# Patient Record
Sex: FEMALE | Race: BLACK OR AFRICAN AMERICAN | NOT HISPANIC OR LATINO | Employment: UNEMPLOYED | URBAN - METROPOLITAN AREA
[De-identification: names, ages, dates, MRNs, and addresses within clinical notes are randomized per-mention and may not be internally consistent; named-entity substitution may affect disease eponyms.]

---

## 2017-03-22 ENCOUNTER — HOSPITAL ENCOUNTER (OUTPATIENT)
Dept: PREADMISSION TESTING | Facility: HOSPITAL | Age: 57
Discharge: HOME | End: 2017-03-22
Attending: ORTHOPAEDIC SURGERY
Payer: MEDICARE

## 2017-03-22 VITALS
HEART RATE: 81 BPM | OXYGEN SATURATION: 98 % | RESPIRATION RATE: 18 BRPM | DIASTOLIC BLOOD PRESSURE: 81 MMHG | WEIGHT: 219 LBS | TEMPERATURE: 99 F | HEIGHT: 68 IN | SYSTOLIC BLOOD PRESSURE: 165 MMHG | BODY MASS INDEX: 33.19 KG/M2

## 2017-03-22 DIAGNOSIS — Z01.818 PRE-OP TESTING: Primary | ICD-10-CM

## 2017-03-22 LAB
ALBUMIN SERPL BCP-MCNC: 4.1 G/DL
ALP SERPL-CCNC: 107 U/L
ALT SERPL W/O P-5'-P-CCNC: 12 U/L
ANION GAP SERPL CALC-SCNC: 7 MMOL/L
AST SERPL-CCNC: 15 U/L
BASOPHILS # BLD AUTO: 0.04 K/UL
BASOPHILS NFR BLD: 0.3 %
BILIRUB SERPL-MCNC: 0.3 MG/DL
BUN SERPL-MCNC: 12 MG/DL
CALCIUM SERPL-MCNC: 10.2 MG/DL
CHLORIDE SERPL-SCNC: 103 MMOL/L
CO2 SERPL-SCNC: 28 MMOL/L
CREAT SERPL-MCNC: 0.9 MG/DL
DIFFERENTIAL METHOD: NORMAL
EOSINOPHIL # BLD AUTO: 0.1 K/UL
EOSINOPHIL NFR BLD: 0.8 %
ERYTHROCYTE [DISTWIDTH] IN BLOOD BY AUTOMATED COUNT: 12.7 %
EST. GFR  (AFRICAN AMERICAN): >60 ML/MIN/1.73 M^2
EST. GFR  (NON AFRICAN AMERICAN): >60 ML/MIN/1.73 M^2
GLUCOSE SERPL-MCNC: 95 MG/DL
HCT VFR BLD AUTO: 37.8 %
HGB BLD-MCNC: 13 G/DL
LYMPHOCYTES # BLD AUTO: 4.5 K/UL
LYMPHOCYTES NFR BLD: 38.9 %
MCH RBC QN AUTO: 30.9 PG
MCHC RBC AUTO-ENTMCNC: 34.4 %
MCV RBC AUTO: 90 FL
MONOCYTES # BLD AUTO: 0.8 K/UL
MONOCYTES NFR BLD: 7 %
NEUTROPHILS # BLD AUTO: 6.1 K/UL
NEUTROPHILS NFR BLD: 53 %
PLATELET # BLD AUTO: 334 K/UL
PMV BLD AUTO: 10.5 FL
POTASSIUM SERPL-SCNC: 3.9 MMOL/L
PROT SERPL-MCNC: 8.5 G/DL
RBC # BLD AUTO: 4.21 M/UL
SODIUM SERPL-SCNC: 138 MMOL/L
WBC # BLD AUTO: 11.48 K/UL

## 2017-03-22 PROCEDURE — 85025 COMPLETE CBC W/AUTO DIFF WBC: CPT

## 2017-03-22 PROCEDURE — 93005 ELECTROCARDIOGRAM TRACING: CPT

## 2017-03-22 PROCEDURE — 80053 COMPREHEN METABOLIC PANEL: CPT

## 2017-03-22 PROCEDURE — 36415 COLL VENOUS BLD VENIPUNCTURE: CPT

## 2017-03-22 RX ORDER — LEVOTHYROXINE SODIUM 25 UG/1
25 TABLET ORAL DAILY
COMMUNITY
End: 2017-03-22 | Stop reason: ALTCHOICE

## 2017-03-22 RX ORDER — INSULIN GLARGINE 100 [IU]/ML
30 INJECTION, SOLUTION SUBCUTANEOUS NIGHTLY
COMMUNITY
End: 2017-06-19 | Stop reason: SDUPTHER

## 2017-03-22 NOTE — PLAN OF CARE
Pre operative instructions, medication directives and pain scales/post op pain management discussed with patient. All questions were answered. Patient re-assured regarding surgical procedure and day of surgery routine as needed. Patient verbalized understanding of pre-op instructions.

## 2017-03-22 NOTE — DISCHARGE INSTRUCTIONS
"  Your surgery is scheduled for _____3/27/2017______________.    Call [phone] between 2 p.m. and 5 p.m. on   ___3/24/2017____________ to find out your arrival time for the day of your surgery.      Please report to SAME DAY SURGERY UNIT on the 2nd FLOOR at _______ a.m.  Use front door entrance. The doors open at 0530 am.      If you need WHEELCHAIR assistance please call  [phone] from your cell phone or "0"  from the  hospital courtesy phone located to the right after you enter the hospital lobby.      INSTRUCTIONS IMPORTANT!!!  ¨ Do not eat or drink after 12 midnight-including water. OK to brush teeth, no   gum, candy or mints!    ¨ Take only these medicines with a small swallow of water-morning of surgery.         Propanolol, nexium        PLEASE call [phone] when you get home so that we can verify your medications, dosages and frequency taken. This information is needed to complete your chart for surgery.      For this procedure you will shower at home the night before and also the morning of surgery with HIBICLENS soap provided by the pre op nurse. Do not use this soap on your face or genitals. You will shower a third time here at the hospital on the morning of surgery. Rinse completely after each shower.  Please place clean linens on your bed the night before surgery. Please wear fresh clean clothing after each shower.  No shaving of procedural area at least 4-5 days before surgery due to                        increased risk of skin irritation and/or possible infection.      _x___  Do not wear makeup, including mascara. WEARING EYE MAKEUP MAY                   LEAD TO SERIOUS EYE INJURY during surgery.  _x___  No powder, lotions or creams to your body.  _x___  You may wear only deodorant on the day of surgery.  _x___  Please remove all jewelry, including piercings and leave at home.  _x_  No money or valuables needed. Please leave at home.  You may bring your           cell phone.  _x___  Please bring any " documents given by your doctor.    _x___  Wear loose fitting clothing. Allow for dressings, bandages.  _x___  Stop Aspirin, Ibuprofen, Motrin and Aleve at least 3-5 days before                       surgery, unless otherwise instructed by your doctor, or the nurse.              You MAY use Tylenol/acetaminophen until day of surgery.  _x___  If you take diabetic medication, do not take am of surgery unless instructed by   Doctor.  x____  Call MD for temperature above 101 degrees.        _x_ Stop taking any Fish Oil supplement or any Vitamins that contain Vitamin                  E at least 5 days prior to surgery.          I have read or had read and explained to me, and understand the above information.  Additional comments or instructions:Please call   [phone] if you have any questions regarding the instructions above.

## 2017-03-22 NOTE — IP AVS SNAPSHOT
[place] [address] [address] [place] [address]  Phone: [phone]           Patient Discharge Instructions    Our goal is to set you up for success. This packet includes information on your condition, medications, and your home care. It will help you to care for yourself so you don't get sicker.     Please ask your nurse if you have any questions.        There are many details to remember when preparing for your surgery. Here is what you will need to do, please ask your nurse if there are more specific instructions and if you have any questions:    1. 24 hours before procedure Do not smoke or drink alcoholic beverages 24 hours prior to your procedure    2. Eating before procedure Do not eat or drink anything 8 hours before your procedure - this includes gum, mints, and candy.     3. Day of procedure Please remove all jewelry for the procedure. If you wear contact lenses, dentures, hearing aids or glasses, bring a container to put them in during your surgery and give to a family member for safekeeping.  If your doctor has scheduled you for an overnight stay, bring a small overnight bag with any personal items that you need.    4. After procedure Make arrangements in advance for transportation home by a responsible adult. It is not safe to drive a vehicle during the 24 hours following surgery.     PLEASE NOTE: You may be contacted the day before your surgery to confirm your surgery date and arrival time. The Surgery schedule has many variables which may affect the time of your surgery case. Family members should be available if your surgery time changes.                ** Verify the list of medication(s) below is accurate and up to date. Carry this with you in case of emergency. If your medications have changed, please notify your healthcare provider.             Medication List      TAKE these medications        Additional Info                      ANTIVERT 25 mg tablet   Refills:  0    Generic drug:  meclizine    Instructions:  Take by mouth. 1 Tablet Oral Every 6 hours.     AS NEEEDED FOR DIZZINESS     Begin Date    AM    Noon    PM    Bedtime       benazepril 10 MG tablet   Commonly known as:  LOTENSIN   Refills:  0    Instructions:  Take by mouth. 1 Tablet Oral Every day     Begin Date    AM    Noon    PM    Bedtime       bethanechol 25 MG Tab   Commonly known as:  URECHOLINE   Refills:  0    Instructions:  Take by mouth. 2 Tablet Oral Every 6 hours     Begin Date    AM    Noon    PM    Bedtime       CRESTOR 10 MG tablet   Refills:  0   Generic drug:  rosuvastatin      Begin Date    AM    Noon    PM    Bedtime       FLOMAX 0.4 mg Cp24   Refills:  0   Generic drug:  tamsulosin    Instructions:  Take by mouth. 1 Capsule, Ext Release 24 hr Oral Twice a day     Begin Date    AM    Noon    PM    Bedtime       * LANTUS 100 unit/mL injection   Refills:  0   Dose:  30 Units   Generic drug:  insulin glargine    Instructions:  Inject 30 Units into the skin every evening.     Begin Date    AM    Noon    PM    Bedtime       * insulin glargine 100 unit/mL injection   Commonly known as:  LANTUS   Quantity:  3 vial   Refills:  3   Dose:  25 Units    Instructions:  Inject 25 Units into the skin 2 (two) times daily. Give 25 units in am and at bedtime.     Begin Date    AM    Noon    PM    Bedtime       methimazole 10 MG Tab   Commonly known as:  TAPAZOLE   Refills:  0    Instructions:  Take by mouth. 2 Tablet Oral Twice a day     Begin Date    AM    Noon    PM    Bedtime       NEXIUM 40 MG capsule   Refills:  0   Generic drug:  esomeprazole      Begin Date    AM    Noon    PM    Bedtime       NOVOLOG 100 unit/mL injection   Refills:  0   Indications:  type 2 diabetes mellitus   Generic drug:  insulin aspart    Instructions:  Three times a day with meals according to sliding scale.     Begin Date    AM    Noon    PM    Bedtime       oxybutynin 5 MG Tab   Commonly known as:  DITROPAN   Refills:  0   Dose:  5 mg  "   Instructions:  Take 5 mg by mouth 2 (two) times daily.     Begin Date    AM    Noon    PM    Bedtime       propranolol 60 MG tablet   Commonly known as:  INDERAL   Refills:  0    Instructions:  Take by mouth 2 (two) times daily. 1 Tablet Oral Four times a day     Begin Date    AM    Noon    PM    Bedtime       SYNTHROID 25 MCG tablet   Refills:  0   Dose:  25 mcg   Generic drug:  levothyroxine    Instructions:  Take 25 mcg by mouth once daily.     Begin Date    AM    Noon    PM    Bedtime       * Notice:  This list has 2 medication(s) that are the same as other medications prescribed for you. Read the directions carefully, and ask your doctor or other care provider to review them with you.               Please bring to all follow up appointments:    1. A copy of your discharge instructions.  2. All medicines you are currently taking in their original bottles.  3. Identification and insurance card.    Please arrive 15 minutes ahead of scheduled appointment time.    Please call 24 hours in advance if you must reschedule your appointment and/or time.        Your Future Surgeries/Procedures     Mar 27, 2017   Surgery with Chapito Rodriguez MD   [facility] ([facility])    [address] [place], [place] [address] [id number] [phone]                  Discharge Instructions         Your surgery is scheduled for _____3/27/2017______________.    Call [phone] between 2 p.m. and 5 p.m. on   ___3/24/2017____________ to find out your arrival time for the day of your surgery.      Please report to SAME DAY SURGERY UNIT on the 2nd FLOOR at _______ a.m.  Use front door entrance. The doors open at 0530 am.      If you need WHEELCHAIR assistance please call  [phone] from your cell phone or "0"  from the  hospital courtesy phone located to the right after you enter the hospital lobby.      INSTRUCTIONS IMPORTANT!!!  ¨ Do not eat or drink after 12 midnight-including water. OK to brush teeth, no   gum, candy " or mints!    ¨ Take only these medicines with a small swallow of water-morning of surgery.         Propanolol, nexium        PLEASE call [phone] when you get home so that we can verify your medications, dosages and frequency taken. This information is needed to complete your chart for surgery.      For this procedure you will shower at home the night before and also the morning of surgery with HIBICLENS soap provided by the pre op nurse. Do not use this soap on your face or genitals. You will shower a third time here at the hospital on the morning of surgery. Rinse completely after each shower.  Please place clean linens on your bed the night before surgery. Please wear fresh clean clothing after each shower.  No shaving of procedural area at least 4-5 days before surgery due to                        increased risk of skin irritation and/or possible infection.      _x___  Do not wear makeup, including mascara. WEARING EYE MAKEUP MAY                   LEAD TO SERIOUS EYE INJURY during surgery.  _x___  No powder, lotions or creams to your body.  _x___  You may wear only deodorant on the day of surgery.  _x___  Please remove all jewelry, including piercings and leave at home.  _x_  No money or valuables needed. Please leave at home.  You may bring your           cell phone.  _x___  Please bring any documents given by your doctor.    _x___  Wear loose fitting clothing. Allow for dressings, bandages.  _x___  Stop Aspirin, Ibuprofen, Motrin and Aleve at least 3-5 days before                       surgery, unless otherwise instructed by your doctor, or the nurse.              You MAY use Tylenol/acetaminophen until day of surgery.  _x___  If you take diabetic medication, do not take am of surgery unless instructed by   Doctor.  x____  Call MD for temperature above 101 degrees.        _x_ Stop taking any Fish Oil supplement or any Vitamins that contain Vitamin                  E at least 5 days prior to surgery.         "  I have read or had read and explained to me, and understand the above information.  Additional comments or instructions:Please call   [phone] if you have any questions regarding the instructions above.                 Admission Information     Date & Time Provider Department CSN    3/22/2017 12:00 PM Chapito Rodriguez MD [facility] [id number]      Care Providers     Provider Role Specialty Primary office phone    Chapito Rodriguez MD Attending Provider Orthopedic Surgery [phone]      Your Vitals Were     BP Pulse Temp Resp Height Weight    165/81 (BP Location: Left arm, Patient Position: Sitting, BP Method: Automatic) 81 98.7 °F (37.1 °C) (Oral) 18 5' 7.5" (1.715 m) 99.3 kg (219 lb)    SpO2 BMI             98% 33.79 kg/m2         Recent Lab Values        4/16/2012 6/29/2012 9/9/2013 1/12/2016                 12:29 PM 11:20 AM 11:00 PM 11:18 AM        A1C 10.4 (H) 10.7 (H) 10.7 (H) 9.6 (H)        Comment for A1C at 11:18 AM on 1/12/2016:  Units:  % of total Hgb  According to ADA guidelines, hemoglobin A1c <7.0%  represents optimal control in non-pregnant diabetic  patients. Different metrics may apply to specific  patient populations. Standards of Medical Care in  Diabetes-2013. Diabetes Care. 2013;36:s11-s66  For the purpose of screening for the presence of  diabetes  <5.7%       Consistent with the absence of diabetes  5.7-6.4%    Consistent with increased risk for diabetes  (prediabetes)  >or=6.5%    Consistent with diabetes  This assay result is consistent with diabetes  mellitus.  Currently, no consensus exists for use of hemoglobin  A1c for diagnosis of diabetes for children.  Test Performed at:  [facility] [place]  [address]     JEFF RODGERS MD        Allergies as of 3/22/2017        Reactions    Penicillin G Shortness Of Breath    Penicillins Shortness Of Breath, Itching, Swelling      [facility] Care [facility] - " 24/7 Assistance  Unless otherwise directed by your provider, please contact [facility], our nurse care line that is available for 24/7 assistance.     Registered nurses in the [facility] provide clinical advisement, health education, appointment booking, and other advisory services.  Call for this free service at [phone].        Advance Directives     An advance directive is a document which, in the event you are no longer able to make decisions for yourself, tells your healthcare team what kind of treatment you do or do not want to receive, or who you would like to make those decisions for you.  If you do not currently have an advance directive, [facility] encourages you to create one.  For more information call:  [phone] ([phone]), [phone] ([phone]),  or log on to [email or web address].        Smoking Cessation     If you would like to quit smoking:   You may be eligible for free services if you are a [place] resident and started smoking cigarettes before September 1, 1988.  Call the Smoking Cessation Trust (SCT) toll free at (483) 499-9762 or (201) 020-4852.   Call 0-825-QUIT-NOW if you do not meet the above criteria.            Language Assistance Services     ATTENTION: Language assistance services are available, free of charge. Please call [phone].      ATENCIÓN: Si habla español, tiene a cordova disposición servicios gratuitos de asistencia lingüística. Llame al [phone].     CHÚ Ý: N?u b?n nói Ti?ng Vi?t, có các d?ch v? h? tr? ngôn ng? mi?n phí dành cho b?n. G?i s? [phone].        Diabetes Discharge Instructions                                   [facility] Sign-Up     Activating your [facility] account is as easy as 1-2-3!     1) Visit [email or web address], select Sign Up Now, enter this activation code and your date of birth, then select Next.  [email or web address]  Expires: 5/6/2017 12:41 PM      2) Create a username and password to use when you visit  [facility] in the future and select a security question in case you lose your password and select Next.    3) Enter your e-mail address and click Sign Up!    Additional Information  If you have questions, please e-mail [email or web address] or call [phone] to talk to our [facility] staff. Remember, [facility] is NOT to be used for urgent needs. For medical emergencies, dial 911.          [facility] complies with applicable Federal civil rights laws and does not discriminate on the basis of race, color, national origin, age, disability, or sex.

## 2017-03-27 ENCOUNTER — SURGERY (OUTPATIENT)
Age: 57
End: 2017-03-27

## 2017-03-27 ENCOUNTER — HOSPITAL ENCOUNTER (INPATIENT)
Facility: HOSPITAL | Age: 57
LOS: 1 days | Discharge: HOME | DRG: 554 | End: 2017-03-27
Attending: ORTHOPAEDIC SURGERY | Admitting: ORTHOPAEDIC SURGERY
Payer: MEDICARE

## 2017-03-27 VITALS
OXYGEN SATURATION: 99 % | WEIGHT: 219 LBS | DIASTOLIC BLOOD PRESSURE: 71 MMHG | HEIGHT: 68 IN | TEMPERATURE: 99 F | SYSTOLIC BLOOD PRESSURE: 133 MMHG | BODY MASS INDEX: 33.19 KG/M2 | HEART RATE: 77 BPM | RESPIRATION RATE: 18 BRPM

## 2017-03-27 DIAGNOSIS — M17.11 PRIMARY OSTEOARTHRITIS OF RIGHT KNEE: Primary | ICD-10-CM

## 2017-03-27 LAB
POCT GLUCOSE: 379 MG/DL (ref 70–110)
POCT GLUCOSE: 382 MG/DL (ref 70–110)

## 2017-03-27 PROCEDURE — 11000001 HC ACUTE MED/SURG PRIVATE ROOM

## 2017-03-27 PROCEDURE — 63600175 PHARM REV CODE 636 W HCPCS: Performed by: ANESTHESIOLOGY

## 2017-03-27 RX ORDER — LIDOCAINE HYDROCHLORIDE 10 MG/ML
1 INJECTION, SOLUTION EPIDURAL; INFILTRATION; INTRACAUDAL; PERINEURAL ONCE
Status: DISCONTINUED | OUTPATIENT
Start: 2017-03-27 | End: 2017-03-29 | Stop reason: HOSPADM

## 2017-03-27 RX ORDER — SODIUM CHLORIDE 9 MG/ML
INJECTION, SOLUTION INTRAVENOUS CONTINUOUS
Status: DISCONTINUED | OUTPATIENT
Start: 2017-03-27 | End: 2017-03-29 | Stop reason: HOSPADM

## 2017-03-27 RX ORDER — SODIUM CHLORIDE, SODIUM LACTATE, POTASSIUM CHLORIDE, CALCIUM CHLORIDE 600; 310; 30; 20 MG/100ML; MG/100ML; MG/100ML; MG/100ML
INJECTION, SOLUTION INTRAVENOUS CONTINUOUS
Status: DISCONTINUED | OUTPATIENT
Start: 2017-03-27 | End: 2017-03-29 | Stop reason: HOSPADM

## 2017-03-27 RX ORDER — INSULIN GLARGINE 100 [IU]/ML
50 INJECTION, SOLUTION SUBCUTANEOUS DAILY
COMMUNITY
End: 2017-06-19 | Stop reason: SDUPTHER

## 2017-03-27 RX ORDER — LIDOCAINE HYDROCHLORIDE 10 MG/ML
1 INJECTION, SOLUTION EPIDURAL; INFILTRATION; INTRACAUDAL; PERINEURAL ONCE AS NEEDED
Status: ACTIVE | OUTPATIENT
Start: 2017-03-27 | End: 2017-03-27

## 2017-03-27 RX ORDER — CLINDAMYCIN PHOSPHATE 900 MG/50ML
900 INJECTION, SOLUTION INTRAVENOUS
Status: DISCONTINUED | OUTPATIENT
Start: 2017-03-27 | End: 2017-03-29 | Stop reason: HOSPADM

## 2017-03-27 RX ADMIN — INSULIN HUMAN 10 UNITS: 100 INJECTION, SOLUTION PARENTERAL at 10:03

## 2017-03-27 NOTE — OR NURSING
Reported repeat blood sugar of 379 mg/dl  to Dr Rodriguez after getting new order. Instructed to tell patient , surgery cancelled for today and she is to follow-up with her primary care physician for diabetes management /improvement . Patient voices understanding but would like to wait to talk to Dr Rodriguez. He will come see her.

## 2017-03-30 NOTE — DISCHARGE SUMMARY
Patient was scheduled for a TKA but her blood sugar was elevated. We cancelled the case in order to nelida her blood sugar under better control.

## 2017-04-06 ENCOUNTER — HOSPITAL ENCOUNTER (OUTPATIENT)
Dept: PREADMISSION TESTING | Facility: HOSPITAL | Age: 57
Discharge: HOME | End: 2017-04-06
Attending: ORTHOPAEDIC SURGERY
Payer: MEDICARE

## 2017-04-06 VITALS
RESPIRATION RATE: 18 BRPM | HEIGHT: 68 IN | SYSTOLIC BLOOD PRESSURE: 162 MMHG | WEIGHT: 218 LBS | TEMPERATURE: 99 F | DIASTOLIC BLOOD PRESSURE: 83 MMHG | OXYGEN SATURATION: 98 % | HEART RATE: 91 BPM | BODY MASS INDEX: 33.04 KG/M2

## 2017-04-06 DIAGNOSIS — I15.9 SECONDARY HYPERTENSION: ICD-10-CM

## 2017-04-06 DIAGNOSIS — Z01.818 PRE-OP TESTING: Primary | ICD-10-CM

## 2017-04-06 LAB
ANION GAP SERPL CALC-SCNC: 9 MMOL/L
BUN SERPL-MCNC: 10 MG/DL
CALCIUM SERPL-MCNC: 9.6 MG/DL
CHLORIDE SERPL-SCNC: 105 MMOL/L
CO2 SERPL-SCNC: 24 MMOL/L
CREAT SERPL-MCNC: 1.1 MG/DL
EST. GFR  (AFRICAN AMERICAN): >60 ML/MIN/1.73 M^2
EST. GFR  (NON AFRICAN AMERICAN): 55 ML/MIN/1.73 M^2
GLUCOSE SERPL-MCNC: 236 MG/DL
POTASSIUM SERPL-SCNC: 4.2 MMOL/L
SODIUM SERPL-SCNC: 138 MMOL/L

## 2017-04-06 PROCEDURE — 83036 HEMOGLOBIN GLYCOSYLATED A1C: CPT

## 2017-04-06 PROCEDURE — 36415 COLL VENOUS BLD VENIPUNCTURE: CPT

## 2017-04-06 PROCEDURE — 80048 BASIC METABOLIC PNL TOTAL CA: CPT

## 2017-04-06 NOTE — DISCHARGE INSTRUCTIONS
"  Your surgery is scheduled for ___4/10/2017_________________.    Call [phone] between 2 p.m. and 5 p.m. on   ___4/7/2017____________ to find out your arrival time for the day of your surgery.      Please report to SAME DAY SURGERY UNIT on the 2nd FLOOR at _______ a.m.  Use front door entrance. The doors open at 0530 am.      If you need WHEELCHAIR assistance please call  [phone] from your cell phone or "0"  from the  hospital courtesy phone located to the right after you enter the hospital lobby.      INSTRUCTIONS IMPORTANT!!!  ¨ Do not eat or drink after 12 midnight-including water. OK to brush teeth, no   gum, candy or mints!    ¨ Take only these medicines with a small swallow of water-morning of surgery.   propanolol           _x___  Prep instructions:    SHOWER   OTHER  ______________________  _x___  Please shower using Hibiclens soap the night before AND  the morning of                  your surgery/procedure. Do not use Hibiclens on your face or genitals            Rinse hibiclens off completely.  _x___  No shaving of procedural area at least 4-5 days before surgery due to                        increased risk of skin irritation and/or possible infection.  _x___  Do not wear makeup, including mascara. WEARING EYE MAKEUP MAY                   LEAD TO SERIOUS EYE INJURY during surgery.  _x___  No powder, lotions or creams to your body.  _x___  You may wear only deodorant on the day of surgery.  _x___  Please remove all jewelry, including piercings and leave at home.  _x___  No money or valuables needed. Please leave at home.  You may bring your           cell phone.  x____  Please bring any documents given by your doctor.    _x___  Stop Aspirin, Ibuprofen, Motrin and Aleve at least 3-5 days before                       surgery, unless otherwise instructed by your doctor, or the nurse.              You MAY use Tylenol/acetaminophen until day of surgery.  _x___  If you take diabetic medication, do not take am " of surgery unless instructed by   Doctor.  _x___  Call MD for temperature above 101 degrees.        __x__ Stop taking any Fish Oil supplement or any Vitamins that contain Vitamin                  E at least 5 days prior to surgery.          I have read or had read and explained to me, and understand the above information.  Additional comments or instructions:Please call   [phone] if you have any questions regarding the instructions above.

## 2017-04-06 NOTE — IP AVS SNAPSHOT
[place] [address] [address] [address]  Phone: [phone]           Patient Discharge Instructions  Our goal is to set you up for success. This packet includes information on your condition, medications, and your home care. It will help you care for yourself to prevent having to return to the hospital.     Please ask your nurse if you have any questions.      There are many details to remember when preparing for your surgery. Here is what you will need to do, please ask your nurse if there are more specific instructions and if you have any questions:    1. Before procedure Do not smoke or drink alcoholic beverages 24 hours prior to your procedure. Do not eat or drink anything 8 hours before your procedure - this includes gum, mints, and candy.     2. Day of procedure Please remove all jewelry for the procedure. If you wear contact lenses, dentures, hearing aids or glasses, bring a container to put them in during your surgery and give to a family member.  If your doctor has scheduled you for an overnight stay, bring a small overnight bag with any personal items that you need.      3. After procedure  Make arrangements in advance for transportation home by a responsible adult. It is not safe to drive a vehicle during the 24 hours following surgery.     PLEASE NOTE: You may be contacted the day before your surgery to confirm your surgery date and arrival time. The Surgery schedule has many variables which may affect the time of your surgery case. Family members should be available if your surgery time changes.               ** Verify the list of medication(s) below is accurate and up to date. Carry this with you in case of emergency. If your medications have changed, please notify your healthcare provider.             Medication List      TAKE these medications        Additional Info                      ANTIVERT 25 mg tablet   Refills:  0   Generic drug:  meclizine    Instructions:   Take by mouth. 1 Tablet Oral Every 6 hours.     AS NEEEDED FOR DIZZINESS     Begin Date    AM    Noon    PM    Bedtime       benazepril 10 MG tablet   Commonly known as:  LOTENSIN   Refills:  0    Instructions:  Take by mouth. 1 Tablet Oral Every day     Begin Date    AM    Noon    PM    Bedtime       bethanechol 25 MG Tab   Commonly known as:  URECHOLINE   Refills:  0    Instructions:  Take by mouth. 2 Tablet Oral Every 6 hours     Begin Date    AM    Noon    PM    Bedtime       CRESTOR 10 MG tablet   Refills:  0   Generic drug:  rosuvastatin      Begin Date    AM    Noon    PM    Bedtime       FLOMAX 0.4 mg Cp24   Refills:  0   Generic drug:  tamsulosin    Instructions:  Take by mouth. 1 Capsule, Ext Release 24 hr Oral Twice a day     Begin Date    AM    Noon    PM    Bedtime       * LANTUS 100 unit/mL injection   Refills:  0   Dose:  30 Units   Generic drug:  insulin glargine    Instructions:  Inject 30 Units into the skin every evening.     Begin Date    AM    Noon    PM    Bedtime       * insulin glargine 100 unit/mL injection   Commonly known as:  LANTUS   Refills:  0   Dose:  50 Units    Instructions:  Inject 50 Units into the skin once daily.     Begin Date    AM    Noon    PM    Bedtime       * insulin glargine 100 unit/mL injection   Commonly known as:  LANTUS   Quantity:  3 vial   Refills:  3   Dose:  25 Units    Instructions:  Inject 25 Units into the skin 2 (two) times daily. Give 25 units in am and at bedtime.     Begin Date    AM    Noon    PM    Bedtime       METFORMIN ORAL   Refills:  0   Dose:  500 mg   Indications:  with breafast and dinner    Instructions:  Take 500 mg by mouth 2 (two) times daily.     Begin Date    AM    Noon    PM    Bedtime       methimazole 10 MG Tab   Commonly known as:  TAPAZOLE   Refills:  0   Dose:  10 mg    Instructions:  Take 10 mg by mouth once daily. 2 Tablet Oral Twice a day     Begin Date    AM    Noon    PM    Bedtime       NEXIUM 40 MG capsule   Refills:  0    Generic drug:  esomeprazole      Begin Date    AM    Noon    PM    Bedtime       NOVOLOG 100 unit/mL injection   Refills:  0   Indications:  type 2 diabetes mellitus   Generic drug:  insulin aspart    Instructions:  Three times a day with meals according to sliding scale.     Begin Date    AM    Noon    PM    Bedtime       oxybutynin 5 MG Tab   Commonly known as:  DITROPAN   Refills:  0   Dose:  5 mg    Instructions:  Take 5 mg by mouth 2 (two) times daily.     Begin Date    AM    Noon    PM    Bedtime       propranolol 60 MG tablet   Commonly known as:  INDERAL   Refills:  0    Instructions:  Take by mouth 2 (two) times daily. 1 Tablet Oral Four times a day     Begin Date    AM    Noon    PM    Bedtime       * Notice:  This list has 3 medication(s) that are the same as other medications prescribed for you. Read the directions carefully, and ask your doctor or other care provider to review them with you.               Please bring to all follow up appointments:    1. A copy of your discharge instructions.  2. All medicines you are currently taking in their original bottles.  3. Identification and insurance card.    Please arrive 15 minutes ahead of scheduled appointment time.    Please call 24 hours in advance if you must reschedule your appointment and/or time.        Your Future Surgeries/Procedures     Apr 10, 2017   Surgery with Chapito Rodriguez MD   [facility] ([facility])    [facility] [place] [address] [phone]                  Discharge Instructions         Your surgery is scheduled for ___4/10/2017_________________.    Call [phone] between 2 p.m. and 5 p.m. on   ___4/7/2017____________ to find out your arrival time for the day of your surgery.      Please report to SAME DAY SURGERY UNIT on the 2nd FLOOR at _______ a.m.  Use front door entrance. The doors open at 0530 am.      If you need WHEELCHAIR assistance please call  [phone] from your cell  "phone or "0"  from the  hospital courtesy phone located to the right after you enter the hospital lobby.      INSTRUCTIONS IMPORTANT!!!  ¨ Do not eat or drink after 12 midnight-including water. OK to brush teeth, no   gum, candy or mints!    ¨ Take only these medicines with a small swallow of water-morning of surgery.   propanolol           _x___  Prep instructions:    SHOWER   OTHER  ______________________  _x___  Please shower using Hibiclens soap the night before AND  the morning of                  your surgery/procedure. Do not use Hibiclens on your face or genitals            Rinse hibiclens off completely.  _x___  No shaving of procedural area at least 4-5 days before surgery due to                        increased risk of skin irritation and/or possible infection.  _x___  Do not wear makeup, including mascara. WEARING EYE MAKEUP MAY                   LEAD TO SERIOUS EYE INJURY during surgery.  _x___  No powder, lotions or creams to your body.  _x___  You may wear only deodorant on the day of surgery.  _x___  Please remove all jewelry, including piercings and leave at home.  _x___  No money or valuables needed. Please leave at home.  You may bring your           cell phone.  x____  Please bring any documents given by your doctor.    _x___  Stop Aspirin, Ibuprofen, Motrin and Aleve at least 3-5 days before                       surgery, unless otherwise instructed by your doctor, or the nurse.              You MAY use Tylenol/acetaminophen until day of surgery.  _x___  If you take diabetic medication, do not take am of surgery unless instructed by   Doctor.  _x___  Call MD for temperature above 101 degrees.        __x__ Stop taking any Fish Oil supplement or any Vitamins that contain Vitamin                  E at least 5 days prior to surgery.          I have read or had read and explained to me, and understand the above information.  Additional comments or instructions:Please call   [phone] if you have any " "questions regarding the instructions above.                 Admission Information     Date & Time Provider Department CSN    4/6/2017 12:00 PM Chapito Rodriguez MD [facility] [id number]      Care Providers     Provider Role Specialty Primary office phone    Chapito Rodriguez MD Attending Provider Orthopedic Surgery [phone]      Your Vitals Were     BP Pulse Temp Resp Height Weight    162/83 (BP Location: Left arm, Patient Position: Sitting, BP Method: Automatic) 91 99.4 °F (37.4 °C) (Oral) 18 5' 7.5" (1.715 m) 98.9 kg (218 lb)    SpO2 BMI             98% 33.64 kg/m2         Recent Lab Values        4/16/2012 6/29/2012 9/9/2013 1/12/2016                 12:29 PM 11:20 AM 11:00 PM 11:18 AM        A1C 10.4 (H) 10.7 (H) 10.7 (H) 9.6 (H)        Comment for A1C at 11:18 AM on 1/12/2016:  Units:  % of total Hgb  According to ADA guidelines, hemoglobin A1c <7.0%  represents optimal control in non-pregnant diabetic  patients. Different metrics may apply to specific  patient populations. Standards of Medical Care in  Diabetes-2013. Diabetes Care. 2013;36:s11-s66  For the purpose of screening for the presence of  diabetes  <5.7%       Consistent with the absence of diabetes  5.7-6.4%    Consistent with increased risk for diabetes  (prediabetes)  >or=6.5%    Consistent with diabetes  This assay result is consistent with diabetes  mellitus.  Currently, no consensus exists for use of hemoglobin  A1c for diagnosis of diabetes for children.  Test Performed at:  [facility] [place]  [address]     JEFF RODGERS MD        Allergies as of 4/6/2017        Reactions    Penicillin G Shortness Of Breath    Penicillins Shortness Of Breath, Itching, Swelling      [facility]     [facility] Nurse Care Line - 24/7 Assistance  Unless otherwise directed by your provider, please contact [facility], our nurse care line that is available for 24/7 assistance. "     Registered nurses in the [facility] provide clinical advisement, health education, appointment booking, and other advisory services.  Call for this free service at [phone].        Advance Directives     An advance directive is a document which, in the event you are no longer able to make decisions for yourself, tells your healthcare team what kind of treatment you do or do not want to receive, or who you would like to make those decisions for you.  If you do not currently have an advance directive, [facility] encourages you to create one.  For more information call:  [phone] ([phone]), [phone] ([phone]),  or log on to [email or web address].        Smoking Cessation     If you would like to quit smoking:   You may be eligible for free services if you are a [place] resident and started smoking cigarettes before September 1, 1988.  Call the Smoking Cessation Trust ([facility]) toll free at [phone] or [phone].   Call 4-633-QUIT-NOW if you do not meet the above criteria.   Contact us via email: [email or web address]   View our website for more information: [email or web address]        Language Assistance Services     ATTENTION: Language assistance services are available, free of charge. Please call [phone].      ATENCIÓN: Si habla español, tiene a cordova disposición servicios gratuitos de asistencia lingüística. Llame al [phone].     CHÚ Ý: N?u b?n nói Ti?ng Vi?t, có các d?ch v? h? tr? ngôn ng? mi?n phí dành cho b?n. G?i s? [phone].        Diabetes Discharge Instructions                                   [facility] Sign-Up     Activating your [facility] account is as easy as 1-2-3!     1) Visit [email or web address], select Sign Up Now, enter this activation code and your date of birth, then select Next.  [email or web address]  Expires: 5/6/2017 12:41 PM      2) Create a username and password to use when you visit [facility] in the future and select a  security question in case you lose your password and select Next.    3) Enter your e-mail address and click Sign Up!    Additional Information  If you have questions, please e-mail [email or web address] or call [phone] to talk to our [facility] staff. Remember, [facility] is NOT to be used for urgent needs. For medical emergencies, dial 911.          [facility] complies with applicable Federal civil rights laws and does not discriminate on the basis of race, color, national origin, age, disability, or sex.

## 2017-04-07 LAB
ESTIMATED AVG GLUCOSE: 246 MG/DL
HBA1C MFR BLD HPLC: 10.2 %

## 2017-06-19 ENCOUNTER — HOSPITAL ENCOUNTER (OUTPATIENT)
Dept: PREADMISSION TESTING | Facility: HOSPITAL | Age: 57
Discharge: HOME | End: 2017-06-19
Attending: ORTHOPAEDIC SURGERY
Payer: MEDICARE

## 2017-06-19 VITALS
RESPIRATION RATE: 17 BRPM | HEART RATE: 95 BPM | HEIGHT: 68 IN | TEMPERATURE: 100 F | SYSTOLIC BLOOD PRESSURE: 128 MMHG | BODY MASS INDEX: 34.89 KG/M2 | WEIGHT: 230.19 LBS | DIASTOLIC BLOOD PRESSURE: 86 MMHG | OXYGEN SATURATION: 95 %

## 2017-06-19 DIAGNOSIS — N39.0 URINARY TRACT INFECTION, BACTERIAL: ICD-10-CM

## 2017-06-19 DIAGNOSIS — E11.65 UNCONTROLLED TYPE 2 DIABETES MELLITUS WITH HYPERGLYCEMIA, UNSPECIFIED LONG TERM INSULIN USE STATUS: ICD-10-CM

## 2017-06-19 DIAGNOSIS — Z01.818 PRE-OP TESTING: Primary | ICD-10-CM

## 2017-06-19 DIAGNOSIS — Z87.440 HISTORY OF UTI: ICD-10-CM

## 2017-06-19 DIAGNOSIS — A49.9 URINARY TRACT INFECTION, BACTERIAL: ICD-10-CM

## 2017-06-19 LAB
ANION GAP SERPL CALC-SCNC: 9 MMOL/L
BACTERIA #/AREA URNS HPF: NORMAL /HPF
BASOPHILS # BLD AUTO: 0.03 K/UL
BASOPHILS NFR BLD: 0.3 %
BILIRUB UR QL STRIP: NEGATIVE
BUN SERPL-MCNC: 12 MG/DL
CALCIUM SERPL-MCNC: 10 MG/DL
CHLORIDE SERPL-SCNC: 107 MMOL/L
CLARITY UR: CLEAR
CO2 SERPL-SCNC: 22 MMOL/L
COLOR UR: YELLOW
CREAT SERPL-MCNC: 0.9 MG/DL
DIFFERENTIAL METHOD: NORMAL
EOSINOPHIL # BLD AUTO: 0.1 K/UL
EOSINOPHIL NFR BLD: 0.7 %
ERYTHROCYTE [DISTWIDTH] IN BLOOD BY AUTOMATED COUNT: 12.8 %
EST. GFR  (AFRICAN AMERICAN): >60 ML/MIN/1.73 M^2
EST. GFR  (NON AFRICAN AMERICAN): >60 ML/MIN/1.73 M^2
GLUCOSE SERPL-MCNC: 102 MG/DL
GLUCOSE UR QL STRIP: NEGATIVE
HCT VFR BLD AUTO: 38.7 %
HGB BLD-MCNC: 12.8 G/DL
HGB UR QL STRIP: ABNORMAL
KETONES UR QL STRIP: NEGATIVE
LEUKOCYTE ESTERASE UR QL STRIP: ABNORMAL
LYMPHOCYTES # BLD AUTO: 3.8 K/UL
LYMPHOCYTES NFR BLD: 35.1 %
MCH RBC QN AUTO: 30.4 PG
MCHC RBC AUTO-ENTMCNC: 33.1 %
MCV RBC AUTO: 92 FL
MICROSCOPIC COMMENT: NORMAL
MONOCYTES # BLD AUTO: 0.8 K/UL
MONOCYTES NFR BLD: 7 %
NEUTROPHILS # BLD AUTO: 6.2 K/UL
NEUTROPHILS NFR BLD: 56.8 %
NITRITE UR QL STRIP: NEGATIVE
PH UR STRIP: 5 [PH] (ref 5–8)
PLATELET # BLD AUTO: 305 K/UL
PMV BLD AUTO: 10.6 FL
POTASSIUM SERPL-SCNC: 4.3 MMOL/L
PROT UR QL STRIP: NEGATIVE
RBC # BLD AUTO: 4.21 M/UL
RBC #/AREA URNS HPF: 2 /HPF (ref 0–4)
SODIUM SERPL-SCNC: 138 MMOL/L
SP GR UR STRIP: 1.01 (ref 1–1.03)
SQUAMOUS #/AREA URNS HPF: 3 /HPF
URN SPEC COLLECT METH UR: ABNORMAL
UROBILINOGEN UR STRIP-ACNC: NEGATIVE EU/DL
WBC # BLD AUTO: 10.87 K/UL
WBC #/AREA URNS HPF: 2 /HPF (ref 0–5)

## 2017-06-19 PROCEDURE — 36415 COLL VENOUS BLD VENIPUNCTURE: CPT

## 2017-06-19 PROCEDURE — 81000 URINALYSIS NONAUTO W/SCOPE: CPT

## 2017-06-19 PROCEDURE — 80048 BASIC METABOLIC PNL TOTAL CA: CPT

## 2017-06-19 PROCEDURE — 87086 URINE CULTURE/COLONY COUNT: CPT

## 2017-06-19 PROCEDURE — 85025 COMPLETE CBC W/AUTO DIFF WBC: CPT

## 2017-06-19 PROCEDURE — 83036 HEMOGLOBIN GLYCOSYLATED A1C: CPT

## 2017-06-19 NOTE — DISCHARGE INSTRUCTIONS
"For this procedure you will shower at home the night before and also the morning of surgery with HIBICLENS soap provided by the pre op nurse. Do not use this soap on your face or genitals. You will shower a third time here at the hospital on the morning of surgery. Rinse completely after each shower.  Please place clean linens on your bed the night before surgery. Please wear fresh clean clothing after each shower.  No shaving of procedural area at least 4-5 days before surgery due to   increased risk of skin irritation and/or possible infection.         Your surgery is scheduled for Monday June 26, 2017___.    Call [phone] between 2 p.m. and 5 p.m. on   _Friday __ to find out your arrival time for the day of your surgery.      Please report to SAME DAY SURGERY UNIT on the 2nd FLOOR at _______ a.m.  Use front door entrance. The doors open at 0530 am.      If you need WHEELCHAIR assistance please call  [phone] from your cell phone or "0"  from the  hospital courtesy phone located to the right after you enter the hospital lobby.      INSTRUCTIONS IMPORTANT!!!  ¨ Do not eat or drink after 12 midnight-including water. OK to brush teeth, no   gum, candy or mints!    ¨ Take only these medicines with a small swallow of water-morning of surgery.  Take Benazepril and Inderal am of surgery with swallow of water.      _x___  Prep instructions:    SHOWER    _x___  Please shower using Hibiclens soap the night before AND  the morning of your surgery/procedure. Do not use Hibiclens on your face or genitals               If your surgery is around your belly button (Navel) be sure to wash inside your  belly button also. Rinse hibiclens off completely.  _x___  No shaving of procedural area at least 4-5 days before surgery due to   increased risk of skin irritation and/or possible infection.  _x___  Do not wear makeup, including mascara. WEARING EYE MAKEUP MAY  LEAD TO SERIOUS EYE INJURY during surgery.  _x___  No powder, lotions or " creams to your body.  _x___  You may wear only deodorant on the day of surgery.  _x___  Please remove all jewelry, including piercings and leave at home.  _x__  No money or valuables needed. Please leave at home.  You may bring your cell phone.  ____  Please bring any documents given by your doctor.    _x___  Wear loose fitting clothing. Allow for dressings, bandages.  x____  Stop Aspirin, Ibuprofen, Motrin and Aleve at least 3-5 days before   surgery, unless otherwise instructed by your doctor, or the nurse.              You MAY use Tylenol/acetaminophen until day of surgery.  _x___  If you take diabetic medication, do not take am of surgery unless instructed by   Doctor.  _x___  Call MD for temperature above 101 degrees.        __x__ Stop taking any Fish Oil supplement or any Vitamins that contain Vitamin    E at least 5 days prior to surgery.          I have read or had read and explained to me, and understand the above information.  Additional comments or instructions:Please call   [phone] if you have any questions regarding the instructions above.

## 2017-06-19 NOTE — PRE-PROCEDURE INSTRUCTIONS
Pre operative instructions, medication directives and pain scales/post op pain management discussed with patient.   Instructed to wash with Hibiclens prior to surgery as directed.   All questions were answered. Patient re-assured regarding surgical procedure and day of surgery routine as needed. Patient verbalized understanding of pre-op instructions.

## 2017-06-20 LAB
ESTIMATED AVG GLUCOSE: 203 MG/DL
HBA1C MFR BLD HPLC: 8.7 %

## 2017-06-21 LAB — BACTERIA UR CULT: NORMAL

## 2017-06-23 NOTE — H&P
CMikeC. Right knee pain    HPI: Navin Cobian56 y.o. complaining of right knee pain. She has had right knee pain for years. She has severe arthritis that she has been dealing with through injections bth cortisone and visco, the last of which was over 6 months ago. She has performed weight loss through diet. She has tried NSIAD's and other OTC medicines. At this point nothing is working. She is ready to proceed with a surgery    ROS:   Pertinent positives: right knee pain   Negatives: F/C, N/V, CP, SOB,    All other 14 point ROS negative    PMH:   Past Medical History:   Diagnosis Date    Allergy     Arrhythmia     Arthritis     Diabetes mellitus     Glaucoma     Hormone disorder     hysterectomy    Hypertension     Hyperthyroidism     Insomnia     Kidney stones     Thyroid disease     Urine, incontinence, stress female        PSH:   Past Surgical History:   Procedure Laterality Date    APPENDECTOMY      BACK SURGERY      disc removal     CHOLECYSTECTOMY      HYSTERECT[facility]Y  [facility]    KNEE ARTHROSCOPY W/ DEBRIDEMENT      KNEE SURGERY      LITHOTRIPSY      VT REMOVAL OF OVARY/TUBE(S)  9/9/13    benign    removal of round ligament mass      WRIST FRACTURE SURGERY      bilateral wrist        Social Hx:   Social History     Occupational History    Not on file.     Social History Main Topics    Smoking status: Former Smoker     Packs/day: 0.25     Years: 30.00    Smokeless tobacco: Never Used    Alcohol use No    Drug use: No    Sexual activity: Not Currently     Partners: Male     Birth control/ protection: Surgical       Medications:    No current facility-administered medications on file prior to encounter.      Current Outpatient Prescriptions on File Prior to Encounter   Medication Sig Dispense Refill    benazepril (LOTENSIN) 10 MG tablet Take by mouth. 1 Tablet Oral Every day      bethanechol (URECHOLINE) 25 MG Tab Take by mouth. 2 Tablet Oral Every 6  "hours      CRESTOR 10 mg tablet       insulin aspart (NOVOLOG) 100 unit/mL injection Three times a day with meals according to sliding scale.      insulin glargine (LANTUS) 100 unit/mL injection Inject 25 Units into the skin 2 (two) times daily. Give 25 units in am and at bedtime. (Patient taking differently: Inject 25 Units into the skin 2 (two) times daily. Give 60 units in am and 50 UNITS  at bedtime.) 3 vial 3    meclizine (ANTIVERT) 25 mg tablet Take by mouth. 1 Tablet Oral Every 6 hours.     AS NEEEDED FOR DIZZINESS      METFORMIN HCL (METFORMIN ORAL) Take 500 mg by mouth 2 (two) times daily.      methimazole (TAPAZOLE) 10 MG Tab Take 10 mg by mouth once daily. 2 Tablet Oral Twice a day       NEXIUM 40 mg capsule       oxybutynin (DITROPAN) 5 MG Tab Take 5 mg by mouth 2 (two) times daily.      propranolol (INDERAL) 60 MG tablet Take by mouth 2 (two) times daily. 1 Tablet Oral Four times a day      tamsulosin (FLOMAX) 0.4 mg Cp24 Take by mouth. 1 Capsule, Ext Release 24 hr Oral Twice a day         Allergies:   Review of patient's allergies indicates:   Allergen Reactions    Penicillin g Shortness Of Breath    Penicillins Shortness Of Breath, Itching and Swelling         PE:         There were no vitals filed for this visit.    Estimated body mass index is 35.52 kg/m² as calculated from the following:    Height as of this encounter: 5' 7.5" (1.715 m).    Weight as of this encounter: 104.4 kg (230 lb 2.6 oz).     General WDWN, NAD    Heart RRR  Lungs CTAB  Abd S/NT/ND     Extremity: Right knee with mild effusion  Crepitation with ROM  ROM 5-80 with pain  Stable to varus and valgus stress.  NV intact distally  Palpable distal pulses.     Labs:    Lab Results   Component Value Date    WBC 10.87 06/19/2017    HGB 12.8 06/19/2017    HCT 38.7 06/19/2017    MCV 92 06/19/2017     06/19/2017       BMP  Lab Results   Component Value Date     06/19/2017    K 4.3 06/19/2017     06/19/2017    " CO2 22 (L) 06/19/2017    BUN 12 06/19/2017    CREATININE 0.9 06/19/2017    CALCIUM 10.0 06/19/2017    ANIONGAP 9 06/19/2017    ESTGFRAFRICA >60 06/19/2017    EGFRNONAA >60 06/19/2017       Lab Results   Component Value Date    INR 1.0 06/18/2012       No results found for: SEDRATE    No results found for: CRP    Radiography:  Film    Interpretation  Severe tricompartmental right knee arthritis.    A/P  56 y.o.female with right knee DJD    Plan:    Proceed with Right TKA after clearance.     Vancomycin will be used due to the increased presence of MRSA in the community.    Risks and complications were discussed including but not limited to the risks of anesthetic complications, infection, wound healing complications, bleeding, aseptic loosening, injury to nerve, vessels, anr or soft tissues. Need to repeat or perform future operations, instability, limb length inequality, neurologic dysfunction including numbness, DVT, pulmonary embolism, myocardial infarction, stroke and death among others were discussed, and the patient elects to proceed.    The patient has no history of blood clot.   The patient is not on blood thinners.        Chapito Rodriguez MD

## 2017-06-25 ENCOUNTER — ANESTHESIA EVENT (OUTPATIENT)
Dept: SURGERY | Facility: HOSPITAL | Age: 57
DRG: 470 | End: 2017-06-25
Payer: MEDICARE

## 2017-06-26 ENCOUNTER — HOSPITAL ENCOUNTER (INPATIENT)
Facility: HOSPITAL | Age: 57
LOS: 2 days | Discharge: HOME HEALTH | DRG: 470 | End: 2017-06-28
Attending: ORTHOPAEDIC SURGERY | Admitting: ORTHOPAEDIC SURGERY
Payer: MEDICARE

## 2017-06-26 ENCOUNTER — ANESTHESIA (OUTPATIENT)
Dept: SURGERY | Facility: HOSPITAL | Age: 57
DRG: 470 | End: 2017-06-26
Payer: MEDICARE

## 2017-06-26 DIAGNOSIS — M17.11 PRIMARY OSTEOARTHRITIS OF RIGHT KNEE: Primary | ICD-10-CM

## 2017-06-26 LAB
ANION GAP SERPL CALC-SCNC: 8 MMOL/L
BUN SERPL-MCNC: 12 MG/DL
CALCIUM SERPL-MCNC: 9.6 MG/DL
CHLORIDE SERPL-SCNC: 106 MMOL/L
CO2 SERPL-SCNC: 24 MMOL/L
CREAT SERPL-MCNC: 1 MG/DL
EST. GFR  (AFRICAN AMERICAN): >60 ML/MIN/1.73 M^2
EST. GFR  (NON AFRICAN AMERICAN): >60 ML/MIN/1.73 M^2
GLUCOSE SERPL-MCNC: 121 MG/DL
HCT VFR BLD AUTO: 38.1 %
HGB BLD-MCNC: 12.6 G/DL
POCT GLUCOSE: 113 MG/DL (ref 70–110)
POCT GLUCOSE: 169 MG/DL (ref 70–110)
POCT GLUCOSE: 239 MG/DL (ref 70–110)
POTASSIUM SERPL-SCNC: 4 MMOL/L
SODIUM SERPL-SCNC: 138 MMOL/L

## 2017-06-26 PROCEDURE — 25000003 PHARM REV CODE 250: Performed by: ANESTHESIOLOGY

## 2017-06-26 PROCEDURE — C1713 ANCHOR/SCREW BN/BN,TIS/BN: HCPCS | Performed by: ORTHOPAEDIC SURGERY

## 2017-06-26 PROCEDURE — 63600175 PHARM REV CODE 636 W HCPCS: Performed by: ANESTHESIOLOGY

## 2017-06-26 PROCEDURE — 85018 HEMOGLOBIN: CPT

## 2017-06-26 PROCEDURE — 36000710: Performed by: ORTHOPAEDIC SURGERY

## 2017-06-26 PROCEDURE — 63600175 PHARM REV CODE 636 W HCPCS: Performed by: ORTHOPAEDIC SURGERY

## 2017-06-26 PROCEDURE — 71000039 HC RECOVERY, EACH ADD'L HOUR: Performed by: ORTHOPAEDIC SURGERY

## 2017-06-26 PROCEDURE — 0SRC0J9 REPLACEMENT OF RIGHT KNEE JOINT WITH SYNTHETIC SUBSTITUTE, CEMENTED, OPEN APPROACH: ICD-10-PCS | Performed by: ORTHOPAEDIC SURGERY

## 2017-06-26 PROCEDURE — 80048 BASIC METABOLIC PNL TOTAL CA: CPT

## 2017-06-26 PROCEDURE — 85014 HEMATOCRIT: CPT

## 2017-06-26 PROCEDURE — 63600175 PHARM REV CODE 636 W HCPCS: Performed by: NURSE ANESTHETIST, CERTIFIED REGISTERED

## 2017-06-26 PROCEDURE — 37000009 HC ANESTHESIA EA ADD 15 MINS: Performed by: ORTHOPAEDIC SURGERY

## 2017-06-26 PROCEDURE — 25000003 PHARM REV CODE 250

## 2017-06-26 PROCEDURE — 36415 COLL VENOUS BLD VENIPUNCTURE: CPT

## 2017-06-26 PROCEDURE — 25000003 PHARM REV CODE 250: Performed by: ORTHOPAEDIC SURGERY

## 2017-06-26 PROCEDURE — 36000711: Performed by: ORTHOPAEDIC SURGERY

## 2017-06-26 PROCEDURE — D9220A PRA ANESTHESIA: Mod: CRNA,,, | Performed by: NURSE ANESTHETIST, CERTIFIED REGISTERED

## 2017-06-26 PROCEDURE — 37000008 HC ANESTHESIA 1ST 15 MINUTES: Performed by: ORTHOPAEDIC SURGERY

## 2017-06-26 PROCEDURE — 27201423 OPTIME MED/SURG SUP & DEVICES STERILE SUPPLY: Performed by: ORTHOPAEDIC SURGERY

## 2017-06-26 PROCEDURE — D9220A PRA ANESTHESIA: Mod: ANES,,, | Performed by: ANESTHESIOLOGY

## 2017-06-26 PROCEDURE — 12000002 HC ACUTE/MED SURGE SEMI-PRIVATE ROOM

## 2017-06-26 PROCEDURE — 71000033 HC RECOVERY, INTIAL HOUR: Performed by: ORTHOPAEDIC SURGERY

## 2017-06-26 PROCEDURE — 25000003 PHARM REV CODE 250: Performed by: NURSE ANESTHETIST, CERTIFIED REGISTERED

## 2017-06-26 PROCEDURE — 63600175 PHARM REV CODE 636 W HCPCS

## 2017-06-26 PROCEDURE — C1776 JOINT DEVICE (IMPLANTABLE): HCPCS | Performed by: ORTHOPAEDIC SURGERY

## 2017-06-26 DEVICE — TIBIAL POST STAB SZ 3 11X3 POL: Type: IMPLANTABLE DEVICE | Site: KNEE | Status: FUNCTIONAL

## 2017-06-26 DEVICE — PATELLA TRI 29X8 X3 POLYETHYLE: Type: IMPLANTABLE DEVICE | Site: KNEE | Status: FUNCTIONAL

## 2017-06-26 DEVICE — CEMENT BONE W/GENT SIMPLEX HV: Type: IMPLANTABLE DEVICE | Site: KNEE | Status: FUNCTIONAL

## 2017-06-26 DEVICE — BASEPLATE TIB CEM PRIM SZ 3: Type: IMPLANTABLE DEVICE | Site: KNEE | Status: FUNCTIONAL

## 2017-06-26 DEVICE — COMP FEM POST STAB CEM SZ 4 RT: Type: IMPLANTABLE DEVICE | Site: KNEE | Status: FUNCTIONAL

## 2017-06-26 RX ORDER — MORPHINE SULFATE 10 MG/ML
4 INJECTION INTRAMUSCULAR; INTRAVENOUS; SUBCUTANEOUS EVERY 4 HOURS PRN
Status: DISCONTINUED | OUTPATIENT
Start: 2017-06-26 | End: 2017-06-28 | Stop reason: HOSPADM

## 2017-06-26 RX ORDER — ONDANSETRON 2 MG/ML
INJECTION INTRAMUSCULAR; INTRAVENOUS
Status: DISCONTINUED | OUTPATIENT
Start: 2017-06-26 | End: 2017-06-26

## 2017-06-26 RX ORDER — OXYCODONE HCL 10 MG/1
10 TABLET, FILM COATED, EXTENDED RELEASE ORAL EVERY 12 HOURS
Status: COMPLETED | OUTPATIENT
Start: 2017-06-26 | End: 2017-06-28

## 2017-06-26 RX ORDER — CLINDAMYCIN PHOSPHATE 900 MG/50ML
INJECTION, SOLUTION INTRAVENOUS
Status: DISCONTINUED | OUTPATIENT
Start: 2017-06-26 | End: 2017-06-26

## 2017-06-26 RX ORDER — OXYBUTYNIN CHLORIDE 5 MG/1
5 TABLET ORAL 2 TIMES DAILY
Status: DISCONTINUED | OUTPATIENT
Start: 2017-06-26 | End: 2017-06-28 | Stop reason: HOSPADM

## 2017-06-26 RX ORDER — SODIUM CHLORIDE, SODIUM LACTATE, POTASSIUM CHLORIDE, CALCIUM CHLORIDE 600; 310; 30; 20 MG/100ML; MG/100ML; MG/100ML; MG/100ML
INJECTION, SOLUTION INTRAVENOUS CONTINUOUS PRN
Status: DISCONTINUED | OUTPATIENT
Start: 2017-06-26 | End: 2017-06-26

## 2017-06-26 RX ORDER — IBUPROFEN 200 MG
24 TABLET ORAL
Status: DISCONTINUED | OUTPATIENT
Start: 2017-06-26 | End: 2017-06-28 | Stop reason: HOSPADM

## 2017-06-26 RX ORDER — FENTANYL CITRATE 50 UG/ML
25 INJECTION, SOLUTION INTRAMUSCULAR; INTRAVENOUS EVERY 5 MIN PRN
Status: COMPLETED | OUTPATIENT
Start: 2017-06-26 | End: 2017-06-26

## 2017-06-26 RX ORDER — HYDROMORPHONE HYDROCHLORIDE 2 MG/ML
0.2 INJECTION, SOLUTION INTRAMUSCULAR; INTRAVENOUS; SUBCUTANEOUS EVERY 5 MIN PRN
Status: DISCONTINUED | OUTPATIENT
Start: 2017-06-26 | End: 2017-06-26 | Stop reason: HOSPADM

## 2017-06-26 RX ORDER — ROSUVASTATIN CALCIUM 10 MG/1
10 TABLET, COATED ORAL DAILY
Status: DISCONTINUED | OUTPATIENT
Start: 2017-06-27 | End: 2017-06-28 | Stop reason: HOSPADM

## 2017-06-26 RX ORDER — INSULIN ASPART 100 [IU]/ML
1-10 INJECTION, SOLUTION INTRAVENOUS; SUBCUTANEOUS
Status: DISCONTINUED | OUTPATIENT
Start: 2017-06-26 | End: 2017-06-28 | Stop reason: HOSPADM

## 2017-06-26 RX ORDER — HYDRALAZINE HYDROCHLORIDE 20 MG/ML
10 INJECTION INTRAMUSCULAR; INTRAVENOUS ONCE
Status: COMPLETED | OUTPATIENT
Start: 2017-06-26 | End: 2017-06-26

## 2017-06-26 RX ORDER — METOCLOPRAMIDE HYDROCHLORIDE 5 MG/ML
INJECTION INTRAMUSCULAR; INTRAVENOUS
Status: DISCONTINUED | OUTPATIENT
Start: 2017-06-26 | End: 2017-06-26

## 2017-06-26 RX ORDER — ASPIRIN 325 MG
325 TABLET ORAL DAILY
Status: DISCONTINUED | OUTPATIENT
Start: 2017-06-27 | End: 2017-06-28 | Stop reason: HOSPADM

## 2017-06-26 RX ORDER — HYDROMORPHONE HYDROCHLORIDE 2 MG/ML
INJECTION, SOLUTION INTRAMUSCULAR; INTRAVENOUS; SUBCUTANEOUS
Status: COMPLETED
Start: 2017-06-26 | End: 2017-06-26

## 2017-06-26 RX ORDER — METOPROLOL TARTRATE 1 MG/ML
INJECTION, SOLUTION INTRAVENOUS
Status: DISPENSED
Start: 2017-06-26 | End: 2017-06-27

## 2017-06-26 RX ORDER — PHENYLEPHRINE HYDROCHLORIDE 10 MG/ML
INJECTION INTRAVENOUS
Status: DISCONTINUED | OUTPATIENT
Start: 2017-06-26 | End: 2017-06-26

## 2017-06-26 RX ORDER — GLUCAGON 1 MG
1 KIT INJECTION
Status: DISCONTINUED | OUTPATIENT
Start: 2017-06-26 | End: 2017-06-28 | Stop reason: HOSPADM

## 2017-06-26 RX ORDER — MEPERIDINE HYDROCHLORIDE 50 MG/ML
12.5 INJECTION INTRAMUSCULAR; INTRAVENOUS; SUBCUTANEOUS ONCE AS NEEDED
Status: DISCONTINUED | OUTPATIENT
Start: 2017-06-26 | End: 2017-06-26 | Stop reason: HOSPADM

## 2017-06-26 RX ORDER — OXYCODONE HYDROCHLORIDE 5 MG/1
10 TABLET ORAL
Status: DISCONTINUED | OUTPATIENT
Start: 2017-06-26 | End: 2017-06-28 | Stop reason: HOSPADM

## 2017-06-26 RX ORDER — SODIUM CHLORIDE 0.9 % (FLUSH) 0.9 %
3 SYRINGE (ML) INJECTION
Status: DISCONTINUED | OUTPATIENT
Start: 2017-06-26 | End: 2017-06-28 | Stop reason: HOSPADM

## 2017-06-26 RX ORDER — FENTANYL CITRATE 50 UG/ML
INJECTION, SOLUTION INTRAMUSCULAR; INTRAVENOUS
Status: DISCONTINUED
Start: 2017-06-26 | End: 2017-06-26 | Stop reason: WASHOUT

## 2017-06-26 RX ORDER — HYDRALAZINE HYDROCHLORIDE 20 MG/ML
INJECTION INTRAMUSCULAR; INTRAVENOUS
Status: COMPLETED
Start: 2017-06-26 | End: 2017-06-26

## 2017-06-26 RX ORDER — LIDOCAINE HCL/PF 100 MG/5ML
SYRINGE (ML) INTRAVENOUS
Status: DISCONTINUED | OUTPATIENT
Start: 2017-06-26 | End: 2017-06-26

## 2017-06-26 RX ORDER — SODIUM CHLORIDE 0.9 % (FLUSH) 0.9 %
3 SYRINGE (ML) INJECTION EVERY 8 HOURS
Status: DISCONTINUED | OUTPATIENT
Start: 2017-06-26 | End: 2017-06-26 | Stop reason: HOSPADM

## 2017-06-26 RX ORDER — MIDAZOLAM HYDROCHLORIDE 1 MG/ML
INJECTION, SOLUTION INTRAMUSCULAR; INTRAVENOUS
Status: DISCONTINUED | OUTPATIENT
Start: 2017-06-26 | End: 2017-06-26

## 2017-06-26 RX ORDER — PANTOPRAZOLE SODIUM 40 MG/1
40 TABLET, DELAYED RELEASE ORAL DAILY
Status: DISCONTINUED | OUTPATIENT
Start: 2017-06-27 | End: 2017-06-28 | Stop reason: HOSPADM

## 2017-06-26 RX ORDER — IBUPROFEN 200 MG
16 TABLET ORAL
Status: DISCONTINUED | OUTPATIENT
Start: 2017-06-26 | End: 2017-06-28 | Stop reason: HOSPADM

## 2017-06-26 RX ORDER — OXYCODONE AND ACETAMINOPHEN 5; 325 MG/1; MG/1
1 TABLET ORAL
Status: DISCONTINUED | OUTPATIENT
Start: 2017-06-26 | End: 2017-06-26 | Stop reason: HOSPADM

## 2017-06-26 RX ORDER — METOPROLOL TARTRATE 1 MG/ML
5 INJECTION, SOLUTION INTRAVENOUS ONCE
Status: COMPLETED | OUTPATIENT
Start: 2017-06-26 | End: 2017-06-26

## 2017-06-26 RX ORDER — TRANEXAMIC ACID 100 MG/ML
1000 INJECTION, SOLUTION INTRAVENOUS ONCE
Status: COMPLETED | OUTPATIENT
Start: 2017-06-26 | End: 2017-06-26

## 2017-06-26 RX ORDER — TAMSULOSIN HYDROCHLORIDE 0.4 MG/1
0.4 CAPSULE ORAL DAILY
Status: DISCONTINUED | OUTPATIENT
Start: 2017-06-27 | End: 2017-06-28 | Stop reason: HOSPADM

## 2017-06-26 RX ORDER — ACETAMINOPHEN 10 MG/ML
1000 INJECTION, SOLUTION INTRAVENOUS EVERY 6 HOURS
Status: COMPLETED | OUTPATIENT
Start: 2017-06-26 | End: 2017-06-27

## 2017-06-26 RX ORDER — OXYCODONE AND ACETAMINOPHEN 10; 325 MG/1; MG/1
1 TABLET ORAL EVERY 4 HOURS PRN
Qty: 90 TABLET | Refills: 0 | Status: SHIPPED | OUTPATIENT
Start: 2017-06-26 | End: 2017-07-06

## 2017-06-26 RX ORDER — GLYCOPYRROLATE 0.2 MG/ML
INJECTION INTRAMUSCULAR; INTRAVENOUS
Status: DISCONTINUED | OUTPATIENT
Start: 2017-06-26 | End: 2017-06-26

## 2017-06-26 RX ORDER — SODIUM CHLORIDE 9 MG/ML
INJECTION, SOLUTION INTRAVENOUS CONTINUOUS
Status: DISCONTINUED | OUTPATIENT
Start: 2017-06-26 | End: 2017-06-28 | Stop reason: HOSPADM

## 2017-06-26 RX ORDER — BENAZEPRIL HYDROCHLORIDE 10 MG/1
10 TABLET ORAL DAILY
Status: DISCONTINUED | OUTPATIENT
Start: 2017-06-27 | End: 2017-06-28 | Stop reason: HOSPADM

## 2017-06-26 RX ORDER — FENTANYL CITRATE 50 UG/ML
INJECTION, SOLUTION INTRAMUSCULAR; INTRAVENOUS
Status: DISCONTINUED | OUTPATIENT
Start: 2017-06-26 | End: 2017-06-26

## 2017-06-26 RX ORDER — DOCUSATE SODIUM 100 MG/1
100 CAPSULE, LIQUID FILLED ORAL 2 TIMES DAILY
Status: DISCONTINUED | OUTPATIENT
Start: 2017-06-26 | End: 2017-06-28 | Stop reason: HOSPADM

## 2017-06-26 RX ORDER — DOCUSATE SODIUM 100 MG/1
100 CAPSULE, LIQUID FILLED ORAL 2 TIMES DAILY PRN
Qty: 60 CAPSULE | Refills: 0 | Status: ON HOLD | OUTPATIENT
Start: 2017-06-26 | End: 2017-08-09

## 2017-06-26 RX ORDER — ONDANSETRON 2 MG/ML
4 INJECTION INTRAMUSCULAR; INTRAVENOUS EVERY 8 HOURS PRN
Status: DISCONTINUED | OUTPATIENT
Start: 2017-06-26 | End: 2017-06-26 | Stop reason: HOSPADM

## 2017-06-26 RX ORDER — HYDROMORPHONE HYDROCHLORIDE 2 MG/ML
0.2 INJECTION, SOLUTION INTRAMUSCULAR; INTRAVENOUS; SUBCUTANEOUS EVERY 5 MIN PRN
Status: COMPLETED | OUTPATIENT
Start: 2017-06-26 | End: 2017-06-26

## 2017-06-26 RX ORDER — METFORMIN HYDROCHLORIDE 500 MG/1
500 TABLET ORAL 2 TIMES DAILY
Status: DISCONTINUED | OUTPATIENT
Start: 2017-06-26 | End: 2017-06-28 | Stop reason: HOSPADM

## 2017-06-26 RX ORDER — PROPOFOL 10 MG/ML
VIAL (ML) INTRAVENOUS
Status: DISCONTINUED | OUTPATIENT
Start: 2017-06-26 | End: 2017-06-26

## 2017-06-26 RX ORDER — ASPIRIN 325 MG
325 TABLET ORAL DAILY
Qty: 30 TABLET | Refills: 0 | Status: SHIPPED | OUTPATIENT
Start: 2017-06-26 | End: 2017-07-26

## 2017-06-26 RX ORDER — LIDOCAINE HYDROCHLORIDE 10 MG/ML
1 INJECTION, SOLUTION EPIDURAL; INFILTRATION; INTRACAUDAL; PERINEURAL ONCE
Status: DISCONTINUED | OUTPATIENT
Start: 2017-06-26 | End: 2017-06-26 | Stop reason: HOSPADM

## 2017-06-26 RX ORDER — LABETALOL HYDROCHLORIDE 5 MG/ML
INJECTION, SOLUTION INTRAVENOUS
Status: COMPLETED
Start: 2017-06-26 | End: 2017-06-26

## 2017-06-26 RX ORDER — LABETALOL HYDROCHLORIDE 5 MG/ML
10 INJECTION, SOLUTION INTRAVENOUS ONCE
Status: COMPLETED | OUTPATIENT
Start: 2017-06-26 | End: 2017-06-26

## 2017-06-26 RX ORDER — SODIUM CHLORIDE, SODIUM LACTATE, POTASSIUM CHLORIDE, CALCIUM CHLORIDE 600; 310; 30; 20 MG/100ML; MG/100ML; MG/100ML; MG/100ML
INJECTION, SOLUTION INTRAVENOUS CONTINUOUS
Status: DISCONTINUED | OUTPATIENT
Start: 2017-06-26 | End: 2017-06-26

## 2017-06-26 RX ORDER — BETHANECHOL CHLORIDE 25 MG/1
25 TABLET ORAL 3 TIMES DAILY
Status: DISCONTINUED | OUTPATIENT
Start: 2017-06-26 | End: 2017-06-28 | Stop reason: HOSPADM

## 2017-06-26 RX ORDER — ACETAMINOPHEN 10 MG/ML
INJECTION, SOLUTION INTRAVENOUS
Status: COMPLETED
Start: 2017-06-26 | End: 2017-06-26

## 2017-06-26 RX ORDER — METOCLOPRAMIDE HYDROCHLORIDE 5 MG/ML
10 INJECTION INTRAMUSCULAR; INTRAVENOUS EVERY 10 MIN PRN
Status: DISCONTINUED | OUTPATIENT
Start: 2017-06-26 | End: 2017-06-26 | Stop reason: HOSPADM

## 2017-06-26 RX ADMIN — PROPOFOL 20 MG: 10 INJECTION, EMULSION INTRAVENOUS at 01:06

## 2017-06-26 RX ADMIN — OXYCODONE HYDROCHLORIDE 10 MG: 10 TABLET, FILM COATED, EXTENDED RELEASE ORAL at 08:06

## 2017-06-26 RX ADMIN — PHENYLEPHRINE HYDROCHLORIDE 100 MCG: 10 INJECTION INTRAVENOUS at 12:06

## 2017-06-26 RX ADMIN — PROPOFOL 30 MG: 10 INJECTION, EMULSION INTRAVENOUS at 01:06

## 2017-06-26 RX ADMIN — ACETAMINOPHEN 1000 MG: 10 INJECTION, SOLUTION INTRAVENOUS at 05:06

## 2017-06-26 RX ADMIN — HYDROMORPHONE HYDROCHLORIDE 0.2 MG: 2 INJECTION INTRAMUSCULAR; INTRAVENOUS; SUBCUTANEOUS at 05:06

## 2017-06-26 RX ADMIN — CLINDAMYCIN PHOSPHATE 900 MG: 18 INJECTION, SOLUTION INTRAVENOUS at 12:06

## 2017-06-26 RX ADMIN — PHENYLEPHRINE HYDROCHLORIDE 200 MCG: 10 INJECTION INTRAVENOUS at 12:06

## 2017-06-26 RX ADMIN — METFORMIN HYDROCHLORIDE 500 MG: 500 TABLET, FILM COATED ORAL at 08:06

## 2017-06-26 RX ADMIN — METOCLOPRAMIDE 10 MG: 5 INJECTION, SOLUTION INTRAMUSCULAR; INTRAVENOUS at 12:06

## 2017-06-26 RX ADMIN — FENTANYL CITRATE 100 MCG: 50 INJECTION INTRAMUSCULAR; INTRAVENOUS at 12:06

## 2017-06-26 RX ADMIN — FENTANYL CITRATE 25 MCG: 50 INJECTION, SOLUTION INTRAMUSCULAR; INTRAVENOUS at 06:06

## 2017-06-26 RX ADMIN — PROPOFOL 30 MG: 10 INJECTION, EMULSION INTRAVENOUS at 12:06

## 2017-06-26 RX ADMIN — BUPIVACAINE HYDROCHLORIDE 3 ML: 5 INJECTION, SOLUTION PERINEURAL at 12:06

## 2017-06-26 RX ADMIN — PROPRANOLOL HYDROCHLORIDE 60 MG: 40 TABLET ORAL at 10:06

## 2017-06-26 RX ADMIN — SODIUM CHLORIDE, SODIUM LACTATE, POTASSIUM CHLORIDE, AND CALCIUM CHLORIDE: .6; .31; .03; .02 INJECTION, SOLUTION INTRAVENOUS at 09:06

## 2017-06-26 RX ADMIN — PHENYLEPHRINE HYDROCHLORIDE 200 MCG: 10 INJECTION INTRAVENOUS at 01:06

## 2017-06-26 RX ADMIN — HYDROMORPHONE HYDROCHLORIDE 0.2 MG: 2 INJECTION INTRAMUSCULAR; INTRAVENOUS; SUBCUTANEOUS at 06:06

## 2017-06-26 RX ADMIN — INSULIN ASPART 1 UNITS: 100 INJECTION, SOLUTION INTRAVENOUS; SUBCUTANEOUS at 10:06

## 2017-06-26 RX ADMIN — MORPHINE SULFATE 4 MG: 10 INJECTION INTRAVENOUS at 08:06

## 2017-06-26 RX ADMIN — LIDOCAINE HYDROCHLORIDE 100 MG: 20 INJECTION, SOLUTION INTRAVENOUS at 12:06

## 2017-06-26 RX ADMIN — PREGABALIN 75 MG: 50 CAPSULE ORAL at 08:06

## 2017-06-26 RX ADMIN — MIDAZOLAM HYDROCHLORIDE 2 MG: 1 INJECTION, SOLUTION INTRAMUSCULAR; INTRAVENOUS at 12:06

## 2017-06-26 RX ADMIN — SODIUM CHLORIDE: 0.9 INJECTION, SOLUTION INTRAVENOUS at 08:06

## 2017-06-26 RX ADMIN — FENTANYL CITRATE 50 MCG: 50 INJECTION INTRAMUSCULAR; INTRAVENOUS at 12:06

## 2017-06-26 RX ADMIN — PROPOFOL 20 MG: 10 INJECTION, EMULSION INTRAVENOUS at 12:06

## 2017-06-26 RX ADMIN — GLYCOPYRROLATE 0.2 MG: 0.2 INJECTION, SOLUTION INTRAMUSCULAR; INTRAVENOUS at 12:06

## 2017-06-26 RX ADMIN — SODIUM CHLORIDE, SODIUM LACTATE, POTASSIUM CHLORIDE, AND CALCIUM CHLORIDE: .6; .31; .03; .02 INJECTION, SOLUTION INTRAVENOUS at 01:06

## 2017-06-26 RX ADMIN — FENTANYL CITRATE 50 MCG: 50 INJECTION INTRAMUSCULAR; INTRAVENOUS at 01:06

## 2017-06-26 RX ADMIN — DOCUSATE SODIUM 100 MG: 100 CAPSULE, LIQUID FILLED ORAL at 08:06

## 2017-06-26 RX ADMIN — LABETALOL HYDROCHLORIDE 10 MG: 5 INJECTION, SOLUTION INTRAVENOUS at 04:06

## 2017-06-26 RX ADMIN — VANCOMYCIN HYDROCHLORIDE 1000 MG: 1 INJECTION, POWDER, LYOPHILIZED, FOR SOLUTION INTRAVENOUS at 08:06

## 2017-06-26 RX ADMIN — ONDANSETRON 4 MG: 2 INJECTION, SOLUTION INTRAMUSCULAR; INTRAVENOUS at 12:06

## 2017-06-26 RX ADMIN — ACETAMINOPHEN 1000 MG: 10 INJECTION, SOLUTION INTRAVENOUS at 11:06

## 2017-06-26 RX ADMIN — HYDRALAZINE HYDROCHLORIDE 10 MG: 20 INJECTION INTRAMUSCULAR; INTRAVENOUS at 05:06

## 2017-06-26 RX ADMIN — OXYBUTYNIN CHLORIDE 5 MG: 5 TABLET ORAL at 10:06

## 2017-06-26 RX ADMIN — Medication 1000 MG: at 12:06

## 2017-06-26 RX ADMIN — BETHANECHOL CHLORIDE 25 MG: 25 TABLET ORAL at 10:06

## 2017-06-26 RX ADMIN — TRANEXAMIC ACID 1 G: 100 INJECTION, SOLUTION INTRAVENOUS at 12:06

## 2017-06-26 RX ADMIN — FENTANYL CITRATE 50 MCG: 50 INJECTION INTRAMUSCULAR; INTRAVENOUS at 02:06

## 2017-06-26 RX ADMIN — PROPOFOL 40 MG: 10 INJECTION, EMULSION INTRAVENOUS at 12:06

## 2017-06-26 RX ADMIN — TRANEXAMIC ACID 1 G: 100 INJECTION, SOLUTION INTRAVENOUS at 01:06

## 2017-06-26 RX ADMIN — METOPROLOL TARTRATE 5 MG: 5 INJECTION INTRAVENOUS at 03:06

## 2017-06-26 NOTE — TRANSFER OF CARE
"Anesthesia Transfer of Care Note    Patient: Navin Beck    Procedure(s) Performed: Procedure(s) (LRB):  ARTHROPLASTY-KNEE (Right)    Patient location: PACU    Anesthesia Type: spinal and MAC    Transport from OR: Transported from OR on room air with adequate spontaneous ventilation    Post pain: adequate analgesia    Post assessment: no apparent anesthetic complications and tolerated procedure well    Post vital signs: stable    Level of consciousness: awake, alert and oriented    Nausea/Vomiting: no nausea/vomiting    Complications: none    Transfer of care protocol was followed      Last vitals:   Visit Vitals  BP (!) 117/58 (BP Location: Left arm, Patient Position: Lying, BP Method: Automatic)   Pulse 80   Temp 36.5 °C (97.7 °F) (Oral)   Resp 17   Ht 5' 7.5" (1.715 m)   Wt 104.4 kg (230 lb 2.6 oz)   SpO2 100%   Breastfeeding? No   BMI 35.52 kg/m²     "

## 2017-06-26 NOTE — PLAN OF CARE
Problem: Anesthesia/Analgesia, Neuraxial (Adult)  Goal: Signs and Symptoms of Listed Potential Problems Will be Absent, Minimized or Managed (Anesthesia/Analgesia, Neuraxial)  Signs and symptoms of listed potential problems will be absent, minimized or managed by discharge/transition of care (reference Anesthesia/Analgesia, Neuraxial (Adult) CPG).   Outcome: Outcome(s) achieved Date Met: 06/26/17 06/26/17 1651   Anesthesia/Analgesia, Neuraxial   Problems Assessed (Neuraxial Anesthesia/Analgesia) all   Problems Present (Neuraxial Anesthesia/Analgesia) none

## 2017-06-26 NOTE — OP NOTE
DATE OF PROCEDURE: 06/26/2017    SURGEON: Chapito Rodriguez M.D.    ASSISTANT: Breezy White    Anesthesia spinal    PREOPERATIVE DIAGNOSIS: Right knee osteoarthritis, tricompartmental.    POSTOPERATIVE DIAGNOSIS: Same    PROCEDURE PERFORMED: Right total knee arthroplasty.    COMPLICATIONS: None.    IMPLANTS: Lucille Triathlon system, PS cemented femur size 4., tibia size 3, polyethylene size 11, patella component  size 29 x 8 mm, abx cement.    BLOOD LOSS: 50 mL.    FLUIDS: 2 liters.    URINE OUTPUT: 150 mL    HISTORY OF PRESENT ILLNESS: The patient is a 56 y.o. year-old female with   considerable Right knee osteoarthritis with pain and deformity. The patient has  undergone nonsurgical intervention such as physical therapy, weight loss attempts, and injections and failed. At this time the patient wishes to proceed with   surgical procedure.    PROCEDURE IN DETAIL: The appropriate consents were signed after discussion of   possible risks and complications, which include but not limited to wound healing  complications, skin breakdown and infection, as well as prosthesis component failure, as well as injury to nerves,   vessels, soft tissue in the area as well as DVT, pulmonary embolism, myocardial   infarction, stroke and death, as well as the need to repeat or perform future procedures. The patient understood possible risks and   complications and wished to proceed with procedure. After consents were signed,  the patient was brought in to the Operating Room, kept in the supine position   on the operative table. All bony prominences were well paddedThe Right lower limb was prepped and draped in the normal sterile standard fashion. The proximal thigh tourniquet had been placed   nonsterilely. Prior to this IV antibiotics had been infused. After prepping   and draping, a timeout was called including the scrub tech, the anesthesia staff,   circulating, nurse, as well as surgical staff. A midline incision was made    directly over the knee. A medial parapatellar arthrotomy was performed. The big   drill was placed into the femur. An intramedullary femoral alignment system was   placed. The distal femur was cut with 8 mm. The sizer was then put on the   distal femur showed to be size 4. This was pinned in 3 degrees of external   rotation. The sizer block was placed at 3 degrees of external rotation. Cuts were   made, anterior and posterior.  Attention was then turned to the tibia. The extramedullary alignment guide was placed on the tibia with 3 degrees of posterior slope built into the guide and 0 degrees varus valgus. Approximately 9 mm was chosen to cut from the tibia measuring from the non defective side. The guide was pinned into place, drop zenon was placed to check and a third pin was placed for stability. The tibia was then cut. The lamina  was used. Full complete meniscectomies were made medial and laterally. Posterior osteophytes were removed. The spacer blocks were placed and size 11, was appropriate in flexion and extension, as well as stable in varus and valgus. The femoral cutting block was then replaced and pinned in place on the femur. The anterior and posterior chamfer cuts were then made. The Box was cut. The tibial sizingtray was placed, showed to be a size 3. This was drilled and punched. The femoral trial was placed. The size 11   Trial insert was then placed and showed to be excellent in flexion-extension, varus and valgus. The knee was brought through range of motion. The leg was then brought on to a bump. The patella  was measured and showed to be 24 and cut. We took approximately 9 mm of patellar bone away and replaced 8 mm. This was again put into place, brought through range of motion, showed to be stable in all planes. The trial components were removed. Cement was mixed. The tibial component was cemented into place first, then the femoral  component and then the real polyethylene was inserted  into place. The knee   was reduced, showed to have excellent stability in all planes. The patellar   component was then cemented and while cement hardened, we pulsatile lavaged.   Once cement hardened, the knee was brought through range of motion again and   showed to be stable in all planes. The tourniquet was let down and all venous bleeders were coagulated. There was no evidence of arterial bleeding. The medial parapatellar arthrotomy was   closed with #1 Vicryl suture interrupted figure-of-eight. The 0's were used to   close the deep tissue and 2-0 Vicryl sutures inverted were used for the deep dermal layer. Skin was approximated with staples. Sterile   dressing applied to the wound. The patient was transferred to Recovery Room   without complications. All the counts were correct.      POSTOPERATIVE PLAN: The patient will be weight bearing as tolerated and receive PT immediately.    POSTOPERATIVE NOTE:The patient was examined in the recovery room found to be vascularly intact distally on the operative limb with weakness and sensatory loss bilateral LE due to spinal. The patient was awake and breathing without labor.

## 2017-06-26 NOTE — PLAN OF CARE
Problem: Patient Care Overview  Goal: Plan of Care Review  Outcome: Ongoing (interventions implemented as appropriate)   06/26/17 1652   Coping/Psychosocial   Plan Of Care Reviewed With patient;family     Patient continues to be hypertensive ('s)  despite treatment. Spinal is now resolved and patient is experiencing pain. PRN pain meds were initiated. AAOX4. No n/v noted. See chart for full assessment. Hand off report to KEVAN Lara RN.     Problem: Surgery Nonspecified (Adult)  Goal: Signs and Symptoms of Listed Potential Problems Will be Absent, Minimized or Managed (Surgery Nonspecified)  Signs and symptoms of listed potential problems will be absent, minimized or managed by discharge/transition of care (reference Surgery Nonspecified (Adult) CPG).  Outcome: Ongoing (interventions implemented as appropriate)   06/26/17 1652   Surgery Nonspecified   Problems Assessed (Surgery) bleeding/anemia;pain;postoperative nausea and vomiting;respiratory compromise;situational response;postoperative urinary retention   Problems Present (Surgery) pain

## 2017-06-26 NOTE — BRIEF OP NOTE
[facility]  Brief Operative Note    SUMMARY     Surgery Date: 6/26/2017     Surgeon(s) and Role:     * Chapito Rodriguez MD - Primary    Assisting Surgeon: None    Pre-op Diagnosis:  Primary osteoarthritis of right knee [M17.11]    Post-op Diagnosis:  Post-Op Diagnosis Codes:     * Primary osteoarthritis of right knee [M17.11]    Procedure(s) (LRB):  ARTHROPLASTY-KNEE (Right)    Anesthesia: Choice    Description of Procedure: R   TKA    Description of the findings of the procedure: Right knee DJD    Estimated Blood Loss: * No values recorded between 6/26/2017 12:50 PM and 6/26/2017  2:02 PM *       50 cc  Specimens:   Specimen (12h ago through future)    None

## 2017-06-27 LAB
ANION GAP SERPL CALC-SCNC: 8 MMOL/L
BUN SERPL-MCNC: 14 MG/DL
CALCIUM SERPL-MCNC: 9.4 MG/DL
CHLORIDE SERPL-SCNC: 102 MMOL/L
CO2 SERPL-SCNC: 24 MMOL/L
CREAT SERPL-MCNC: 1.1 MG/DL
EST. GFR  (AFRICAN AMERICAN): >60 ML/MIN/1.73 M^2
EST. GFR  (NON AFRICAN AMERICAN): 55 ML/MIN/1.73 M^2
GLUCOSE SERPL-MCNC: 293 MG/DL
HCT VFR BLD AUTO: 34 %
HGB BLD-MCNC: 11.2 G/DL
POCT GLUCOSE: 178 MG/DL (ref 70–110)
POCT GLUCOSE: 229 MG/DL (ref 70–110)
POCT GLUCOSE: 322 MG/DL (ref 70–110)
POCT GLUCOSE: 348 MG/DL (ref 70–110)
POTASSIUM SERPL-SCNC: 5.2 MMOL/L
SODIUM SERPL-SCNC: 134 MMOL/L

## 2017-06-27 PROCEDURE — 97116 GAIT TRAINING THERAPY: CPT

## 2017-06-27 PROCEDURE — 12000002 HC ACUTE/MED SURGE SEMI-PRIVATE ROOM

## 2017-06-27 PROCEDURE — G8988 SELF CARE GOAL STATUS: HCPCS | Mod: CI

## 2017-06-27 PROCEDURE — G8987 SELF CARE CURRENT STATUS: HCPCS | Mod: CJ

## 2017-06-27 PROCEDURE — 97110 THERAPEUTIC EXERCISES: CPT

## 2017-06-27 PROCEDURE — 94761 N-INVAS EAR/PLS OXIMETRY MLT: CPT

## 2017-06-27 PROCEDURE — 80048 BASIC METABOLIC PNL TOTAL CA: CPT

## 2017-06-27 PROCEDURE — 36415 COLL VENOUS BLD VENIPUNCTURE: CPT

## 2017-06-27 PROCEDURE — 97165 OT EVAL LOW COMPLEX 30 MIN: CPT

## 2017-06-27 PROCEDURE — 85018 HEMOGLOBIN: CPT

## 2017-06-27 PROCEDURE — 63600175 PHARM REV CODE 636 W HCPCS: Performed by: ORTHOPAEDIC SURGERY

## 2017-06-27 PROCEDURE — 97161 PT EVAL LOW COMPLEX 20 MIN: CPT

## 2017-06-27 PROCEDURE — 97535 SELF CARE MNGMENT TRAINING: CPT

## 2017-06-27 PROCEDURE — 25000003 PHARM REV CODE 250: Performed by: ORTHOPAEDIC SURGERY

## 2017-06-27 PROCEDURE — 85014 HEMATOCRIT: CPT

## 2017-06-27 PROCEDURE — G8978 MOBILITY CURRENT STATUS: HCPCS | Mod: CK

## 2017-06-27 PROCEDURE — G8979 MOBILITY GOAL STATUS: HCPCS | Mod: CI

## 2017-06-27 PROCEDURE — 97530 THERAPEUTIC ACTIVITIES: CPT

## 2017-06-27 RX ORDER — BUPIVACAINE HYDROCHLORIDE 5 MG/ML
INJECTION, SOLUTION PERINEURAL
Status: DISCONTINUED | OUTPATIENT
Start: 2017-06-26 | End: 2017-06-27

## 2017-06-27 RX ADMIN — OXYCODONE HYDROCHLORIDE 10 MG: 10 TABLET, FILM COATED, EXTENDED RELEASE ORAL at 09:06

## 2017-06-27 RX ADMIN — BETHANECHOL CHLORIDE 25 MG: 25 TABLET ORAL at 02:06

## 2017-06-27 RX ADMIN — ROSUVASTATIN CALCIUM 10 MG: 10 TABLET ORAL at 09:06

## 2017-06-27 RX ADMIN — BETHANECHOL CHLORIDE 25 MG: 25 TABLET ORAL at 09:06

## 2017-06-27 RX ADMIN — MORPHINE SULFATE 4 MG: 10 INJECTION INTRAVENOUS at 05:06

## 2017-06-27 RX ADMIN — OXYCODONE HYDROCHLORIDE 10 MG: 5 TABLET ORAL at 04:06

## 2017-06-27 RX ADMIN — INSULIN DETEMIR 60 UNITS: 100 INJECTION, SOLUTION SUBCUTANEOUS at 09:06

## 2017-06-27 RX ADMIN — PREGABALIN 75 MG: 50 CAPSULE ORAL at 09:06

## 2017-06-27 RX ADMIN — OXYBUTYNIN CHLORIDE 5 MG: 5 TABLET ORAL at 09:06

## 2017-06-27 RX ADMIN — MORPHINE SULFATE 4 MG: 10 INJECTION INTRAVENOUS at 07:06

## 2017-06-27 RX ADMIN — PROPRANOLOL HYDROCHLORIDE 60 MG: 40 TABLET ORAL at 09:06

## 2017-06-27 RX ADMIN — MORPHINE SULFATE 4 MG: 10 INJECTION INTRAVENOUS at 10:06

## 2017-06-27 RX ADMIN — ASPIRIN 325 MG ORAL TABLET 325 MG: 325 PILL ORAL at 09:06

## 2017-06-27 RX ADMIN — OXYCODONE HYDROCHLORIDE 10 MG: 5 TABLET ORAL at 03:06

## 2017-06-27 RX ADMIN — ACETAMINOPHEN 1000 MG: 10 INJECTION, SOLUTION INTRAVENOUS at 12:06

## 2017-06-27 RX ADMIN — SODIUM CHLORIDE: 0.9 INJECTION, SOLUTION INTRAVENOUS at 04:06

## 2017-06-27 RX ADMIN — INSULIN HUMAN 15 UNITS: 100 INJECTION, SOLUTION PARENTERAL at 04:06

## 2017-06-27 RX ADMIN — SODIUM CHLORIDE: 0.9 INJECTION, SOLUTION INTRAVENOUS at 05:06

## 2017-06-27 RX ADMIN — DOCUSATE SODIUM 100 MG: 100 CAPSULE, LIQUID FILLED ORAL at 09:06

## 2017-06-27 RX ADMIN — INSULIN ASPART 8 UNITS: 100 INJECTION, SOLUTION INTRAVENOUS; SUBCUTANEOUS at 09:06

## 2017-06-27 RX ADMIN — BETHANECHOL CHLORIDE 25 MG: 25 TABLET ORAL at 05:06

## 2017-06-27 RX ADMIN — MORPHINE SULFATE 4 MG: 10 INJECTION INTRAVENOUS at 02:06

## 2017-06-27 RX ADMIN — INSULIN DETEMIR 50 UNITS: 100 INJECTION, SOLUTION SUBCUTANEOUS at 09:06

## 2017-06-27 RX ADMIN — BENAZEPRIL HYDROCHLORIDE 10 MG: 10 TABLET, FILM COATED ORAL at 09:06

## 2017-06-27 RX ADMIN — INSULIN ASPART 1 UNITS: 100 INJECTION, SOLUTION INTRAVENOUS; SUBCUTANEOUS at 09:06

## 2017-06-27 RX ADMIN — ACETAMINOPHEN 1000 MG: 10 INJECTION, SOLUTION INTRAVENOUS at 05:06

## 2017-06-27 RX ADMIN — TAMSULOSIN HYDROCHLORIDE 0.4 MG: 0.4 CAPSULE ORAL at 09:06

## 2017-06-27 RX ADMIN — PROPRANOLOL HYDROCHLORIDE 60 MG: 40 TABLET ORAL at 12:06

## 2017-06-27 RX ADMIN — INSULIN HUMAN 15 UNITS: 100 INJECTION, SOLUTION PARENTERAL at 12:06

## 2017-06-27 RX ADMIN — METFORMIN HYDROCHLORIDE 500 MG: 500 TABLET, FILM COATED ORAL at 09:06

## 2017-06-27 RX ADMIN — PANTOPRAZOLE SODIUM 40 MG: 40 TABLET, DELAYED RELEASE ORAL at 09:06

## 2017-06-27 RX ADMIN — MORPHINE SULFATE 4 MG: 10 INJECTION INTRAVENOUS at 01:06

## 2017-06-27 RX ADMIN — INSULIN ASPART 8 UNITS: 100 INJECTION, SOLUTION INTRAVENOUS; SUBCUTANEOUS at 12:06

## 2017-06-27 NOTE — PLAN OF CARE
[facility] [employer] [address] [place] [address] AT [facility] [address]  [address]  [facility] [address]  Phone: [phone] Fax: [phone]      Physical Address: [address] [place] [address]     06/27/17 1103   Discharge Assessment   Assessment Type Discharge Planning Assessment   Confirmed/corrected address and phone number on facesheet? Yes   Assessment information obtained from? Patient   Prior to hospitilization cognitive status: Alert/Oriented   Prior to hospitalization functional status: Independent   Current cognitive status: Alert/Oriented   Current Functional Status: Independent   Arrived From home or self-care   Lives With child(jovanny), adult;child(jovanny), dependent   Able to Return to Prior Arrangements yes   Is patient able to care for self after discharge? Yes   How many people do you have in your home that can help with your care after discharge? 2   Who are your caregiver(s) and their phone number(s)? Dean Moct[place]uma and Kt   Patient's perception of discharge disposition home or selfcare   Readmission Within The Last 30 Days no previous admission in last 30 days   Equipment Currently Used at Home walker, rolling;cane, straight   Do you have any problems affording any of your prescribed medications? No   Is the patient taking medications as prescribed? yes   Do you have any financial concerns preventing you from receiving the healthcare you need? No   Does the patient have transportation to healthcare appointments? Yes   Transportation Available family or friend will provide   On Dialysis? No   Discharge Plan A Home with family;Home Health   Discharge Plan B Home with family  (PT/OT recs)   Patient/Family In Agreement With Plan yes

## 2017-06-27 NOTE — PT/OT/SLP PROGRESS
Physical Therapy  PM Treatment    Navin Beck   MRN: [id number]   Admitting Diagnosis: Primary osteoarthritis of right knee    PT Received On: 06/27/17  PT Start Time: 1403     PT Stop Time: 1445    PT Total Time (min): 42 min       Billable Minutes:  Gait Training 15 min, Therapeutic Activity 15 min and Therapeutic Exercise 12 min    Treatment Type: Treatment  PT/PTA: PT     PTA Visit Number: 0       General Precautions: Standard, fall, diabetic  Orthopedic Precautions: RLE weight bearing as tolerated   Braces: N/A         Subjective:  Communicated with nurse Virginia prior to session.    Pt stated that she will try.    Pain/Comfort  Pain Rating 1: 6/10  Location - Side 1: Right  Location 1: knee  Pain Addressed 1: Pre-medicate for activity  Pain Rating Post-Intervention 1: 10/10    Objective:   Patient found with: peripheral IV, Polar ice    Functional Mobility:  Bed Mobility:   Scooting/Bridging: Stand by Assistance  Sit to Supine: Contact Guard Assistance, With leg lift    Transfers:  Sit <> Stand Assistance: Contact Guard Assistance  Sit <> Stand Assistive Device: Rolling Walker  Toilet Transfer Technique: Stand Pivot (Pt ambulated ~12 ft to the bathroom.)  Toilet Transfer Assistance: Contact Guard Assistance/SBA  Toilet Transfer Assistive Device: Rolling Walker    Gait:   Gait Distance: 130 ft  Assistance 1: Contact Guard Assistance, Stand by Assistance  Gait Assistive Device: Rolling walker  Gait Pattern: swing-through gait  Gait Deviation(s): decreased weight-shifting ability, decreased toe-to-floor clearance, decreased step length, decreased velocity of limb motion, decreased ravi      Balance:   Static Sit: FAIR+: Able to take MINIMAL challenges from all directions  Dynamic Sit: FAIR+: Maintains balance through MINIMAL excursions of active trunk motion  Static Stand: FAIR+: Takes MINIMAL challenges from all directions  Dynamic stand: FAIR: Needs CONTACT GUARD during gait     Therapeutic Activities  and Exercises:   RLE supine therex 2 sets of 10 reps: QS and HS    AM-PAC 6 CLICK MOBILITY  How much help from another person does this patient currently need?   1 = Unable, Total/Dependent Assistance  2 = A lot, Maximum/Moderate Assistance  3 = A little, Minimum/Contact Guard/Supervision  4 = None, Modified [place]/Independent    Turning over in bed (including adjusting bedclothes, sheets and blankets)?: 4  Sitting down on and standing up from a chair with arms (e.g., wheelchair, bedside commode, etc.): 3  Moving from lying on back to sitting on the side of the bed?: 3  Moving to and from a bed to a chair (including a wheelchair)?: 3  Need to walk in hospital room?: 3  Climbing 3-5 steps with a railing?: 2  Total Score: 18    AM-PAC Raw Score CMS G-Code Modifier Level of Impairment Assistance   6 % Total / Unable   7 - 9 CM 80 - 100% Maximal Assist   10 - 14 CL 60 - 80% Moderate Assist   15 - 19 CK 40 - 60% Moderate Assist   20 - 22 CJ 20 - 40% Minimal Assist   23 CI 1-20% SBA / CGA   24 CH 0% Independent/ Mod I     Patient left HOB elevated with all lines intact, call button in reach and nurse Virginia notified. FCD/SCD placed and pillow under R ankle to encourage knee extension.    Assessment:    Rehab identified problem list/impairments: Rehab identified problem list/impairments: weakness, impaired functional mobilty, gait instability, impaired balance, decreased lower extremity function, pain, decreased ROM, impaired skin, edema, orthopedic precautions    Rehab potential is good.    Activity tolerance: Fair    Discharge recommendations: Discharge Facility/Level Of Care Needs: home health PT (with family assistance)     Barriers to discharge: Barriers to Discharge: None    Equipment recommendations: Equipment Needed After Discharge: bedside commode, bath bench, walker, rolling     GOALS:    Physical Therapy Goals        Problem: Physical Therapy Goal    Goal Priority Disciplines Outcome Goal  Variances Interventions   Physical Therapy Goal     PT/OT, PT      Description:  Goals to be met by: 17     Patient will increase functional independence with mobility by performin. Supine to sit with Modified [place]  2. Rolling to Left and Right with Modified [place]  3. Sit to stand transfer with Modified [place] using RW  4. Bed to chair transfer with Modified [place] using Rolling Walker  5. Gait  X 200 feet with Modified [place] using Rolling Walker   6. Lower extremity exercise program x 20 reps per handout, with independence  7. R knee ROM 0-90*                      PLAN:    Patient to be seen BID (Mon-Fri; daily Sat+Sun)  to address the above listed problems via gait training, therapeutic activities, therapeutic exercises  Plan of Care expires: 17  Plan of Care reviewed with: patient    PT G-Codes  Functional Assessment Tool Used: AM-PAC  Score: 18  Functional Limitation: Mobility: Walking and moving around  Mobility: Walking and Moving Around Current Status (): CK  Mobility: Walking and Moving Around Goal Status (): EARLINE Muse, PT  2017

## 2017-06-27 NOTE — PLAN OF CARE
Problem: Occupational Therapy Goal  Goal: Occupational Therapy Goal  Goals to be met by: 6/30/17    Patient will increase functional independence with ADLs by performing:    LE Dressing with Modified [place] and Supervision.  Grooming while standing at sink with Modified [place].  Supine to sit with Stand-by Assistance.  Stand pivot transfers with Supervision.  Toilet transfer to toilet with Stand-by Assistance.    Outcome: Ongoing (interventions implemented as appropriate)  Patient will benefit from OT to address functional deficits.

## 2017-06-27 NOTE — ANESTHESIA POSTPROCEDURE EVALUATION
"Anesthesia Post Evaluation    Patient: Navin Beck    Procedure(s) Performed: Procedure(s) (LRB):  ARTHROPLASTY-KNEE (Right)    Final Anesthesia Type: spinal  Patient location during evaluation: PACU  Patient participation: Yes- Able to Participate  Level of consciousness: awake and alert  Post-procedure vital signs: reviewed and stable  Pain management: adequate  Airway patency: patent  PONV status at discharge: No PONV  Anesthetic complications: no      Cardiovascular status: blood pressure returned to baseline and hemodynamically stable  Respiratory status: unassisted  Hydration status: euvolemic  Follow-up not needed.        Visit Vitals  BP (!) 143/67 (BP Location: Left arm, Patient Position: Lying, BP Method: Automatic)   Pulse 70   Temp 36.9 °C (98.4 °F) (Oral)   Resp 17   Ht 5' 7.5" (1.715 m)   Wt 108.4 kg (239 lb)   SpO2 95%   Breastfeeding? No   BMI 36.88 kg/m²       Pain/Lexus Score: Pain Assessment Performed: Yes (6/27/2017  8:00 AM)  Presence of Pain: complains of pain/discomfort (6/26/2017  8:09 PM)  Pain Rating Prior to Med Admin: 9 (6/27/2017  3:35 PM)  Pain Rating Post Med Admin: 9 (6/27/2017  3:05 PM)  Lexus Score: 10 (6/26/2017  6:48 PM)      "

## 2017-06-27 NOTE — PROGRESS NOTES
"[facility] notified that [facility] has accepted pt for home health PT. All information added to "follow up" tab  "

## 2017-06-27 NOTE — PT/OT/SLP EVAL
Physical Therapy  Evaluation    Navin Beck   MRN: [id number]   Admitting Diagnosis: Primary osteoarthritis of right knee    PT Received On: 06/27/17  PT Start Time: 1120     PT Stop Time: 1202    PT Total Time (min): 42 min       Billable Minutes:  Evaluation 15 min and Gait Training 15 min partial co-tx with OT    Diagnosis: Primary osteoarthritis of right knee  s/p R TKA 6/26/17    Past Medical History:   Diagnosis Date    Allergy     Arrhythmia     Arthritis     Diabetes mellitus     Glaucoma     Hormone disorder     hysterectomy    Hypertension     Hyperthyroidism     Insomnia     Kidney stones     Thyroid disease     Urine, incontinence, stress female       Past Surgical History:   Procedure Laterality Date    APPENDECTOMY      BACK SURGERY      disc removal     CHOLECYSTECTOMY      [facility]    KNEE ARTHROSCOPY W/ DEBRIDEMENT      KNEE SURGERY      LITHOTRIPSY      ND REMOVAL OF OVARY/TUBE(S)  9/9/13    benign    removal of round ligament mass      WRIST FRACTURE SURGERY      bilateral wrist        General Precautions: Standard, fall, diabetic  Orthopedic Precautions: RLE weight bearing as tolerated   Braces: N/A       Patient History:  Lives With: child(jovanny), adult, child(jovanny), dependent (22 y.o. son and 17 y.o daughter; Pt also reported some assistance from daugther-in-law.)  Living Arrangements: house (no concerns)  Transportation Available: car (Pt was driving PTA.)  Equipment Currently Used at Home: cane, straight, walker, standard    Previous Level of Function:  Ambulation Skills: needs device (str cane); Pt was also working at [employer].      Subjective:  Communicated with nurse Coleman prior to session.    Pt reported no dizziness during ambulation.  Chief Complaint: pain  Patient goals: to get back to work    Pain/Comfort  Pain Rating 1: 6/10  Location - Side 1: Right  Location 1: knee  Pain Addressed 1: Pre-medicate for  activity      Objective:   Patient found with: peripheral IV, FCD, SCD, Polar ice    Follows Commands/attention: Follows multistep  commands  Communication: clear/fluent  Safety awareness/insight to disability: intact    Physical Exam:  Postural examination/scapula alignment: Rounded shoulder and Head forward    Skin integrity: RLE dressing intact/dry  Edema: Mild RLE    Sensation:   Intact  light/touch BLE    Lower Extremity Range of Motion:  Right Lower Extremity: WFL except knee ROM ~5-60  Left Lower Extremity: WFL    Lower Extremity Strength:  Right Lower Extremity: WFL  Left Lower Extremity: WFL     Fine motor coordination:  Intact    Gross motor coordination: WFL    Functional Mobility:  Bed Mobility:  Scooting/Bridging: Stand by Assistance  Supine to Sit: Contact Guard Assistance, With leg lift    Transfers:  Sit <> Stand Assistance: Contact Guard Assistance  Sit <> Stand Assistive Device: Rolling Walker  Toilet Transfer Technique: Stand Pivot (Pt ambulated ~12 ft to the bathroom.)  Toilet Transfer Assistance: Contact Guard Assistance  Toilet Transfer Assistive Device: Rolling Walker    Gait:   Gait Distance: 100 ft; Pt with decreased ravi.   Assistance 1: Contact Guard Assistance, Stand by Assistance  Gait Assistive Device: Rolling walker  Gait Pattern: swing-through gait  Gait Deviation(s): decreased weight-shifting ability, decreased toe-to-floor clearance, decreased step length, decreased velocity of limb motion, decreased ravi    Balance:   Static Sit: FAIR+: Able to take MINIMAL challenges from all directions  Dynamic Sit: FAIR+: Maintains balance through MINIMAL excursions of active trunk motion  Static Stand: FAIR+: Takes MINIMAL challenges from all directions  Dynamic stand: FAIR: Needs CONTACT GUARD during gait    Therapeutic Activities and Exercises:  PROM RLE seated in all available planes    RLE seated therex 10 reps: LAQ and HS; B AP's    AM-PAC 6 CLICK MOBILITY  How much help from another  person does this patient currently need?   1 = Unable, Total/Dependent Assistance  2 = A lot, Maximum/Moderate Assistance  3 = A little, Minimum/Contact Guard/Supervision  4 = None, Modified [place]/Independent    Turning over in bed (including adjusting bedclothes, sheets and blankets)?: 4  Sitting down on and standing up from a chair with arms (e.g., wheelchair, bedside commode, etc.): 3  Moving from lying on back to sitting on the side of the bed?: 3  Moving to and from a bed to a chair (including a wheelchair)?: 3  Need to walk in hospital room?: 3  Climbing 3-5 steps with a railing?: 2  Total Score: 18     AM-PAC Raw Score CMS G-Code Modifier Level of Impairment Assistance   6 % Total / Unable   7 - 9 CM 80 - 100% Maximal Assist   10 - 14 CL 60 - 80% Moderate Assist   15 - 19 CK 40 - 60% Moderate Assist   20 - 22 CJ 20 - 40% Minimal Assist   23 CI 1-20% SBA / CGA   24 CH 0% Independent/ Mod I     Patient left up in chair with all lines intact, call button in reach and nurse Virginia notified.    Assessment:     Rehab identified problem list/impairments: Rehab identified problem list/impairments: weakness, impaired functional mobilty, gait instability, impaired balance, decreased lower extremity function, pain, decreased ROM, impaired skin, edema, orthopedic precautions    Rehab potential is good.    Activity tolerance: Fair    Discharge recommendations: Discharge Facility/Level Of Care Needs: home health PT (with family assistance)     Barriers to discharge: Barriers to Discharge: None    Equipment recommendations: Equipment Needed After Discharge: bedside commode, bath bench, walker, rolling     GOALS:    Physical Therapy Goals        Problem: Physical Therapy Goal    Goal Priority Disciplines Outcome Goal Variances Interventions   Physical Therapy Goal     PT/OT, PT      Description:  Goals to be met by: 17     Patient will increase functional independence with mobility by performin.  Supine to sit with Modified [place]  2. Rolling to Left and Right with Modified [place]  3. Sit to stand transfer with Modified [place] using RW  4. Bed to chair transfer with Modified [place] using Rolling Walker  5. Gait  X 200 feet with Modified [place] using Rolling Walker   6. Lower extremity exercise program x 20 reps per handout, with independence  7. R knee ROM 0-90*                      PLAN:    Patient to be seen BID (Mon-Fri; daily Sat+Sun) to address the above listed problems via gait training, therapeutic activities, therapeutic exercises  Plan of Care expires: 07/11/17  Plan of Care reviewed with: patient    Functional Assessment Tool Used: AM-PAC  Score: 18  Functional Limitation: Mobility: Walking and moving around  Mobility: Walking and Moving Around Current Status (): CK  Mobility: Walking and Moving Around Goal Status (): EARLINE Muse, PT  06/27/2017

## 2017-06-27 NOTE — PT/OT/SLP EVAL
Occupational Therapy  Evaluation/Treatment    Navin Beck   MRN: [id number]   Admitting Diagnosis: Primary osteoarthritis of right knee    OT Date of Treatment: 06/27/17   OT Start Time: 1120  OT Stop Time: 1202  OT Total Time (min): 42 min    Billable Minutes:  Evaluation 15  Self Care/Home Management 12  Total Time 42 (co-tx with PT)    Diagnosis: Primary osteoarthritis of right knee   S/p left TKA    Past Medical History:   Diagnosis Date    Allergy     Arrhythmia     Arthritis     Diabetes mellitus     Glaucoma     Hormone disorder     hysterectomy    Hypertension     Hyperthyroidism     Insomnia     Kidney stones     Thyroid disease     Urine, incontinence, stress female       Past Surgical History:   Procedure Laterality Date    APPENDECTOMY      BACK SURGERY      disc removal     CHOLECYSTECTOMY      [facility]    KNEE ARTHROSCOPY W/ DEBRIDEMENT      KNEE SURGERY      LITHOTRIPSY      MI REMOVAL OF OVARY/TUBE(S)  9/9/13    benign    removal of round ligament mass      WRIST FRACTURE SURGERY      bilateral wrist        Referring physician: Michael  Date referred to OT: 6/26/17    General Precautions: Standard, diabetic, fall  Orthopedic Precautions: RLE weight bearing as tolerated  Braces: N/A    Do you have any cultural, spiritual, Temple conflicts, given your current situation?: none     Patient History:  Living Environment  Lives With: child(jovanny), adult, child(jovanny), dependent (21 yo son and 18 yo daughter)  Living Arrangements: house (no concerns)  Transportation Available: family or friend will provide  Equipment Currently Used at Home: cane, straight, walker, standard    Prior level of function:   Bed Mobility/Transfers: needs device  Grooming: independent  Bathing: independent  Upper Body Dressing: independent  Lower Body Dressing: independent  Toileting: independent  Home Management Skills: independent  Driving License: Yes (Patient  drives)  Occupation: Full time employment  Type of Occupation: works at [employer]     Dominant hand: right    Subjective:  Communicated with nurse prior to session.    Chief Complaint: unable to urinate on a bed pan and wants ro amb to the toilet  Patient/Family stated goals: return to work    Pain/Comfort  Pain Rating 1: 6/10  Location - Side 1: Right  Location 1: knee  Pain Addressed 1: Pre-medicate for activity    Objective:  Patient found with: peripheral IV, Polar ice, FCD, SCD    Cognitive Exam:  Oriented to: Person, Place, Time and Situation  Follows Commands/attention: Follows multistep  commands  Communication: clear/fluent  Memory:  No Deficits noted  Safety awareness/insight to disability: intact  Coping skills/emotional control: Appropriate to situation    Visual/perceptual:  Intact    Physical Exam:  Postural examination/scapula alignment: No postural abnormalities identified  Skin integrity: Visible skin intact and left knee incision was covered by dressing, Ace wrap and Polar Ice  Edema: Mild LLE    Sensation:   Intact    Upper Extremity Range of Motion:  Right Upper Extremity: WNL  Left Upper Extremity: WNL    Upper Extremity Strength:  Right Upper Extremity: WFL  Left Upper Extremity: WFL   Strength: WFL    Fine motor coordination:   Intact    Gross motor coordination: WFL    Functional Mobility:  Bed Mobility:  Scooting/Bridging: Stand by Assistance  Supine to Sit: Contact Guard Assistance, With leg lift    Transfers:  Sit <> Stand Assistance: Contact Guard Assistance  Sit <> Stand Assistive Device: Rolling Walker  Toilet Transfer Assistance: Contact Guard Assistance  Toilet Transfer Assistive Device: Rolling Walker    Functional Ambulation: The patient amb using a RW with SBA/CGA from the bed to the toilet 20' and then to the sink ~10'.    Activities of Daily Living:  Feeding Level of Assistance: Independent  UE Dressing Level of Assistance: Contact guard  LE Dressing Level of Assistance:  "Total assistance (to don underpants while seated on the toilet)  LE adaptive equipment: none  Grooming Position: Standing at sink  Grooming Level of Assistance: Stand by assistance  Toileting Where Assessed: Toilet  Toileting Level of Assistance: Modified independent      Balance:   Static Sit: GOOD-: Takes MODERATE challenges from all directions but inconsistently  Dynamic Sit: GOOD-: Maintains balance through MODERATE excursions of active trunk movement,     Static Stand: FAIR+: Takes MINIMAL challenges from all directions  Dynamic stand: FAIR+: Needs CLOSE SUPERVISION during gait and is able to right self with minor LOB    Therapeutic Activities and Exercises:  The patient was educated re: OT role and recommendations. The patient was also educated in LE dressing for s/p left TKA.   AM-PAC 6 CLICK ADL  How much help from another person does this patient currently need?  1 = Unable, Total/Dependent Assistance  2 = A lot, Maximum/Moderate Assistance  3 = A little, Minimum/Contact Guard/Supervision  4 = None, Modified [place]/Independent    Putting on and taking off regular lower body clothing? : 2  Bathing (including washing, rinsing, drying)?: 3  Toileting, which includes using toilet, bedpan, or urinal? : 4  Putting on and taking off regular upper body clothing?: 4  Taking care of personal grooming such as brushing teeth?: 4  Eating meals?: 4  Total Score: 21    AM-PAC Raw Score CMS "G-Code Modifier Level of Impairment Assistance   6 % Total / Unable   7 - 9 CM 80 - 100% Maximal Assist   10-14 CL 60 - 80% Moderate Assist   15 - 19 CK 40 - 60% Moderate Assist   20 - 22 CJ 20 - 40% Minimal Assist   23 CI 1-20% SBA / CGA   24 CH 0% Independent/ Mod I       Patient left up in chair with all lines intact and call button in reach    Assessment:  Navin Beck is a 56 y.o. female with a medical diagnosis of Primary osteoarthritis of right knee and presents with self care and functional mobility " deficits R/T s/p left TKA..    Rehab identified problem list/impairments: Rehab identified problem list/impairments: impaired functional mobilty, impaired endurance, impaired self care skills, impaired balance, gait instability, decreased lower extremity function, decreased ROM, edema, orthopedic precautions, impaired skin    Rehab potential is good.    Activity tolerance: Good    Discharge recommendations: Discharge Facility/Level Of Care Needs: home (with family assist)     Barriers to discharge: Barriers to Discharge: None    Equipment recommendations: bedside commode, bath bench, walker, rolling     GOALS:    Occupational Therapy Goals        Problem: Occupational Therapy Goal    Goal Priority Disciplines Outcome Interventions   Occupational Therapy Goal     OT, PT/OT Ongoing (interventions implemented as appropriate)    Description:  Goals to be met by: 6/30/17    Patient will increase functional independence with ADLs by performing:    LE Dressing with Modified [place] and Supervision.  Grooming while standing at sink with Modified [place].  Supine to sit with Stand-by Assistance.  Stand pivot transfers with Supervision.  Toilet transfer to toilet with Stand-by Assistance.                      PLAN:  Patient to be seen 2 x/week to address the above listed problems via self-care/home management, therapeutic activities  Plan of Care expires: 06/30/17  Plan of Care reviewed with: patient    OT G-codes  Functional Assessment Tool Used: AM PAC  Score: 21  Functional Limitation: Self care  Self Care Current Status (): ISABELL  Self Care Goal Status (): EARLINE Caceres OT  06/27/2017

## 2017-06-27 NOTE — ANESTHESIA PROCEDURE NOTES
Spinal    Diagnosis: arthritis  Patient location during procedure: holding area  Start time: 6/26/2017 12:03 PM  Timeout: 6/26/2017 12:03 PM  End time: 6/26/2017 12:10 PM  Staffing  Anesthesiologist: CASSIE CONRAD  Performed: anesthesiologist   Preanesthetic Checklist  Completed: patient identified, site marked, surgical consent, pre-op evaluation, timeout performed, IV checked, risks and benefits discussed and monitors and equipment checked  Spinal Block  Patient position: sitting  Prep: ChloraPrep  Patient monitoring: heart rate, cardiac monitor and continuous pulse ox  Approach: midline  Location: L4-5  Injection technique: single shot  CSF Fluid: clear free-flowing CSF  Needle  Needle type: pencil-tip   Needle gauge: 25 G  Needle length: 3.5 in  Additional Documentation: negative aspiration for heme and no paresthesia on injection  Needle localization: anatomical landmarks  Assessment  Ease of block: easy  Patient's tolerance of the procedure: comfortable throughout block and no complaints  Medications:  Bolus administered: 3 mL of 0.5 and 3.0 bupivacaine  Epinephrine added: none

## 2017-06-27 NOTE — PROGRESS NOTES
"Call placed to Dr. Jo's office [phone], spoke to Rosey. Follow up appointment arranged for patient to see MD on Tuesday 7/11/2017 @ 2:45PM. All information added to "follow up" tab.     "

## 2017-06-27 NOTE — PLAN OF CARE
Problem: Diabetes, Type 1 (Adult)  Goal: Signs and Symptoms of Listed Potential Problems Will be Absent, Minimized or Managed (Diabetes, Type 1)  Signs and symptoms of listed potential problems will be absent, minimized or managed by discharge/transition of care (reference Diabetes, Type 1 (Adult) CPG).   Outcome: Ongoing (interventions implemented as appropriate)  Pt glucose medications reviewed, continued  by Dr. Rodriguez and Dr. Castro. Glucose monitoring continued and treated as per POC.  Will treat and adjust according to EMAR.  Pt verbalized understanding.

## 2017-06-27 NOTE — PLAN OF CARE
Problem: Physical Therapy Goal  Goal: Physical Therapy Goal  Goals to be met by: 17     Patient will increase functional independence with mobility by performin. Supine to sit with Modified [place]  2. Rolling to Left and Right with Modified [place]  3. Sit to stand transfer with Modified [place] using RW  4. Bed to chair transfer with Modified [place] using Rolling Walker  5. Gait  X 200 feet with Modified [place] using Rolling Walker   6. Lower extremity exercise program x 20 reps per handout, with independence  7. R knee ROM 0-90*    Please use a RW when assisting pt with ambulation to the bathroom.

## 2017-06-27 NOTE — PROGRESS NOTES
Follow-up Information     Chapito Rodriguez MD. Go on 7/11/2017.    Specialty:  Orthopedic Surgery  Why:  For Appointment on Tuesday 7/11/2017 @ 2:45PM  Contact information:  [address]  [address]  [place] [address]  [phone]

## 2017-06-27 NOTE — PROGRESS NOTES
"Ortho Daily Progress Note    Navin Beck is a 56 y.o. female admitted on 6/26/2017      Chief Complaint/Reason for admission: No chief complaint on file.       Hospital Day: 1  Post Op Day: 1 Day Post-Op     The patient was seen and examined this morning at the bedside. Patient reports no acute issues overnight.  Patient reports that pain is adequately controlled.    _______________    Vitals:    06/26/17 2025 06/27/17 0055 06/27/17 0441 06/27/17 0758   BP: (!) 141/91 (!) 144/66 139/63    Pulse: 88 75 65    Resp: 18 18 18    Temp: 98.2 °F (36.8 °C) 98.3 °F (36.8 °C) 98.4 °F (36.9 °C)    TempSrc:  Oral Oral    SpO2: 99% 96% 98% 100%   Weight: 108.4 kg (239 lb)      Height: 5' 7.5" (1.715 m)          Vital Signs (Most Recent)  Temp: 98.4 °F (36.9 °C) (06/27/17 0441)  Pulse: 65 (06/27/17 0441)  Resp: 18 (06/27/17 0441)  BP: 139/63 (06/27/17 0441)  SpO2: 100 % (06/27/17 0758)    Vital Signs Range (Last 24H):  Temp:  [97.2 °F (36.2 °C)-98.8 °F (37.1 °C)]   Pulse:  [65-96]   Resp:  [11-20]   BP: (117-191)/(54-94)   SpO2:  [94 %-100 %]       Physical:    AAOx3  Incision/ dressing clean/dry/intact  NVI Distally  Palpable distal pulses  CR<3sec      Recent Labs      06/26/17   1440  06/27/17   0429   K  4.0  5.2*   CALCIUM  9.6  9.4   HGB  12.6  11.2*   HCT  38.1  34.0*       I/O last 3 completed shifts:  In: 2940 [P.O.:360; I.V.:2380; IV Piggyback:200]  Out: 2400 [Urine:2350; Blood:50]          Assessment:  A/P POD 1 s/p right  TKA             Plan:    PT/OT: WBAT  Pain Control  DVT Prophylaxis: ASA    Discharge Plan: Home tomorrow with JED Rodriguez MD  [facility]  "

## 2017-06-27 NOTE — ANESTHESIA PREPROCEDURE EVALUATION
06/27/2017  Navin Beck is a 56 y.o., female.    Anesthesia Evaluation    I have reviewed the Patient Summary Reports.    I have reviewed the Nursing Notes.      Review of Systems  Social:  Former Smoker    Cardiovascular:   Denies Pacemaker. Hypertension  Denies Valvular problems/Murmurs.  Denies MI.  Denies CAD.    Denies CABG/stent.  Denies Dysrhythmias.   Denies Angina.             hyperlipidemia    Pulmonary:  Pulmonary Normal    Renal/:   renal calculi BPH    Hepatic/GI:   No Bowel Prep. Denies PUD. GERD Denies Liver Disease. Denies Hepatitis.    Musculoskeletal:   Arthritis     Neurological:  Neurology Normal    Endocrine:   Diabetes, type 2, using insulin Hyperthyroidism        Physical Exam  General:  Obesity    Airway/Jaw/Neck:  AIRWAY FINDINGS: Normal           Mental Status:  Mental Status Findings: Normal        Anesthesia Plan  Type of Anesthesia, risks & benefits discussed:  Anesthesia Type:  spinal  Patient's Preference:   Intra-op Monitoring Plan:   Intra-op Monitoring Plan Comments:   Post Op Pain Control Plan:   Post Op Pain Control Plan Comments:   Induction:    Beta Blocker:  Patient is on a Beta-Blocker and has received one dose within the past 24 hours (No further documentation required).       Informed Consent: Patient understands risks and agrees with Anesthesia plan.  Questions answered. Anesthesia consent signed with patient.  ASA Score: 3     Day of Surgery Review of History & Physical:    H&P update referred to the surgeon.     Anesthesia Plan Notes: No contraindication to neuraxial        Ready For Surgery From Anesthesia Perspective.

## 2017-06-27 NOTE — PLAN OF CARE
Problem: Patient Care Overview  Goal: Plan of Care Review  Outcome: Ongoing (interventions implemented as appropriate)  Pt progressing. Oriented x4. Cif[place] maintained. Skin integrity maintained. Pain still seems to be a bit uncontrolled, PRN given. Continue to reassess. VS stable. Adequate oral intake. Tolerating diet. IV fluids maintained. Free of falls. Call light in reach. Low bed. No issues during shift. Continue plan of care.      Problem: Diabetes, Type 2 (Adult)  Goal: Signs and Symptoms of Listed Potential Problems Will be Absent, Minimized or Managed (Diabetes, Type 2)  Signs and symptoms of listed potential problems will be absent, minimized or managed by discharge/transition of care (reference Diabetes, Type 2 (Adult) CPG).   Outcome: Ongoing (interventions implemented as appropriate)  Insulin coverage given.     Problem: Fall Risk (Adult)  Goal: Identify Related Risk Factors and Signs and Symptoms  Related risk factors and signs and symptoms are identified upon initiation of Human Response Clinical Practice Guideline (CPG)   Outcome: Ongoing (interventions implemented as appropriate)  Call light in reach.     Problem: Infection, Risk/Actual (Adult)  Goal: Identify Related Risk Factors and Signs and Symptoms  Related risk factors and signs and symptoms are identified upon initiation of Human Response Clinical Practice Guideline (CPG)   Outcome: Ongoing (interventions implemented as appropriate)  Post op vanco dose given. Afebrile.     Problem: Knee Arthroplasty (Total, Partial) (Adult)  Goal: Signs and Symptoms of Listed Potential Problems Will be Absent, Minimized or Managed (Knee Arthroplasty)  Signs and symptoms of listed potential problems will be absent, minimized or managed by discharge/transition of care (reference Knee Arthroplasty (Total, Partial) (Adult) CPG).  Outcome: Ongoing (interventions implemented as appropriate)  Complains to severe pain to knee. Pain mediation given as needed per order.  scd maintained. Still reports numbness to right leg. Dressing cdi. Neurovascular checks performed.

## 2017-06-27 NOTE — CONSULTS
Orders received to arrange home health and DME for anticipated discharge. Patient in agreement with home health at discharge. Patient informed of the recommended DME (rolling walker, bedside commode and transfer tub bench) patient was also informed that rolling walker and bedside commode are usually covered and the transfer tube bench is not. [place] informed her that each piece is about $70 if she wanted to purchase them. Patient Choice form signed with patient selecting [facility] as her provider of choice. Yellow copy given to patient, white copy placed in patients blue folder.    All paperwork (facesheet, H&P, progress notes, PT/OT notes, MAR) collected and faxed to [facility] [phone]. Fax confirmation received.     Orders for DME (rolling walker, bedside commode) faxed to [facility] along with facesheet [phone]. Fax confirmation received.

## 2017-06-28 VITALS
DIASTOLIC BLOOD PRESSURE: 55 MMHG | HEIGHT: 68 IN | OXYGEN SATURATION: 100 % | WEIGHT: 239 LBS | SYSTOLIC BLOOD PRESSURE: 122 MMHG | TEMPERATURE: 100 F | BODY MASS INDEX: 36.22 KG/M2 | RESPIRATION RATE: 18 BRPM | HEART RATE: 74 BPM

## 2017-06-28 LAB
ANION GAP SERPL CALC-SCNC: 7 MMOL/L
BUN SERPL-MCNC: 16 MG/DL
CALCIUM SERPL-MCNC: 9.2 MG/DL
CHLORIDE SERPL-SCNC: 105 MMOL/L
CO2 SERPL-SCNC: 24 MMOL/L
CREAT SERPL-MCNC: 1 MG/DL
EST. GFR  (AFRICAN AMERICAN): >60 ML/MIN/1.73 M^2
EST. GFR  (NON AFRICAN AMERICAN): >60 ML/MIN/1.73 M^2
GLUCOSE SERPL-MCNC: 149 MG/DL
HCT VFR BLD AUTO: 30.3 %
HGB BLD-MCNC: 9.9 G/DL
POCT GLUCOSE: 234 MG/DL (ref 70–110)
POCT GLUCOSE: 97 MG/DL (ref 70–110)
POTASSIUM SERPL-SCNC: 4.2 MMOL/L
SODIUM SERPL-SCNC: 136 MMOL/L

## 2017-06-28 PROCEDURE — 97110 THERAPEUTIC EXERCISES: CPT

## 2017-06-28 PROCEDURE — G8988 SELF CARE GOAL STATUS: HCPCS | Mod: CI

## 2017-06-28 PROCEDURE — 97535 SELF CARE MNGMENT TRAINING: CPT

## 2017-06-28 PROCEDURE — 80048 BASIC METABOLIC PNL TOTAL CA: CPT

## 2017-06-28 PROCEDURE — 36415 COLL VENOUS BLD VENIPUNCTURE: CPT

## 2017-06-28 PROCEDURE — 97530 THERAPEUTIC ACTIVITIES: CPT

## 2017-06-28 PROCEDURE — G8987 SELF CARE CURRENT STATUS: HCPCS | Mod: CJ

## 2017-06-28 PROCEDURE — 85018 HEMOGLOBIN: CPT

## 2017-06-28 PROCEDURE — G8978 MOBILITY CURRENT STATUS: HCPCS | Mod: CJ

## 2017-06-28 PROCEDURE — 63600175 PHARM REV CODE 636 W HCPCS: Performed by: ORTHOPAEDIC SURGERY

## 2017-06-28 PROCEDURE — G8989 SELF CARE D/C STATUS: HCPCS | Mod: CJ

## 2017-06-28 PROCEDURE — G8980 MOBILITY D/C STATUS: HCPCS | Mod: CJ

## 2017-06-28 PROCEDURE — 85014 HEMATOCRIT: CPT

## 2017-06-28 PROCEDURE — 97116 GAIT TRAINING THERAPY: CPT

## 2017-06-28 PROCEDURE — G8979 MOBILITY GOAL STATUS: HCPCS | Mod: CI

## 2017-06-28 PROCEDURE — 25000003 PHARM REV CODE 250: Performed by: ORTHOPAEDIC SURGERY

## 2017-06-28 RX ORDER — ASPIRIN 325 MG
325 TABLET ORAL DAILY
Refills: 0 | Status: ON HOLD | COMMUNITY
Start: 2017-06-28 | End: 2017-08-07

## 2017-06-28 RX ADMIN — MORPHINE SULFATE 4 MG: 10 INJECTION INTRAVENOUS at 12:06

## 2017-06-28 RX ADMIN — SODIUM CHLORIDE: 0.9 INJECTION, SOLUTION INTRAVENOUS at 03:06

## 2017-06-28 RX ADMIN — ASPIRIN 325 MG ORAL TABLET 325 MG: 325 PILL ORAL at 08:06

## 2017-06-28 RX ADMIN — OXYCODONE HYDROCHLORIDE 10 MG: 5 TABLET ORAL at 05:06

## 2017-06-28 RX ADMIN — ROSUVASTATIN CALCIUM 10 MG: 10 TABLET ORAL at 08:06

## 2017-06-28 RX ADMIN — BETHANECHOL CHLORIDE 25 MG: 25 TABLET ORAL at 05:06

## 2017-06-28 RX ADMIN — PANTOPRAZOLE SODIUM 40 MG: 40 TABLET, DELAYED RELEASE ORAL at 08:06

## 2017-06-28 RX ADMIN — OXYBUTYNIN CHLORIDE 5 MG: 5 TABLET ORAL at 08:06

## 2017-06-28 RX ADMIN — INSULIN HUMAN 15 UNITS: 100 INJECTION, SOLUTION PARENTERAL at 12:06

## 2017-06-28 RX ADMIN — BETHANECHOL CHLORIDE 25 MG: 25 TABLET ORAL at 03:06

## 2017-06-28 RX ADMIN — DOCUSATE SODIUM 100 MG: 100 CAPSULE, LIQUID FILLED ORAL at 08:06

## 2017-06-28 RX ADMIN — OXYCODONE HYDROCHLORIDE 10 MG: 10 TABLET, FILM COATED, EXTENDED RELEASE ORAL at 09:06

## 2017-06-28 RX ADMIN — TAMSULOSIN HYDROCHLORIDE 0.4 MG: 0.4 CAPSULE ORAL at 08:06

## 2017-06-28 RX ADMIN — OXYCODONE HYDROCHLORIDE 10 MG: 5 TABLET ORAL at 02:06

## 2017-06-28 RX ADMIN — MORPHINE SULFATE 4 MG: 10 INJECTION INTRAVENOUS at 05:06

## 2017-06-28 NOTE — PT/OT/SLP PROGRESS
Physical Therapy  AM Treatment    Navin Beck   MRN: [id number]   Admitting Diagnosis: Primary osteoarthritis of right knee    PT Received On: 06/28/17  PT Start Time: 0945     PT Stop Time: 1030    PT Total Time (min): 45 min       Billable Minutes:  Gait [id number], Therapeutic Activity 15 and Therapeutic Exercise 12    Treatment Type: Treatment  PT/PTA: PTA     PTA Visit Number: 1       General Precautions: Standard, fall, diabetic    Orthopedic Precautions: RLE weight bearing as tolerated   Braces: N/A         Subjective:  Communicated with nurse Coleman prior to session.  Pt agreeable to therapy.     Pain/Comfort  Pain Rating 1: 6/10  Location - Side 1: Right  Location 1: knee  Pain Addressed 1: Pre-medicate for activity, Reposition, Distraction, Cessation of Activity, Nurse notified  Pain Rating Post-Intervention 1: 6/10    Objective:   Patient found with: FCD, Polar ice, peripheral IV    Functional Mobility:  Bed Mobility:   Scooting/Bridging: Stand by Assistance  Supine to Sit: Minimum Assistance, With leg lift    Transfers: VC's for safety technique and walker management.   Sit <> Stand Assistance: Contact Guard Assistance  Sit <> Stand Assistive Device: Rolling Walker  Toilet Transfer Technique: Stand Pivot ( pt ambulated to the bathroom )   Toilet Transfer Assistance: Contact Guard Assistance  Toilet Transfer Assistive Device: Rolling Walker    Gait:   Gait Distance: 130 ft. VC's for safety technique and walker management.   Assistance 1: Contact Guard Assistance  Gait Assistive Device: Rolling walker  Gait Pattern: swing-through gait  Gait Deviation(s): decreased ravi, increased time in double stance, decreased weight-shifting ability, decreased velocity of limb motion, decreased step length, decreased swing-to-stance ratio, decreased toe-to-floor clearance      Balance:   Static Sit: GOOD-: Takes MODERATE challenges from all directions but inconsistently  Dynamic Sit: GOOD-: Maintains balance  through MODERATE excursions of active trunk movement,     Static Stand: FAIR+: Takes MINIMAL challenges from all directions  Dynamic stand: FAIR+: Needs CLOSE SUPERVISION during gait and is able to right self with minor LOB     Therapeutic Activities and Exercises:  Pt performed bed mobility, transfer and gait training as above.  Pt performed supine BLE x 15 reps : AP, QS ,GS and seated BLE x 10 reps : LAQ. VC's for safety technique and sequence.   Pt tolerated standing balance using RW CGA to perform self-care in the bathroom .      AM-PAC 6 CLICK MOBILITY  How much help from another person does this patient currently need?   1 = Unable, Total/Dependent Assistance  2 = A lot, Maximum/Moderate Assistance  3 = A little, Minimum/Contact Guard/Supervision  4 = None, Modified [place]/Independent    Turning over in bed (including adjusting bedclothes, sheets and blankets)?: 4  Sitting down on and standing up from a chair with arms (e.g., wheelchair, bedside commode, etc.): 4  Moving from lying on back to sitting on the side of the bed?: 3  Moving to and from a bed to a chair (including a wheelchair)?: 3  Need to walk in hospital room?: 3  Climbing 3-5 steps with a railing?: 3  Total Score: 20    AM-PAC Raw Score CMS G-Code Modifier Level of Impairment Assistance   6 % Total / Unable   7 - 9 CM 80 - 100% Maximal Assist   10 - 14 CL 60 - 80% Moderate Assist   15 - 19 CK 40 - 60% Moderate Assist   20 - 22 CJ 20 - 40% Minimal Assist   23 CI 1-20% SBA / CGA   24 CH 0% Independent/ Mod I     Patient left up in reclined chair polar ice placed with all lines intact, call button in reach and nurse  notified.    Assessment:  Navin Beck is a 56 y.o. female with a medical diagnosis of Primary osteoarthritis of right knee .    Rehab identified problem list/impairments: Rehab identified problem list/impairments: weakness, decreased upper extremity function, decreased lower extremity function, decreased ROM,  edema, orthopedic precautions, pain    Rehab potential is good.    Activity tolerance: Good    Discharge recommendations: Discharge Facility/Level Of Care Needs: home health PT ( with family assistance )     Barriers to discharge:  none     Equipment recommendations: Equipment Needed After Discharge: walker, rolling, bedside commode bath bench,    GOALS:    Physical Therapy Goals        Problem: Physical Therapy Goal    Goal Priority Disciplines Outcome Goal Variances Interventions   Physical Therapy Goal     PT/OT, PT      Description:  Goals to be met by: 17     Patient will increase functional independence with mobility by performin. Supine to sit with Modified [place]  2. Rolling to Left and Right with Modified [place]  3. Sit to stand transfer with Modified [place] using RW  4. Bed to chair transfer with Modified [place] using Rolling Walker  5. Gait  X 200 feet with Modified [place] using Rolling Walker   6. Lower extremity exercise program x 20 reps per handout, with independence  7. R knee ROM 0-90*                      PLAN:    Patient to be seen BID (Mon-Fri; daily Sat+Sun)  to address the above listed problems via gait training, therapeutic activities, therapeutic exercises  Plan of Care expires: 17  Plan of Care reviewed with: patient         Adri Doe, PTA  2017

## 2017-06-28 NOTE — PLAN OF CARE
Problem: Physical Therapy Goal  Goal: Physical Therapy Goal  Goals to be met by: 17     Patient will increase functional independence with mobility by performin. Supine to sit with Modified [place]  2. Rolling to Left and Right with Modified [place]  3. Sit to stand transfer with Modified [place] using RW  4. Bed to chair transfer with Modified [place] using Rolling Walker  5. Gait  X 200 feet with Modified [place] using Rolling Walker   6. Lower extremity exercise program x 20 reps per handout, with independence  7. R knee ROM 0-90*     Outcome: Ongoing (interventions implemented as appropriate)  Pt progressing well toward treatment goals. Continue with POC .

## 2017-06-28 NOTE — DISCHARGE SUMMARY
".Physician Discharge Summary     Patient ID:  Navin Beck  [id number]  56 y.o.  1960    Admit date: 6/26/2017    Discharge date and time: No discharge date for patient encounter.     Admitting Physician: Chapito Rodriguez MD     Admission Diagnoses: Primary osteoarthritis of right knee [M17.11]  Primary osteoarthritis of right knee [M17.11]  Primary osteoarthritis of right knee [M17.11]    Discharge Diagnoses: TKR    Admission Condition: good    Discharged Condition: good    Discharge Exam:    BP (!) 122/55 (BP Location: Left arm, Patient Position: Sitting, BP Method: Automatic)   Pulse 74   Temp 99.5 °F (37.5 °C) (Oral)   Resp 18   Ht 5' 7.5" (1.715 m)   Wt 108.4 kg (239 lb)   SpO2 100%   Breastfeeding? No   BMI 36.88 kg/m²     General Appearance:    Alert, cooperative, no distress, appears stated age   Head:    Normocephalic, without obvious abnormality, atraumatic   Eyes:    PERRL, conjunctiva/corneas clear, EOM's intact, fundi     benign, both eyes   Ears:    Normal TM's and external ear canals, both ears   Nose:   Nares normal, septum midline, mucosa normal, no drainage    or sinus tenderness   Throat:   Lips, mucosa, and tongue normal; teeth and gums normal   Neck:   Supple, symmetrical, trachea midline, no adenopathy;     thyroid:  no enlargement/tenderness/nodules; no carotid    bruit or JVD   Back:     Symmetric, no curvature, ROM normal, no CVA tenderness   Lungs:     Clear to auscultation bilaterally, respirations unlabored   Chest Wall:    No tenderness or deformity    Heart:    Regular rate and rhythm, S1 and S2 normal, no murmur, rub   or gallop   Breast Exam:    No tenderness, masses, or nipple abnormality   Abdomen:     Soft, non-tender, bowel sounds active all four quadrants,     no masses, no organomegaly   Genitalia:    Normal female without lesion, discharge or tenderness   Rectal:    Normal tone, no masses or tenderness;    guaiac negative stool   Extremities:   Extremities " normal, atraumatic, no cyanosis or edema   Pulses:   2+ and symmetric all extremities   Skin:   Skin color, texture, turgor normal, no rashes or lesions   Lymph nodes:   Cervical, supraclavicular, and axillary nodes normal   Neurologic:   CNII-XII intact, normal strength, sensation and reflexes     throughout         Disposition: Home or Self Care    Patient Instructions:   Current Discharge Medication List      START taking these medications    Details   !! aspirin 325 MG tablet Take 1 tablet (325 mg total) by mouth once daily.  Qty: 30 tablet, Refills: 0      !! aspirin 325 MG tablet Take 1 tablet (325 mg total) by mouth once daily.  Refills: 0      docusate sodium (COLACE) 100 MG capsule Take 1 capsule (100 mg total) by mouth 2 (two) times daily as needed for Constipation.  Qty: 60 capsule, Refills: 0      oxycodone-acetaminophen (PERCOCET)  mg per tablet Take 1 tablet by mouth every 4 (four) hours as needed for Pain.  Qty: 90 tablet, Refills: 0       !! - Potential duplicate medications found. Please discuss with provider.      CONTINUE these medications which have NOT CHANGED    Details   benazepril (LOTENSIN) 10 MG tablet Take by mouth. 1 Tablet Oral Every day      bethanechol (URECHOLINE) 25 MG Tab Take by mouth. 2 Tablet Oral Every 6 hours      CRESTOR 10 mg tablet       insulin aspart (NOVOLOG) 100 unit/mL injection Three times a day with meals according to sliding scale.      insulin glargine (LANTUS) 100 unit/mL injection Inject 25 Units into the skin 2 (two) times daily. Give 25 units in am and at bedtime.  Qty: 3 vial, Refills: 3      meclizine (ANTIVERT) 25 mg tablet Take by mouth. 1 Tablet Oral Every 6 hours.     AS NEEEDED FOR DIZZINESS      METFORMIN HCL (METFORMIN ORAL) Take 500 mg by mouth 2 (two) times daily.      methimazole (TAPAZOLE) 10 MG Tab Take 10 mg by mouth once daily. 2 Tablet Oral Twice a day       NEXIUM 40 mg capsule       oxybutynin (DITROPAN) 5 MG Tab Take 5 mg by mouth 2  (two) times daily.      propranolol (INDERAL) 60 MG tablet Take by mouth 2 (two) times daily. 1 Tablet Oral Four times a day      tamsulosin (FLOMAX) 0.4 mg Cp24 Take by mouth. 1 Capsule, Ext Release 24 hr Oral Twice a day           Activity: activity as tolerated  Diet: diabetic diet  Wound Care: keep wound clean and dry    Follow-up with Christian in 2 weeks.    Signed:  Cam Wilkins  6/28/2017  5:11 PM

## 2017-06-28 NOTE — PLAN OF CARE
06/28/17 1608   Final Note   Assessment Type Final Discharge Note   Discharge Disposition Home-Health   Discharge planning education complete? Yes   What phone number can be called within the next 1-3 days to see how you are doing after discharge? [phone]   Hospital Follow Up  Appt(s) scheduled? Yes   Discharge plans and expectations educations in teach back method with documentation complete? Yes   Right Care Referral Info   Post Acute Recommendation Home-care   Referral Type Home Health   Facility Name [facility]

## 2017-06-28 NOTE — PLAN OF CARE
Problem: Occupational Therapy Goal  Goal: Occupational Therapy Goal  Goals to be met by: 6/30/17    Patient will increase functional independence with ADLs by performing:    LE Dressing with Modified [place] and Supervision.  Grooming while standing at sink with Modified [place].  Supine to sit with Stand-by Assistance.  Stand pivot transfers with Supervision. Met 6/28/17  Toilet transfer to toilet with Stand-by Assistance.  Met 6/28/17     Outcome: Ongoing (interventions implemented as appropriate)  Patient will benefit from OT to address functional deficits.

## 2017-06-28 NOTE — PLAN OF CARE
Problem: Patient Care Overview  Goal: Plan of Care Review  Outcome: Ongoing (interventions implemented as appropriate)  Pt progressing. Oriented x4. Voiding without difficulty. Skin integrity maintained. Pain seem to be better controlled, given PRN. Continue to reassess. VS stable. Adequate oral intake. Tolerating diet. IV fluids maintained. Free of falls. Call light in reach. Low bed. No issues during shift. Continue plan of care.        Problem: Diabetes, Type 2 (Adult)  Goal: Signs and Symptoms of Listed Potential Problems Will be Absent, Minimized or Managed (Diabetes, Type 2)  Signs and symptoms of listed potential problems will be absent, minimized or managed by discharge/transition of care (reference Diabetes, Type 2 (Adult) CPG).   Outcome: Ongoing (interventions implemented as appropriate)  Insulin coverage given.      Problem: Fall Risk (Adult)  Goal: Identify Related Risk Factors and Signs and Symptoms  Related risk factors and signs and symptoms are identified upon initiation of Human Response Clinical Practice Guideline (CPG)   Outcome: Ongoing (interventions implemented as appropriate)  Call light in reach.      Problem: Infection, Risk/Actual (Adult)  Goal: Identify Related Risk Factors and Signs and Symptoms  Related risk factors and signs and symptoms are identified upon initiation of Human Response Clinical Practice Guideline (CPG)   Outcome: Ongoing (interventions implemented as appropriate)   Afebrile.

## 2017-06-28 NOTE — PT/OT/SLP PROGRESS
Occupational Therapy  Treatment    Navin Beck   MRN: [id number]   Admitting Diagnosis: Primary osteoarthritis of right knee    OT Date of Treatment: 06/28/17   OT Start Time: 1143  OT Stop Time: 1221  OT Total Time (min): 38 min    Billable Minutes:  Self Care/Home Management 38    General Precautions: Standard, diabetic, fall  Orthopedic Precautions: RLE weight bearing as tolerated  Braces: N/A    Do you have any cultural, spiritual, Episcopal conflicts, given your current situation?: none    Subjective:  Communicated with nurse prior to session.  Patient c/o feeling sleepy.    Pain/Comfort  Pain Rating 1: 5/10  Location - Side 1: Right  Location 1: knee  Pain Addressed 1: Pre-medicate for activity, Cessation of Activity    Objective:  Patient found with: Polar ice, peripheral IV     Functional Mobility:  Bed Mobility:  Supine to Sit:  (The patient was found seated/reclined in the chair.)    Transfers:   Sit <> Stand Assistance: Stand By Assistance (1x from the chair, 1x from the toilet)  Sit <> Stand Assistive Device: Rolling Walker  Toilet Transfer Assistance: Stand By Assistance  Toilet Transfer Assistive Device: Rolling Walker, grab bar    Functional Ambulation: The patient amb using a RW from the chair to the toilet with SBA ~15'.  The patient amb from the toilet and to the sink and chair    Activities of Daily Living:  UE Dressing Level of Assistance: Contact guard  LE Dressing Level of Assistance: Total assistance  Grooming Position: Standing at sink  Grooming Level of Assistance: Supervision, Stand by assistance (to brush her teeth, fash her face and upper body)  Toileting Where Assessed: Bedside commode, Toilet  Toileting Level of Assistance: Modified independent (Patient was able to manage her underpants)    Balance:   Static Sit: GOOD: Takes MODERATE challenges from all directions  Dynamic Sit: GOOD: Maintains balance through MODERATE excursions of active trunk movement  Static Stand: FAIR+: Takes  "MINIMAL challenges from all directions  Dynamic stand: FAIR+: Needs CLOSE SUPERVISION during gait and is able to right self with minor LOB    Therapeutic Activities and Exercises:  The patient was educated re: Polar Ice use. The patient tolerated standing at the sink for Grooming/hygiene tasks ~15 min.    AM-PAC 6 CLICK ADL   How much help from another person does this patient currently need?   1 = Unable, Total/Dependent Assistance  2 = A lot, Maximum/Moderate Assistance  3 = A little, Minimum/Contact Guard/Supervision  4 = None, Modified [place]/Independent    Putting on and taking off regular lower body clothing? : 2  Bathing (including washing, rinsing, drying)?: 3  Toileting, which includes using toilet, bedpan, or urinal? : 4  Putting on and taking off regular upper body clothing?: 4  Taking care of personal grooming such as brushing teeth?: 4  Eating meals?: 4  Total Score: 21     AM-PAC Raw Score CMS "G-Code Modifier Level of Impairment Assistance   6 % Total / Unable   7 - 8 CM 80 - 100% Maximal Assist   9-13 CL 60 - 80% Moderate Assist   14 - 19 CK 40 - 60% Moderate Assist   20 - 22 CJ 20 - 40% Minimal Assist   23 CI 1-20% SBA / CGA   24 CH 0% Independent/ Mod I       Patient left up in chair with all lines intact and call button in reach    ASSESSMENT:  Navin Beck is a 56 y.o. female with a medical diagnosis of Primary osteoarthritis of right knee and presents with improved functional mobility and activity tolerance.    Rehab identified problem list/impairments: Rehab identified problem list/impairments: weakness, impaired self care skills, decreased lower extremity function, decreased ROM, edema, orthopedic precautions, impaired balance, impaired skin, gait instability, impaired functional mobilty    Rehab potential is good.    Activity tolerance: Good    Discharge recommendations: Discharge Facility/Level Of Care Needs: home health OT (with family assist)     Barriers to discharge: " Barriers to Discharge: None    Equipment recommendations: bedside commode, walker, rolling     GOALS:    Occupational Therapy Goals        Problem: Occupational Therapy Goal    Goal Priority Disciplines Outcome Interventions   Occupational Therapy Goal     OT, PT/OT Ongoing (interventions implemented as appropriate)    Description:  Goals to be met by: 6/30/17    Patient will increase functional independence with ADLs by performing:    LE Dressing with Modified [place] and Supervision.  Grooming while standing at sink with Modified [place].  Supine to sit with Stand-by Assistance.  Stand pivot transfers with Supervision. Met 6/28/17  Toilet transfer to toilet with Stand-by Assistance.  Met 6/28/17                       Plan:  Patient to be seen 2 x/week to address the above listed problems via self-care/home management, therapeutic activities  Plan of Care expires: 06/30/17  Plan of Care reviewed with: patient         Viviana Morris, OT  06/28/2017

## 2017-06-28 NOTE — NURSING
Pt right knee incision dressing changed per MD order. Redressed with Aquacel dressing, TEDS applied, non skid socks reapplied. Incision site without drainage or redness noted. Staples intact. Discharge instructions given and pt verbalized understanding.  Pt transported in wheelchair to car with family and hospital personal.

## 2017-06-28 NOTE — PROGRESS NOTES
WRITTEN HEALTHCARE DISCHARGE INFORMATION     Things that YOU are responsible for to Manage Your Care At Home:  1. Getting your prescriptions filled.  2. Taking you medications as directed. DO NOT MISS ANY DOSES!  3. Going to your follow-up doctor appointments. This is important because it allows the doctor to monitor your progress and to determine if any changes need to be made to your treatment plan.    If you are unable to make your follow up appointments, please call the number listed and reschedule this appointment.     After discharge, if you need assistance, you can call [facility] On Call Nurse Care Line for 24/7 assistance at [phone]    Thank you for choosing [facility] and allowing us to care for you.   From your care management team: Ly CHEATHAM,RSW & Amanda TILLMAN RN,TN [phone] or [phone]     You should receive a call from [facility] Discharge Department within 48-72 hours to help manage your care after discharge. Please try to make sure that you answer your phone for this important phone call.     Follow-up Information     Chapito Rodriguez MD. Go on 7/11/2017.    Specialty:  Orthopedic Surgery  Why:  For Appointment on Tuesday 7/11/2017 @ 2:45PM  Contact information:  [address] [facility] [address]  [place] [address]  [phone]             [facility]    Specialty:  DME Provider  Why:  Immanuel Nolasoc [facility]  Contact information:  [address] [facility]  [address]  [place] [address]  [phone]             [facility].    Specialties:  DME Provider, Home Health Services  Why:  Home Health Physical Therapy  Contact information:  [address] [facility]  [address]  [place] [address]  [phone]

## 2017-06-29 NOTE — PT/OT/SLP DISCHARGE
Physical Therapy Discharge Summary    Navin Beck  MRN: [id number]   Primary osteoarthritis of right knee   Patient Discharged from acute Physical Therapy on 17.  Please refer to prior PT notes for functional status.     Assessment:   Patient has not met goals.  GOALS:    Physical Therapy Goals     Not on file          Multidisciplinary Problems (Resolved)        Problem: Physical Therapy Goal    Goal Priority Disciplines Outcome Goal Variances Interventions   Physical Therapy Goal   (Resolved)     PT/OT, PT ongoing     Description:  Goals to be met by: 17     Patient will increase functional independence with mobility by performin. Supine to sit with Modified [place]  2. Rolling to Left and Right with Modified [place]  3. Sit to stand transfer with Modified [place] using RW  4. Bed to chair transfer with Modified [place] using Rolling Walker  5. Gait  X 200 feet with Modified [place] using Rolling Walker   6. Lower extremity exercise program x 20 reps per handout, with independence  7. R knee ROM 0-90*                    Reasons for Discontinuation of Therapy Services  Transfer to alternate level of care.      Plan:  Patient Discharged to: Home with Home Health Service.

## 2017-06-29 NOTE — PROGRESS NOTES
6/29/2017 0900 TN called [facility] and spoke with Patricia. TN informed her that pt was discharged on 6/28/2017 @ 1800.

## 2017-06-30 ENCOUNTER — PATIENT OUTREACH (OUTPATIENT)
Dept: ADMINISTRATIVE | Facility: CLINIC | Age: 57
End: 2017-06-30

## 2017-06-30 RX ORDER — TIZANIDINE HYDROCHLORIDE 4 MG/1
12 CAPSULE, GELATIN COATED ORAL NIGHTLY
Status: ON HOLD | COMMUNITY
End: 2017-08-08

## 2017-06-30 RX ORDER — GABAPENTIN 600 MG/1
600 TABLET ORAL EVERY 4 HOURS
Status: ON HOLD | COMMUNITY
End: 2019-04-17 | Stop reason: SDUPTHER

## 2017-08-07 PROBLEM — R10.13 EPIGASTRIC PAIN: Status: ACTIVE | Noted: 2017-08-07

## 2017-08-07 PROBLEM — E05.90 HYPERTHYROIDISM: Status: ACTIVE | Noted: 2017-08-07

## 2017-08-07 PROBLEM — J06.9 UPPER RESPIRATORY INFECTION: Status: ACTIVE | Noted: 2017-08-07

## 2017-08-07 PROBLEM — Z72.0 TOBACCO ABUSE: Status: ACTIVE | Noted: 2017-08-07

## 2017-08-07 PROBLEM — R19.7 DIARRHEA: Status: ACTIVE | Noted: 2017-08-07

## 2017-08-07 PROBLEM — R11.2 NAUSEA & VOMITING: Status: ACTIVE | Noted: 2017-08-07

## 2017-08-07 PROBLEM — E11.9 DM2 (DIABETES MELLITUS, TYPE 2): Status: ACTIVE | Noted: 2017-08-07

## 2017-08-07 PROBLEM — E11.10 DKA (DIABETIC KETOACIDOSES): Status: ACTIVE | Noted: 2017-08-07

## 2017-08-08 ENCOUNTER — HOSPITAL ENCOUNTER (INPATIENT)
Facility: HOSPITAL | Age: 57
LOS: 3 days | Discharge: HOME | DRG: 384 | End: 2017-08-11
Attending: HOSPITALIST | Admitting: INTERNAL MEDICINE
Payer: MEDICARE

## 2017-08-08 DIAGNOSIS — K27.9 H PYLORI ULCER: ICD-10-CM

## 2017-08-08 DIAGNOSIS — A04.72 CLOSTRIDIUM DIFFICILE DIARRHEA: Primary | ICD-10-CM

## 2017-08-08 DIAGNOSIS — E05.00 GRAVES DISEASE: ICD-10-CM

## 2017-08-08 DIAGNOSIS — I10 ESSENTIAL HYPERTENSION: ICD-10-CM

## 2017-08-08 DIAGNOSIS — B96.81 H PYLORI ULCER: ICD-10-CM

## 2017-08-08 DIAGNOSIS — K31.89 DUODENAL MASS: ICD-10-CM

## 2017-08-08 DIAGNOSIS — Z72.0 TOBACCO ABUSE: ICD-10-CM

## 2017-08-08 PROCEDURE — 11000001 HC ACUTE MED/SURG PRIVATE ROOM

## 2017-08-08 PROCEDURE — 99223 1ST HOSP IP/OBS HIGH 75: CPT | Mod: AI,GC,, | Performed by: INTERNAL MEDICINE

## 2017-08-08 NOTE — PLAN OF CARE
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 She was found to be in DKA. CT abd showed Wall thickening involving the second and third portion of the duodenum concerning for duodenitis cannot exclude mass lesion.  Pt will require GI evaluation for scope / biopsy. Pt also had a c dif + today and has been started on flagyl.    To do list upon patient arrival: Consult GI    Please call extension [phone] upon patient arrival to floor for Hospital Medicine admit team assignment and for additional admit orders. If patient is coming from another [facility] facility please also call [phone] to inform the admit team/office that patient has arrived from the [facility] facility to the floor so patient can be evaluated.

## 2017-08-09 ENCOUNTER — ANESTHESIA EVENT (OUTPATIENT)
Dept: ENDOSCOPY | Facility: HOSPITAL | Age: 57
DRG: 384 | End: 2017-08-09
Payer: MEDICARE

## 2017-08-09 ENCOUNTER — ANESTHESIA (OUTPATIENT)
Dept: ENDOSCOPY | Facility: HOSPITAL | Age: 57
DRG: 384 | End: 2017-08-09
Payer: MEDICARE

## 2017-08-09 PROBLEM — E05.00 GRAVES DISEASE: Status: ACTIVE | Noted: 2017-08-09

## 2017-08-09 PROBLEM — K29.80 DUODENITIS: Status: ACTIVE | Noted: 2017-08-07

## 2017-08-09 PROBLEM — A04.72 CLOSTRIDIUM DIFFICILE DIARRHEA: Status: ACTIVE | Noted: 2017-08-09

## 2017-08-09 LAB
ALBUMIN SERPL BCP-MCNC: 3.3 G/DL
ALP SERPL-CCNC: 92 U/L
ALT SERPL W/O P-5'-P-CCNC: 11 U/L
ANION GAP SERPL CALC-SCNC: 12 MMOL/L
AST SERPL-CCNC: 12 U/L
B-OH-BUTYR BLD STRIP-SCNC: 1.7 MMOL/L
BASOPHILS # BLD AUTO: 0.01 K/UL
BASOPHILS NFR BLD: 0.1 %
BILIRUB SERPL-MCNC: 0.3 MG/DL
BILIRUB UR QL STRIP: NEGATIVE
BUN SERPL-MCNC: 11 MG/DL
CALCIUM SERPL-MCNC: 8.6 MG/DL
CHLORIDE SERPL-SCNC: 106 MMOL/L
CLARITY UR REFRACT.AUTO: CLEAR
CO2 SERPL-SCNC: 18 MMOL/L
COLOR UR AUTO: ABNORMAL
CREAT SERPL-MCNC: 0.9 MG/DL
DIFFERENTIAL METHOD: ABNORMAL
EOSINOPHIL # BLD AUTO: 0 K/UL
EOSINOPHIL NFR BLD: 0.5 %
ERYTHROCYTE [DISTWIDTH] IN BLOOD BY AUTOMATED COUNT: 13 %
EST. GFR  (AFRICAN AMERICAN): >60 ML/MIN/1.73 M^2
EST. GFR  (NON AFRICAN AMERICAN): >60 ML/MIN/1.73 M^2
GLUCOSE SERPL-MCNC: 218 MG/DL
GLUCOSE UR QL STRIP: NEGATIVE
H PYLORI IGG SERPL QL IA: POSITIVE
HCT VFR BLD AUTO: 30.6 %
HGB BLD-MCNC: 10.3 G/DL
HGB UR QL STRIP: ABNORMAL
KETONES UR QL STRIP: ABNORMAL
LEUKOCYTE ESTERASE UR QL STRIP: ABNORMAL
LYMPHOCYTES # BLD AUTO: 3.3 K/UL
LYMPHOCYTES NFR BLD: 44.9 %
MCH RBC QN AUTO: 30.2 PG
MCHC RBC AUTO-ENTMCNC: 33.7 G/DL
MCV RBC AUTO: 90 FL
MICROSCOPIC COMMENT: ABNORMAL
MONOCYTES # BLD AUTO: 0.5 K/UL
MONOCYTES NFR BLD: 6.7 %
NEUTROPHILS # BLD AUTO: 3.5 K/UL
NEUTROPHILS NFR BLD: 47.7 %
NITRITE UR QL STRIP: NEGATIVE
PH UR STRIP: 5 [PH] (ref 5–8)
PLATELET # BLD AUTO: 265 K/UL
PMV BLD AUTO: 10 FL
POCT GLUCOSE: 143 MG/DL (ref 70–110)
POCT GLUCOSE: 185 MG/DL (ref 70–110)
POCT GLUCOSE: 190 MG/DL (ref 70–110)
POCT GLUCOSE: 199 MG/DL (ref 70–110)
POCT GLUCOSE: 207 MG/DL (ref 70–110)
POCT GLUCOSE: 208 MG/DL (ref 70–110)
POTASSIUM SERPL-SCNC: 4 MMOL/L
PROT SERPL-MCNC: 7 G/DL
PROT UR QL STRIP: NEGATIVE
RBC # BLD AUTO: 3.41 M/UL
RBC #/AREA URNS AUTO: 0 /HPF (ref 0–4)
SODIUM SERPL-SCNC: 136 MMOL/L
SP GR UR STRIP: 1 (ref 1–1.03)
SQUAMOUS #/AREA URNS AUTO: 0 /HPF
URN SPEC COLLECT METH UR: ABNORMAL
UROBILINOGEN UR STRIP-ACNC: NEGATIVE EU/DL
WBC # BLD AUTO: 7.29 K/UL
WBC #/AREA URNS AUTO: 8 /HPF (ref 0–5)

## 2017-08-09 PROCEDURE — 25000003 PHARM REV CODE 250: Performed by: INTERNAL MEDICINE

## 2017-08-09 PROCEDURE — 25000003 PHARM REV CODE 250: Performed by: STUDENT IN AN ORGANIZED HEALTH CARE EDUCATION/TRAINING PROGRAM

## 2017-08-09 PROCEDURE — S0028 INJECTION, FAMOTIDINE, 20 MG: HCPCS | Performed by: REGISTERED NURSE

## 2017-08-09 PROCEDURE — 85025 COMPLETE CBC W/AUTO DIFF WBC: CPT

## 2017-08-09 PROCEDURE — 37000009 HC ANESTHESIA EA ADD 15 MINS: Performed by: INTERNAL MEDICINE

## 2017-08-09 PROCEDURE — 63600175 PHARM REV CODE 636 W HCPCS: Performed by: REGISTERED NURSE

## 2017-08-09 PROCEDURE — 37000008 HC ANESTHESIA 1ST 15 MINUTES: Performed by: INTERNAL MEDICINE

## 2017-08-09 PROCEDURE — 11000001 HC ACUTE MED/SURG PRIVATE ROOM

## 2017-08-09 PROCEDURE — 82010 KETONE BODYS QUAN: CPT

## 2017-08-09 PROCEDURE — 88305 TISSUE EXAM BY PATHOLOGIST: CPT | Mod: 26,,, | Performed by: PATHOLOGY

## 2017-08-09 PROCEDURE — D9220A PRA ANESTHESIA: Mod: ANES,,, | Performed by: ANESTHESIOLOGY

## 2017-08-09 PROCEDURE — S0030 INJECTION, METRONIDAZOLE: HCPCS | Performed by: STUDENT IN AN ORGANIZED HEALTH CARE EDUCATION/TRAINING PROGRAM

## 2017-08-09 PROCEDURE — 0DB68ZX EXCISION OF STOMACH, VIA NATURAL OR ARTIFICIAL OPENING ENDOSCOPIC, DIAGNOSTIC: ICD-10-PCS | Performed by: INTERNAL MEDICINE

## 2017-08-09 PROCEDURE — 43239 EGD BIOPSY SINGLE/MULTIPLE: CPT | Mod: ,,, | Performed by: INTERNAL MEDICINE

## 2017-08-09 PROCEDURE — 43239 EGD BIOPSY SINGLE/MULTIPLE: CPT | Performed by: INTERNAL MEDICINE

## 2017-08-09 PROCEDURE — 25000003 PHARM REV CODE 250: Performed by: REGISTERED NURSE

## 2017-08-09 PROCEDURE — 81001 URINALYSIS AUTO W/SCOPE: CPT

## 2017-08-09 PROCEDURE — 63600175 PHARM REV CODE 636 W HCPCS: Performed by: STUDENT IN AN ORGANIZED HEALTH CARE EDUCATION/TRAINING PROGRAM

## 2017-08-09 PROCEDURE — 86677 HELICOBACTER PYLORI ANTIBODY: CPT

## 2017-08-09 PROCEDURE — 0DB98ZX EXCISION OF DUODENUM, VIA NATURAL OR ARTIFICIAL OPENING ENDOSCOPIC, DIAGNOSTIC: ICD-10-PCS | Performed by: INTERNAL MEDICINE

## 2017-08-09 PROCEDURE — 80053 COMPREHEN METABOLIC PANEL: CPT

## 2017-08-09 PROCEDURE — 27201012 HC FORCEPS, HOT/COLD, DISP: Performed by: INTERNAL MEDICINE

## 2017-08-09 PROCEDURE — 36415 COLL VENOUS BLD VENIPUNCTURE: CPT

## 2017-08-09 PROCEDURE — D9220A PRA ANESTHESIA: Mod: CRNA,,, | Performed by: REGISTERED NURSE

## 2017-08-09 PROCEDURE — 88305 TISSUE EXAM BY PATHOLOGIST: CPT | Performed by: PATHOLOGY

## 2017-08-09 RX ORDER — MIDAZOLAM HYDROCHLORIDE 1 MG/ML
INJECTION, SOLUTION INTRAMUSCULAR; INTRAVENOUS
Status: DISCONTINUED | OUTPATIENT
Start: 2017-08-09 | End: 2017-08-09

## 2017-08-09 RX ORDER — PROPOFOL 10 MG/ML
VIAL (ML) INTRAVENOUS
Status: DISCONTINUED | OUTPATIENT
Start: 2017-08-09 | End: 2017-08-09

## 2017-08-09 RX ORDER — SODIUM CHLORIDE 9 MG/ML
INJECTION, SOLUTION INTRAVENOUS CONTINUOUS
Status: DISCONTINUED | OUTPATIENT
Start: 2017-08-09 | End: 2017-08-09

## 2017-08-09 RX ORDER — ONDANSETRON 2 MG/ML
4 INJECTION INTRAMUSCULAR; INTRAVENOUS EVERY 12 HOURS PRN
Status: DISCONTINUED | OUTPATIENT
Start: 2017-08-09 | End: 2017-08-11 | Stop reason: HOSPADM

## 2017-08-09 RX ORDER — ROSUVASTATIN CALCIUM 5 MG/1
10 TABLET, COATED ORAL DAILY
Status: DISCONTINUED | OUTPATIENT
Start: 2017-08-09 | End: 2017-08-11 | Stop reason: HOSPADM

## 2017-08-09 RX ORDER — GABAPENTIN 300 MG/1
300 CAPSULE ORAL 2 TIMES DAILY
Status: DISCONTINUED | OUTPATIENT
Start: 2017-08-09 | End: 2017-08-11 | Stop reason: HOSPADM

## 2017-08-09 RX ORDER — HYDROCODONE BITARTRATE AND ACETAMINOPHEN 10; 325 MG/1; MG/1
1 TABLET ORAL
Status: ON HOLD | COMMUNITY
End: 2020-10-28 | Stop reason: HOSPADM

## 2017-08-09 RX ORDER — ACETAMINOPHEN 500 MG
1000 TABLET ORAL 3 TIMES DAILY PRN
Status: ON HOLD | COMMUNITY
End: 2020-12-08

## 2017-08-09 RX ORDER — ZOLPIDEM TARTRATE 10 MG/1
10 TABLET ORAL NIGHTLY
Status: ON HOLD | COMMUNITY
End: 2020-12-08

## 2017-08-09 RX ORDER — BENAZEPRIL HYDROCHLORIDE 10 MG/1
10 TABLET ORAL DAILY
Status: DISCONTINUED | OUTPATIENT
Start: 2017-08-09 | End: 2017-08-11 | Stop reason: HOSPADM

## 2017-08-09 RX ORDER — METHIMAZOLE 10 MG/1
20 TABLET ORAL DAILY
Status: DISCONTINUED | OUTPATIENT
Start: 2017-08-09 | End: 2017-08-11 | Stop reason: HOSPADM

## 2017-08-09 RX ORDER — SODIUM CHLORIDE 0.9 % (FLUSH) 0.9 %
3 SYRINGE (ML) INJECTION
Status: DISCONTINUED | OUTPATIENT
Start: 2017-08-09 | End: 2017-08-11 | Stop reason: HOSPADM

## 2017-08-09 RX ORDER — METRONIDAZOLE 500 MG/100ML
500 INJECTION, SOLUTION INTRAVENOUS
Status: DISCONTINUED | OUTPATIENT
Start: 2017-08-09 | End: 2017-08-10

## 2017-08-09 RX ORDER — IBUPROFEN 200 MG
16 TABLET ORAL
Status: DISCONTINUED | OUTPATIENT
Start: 2017-08-09 | End: 2017-08-11 | Stop reason: HOSPADM

## 2017-08-09 RX ORDER — PROPRANOLOL HYDROCHLORIDE 20 MG/1
60 TABLET ORAL ONCE
Status: COMPLETED | OUTPATIENT
Start: 2017-08-09 | End: 2017-08-09

## 2017-08-09 RX ORDER — GLYCOPYRROLATE 0.2 MG/ML
INJECTION INTRAMUSCULAR; INTRAVENOUS
Status: DISCONTINUED | OUTPATIENT
Start: 2017-08-09 | End: 2017-08-09

## 2017-08-09 RX ORDER — HYDROMORPHONE HYDROCHLORIDE 1 MG/ML
0.2 INJECTION, SOLUTION INTRAMUSCULAR; INTRAVENOUS; SUBCUTANEOUS EVERY 5 MIN PRN
Status: DISCONTINUED | OUTPATIENT
Start: 2017-08-09 | End: 2017-08-09 | Stop reason: HOSPADM

## 2017-08-09 RX ORDER — DULOXETIN HYDROCHLORIDE 60 MG/1
60 CAPSULE, DELAYED RELEASE ORAL EVERY MORNING
COMMUNITY
End: 2020-10-23 | Stop reason: CLARIF

## 2017-08-09 RX ORDER — PANTOPRAZOLE SODIUM 40 MG/1
40 TABLET, DELAYED RELEASE ORAL DAILY
Status: DISCONTINUED | OUTPATIENT
Start: 2017-08-09 | End: 2017-08-10

## 2017-08-09 RX ORDER — GLUCAGON 1 MG
1 KIT INJECTION
Status: DISCONTINUED | OUTPATIENT
Start: 2017-08-09 | End: 2017-08-11 | Stop reason: HOSPADM

## 2017-08-09 RX ORDER — INSULIN ASPART 100 [IU]/ML
11 INJECTION, SOLUTION INTRAVENOUS; SUBCUTANEOUS
Status: DISCONTINUED | OUTPATIENT
Start: 2017-08-09 | End: 2017-08-09

## 2017-08-09 RX ORDER — ACETAMINOPHEN 325 MG/1
650 TABLET ORAL EVERY 8 HOURS PRN
Status: DISCONTINUED | OUTPATIENT
Start: 2017-08-09 | End: 2017-08-11 | Stop reason: HOSPADM

## 2017-08-09 RX ORDER — ACETAMINOPHEN 325 MG/1
650 TABLET ORAL EVERY 4 HOURS PRN
Status: DISCONTINUED | OUTPATIENT
Start: 2017-08-09 | End: 2017-08-11 | Stop reason: HOSPADM

## 2017-08-09 RX ORDER — METOCLOPRAMIDE HYDROCHLORIDE 5 MG/ML
5 INJECTION INTRAMUSCULAR; INTRAVENOUS EVERY 6 HOURS PRN
Status: DISCONTINUED | OUTPATIENT
Start: 2017-08-09 | End: 2017-08-11 | Stop reason: HOSPADM

## 2017-08-09 RX ORDER — TAMSULOSIN HYDROCHLORIDE 0.4 MG/1
0.4 CAPSULE ORAL DAILY
Status: DISCONTINUED | OUTPATIENT
Start: 2017-08-09 | End: 2017-08-10

## 2017-08-09 RX ORDER — IBUPROFEN 200 MG
24 TABLET ORAL
Status: DISCONTINUED | OUTPATIENT
Start: 2017-08-09 | End: 2017-08-11 | Stop reason: HOSPADM

## 2017-08-09 RX ORDER — FAMOTIDINE 10 MG/ML
INJECTION INTRAVENOUS
Status: DISCONTINUED | OUTPATIENT
Start: 2017-08-09 | End: 2017-08-09

## 2017-08-09 RX ORDER — SODIUM CHLORIDE 9 MG/ML
INJECTION, SOLUTION INTRAVENOUS CONTINUOUS PRN
Status: DISCONTINUED | OUTPATIENT
Start: 2017-08-09 | End: 2017-08-09

## 2017-08-09 RX ORDER — DEXAMETHASONE SODIUM PHOSPHATE 4 MG/ML
INJECTION, SOLUTION INTRA-ARTICULAR; INTRALESIONAL; INTRAMUSCULAR; INTRAVENOUS; SOFT TISSUE
Status: DISCONTINUED | OUTPATIENT
Start: 2017-08-09 | End: 2017-08-09

## 2017-08-09 RX ORDER — SODIUM CHLORIDE 9 MG/ML
INJECTION, SOLUTION INTRAVENOUS ONCE
Status: DISCONTINUED | OUTPATIENT
Start: 2017-08-09 | End: 2017-08-09

## 2017-08-09 RX ORDER — INSULIN ASPART 100 [IU]/ML
0-5 INJECTION, SOLUTION INTRAVENOUS; SUBCUTANEOUS EVERY 6 HOURS PRN
Status: DISCONTINUED | OUTPATIENT
Start: 2017-08-09 | End: 2017-08-10

## 2017-08-09 RX ORDER — TIZANIDINE 4 MG/1
4 TABLET ORAL NIGHTLY
Status: DISCONTINUED | OUTPATIENT
Start: 2017-08-09 | End: 2017-08-11 | Stop reason: HOSPADM

## 2017-08-09 RX ORDER — HYDROCODONE BITARTRATE AND ACETAMINOPHEN 5; 325 MG/1; MG/1
1 TABLET ORAL EVERY 4 HOURS PRN
Status: DISCONTINUED | OUTPATIENT
Start: 2017-08-09 | End: 2017-08-11 | Stop reason: HOSPADM

## 2017-08-09 RX ORDER — ONDANSETRON 2 MG/ML
INJECTION INTRAMUSCULAR; INTRAVENOUS
Status: DISCONTINUED | OUTPATIENT
Start: 2017-08-09 | End: 2017-08-09

## 2017-08-09 RX ADMIN — METHIMAZOLE 20 MG: 5 TABLET ORAL at 03:08

## 2017-08-09 RX ADMIN — GABAPENTIN 300 MG: 300 CAPSULE ORAL at 08:08

## 2017-08-09 RX ADMIN — INSULIN ASPART 1 UNITS: 100 INJECTION, SOLUTION INTRAVENOUS; SUBCUTANEOUS at 10:08

## 2017-08-09 RX ADMIN — INSULIN ASPART 2 UNITS: 100 INJECTION, SOLUTION INTRAVENOUS; SUBCUTANEOUS at 06:08

## 2017-08-09 RX ADMIN — MIDAZOLAM HYDROCHLORIDE 2 MG: 1 INJECTION, SOLUTION INTRAMUSCULAR; INTRAVENOUS at 12:08

## 2017-08-09 RX ADMIN — METRONIDAZOLE 500 MG: 500 INJECTION, SOLUTION INTRAVENOUS at 08:08

## 2017-08-09 RX ADMIN — METRONIDAZOLE 500 MG: 500 INJECTION, SOLUTION INTRAVENOUS at 12:08

## 2017-08-09 RX ADMIN — TIZANIDINE 4 MG: 4 TABLET ORAL at 02:08

## 2017-08-09 RX ADMIN — SODIUM CHLORIDE: 0.9 INJECTION, SOLUTION INTRAVENOUS at 12:08

## 2017-08-09 RX ADMIN — HYDROCODONE BITARTRATE AND ACETAMINOPHEN 1 TABLET: 5; 325 TABLET ORAL at 07:08

## 2017-08-09 RX ADMIN — GLYCOPYRROLATE 0.2 MCG: 0.2 INJECTION, SOLUTION INTRAMUSCULAR; INTRAVENOUS at 12:08

## 2017-08-09 RX ADMIN — TIZANIDINE 4 MG: 4 TABLET ORAL at 08:08

## 2017-08-09 RX ADMIN — HYDROCODONE BITARTRATE AND ACETAMINOPHEN 1 TABLET: 5; 325 TABLET ORAL at 02:08

## 2017-08-09 RX ADMIN — FAMOTIDINE 20 MG: 10 INJECTION, SOLUTION INTRAVENOUS at 12:08

## 2017-08-09 RX ADMIN — PANTOPRAZOLE SODIUM 40 MG: 40 TABLET, DELAYED RELEASE ORAL at 03:08

## 2017-08-09 RX ADMIN — HYDROCODONE BITARTRATE AND ACETAMINOPHEN 1 TABLET: 5; 325 TABLET ORAL at 06:08

## 2017-08-09 RX ADMIN — ONDANSETRON 4 MG: 2 INJECTION INTRAMUSCULAR; INTRAVENOUS at 12:08

## 2017-08-09 RX ADMIN — PROPOFOL 50 MG: 10 INJECTION, EMULSION INTRAVENOUS at 12:08

## 2017-08-09 RX ADMIN — DEXAMETHASONE SODIUM PHOSPHATE 4 MG: 4 INJECTION, SOLUTION INTRAMUSCULAR; INTRAVENOUS at 12:08

## 2017-08-09 RX ADMIN — SODIUM CHLORIDE 1000 ML: 0.9 INJECTION, SOLUTION INTRAVENOUS at 07:08

## 2017-08-09 RX ADMIN — PROPRANOLOL HYDROCHLORIDE 60 MG: 20 TABLET ORAL at 02:08

## 2017-08-09 RX ADMIN — SODIUM CHLORIDE: 0.9 INJECTION, SOLUTION INTRAVENOUS at 06:08

## 2017-08-09 RX ADMIN — HYDROCODONE BITARTRATE AND ACETAMINOPHEN 1 TABLET: 5; 325 TABLET ORAL at 03:08

## 2017-08-09 RX ADMIN — METRONIDAZOLE 500 MG: 500 INJECTION, SOLUTION INTRAVENOUS at 04:08

## 2017-08-09 RX ADMIN — INSULIN DETEMIR 30 UNITS: 100 INJECTION, SOLUTION SUBCUTANEOUS at 02:08

## 2017-08-09 RX ADMIN — INSULIN DETEMIR 30 UNITS: 100 INJECTION, SOLUTION SUBCUTANEOUS at 10:08

## 2017-08-09 NOTE — SUBJECTIVE & OBJECTIVE
Past Medical History:   Diagnosis Date    Allergy     Arrhythmia     Arthritis     Diabetes mellitus     Glaucoma     Hormone disorder     hysterectomy    Hypertension     Hyperthyroidism     Insomnia     Kidney stones     Thyroid disease     Urine, incontinence, stress female        Past Surgical History:   Procedure Laterality Date    APPENDECTOMY      BACK SURGERY      disc removal     CHOLECYSTECTOMY      HY[facility]ERECT[facility]Y  [facility]    KNEE ARTHROSCOPY W/ DEBRIDEMENT      KNEE SURGERY      LITHOTRIPSY      AL REMOVAL OF OVARY/TUBE(S)  9/9/13    benign    removal of round ligament mass      WRIST FRACTURE SURGERY      bilateral wrist        Review of patient's allergies indicates:   Allergen Reactions    Penicillin g Shortness Of Breath    Penicillins Shortness Of Breath, Itching and Swelling     Family History     Problem Relation (Age of Onset)    Cancer Mother, Brother, Brother, Brother, Brother    Colon cancer Brother        Social History Main Topics    Smoking status: Former Smoker     Packs/day: 0.25     Years: 30.00    Smokeless tobacco: Never Used    Alcohol use No    Drug use: No    Sexual activity: Not Currently     Partners: Male     Birth control/ protection: Surgical     Review of Systems   Constitutional: Positive for chills, fatigue and fever. Negative for diaphoresis and unexpected weight change.   HENT: Negative for trouble swallowing.    Respiratory: Negative for cough, choking and shortness of breath.    Cardiovascular: Negative for chest pain and palpitations.   Gastrointestinal: Positive for abdominal pain, diarrhea, nausea and vomiting. Negative for abdominal distention, anal bleeding, blood in stool, constipation and rectal pain.   Genitourinary: Negative.  Negative for dysuria and flank pain.   Musculoskeletal: Negative.    Skin: Negative.    Neurological: Negative for dizziness and tremors.   Psychiatric/Behavioral: Negative.  Negative  for confusion and hallucinations.     Objective:     Vital Signs (Most Recent):  Temp: 96.7 °F (35.9 °C) (08/09/17 0800)  Pulse: (!) 57 (08/09/17 0800)  Resp: 18 (08/09/17 0800)  BP: 130/64 (08/09/17 0800)  SpO2: 97 % (08/09/17 0800) Vital Signs (24h Range):  Temp:  [96.4 °F (35.8 °C)-98.8 °F (37.1 °C)] 96.7 °F (35.9 °C)  Pulse:  [54-69] 57  Resp:  [11-23] 18  SpO2:  [97 %-100 %] 97 %  BP: (104-142)/(52-68) 130/64     Weight: 102.4 kg (225 lb 12 oz) (08/08/17 2200)  Body mass index is 35.36 kg/m².    No intake or output data in the 24 hours ending 08/09/17 1132    Lines/Drains/Airways     Epidural Line                 Neuraxial Analgesia/Anesthesia Assessment (using dermatomes) Epidural 06/26/17 1430 43 days          Peripheral Intravenous Line                 Peripheral IV - Single Lumen 08/07/17 1835 Left Antecubital 1 day         Peripheral IV - Single Lumen 08/07/17 1835 Right Antecubital 1 day                Physical Exam   Constitutional: She is oriented to person, place, and time. She appears well-developed and well-nourished. No distress.   HENT:   Mouth/Throat: Oropharynx is clear and moist. No oropharyngeal exudate.   Eyes: Conjunctivae are normal. No scleral icterus.   Neck: Neck supple.   Cardiovascular: Normal rate, regular rhythm, normal heart sounds and intact distal pulses.  Exam reveals no friction rub.    No murmur heard.  Pulmonary/Chest: Effort normal and breath sounds normal. No respiratory distress. She has no wheezes. She has no rales.   Abdominal: Soft. Bowel sounds are normal. She exhibits no distension and no mass. There is tenderness (epigastric). There is no rebound and no guarding.   Lymphadenopathy:     She has no cervical adenopathy.   Neurological: She is alert and oriented to person, place, and time.   Skin: Skin is warm and dry. She is not diaphoretic.   Psychiatric: She has a normal mood and affect.       Significant Labs:  CBC:   Recent Labs  Lab 08/07/17  1402 08/08/17  6275  08/09/17 0334   WBC 12.45 7.68 7.29   HGB 12.5 10.8* 10.3*   HCT 39.0 33.2* 30.6*    294 265     CMP:   Recent Labs  Lab 08/09/17 0334   *   CALCIUM 8.6*   ALBUMIN 3.3*   PROT 7.0      K 4.0   CO2 18*      BUN 11   CREATININE 0.9   ALKPHOS 92   ALT 11   AST 12   BILITOT 0.3       Significant Imaging:  Imaging results within the past 24 hours have been reviewed.

## 2017-08-09 NOTE — ANESTHESIA POSTPROCEDURE EVALUATION
"Anesthesia Post Evaluation    Patient: Navin Beck    Procedure(s) Performed: Procedure(s) (LRB):  ESOPHAGOGASTRODUODENOSCOPY (EGD) (N/A)    Final Anesthesia Type: general  Patient location during evaluation: GI PACU  Patient participation: Yes- Able to Participate  Level of consciousness: awake and alert and oriented  Post-procedure vital signs: reviewed and stable  Pain management: adequate  Airway patency: patent  PONV status at discharge: No PONV  Anesthetic complications: no      Cardiovascular status: stable  Respiratory status: unassisted  Hydration status: euvolemic  Follow-up not needed.        Visit Vitals  BP (!) 129/59 (BP Location: Left arm, Patient Position: Sitting, BP Method: Automatic)   Pulse 72   Temp 36.6 °C (97.9 °F) (Oral)   Resp 17   Ht 5' 7" (1.702 m)   Wt 102.4 kg (225 lb 12 oz)   SpO2 100%   Breastfeeding? No   BMI 35.36 kg/m²       Pain/Lexus Score: Pain Assessment Performed: Yes (8/9/2017 12:25 PM)  Presence of Pain: complains of pain/discomfort (8/9/2017 12:25 PM)  Pain Rating Prior to Med Admin: 6 (8/9/2017  6:38 AM)  Pain Rating Post Med Admin: 5 (8/9/2017  3:24 AM)      "

## 2017-08-09 NOTE — PHARMACY MED REC
"Admission Medication Reconciliation - Pharmacy Consult Note    The home medication history was taken by Jesika Sánchez Pharmacy Tech. Based on information gathered and subsequent review by the clinical pharmacist, the items below may need attention.     You may go to "Admission" then "Reconcile Home Medications" tabs to review and/or act upon these items. Based on information gathered and subsequent review by the clinical pharmacist, the items below may need attention.    Potentially problematic discrepancies with current MAR  o Patient is taking a drug DIFFERENTLY than how ordered upon admit  o Patient taking gabapentin 600 mg q4h at home.  o Patient taking tizanidine 12 mg QHS at home.    Please address this information as you see fit.  Feel free to contact us if you have any questions or require assistance.    Richard Wagner, PharmD  Ext [phone]    Patient's prior to admission medication regimen was as follows:  Medication Sig    acetaminophen (TYLENOL) 500 MG tablet Take 1,000 mg by mouth 3 (three) times daily as needed (fever and chills).    benazepril (LOTENSIN) 10 MG tablet Take 10 mg by mouth once daily.     CRESTOR 10 mg tablet Take 10 mg by mouth once daily.     DM/P-EPHED/ACETAMINOPH/DOXYLAM (NYQUIL ORAL) Take by mouth nightly as needed for fever and chills    duloxetine (CYMBALTA) 60 MG capsule Take 60 mg by mouth every morning.    gabapentin (NEURONTIN) 600 MG tablet Take 600 mg by mouth every 4 (four) hours.     hydrocodone-acetaminophen 10-325mg (NORCO)  mg Tab Take 1 tablet by mouth every 4 to 6 hours as needed for Pain.    insulin aspart (NOVOLOG) 100 unit/mL injection Inject 15 Units into the skin 3 (three) times daily with meals. Three times a day with meals according to sliding scale.    insulin glargine (LANTUS) 100 unit/mL injection Inject 25 Units into the skin 2 (two) times daily. Give 25 units in am and at bedtime. (Patient taking differently: Inject into the skin 2 (two) times " daily. Give 50 units in am and 40 units  at bedtime.)    loperamide (IMODIUM) 1 mg/5 mL solution Take 10-15 ml  By mouth daily as needed for diarrhea    METFORMIN HCL (METFORMIN ORAL) Take 500 mg by mouth 2 (two) times daily.    methimazole (TAPAZOLE) 10 MG Tab Take 10 mg by mouth once daily.     NEXIUM 40 mg capsule Take 40 mg by mouth once daily.     tamsulosin (FLOMAX) 0.4 mg Cp24 Take 0.4 mg by mouth once daily.     tizanidine 4 mg Cap Take 4 mg by mouth every evening. (Patient taking differently: Take 12 mg by mouth every evening. )    zolpidem (AMBIEN) 10 mg Tab Take 10 mg by mouth every evening.         Please add appropriate    SmartPhrase below:

## 2017-08-09 NOTE — PROGRESS NOTES
Pt off unit via stretcher. NPO maintained. NAD. Dr Emery on unit--informed of contact Isolation for Cdiff.

## 2017-08-09 NOTE — HPI
57 yr old female. With HTN for 5 years, Grave's disease for 5 years, DM2 on insulin for 30 years (had gestational DM). Came in with diarrhea, nausea, vomiting and generalized weakness for 5 days.  She started to have sever non-bloody watery diarrhea, 5-6 times a day, with upper abdominal pain, and nausea last Friday. She felt very weak she barely goes to bathroom but she had no falls.  Then Monday she started to have vomiting, food content, no blood, 3 times. At that time she called her PCP and was found to have ketones in the urine.  At the same time she developed cold symptoms and dry cough. Her son was having cold too. She had chills and fever of 100.4 on Friday.   She has been using nexium for 2-3 years. She denies any recent antibiotic use.   She has palpitations on and off. No chest pain.  She reports 7-8 Ib non-intentional weight loss over the past month.   Past surgical: multiple abdominal surgeries. Appendectomy, hysterectomy -BSO, bladder procedure? And gallstone removal. Rt knee replacement on June/2017. And bilateral carpal tunnel surgery.   She is an active smoker. She smokes 6-7 cigarettes a day for the past 35 years. She denies alcohol of IVDU.

## 2017-08-09 NOTE — ASSESSMENT & PLAN NOTE
May be duodenitis 2/2 concomitant gastroenteritis vs active C Dif infection.  Unlikely, but may also be a duodenal mass/malignancy.    Will proceed with EGD today    Keep NPO    Continue Flagyl 500mg po TID for C Dif

## 2017-08-09 NOTE — SUBJECTIVE & OBJECTIVE
Past Medical History:   Diagnosis Date    Allergy     Arrhythmia     Arthritis     Diabetes mellitus     Glaucoma     Hormone disorder     hysterectomy    Hypertension     Hyperthyroidism     Insomnia     Kidney stones     Thyroid disease     Urine, incontinence, stress female        Past Surgical History:   Procedure Laterality Date    APPENDECTOMY      BACK SURGERY      disc removal     CHOLECYSTECTOMY      [facility] [place]    KNEE ARTHROSCOPY W/ DEBRIDEMENT      KNEE SURGERY      LITHOTRIPSY      MI REMOVAL OF OVARY/TUBE(S)  9/9/13    benign    removal of round ligament mass      WRIST FRACTURE SURGERY      bilateral wrist        Review of patient's allergies indicates:   Allergen Reactions    Penicillin g Shortness Of Breath    Penicillins Shortness Of Breath, Itching and Swelling       Current Facility-Administered Medications on File Prior to Encounter   Medication    [DISCONTINUED] 0.9%  NaCl infusion    [DISCONTINUED] 0.9%  NaCl infusion    [DISCONTINUED] acetaminophen tablet 650 mg    [DISCONTINUED] benazepril tablet 10 mg    [DISCONTINUED] dextrose 10 % infusion    [DISCONTINUED] dextrose 10 % infusion    [DISCONTINUED] dextrose 5 % and 0.9 % NaCl infusion    [DISCONTINUED] dextrose 50% injection 12.5 g    [DISCONTINUED] dextrose 50% injection 12.5 g    [DISCONTINUED] dextrose 50% injection 12.5 g    [DISCONTINUED] dextrose 50% injection 25 g    [DISCONTINUED] enoxaparin injection 40 mg    [DISCONTINUED] famotidine (PF) injection 20 mg    [DISCONTINUED] glucagon (human recombinant) injection 1 mg    [DISCONTINUED] glucagon (human recombinant) injection 1 mg    [DISCONTINUED] guaifenesin 100 mg/5 ml syrup 200 mg    [DISCONTINUED] guaifenesin 12 hr tablet 600 mg    [DISCONTINUED] hydrALAZINE injection 10 mg    [DISCONTINUED] hydromorphone (PF) injection 0.5 mg    [DISCONTINUED] insulin aspart pen 0-5 Units    [DISCONTINUED] insulin  detemir pen 20 Units    [DISCONTINUED] insulin regular (Humulin R) 100 Units in sodium chloride 0.9% 100 mL infusion    [DISCONTINUED] methimazole tablet 20 mg    [DISCONTINUED] metronidazole IVPB 500 mg    [DISCONTINUED] ondansetron injection 4 mg    [DISCONTINUED] propranolol tablet 60 mg    [DISCONTINUED] ramelteon tablet 8 mg    [DISCONTINUED] rosuvastatin tablet 10 mg    [DISCONTINUED] tamsulosin 24 hr capsule 0.4 mg    [DISCONTINUED] tramadol tablet 100 mg     Current Outpatient Prescriptions on File Prior to Encounter   Medication Sig    benazepril (LOTENSIN) 10 MG tablet Take by mouth. 1 Tablet Oral Every day    bethanechol (URECHOLINE) 25 MG Tab Take 10 mg by mouth 3 (three) times daily as needed. 2 Tablet Oral Every 6 hours    CRESTOR 10 mg tablet 10 mg once daily.     docusate sodium (COLACE) 100 MG capsule Take 1 capsule (100 mg total) by mouth 2 (two) times daily as needed for Constipation.    gabapentin (NEURONTIN) 300 MG capsule Take 600 mg by mouth every 4 (four) hours.     insulin aspart (NOVOLOG) 100 unit/mL injection Inject 20 Units into the skin 3 (three) times daily with meals. Three times a day with meals according to sliding scale.    insulin glargine (LANTUS) 100 unit/mL injection Inject 25 Units into the skin 2 (two) times daily. Give 25 units in am and at bedtime. (Patient taking differently: Inject into the skin 2 (two) times daily. Give 50 units in am and 40 units  at bedtime.)    METFORMIN HCL (METFORMIN ORAL) Take 500 mg by mouth 2 (two) times daily.    methimazole (TAPAZOLE) 10 MG Tab Take 20 mg by mouth once daily.     NEXIUM 40 mg capsule once daily.     propranolol (INDERAL) 60 MG tablet Take by mouth 2 (two) times daily. 1 Tablet Oral Four times a day    tamsulosin (FLOMAX) 0.4 mg Cp24 Take by mouth. 1 Capsule, Ext Release 24 hr Oral Twice a day    tizanidine 4 mg Cap Take 4 mg by mouth every evening.     Family History     Problem Relation (Age of Onset)     Cancer Mother, Brother, Brother, Brother, Brother    Colon cancer Brother        Social History Main Topics    Smoking status: Former Smoker     Packs/day: 0.25     Years: 30.00    Smokeless tobacco: Never Used    Alcohol use No    Drug use: No    Sexual activity: Not Currently     Partners: Male     Birth control/ protection: Surgical     Review of Systems   Constitutional: Positive for activity change, chills, fever and unexpected weight change.   HENT: Positive for congestion.    Respiratory: Positive for cough. Negative for chest tightness.    Cardiovascular: Positive for palpitations. Negative for chest pain and leg swelling.   Gastrointestinal: Positive for abdominal pain, diarrhea, nausea and vomiting. Negative for blood in stool, constipation and rectal pain.   Musculoskeletal: Positive for back pain and joint swelling.   Skin: Negative for rash.   Neurological: Negative for headaches.     Objective:     Vital Signs (Most Recent):  Temp: 97.8 °F (36.6 °C) (08/09/17 0001)  Pulse: (!) 58 (08/09/17 0001)  Resp: 16 (08/09/17 0001)  BP: (!) 142/68 (08/09/17 0001)  SpO2: 99 % (08/08/17 2130) Vital Signs (24h Range):  Temp:  [96.4 °F (35.8 °C)-98.8 °F (37.1 °C)] 97.8 °F (36.6 °C)  Pulse:  [54-76] 58  Resp:  [0-24] 16  SpO2:  [95 %-100 %] 99 %  BP: (110-158)/(51-74) 142/68     Weight: 102.4 kg (225 lb 12 oz)  Body mass index is 35.36 kg/m².    Physical Exam   Constitutional: She is oriented to person, place, and time. She appears well-developed and well-nourished. No distress.   HENT:   Head: Normocephalic and atraumatic.   Eyes: EOM are normal. No scleral icterus.   Neck: Neck supple. No JVD present.   Cardiovascular: Normal rate and normal heart sounds.    No murmur heard.  Pulmonary/Chest: Effort normal and breath sounds normal.   Abdominal: Soft. Bowel sounds are normal. She exhibits no distension. There is tenderness (sever epigastric tenderness).   Musculoskeletal: She exhibits no edema.   Neurological:  She is alert and oriented to person, place, and time.   Skin: Skin is warm.   Psychiatric: She has a normal mood and affect.   Vitals reviewed.       Significant Labs:   BMP:   Recent Labs  Lab 08/08/17  1206 08/08/17  1607   *  --      --    K 4.2  --      --    CO2 21*  --    BUN 14  --    CREATININE 0.84  --    CALCIUM 8.8  --    MG 1.8 1.8     CBC:   Recent Labs  Lab 08/07/17  1402 08/08/17  0407   WBC 12.45 7.68   HGB 12.5 10.8*   HCT 39.0 33.2*    294       Significant Imaging: CT: I have reviewed all pertinent results/findings within the past 24 hours and my personal findings are:  doudenal thickening

## 2017-08-09 NOTE — DISCHARGE INSTRUCTIONS

## 2017-08-09 NOTE — H&P
[facility] Medicine  History & Physical    Patient Name: Navin Beck  MRN: [id number]  Admission Date: 8/8/2017  Attending Physician: Alexa Emery MD   Primary Care Provider: Elvira Quinones MD    [facility] Medicine Team: [facility] [place] [facility] 5 Manasa Andrew MD     Patient information was obtained from patient and ER records.     Subjective:     Principal Problem:Duodenal mass    Chief Complaint: No chief complaint on file.       HPI: 57 yr old female. With HTN for 5 years, Grave's disease for 5 years, DM2 on insulin for 30 years (had gestational DM). Came in with diarrhea, nausea, vomiting and generalized weakness for 5 days.  She started to have sever non-bloody watery diarrhea, 5-6 times a day, with upper abdominal pain, and nausea last Friday. She felt very weak she barely goes to bathroom but she had no falls.  Then Monday she started to have vomiting, food content, no blood, 3 times. At that time she called her PCP and was found to have ketones in the urine.  At the same time she developed cold symptoms and dry cough. Her son was having cold too. She had chills and fever of 100.4 on Friday.   She has been using nexium for 2-3 years. She denies any recent antibiotic use.   She has palpitations on and off. No chest pain.  She reports 7-8 Ib non-intentional weight loss over the past month.   Past surgical: multiple abdominal surgeries. Appendectomy, hysterectomy -BSO, bladder procedure? And gallstone removal. Rt knee replacement on June/2017. And bilateral carpal tunnel surgery.   She is an active smoker. She smokes 6-7 cigarettes a day for the past 35 years. She denies alcohol of IVDU.     Past Medical History:   Diagnosis Date    Allergy     Arrhythmia     Arthritis     Diabetes mellitus     Glaucoma     Hormone disorder     hysterectomy    Hypertension     Hyperthyroidism     Insomnia     Kidney stones     Thyroid disease     Urine, incontinence, stress  female        Past Surgical History:   Procedure Laterality Date    APPENDECTOMY      BACK SURGERY      disc removal     CHOLECYSTECTOMY      HYSTERECTOMY  2010    Dr Gordon [facility]    KNEE ARTHROSCOPY W/ DEBRIDEMENT      KNEE SURGERY      LITHOTRIPSY      DE REMOVAL OF OVARY/TUBE(S)  9/9/13    benign    removal of round ligament mass      WRIST FRACTURE SURGERY      bilateral wrist        Review of patient's allergies indicates:   Allergen Reactions    Penicillin g Shortness Of Breath    Penicillins Shortness Of Breath, Itching and Swelling       Current Facility-Administered Medications on File Prior to Encounter   Medication    [DISCONTINUED] 0.9%  NaCl infusion    [DISCONTINUED] 0.9%  NaCl infusion    [DISCONTINUED] acetaminophen tablet 650 mg    [DISCONTINUED] benazepril tablet 10 mg    [DISCONTINUED] dextrose 10 % infusion    [DISCONTINUED] dextrose 10 % infusion    [DISCONTINUED] dextrose 5 % and 0.9 % NaCl infusion    [DISCONTINUED] dextrose 50% injection 12.5 g    [DISCONTINUED] dextrose 50% injection 12.5 g    [DISCONTINUED] dextrose 50% injection 12.5 g    [DISCONTINUED] dextrose 50% injection 25 g    [DISCONTINUED] enoxaparin injection 40 mg    [DISCONTINUED] famotidine (PF) injection 20 mg    [DISCONTINUED] glucagon (human recombinant) injection 1 mg    [DISCONTINUED] glucagon (human recombinant) injection 1 mg    [DISCONTINUED] guaifenesin 100 mg/5 ml syrup 200 mg    [DISCONTINUED] guaifenesin 12 hr tablet 600 mg    [DISCONTINUED] hydrALAZINE injection 10 mg    [DISCONTINUED] hydromorphone (PF) injection 0.5 mg    [DISCONTINUED] insulin aspart pen 0-5 Units    [DISCONTINUED] insulin detemir pen 20 Units    [DISCONTINUED] insulin regular (Humulin R) 100 Units in sodium chloride 0.9% 100 mL infusion    [DISCONTINUED] methimazole tablet 20 mg    [DISCONTINUED] metronidazole IVPB 500 mg    [DISCONTINUED] ondansetron injection 4 mg    [DISCONTINUED] propranolol  tablet 60 mg    [DISCONTINUED] ramelteon tablet 8 mg    [DISCONTINUED] rosuvastatin tablet 10 mg    [DISCONTINUED] tamsulosin 24 hr capsule 0.4 mg    [DISCONTINUED] tramadol tablet 100 mg     Current Outpatient Prescriptions on File Prior to Encounter   Medication Sig    benazepril (LOTENSIN) 10 MG tablet Take by mouth. 1 Tablet Oral Every day    bethanechol (URECHOLINE) 25 MG Tab Take 10 mg by mouth 3 (three) times daily as needed. 2 Tablet Oral Every 6 hours    CRESTOR 10 mg tablet 10 mg once daily.     docusate sodium (COLACE) 100 MG capsule Take 1 capsule (100 mg total) by mouth 2 (two) times daily as needed for Constipation.    gabapentin (NEURONTIN) 300 MG capsule Take 600 mg by mouth every 4 (four) hours.     insulin aspart (NOVOLOG) 100 unit/mL injection Inject 20 Units into the skin 3 (three) times daily with meals. Three times a day with meals according to sliding scale.    insulin glargine (LANTUS) 100 unit/mL injection Inject 25 Units into the skin 2 (two) times daily. Give 25 units in am and at bedtime. (Patient taking differently: Inject into the skin 2 (two) times daily. Give 50 units in am and 40 units  at bedtime.)    METFORMIN HCL (METFORMIN ORAL) Take 500 mg by mouth 2 (two) times daily.    methimazole (TAPAZOLE) 10 MG Tab Take 20 mg by mouth once daily.     NEXIUM 40 mg capsule once daily.     propranolol (INDERAL) 60 MG tablet Take by mouth 2 (two) times daily. 1 Tablet Oral Four times a day    tamsulosin (FLOMAX) 0.4 mg Cp24 Take by mouth. 1 Capsule, Ext Release 24 hr Oral Twice a day    tizanidine 4 mg Cap Take 4 mg by mouth every evening.     Family History     Problem Relation (Age of Onset)    Cancer Mother, Brother, Brother, Brother, Brother    Colon cancer Brother        Social History Main Topics    Smoking status: Former Smoker     Packs/day: 0.25     Years: 30.00    Smokeless tobacco: Never Used    Alcohol use No    Drug use: No    Sexual activity: Not Currently      Partners: Male     Birth control/ protection: Surgical     Review of Systems   Constitutional: Positive for activity change, chills, fever and unexpected weight change.   HENT: Positive for congestion.    Respiratory: Positive for cough. Negative for chest tightness.    Cardiovascular: Positive for palpitations. Negative for chest pain and leg swelling.   Gastrointestinal: Positive for abdominal pain, diarrhea, nausea and vomiting. Negative for blood in stool, constipation and rectal pain.   Musculoskeletal: Positive for back pain and joint swelling.   Skin: Negative for rash.   Neurological: Negative for headaches.     Objective:     Vital Signs (Most Recent):  Temp: 97.8 °F (36.6 °C) (08/09/17 0001)  Pulse: (!) 58 (08/09/17 0001)  Resp: 16 (08/09/17 0001)  BP: (!) 142/68 (08/09/17 0001)  SpO2: 99 % (08/08/17 2130) Vital Signs (24h Range):  Temp:  [96.4 °F (35.8 °C)-98.8 °F (37.1 °C)] 97.8 °F (36.6 °C)  Pulse:  [54-76] 58  Resp:  [0-24] 16  SpO2:  [95 %-100 %] 99 %  BP: (110-158)/(51-74) 142/68     Weight: 102.4 kg (225 lb 12 oz)  Body mass index is 35.36 kg/m².    Physical Exam   Constitutional: She is oriented to person, place, and time. She appears well-developed and well-nourished. No distress.   HENT:   Head: Normocephalic and atraumatic.   Eyes: EOM are normal. No scleral icterus.   Neck: Neck supple. No JVD present.   Cardiovascular: Normal rate and normal heart sounds.    No murmur heard.  Pulmonary/Chest: Effort normal and breath sounds normal.   Abdominal: Soft. Bowel sounds are normal. She exhibits no distension. There is tenderness (sever epigastric tenderness).   Musculoskeletal: She exhibits no edema.   Neurological: She is alert and oriented to person, place, and time.   Skin: Skin is warm.   Psychiatric: She has a normal mood and affect.   Vitals reviewed.       Significant Labs:   BMP:   Recent Labs  Lab 08/08/17  1206 08/08/17  1607   *  --      --    K 4.2  --      --     CO2 21*  --    BUN 14  --    CREATININE 0.84  --    CALCIUM 8.8  --    MG 1.8 1.8     CBC:   Recent Labs  Lab 08/07/17  1402 08/08/17  0407   WBC 12.45 7.68   HGB 12.5 10.8*   HCT 39.0 33.2*    294       Significant Imaging: CT: I have reviewed all pertinent results/findings within the past 24 hours and my personal findings are:  doudenal thickening     Assessment/Plan:     * Duodenal mass    Patient came in as a transfer. With upper and lower GI symptoms. Recent abdominal CT showed thickening of the duodenum.   - will consult GI for EGD.  - for her nausea she is on zufran.             Clostridium difficile diarrhea    She has positive stool cultures for c.diff.   She started to have watery diarrhea last Friday.   She was started on flagyl and we will continue it.           Uncontrolled type 1 diabetes mellitus    Will give her 80% of her home dose insulin lantus.   - she is on an NPO ISS. For possible procedure tomorrow morning.   - she is on novolog TID with meals.   - will hold metformin during her admission.   - her last HBA1C was 8.3%          Essential hypertension    Will continue her home meds. - benzapril.           Graves disease    She was diagnosed 5 years ago with hyperthyroidism.   She is on propranolol and methimazole.            VTE Risk Mitigation         Ordered     High Risk of VTE  Once      08/09/17 0131     Place TREVIN hose  Until discontinued      08/09/17 0131     Place sequential compression device  Until discontinued      08/09/17 0131             Manasa Andrew MD  Department of Hospital Medicine   [facility]    I HAVE PERSONALLY TAKEN THE HISTORY, EXAMINED THIS PATIENT,  AND AGREE WITH THE RESIDENT'S NOTE AS STATED ABOVE WITH THE FOLLOWING EXCEPTIONS:  The patient was seen and examined with the intern at 3am on 8/9/17    Ms. Beck  Is a who has DM2 and HTN.  She was recently admitted to [facility] on 8/7/17 with nausea, vomiting and diarrhea.   She was found to have stools positive for C. Diff. And also to have an abnormality on her CT abdomen in the duodenal area.  She is now being transferred here for GI evaluation for EGD.    Assessment and plan:  C. Difficile colitis  -Start flagyl 500mg iv q8 hours until not NPO and nausea completely resolved, then PO    Abnormal duodenal finding on CT abdomen  -Wall thickening involving the second and third portion of the duodenum concerning for duodenitis   -Cannot exclude mass lesion  -GI consult for EGD and check H. Pylori sab  -If PUD, will have to provide PPI, though holding in light of active C. Diff currently  -Contact precautions  -She has been on PPI for over two years, so gastrin level is less reliable (doubt ZE sx, but possible)    Nausea and vomiting  -Worrisome for PUD or cancer  -Zofran and phenergan prn    Diabetes mellitus 2, uncontrolled   -She was in DKA on initial admission  -ISS for NPO patients  -80% of home meds (Levemir)    Anemia of chronic disease  -Check B12, folate, fe studies  -?Atropic gastritis ?Pernicious anemia given her autoimmune issues    Grave's disease  -Continue Methimazole and Propranolol      Temp:  [96.4 °F (35.8 °C)-98.8 °F (37.1 °C)]   Pulse:  [54-68]   Resp:  [12-20]   BP: (112-142)/(52-68)   SpO2:  [97 %-100 %]      Recent Results (from the past 24 hour(s))   POCT glucose    Collection Time: 08/08/17  4:00 AM   Result Value Ref Range    POC Glucose 256 (A) 70 - 110 mg/dL   Comprehensive metabolic panel    Collection Time: 08/08/17  4:07 AM   Result Value Ref Range    Sodium 136 136 - 145 mmol/L    Potassium 4.5 3.5 - 5.1 mmol/L    Chloride 106 95 - 110 mmol/L    CO2 20 (L) 23 - 29 mmol/L    Glucose 236 (H) 70 - 110 mg/dL    BUN, Bld 14 7 - 17 mg/dL    Creatinine 0.81 0.50 - 1.40 mg/dL    Calcium 8.8 8.7 - 10.5 mg/dL    Total Protein 7.3 6.0 - 8.4 g/dL    Albumin 3.8 3.5 - 5.2 g/dL    Total Bilirubin 0.5 0.1 - 1.0 mg/dL    Alkaline Phosphatase 88 38 - 126 U/L    AST 15 15 -  46 U/L    ALT 35 10 - 44 U/L    Anion Gap 10 8 - 16 mmol/L    eGFR if African American >60.0 >60 mL/min/1.73 m^2    eGFR if non African American >60.0 >60 mL/min/1.73 m^2   CBC auto differential    Collection Time: 08/08/17  4:07 AM   Result Value Ref Range    WBC 7.68 3.90 - 12.70 K/uL    RBC 3.56 (L) 4.00 - 5.40 M/uL    Hemoglobin 10.8 (L) 12.0 - 16.0 g/dL    Hematocrit 33.2 (L) 37.0 - 48.5 %    MCV 93 82 - 98 fL    MCH 30.3 27.0 - 31.0 pg    MCHC 32.5 32.0 - 36.0 g/dL    RDW 12.9 11.5 - 14.5 %    Platelets 294 150 - 350 K/uL    MPV 9.7 9.2 - 12.9 fL    Lymph # CANCELED 1.0 - 4.8 K/uL    Mono # CANCELED 0.3 - 1.0 K/uL    Eos # CANCELED 0.0 - 0.5 K/uL    Baso # CANCELED 0.00 - 0.20 K/uL    Gran% 42.0 38.0 - 73.0 %    Lymph% 51.0 (H) 18.0 - 48.0 %    Mono% 7.0 4.0 - 15.0 %    Eosinophil% 0.0 0.0 - 8.0 %    Basophil% 0.0 0.0 - 1.9 %    Differential Method Manual    Magnesium    Collection Time: 08/08/17  4:07 AM   Result Value Ref Range    Magnesium 1.8 1.6 - 2.6 mg/dL   Phosphorus    Collection Time: 08/08/17  4:07 AM   Result Value Ref Range    Phosphorus 4.5 2.7 - 4.5 mg/dL   POCT glucose    Collection Time: 08/08/17  5:00 AM   Result Value Ref Range    POC Glucose 283 (A) 70 - 110 mg/dL   POCT glucose    Collection Time: 08/08/17  6:00 AM   Result Value Ref Range    POC Glucose 272 (A) 70 - 110 mg/dL   POCT glucose    Collection Time: 08/08/17  7:00 AM   Result Value Ref Range    POC Glucose 250 (A) 70 - 110 mg/dL   POCT glucose    Collection Time: 08/08/17  8:00 AM   Result Value Ref Range    POC Glucose 247 (A) 70 - 110 mg/dL   Basic metabolic panel    Collection Time: 08/08/17  8:22 AM   Result Value Ref Range    Sodium 137 136 - 145 mmol/L    Potassium 4.2 3.5 - 5.1 mmol/L    Chloride 106 95 - 110 mmol/L    CO2 21 (L) 23 - 29 mmol/L    Glucose 244 (H) 70 - 110 mg/dL    BUN, Bld 14 7 - 17 mg/dL    Creatinine 0.82 0.50 - 1.40 mg/dL    Calcium 8.9 8.7 - 10.5 mg/dL    Anion Gap 10 8 - 16 mmol/L    eGFR if African  American >60.0 >60 mL/min/1.73 m^2    eGFR if non African American >60.0 >60 mL/min/1.73 m^2   Magnesium    Collection Time: 08/08/17  8:22 AM   Result Value Ref Range    Magnesium 1.8 1.6 - 2.6 mg/dL   Phosphorus    Collection Time: 08/08/17  8:22 AM   Result Value Ref Range    Phosphorus 4.0 2.7 - 4.5 mg/dL   POCT glucose    Collection Time: 08/08/17  9:00 AM   Result Value Ref Range    POC Glucose 252 (A) 70 - 110 mg/dL   POCT glucose    Collection Time: 08/08/17 10:00 AM   Result Value Ref Range    POC Glucose 241 (A) 70 - 110 mg/dL   Clostridium difficile EIA    Collection Time: 08/08/17 10:10 AM   Result Value Ref Range    C. diff Antigen Positive (A) Negative    C difficile Toxins A+B, EIA Positive (A) Negative   POCT glucose    Collection Time: 08/08/17 11:00 AM   Result Value Ref Range    POC Glucose 265 (A) 70 - 110 mg/dL   POCT glucose    Collection Time: 08/08/17 12:00 PM   Result Value Ref Range    POC Glucose 269 (A) 70 - 110 mg/dL   Basic metabolic panel    Collection Time: 08/08/17 12:06 PM   Result Value Ref Range    Sodium 136 136 - 145 mmol/L    Potassium 4.2 3.5 - 5.1 mmol/L    Chloride 105 95 - 110 mmol/L    CO2 21 (L) 23 - 29 mmol/L    Glucose 238 (H) 70 - 110 mg/dL    BUN, Bld 14 7 - 17 mg/dL    Creatinine 0.84 0.50 - 1.40 mg/dL    Calcium 8.8 8.7 - 10.5 mg/dL    Anion Gap 10 8 - 16 mmol/L    eGFR if African American >60.0 >60 mL/min/1.73 m^2    eGFR if non African American >60.0 >60 mL/min/1.73 m^2   Magnesium    Collection Time: 08/08/17 12:06 PM   Result Value Ref Range    Magnesium 1.8 1.6 - 2.6 mg/dL   Phosphorus    Collection Time: 08/08/17 12:06 PM   Result Value Ref Range    Phosphorus 4.0 2.7 - 4.5 mg/dL   POCT glucose    Collection Time: 08/08/17  1:00 PM   Result Value Ref Range    POC Glucose 222 (A) 70 - 110 mg/dL   POCT glucose    Collection Time: 08/08/17  2:00 PM   Result Value Ref Range    POC Glucose 199 (A) 70 - 110 mg/dL   POCT glucose    Collection Time: 08/08/17  3:05 PM    Result Value Ref Range    POC Glucose 210 (A) 70 - 110 mg/dL   POCT glucose    Collection Time: 08/08/17  4:00 PM   Result Value Ref Range    POC Glucose 163 (A) 70 - 110 mg/dL   Magnesium    Collection Time: 08/08/17  4:07 PM   Result Value Ref Range    Magnesium 1.8 1.6 - 2.6 mg/dL   Phosphorus    Collection Time: 08/08/17  4:07 PM   Result Value Ref Range    Phosphorus 3.9 2.7 - 4.5 mg/dL   POCT glucose    Collection Time: 08/08/17  5:52 PM   Result Value Ref Range    POC Glucose 163 (A) 70 - 110 mg/dL   Urinalysis    Collection Time: 08/09/17  1:36 AM   Result Value Ref Range    Specimen UA Urine, Clean Catch     Color, UA Straw Yellow, Straw, Francia    Appearance, UA Clear Clear    pH, UA 5.0 5.0 - 8.0    Specific Gravity, UA 1.005 1.005 - 1.030    Protein, UA Negative Negative    Glucose, UA Negative Negative    Ketones, UA Trace (A) Negative    Bilirubin (UA) Negative Negative    Occult Blood UA 1+ (A) Negative    Nitrite, UA Negative Negative    Urobilinogen, UA Negative <2.0 EU/dL    Leukocytes, UA 1+ (A) Negative   Urinalysis Microscopic    Collection Time: 08/09/17  1:36 AM   Result Value Ref Range    RBC, UA 0 0 - 4 /hpf    WBC, UA 8 (H) 0 - 5 /hpf    Squam Epithel, UA 0 /hpf    Microscopic Comment SEE COMMENT    POCT glucose    Collection Time: 08/09/17  2:26 AM   Result Value Ref Range    POCT Glucose 199 (H) 70 - 110 mg/dL     CT Abdomen Pelvis With Contrast 08/07/2017 epigastric pain          RESULTS:  CT abdomen pelvis with contrast.     Findings: 75 mL of Omni 350 IV contrast was administered for today's examination.     The visualized portion of the base of the lungs is significant for bilateral lower lobe atelectatic versus fibrotic change. The visualized portion of the heart, stomach, spleen, pancreas, and liver are unremarkable. The gallbladder is absent. There is mild nonspecific bilateral adrenal gland thickening. The visualized portion of the aorta is significant for minimal calcified  plaque. The kidneys have a normal appearance. The kidneys appropriately concentrate and excrete contrast on the delayed images. The bladder is unremarkable. The uterus is absent or atrophic. The bowel is significant for wall thickening of the second third portion of the duodenum concerning for duodenitis cannot exclude underlying mass lesion. This finding was not present on previous CT dated 4/14/16. The osseous structures demonstrate degenerative change.  IMPRESSION:    Wall thickening involving the second and third portion of the duodenum concerning for duodenitis cannot exclude mass lesion.        Electronically signed by:           DAVY CHACKO MD  Date:                                                                                    08/08/17  Time:                                                                                   10:37           Signed By:  Davy Chacko MD on 8/8/2017 10:37 AM

## 2017-08-09 NOTE — ANESTHESIA PREPROCEDURE EVALUATION
08/09/2017  Navin Beck is a 57 y.o., female.    Anesthesia Evaluation    I have reviewed the Patient Summary Reports.    I have reviewed the Nursing Notes.   I have reviewed the Medications.     Review of Systems  Anesthesia Hx:  No problems with previous Anesthesia  History of prior surgery of interest to airway management or planning: Previous anesthesia: General  Denies Personal Hx of Anesthesia complications.   Cardiovascular:   Hypertension    Pulmonary:  Pulmonary Normal    Renal/:   Chronic Renal Disease    Hepatic/GI:  Hepatic/GI Normal    Musculoskeletal:   Arthritis     Neurological:  Neurology Normal    Endocrine:   Diabetes Hyperthyroidism      Patient Active Problem List   Diagnosis    Uncontrolled type 1 diabetes mellitus    Atrophic vaginitis    Screening for malignant neoplasm of the cervix    Essential hypertension    Postmenopausal atrophic vaginitis    Diabetes mellitus out of control    ASCVD (arteriosclerotic cardiovascular disease)    Mixed hyperlipidemia    Nonspecific (abnormal) findings on radiological and other examination of other intrathoracic organs    Other benign neoplasm of connective and other soft tissue of pelvis    Tachycardia, unspecified    Thymoma    Obesity    Primary osteoarthritis of right knee    Duodenitis    Tobacco abuse    Hyperthyroidism    Upper respiratory infection    DM2 (diabetes mellitus, type 2)    Diabetic ketoacidosis without coma associated with type 2 diabetes mellitus    Duodenal mass    Graves disease    Clostridium difficile diarrhea     Past Medical History:   Diagnosis Date    Allergy     Arrhythmia     Arthritis     Diabetes mellitus     Glaucoma     Hormone disorder     hysterectomy    Hypertension     Hyperthyroidism     Insomnia     Kidney stones     Thyroid disease     Urine, incontinence,  stress female      Past Surgical History:   Procedure Laterality Date    APPENDECTOMY      BACK SURGERY      disc removal     CHOLECYSTECTOMY      [facility]    KNEE ARTHROSCOPY W/ DEBRIDEMENT      KNEE SURGERY      LITHOTRIPSY      NH REMOVAL OF OVARY/TUBE(S)  9/9/13    benign    removal of round ligament mass      WRIST FRACTURE SURGERY      bilateral wrist          Physical Exam  General:  Well nourished    Airway/Jaw/Neck:  Airway Findings: Mouth Opening: Normal Tongue: Normal  General Airway Assessment: Adult  Mallampati: II  TM Distance: Normal, at least 6 cm  Jaw/Neck Findings:  Neck ROM: Normal ROM      Dental:  Dental Findings: In tact   Chest/Lungs:  Chest/Lungs Findings: Clear to auscultation     Heart/Vascular:  Heart Findings: Rate: Normal  Rhythm: Regular Rhythm  Sounds: Normal        Mental Status:  Mental Status Findings:  Cooperative, Alert and Oriented         Anesthesia Plan  Type of Anesthesia, risks & benefits discussed:  Anesthesia Type:  general  Patient's Preference: general  Intra-op Monitoring Plan: standard ASA monitors  Intra-op Monitoring Plan Comments:   Post Op Pain Control Plan: per primary service following discharge from PACU  Post Op Pain Control Plan Comments:   Induction:   IV  Beta Blocker:  Patient is not currently on a Beta-Blocker (No further documentation required).       Informed Consent: Patient understands risks and agrees with Anesthesia plan.  Questions answered. Anesthesia consent signed with patient.  ASA Score: 2     Day of Surgery Review of History & Physical:    H&P update referred to the surgeon.         Ready For Surgery From Anesthesia Perspective.

## 2017-08-09 NOTE — ASSESSMENT & PLAN NOTE
She has positive stool cultures for c.diff.   She started to have watery diarrhea last Friday.   She was started on flagyl and we will continue it.

## 2017-08-09 NOTE — ASSESSMENT & PLAN NOTE
Patient came in as a transfer. With upper and lower GI symptoms. Recent abdominal CT showed thickening of the duodenum.   - will consult GI for EGD.  - for her nausea she is on zufran.

## 2017-08-09 NOTE — HPI
57 yr old female. With HTN for 5 years, Grave's disease for 5 years, DM2 on insulin for 30 years (had gestational DM). Came in with diarrhea, nausea, vomiting and generalized weakness for 5 days.  She started to have sever non-bloody watery diarrhea, 5-6 times a day, with upper abdominal pain, and nausea last Friday. She felt very weak she barely goes to bathroom but she had no falls.  Then Monday she started to have vomiting, food content, no blood, 3 times. At that time she called her PCP and was found to have ketones in the urine.  At the same time she developed cold symptoms and dry cough. Her son was having cold too. She had chills and fever of 100.4 on Friday.     A CT was performed which showed thickening of the second and third portions of the duodenum.  The imaged colon appeared normal.    Today the patient reports an improvement in symptoms.

## 2017-08-09 NOTE — ASSESSMENT & PLAN NOTE
Will give her 80% of her home dose insulin lantus.   - she is on an NPO ISS. For possible procedure tomorrow morning.   - she is on novolog TID with meals.   - will hold metformin during her admission.   - her last HBA1C was 8.3%

## 2017-08-09 NOTE — PATIENT INSTRUCTIONS
Discharge Summary/Instructions after an Endoscopic Procedure  Patient Name: Navin Beck  Patient MRN: [id number]  Patient YOB: 1960 Wednesday, August 09, 2017  Lavon Diallo MD  RESTRICTIONS ON ACTIVITY:  - DO NOT drive a car, operate machinery, make legal/financial decisions, or   drink alcohol until the day after the procedure.    - The following day: return to full activity including work, except no heavy   lifting, straining or running for 3 days if polyps were removed.  - Diet: Eat and drink normally unless instructed otherwise.  TREATMENT FOR COMMON SIDE EFFECTS:  - Mild abdominal pain, bloating or excessive gas: rest, eat lightly and use   a heating pad.  - Sore Throat - treat with throat lozenges. Gargle with warm salt water.  SYMPTOMS TO WATCH FOR AND REPORT TO YOUR PHYSICIAN:  1. Severe abdominal pain or bloating.  2. Pain in chest.  3. Chills or fever occurring within 24 hours after a procedure.  4. A large amount of rectal bleeding, which would show as bright red,   maroon, or black stools. (A small amount of blood from the rectum is not   serious, especially if hemorrhoids are present.)  5. Because air was used during the procedure, expelling large amounts of air   from your rectum or belching is normal.  6. If a bowel prep was taken, you may not have a bowel movement for 1-3   days.  This is normal.  7. Go directly to the emergency room if you notice any of the following:   Chills and/or fever over 101 F   Persistent vomiting   Severe abdominal pain, other than gas cramps   Severe chest pain   Black, tarry stools   Any bleeding - exceeding one tablespoon  Your doctor recommends these additional instructions:  If any biopsies were performed, my office will call you in 5 to 6 business   days with any results.  We are waiting for your pathology results.   A proton pump inhibitor medication will be prescribed to be taken by mouth   once a day for eight weeks.   Do not take any aspirin, ibuprofen  (including Advil, Motrin or Nuprin),   naproxen (including Aleve), or any other non-steroidal anti-inflammatory   drugs.   The findings and recommendations were discussed with your designated   responsible adult.  For questions, problems or results please call your physician - Lavon Diallo MD at Work:  [phone].  [facility], EMERGENCY ROOM PHONE NUMBER: [phone]  IF A COMPLICATION OR EMERGENCY SITUATION ARISES AND YOU ARE UNABLE TO REACH   YOUR PHYSICIAN - GO TO THE EMERGENCY ROOM.  Lavon Diallo MD  8/9/2017 12:58:20 PM  This report has been verified and signed electronically.

## 2017-08-09 NOTE — ANESTHESIA RELEASE NOTE
"Anesthesia Release from PACU Note    Patient: Navin Beck    Procedure(s) Performed: Procedure(s) (LRB):  ESOPHAGOGASTRODUODENOSCOPY (EGD) (N/A)    Anesthesia type: general    Post pain: Adequate analgesia    Post assessment: no apparent anesthetic complications, tolerated procedure well and no evidence of recall    Last Vitals:   Visit Vitals  BP (!) 129/59 (BP Location: Left arm, Patient Position: Sitting, BP Method: Automatic)   Pulse 72   Temp 36.6 °C (97.9 °F) (Oral)   Resp 17   Ht 5' 7" (1.702 m)   Wt 102.4 kg (225 lb 12 oz)   SpO2 100%   Breastfeeding? No   BMI 35.36 kg/m²       Post vital signs: stable    Level of consciousness: awake, alert  and oriented    Nausea/Vomiting: no nausea/no vomiting    Complications: none    Airway Patency: patent    Respiratory: unassisted    Cardiovascular: stable and blood pressure at baseline    Hydration: euvolemic  "

## 2017-08-10 PROBLEM — B96.81 H PYLORI ULCER: Status: ACTIVE | Noted: 2017-08-10

## 2017-08-10 PROBLEM — K31.89 DUODENAL MASS: Status: RESOLVED | Noted: 2017-08-08 | Resolved: 2017-08-10

## 2017-08-10 PROBLEM — K27.9 PEPTIC ULCER DISEASE: Status: ACTIVE | Noted: 2017-08-10

## 2017-08-10 LAB
ANION GAP SERPL CALC-SCNC: 12 MMOL/L
BASOPHILS # BLD AUTO: 0.01 K/UL
BASOPHILS NFR BLD: 0.1 %
BUN SERPL-MCNC: 11 MG/DL
CALCIUM SERPL-MCNC: 9.1 MG/DL
CHLORIDE SERPL-SCNC: 104 MMOL/L
CO2 SERPL-SCNC: 18 MMOL/L
CREAT SERPL-MCNC: 1 MG/DL
DIFFERENTIAL METHOD: ABNORMAL
EOSINOPHIL # BLD AUTO: 0 K/UL
EOSINOPHIL NFR BLD: 0 %
ERYTHROCYTE [DISTWIDTH] IN BLOOD BY AUTOMATED COUNT: 12.9 %
EST. GFR  (AFRICAN AMERICAN): >60 ML/MIN/1.73 M^2
EST. GFR  (NON AFRICAN AMERICAN): >60 ML/MIN/1.73 M^2
GLUCOSE SERPL-MCNC: 263 MG/DL
HCT VFR BLD AUTO: 33 %
HGB BLD-MCNC: 11.4 G/DL
LYMPHOCYTES # BLD AUTO: 2.2 K/UL
LYMPHOCYTES NFR BLD: 29.7 %
MCH RBC QN AUTO: 30.2 PG
MCHC RBC AUTO-ENTMCNC: 34.5 G/DL
MCV RBC AUTO: 88 FL
MONOCYTES # BLD AUTO: 0.3 K/UL
MONOCYTES NFR BLD: 4.6 %
NEUTROPHILS # BLD AUTO: 4.9 K/UL
NEUTROPHILS NFR BLD: 65.3 %
PLATELET # BLD AUTO: 259 K/UL
PMV BLD AUTO: 10.6 FL
POCT GLUCOSE: 250 MG/DL (ref 70–110)
POCT GLUCOSE: 321 MG/DL (ref 70–110)
POCT GLUCOSE: 327 MG/DL (ref 70–110)
POCT GLUCOSE: 360 MG/DL (ref 70–110)
POTASSIUM SERPL-SCNC: 4.1 MMOL/L
RBC # BLD AUTO: 3.77 M/UL
SODIUM SERPL-SCNC: 134 MMOL/L
WBC # BLD AUTO: 7.44 K/UL

## 2017-08-10 PROCEDURE — 25000003 PHARM REV CODE 250: Performed by: INTERNAL MEDICINE

## 2017-08-10 PROCEDURE — 63600175 PHARM REV CODE 636 W HCPCS: Performed by: INTERNAL MEDICINE

## 2017-08-10 PROCEDURE — 25000003 PHARM REV CODE 250: Performed by: STUDENT IN AN ORGANIZED HEALTH CARE EDUCATION/TRAINING PROGRAM

## 2017-08-10 PROCEDURE — 99233 SBSQ HOSP IP/OBS HIGH 50: CPT | Mod: GC,,, | Performed by: HOSPITALIST

## 2017-08-10 PROCEDURE — 36415 COLL VENOUS BLD VENIPUNCTURE: CPT

## 2017-08-10 PROCEDURE — 20600001 HC STEP DOWN PRIVATE ROOM

## 2017-08-10 PROCEDURE — 80048 BASIC METABOLIC PNL TOTAL CA: CPT

## 2017-08-10 PROCEDURE — S0030 INJECTION, METRONIDAZOLE: HCPCS | Performed by: STUDENT IN AN ORGANIZED HEALTH CARE EDUCATION/TRAINING PROGRAM

## 2017-08-10 PROCEDURE — 85025 COMPLETE CBC W/AUTO DIFF WBC: CPT

## 2017-08-10 RX ORDER — CLARITHROMYCIN 500 MG/1
500 TABLET, FILM COATED ORAL EVERY 12 HOURS
Status: DISCONTINUED | OUTPATIENT
Start: 2017-08-10 | End: 2017-08-10

## 2017-08-10 RX ORDER — TETRACYCLINE HYDROCHLORIDE 500 MG/1
500 CAPSULE ORAL EVERY 6 HOURS
Status: DISCONTINUED | OUTPATIENT
Start: 2017-08-11 | End: 2017-08-11 | Stop reason: HOSPADM

## 2017-08-10 RX ORDER — INSULIN ASPART 100 [IU]/ML
0-10 INJECTION, SOLUTION INTRAVENOUS; SUBCUTANEOUS EVERY 6 HOURS PRN
Status: DISCONTINUED | OUTPATIENT
Start: 2017-08-10 | End: 2017-08-11

## 2017-08-10 RX ORDER — TAMSULOSIN HYDROCHLORIDE 0.4 MG/1
0.4 CAPSULE ORAL DAILY
Status: DISCONTINUED | OUTPATIENT
Start: 2017-08-10 | End: 2017-08-11 | Stop reason: HOSPADM

## 2017-08-10 RX ORDER — BISMUTH SUBSALICYLATE 525 MG/30ML
30 LIQUID ORAL EVERY 6 HOURS PRN
Status: DISCONTINUED | OUTPATIENT
Start: 2017-08-10 | End: 2017-08-10

## 2017-08-10 RX ORDER — METRONIDAZOLE 500 MG/1
500 TABLET ORAL EVERY 8 HOURS
Status: DISCONTINUED | OUTPATIENT
Start: 2017-08-10 | End: 2017-08-11 | Stop reason: HOSPADM

## 2017-08-10 RX ORDER — TETRACYCLINE HYDROCHLORIDE 500 MG/1
500 CAPSULE ORAL 3 TIMES DAILY
Status: DISCONTINUED | OUTPATIENT
Start: 2017-08-10 | End: 2017-08-10

## 2017-08-10 RX ORDER — PANTOPRAZOLE SODIUM 40 MG/1
40 TABLET, DELAYED RELEASE ORAL 2 TIMES DAILY
Status: DISCONTINUED | OUTPATIENT
Start: 2017-08-10 | End: 2017-08-11 | Stop reason: HOSPADM

## 2017-08-10 RX ORDER — BISMUTH SUBSALICYLATE 525 MG/30ML
30 LIQUID ORAL 4 TIMES DAILY
Status: DISCONTINUED | OUTPATIENT
Start: 2017-08-10 | End: 2017-08-11 | Stop reason: HOSPADM

## 2017-08-10 RX ADMIN — METHIMAZOLE 20 MG: 5 TABLET ORAL at 09:08

## 2017-08-10 RX ADMIN — HYDROCODONE BITARTRATE AND ACETAMINOPHEN 1 TABLET: 5; 325 TABLET ORAL at 10:08

## 2017-08-10 RX ADMIN — HYDROCODONE BITARTRATE AND ACETAMINOPHEN 1 TABLET: 5; 325 TABLET ORAL at 12:08

## 2017-08-10 RX ADMIN — GABAPENTIN 300 MG: 300 CAPSULE ORAL at 10:08

## 2017-08-10 RX ADMIN — TAMSULOSIN HYDROCHLORIDE 0.4 MG: 0.4 CAPSULE ORAL at 09:08

## 2017-08-10 RX ADMIN — INSULIN ASPART 4 UNITS: 100 INJECTION, SOLUTION INTRAVENOUS; SUBCUTANEOUS at 12:08

## 2017-08-10 RX ADMIN — PANTOPRAZOLE SODIUM 40 MG: 40 TABLET, DELAYED RELEASE ORAL at 09:08

## 2017-08-10 RX ADMIN — TIZANIDINE 4 MG: 4 TABLET ORAL at 10:08

## 2017-08-10 RX ADMIN — GABAPENTIN 300 MG: 300 CAPSULE ORAL at 09:08

## 2017-08-10 RX ADMIN — METRONIDAZOLE 500 MG: 500 TABLET, FILM COATED ORAL at 11:08

## 2017-08-10 RX ADMIN — HYDROCODONE BITARTRATE AND ACETAMINOPHEN 1 TABLET: 5; 325 TABLET ORAL at 02:08

## 2017-08-10 RX ADMIN — INSULIN DETEMIR 35 UNITS: 100 INJECTION, SOLUTION SUBCUTANEOUS at 10:08

## 2017-08-10 RX ADMIN — PANTOPRAZOLE SODIUM 40 MG: 40 TABLET, DELAYED RELEASE ORAL at 10:08

## 2017-08-10 RX ADMIN — BENAZEPRIL HYDROCHLORIDE 10 MG: 10 TABLET, FILM COATED ORAL at 09:08

## 2017-08-10 RX ADMIN — BISMUTH SUBSALICYLATE 30 ML: 262 LIQUID ORAL at 04:08

## 2017-08-10 RX ADMIN — INSULIN ASPART 2 UNITS: 100 INJECTION, SOLUTION INTRAVENOUS; SUBCUTANEOUS at 05:08

## 2017-08-10 RX ADMIN — HYDROCODONE BITARTRATE AND ACETAMINOPHEN 1 TABLET: 5; 325 TABLET ORAL at 05:08

## 2017-08-10 RX ADMIN — ROSUVASTATIN CALCIUM 10 MG: 10 TABLET ORAL at 09:08

## 2017-08-10 RX ADMIN — METRONIDAZOLE 500 MG: 500 INJECTION, SOLUTION INTRAVENOUS at 04:08

## 2017-08-10 RX ADMIN — METRONIDAZOLE 500 MG: 500 TABLET, FILM COATED ORAL at 10:08

## 2017-08-10 RX ADMIN — INSULIN ASPART 4 UNITS: 100 INJECTION, SOLUTION INTRAVENOUS; SUBCUTANEOUS at 05:08

## 2017-08-10 NOTE — PROGRESS NOTES
Pharmacist Intervention IV to PO Note    Navin Beck is a 57 y.o. female being treated with IV medication metronidazole    Patient Data:    Vital Signs (Most Recent):  Temp: 97.2 °F (36.2 °C) (08/10/17 1206)  Pulse: 66 (08/10/17 1206)  Resp: 18 (08/10/17 1206)  BP: (!) 124/57 (08/10/17 1206)  SpO2: 99 % (08/10/17 1206)   Vital Signs (72h Range):  Temp:  [96.1 °F (35.6 °C)-98.8 °F (37.1 °C)]   Pulse:  [54-96]   Resp:  [0-28]   BP: (103-182)/(51-87)   SpO2:  [93 %-100 %]      CBC:    Recent Labs     Lab 08/08/17  0407 08/09/17  0334 08/10/17  0448   WBC 7.68 7.29 7.44   RBC 3.56* 3.41* 3.77*   HGB 10.8* 10.3* 11.4*   HCT 33.2* 30.6* 33.0*    265 259   MCV 93 90 88   MCH 30.3 30.2 30.2   MCHC 32.5 33.7 34.5     CMP:     Recent Labs     Lab 08/07/17  1402  08/08/17  0407  08/08/17  1206 08/09/17  0334 08/10/17  0448   * < > 236* < > 238* 218* 263*   CALCIUM 9.4 < > 8.8 < > 8.8 8.6* 9.1   ALBUMIN 4.5 --  3.8 --  --  3.3* --    PROT 8.4 --  7.3 --  --  7.0 --    * < > 136 < > 136 136 134*   K 4.9 < > 4.5 < > 4.2 4.0 4.1   CO2 15* < > 20* < > 21* 18* 18*   CL 97 < > 106 < > 105 106 104   BUN 19* < > 14 < > 14 11 11   CREATININE 0.99 < > 0.81 < > 0.84 0.9 1.0   ALKPHOS 120 --  88 --  --  92 --    ALT 33 --  35 --  --  11 --    AST 22 --  15 --  --  12 --    BILITOT 0.7 --  0.5 --  --  0.3 --    < > = values in this interval not displayed.       Dietary Orders:  Diet Orders            Diet Diabetic 1500 Calories: Diabetic 1500 starting at 08/10 1147            Based on the following criteria, this patient qualifies for intravenous to oral conversion:  [x] The patients gastrointestinal tract is functioning (tolerating medications via oral or enteral route for 24 hours and tolerating food or enteral feeds for 24 hours).  [x] The patient is hemodynamically stable for 24 hours (heart rate <100 beats per minute, systolic blood pressure >99 mm Hg, and respiratory rate <20 breaths per minute).  [x] The  patient shows clinical improvement (afebrile for at least 24 hours and white blood cell count downtrending or normalized). Additionally, the patient must be non-neutropenic (absolute neutrophil count >500 cells/mm3).  [x] For antimicrobials, the patient has received IV therapy for at least 24 hours.    IV medication metronidazole 500mg Q8H will be changed to oral medication metronidazole 500mg Q8H.    Pharmacist's Name: JORGE LUIS KAUFMAN  Pharmacist's Extension: [phone]

## 2017-08-10 NOTE — ASSESSMENT & PLAN NOTE
- pt found to have duodenal thickening on CT outside imaging  - Pt had EGD here and this showed PUD  - Pt also had blood cultures showing H Pylori  - Pt started on quad therapy- bismuth, PPI, Metronidazole, Tetracycline

## 2017-08-10 NOTE — ASSESSMENT & PLAN NOTE
- She has positive stool cultures + for c.diff.   - She started to have watery diarrhea last Friday, fever.   - She was started on flagyl and we will continue it.

## 2017-08-10 NOTE — PHYSICIAN QUERY
PT Name: Navin Beck  MR #: [id number]     Physician Query Form - Documentation Clarification      CDS/: Brandy E Capley               Contact information: Ext.   [phone]    This form is a permanent document in the medical record.     Query Date: August 10, 2017    By submitting this query, we are merely seeking further clarification of documentation. Please utilize your independent clinical judgment when addressing the question(s) below.    The Medical record reflects the following:    Supporting Clinical Findings Location in Medical Record     POCT glucose= 199 --> 185 --> 190 --> 143 --> 207 --> 208 --> 250     Labs 8/9 0226 ---> 8/9 0541 --> 8/9 0748 --> 8/9 1135 --> 8/9 1820 --> 8/9 2234 --> 8/10 0542     Ms. Beck  Is a who has DM2 and HTN.    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 She was found to be in DKA.        H&P 8/9    H&P 8/9    Care plan 8/8                                                                            Doctor, Please specify diagnosis or diagnoses associated with above clinical findings.    Provider Use Only       (  )  Type 1 diabetes mellitus with hyperglycemia     (X)  Type 2 diabetes mellitus with hyperglycemia     (  )  Other (please specify):  ____________________________                                                                                                             [  ] Clinically undetermined

## 2017-08-10 NOTE — SUBJECTIVE & OBJECTIVE
Interval History: See hospital course above.    Review of Systems   Constitutional: Negative for chills, diaphoresis, fatigue, fever and unexpected weight change.   HENT: Negative for trouble swallowing.    Respiratory: Negative for cough, choking and shortness of breath.    Cardiovascular: Negative for chest pain and palpitations.   Gastrointestinal: Positive for abdominal pain and nausea. Negative for abdominal distention, anal bleeding, blood in stool, constipation, diarrhea, rectal pain and vomiting.   Genitourinary: Negative.  Negative for dysuria and flank pain.   Musculoskeletal: Negative.    Skin: Negative.    Neurological: Negative for dizziness and tremors.   Psychiatric/Behavioral: Negative.  Negative for confusion and hallucinations.     Objective:     Vital Signs (Most Recent):  Temp: 97.2 °F (36.2 °C) (08/10/17 1206)  Pulse: 66 (08/10/17 1206)  Resp: 18 (08/10/17 1206)  BP: (!) 124/57 (08/10/17 1206)  SpO2: 99 % (08/10/17 1206) Vital Signs (24h Range):  Temp:  [96.1 °F (35.6 °C)-98.1 °F (36.7 °C)] 97.2 °F (36.2 °C)  Pulse:  [61-72] 66  Resp:  [16-18] 18  SpO2:  [94 %-99 %] 99 %  BP: (124-159)/(57-70) 124/57     Weight: 102.4 kg (225 lb 12 oz)  Body mass index is 35.36 kg/m².    Intake/Output Summary (Last 24 hours) at 08/10/17 1457  Last data filed at 08/09/17 1800   Gross per 24 hour   Intake              240 ml   Output                0 ml   Net              240 ml      Physical Exam   Constitutional: She is oriented to person, place, and time. She appears well-developed and well-nourished. No distress.   HENT:   Mouth/Throat: Oropharynx is clear and moist. No oropharyngeal exudate.   Eyes: Conjunctivae are normal. No scleral icterus.   Neck: Neck supple.   Cardiovascular: Normal rate, regular rhythm, normal heart sounds and intact distal pulses.  Exam reveals no friction rub.    No murmur heard.  Pulmonary/Chest: Effort normal and breath sounds normal. No respiratory distress. She has no wheezes.  She has no rales.   Abdominal: Soft. Bowel sounds are normal. She exhibits no distension and no mass. There is tenderness (epigastric). There is no rebound and no guarding.   Lymphadenopathy:     She has no cervical adenopathy.   Neurological: She is alert and oriented to person, place, and time.   Skin: Skin is warm and dry. She is not diaphoretic.   Psychiatric: She has a normal mood and affect.       Significant Labs: All pertinent labs within the past 24 hours have been reviewed.    Significant Imaging: I have reviewed all pertinent imaging results/findings within the past 24 hours.

## 2017-08-10 NOTE — PLAN OF CARE
Problem: Patient Care Overview  Goal: Plan of Care Review  Outcome: Ongoing (interventions implemented as appropriate)  Pt AAO x 4. VSS. Call bell within reach. Pain controlled with PRN medication. Special contact precautions maintained. Pt ambulates standby assist with walker. Blood glucose monitoring ordered

## 2017-08-10 NOTE — HOSPITAL COURSE
8/10- No diarrhea for 2 days. Eating resumed. Pt is HPylori +, tx with quad therapy  8/11- pt tolerating diet well , has not had any diarrhea

## 2017-08-10 NOTE — PLAN OF CARE
Elvira Quinones MD     Extended Emergency Contact Information  Primary Emergency Contact: Jose Elias Beck III   [place]  Mobile Phone: [phone]  Relation: Son  Secondary Emergency Contact: Jose Elias Beck   [place]  Mobile Phone: [phone]  Relation: Spouse     Payor: HU[employer] MANAGED MEDICARE / Plan: HU[employer]NA MEDICARE HMO / Product Type: Capitation /        [facility] [id number] [place] [address] [address] [facility] [address]  [address]  [place] [address]  Phone: [phone] Fax: [phone]         08/10/17 5340   Discharge Assessment   Assessment Type Discharge Planning Assessment   Confirmed/corrected address and phone number on facesheet? Yes   Assessment information obtained from? Patient   Expected Length of Stay (days) 4   Communicated expected length of stay with patient/caregiver yes   Prior to hospitilization cognitive status: Alert/Oriented   Prior to hospitalization functional status: Assistive Equipment   Current cognitive status: Alert/Oriented   Current Functional Status: Assistive Equipment   Arrived From (transferred from [facility])   Lives With child(jovanny), dependent   Able to Return to Prior Arrangements yes   Is patient able to care for self after discharge? Yes   Does the patient have family/friends to help with healtcare needs after discharge? yes   How many people do you have in your home that can help with your care after discharge? 1   Who are your caregiver(s) and their phone number(s)? Jose Elias Beck (son) [phone]   Patient's perception of discharge disposition home or selfcare   Readmission Within The Last 30 Days planned readmission   Patient currently being followed by outpatient case management? No   Patient currently receives home health services? No   Patient previously received home health services and would like to resume services if necessary? No   Does the patient currently use  HME? Yes   Patient currently receives private duty nursing? No   Patient currently receives any other outside agency services? No   Equipment Currently Used at Home walker, rolling   Do you have any problems affording any of your prescribed medications? No   Is the patient taking medications as prescribed? yes   Do you have any financial concerns preventing you from receiving the healthcare you need? No   Does the patient have transportation to healthcare appointments? Yes   Transportation Available family or friend will provide   On Dialysis? No   Does the patient receive services at the Coumadin Clinic? No   Are there any open cases? No   Discharge Plan A Home with family   Discharge Plan B Home with family;Home Health   Patient/Family In Agreement With Plan yes

## 2017-08-10 NOTE — ASSESSMENT & PLAN NOTE
- Will continue her home med. - benzapril.   - Pt is controlled.   - If this changes we will adjust as needed

## 2017-08-10 NOTE — PLAN OF CARE
Problem: Diabetes, Type 1 (Adult)  Intervention: Optimize Glycemic Control   08/10/17 1400   Nutrition Interventions   Glycemic Management blood glucose monitoring;insulin dose matched to carbohydrate intake;supplemental insulin given

## 2017-08-10 NOTE — ASSESSMENT & PLAN NOTE
- her last HBA1C was 8.3%, states her BG is usually around 200 at home  - Will give her 80% of her home dose insulin lantus.   - she is on novolog TID with meals.   - Will continue to adjust as pt eats more food.

## 2017-08-10 NOTE — PROGRESS NOTES
[facility] Hospital Medicine  Progress Note    Patient Name: Navin Beck  MRN: [id number]  Patient Class: IP- Inpatient   Admission Date: 8/8/2017  Length of Stay: 2 days  Attending Physician: Alexa Emery MD  Primary Care Provider: Elvira Quinones MD    [facility] Medicine Team: [facility] HOSP MED 5 Tulio Rivera MD    Subjective:     Principal Problem:Clostridium difficile diarrhea    HPI:  57 yr old female. With HTN for 5 years, Grave's disease for 5 years, DM2 on insulin for 30 years (had gestational DM). Came in with diarrhea, nausea, vomiting and generalized weakness for 5 days.  She started to have sever non-bloody watery diarrhea, 5-6 times a day, with upper abdominal pain, and nausea last Friday. She felt very weak she barely goes to bathroom but she had no falls.  Then Monday she started to have vomiting, food content, no blood, 3 times. At that time she called her PCP and was found to have ketones in the urine.  At the same time she developed cold symptoms and dry cough. Her son was having cold too. She had chills and fever of 100.4 on Friday.   She has been using nexium for 2-3 years. She denies any recent antibiotic use.   She has palpitations on and off. No chest pain.  She reports 7-8 Ib non-intentional weight loss over the past month.   Past surgical: multiple abdominal surgeries. Appendectomy, hysterectomy -BSO, bladder procedure? And gallstone removal. Rt knee replacement on June/2017. And bilateral carpal tunnel surgery.   She is an active smoker. She smokes 6-7 cigarettes a day for the past 35 years. She denies alcohol of IVDU.     Hospital Course:  8/10- No diarrhea for 2 days. Eating resumed. Pt is HPylori +, tx with quad therapy    Interval History: See hospital course above.    Review of Systems   Constitutional: Negative for chills, diaphoresis, fatigue, fever and unexpected weight change.   HENT: Negative for trouble swallowing.    Respiratory: Negative for  cough, choking and shortness of breath.    Cardiovascular: Negative for chest pain and palpitations.   Gastrointestinal: Positive for abdominal pain and nausea. Negative for abdominal distention, anal bleeding, blood in stool, constipation, diarrhea, rectal pain and vomiting.   Genitourinary: Negative.  Negative for dysuria and flank pain.   Musculoskeletal: Negative.    Skin: Negative.    Neurological: Negative for dizziness and tremors.   Psychiatric/Behavioral: Negative.  Negative for confusion and hallucinations.     Objective:     Vital Signs (Most Recent):  Temp: 97.2 °F (36.2 °C) (08/10/17 1206)  Pulse: 66 (08/10/17 1206)  Resp: 18 (08/10/17 1206)  BP: (!) 124/57 (08/10/17 1206)  SpO2: 99 % (08/10/17 1206) Vital Signs (24h Range):  Temp:  [96.1 °F (35.6 °C)-98.1 °F (36.7 °C)] 97.2 °F (36.2 °C)  Pulse:  [61-72] 66  Resp:  [16-18] 18  SpO2:  [94 %-99 %] 99 %  BP: (124-159)/(57-70) 124/57     Weight: 102.4 kg (225 lb 12 oz)  Body mass index is 35.36 kg/m².    Intake/Output Summary (Last 24 hours) at 08/10/17 1457  Last data filed at 08/09/17 1800   Gross per 24 hour   Intake              240 ml   Output                0 ml   Net              240 ml      Physical Exam   Constitutional: She is oriented to person, place, and time. She appears well-developed and well-nourished. No distress.   HENT:   Mouth/Throat: Oropharynx is clear and moist. No oropharyngeal exudate.   Eyes: Conjunctivae are normal. No scleral icterus.   Neck: Neck supple.   Cardiovascular: Normal rate, regular rhythm, normal heart sounds and intact distal pulses.  Exam reveals no friction rub.    No murmur heard.  Pulmonary/Chest: Effort normal and breath sounds normal. No respiratory distress. She has no wheezes. She has no rales.   Abdominal: Soft. Bowel sounds are normal. She exhibits no distension and no mass. There is tenderness (epigastric). There is no rebound and no guarding.   Lymphadenopathy:     She has no cervical adenopathy.    Neurological: She is alert and oriented to person, place, and time.   Skin: Skin is warm and dry. She is not diaphoretic.   Psychiatric: She has a normal mood and affect.       Significant Labs: All pertinent labs within the past 24 hours have been reviewed.    Significant Imaging: I have reviewed all pertinent imaging results/findings within the past 24 hours.    Assessment/Plan:      * Clostridium difficile diarrhea    - She has positive stool cultures + for c.diff.   - She started to have watery diarrhea last Friday, fever.   - She was started on flagyl and we will continue it.           H pylori ulcer    - pt found to have duodenal thickening on CT outside imaging  - Pt had EGD here and this showed PUD  - Pt also had blood cultures showing H Pylori  - Pt started on quad therapy- bismuth, PPI, Metronidazole, Tetracycline          Uncontrolled type 1 diabetes mellitus    - her last HBA1C was 8.3%, states her BG is usually around 200 at home  - Will give her 80% of her home dose insulin lantus.   - she is on novolog TID with meals.   - Will continue to adjust as pt eats more food.          Graves disease    She was diagnosed 5 years ago with hyperthyroidism.   She is on propranolol and methimazole.          Tobacco abuse              Essential hypertension    - Will continue her home med. - benzapril.   - Pt is controlled.   - If this changes we will adjust as needed            VTE Risk Mitigation         Ordered     High Risk of VTE  Once      08/09/17 0131     Place TREVIN hose  Until discontinued      08/09/17 0131     Place sequential compression device  Until discontinued      08/09/17 0131              Tulio Rivera MD  Department of Hospital Medicine   [facility]

## 2017-08-11 VITALS
DIASTOLIC BLOOD PRESSURE: 60 MMHG | WEIGHT: 225.75 LBS | HEART RATE: 77 BPM | TEMPERATURE: 98 F | HEIGHT: 67 IN | RESPIRATION RATE: 18 BRPM | SYSTOLIC BLOOD PRESSURE: 119 MMHG | OXYGEN SATURATION: 94 % | BODY MASS INDEX: 35.43 KG/M2

## 2017-08-11 LAB
ANION GAP SERPL CALC-SCNC: 11 MMOL/L
BUN SERPL-MCNC: 11 MG/DL
CALCIUM SERPL-MCNC: 9.3 MG/DL
CHLORIDE SERPL-SCNC: 109 MMOL/L
CO2 SERPL-SCNC: 21 MMOL/L
CREAT SERPL-MCNC: 1 MG/DL
EST. GFR  (AFRICAN AMERICAN): >60 ML/MIN/1.73 M^2
EST. GFR  (NON AFRICAN AMERICAN): >60 ML/MIN/1.73 M^2
GLUCOSE SERPL-MCNC: 193 MG/DL
POCT GLUCOSE: 179 MG/DL (ref 70–110)
POCT GLUCOSE: 221 MG/DL (ref 70–110)
POCT GLUCOSE: 319 MG/DL (ref 70–110)
POCT GLUCOSE: 355 MG/DL (ref 70–110)
POTASSIUM SERPL-SCNC: 3.2 MMOL/L
SODIUM SERPL-SCNC: 141 MMOL/L

## 2017-08-11 PROCEDURE — 25000003 PHARM REV CODE 250: Performed by: STUDENT IN AN ORGANIZED HEALTH CARE EDUCATION/TRAINING PROGRAM

## 2017-08-11 PROCEDURE — 80048 BASIC METABOLIC PNL TOTAL CA: CPT

## 2017-08-11 PROCEDURE — 36415 COLL VENOUS BLD VENIPUNCTURE: CPT

## 2017-08-11 PROCEDURE — 25000003 PHARM REV CODE 250: Performed by: INTERNAL MEDICINE

## 2017-08-11 PROCEDURE — 99238 HOSP IP/OBS DSCHRG MGMT 30/<: CPT | Mod: GC,,, | Performed by: HOSPITALIST

## 2017-08-11 PROCEDURE — 63600175 PHARM REV CODE 636 W HCPCS: Performed by: INTERNAL MEDICINE

## 2017-08-11 RX ORDER — POTASSIUM CHLORIDE 750 MG/1
40 CAPSULE, EXTENDED RELEASE ORAL ONCE
Status: COMPLETED | OUTPATIENT
Start: 2017-08-11 | End: 2017-08-11

## 2017-08-11 RX ORDER — BISMUTH SUBSALICYLATE 525 MG/30ML
30 LIQUID ORAL 4 TIMES DAILY
Qty: 1560 ML | Refills: 0 | Status: SHIPPED | OUTPATIENT
Start: 2017-08-11 | End: 2017-08-24

## 2017-08-11 RX ORDER — METRONIDAZOLE 500 MG/1
500 TABLET ORAL EVERY 8 HOURS
Qty: 36 TABLET | Refills: 0 | Status: SHIPPED | OUTPATIENT
Start: 2017-08-11 | End: 2017-08-23

## 2017-08-11 RX ORDER — INSULIN ASPART 100 [IU]/ML
0-5 INJECTION, SOLUTION INTRAVENOUS; SUBCUTANEOUS EVERY 6 HOURS PRN
Status: DISCONTINUED | OUTPATIENT
Start: 2017-08-11 | End: 2017-08-11 | Stop reason: HOSPADM

## 2017-08-11 RX ORDER — TETRACYCLINE HYDROCHLORIDE 500 MG/1
500 CAPSULE ORAL EVERY 6 HOURS
Qty: 52 CAPSULE | Refills: 0 | Status: SHIPPED | OUTPATIENT
Start: 2017-08-11 | End: 2017-08-24

## 2017-08-11 RX ORDER — PANTOPRAZOLE SODIUM 40 MG/1
40 TABLET, DELAYED RELEASE ORAL 2 TIMES DAILY
Qty: 26 TABLET | Refills: 0 | Status: SHIPPED | OUTPATIENT
Start: 2017-08-11 | End: 2017-08-24

## 2017-08-11 RX ORDER — PANTOPRAZOLE SODIUM 40 MG/1
40 TABLET, DELAYED RELEASE ORAL DAILY
Qty: 30 TABLET | Refills: 1 | Status: SHIPPED | OUTPATIENT
Start: 2017-08-11

## 2017-08-11 RX ORDER — INSULIN ASPART 100 [IU]/ML
3 INJECTION, SOLUTION INTRAVENOUS; SUBCUTANEOUS
Status: DISCONTINUED | OUTPATIENT
Start: 2017-08-11 | End: 2017-08-11 | Stop reason: HOSPADM

## 2017-08-11 RX ADMIN — BENAZEPRIL HYDROCHLORIDE 10 MG: 10 TABLET, FILM COATED ORAL at 08:08

## 2017-08-11 RX ADMIN — ROSUVASTATIN CALCIUM 10 MG: 10 TABLET ORAL at 08:08

## 2017-08-11 RX ADMIN — HYDROCODONE BITARTRATE AND ACETAMINOPHEN 1 TABLET: 5; 325 TABLET ORAL at 09:08

## 2017-08-11 RX ADMIN — BISMUTH SUBSALICYLATE 30 ML: 262 LIQUID ORAL at 05:08

## 2017-08-11 RX ADMIN — METHIMAZOLE 20 MG: 5 TABLET ORAL at 08:08

## 2017-08-11 RX ADMIN — TAMSULOSIN HYDROCHLORIDE 0.4 MG: 0.4 CAPSULE ORAL at 08:08

## 2017-08-11 RX ADMIN — TETRACYCLINE HYDROCHLORIDE 500 MG: 500 CAPSULE ORAL at 12:08

## 2017-08-11 RX ADMIN — TETRACYCLINE HYDROCHLORIDE 500 MG: 500 CAPSULE ORAL at 05:08

## 2017-08-11 RX ADMIN — METRONIDAZOLE 500 MG: 500 TABLET, FILM COATED ORAL at 05:08

## 2017-08-11 RX ADMIN — INSULIN ASPART 3 UNITS: 100 INJECTION, SOLUTION INTRAVENOUS; SUBCUTANEOUS at 12:08

## 2017-08-11 RX ADMIN — INSULIN ASPART 3 UNITS: 100 INJECTION, SOLUTION INTRAVENOUS; SUBCUTANEOUS at 01:08

## 2017-08-11 RX ADMIN — GABAPENTIN 300 MG: 300 CAPSULE ORAL at 08:08

## 2017-08-11 RX ADMIN — PANTOPRAZOLE SODIUM 40 MG: 40 TABLET, DELAYED RELEASE ORAL at 08:08

## 2017-08-11 RX ADMIN — INSULIN ASPART 2 UNITS: 100 INJECTION, SOLUTION INTRAVENOUS; SUBCUTANEOUS at 09:08

## 2017-08-11 RX ADMIN — INSULIN ASPART 3 UNITS: 100 INJECTION, SOLUTION INTRAVENOUS; SUBCUTANEOUS at 09:08

## 2017-08-11 RX ADMIN — BISMUTH SUBSALICYLATE 30 ML: 262 LIQUID ORAL at 12:08

## 2017-08-11 RX ADMIN — POTASSIUM CHLORIDE 40 MEQ: 750 CAPSULE, EXTENDED RELEASE ORAL at 09:08

## 2017-08-11 NOTE — PLAN OF CARE
Problem: Patient Care Overview  Goal: Plan of Care Review  Outcome: Ongoing (interventions implemented as appropriate)  Patient AAOx4, on special contact precautions for C.Diff, VSS. POCT monitored Q6H, supplemental insulin given. Pain reported 1x, treated with hydrocodone-acetaminophen. Patient ambulates independently, uses walker. Call light within reach, rest promoted. no acute signs of distress, will continue to monitor.

## 2017-08-11 NOTE — ASSESSMENT & PLAN NOTE
- her last HBA1C was 8.3%, states her BG is usually around 200 at home  -at home on lantus 50-50 regimen   - she is on [id number]  TID with meals.   -today increased insulin to 38 BID and 3 scheduled with SSI

## 2017-08-11 NOTE — SUBJECTIVE & OBJECTIVE
Interval History: NEON. Pt tolerating diet. No diarrhea for 2 days    Review of Systems   Constitutional: Negative for chills and fever.   Respiratory: Negative for shortness of breath.    Cardiovascular: Negative for chest pain and palpitations.   Gastrointestinal: Negative for abdominal pain, diarrhea, nausea and vomiting.   Skin: Negative for rash and wound.     Objective:     Vital Signs (Most Recent):  Temp: 98.3 °F (36.8 °C) (08/11/17 0825)  Pulse: 77 (08/11/17 0825)  Resp: 18 (08/11/17 0825)  BP: 119/60 (08/11/17 0825)  SpO2: (!) 94 % (08/11/17 0825) Vital Signs (24h Range):  Temp:  [96.2 °F (35.7 °C)-98.8 °F (37.1 °C)] 98.3 °F (36.8 °C)  Pulse:  [67-81] 77  Resp:  [16-18] 18  SpO2:  [93 %-100 %] 94 %  BP: (106-122)/(52-62) 119/60     Weight: 102.4 kg (225 lb 12 oz)  Body mass index is 35.36 kg/m².    Intake/Output Summary (Last 24 hours) at 08/11/17 1346  Last data filed at 08/11/17 0500   Gross per 24 hour   Intake              480 ml   Output                0 ml   Net              480 ml      Physical Exam   Constitutional: She appears well-developed and well-nourished. No distress.   HENT:   Head: Normocephalic and atraumatic.   Mouth/Throat: No oropharyngeal exudate.   Eyes: EOM are normal. Right eye exhibits no discharge. Left eye exhibits no discharge. No scleral icterus.   Neck: Neck supple. No tracheal deviation present.   Cardiovascular: Normal rate, regular rhythm, normal heart sounds and intact distal pulses.  Exam reveals no gallop and no friction rub.    No murmur heard.  Pulmonary/Chest: Effort normal and breath sounds normal. No respiratory distress. She has no wheezes. She has no rales. She exhibits no tenderness.   Abdominal: Soft. Bowel sounds are normal. She exhibits no distension. There is no tenderness. There is no rebound and no guarding.   Musculoskeletal: She exhibits no edema, tenderness or deformity.   Neurological: She is alert.   Skin: Skin is warm and dry. No rash noted. She is  not diaphoretic. No erythema.   Psychiatric: She has a normal mood and affect. Her behavior is normal. Judgment and thought content normal.       Significant Labs:   Recent Lab Results       08/11/17  1330 08/11/17  0937 08/11/17  0633 08/11/17  0528 08/11/17  0016      Anion Gap    11      BUN, Bld    11      Calcium    9.3      Chloride    109      CO2    21(L)      Creatinine    1.0      eGFR if     >60.0      eGFR if non     >60.0  Comment:  Calculation used to obtain the estimated glomerular filtration  rate (eGFR) is the CKD-EPI equation. Since race is unknown   in our information system, the eGFR values for   -American and Non--American patients are given   for each creatinine result.        Glucose    193(H)      POCT Glucose 319(H) 221(H) 179(H)  355(H)     Potassium    3.2(L)      Sodium    141                  08/10/17  2251 08/10/17  1733      Anion Gap       BUN, Bld       Calcium       Chloride       CO2       Creatinine       eGFR if        eGFR if non African American       Glucose       POCT Glucose 360(H) 327(H)     Potassium       Sodium             Significant Imaging: I have reviewed all pertinent imaging results/findings within the past 24 hours.

## 2017-08-11 NOTE — ASSESSMENT & PLAN NOTE
- her last HBA1C was 8.3%, states her BG is usually around 200 at home  -at home on lantus 50-50 regimen   - she is on [id number]  TID with meals.   -today increased insulin to 38 BID and 3 scheduled with SSI

## 2017-08-11 NOTE — ASSESSMENT & PLAN NOTE
- She has positive stool cultures + for c.diff.   - She started to have watery diarrhea last Friday, fever.   - She was started on IV flagyl and then transitioned to Po once able to tolerate PO

## 2017-08-11 NOTE — DISCHARGE SUMMARY
[facility] Hospital Medicine  Discharge Summary      Patient Name: Navin Beck  MRN: [id number]  Admission Date: 8/8/2017  Hospital Length of Stay: 3 days  Discharge Date and Time:  08/11/2017 2:00 PM  Attending Physician: Alexa Emery MD   Discharging Provider: Sandie Khan MD  Primary Care Provider: Elvira Quinones MD  [facility] Medicine Team: [facility] HOSP MED 5 Sandie Khan MD    HPI:   57 yr old female. With HTN for 5 years, Grave's disease for 5 years, DM2 on insulin for 30 years (had gestational DM). Came in with diarrhea, nausea, vomiting and generalized weakness for 5 days.  She started to have sever non-bloody watery diarrhea, 5-6 times a day, with upper abdominal pain, and nausea last Friday. She felt very weak she barely goes to bathroom but she had no falls.  Then Monday she started to have vomiting, food content, no blood, 3 times. At that time she called her PCP and was found to have ketones in the urine.  At the same time she developed cold symptoms and dry cough. Her son was having cold too. She had chills and fever of 100.4 on Friday.   She has been using nexium for 2-3 years. She denies any recent antibiotic use.   She has palpitations on and off. No chest pain.  She reports 7-8 Ib non-intentional weight loss over the past month.   Past surgical: multiple abdominal surgeries. Appendectomy, hysterectomy -BSO, bladder procedure? And gallstone removal. Rt knee replacement on June/2017. And bilateral carpal tunnel surgery.   She is an active smoker. She smokes 6-7 cigarettes a day for the past 35 years. She denies alcohol of IVDU.     Procedure(s) (LRB):  ESOPHAGOGASTRODUODENOSCOPY (EGD) (N/A)      Indwelling Lines/Drains at time of discharge:   Lines/Drains/Airways     Epidural Line                 Neuraxial Analgesia/Anesthesia Assessment (using dermatomes) Epidural 06/26/17 1430 45 days              Hospital Course:   8/10- No diarrhea for 2 days. Eating  "resumed. Pt is HPylori +, tx with quad therapy  8/11- pt tolerating diet well , has not had any diarrhea     Consults:   Consults         Status Ordering Provider     Inpatient consult to Gastroenterology  Once     Provider:  (Not yet assigned)    MOO Mendez          Significant Diagnostic Studies:   EGD: Non-bleeding erosive gastropathy. Biopsied.          - Multiple non-bleeding duodenal ulcers with no stigmata of bleeding    Pending Diagnostic Studies:     None        Final Active Diagnoses:    Diagnosis Date Noted POA    PRINCIPAL PROBLEM:  Clostridium difficile diarrhea [A04.7] 08/09/2017 Yes    H pylori ulcer [K27.9, B96.81] 08/10/2017 Yes    Graves disease [E05.00] 08/09/2017 Yes    Tobacco abuse [Z72.0] 08/07/2017 Yes    Essential hypertension [I10] 07/06/2012 Yes    Uncontrolled type 1 diabetes mellitus [E10.65] 06/29/2012 Yes      Problems Resolved During this Admission:    Diagnosis Date Noted Date Resolved POA    Duodenal mass [K31.89] 08/08/2017 08/10/2017 Yes      H pylori ulcer    - pt found to have duodenal thickening on CT outside imaging  - Pt had EGD here and this showed PUD: "Non-bleeding erosive gastropathy. Biopsied.- Multiple non-bleeding duodenal ulcers with no stigmata of bleeding"  -pt to remain on 40 PPI daily for 8 weeks  - Pt also had blood cultures showing H Pylori  - Pt started on quad therapy- bismuth, PPI, Metronidazole, Tetracycline  - pt to have outpatient FU, repeat test        Graves disease    She was diagnosed 5 years ago with hyperthyroidism.   She is on propranolol and methimazole.    -continue home dose meds        Essential hypertension    - Will continue her home med. - benzapril.   - Pt is controlled.           Uncontrolled type 1 diabetes mellitus    - her last HBA1C was 8.3%, states her BG is usually around 200 at home  -at home on lantus 50-50 regimen   - she is on [id number]  TID with meals.   -today increased insulin to 38 BID and 3 scheduled with " SSI          * Clostridium difficile diarrhea    - She has positive stool cultures + for c.diff.   - She started to have watery diarrhea last Friday, fever.   - She was started on IV flagyl and then transitioned to Po once able to tolerate PO              Discharged Condition: good    Disposition: Home or Self Care    Follow Up:  Follow-up Information     Elvira Quinones MD.    Specialty:  Cardiology  Contact information:  [place] [id number]  [phone]             Schedule an appointment as soon as possible for a visit with Samy Johnson - Gastroenterology.    Specialty:  Gastroenterology  Contact information:  [address]  [facility] [address] [phone]  Additional information:  [facility] [address]               Patient Instructions:     Diet general       Medications:  Reconciled Home Medications:   Current Discharge Medication List      START taking these medications    Details   bismuth subsalicylate (PEPTO BISMOL) 262 mg/15 mL suspension Take 30 mLs by mouth 4 (four) times daily.  Qty: 1560 mL, Refills: 0      metronidazole (FLAGYL) 500 MG tablet Take 1 tablet (500 mg total) by mouth every 8 (eight) hours.  Qty: 36 tablet, Refills: 0      !! pantoprazole (PROTONIX) 40 MG tablet Take 1 tablet (40 mg total) by mouth 2 (two) times daily.  Qty: 26 tablet, Refills: 0      !! pantoprazole (PROTONIX) 40 MG tablet Take 1 tablet (40 mg total) by mouth once daily. Start after the twice daily Protonix is complete.Follow-up with doctor for further refills.  Qty: 30 tablet, Refills: 1      tetracycline (ACHROMYCIN,SUMYCIN) 500 MG capsule Take 1 capsule (500 mg total) by mouth every 6 (six) hours.  Qty: 52 capsule, Refills: 0       !! - Potential duplicate medications found. Please discuss with provider.      CONTINUE these medications which have NOT CHANGED    Details   acetaminophen (TYLENOL) 500 MG tablet Take 1,000 mg by mouth 3 (three) times daily as needed (fever and chills).       benazepril (LOTENSIN) 10 MG tablet Take 10 mg by mouth once daily.       CRESTOR 10 mg tablet Take 10 mg by mouth once daily.       duloxetine (CYMBALTA) 60 MG capsule Take 60 mg by mouth every morning.      gabapentin (NEURONTIN) 600 MG tablet Take 600 mg by mouth every 4 (four) hours.       hydrocodone-acetaminophen 10-325mg (NORCO)  mg Tab Take 1 tablet by mouth every 4 to 6 hours as needed for Pain.      insulin aspart (NOVOLOG) 100 unit/mL injection Inject 15 Units into the skin 3 (three) times daily with meals. Three times a day with meals according to sliding scale.      insulin glargine (LANTUS) 100 unit/mL injection Inject 25 Units into the skin 2 (two) times daily. Give 25 units in am and at bedtime.  Qty: 3 vial, Refills: 3      METFORMIN HCL (METFORMIN ORAL) Take 500 mg by mouth 2 (two) times daily.      methimazole (TAPAZOLE) 10 MG Tab Take 10 mg by mouth once daily.       tamsulosin (FLOMAX) 0.4 mg Cp24 Take 0.4 mg by mouth once daily.       tizanidine 4 mg Cap Take 4 mg by mouth every evening.      zolpidem (AMBIEN) 10 mg Tab Take 10 mg by mouth every evening.             HOS POC IP DISCHARGE SUMMARY    Sandie Khan MD  Department of Hospital Medicine  [facility]

## 2017-08-11 NOTE — ASSESSMENT & PLAN NOTE
She was diagnosed 5 years ago with hyperthyroidism.   She is on propranolol and methimazole.    -continue home dose meds

## 2017-08-11 NOTE — PROGRESS NOTES
[facility] Hospital Medicine  Progress Note    Patient Name: Navin Beck  MRN: [id number]  Patient Class: IP- Inpatient   Admission Date: 8/8/2017  Length of Stay: 3 days  Attending Physician: Alexa Emery MD  Primary Care Provider: Elvira Quinones MD    [facility] Medicine Team: [facility] HOSP MED 5 Sandie Khan MD    Subjective:     Principal Problem:Clostridium difficile diarrhea    HPI:  57 yr old female. With HTN for 5 years, Grave's disease for 5 years, DM2 on insulin for 30 years (had gestational DM). Came in with diarrhea, nausea, vomiting and generalized weakness for 5 days.  She started to have sever non-bloody watery diarrhea, 5-6 times a day, with upper abdominal pain, and nausea last Friday. She felt very weak she barely goes to bathroom but she had no falls.  Then Monday she started to have vomiting, food content, no blood, 3 times. At that time she called her PCP and was found to have ketones in the urine.  At the same time she developed cold symptoms and dry cough. Her son was having cold too. She had chills and fever of 100.4 on Friday.   She has been using nexium for 2-3 years. She denies any recent antibiotic use.   She has palpitations on and off. No chest pain.  She reports 7-8 Ib non-intentional weight loss over the past month.   Past surgical: multiple abdominal surgeries. Appendectomy, hysterectomy -BSO, bladder procedure? And gallstone removal. Rt knee replacement on June/2017. And bilateral carpal tunnel surgery.   She is an active smoker. She smokes 6-7 cigarettes a day for the past 35 years. She denies alcohol of IVDU.     Hospital Course:  8/10- No diarrhea for 2 days. Eating resumed. Pt is HPylori +, tx with quad therapy  8/11- pt tolerating diet well , has not had any diarrhea    Interval History: NEON. Pt tolerating diet. No diarrhea for 2 days    Review of Systems   Constitutional: Negative for chills and fever.   Respiratory: Negative for  shortness of breath.    Cardiovascular: Negative for chest pain and palpitations.   Gastrointestinal: Negative for abdominal pain, diarrhea, nausea and vomiting.   Skin: Negative for rash and wound.     Objective:     Vital Signs (Most Recent):  Temp: 98.3 °F (36.8 °C) (08/11/17 0825)  Pulse: 77 (08/11/17 0825)  Resp: 18 (08/11/17 0825)  BP: 119/60 (08/11/17 0825)  SpO2: (!) 94 % (08/11/17 0825) Vital Signs (24h Range):  Temp:  [96.2 °F (35.7 °C)-98.8 °F (37.1 °C)] 98.3 °F (36.8 °C)  Pulse:  [67-81] 77  Resp:  [16-18] 18  SpO2:  [93 %-100 %] 94 %  BP: (106-122)/(52-62) 119/60     Weight: 102.4 kg (225 lb 12 oz)  Body mass index is 35.36 kg/m².    Intake/Output Summary (Last 24 hours) at 08/11/17 1346  Last data filed at 08/11/17 0500   Gross per 24 hour   Intake              480 ml   Output                0 ml   Net              480 ml      Physical Exam   Constitutional: She appears well-developed and well-nourished. No distress.   HENT:   Head: Normocephalic and atraumatic.   Mouth/Throat: No oropharyngeal exudate.   Eyes: EOM are normal. Right eye exhibits no discharge. Left eye exhibits no discharge. No scleral icterus.   Neck: Neck supple. No tracheal deviation present.   Cardiovascular: Normal rate, regular rhythm, normal heart sounds and intact distal pulses.  Exam reveals no gallop and no friction rub.    No murmur heard.  Pulmonary/Chest: Effort normal and breath sounds normal. No respiratory distress. She has no wheezes. She has no rales. She exhibits no tenderness.   Abdominal: Soft. Bowel sounds are normal. She exhibits no distension. There is no tenderness. There is no rebound and no guarding.   Musculoskeletal: She exhibits no edema, tenderness or deformity.   Neurological: She is alert.   Skin: Skin is warm and dry. No rash noted. She is not diaphoretic. No erythema.   Psychiatric: She has a normal mood and affect. Her behavior is normal. Judgment and thought content normal.       Significant Labs:  "  Recent Lab Results       08/11/17  1330 08/11/17  0937 08/11/17  0633 08/11/17  0528 08/11/17  0016      Anion Gap    11      BUN, Bld    11      Calcium    9.3      Chloride    109      CO2    21(L)      Creatinine    1.0      eGFR if     >60.0      eGFR if non     >60.0  Comment:  Calculation used to obtain the estimated glomerular filtration  rate (eGFR) is the CKD-EPI equation. Since race is unknown   in our information system, the eGFR values for   -American and Non--American patients are given   for each creatinine result.        Glucose    193(H)      POCT Glucose 319(H) 221(H) 179(H)  355(H)     Potassium    3.2(L)      Sodium    141                  08/10/17  2251 08/10/17  1733      Anion Gap       BUN, Bld       Calcium       Chloride       CO2       Creatinine       eGFR if        eGFR if non African American       Glucose       POCT Glucose 360(H) 327(H)     Potassium       Sodium             Significant Imaging: I have reviewed all pertinent imaging results/findings within the past 24 hours.    Assessment/Plan:      H pylori ulcer    - pt found to have duodenal thickening on CT outside imaging  - Pt had EGD here and this showed PUD: "Non-bleeding erosive gastropathy. Biopsied.- Multiple non-bleeding duodenal ulcers with no stigmata of bleeding"  -pt to remain on 40 PPI daily for 8 weeks  - Pt also had blood cultures showing H Pylori  - Pt started on quad therapy- bismuth, PPI, Metronidazole, Tetracycline  - pt to have outpatient FU, repeat test        Graves disease    She was diagnosed 5 years ago with hyperthyroidism.   She is on propranolol and methimazole.    -continue home dose meds        Tobacco abuse              Essential hypertension    - Will continue her home med. - benzapril.   - Pt is controlled.           Uncontrolled type 1 diabetes mellitus    - her last HBA1C was 8.3%, states her BG is usually around 200 at home  -at home " on lantus 50-50 regimen   - she is on [id number]  TID with meals.   -today increased insulin to 38 BID and 3 scheduled with SSI          * Clostridium difficile diarrhea    - She has positive stool cultures + for c.diff.   - She started to have watery diarrhea last Friday, fever.   - She was started on IV flagyl and then transitioned to Po once able to tolerate PO            VTE Risk Mitigation         Ordered     High Risk of VTE  Once      08/09/17 0131     Place TREVIN hose  Until discontinued      08/09/17 0131     Place sequential compression device  Until discontinued      08/09/17 0131              Sandie Khan MD  Department of Hospital Medicine   [facility]

## 2017-08-11 NOTE — ASSESSMENT & PLAN NOTE
"- pt found to have duodenal thickening on CT outside imaging  - Pt had EGD here and this showed PUD: "Non-bleeding erosive gastropathy. Biopsied.- Multiple non-bleeding duodenal ulcers with no stigmata of bleeding"  -pt to remain on 40 PPI daily for 8 weeks  - Pt also had blood cultures showing H Pylori  - Pt started on quad therapy- bismuth, PPI, Metronidazole, Tetracycline  - pt to have outpatient FU, repeat test  "

## 2017-08-11 NOTE — PLAN OF CARE
08/11/17 1203   Final Note   Assessment Type Final Discharge Note   Discharge Disposition Home   Discharge planning education complete? Yes   Discharge plans and expectations educations in teach back method with documentation complete? Yes   Offered [facility] -- Bedside Delivery? n/a   Discharge/Hospital Encounter Summary to (non-[facility]) PCP n/a   Referral to Outpatient Case Management complete? n/a   Referral to / orders for Home Health Complete? n/a   30 day supply of medicines given at discharge, if documented non-compliance / non-adherence? n/a   Any social issues identified prior to discharge? No   Did you assess the readiness or willingness of the family or caregiver to support self management of care? Yes   Right Care Referral Info   Post Acute Recommendation No Care     No future appointments.

## 2017-08-11 NOTE — NURSING
Paged Med 5 for an update.  Patient will likely discharge around noon today.  Patient has a ride who can come take her back to [place] at that time. Clarified insulin orders.   before breakfast; patient just received coverage at 06:30.  Ok with holding.

## 2017-08-11 NOTE — NURSING
Preparing patient for discharge.  Instructions include Diabetic teaching, confirmation of prescription delivery, Med review (frequency, dose, consuming with meals, etc) activity instructions, F/U appointments, S/S of complications, when to seek medical attention.  Patient verbalized understanding. Peripheral IVs removed bilaterally.  Patient awaiting ride from family member.

## 2017-09-06 PROBLEM — Z96.651 STATUS POST TOTAL RIGHT KNEE REPLACEMENT: Status: ACTIVE | Noted: 2017-09-06

## 2018-07-26 ENCOUNTER — OFFICE VISIT (OUTPATIENT)
Dept: FAMILY MEDICINE | Facility: CLINIC | Age: 58
End: 2018-07-26
Payer: MEDICARE

## 2018-07-26 VITALS
BODY MASS INDEX: 35.69 KG/M2 | TEMPERATURE: 99 F | DIASTOLIC BLOOD PRESSURE: 73 MMHG | HEIGHT: 68 IN | OXYGEN SATURATION: 97 % | SYSTOLIC BLOOD PRESSURE: 130 MMHG | WEIGHT: 235.5 LBS | HEART RATE: 83 BPM

## 2018-07-26 DIAGNOSIS — I15.2 HYPERTENSION ASSOCIATED WITH DIABETES: ICD-10-CM

## 2018-07-26 DIAGNOSIS — E11.69 HYPERLIPIDEMIA ASSOCIATED WITH TYPE 2 DIABETES MELLITUS: ICD-10-CM

## 2018-07-26 DIAGNOSIS — E78.5 HYPERLIPIDEMIA ASSOCIATED WITH TYPE 2 DIABETES MELLITUS: ICD-10-CM

## 2018-07-26 DIAGNOSIS — E11.59 HYPERTENSION ASSOCIATED WITH DIABETES: ICD-10-CM

## 2018-07-26 DIAGNOSIS — E05.90 HYPERTHYROIDISM: ICD-10-CM

## 2018-07-26 DIAGNOSIS — E10.65 UNCONTROLLED TYPE 1 DIABETES MELLITUS WITH HYPERGLYCEMIA: ICD-10-CM

## 2018-07-26 DIAGNOSIS — I10 ESSENTIAL HYPERTENSION: ICD-10-CM

## 2018-07-26 DIAGNOSIS — D75.839 THROMBOCYTOSIS: ICD-10-CM

## 2018-07-26 DIAGNOSIS — I70.0 CALCIFICATION OF AORTA: ICD-10-CM

## 2018-07-26 DIAGNOSIS — Z00.00 ENCOUNTER FOR PREVENTIVE HEALTH EXAMINATION: Primary | ICD-10-CM

## 2018-07-26 DIAGNOSIS — Z23 IMMUNIZATION DUE: ICD-10-CM

## 2018-07-26 DIAGNOSIS — E78.2 MIXED HYPERLIPIDEMIA: ICD-10-CM

## 2018-07-26 DIAGNOSIS — E66.01 SEVERE OBESITY (BMI 35.0-35.9 WITH COMORBIDITY): ICD-10-CM

## 2018-07-26 PROBLEM — E11.10 DIABETIC KETOACIDOSIS WITHOUT COMA ASSOCIATED WITH TYPE 2 DIABETES MELLITUS: Status: RESOLVED | Noted: 2017-08-07 | Resolved: 2018-07-26

## 2018-07-26 PROBLEM — A04.72 CLOSTRIDIUM DIFFICILE DIARRHEA: Status: RESOLVED | Noted: 2017-08-09 | Resolved: 2018-07-26

## 2018-07-26 PROCEDURE — G0439 PPPS, SUBSEQ VISIT: HCPCS | Mod: S$GLB,,, | Performed by: NURSE PRACTITIONER

## 2018-07-26 PROCEDURE — 3075F SYST BP GE 130 - 139MM HG: CPT | Mod: CPTII,S$GLB,, | Performed by: NURSE PRACTITIONER

## 2018-07-26 PROCEDURE — 3078F DIAST BP <80 MM HG: CPT | Mod: CPTII,S$GLB,, | Performed by: NURSE PRACTITIONER

## 2018-07-26 PROCEDURE — 90732 PPSV23 VACC 2 YRS+ SUBQ/IM: CPT | Mod: S$GLB,,, | Performed by: NURSE PRACTITIONER

## 2018-07-26 PROCEDURE — 99999 PR PBB SHADOW E&M-EST. PATIENT-LVL V: CPT | Mod: PBBFAC,,, | Performed by: NURSE PRACTITIONER

## 2018-07-26 PROCEDURE — G0009 ADMIN PNEUMOCOCCAL VACCINE: HCPCS | Mod: S$GLB,,, | Performed by: NURSE PRACTITIONER

## 2018-07-26 PROCEDURE — 3045F PR MOST RECENT HEMOGLOBIN A1C LEVEL 7.0-9.0%: CPT | Mod: CPTII,S$GLB,, | Performed by: NURSE PRACTITIONER

## 2018-07-26 PROCEDURE — 99499 UNLISTED E&M SERVICE: CPT | Mod: S$GLB,,, | Performed by: NURSE PRACTITIONER

## 2018-07-26 RX ORDER — AZITHROMYCIN 250 MG/1
500 TABLET, FILM COATED ORAL DAILY
Status: ON HOLD | COMMUNITY
End: 2019-04-17 | Stop reason: HOSPADM

## 2018-07-26 RX ORDER — TIZANIDINE 4 MG/1
TABLET ORAL
Refills: 2 | COMMUNITY
Start: 2018-06-24 | End: 2018-07-26 | Stop reason: SDUPTHER

## 2018-07-26 NOTE — Clinical Note
Primary Care Providers: Angel Quinones MD, MD (General)  Your patient was seen today for a HRA visit. Gap(s) in care (HEDIS gaps) have been identified during this visit that require additional testing and possible follow up.  Orders Placed This Encounter     (In Office Administered) Pneumococcal Polysaccharide Vaccine (23 Valent) (SQ/IM)  These orders were placed using [facility] approved protocol and any results will be forwarded to your office for appropriate follow up. I have included a copy of my visit note; please review the note and feel free to contact me with any questions.   Thank you for allowing me to participate in the care of your patients. Arnulfo Chow NP

## 2018-07-26 NOTE — PROGRESS NOTES
"Navin Beck presented for a  Medicare AWV and comprehensive Health Risk Assessment today. The following components were reviewed and updated:    · Medical history  · Family History  · Social history  · Allergies and Current Medications  · Health Risk Assessment  · Health Maintenance  · Care Team     ** See Completed Assessments for Annual Wellness Visit within the encounter summary.**       The following assessments were completed:  · Living Situation  · CAGE  · Depression Screening  · Timed Get Up and Go  · Whisper Test  · Cognitive Function Screening  · Nutrition Screening  · ADL Screening  · PAQ Screening    Vitals:    07/26/18 1104   BP: 130/73   BP Location: Left arm   Patient Position: Sitting   BP Method: Large (Manual)   Pulse: 83   Temp: 98.9 °F (37.2 °C)   TempSrc: Oral   SpO2: 97%   Weight: 106.8 kg (235 lb 8 oz)   Height: 5' 7.5" (1.715 m)     Body mass index is 36.34 kg/m².  Physical Exam   Constitutional: She is oriented to person, place, and time. She appears well-developed and well-nourished.   HENT:   Head: Normocephalic and atraumatic.   Eyes: EOM are normal. Pupils are equal, round, and reactive to light.   Neck: Normal range of motion.   Cardiovascular: Normal rate, regular rhythm, normal heart sounds and intact distal pulses.    Pulses:       Dorsalis pedis pulses are 2+ on the right side, and 2+ on the left side.        Posterior tibial pulses are 1+ on the right side, and 1+ on the left side.   Pulmonary/Chest: Effort normal and breath sounds normal. No respiratory distress.   Musculoskeletal: Normal range of motion. She exhibits no edema.   Neurological: She is alert and oriented to person, place, and time. Coordination normal.   Skin: Skin is warm and dry.   Psychiatric: She has a normal mood and affect. Her behavior is normal. Judgment and thought content normal.   Nursing note and vitals reviewed.        Diagnoses and health risks identified today and associated " recommendations/orders:    1. Encounter for preventive health examination    2. Severe obesity (BMI 35.0-35.9 with comorbidity)  Current BMI 36.34. Lifestyle modifications discussed with patient.     3. Uncontrolled type 1 diabetes mellitus with hyperglycemia  Chronic; unstable. Hemoglobin a1c 8.3 with an average glucose of 192 as noted on most recent A1C dated 8/7/2017. Continue current treatment plan as previously prescribed by PCP.     4. Hypertension associated with diabetes  Chronic; stable. Continue current treatment plan as previously prescribed by PCP.    5. Hyperlipidemia associated with type 2 diabetes mellitus  Chronic; unstable. Cholesterol 298 (H) and .2 (H) as noted on most recent lipid panel dated 7/24/2018. Continue current treatment plan as previously prescribed by PCP.    6. Thrombocytosis  Platelets 358 (H) as noted on most recent CBC dated 7/24/2018. Patient followed by PCP.     7. Calcification of aorta  Noted on CT abdomen and pelvis dated 8/7/2017. Patient followed by Cardiology (Dr. Quinones).    8. Essential hypertension  Chronic; stable. Continue current treatment plan as previously prescribed by PCP.    9. Mixed hyperlipidemia  Chronic; unstable. Cholesterol 298 (H) and .2 (H) as noted on most recent lipid panel dated 7/24/2018. Continue current treatment plan as previously prescribed by PCP.    10. Hyperthyroidism  Chronic; stable. Continue current treatment plan as previously prescribed by PCP.    11. Immunization due  - (In Office Administered) Pneumococcal Polysaccharide Vaccine (23 Valent) (SQ/IM)      Provided Rutha with a 5-10 year written screening schedule and personal prevention plan. Recommendations were developed using the USPSTF age appropriate recommendations. Education, counseling, and referrals were provided as needed. After Visit Summary printed and given to patient which includes a list of additional screenings\tests needed.    Follow-up for HRA visit in  1 year.    YFidel Chow NP

## 2018-07-26 NOTE — PATIENT INSTRUCTIONS
Counseling and Referral of Other Preventative  (Italic type indicates deductible and co-insurance are waived)    Patient Name: Navin Beck  Today's Date: 7/26/2018    Health Maintenance       Date Due Completion Date    Hepatitis C Screening 1960 ---    Foot Exam 06/30/1968 ---    TETANUS VACCINE 06/30/1976 ---    Pneumococcal PPSV23 (Medium Risk) (1) 06/30/1976 ---    Zoster Vaccine 06/30/2018 ---    Influenza Vaccine 08/01/2018 ---    Hemoglobin A1c 09/12/2018 3/12/2018    Mammogram 02/17/2019 2/17/2017    Eye Exam 06/06/2019 6/6/2018    Lipid Panel 07/24/2019 7/24/2018    High Dose Statin 07/26/2019 7/26/2018    Colonoscopy 08/17/2022 8/17/2012        Orders Placed This Encounter   Procedures    (In Office Administered) Pneumococcal Polysaccharide Vaccine (23 Valent) (SQ/IM)     The following information is provided to all patients.  This information is to help you find resources for any of the problems found today that may be affecting your health:                Living healthy guide: www.[facility].[place].gov      Understanding Diabetes: www.diabetes.org      Eating healthy: www.cdc.gov/healthyweight      CDC home safety checklist: www.cdc.gov/steadi/patient.html      Agency on Aging: www.goea.[place].gov      Alcoholics anonymous (AA): www.aa.org      Physical Activity: www.maria l.nih.gov/yw8cgqj      Tobacco use: www.quitwithusla.org

## 2019-04-12 PROBLEM — J18.9 PNEUMONIA OF RIGHT LOWER LOBE DUE TO INFECTIOUS ORGANISM: Status: ACTIVE | Noted: 2019-04-12

## 2019-04-13 PROBLEM — A41.9 SEPSIS: Status: ACTIVE | Noted: 2019-04-13

## 2019-04-14 PROBLEM — J96.01 ACUTE HYPOXEMIC RESPIRATORY FAILURE: Status: ACTIVE | Noted: 2019-04-14

## 2019-04-15 PROBLEM — E87.6 HYPOKALEMIA: Status: ACTIVE | Noted: 2019-04-15

## 2019-04-18 PROBLEM — J96.01 ACUTE HYPOXEMIC RESPIRATORY FAILURE: Status: RESOLVED | Noted: 2019-04-14 | Resolved: 2019-04-18

## 2019-04-18 PROBLEM — E87.6 HYPOKALEMIA: Status: RESOLVED | Noted: 2019-04-15 | Resolved: 2019-04-18

## 2019-04-24 ENCOUNTER — PES CALL (OUTPATIENT)
Dept: ADMINISTRATIVE | Facility: CLINIC | Age: 59
End: 2019-04-24

## 2019-09-11 ENCOUNTER — TELEPHONE (OUTPATIENT)
Dept: PULMONOLOGY | Facility: CLINIC | Age: 59
End: 2019-09-11

## 2019-09-11 DIAGNOSIS — J45.909 ASTHMA, UNSPECIFIED ASTHMA SEVERITY, UNSPECIFIED WHETHER COMPLICATED, UNSPECIFIED WHETHER PERSISTENT: Primary | ICD-10-CM

## 2019-09-11 NOTE — TELEPHONE ENCOUNTER
----- Message from Radha Valentin sent at 9/11/2019 12:09 PM CDT -----  Contact: pt  Reason:Pt calling to Reschedule appts on 09/12/2019    Communication:[phone]

## 2019-09-11 NOTE — TELEPHONE ENCOUNTER
Patient appointments was rescheduled for Thursday, September 19, 2019 beginning with pft's at 2:00pm and following that visit with SOTO Escalona for 3:00pm. Patient has accepted and confirmed appointments with me. An appointment notice will also be placed into the mail for Ms. Beck.

## 2020-07-17 ENCOUNTER — OFFICE VISIT (OUTPATIENT)
Dept: OBSTETRICS AND GYNECOLOGY | Facility: CLINIC | Age: 60
End: 2020-07-17
Payer: MEDICARE

## 2020-07-17 VITALS
HEIGHT: 67 IN | BODY MASS INDEX: 37.38 KG/M2 | DIASTOLIC BLOOD PRESSURE: 80 MMHG | SYSTOLIC BLOOD PRESSURE: 125 MMHG | WEIGHT: 238.19 LBS

## 2020-07-17 DIAGNOSIS — N89.8 VAGINAL DISCHARGE: ICD-10-CM

## 2020-07-17 DIAGNOSIS — Z12.31 BREAST CANCER SCREENING BY MAMMOGRAM: ICD-10-CM

## 2020-07-17 DIAGNOSIS — N95.0 POSTMENOPAUSAL BLEEDING: ICD-10-CM

## 2020-07-17 DIAGNOSIS — T83.711A EROSION OF IMPLANTED VAGINAL MESH AND PROSTHETIC MATERIALS, INITIAL ENCOUNTER: Primary | ICD-10-CM

## 2020-07-17 PROBLEM — K21.9 GASTROESOPHAGEAL REFLUX DISEASE WITHOUT ESOPHAGITIS: Status: ACTIVE | Noted: 2020-07-17

## 2020-07-17 PROBLEM — E66.9 OBESITY WITH BODY MASS INDEX 30 OR GREATER: Status: ACTIVE | Noted: 2020-07-17

## 2020-07-17 PROBLEM — I10 HYPERTENSIVE DISORDER: Status: ACTIVE | Noted: 2020-07-17

## 2020-07-17 PROBLEM — J45.909 ASTHMA: Status: ACTIVE | Noted: 2020-07-17

## 2020-07-17 PROBLEM — E11.9 DIABETES MELLITUS: Status: ACTIVE | Noted: 2020-07-17

## 2020-07-17 PROCEDURE — 3074F PR MOST RECENT SYSTOLIC BLOOD PRESSURE < 130 MM HG: ICD-10-PCS | Mod: HCWC,CPTII,S$GLB, | Performed by: OBSTETRICS & GYNECOLOGY

## 2020-07-17 PROCEDURE — 99204 OFFICE O/P NEW MOD 45 MIN: CPT | Mod: HCWC,S$GLB,, | Performed by: OBSTETRICS & GYNECOLOGY

## 2020-07-17 PROCEDURE — 87186 SC STD MICRODIL/AGAR DIL: CPT | Mod: HCWC

## 2020-07-17 PROCEDURE — 3008F BODY MASS INDEX DOCD: CPT | Mod: HCWC,CPTII,S$GLB, | Performed by: OBSTETRICS & GYNECOLOGY

## 2020-07-17 PROCEDURE — 99999 PR PBB SHADOW E&M-EST. PATIENT-LVL V: CPT | Mod: PBBFAC,HCWC,, | Performed by: OBSTETRICS & GYNECOLOGY

## 2020-07-17 PROCEDURE — 88305 TISSUE EXAM BY PATHOLOGIST: ICD-10-PCS | Mod: 26,HCWC,, | Performed by: PATHOLOGY

## 2020-07-17 PROCEDURE — 99999 PR PBB SHADOW E&M-EST. PATIENT-LVL V: ICD-10-PCS | Mod: PBBFAC,HCWC,, | Performed by: OBSTETRICS & GYNECOLOGY

## 2020-07-17 PROCEDURE — 87075 CULTR BACTERIA EXCEPT BLOOD: CPT | Mod: HCWC

## 2020-07-17 PROCEDURE — 88305 TISSUE EXAM BY PATHOLOGIST: CPT | Mod: 26,HCWC,, | Performed by: PATHOLOGY

## 2020-07-17 PROCEDURE — 3074F SYST BP LT 130 MM HG: CPT | Mod: HCWC,CPTII,S$GLB, | Performed by: OBSTETRICS & GYNECOLOGY

## 2020-07-17 PROCEDURE — 87070 CULTURE OTHR SPECIMN AEROBIC: CPT | Mod: HCWC

## 2020-07-17 PROCEDURE — 3008F PR BODY MASS INDEX (BMI) DOCUMENTED: ICD-10-PCS | Mod: HCWC,CPTII,S$GLB, | Performed by: OBSTETRICS & GYNECOLOGY

## 2020-07-17 PROCEDURE — 99204 PR OFFICE/OUTPT VISIT, NEW, LEVL IV, 45-59 MIN: ICD-10-PCS | Mod: HCWC,S$GLB,, | Performed by: OBSTETRICS & GYNECOLOGY

## 2020-07-17 PROCEDURE — 87076 CULTURE ANAEROBE IDENT EACH: CPT | Mod: 59,HCWC

## 2020-07-17 PROCEDURE — 3079F PR MOST RECENT DIASTOLIC BLOOD PRESSURE 80-89 MM HG: ICD-10-PCS | Mod: HCWC,CPTII,S$GLB, | Performed by: OBSTETRICS & GYNECOLOGY

## 2020-07-17 PROCEDURE — 3079F DIAST BP 80-89 MM HG: CPT | Mod: HCWC,CPTII,S$GLB, | Performed by: OBSTETRICS & GYNECOLOGY

## 2020-07-17 PROCEDURE — 87077 CULTURE AEROBIC IDENTIFY: CPT | Mod: HCWC

## 2020-07-17 PROCEDURE — 88305 TISSUE EXAM BY PATHOLOGIST: CPT | Mod: HCWC | Performed by: PATHOLOGY

## 2020-07-17 RX ORDER — TIZANIDINE 4 MG/1
8-12 TABLET ORAL NIGHTLY PRN
Status: ON HOLD | COMMUNITY
End: 2020-12-12 | Stop reason: SDUPTHER

## 2020-07-17 RX ORDER — ALBUTEROL SULFATE 90 UG/1
1-2 AEROSOL, METERED RESPIRATORY (INHALATION) EVERY 6 HOURS PRN
COMMUNITY
Start: 2020-04-17

## 2020-07-17 RX ORDER — GABAPENTIN 300 MG/1
CAPSULE ORAL
COMMUNITY
End: 2020-10-23 | Stop reason: CLARIF

## 2020-07-17 RX ORDER — CALCIUM CITRATE/VITAMIN D3 200MG-6.25
TABLET ORAL
COMMUNITY
Start: 2020-07-03

## 2020-07-17 RX ORDER — FLURAZEPAM HYDROCHLORIDE 30 MG/1
CAPSULE ORAL NIGHTLY PRN
Status: ON HOLD | COMMUNITY
End: 2020-11-30 | Stop reason: HOSPADM

## 2020-07-17 RX ORDER — FLUTICASONE PROPIONATE 44 UG/1
1 AEROSOL, METERED RESPIRATORY (INHALATION) DAILY
COMMUNITY

## 2020-07-17 RX ORDER — DIPHENOXYLATE HYDROCHLORIDE AND ATROPINE SULFATE 2.5; .025 MG/1; MG/1
TABLET ORAL
Status: ON HOLD | COMMUNITY
End: 2020-11-30

## 2020-07-17 RX ORDER — MELOXICAM 7.5 MG/1
TABLET ORAL
COMMUNITY
End: 2020-10-23 | Stop reason: CLARIF

## 2020-07-17 RX ORDER — ROSUVASTATIN CALCIUM 20 MG/1
TABLET, COATED ORAL
Status: ON HOLD | COMMUNITY
End: 2020-12-08

## 2020-07-17 RX ORDER — DULOXETIN HYDROCHLORIDE 60 MG/1
60 CAPSULE, DELAYED RELEASE ORAL DAILY
Status: ON HOLD | COMMUNITY
End: 2020-12-12 | Stop reason: SDUPTHER

## 2020-07-17 RX ORDER — BUPRENORPHINE HYDROCHLORIDE 75 UG/1
FILM, SOLUBLE BUCCAL DAILY
Status: ON HOLD | COMMUNITY
Start: 2018-12-26 | End: 2020-11-30

## 2020-07-17 NOTE — PROGRESS NOTES
Subjective:       Patient ID: Navin Beck is a 60 y.o. female.    Chief Complaint:  Vaginal Bleeding (x6 months)  61yo [id number] presents to office today complaining of vaginal bleeding daily for past 6 months. She is not sexually active and had no trauma or falls. She is not taking any blood thinners. She says she will have 1-2 bright red days then light pink on and off. It has not gotten heavier but not stopped either. She had a hysterectomy due to heavy vaginal bleeding. 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 The operative report for that surgery reports removal of mesh during that surgery too.    Patient also requests mammogram to be ordered.  Patient Active Problem List   Diagnosis    Uncontrolled type 1 diabetes mellitus    Atrophic vaginitis    Essential hypertension    Postmenopausal atrophic vaginitis    ASCVD (arteriosclerotic cardiovascular disease)    Mixed hyperlipidemia    Nonspecific (abnormal) findings on radiological and other examination of other intrathoracic organs    Other benign neoplasm of connective and other soft tissue of pelvis    Thymoma    Severe obesity (BMI 35.0-35.9 with comorbidity)    Primary osteoarthritis of right knee    Duodenitis    Tobacco abuse    Hyperthyroidism    Upper respiratory infection    Diabetic ketoacidosis without coma associated with type 2 diabetes mellitus    Graves disease    H pylori ulcer    Status post total right knee replacement    Calcification of aorta    Thrombocytosis    Hypertension associated with diabetes    Hyperlipidemia associated with type 2 diabetes mellitus    Pneumonia of right lower lobe due to infectious organism    Sepsis    Gastroesophageal reflux disease without esophagitis    Asthma    Obesity with body mass index 30 or greater    Hypertensive disorder    Diabetes mellitus       History of Present Illness    Past Medical  History:   Diagnosis Date    Allergy     Arrhythmia     Arthritis     Diabetes mellitus     Glaucoma     Hormone disorder     hysterectomy    Hypertension     Hyperthyroidism     Insomnia     Kidney stones     Thyroid disease     Urine, incontinence, stress female        Past Surgical History:   Procedure Laterality Date    APPENDECTOMY      BACK SURGERY      disc removal     CARPAL TUNNEL RELEASE Bilateral     CHOLECYSTECTOMY      FRACTURE SURGERY Right     wrist    [facility]    JOINT REPLACEMENT      KNEE ARTHROSCOPY W/ DEBRIDEMENT      KNEE SURGERY Right     Knee replacement    LITHOTRIPSY      SC REMOVAL OF OVARY/TUBE(S)  13    benign    removal of round ligament mass      SPINE SURGERY      WRIST FRACTURE SURGERY Left        OB History    Para Term  AB Living   5 4 3 1 1 3   SAB TAB Ectopic Multiple Live Births   1       4      # Outcome Date GA Lbr Davon/2nd Weight Sex Delivery Anes PTL Lv   5 Term 2000    M Vag-Spont   GUERRERO   4 Term     M Vag-Spont   GUERRERO   3 Term 1980    M Vag-Spont   GUERRERO   2 SAB            1      M    ND   No history of abnormal pap or STDs    No LMP recorded. Patient has had a hysterectomy.   Date of Last Pap: 2012    Review of Systems  Review of Systems   Constitutional: Negative for appetite change, chills and fever.   Gastrointestinal: Negative for abdominal pain, constipation, diarrhea, nausea and vomiting.   Genitourinary: Positive for vaginal bleeding and vaginal discharge (yellow discharge at times without odor). Negative for difficulty urinating and pelvic pain.   Musculoskeletal: Negative for back pain.        Objective:   Physical Exam:   Constitutional: She appears well-developed and well-nourished. No distress.             Abdominal: Soft. Bowel sounds are normal. She exhibits no distension. There is no abdominal tenderness.     Genitourinary: Rectum:      External hemorrhoid (no  active bleeding noted) present.      Pelvic exam was performed with patient supine.   There is no rash or lesion on the right labia. There is no rash or lesion on the left labia. Uterus is absent. There is bleeding (during exam not heavy) in the vagina.    There is a foreign body (mesh noted at midline of vaginal cuff ) in the vagina.   Labial bartholins normal.Cervix exhibits absence.    Genitourinary Comments: Vaginal cuff with defect in midline with mesh protruding. Probed with q-tip and feels intact around mesh site. Yellow drainage when probed. No odor noted. Culture obtained. There was a granulated area on left edge of cuff that felt harder. Biopsy forceps used to obtain a sample with verbal consent by patient. Will send for pathology.   positive for vaginal discharge (yellow discharge at mesh)             Lymphadenopathy: No inguinal adenopathy noted on the right or left side.               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 Kenny, Urogynecology, who suggested to start alternating Metrogel vaginal and Estrace. Medications called in and patient notified to start. Note will also be forwarded to her for scheduling with Urogyn next week.   -     metroNIDAZOLE (METROGEL) 0.75 % vaginal gel; Place 1 applicator vaginally 3 (three) times a week. Use every other night. Alternate with Estrace. for 14 days  Dispense: 70 g; Refill: 0  -     estradioL (ESTRACE) 0.01 % (0.1 mg/gram) vaginal cream; Place 1 g vaginally 3 (three) times a week. Alternate days with Metrogel for 14 days  Dispense: 42.5 g; Refill: 0  -     Ambulatory referral/consult to Urogynecology; Future; Expected date: 07/25/2020    Postmenopausal bleeding  Vaginal biopsy done at site of abnormal tissue with minimal bleeding noted.  -     Specimen to Pathology, Ob/Gyn    Vaginal discharge from mesh  -     Culture, Anaerobic  -     Aerobic culture    Breast cancer  screening by mammogram  -     Mammo Digital Screening Bilat w/ Humberto; Future; Expected date: 07/17/2020       Follow up with Urodavid Boyle MD

## 2020-07-18 RX ORDER — ESTRADIOL 0.1 MG/G
1 CREAM VAGINAL
Qty: 42.5 G | Refills: 0 | Status: SHIPPED | OUTPATIENT
Start: 2020-07-20 | End: 2020-08-14 | Stop reason: SDUPTHER

## 2020-07-18 RX ORDER — METRONIDAZOLE 7.5 MG/G
1 GEL VAGINAL
Qty: 70 G | Refills: 0 | Status: SHIPPED | OUTPATIENT
Start: 2020-07-20 | End: 2020-08-03

## 2020-07-20 LAB — BACTERIA SPEC AEROBE CULT: ABNORMAL

## 2020-07-22 LAB
BACTERIA SPEC ANAEROBE CULT: ABNORMAL
BACTERIA SPEC ANAEROBE CULT: ABNORMAL
FINAL PATHOLOGIC DIAGNOSIS: NORMAL
GROSS: NORMAL

## 2020-07-24 ENCOUNTER — OFFICE VISIT (OUTPATIENT)
Dept: UROGYNECOLOGY | Facility: CLINIC | Age: 60
End: 2020-07-24
Payer: MEDICARE

## 2020-07-24 VITALS — WEIGHT: 238 LBS | HEIGHT: 67 IN | BODY MASS INDEX: 37.35 KG/M2

## 2020-07-24 DIAGNOSIS — T83.711A EROSION OF IMPLANTED VAGINAL MESH AND PROSTHETIC MATERIALS, INITIAL ENCOUNTER: ICD-10-CM

## 2020-07-24 DIAGNOSIS — Z01.818 PRE-OP TESTING: Primary | ICD-10-CM

## 2020-07-24 PROCEDURE — 99215 PR OFFICE/OUTPT VISIT, EST, LEVL V, 40-54 MIN: ICD-10-PCS | Mod: HCWC,S$GLB,, | Performed by: OBSTETRICS & GYNECOLOGY

## 2020-07-24 PROCEDURE — 3008F BODY MASS INDEX DOCD: CPT | Mod: HCWC,CPTII,S$GLB, | Performed by: OBSTETRICS & GYNECOLOGY

## 2020-07-24 PROCEDURE — 99999 PR PBB SHADOW E&M-EST. PATIENT-LVL II: ICD-10-PCS | Mod: PBBFAC,HCWC,, | Performed by: OBSTETRICS & GYNECOLOGY

## 2020-07-24 PROCEDURE — 99215 OFFICE O/P EST HI 40 MIN: CPT | Mod: HCWC,S$GLB,, | Performed by: OBSTETRICS & GYNECOLOGY

## 2020-07-24 PROCEDURE — 99999 PR PBB SHADOW E&M-EST. PATIENT-LVL II: CPT | Mod: PBBFAC,HCWC,, | Performed by: OBSTETRICS & GYNECOLOGY

## 2020-07-24 PROCEDURE — 3008F PR BODY MASS INDEX (BMI) DOCUMENTED: ICD-10-PCS | Mod: HCWC,CPTII,S$GLB, | Performed by: OBSTETRICS & GYNECOLOGY

## 2020-07-24 RX ORDER — CIPROFLOXACIN 500 MG/1
500 TABLET ORAL EVERY 12 HOURS
Qty: 20 TABLET | Refills: 0 | Status: SHIPPED | OUTPATIENT
Start: 2020-07-24 | End: 2020-08-03

## 2020-07-24 NOTE — PROGRESS NOTES
Subjective:      Patient ID: Navin Beck is a 60 y.o. female.    Chief Complaint:  Consult (mesh erosion)      History of Present Illness  60-year-old multipara history of hysterectomy unclear etiology  Two thousand ten underwent with the the EMR patient cannot will had a lot of insight into but looks like note from colleague anterior colporrhaphy with mesh underlay.  Subsequent to this in 2013 patient underwent robotic round ligament tumor resection with concomitant procedure by colleague from Urology.  Patient is now referred after discovery of mesh erosion at apex patient is was seen earlier this week cultured resulting in mixed a robotic and anaerobic bacteria see below.  Patient has not been compliant with medication additionally patient was put on Estrace but has not yet obtain that.  Patient presents stating that she has significant angle is from this as suffering and has been for quite some time is requesting assistance.  Patient states there is no leakage of urine       Abnormal   PREVOTELLA TIMONENSIS   Moderate         ESCHERICHIA COLI   Moderate   Genital calixto also present     Past Medical History:   Diagnosis Date    Allergy     Arrhythmia     Arthritis     Diabetes mellitus     Glaucoma     Hormone disorder     hysterectomy    Hypertension     Hyperthyroidism     Insomnia     Kidney stones     Thyroid disease     Urine, incontinence, stress female        Past Surgical History:   Procedure Laterality Date    APPENDECTOMY      BACK SURGERY      disc removal     CARPAL TUNNEL RELEASE Bilateral     CHOLECYSTECTOMY      FRACTURE SURGERY Right     wrist    [facility]    JOINT REPLACEMENT      KNEE ARTHROSCOPY W/ DEBRIDEMENT      KNEE SURGERY Right     Knee replacement    LITHOTRIPSY      ID REMOVAL OF OVARY/TUBE(S)  9/9/13    benign    removal of round ligament mass      SPINE SURGERY      WRIST FRACTURE SURGERY Left        GYN & OB  History  No LMP recorded. Patient has had a hysterectomy.   Date of Last Pap: 2012    OB History    Para Term  AB Living   5 4 3 1 1 3   SAB TAB Ectopic Multiple Live Births   1       4      # Outcome Date GA Lbr Davon/2nd Weight Sex Delivery Anes PTL Lv   5 Term 2000    M Vag-Spont   GUERRERO   4 Term     M Vag-Spont   GUERRERO   3 Term 1980    M Vag-Spont   GUERRERO   2 SAB            1      M    ND       Health Maintenance       Date Due Completion Date    Hepatitis C Screening 1960 ---    Foot Exam 1968 ---    HIV Screening 1973 ---    TETANUS VACCINE 1976 ---    Aspirin/Antiplatelet Therapy 1976 ---    Shingles Vaccine (1 of 2) 2008 ---    Mammogram 2019    Eye Exam 2019    Hemoglobin A1c 10/12/2019 2019    Lipid Panel 2019    Influenza Vaccine (1) 2020 ---    High Dose Statin 2021    Colorectal Cancer Screening 2022          Family History   Problem Relation Age of Onset    Cancer Mother     Cancer Brother     Colon cancer Brother     Cancer Brother     Cancer Brother     Cancer Brother     Ovarian cancer Neg Hx     Uterine cancer Neg Hx     Breast cancer Neg Hx        Social History     Socioeconomic History    Marital status:      Spouse name: Not on file    Number of children: Not on file    Years of education: Not on file    Highest education level: Not on file   Occupational History    Not on file   Social Needs    Financial resource strain: Not on file    Food insecurity     Worry: Not on file     Inability: Not on file    Transportation needs     Medical: Not on file     Non-medical: Not on file   Tobacco Use    Smoking status: Former Smoker     Packs/day: 0.25     Years: 30.00     Pack years: 7.50    Smokeless tobacco: Never Used   Substance and Sexual Activity    Alcohol use: No    Drug use: No    Sexual activity: Not Currently      "Partners: Male     Birth control/protection: Surgical   Lifestyle    Physical activity     Days per week: Not on file     Minutes per session: Not on file    Stress: Not on file   Relationships    Social connections     Talks on phone: Not on file     Gets together: Not on file     Attends Church service: Not on file     Active member of club or organization: Not on file     Attends meetings of clubs or organizations: Not on file     Relationship status: Not on file   Other Topics Concern    Not on file   Social History Narrative    Not on file       Review of Systems  Review of Systems   Genitourinary: Positive for pelvic pain and vaginal discharge.          Objective:   Ht 5' 7" (1.702 m)   Wt 108 kg (238 lb)   BMI 37.28 kg/m²     Physical Exam   Genitourinary: Pelvic exam was performed with patient supine. Skenes normal. Right labia normal. Urethra exhibits no urethral caruncle, no urethral diverticulum, no urethral mass and no hypermobility. There is mesh exposure (At apex at minus 11 cm) in the vagina. No rectocele or unspecified prolapse of vaginal walls in the vagina. Right adnexum displays no mass. Left adnexum displays no mass. Cervix exhibits absence. Uterus is absent.   POP-Q  Aa: -3 Ba:  C: -11   GH:  PB:  TVL: 11   Ap: -3 Bp:  D:                         Excellent apical fixation  I do feel what I believe is the graft going from arcus to arcus bilaterally left right on bony pelvis additionally I do feel a greater amount of hardening in in the vesicovaginal space as well as rectovaginal space.  Deep palpation of sacral spinous I think I feel the outline of fixation bullets.  I would not be surprised if this is some type of trocar based fixation device such as elevate for or others at the time such as [employer] or Pro lift.  I could be incorrect  Assessment:     1. Erosion of implanted vaginal mesh and prosthetic materials, initial encounter            Plan:     1. Erosion of implanted " vaginal mesh and prosthetic materials, initial encounter      After examination long discussion with patient.  1.  We have to actively treat the infection that has been culture positive.  I have asked patient to  the MetroGel this should take care of anaerobes.  I am going to put her on Cipro that we know the a robotic is sensitive to.  2.   He I contacted referring physician we discussed if she might be able to get the original operative report from 2010  3.  I have asked patient herself to try to get me the original operative note.  4.  I will plan for pelvic MRI to assist in place surgical planning.  5.  Approaching this level of mesh erosion which is at least 2 cm x 2 cm at what looks like the apex this cannot be reached safely transvaginally.  This going to have to be an abdominal approach.  I would utilize robotic assistance to do this along with a [employer] positioning system for static control.  6.  Because I believe there is fixation in the  space paravesical spaces I might have to get into the space of Retzius and developed those.  I am going to ask for consultation from Urologic Oncology.  To assist in navigating the spaces safely    That plan might be amended to include stents protect the lower tracts.  I do not know yet I will wait further information from imaging as well as the original operative report.  All of this discussed with the patient at great length patient appreciated the time.  Total time of this visit 40 min greater than 50% time 25 min in direct discussion reviewing all findings  There are no Patient Instructions on file for this visit.

## 2020-07-24 NOTE — LETTER
July 24, 2020      Devi Boyle MD  [address] [address]           [facility]-[id number]   [address] [facility] [address]  [facility] [address]  Phone: [phone]          Patient: Navin Beck   MR Number: [id number]   YOB: 1960   Date of Visit: 7/24/2020       Dear Dr. Devi Boyle:    Thank you for referring Navin Beck to me for evaluation. Attached you will find relevant portions of my assessment and plan of care.    If you have questions, please do not hesitate to call me. I look forward to following Navin Beck along with you.    Sincerely,    Wilner Goel MD    Enclosure  CC:  No Recipients    If you would like to receive this communication electronically, please contact [email or web address] or [phone] to request more information on [employer] Link access.    For providers and/or their staff who would like to refer a patient to [facility], please contact us through our one-stop-shop provider referral line, [facility], at [phone].    If you feel you have received this communication in error or would no longer like to receive these types of communications, please e-mail [email or web address]

## 2020-08-14 ENCOUNTER — OFFICE VISIT (OUTPATIENT)
Dept: UROLOGY | Facility: CLINIC | Age: 60
End: 2020-08-14
Payer: MEDICARE

## 2020-08-14 VITALS
BODY MASS INDEX: 37.37 KG/M2 | HEIGHT: 67 IN | WEIGHT: 238.13 LBS | SYSTOLIC BLOOD PRESSURE: 139 MMHG | HEART RATE: 110 BPM | DIASTOLIC BLOOD PRESSURE: 78 MMHG

## 2020-08-14 DIAGNOSIS — R82.90 UNSPECIFIED ABNORMAL FINDINGS IN URINE: ICD-10-CM

## 2020-08-14 DIAGNOSIS — T83.711A EROSION OF IMPLANTED VAGINAL MESH AND PROSTHETIC MATERIALS, INITIAL ENCOUNTER: ICD-10-CM

## 2020-08-14 LAB
BILIRUB SERPL-MCNC: NORMAL MG/DL
BLOOD URINE, POC: 250
CLARITY, POC UA: CLEAR
COLOR, POC UA: YELLOW
GLUCOSE UR QL STRIP: 1000
KETONES UR QL STRIP: NORMAL
LEUKOCYTE ESTERASE URINE, POC: NORMAL
NITRITE, POC UA: NORMAL
PH, POC UA: 5
PROTEIN, POC: 30
SPECIFIC GRAVITY, POC UA: 1.02
UROBILINOGEN, POC UA: NORMAL

## 2020-08-14 PROCEDURE — 3075F PR MOST RECENT SYSTOLIC BLOOD PRESS GE 130-139MM HG: ICD-10-PCS | Mod: HCWC,CPTII,S$GLB, | Performed by: UROLOGY

## 2020-08-14 PROCEDURE — 87088 URINE BACTERIA CULTURE: CPT | Mod: HCWC

## 2020-08-14 PROCEDURE — 3078F DIAST BP <80 MM HG: CPT | Mod: HCWC,CPTII,S$GLB, | Performed by: UROLOGY

## 2020-08-14 PROCEDURE — 87186 SC STD MICRODIL/AGAR DIL: CPT | Mod: HCWC

## 2020-08-14 PROCEDURE — 87086 URINE CULTURE/COLONY COUNT: CPT | Mod: HCWC

## 2020-08-14 PROCEDURE — 99204 OFFICE O/P NEW MOD 45 MIN: CPT | Mod: HCWC,25,S$GLB, | Performed by: UROLOGY

## 2020-08-14 PROCEDURE — 81002 POCT URINE DIPSTICK WITHOUT MICROSCOPE: ICD-10-PCS | Mod: HCWC,S$GLB,, | Performed by: UROLOGY

## 2020-08-14 PROCEDURE — 3008F BODY MASS INDEX DOCD: CPT | Mod: HCWC,CPTII,S$GLB, | Performed by: UROLOGY

## 2020-08-14 PROCEDURE — 87077 CULTURE AEROBIC IDENTIFY: CPT | Mod: HCWC

## 2020-08-14 PROCEDURE — 81002 URINALYSIS NONAUTO W/O SCOPE: CPT | Mod: HCWC,S$GLB,, | Performed by: UROLOGY

## 2020-08-14 PROCEDURE — 3078F PR MOST RECENT DIASTOLIC BLOOD PRESSURE < 80 MM HG: ICD-10-PCS | Mod: HCWC,CPTII,S$GLB, | Performed by: UROLOGY

## 2020-08-14 PROCEDURE — 99204 PR OFFICE/OUTPT VISIT, NEW, LEVL IV, 45-59 MIN: ICD-10-PCS | Mod: HCWC,25,S$GLB, | Performed by: UROLOGY

## 2020-08-14 PROCEDURE — 3075F SYST BP GE 130 - 139MM HG: CPT | Mod: HCWC,CPTII,S$GLB, | Performed by: UROLOGY

## 2020-08-14 PROCEDURE — 3008F PR BODY MASS INDEX (BMI) DOCUMENTED: ICD-10-PCS | Mod: HCWC,CPTII,S$GLB, | Performed by: UROLOGY

## 2020-08-14 RX ORDER — LIDOCAINE HYDROCHLORIDE 20 MG/ML
JELLY TOPICAL ONCE
Status: CANCELLED | OUTPATIENT
Start: 2020-08-14 | End: 2020-08-14

## 2020-08-14 RX ORDER — METRONIDAZOLE 7.5 MG/G
GEL VAGINAL
COMMUNITY
End: 2020-10-23 | Stop reason: CLARIF

## 2020-08-14 NOTE — LETTER
August 14, 2020      Wilner Goel MD  [address] [place] [address]  [address]  [facility] [address]           [facility], [address] [address]  Phone: [phone]  Fax: [phone]          Patient: Navin Beck   MR Number: [id number]   YOB: 1960   Date of Visit: 8/14/2020       Dear Dr. Wilner Goel:    Thank you for referring Navin Beck to me for evaluation. Attached you will find relevant portions of my assessment and plan of care.    If you have questions, please do not hesitate to call me. I look forward to following Navin Beck along with you.    Sincerely,    Grayson Trevino MD    Enclosure  CC:  No Recipients    If you would like to receive this communication electronically, please contact [email or web address] or [phone] to request more information on [employer] Link access.    For providers and/or their staff who would like to refer a patient to [facility], please contact us through our one-stop-shop provider referral line, [facility], at [phone].    If you feel you have received this communication in error or would no longer like to receive these types of communications, please e-mail [email or web address]

## 2020-08-14 NOTE — PROGRESS NOTES
Subjective:      Navin Beck is a 60 y.o. female who returns today regarding her     Dr Goel plans robotic removal of vaginal mesh and has asked that I place ureteral catheters and be available for mobilization.    The following portions of the patient's history were reviewed and updated as appropriate: allergies, current medications, past family history, past medical history, past social history, past surgical history and problem list.    Review of Systems  Pertinent items are noted in HPI.  A comprehensive multipoint review of systems was negative except as otherwise stated in the HPI.    Past Medical History:   Diagnosis Date    Allergy     Arrhythmia     Arthritis     Diabetes mellitus     Glaucoma     Hormone disorder     hysterectomy    Hypertension     Hyperthyroidism     Insomnia     Kidney stones     Thyroid disease     Urine, incontinence, stress female      Past Surgical History:   Procedure Laterality Date    APPENDECTOMY      BACK SURGERY      disc removal     CARPAL TUNNEL RELEASE Bilateral     CHOLECYSTECTOMY      FRACTURE SURGERY Right     wrist    [facility]    JOINT REPLACEMENT      KNEE ARTHROSCOPY W/ DEBRIDEMENT      KNEE SURGERY Right     Knee replacement    LITHOTRIPSY      UT REMOVAL OF OVARY/TUBE(S)  9/9/13    benign    removal of round ligament mass      SPINE SURGERY      WRIST FRACTURE SURGERY Left      Diabetes Medications             insulin aspart (NOVOLOG) 100 unit/mL injection Inject 15 Units into the skin 3 (three) times daily with meals. Three times a day with meals according to sliding scale.        Jesika Sánchez 8/9/2017 11:40 AM                insulin glargine (LANTUS U-100 INSULIN) 100 unit/mL injection Inject 50 Units into the skin 2 (two) times daily.    METFORMIN HCL (METFORMIN ORAL) Take 500 mg by mouth 2 (two) times daily.        Review of patient's allergies indicates:   Allergen Reactions     "Penicillin g Shortness Of Breath    Penicillins Shortness Of Breath, Itching and Swelling          Objective:   Vitals: /78 (BP Location: Right arm, Patient Position: Sitting, BP Method: Large (Automatic))   Pulse 110   Ht 5' 7" (1.702 m)   Wt 108 kg (238 lb 1.6 oz)   BMI 37.29 kg/m²     Physical Exam   All counseling    Physical Exam    Lab Review   Urinalysis demonstrates urine culture sent    Lab Results   Component Value Date    WBC 15.96 (H) 04/17/2019    HGB 12.0 04/17/2019    HCT 35.7 (L) 04/17/2019    HCT 30 (L) 09/09/2013    MCV 90 04/17/2019     (H) 04/17/2019     Lab Results   Component Value Date    CREATININE 1.13 04/17/2019    BUN 22 (H) 04/17/2019       Imaging  CT 2017 normal kidneys by report    Assessment and Plan:   Erosion of implanted vaginal mesh and prosthetic materials, initial encounter  -     Ambulatory referral/consult to Urology  -     POCT URINE DIPSTICK WITHOUT MICROSCOPE  -     Urine culture  -     Diet NPO; Standing  -     Case Request Operating Room: CYSTOURETEROSCOPY, WITH RETROGRADE PYELOGRAM AND URETERAL STENT INSERTION  -     Place in Outpatient - Extended Recovery; Standing    Unspecified abnormal findings in urine   -     Urine culture  -     Diet NPO; Standing  -     Case Request Operating Room: CYSTOURETEROSCOPY, WITH RETROGRADE PYELOGRAM AND URETERAL STENT INSERTION  -     Place in Outpatient - Extended Recovery; Standing    Other orders  -     Progressive Mobility Protocol (mobilize patient to their highest level of functioning at least twice daily); Standing        Will perform cystoscopy and perform bilateral retrogrades and ureteral catheter placement at the time of her surgery with Dr Goel    I will also be available for mobilization of the bladder and ureters if necessary    I explained that all of her perioperative management would be performed by Dr Goel and his team    We discussed all the potential risks of the procedure and I answered " all her questions.  We did discuss the possibility urea ureteral bladder injury and the need for repair, stent placement and/or nephrostomy tube placement if this should occur

## 2020-08-17 ENCOUNTER — TELEPHONE (OUTPATIENT)
Dept: UROLOGY | Facility: CLINIC | Age: 60
End: 2020-08-17

## 2020-08-17 LAB — BACTERIA UR CULT: ABNORMAL

## 2020-08-17 NOTE — TELEPHONE ENCOUNTER
Appt/ax confirmed.    ----- Message from Jerri Streeter, Patient Care Assistant sent at 8/17/2020 11:43 AM CDT -----  Type:  Patient Returning Call    Who Called: WOODROW SALCIDO [[id number]]    Who Left Message for Patient: Sangeeta    Does the patient know what this is regarding?:Yes    Best Call Back Number:[phone]    Additional Information:   None

## 2020-08-17 NOTE — PROGRESS NOTES
Please tell MsMike Sweeneydante Debbie Beck that Dr Trevino reviewed these results  I sent Bactrim to her pharmacy  Please schedule a repeat urine cs 1 week prior to surgery    I am copying Dr Goel so he is aware.  thanks

## 2020-08-18 ENCOUNTER — TELEPHONE (OUTPATIENT)
Dept: UROGYNECOLOGY | Facility: CLINIC | Age: 60
End: 2020-08-18

## 2020-08-18 NOTE — TELEPHONE ENCOUNTER
----- Message from Wilner Goel MD sent at 8/18/2020 11:21 AM CDT -----  What is make sure to arrange for urine culture at least the type 2 weeks to 1 week out from surgery for this patient.  Most likely will she will get infected with for another UTI will have to put her on prophylactic  ----- Message -----  From: Grayson Trevino MD  Sent: 8/17/2020   9:29 AM CDT  To: Wilner Goel MD, [facility] Staff    Please tell MsMike Navin Debbie Beck that Dr Trevino reviewed these results  I sent Bactrim to her pharmacy  Please schedule a repeat urine cs 1 week prior to surgery    I am copying Dr Goel so he is aware.  thanks

## 2020-08-20 ENCOUNTER — TELEPHONE (OUTPATIENT)
Dept: UROGYNECOLOGY | Facility: CLINIC | Age: 60
End: 2020-08-20

## 2020-08-20 DIAGNOSIS — N39.0 URINARY TRACT INFECTION WITHOUT HEMATURIA, SITE UNSPECIFIED: ICD-10-CM

## 2020-08-20 DIAGNOSIS — Z01.818 PRE-OP TESTING: Primary | ICD-10-CM

## 2020-08-20 DIAGNOSIS — T83.711A EROSION OF IMPLANTED VAGINAL MESH AND PROSTHETIC MATERIALS, INITIAL ENCOUNTER: ICD-10-CM

## 2020-08-20 NOTE — TELEPHONE ENCOUNTER
Spoke to pt. And provided all surgery appointments and informed her that all reminders will be mailed. Pt. Verbalized understanding

## 2020-08-20 NOTE — TELEPHONE ENCOUNTER
Pt. Stated she is returning Ana's call. I informed pt. That I will speak with Ana once she arrives to clinic this morning,regarding nature of call and give her a call later to discuss. Pt. Verbalized understanding

## 2020-08-20 NOTE — TELEPHONE ENCOUNTER
----- Message from Herbie Gomez sent at 8/19/2020  4:53 PM CDT -----  Regarding: Pt Advice  Contact: WOODROW SALCIDO [[id number]]  Type:  Patient Returning Call    Who Called: WOODROW SALCIDO [[id number]]    Who Left Message for Patient:     Does the patient know what this is regarding?: no    Would the patient rather a call back or a response via My [facility]? call    Best Call Back Number: [phone]    Additional Information:

## 2020-10-13 ENCOUNTER — TELEPHONE (OUTPATIENT)
Dept: UROLOGY | Facility: CLINIC | Age: 60
End: 2020-10-13

## 2020-10-13 DIAGNOSIS — R82.90 UNSPECIFIED ABNORMAL FINDINGS IN URINE: Primary | ICD-10-CM

## 2020-10-13 NOTE — TELEPHONE ENCOUNTER
Order placed for UC to be done at lab, not clinic. Patient had questions about pre-op- directed to primary surgeon.    ----- Message from Corinna Sullivan sent at 10/13/2020 11:33 AM CDT -----  Regarding: Appointment Access  Contact: [phone]  Pt calling to speak with someone so that she can be rescheduled for her urinalysis. The pt was originally scheduled for 10/12, but was unable to make it. Please call the pt regarding the scheduling of the appointment.

## 2020-10-16 ENCOUNTER — TELEPHONE (OUTPATIENT)
Dept: UROGYNECOLOGY | Facility: CLINIC | Age: 60
End: 2020-10-16

## 2020-10-16 NOTE — TELEPHONE ENCOUNTER
----- Message from Wilner Goel MD sent at 10/16/2020 12:49 PM CDT -----  New reach out to the patient see which she needs regarding her surgery on the 20/6 print she had some questions for shown

## 2020-10-16 NOTE — TELEPHONE ENCOUNTER
Spoke to pt. Regarding upcoming appts. And surgery. Pt. verbalized understanding of all information. Pt. Also verbalized some further questions she would like to discuss with , I encouraged pt. To make a list and bring all/any questions with her to her appt. On 10/22/2020 and  would gladly answer at that time. Pt.verbaliized appreciation and understanding

## 2020-10-19 ENCOUNTER — TELEPHONE (OUTPATIENT)
Dept: UROGYNECOLOGY | Facility: CLINIC | Age: 60
End: 2020-10-19

## 2020-10-19 RX ORDER — NITROFURANTOIN (MACROCRYSTALS) 100 MG/1
100 CAPSULE ORAL 2 TIMES DAILY
Qty: 14 CAPSULE | Refills: 0 | Status: ON HOLD | OUTPATIENT
Start: 2020-10-19 | End: 2020-10-27 | Stop reason: HOSPADM

## 2020-10-19 NOTE — TELEPHONE ENCOUNTER
----- Message from Wilner Goel MD sent at 10/19/2020  8:31 AM CDT -----  Let patient know I have called in macrobid 100 mg bid for 7 days

## 2020-10-22 ENCOUNTER — OFFICE VISIT (OUTPATIENT)
Dept: UROGYNECOLOGY | Facility: CLINIC | Age: 60
End: 2020-10-22
Payer: MEDICARE

## 2020-10-22 VITALS
HEIGHT: 67 IN | DIASTOLIC BLOOD PRESSURE: 88 MMHG | WEIGHT: 240.5 LBS | BODY MASS INDEX: 37.75 KG/M2 | SYSTOLIC BLOOD PRESSURE: 132 MMHG

## 2020-10-22 DIAGNOSIS — T83.711A EROSION OF IMPLANTED VAGINAL MESH AND PROSTHETIC MATERIALS, INITIAL ENCOUNTER: ICD-10-CM

## 2020-10-22 DIAGNOSIS — N39.0 URINARY TRACT INFECTION WITHOUT HEMATURIA, SITE UNSPECIFIED: Primary | ICD-10-CM

## 2020-10-22 PROCEDURE — 87086 URINE CULTURE/COLONY COUNT: CPT | Mod: HCWC

## 2020-10-22 PROCEDURE — 3075F SYST BP GE 130 - 139MM HG: CPT | Mod: HCWC,CPTII,S$GLB, | Performed by: OBSTETRICS & GYNECOLOGY

## 2020-10-22 PROCEDURE — 3075F PR MOST RECENT SYSTOLIC BLOOD PRESS GE 130-139MM HG: ICD-10-PCS | Mod: HCWC,CPTII,S$GLB, | Performed by: OBSTETRICS & GYNECOLOGY

## 2020-10-22 PROCEDURE — 99214 OFFICE O/P EST MOD 30 MIN: CPT | Mod: HCWC,S$GLB,, | Performed by: OBSTETRICS & GYNECOLOGY

## 2020-10-22 PROCEDURE — 99999 PR PBB SHADOW E&M-EST. PATIENT-LVL IV: CPT | Mod: PBBFAC,HCWC,, | Performed by: OBSTETRICS & GYNECOLOGY

## 2020-10-22 PROCEDURE — 99999 PR PBB SHADOW E&M-EST. PATIENT-LVL IV: ICD-10-PCS | Mod: PBBFAC,HCWC,, | Performed by: OBSTETRICS & GYNECOLOGY

## 2020-10-22 PROCEDURE — 3008F PR BODY MASS INDEX (BMI) DOCUMENTED: ICD-10-PCS | Mod: HCWC,CPTII,S$GLB, | Performed by: OBSTETRICS & GYNECOLOGY

## 2020-10-22 PROCEDURE — 3079F PR MOST RECENT DIASTOLIC BLOOD PRESSURE 80-89 MM HG: ICD-10-PCS | Mod: HCWC,CPTII,S$GLB, | Performed by: OBSTETRICS & GYNECOLOGY

## 2020-10-22 PROCEDURE — 3008F BODY MASS INDEX DOCD: CPT | Mod: HCWC,CPTII,S$GLB, | Performed by: OBSTETRICS & GYNECOLOGY

## 2020-10-22 PROCEDURE — 3079F DIAST BP 80-89 MM HG: CPT | Mod: HCWC,CPTII,S$GLB, | Performed by: OBSTETRICS & GYNECOLOGY

## 2020-10-22 PROCEDURE — 99214 PR OFFICE/OUTPT VISIT, EST, LEVL IV, 30-39 MIN: ICD-10-PCS | Mod: HCWC,S$GLB,, | Performed by: OBSTETRICS & GYNECOLOGY

## 2020-10-22 RX ORDER — METRONIDAZOLE 7.5 MG/G
5 GEL VAGINAL NIGHTLY
Qty: 5 APPLICATOR | Refills: 0 | Status: SHIPPED | OUTPATIENT
Start: 2020-10-22 | End: 2020-10-27

## 2020-10-22 NOTE — PROGRESS NOTES
Subjective:      Patient ID: Navin Beck is a 60 y.o. female.    Chief Complaint:  Other (PRE OP )      History of Present Illness  Patient returns stating that she is in much worse condition much more pain much more discharge is requesting assistance to so excited that her surgeries now several days away      Since her initial presentation I was able to researcher case she has a MiniArc in place as well as pros some a device that was placed anterior approach      From previous note  60-year-old multipara history of hysterectomy unclear etiology  Two thousand ten underwent with the the EMR patient cannot will had a lot of insight into but looks like note from colleague anterior colporrhaphy with mesh underlay.  Subsequent to this in 2013 patient underwent robotic round ligament tumor resection with concomitant procedure by colleague from Urology.  Patient is now referred after discovery of mesh erosion at apex patient is was seen earlier this week cultured resulting in mixed a robotic and anaerobic bacteria see below.  Patient has not been compliant with medication additionally patient was put on Estrace but has not yet obtain that.  Patient presents stating that she has significant angle is from this as suffering and has been for quite some time is requesting assistance.  Patient states there is no leakage of urine    Genitourinary: Pelvic exam was performed with patient supine. Skenes normal. Right labia normal. Urethra exhibits no urethral caruncle, no urethral diverticulum, no urethral mass and no hypermobility. There is mesh exposure (At apex at minus 11 cm) in the vagina. No rectocele or unspecified prolapse of vaginal walls in the vagina. Right adnexum displays no mass. Left adnexum displays no mass. Cervix exhibits absence. Uterus is absent.   POP-Q  Aa: -3 Ba:  C: -11   GH:  PB:  TVL: 11   Ap: -3 Bp:  D:           Past Medical History:   Diagnosis Date    Allergy     Arrhythmia     Arthritis      Diabetes mellitus     Glaucoma     Hormone disorder     hysterectomy    Hypertension     Hyperthyroidism     Insomnia     Kidney stones     Thyroid disease     Urine, incontinence, stress female        Past Surgical History:   Procedure Laterality Date    APPENDECTOMY      BACK SURGERY      disc removal     CARPAL TUNNEL RELEASE Bilateral     CHOLECYSTECTOMY      FRACTURE SURGERY Right     wrist    [facility]    JOINT REPLACEMENT      KNEE ARTHROSCOPY W/ DEBRIDEMENT      KNEE SURGERY Right     Knee replacement    LITHOTRIPSY      NE REMOVAL OF OVARY/TUBE(S)  13    benign    removal of round ligament mass      SPINE SURGERY      WRIST FRACTURE SURGERY Left        GYN & OB History  No LMP recorded. Patient has had a hysterectomy.   Date of Last Pap: 2012    OB History    Para Term  AB Living   5 4 3 1 1 3   SAB TAB Ectopic Multiple Live Births   1       4      # Outcome Date GA Lbr Davon/2nd Weight Sex Delivery Anes PTL Lv   5 Term 2000    M Vag-Spont   GUERRERO   4 Term     M Vag-Spont   GUERRERO   3 Term 1980    M Vag-Spont   GUERRERO   2 SAB            1      M    ND       Health Maintenance       Date Due Completion Date    Hepatitis C Screening 1958 ---    Foot Exam 1968 ---    HIV Screening 1973 ---    TETANUS VACCINE 1976 ---    Aspirin/Antiplatelet Therapy 1976 ---    Shingles Vaccine (1 of 2) 2008 ---    Mammogram 2019    Eye Exam 2019    Influenza Vaccine (1) 2020 ---    Hemoglobin A1c 2021    Lipid Panel 2021    Colorectal Cancer Screening 2022          Family History   Problem Relation Age of Onset    Cancer Mother     Cancer Brother     Colon cancer Brother     Cancer Brother     Cancer Brother     Cancer Brother     Ovarian cancer Neg Hx     Uterine cancer Neg Hx     Breast cancer Neg Hx   "      Social History     Socioeconomic History    Marital status:      Spouse name: Not on file    Number of children: Not on file    Years of education: Not on file    Highest education level: Not on file   Occupational History    Not on file   Social Needs    Financial resource strain: Not on file    Food insecurity     Worry: Not on file     Inability: Not on file    Transportation needs     Medical: Not on file     Non-medical: Not on file   Tobacco Use    Smoking status: Former Smoker     Packs/day: 0.25     Years: 30.00     Pack years: 7.50    Smokeless tobacco: Never Used   Substance and Sexual Activity    Alcohol use: No    Drug use: No    Sexual activity: Not Currently     Partners: Male     Birth control/protection: Surgical   Lifestyle    Physical activity     Days per week: Not on file     Minutes per session: Not on file    Stress: Not on file   Relationships    Social connections     Talks on phone: Not on file     Gets together: Not on file     Attends Ep[employer] service: Not on file     Active member of club or organization: Not on file     Attends meetings of clubs or organizations: Not on file     Relationship status: Not on file   Other Topics Concern    Not on file   Social History Narrative    Not on file       Review of Systems  Review of Systems   Genitourinary: Positive for pelvic pain and vaginal pain.          Objective:   /88   Ht 5' 7" (1.702 m)   Wt 109.1 kg (240 lb 8 oz)   BMI 37.67 kg/m²     Physical Exam   Atypical rosea is much worse about twice the size obvious BV infection  Assessment:     1. Urinary tract infection without hematuria, site unspecified    2. Erosion of implanted vaginal mesh and prosthetic materials, initial encounter            Plan:     1. Urinary tract infection without hematuria, site unspecified    2. Erosion of implanted vaginal mesh and prosthetic materials, initial encounter      After examination had patient presented my " office I used [employer] interactive web site to diagram anatomy from a sagittal section we then talked at great length about the risks the benefits alternatives I made patient very well aware reviewed every aspect of the case patient noted understanding appreciated the time there was many questions all addressed total time of this visit today 30 min greater than 50% of time 20 min in direct discussion my office appropriate consents taken not only from my component but the urologic component was possible stent again intravenous pyelogram patient understanding appreciated it is looking for to the surgery  There are no Patient Instructions on file for this visit.

## 2020-10-22 NOTE — H&P (VIEW-ONLY)
Subjective:      Patient ID: Navin Beck is a 60 y.o. female.    Chief Complaint:  Other (PRE OP )      History of Present Illness  Patient returns stating that she is in much worse condition much more pain much more discharge is requesting assistance to so excited that her surgeries now several days away      Since her initial presentation I was able to researcher case she has a MiniArc in place as well as pros some a device that was placed anterior approach      From previous note  60-year-old multipara history of hysterectomy unclear etiology  Two thousand ten underwent with the the EMR patient cannot will had a lot of insight into but looks like note from colleague anterior colporrhaphy with mesh underlay.  Subsequent to this in 2013 patient underwent robotic round ligament tumor resection with concomitant procedure by colleague from Urology.  Patient is now referred after discovery of mesh erosion at apex patient is was seen earlier this week cultured resulting in mixed a robotic and anaerobic bacteria see below.  Patient has not been compliant with medication additionally patient was put on Estrace but has not yet obtain that.  Patient presents stating that she has significant angle is from this as suffering and has been for quite some time is requesting assistance.  Patient states there is no leakage of urine    Genitourinary: Pelvic exam was performed with patient supine. Skenes normal. Right labia normal. Urethra exhibits no urethral caruncle, no urethral diverticulum, no urethral mass and no hypermobility. There is mesh exposure (At apex at minus 11 cm) in the vagina. No rectocele or unspecified prolapse of vaginal walls in the vagina. Right adnexum displays no mass. Left adnexum displays no mass. Cervix exhibits absence. Uterus is absent.   POP-Q  Aa: -3 Ba:  C: -11   GH:  PB:  TVL: 11   Ap: -3 Bp:  D:           Past Medical History:   Diagnosis Date    Allergy     Arrhythmia     Arthritis      Diabetes mellitus     Glaucoma     Hormone disorder     hysterectomy    Hypertension     Hyperthyroidism     Insomnia     Kidney stones     Thyroid disease     Urine, incontinence, stress female        Past Surgical History:   Procedure Laterality Date    APPENDECTOMY      BACK SURGERY      disc removal     CARPAL TUNNEL RELEASE Bilateral     CHOLECYSTECTOMY      FRACTURE SURGERY Right     wrist    [facility]    JOINT REPLACEMENT      KNEE ARTHROSCOPY W/ DEBRIDEMENT      KNEE SURGERY Right     Knee replacement    LITHOTRIPSY      SD REMOVAL OF OVARY/TUBE(S)  13    benign    removal of round ligament mass      SPINE SURGERY      WRIST FRACTURE SURGERY Left        GYN & OB History  No LMP recorded. Patient has had a hysterectomy.   Date of Last Pap: 2012    OB History    Para Term  AB Living   5 4 3 1 1 3   SAB TAB Ectopic Multiple Live Births   1       4      # Outcome Date GA Lbr Davon/2nd Weight Sex Delivery Anes PTL Lv   5 Term 2000    M Vag-Spont   GUERRERO   4 Term     M Vag-Spont   GUERRERO   3 Term 1980    M Vag-Spont   GUERRERO   2 SAB            1      M    ND       Health Maintenance       Date Due Completion Date    Hepatitis C Screening 1958 ---    Foot Exam 1968 ---    HIV Screening 1973 ---    TETANUS VACCINE 1976 ---    Aspirin/Antiplatelet Therapy 1976 ---    Shingles Vaccine (1 of 2) 2008 ---    Mammogram 2019    Eye Exam 2019    Influenza Vaccine (1) 2020 ---    Hemoglobin A1c 2021    Lipid Panel 2021    Colorectal Cancer Screening 2022          Family History   Problem Relation Age of Onset    Cancer Mother     Cancer Brother     Colon cancer Brother     Cancer Brother     Cancer Brother     Cancer Brother     Ovarian cancer Neg Hx     Uterine cancer Neg Hx     Breast cancer Neg Hx   "      Social History     Socioeconomic History    Marital status:      Spouse name: Not on file    Number of children: Not on file    Years of education: Not on file    Highest education level: Not on file   Occupational History    Not on file   Social Needs    Financial resource strain: Not on file    Food insecurity     Worry: Not on file     Inability: Not on file    Transportation needs     Medical: Not on file     Non-medical: Not on file   Tobacco Use    Smoking status: Former Smoker     Packs/day: 0.25     Years: 30.00     Pack years: 7.50    Smokeless tobacco: Never Used   Substance and Sexual Activity    Alcohol use: No    Drug use: No    Sexual activity: Not Currently     Partners: Male     Birth control/protection: Surgical   Lifestyle    Physical activity     Days per week: Not on file     Minutes per session: Not on file    Stress: Not on file   Relationships    Social connections     Talks on phone: Not on file     Gets together: Not on file     Attends Cheondoism service: Not on file     Active member of club or organization: Not on file     Attends meetings of clubs or organizations: Not on file     Relationship status: Not on file   Other Topics Concern    Not on file   Social History Narrative    Not on file       Review of Systems  Review of Systems   Genitourinary: Positive for pelvic pain and vaginal pain.          Objective:   /88   Ht 5' 7" (1.702 m)   Wt 109.1 kg (240 lb 8 oz)   BMI 37.67 kg/m²     Physical Exam   Atypical rosea is much worse about twice the size obvious BV infection  Assessment:     1. Urinary tract infection without hematuria, site unspecified    2. Erosion of implanted vaginal mesh and prosthetic materials, initial encounter            Plan:     1. Urinary tract infection without hematuria, site unspecified    2. Erosion of implanted vaginal mesh and prosthetic materials, initial encounter      After examination had patient presented my " office I used [employer] interactive web site to diagram anatomy from a sagittal section we then talked at great length about the risks the benefits alternatives I made patient very well aware reviewed every aspect of the case patient noted understanding appreciated the time there was many questions all addressed total time of this visit today 30 min greater than 50% of time 20 min in direct discussion my office appropriate consents taken not only from my component but the urologic component was possible stent again intravenous pyelogram patient understanding appreciated it is looking for to the surgery  There are no Patient Instructions on file for this visit.

## 2020-10-23 ENCOUNTER — ANESTHESIA EVENT (OUTPATIENT)
Dept: SURGERY | Facility: OTHER | Age: 60
End: 2020-10-23
Payer: MEDICARE

## 2020-10-23 ENCOUNTER — HOSPITAL ENCOUNTER (OUTPATIENT)
Dept: PREADMISSION TESTING | Facility: OTHER | Age: 60
Discharge: HOME | End: 2020-10-23
Attending: OBSTETRICS & GYNECOLOGY
Payer: MEDICARE

## 2020-10-23 ENCOUNTER — TELEPHONE (OUTPATIENT)
Dept: UROLOGY | Facility: CLINIC | Age: 60
End: 2020-10-23

## 2020-10-23 ENCOUNTER — TELEPHONE (OUTPATIENT)
Dept: UROGYNECOLOGY | Facility: CLINIC | Age: 60
End: 2020-10-23

## 2020-10-23 VITALS
TEMPERATURE: 97 F | WEIGHT: 240.5 LBS | SYSTOLIC BLOOD PRESSURE: 141 MMHG | OXYGEN SATURATION: 97 % | BODY MASS INDEX: 36.45 KG/M2 | HEIGHT: 68 IN | HEART RATE: 99 BPM | DIASTOLIC BLOOD PRESSURE: 89 MMHG

## 2020-10-23 DIAGNOSIS — Z01.818 PRE-OP TESTING: ICD-10-CM

## 2020-10-23 DIAGNOSIS — Z01.818 PREOP TESTING: Primary | ICD-10-CM

## 2020-10-23 LAB
ABO + RH BLD: NORMAL
BACTERIA UR CULT: NO GROWTH
BLD GP AB SCN CELLS X3 SERPL QL: NORMAL

## 2020-10-23 PROCEDURE — 86850 RBC ANTIBODY SCREEN: CPT | Mod: HCWC

## 2020-10-23 PROCEDURE — 36415 COLL VENOUS BLD VENIPUNCTURE: CPT | Mod: HCWC

## 2020-10-23 PROCEDURE — U0003 INFECTIOUS AGENT DETECTION BY NUCLEIC ACID (DNA OR RNA); SEVERE ACUTE RESPIRATORY SYNDROME CORONAVIRUS 2 (SARS-COV-2) (CORONAVIRUS DISEASE [COVID-19]), AMPLIFIED PROBE TECHNIQUE, MAKING USE OF HIGH THROUGHPUT TECHNOLOGIES AS DESCRIBED BY CMS-2020-01-R: HCPCS | Mod: HCWC

## 2020-10-23 RX ORDER — CELECOXIB 200 MG/1
400 CAPSULE ORAL
Status: CANCELLED | OUTPATIENT
Start: 2020-10-23 | End: 2020-10-23

## 2020-10-23 RX ORDER — LIDOCAINE HYDROCHLORIDE 10 MG/ML
0.5 INJECTION, SOLUTION EPIDURAL; INFILTRATION; INTRACAUDAL; PERINEURAL ONCE
Status: CANCELLED | OUTPATIENT
Start: 2020-10-23 | End: 2020-10-23

## 2020-10-23 RX ORDER — ACETAMINOPHEN 500 MG
1000 TABLET ORAL
Status: CANCELLED | OUTPATIENT
Start: 2020-10-23 | End: 2020-10-23

## 2020-10-23 RX ORDER — PREGABALIN 50 MG/1
50 CAPSULE ORAL
Status: CANCELLED | OUTPATIENT
Start: 2020-10-23 | End: 2020-10-23

## 2020-10-23 RX ORDER — SODIUM CHLORIDE, SODIUM LACTATE, POTASSIUM CHLORIDE, CALCIUM CHLORIDE 600; 310; 30; 20 MG/100ML; MG/100ML; MG/100ML; MG/100ML
INJECTION, SOLUTION INTRAVENOUS CONTINUOUS
Status: CANCELLED | OUTPATIENT
Start: 2020-10-23

## 2020-10-23 RX ORDER — ALBUTEROL SULFATE 2.5 MG/.5ML
2.5 SOLUTION RESPIRATORY (INHALATION)
Status: CANCELLED | OUTPATIENT
Start: 2020-10-23 | End: 2020-10-23

## 2020-10-23 NOTE — ANESTHESIA PREPROCEDURE EVALUATION
10/23/2020  Navin Beck is a 60 y.o., female.    Anesthesia Evaluation    I have reviewed the Patient Summary Reports.    I have reviewed the Nursing Notes. I have reviewed the NPO Status.   I have reviewed the Medications.     Review of Systems  Anesthesia Hx:  No problems with previous Anesthesia  History of prior surgery of interest to airway management or planning: Previous anesthesia: General Knee surg 3 yrs ago with general anesthesia.  Denies Family Hx of Anesthesia complications.   Denies Personal Hx of Anesthesia complications.   Social:  Non-Smoker    Hematology/Oncology:  Hematology Normal   Oncology Normal     EENT/Dental:EENT/Dental Normal   Cardiovascular:   Hypertension, well controlled hyperlipidemia    Pulmonary:   Asthma mild    Renal/:   renal calculi    Hepatic/GI:   PUD, GERD, well controlled    Musculoskeletal:   Arthritis     Neurological:  Neurology Normal    Endocrine:   Diabetes, using insulin Hyperthyroidism    Dermatological:  Skin Normal    Psych:  Psychiatric Normal           Physical Exam  General:  Obesity    Airway/Jaw/Neck:  Airway Findings: Mouth Opening: Normal Tongue: Normal  General Airway Assessment: Adult  Mallampati: II      Dental:  Dental Findings: Upper Dentures        Mental Status:  Mental Status Findings:  Cooperative, Alert and Oriented         Anesthesia Plan  Type of Anesthesia, risks & benefits discussed:  Anesthesia Type:  general  Patient's Preference:   Intra-op Monitoring Plan: standard ASA monitors  Intra-op Monitoring Plan Comments:   Post Op Pain Control Plan: per primary service following discharge from PACU and multimodal analgesia  Post Op Pain Control Plan Comments:   Induction:   IV  Beta Blocker:         Informed Consent: Patient understands risks and agrees with Anesthesia plan.  Questions answered. Anesthesia consent signed with  patient.  ASA Score: 3     Day of Surgery Review of History & Physical:    H&P update referred to the surgeon.     Anesthesia Plan Notes: Labs and EKG in epic        Ready For Surgery From Anesthesia Perspective.

## 2020-10-23 NOTE — DISCHARGE INSTRUCTIONS
Information to Prepare you for your Surgery    PRE-ADMIT TESTING -  [phone]    [address]  [place] [facility]          Your surgery has been scheduled at [facility]. We are pleased to have the opportunity to serve you. For Further Information please call [phone].    On the day of surgery please report to the Information Desk on the 1st floor.    · CONTACT YOUR PHYSICIAN'S OFFICE THE DAY PRIOR TO YOUR SURGERY TO OBTAIN YOUR ARRIVAL TIME.     · The evening before surgery do not eat anything after 9 p.m. ( this includes hard candy, chewing gum and mints).  You may only have GATORADE, POWERADE AND WATER  from 9 p.m. until you leave your home.   DO NOT DRINK ANY LIQUIDS ON THE WAY TO THE HOSPITAL.      SPECIAL MEDICATION INSTRUCTIONS: TAKE medications checked off by the Anesthesiologist on your Medication List.    Angiogram Patients: Take medications as instructed by your physician, including aspirin.     Surgery Patients:    If you take ASPIRIN - Your PHYSICIAN/SURGEON will need to inform you IF/OR when you need to stop taking aspirin prior to your surgery.     Do Not take any medications containing IBUPROFEN.  Do Not Wear any make-up or dark nail polish   (especially eye make-up) to surgery. If you come to surgery with makeup on you will be required to remove the makeup or nail polish.    Do not shave your surgical area at least 5 days prior to your surgery. The surgical prep will be performed at the hospital according to Infection Control regulations.    Leave all valuables at home.   Do Not wear any jewelry or watches, including any metal in body piercings. Jewelry must be removed prior to coming to the hospital.  There is a possibility that rings that are unable to be removed may be cut off if they are on the surgical extremity.    Contact Lens must be removed before surgery. Either do not wear the contact lens or bring a case and solution for  storage.  Please bring a container for eyeglasses or dentures as required.  Bring any paperwork your physician has provided, such as consent forms,  history and physicals, doctor's orders, etc.   Bring comfortable clothes that are loose fitting to wear upon discharge. Take into consideration the type of surgery being performed.  Maintain your diet as advised per your physician the day prior to surgery.      Adequate rest the night before surgery is advised.   Park in the Parking lot behind the hospital or in the [facility] across the street from the parking lot. Parking is complimentary.  If you will be discharged the same day as your procedure, please arrange for a responsible adult to drive you home or to accompany you if traveling by taxi.   YOU WILL NOT BE PERMITTED TO DRIVE OR TO LEAVE THE HOSPITAL ALONE AFTER SURGERY.   If you are being discharged the same day, it is strongly recommended that you arrange for someone to remain with you for the first 24 hrs following your surgery.    The Surgeon will speak to your family/visitor after your surgery regarding the outcome of your surgery and post op care.  The Surgeon may speak to you after your surgery, but there is a possibility you may not remember the details.  Please check with your family members regarding the conversation with the Surgeon.    We strongly recommend whoever is bringing you home be present for discharge instructions.  This will ensure a thorough understanding for your post op home care.    ALL CHILDREN MUST ALWAYS BE ACCOMPANIED BY AN ADULT.    Visitors-Refer to current Visitor policy handouts.    Thank you for your cooperation.  The Staff of [facility].                Bathing Instructions with Hibiclens     Shower the evening before and morning of your procedure with Hibiclens:   Wash your face with water and your regular face wash/soap   Apply Hibiclens directly on your skin or on a wet washcloth and wash  gently. When showering: Move away from the shower stream when applying Hibiclens to avoid rinsing off too soon.   Rinse thoroughly with warm water   Do not dilute Hibiclens         Dry off as usual, do not use any deodorant, powder, body lotions, perfume, after shave or cologne.

## 2020-10-24 LAB — SARS-COV-2 RNA RESP QL NAA+PROBE: NOT DETECTED

## 2020-10-26 ENCOUNTER — HOSPITAL ENCOUNTER (OUTPATIENT)
Facility: OTHER | Age: 60
Discharge: HOME | End: 2020-10-28
Attending: OBSTETRICS & GYNECOLOGY | Admitting: OBSTETRICS & GYNECOLOGY
Payer: MEDICARE

## 2020-10-26 ENCOUNTER — ANESTHESIA (OUTPATIENT)
Dept: SURGERY | Facility: OTHER | Age: 60
End: 2020-10-26
Payer: MEDICARE

## 2020-10-26 DIAGNOSIS — E11.69 HYPERLIPIDEMIA ASSOCIATED WITH TYPE 2 DIABETES MELLITUS: ICD-10-CM

## 2020-10-26 DIAGNOSIS — T83.711A EROSION OF IMPLANTED VAGINAL MESH AND PROSTHETIC MATERIALS, INITIAL ENCOUNTER: ICD-10-CM

## 2020-10-26 DIAGNOSIS — T83.718A EROSION OF BLADDER SUSPENSION MESH, INITIAL ENCOUNTER: Primary | ICD-10-CM

## 2020-10-26 DIAGNOSIS — Z79.4 TYPE 2 DIABETES MELLITUS WITH HYPERGLYCEMIA, WITH LONG-TERM CURRENT USE OF INSULIN: ICD-10-CM

## 2020-10-26 DIAGNOSIS — E10.649 UNCONTROLLED TYPE 1 DIABETES MELLITUS WITH HYPOGLYCEMIA WITHOUT COMA: ICD-10-CM

## 2020-10-26 DIAGNOSIS — E78.5 HYPERLIPIDEMIA ASSOCIATED WITH TYPE 2 DIABETES MELLITUS: ICD-10-CM

## 2020-10-26 DIAGNOSIS — I10 ESSENTIAL HYPERTENSION: ICD-10-CM

## 2020-10-26 DIAGNOSIS — T83.711D EROSION OF IMPLANTED VAGINAL MESH AND PROSTHETIC MATERIALS, SUBSEQUENT ENCOUNTER: ICD-10-CM

## 2020-10-26 DIAGNOSIS — E11.65 TYPE 2 DIABETES MELLITUS WITH HYPERGLYCEMIA, WITH LONG-TERM CURRENT USE OF INSULIN: ICD-10-CM

## 2020-10-26 PROBLEM — E05.90 HYPERTHYROIDISM: Status: RESOLVED | Noted: 2017-08-07 | Resolved: 2020-10-26

## 2020-10-26 PROBLEM — E11.10 DIABETIC KETOACIDOSIS WITHOUT COMA ASSOCIATED WITH TYPE 2 DIABETES MELLITUS: Status: RESOLVED | Noted: 2017-08-07 | Resolved: 2020-10-26

## 2020-10-26 LAB
ALBUMIN SERPL BCP-MCNC: 4.1 G/DL (ref 3.5–5.2)
ANION GAP SERPL CALC-SCNC: 12 MMOL/L (ref 8–16)
BUN SERPL-MCNC: 11 MG/DL (ref 8–23)
CALCIUM SERPL-MCNC: 8.5 MG/DL (ref 8.7–10.5)
CHLORIDE SERPL-SCNC: 107 MMOL/L (ref 95–110)
CO2 SERPL-SCNC: 19 MMOL/L (ref 23–29)
CREAT SERPL-MCNC: 1.2 MG/DL (ref 0.5–1.4)
EST. GFR  (AFRICAN AMERICAN): 56 ML/MIN/1.73 M^2
EST. GFR  (NON AFRICAN AMERICAN): 49 ML/MIN/1.73 M^2
GLUCOSE SERPL-MCNC: 432 MG/DL (ref 70–110)
PHOSPHATE SERPL-MCNC: 4.1 MG/DL (ref 2.7–4.5)
POCT GLUCOSE: 180 MG/DL (ref 70–110)
POCT GLUCOSE: 354 MG/DL (ref 70–110)
POCT GLUCOSE: 368 MG/DL (ref 70–110)
POCT GLUCOSE: 400 MG/DL (ref 70–110)
POCT GLUCOSE: 411 MG/DL (ref 70–110)
POCT GLUCOSE: 423 MG/DL (ref 70–110)
POTASSIUM SERPL-SCNC: 4.1 MMOL/L (ref 3.5–5.1)
SODIUM SERPL-SCNC: 138 MMOL/L (ref 136–145)

## 2020-10-26 PROCEDURE — 25000003 PHARM REV CODE 250: Mod: HCWC | Performed by: OBSTETRICS & GYNECOLOGY

## 2020-10-26 PROCEDURE — 94761 N-INVAS EAR/PLS OXIMETRY MLT: CPT | Mod: HCWC

## 2020-10-26 PROCEDURE — 63600175 PHARM REV CODE 636 W HCPCS: Mod: HCWC | Performed by: ANESTHESIOLOGY

## 2020-10-26 PROCEDURE — 25000242 PHARM REV CODE 250 ALT 637 W/ HCPCS: Mod: HCWC | Performed by: ANESTHESIOLOGY

## 2020-10-26 PROCEDURE — 37000009 HC ANESTHESIA EA ADD 15 MINS: Mod: HCWC | Performed by: OBSTETRICS & GYNECOLOGY

## 2020-10-26 PROCEDURE — 27201423 OPTIME MED/SURG SUP & DEVICES STERILE SUPPLY: Mod: HCWC | Performed by: OBSTETRICS & GYNECOLOGY

## 2020-10-26 PROCEDURE — 25000003 PHARM REV CODE 250: Mod: HCWC | Performed by: REGISTERED NURSE

## 2020-10-26 PROCEDURE — 71000033 HC RECOVERY, INTIAL HOUR: Mod: HCWC | Performed by: OBSTETRICS & GYNECOLOGY

## 2020-10-26 PROCEDURE — 99900035 HC TECH TIME PER 15 MIN (STAT): Mod: HCWC

## 2020-10-26 PROCEDURE — 99219 PR INITIAL OBSERVATION CARE,LEVL II: CPT | Mod: HCWC,,, | Performed by: INTERNAL MEDICINE

## 2020-10-26 PROCEDURE — 63600175 PHARM REV CODE 636 W HCPCS: Mod: HCWC | Performed by: REGISTERED NURSE

## 2020-10-26 PROCEDURE — 25000003 PHARM REV CODE 250: Mod: HCWC | Performed by: ANESTHESIOLOGY

## 2020-10-26 PROCEDURE — 57426 PR REVISION PROSTHETIC VAGINAL GRAFT LAPAROSCOPIC: ICD-10-PCS | Mod: 62,HCWC,, | Performed by: UROLOGY

## 2020-10-26 PROCEDURE — G0378 HOSPITAL OBSERVATION PER HR: HCPCS | Mod: HCWC

## 2020-10-26 PROCEDURE — 96372 THER/PROPH/DIAG INJ SC/IM: CPT | Mod: 59

## 2020-10-26 PROCEDURE — 63600175 PHARM REV CODE 636 W HCPCS: Mod: HCWC | Performed by: OBSTETRICS & GYNECOLOGY

## 2020-10-26 PROCEDURE — 88300 PR  SURG PATH,GROSS,LEVEL I: ICD-10-PCS | Mod: 26,HCWC,, | Performed by: PATHOLOGY

## 2020-10-26 PROCEDURE — 57426 REVISE PROSTH VAG GRAFT LAP: CPT | Mod: HCWC,62,, | Performed by: OBSTETRICS & GYNECOLOGY

## 2020-10-26 PROCEDURE — 88300 SURGICAL PATH GROSS: CPT | Mod: 26,HCWC,, | Performed by: PATHOLOGY

## 2020-10-26 PROCEDURE — P9045 ALBUMIN (HUMAN), 5%, 250 ML: HCPCS | Mod: JG,HCWC | Performed by: REGISTERED NURSE

## 2020-10-26 PROCEDURE — 36000711: Mod: HCWC | Performed by: OBSTETRICS & GYNECOLOGY

## 2020-10-26 PROCEDURE — 25000003 PHARM REV CODE 250: Mod: HCWC | Performed by: INTERNAL MEDICINE

## 2020-10-26 PROCEDURE — 36000710: Mod: HCWC | Performed by: OBSTETRICS & GYNECOLOGY

## 2020-10-26 PROCEDURE — 80069 RENAL FUNCTION PANEL: CPT | Mod: HCWC

## 2020-10-26 PROCEDURE — 94640 AIRWAY INHALATION TREATMENT: CPT | Mod: HCWC

## 2020-10-26 PROCEDURE — 99219 PR INITIAL OBSERVATION CARE,LEVL II: ICD-10-PCS | Mod: HCWC,,, | Performed by: INTERNAL MEDICINE

## 2020-10-26 PROCEDURE — 27000221 HC OXYGEN, UP TO 24 HOURS: Mod: HCWC

## 2020-10-26 PROCEDURE — C1769 GUIDE WIRE: HCPCS | Mod: HCWC | Performed by: OBSTETRICS & GYNECOLOGY

## 2020-10-26 PROCEDURE — 63600175 PHARM REV CODE 636 W HCPCS: Mod: HCWC | Performed by: INTERNAL MEDICINE

## 2020-10-26 PROCEDURE — C9399 UNCLASSIFIED DRUGS OR BIOLOG: HCPCS | Mod: HCWC | Performed by: INTERNAL MEDICINE

## 2020-10-26 PROCEDURE — 88300 SURGICAL PATH GROSS: CPT | Mod: HCWC | Performed by: PATHOLOGY

## 2020-10-26 PROCEDURE — 36415 COLL VENOUS BLD VENIPUNCTURE: CPT | Mod: HCWC

## 2020-10-26 PROCEDURE — 25500020 PHARM REV CODE 255: Mod: HCWC | Performed by: OBSTETRICS & GYNECOLOGY

## 2020-10-26 PROCEDURE — 37000008 HC ANESTHESIA 1ST 15 MINUTES: Mod: HCWC | Performed by: OBSTETRICS & GYNECOLOGY

## 2020-10-26 PROCEDURE — 57426 PR REVISION PROSTHETIC VAGINAL GRAFT LAPAROSCOPIC: ICD-10-PCS | Mod: HCWC,62,, | Performed by: OBSTETRICS & GYNECOLOGY

## 2020-10-26 PROCEDURE — 82962 GLUCOSE BLOOD TEST: CPT | Mod: HCWC | Performed by: OBSTETRICS & GYNECOLOGY

## 2020-10-26 PROCEDURE — 71000039 HC RECOVERY, EACH ADD'L HOUR: Mod: HCWC | Performed by: OBSTETRICS & GYNECOLOGY

## 2020-10-26 PROCEDURE — 57426 REVISE PROSTH VAG GRAFT LAP: CPT | Mod: 62,HCWC,, | Performed by: UROLOGY

## 2020-10-26 RX ORDER — KETOROLAC TROMETHAMINE 30 MG/ML
30 INJECTION, SOLUTION INTRAMUSCULAR; INTRAVENOUS EVERY 6 HOURS
Status: COMPLETED | OUTPATIENT
Start: 2020-10-26 | End: 2020-10-27

## 2020-10-26 RX ORDER — VASOPRESSIN 20 [USP'U]/ML
INJECTION, SOLUTION INTRAMUSCULAR; SUBCUTANEOUS
Status: DISCONTINUED | OUTPATIENT
Start: 2020-10-26 | End: 2020-10-26

## 2020-10-26 RX ORDER — GLUCAGON 1 MG
1 KIT INJECTION
Status: DISCONTINUED | OUTPATIENT
Start: 2020-10-26 | End: 2020-10-26

## 2020-10-26 RX ORDER — ALBUMIN HUMAN 50 G/1000ML
SOLUTION INTRAVENOUS CONTINUOUS PRN
Status: DISCONTINUED | OUTPATIENT
Start: 2020-10-26 | End: 2020-10-26

## 2020-10-26 RX ORDER — ONDANSETRON 2 MG/ML
INJECTION INTRAMUSCULAR; INTRAVENOUS
Status: DISCONTINUED | OUTPATIENT
Start: 2020-10-26 | End: 2020-10-26

## 2020-10-26 RX ORDER — OXYCODONE AND ACETAMINOPHEN 5; 325 MG/1; MG/1
1 TABLET ORAL EVERY 4 HOURS PRN
Status: DISCONTINUED | OUTPATIENT
Start: 2020-10-26 | End: 2020-10-27

## 2020-10-26 RX ORDER — ONDANSETRON 2 MG/ML
4 INJECTION INTRAMUSCULAR; INTRAVENOUS DAILY PRN
Status: DISCONTINUED | OUTPATIENT
Start: 2020-10-26 | End: 2020-10-26 | Stop reason: HOSPADM

## 2020-10-26 RX ORDER — OXYCODONE AND ACETAMINOPHEN 10; 325 MG/1; MG/1
1 TABLET ORAL EVERY 4 HOURS PRN
Status: DISCONTINUED | OUTPATIENT
Start: 2020-10-26 | End: 2020-10-27

## 2020-10-26 RX ORDER — CELECOXIB 200 MG/1
400 CAPSULE ORAL
Status: COMPLETED | OUTPATIENT
Start: 2020-10-26 | End: 2020-10-26

## 2020-10-26 RX ORDER — INSULIN ASPART 100 [IU]/ML
1-10 INJECTION, SOLUTION INTRAVENOUS; SUBCUTANEOUS
Status: DISCONTINUED | OUTPATIENT
Start: 2020-10-26 | End: 2020-10-28 | Stop reason: HOSPADM

## 2020-10-26 RX ORDER — PROPOFOL 10 MG/ML
INJECTION, EMULSION INTRAVENOUS
Status: DISCONTINUED | OUTPATIENT
Start: 2020-10-26 | End: 2020-10-26

## 2020-10-26 RX ORDER — ROCURONIUM BROMIDE 10 MG/ML
INJECTION, SOLUTION INTRAVENOUS
Status: DISCONTINUED | OUTPATIENT
Start: 2020-10-26 | End: 2020-10-26

## 2020-10-26 RX ORDER — LIDOCAINE HYDROCHLORIDE AND EPINEPHRINE 10; 10 MG/ML; UG/ML
INJECTION, SOLUTION INFILTRATION; PERINEURAL
Status: DISCONTINUED | OUTPATIENT
Start: 2020-10-26 | End: 2020-10-26 | Stop reason: HOSPADM

## 2020-10-26 RX ORDER — POLYETHYLENE GLYCOL 3350 17 G/17G
17 POWDER, FOR SOLUTION ORAL DAILY
Status: DISCONTINUED | OUTPATIENT
Start: 2020-10-26 | End: 2020-10-28 | Stop reason: HOSPADM

## 2020-10-26 RX ORDER — LIDOCAINE HYDROCHLORIDE 10 MG/ML
0.5 INJECTION, SOLUTION EPIDURAL; INFILTRATION; INTRACAUDAL; PERINEURAL ONCE
Status: DISCONTINUED | OUTPATIENT
Start: 2020-10-26 | End: 2020-10-26 | Stop reason: HOSPADM

## 2020-10-26 RX ORDER — IBUPROFEN 200 MG
16 TABLET ORAL
Status: DISCONTINUED | OUTPATIENT
Start: 2020-10-26 | End: 2020-10-26

## 2020-10-26 RX ORDER — HYDROMORPHONE HYDROCHLORIDE 2 MG/ML
0.4 INJECTION, SOLUTION INTRAMUSCULAR; INTRAVENOUS; SUBCUTANEOUS EVERY 5 MIN PRN
Status: DISCONTINUED | OUTPATIENT
Start: 2020-10-26 | End: 2020-10-26 | Stop reason: HOSPADM

## 2020-10-26 RX ORDER — IBUPROFEN 200 MG
24 TABLET ORAL
Status: DISCONTINUED | OUTPATIENT
Start: 2020-10-26 | End: 2020-10-26

## 2020-10-26 RX ORDER — BENAZEPRIL HYDROCHLORIDE 10 MG/1
10 TABLET ORAL DAILY
Status: DISCONTINUED | OUTPATIENT
Start: 2020-10-27 | End: 2020-10-28 | Stop reason: HOSPADM

## 2020-10-26 RX ORDER — CLINDAMYCIN PHOSPHATE 900 MG/50ML
900 INJECTION, SOLUTION INTRAVENOUS
Status: COMPLETED | OUTPATIENT
Start: 2020-10-26 | End: 2020-10-26

## 2020-10-26 RX ORDER — FAMOTIDINE 10 MG/ML
20 INJECTION INTRAVENOUS EVERY 12 HOURS
Status: DISCONTINUED | OUTPATIENT
Start: 2020-10-26 | End: 2020-10-28 | Stop reason: HOSPADM

## 2020-10-26 RX ORDER — BISACODYL 10 MG
10 SUPPOSITORY, RECTAL RECTAL DAILY PRN
Status: DISCONTINUED | OUTPATIENT
Start: 2020-10-26 | End: 2020-10-28 | Stop reason: HOSPADM

## 2020-10-26 RX ORDER — GLUCAGON 1 MG
1 KIT INJECTION
Status: DISCONTINUED | OUTPATIENT
Start: 2020-10-26 | End: 2020-10-28 | Stop reason: HOSPADM

## 2020-10-26 RX ORDER — ALBUTEROL SULFATE 2.5 MG/.5ML
2.5 SOLUTION RESPIRATORY (INHALATION)
Status: COMPLETED | OUTPATIENT
Start: 2020-10-26 | End: 2020-10-26

## 2020-10-26 RX ORDER — DEXAMETHASONE SODIUM PHOSPHATE 4 MG/ML
INJECTION, SOLUTION INTRA-ARTICULAR; INTRALESIONAL; INTRAMUSCULAR; INTRAVENOUS; SOFT TISSUE
Status: DISCONTINUED | OUTPATIENT
Start: 2020-10-26 | End: 2020-10-26

## 2020-10-26 RX ORDER — KETAMINE HCL IN 0.9 % NACL 50 MG/5 ML
SYRINGE (ML) INTRAVENOUS
Status: DISCONTINUED | OUTPATIENT
Start: 2020-10-26 | End: 2020-10-26

## 2020-10-26 RX ORDER — ACETAMINOPHEN 500 MG
1000 TABLET ORAL
Status: COMPLETED | OUTPATIENT
Start: 2020-10-26 | End: 2020-10-26

## 2020-10-26 RX ORDER — ROSUVASTATIN CALCIUM 10 MG/1
20 TABLET, COATED ORAL NIGHTLY
Status: DISCONTINUED | OUTPATIENT
Start: 2020-10-26 | End: 2020-10-28 | Stop reason: HOSPADM

## 2020-10-26 RX ORDER — SODIUM CHLORIDE, SODIUM LACTATE, POTASSIUM CHLORIDE, CALCIUM CHLORIDE 600; 310; 30; 20 MG/100ML; MG/100ML; MG/100ML; MG/100ML
INJECTION, SOLUTION INTRAVENOUS CONTINUOUS
Status: DISCONTINUED | OUTPATIENT
Start: 2020-10-26 | End: 2020-10-26

## 2020-10-26 RX ORDER — PREGABALIN 50 MG/1
50 CAPSULE ORAL
Status: COMPLETED | OUTPATIENT
Start: 2020-10-26 | End: 2020-10-26

## 2020-10-26 RX ORDER — DIPHENHYDRAMINE HYDROCHLORIDE 50 MG/ML
25 INJECTION INTRAMUSCULAR; INTRAVENOUS EVERY 4 HOURS PRN
Status: DISCONTINUED | OUTPATIENT
Start: 2020-10-26 | End: 2020-10-28 | Stop reason: HOSPADM

## 2020-10-26 RX ORDER — DIPHENHYDRAMINE HCL 25 MG
25 CAPSULE ORAL EVERY 4 HOURS PRN
Status: DISCONTINUED | OUTPATIENT
Start: 2020-10-26 | End: 2020-10-28 | Stop reason: HOSPADM

## 2020-10-26 RX ORDER — SODIUM CHLORIDE, SODIUM LACTATE, POTASSIUM CHLORIDE, CALCIUM CHLORIDE 600; 310; 30; 20 MG/100ML; MG/100ML; MG/100ML; MG/100ML
INJECTION, SOLUTION INTRAVENOUS CONTINUOUS
Status: DISCONTINUED | OUTPATIENT
Start: 2020-10-26 | End: 2020-10-27

## 2020-10-26 RX ORDER — IBUPROFEN 200 MG
16 TABLET ORAL
Status: DISCONTINUED | OUTPATIENT
Start: 2020-10-26 | End: 2020-10-28 | Stop reason: HOSPADM

## 2020-10-26 RX ORDER — POLYETHYLENE GLYCOL 3350 17 G/17G
17 POWDER, FOR SOLUTION ORAL DAILY
Status: DISCONTINUED | OUTPATIENT
Start: 2020-10-26 | End: 2020-10-26

## 2020-10-26 RX ORDER — MUPIROCIN 20 MG/G
1 OINTMENT TOPICAL 2 TIMES DAILY
Status: DISCONTINUED | OUTPATIENT
Start: 2020-10-26 | End: 2020-10-28 | Stop reason: HOSPADM

## 2020-10-26 RX ORDER — EPHEDRINE SULFATE 50 MG/ML
INJECTION, SOLUTION INTRAVENOUS
Status: DISCONTINUED | OUTPATIENT
Start: 2020-10-26 | End: 2020-10-26

## 2020-10-26 RX ORDER — METHIMAZOLE 5 MG/1
10 TABLET ORAL DAILY
Status: DISCONTINUED | OUTPATIENT
Start: 2020-10-27 | End: 2020-10-26

## 2020-10-26 RX ORDER — IBUPROFEN 600 MG/1
600 TABLET ORAL EVERY 6 HOURS
Status: DISCONTINUED | OUTPATIENT
Start: 2020-10-27 | End: 2020-10-28 | Stop reason: HOSPADM

## 2020-10-26 RX ORDER — LIDOCAINE HCL/PF 100 MG/5ML
SYRINGE (ML) INTRAVENOUS
Status: DISCONTINUED | OUTPATIENT
Start: 2020-10-26 | End: 2020-10-26

## 2020-10-26 RX ORDER — HEPARIN SODIUM 5000 [USP'U]/ML
5000 INJECTION, SOLUTION INTRAVENOUS; SUBCUTANEOUS EVERY 8 HOURS
Status: DISCONTINUED | OUTPATIENT
Start: 2020-10-26 | End: 2020-10-26 | Stop reason: HOSPADM

## 2020-10-26 RX ORDER — ONDANSETRON 8 MG/1
8 TABLET, ORALLY DISINTEGRATING ORAL EVERY 8 HOURS PRN
Status: DISCONTINUED | OUTPATIENT
Start: 2020-10-26 | End: 2020-10-28 | Stop reason: HOSPADM

## 2020-10-26 RX ORDER — OXYCODONE HYDROCHLORIDE 5 MG/1
5 TABLET ORAL
Status: DISCONTINUED | OUTPATIENT
Start: 2020-10-26 | End: 2020-10-26 | Stop reason: HOSPADM

## 2020-10-26 RX ORDER — IBUPROFEN 200 MG
24 TABLET ORAL
Status: DISCONTINUED | OUTPATIENT
Start: 2020-10-26 | End: 2020-10-28 | Stop reason: HOSPADM

## 2020-10-26 RX ORDER — SODIUM CHLORIDE 0.9 % (FLUSH) 0.9 %
3 SYRINGE (ML) INJECTION
Status: DISCONTINUED | OUTPATIENT
Start: 2020-10-26 | End: 2020-10-28 | Stop reason: HOSPADM

## 2020-10-26 RX ORDER — INSULIN ASPART 100 [IU]/ML
10 INJECTION, SOLUTION INTRAVENOUS; SUBCUTANEOUS
Status: DISCONTINUED | OUTPATIENT
Start: 2020-10-26 | End: 2020-10-27

## 2020-10-26 RX ORDER — FENTANYL CITRATE 50 UG/ML
INJECTION, SOLUTION INTRAMUSCULAR; INTRAVENOUS
Status: DISCONTINUED | OUTPATIENT
Start: 2020-10-26 | End: 2020-10-26

## 2020-10-26 RX ORDER — PHENYLEPHRINE HYDROCHLORIDE 10 MG/ML
INJECTION INTRAVENOUS
Status: DISCONTINUED | OUTPATIENT
Start: 2020-10-26 | End: 2020-10-26

## 2020-10-26 RX ORDER — MEPERIDINE HYDROCHLORIDE 25 MG/ML
12.5 INJECTION INTRAMUSCULAR; INTRAVENOUS; SUBCUTANEOUS ONCE AS NEEDED
Status: DISCONTINUED | OUTPATIENT
Start: 2020-10-26 | End: 2020-10-26 | Stop reason: HOSPADM

## 2020-10-26 RX ORDER — HYDROMORPHONE HYDROCHLORIDE 1 MG/ML
1 INJECTION, SOLUTION INTRAMUSCULAR; INTRAVENOUS; SUBCUTANEOUS EVERY 4 HOURS PRN
Status: DISCONTINUED | OUTPATIENT
Start: 2020-10-26 | End: 2020-10-28 | Stop reason: HOSPADM

## 2020-10-26 RX ORDER — NEOSTIGMINE METHYLSULFATE 1 MG/ML
INJECTION, SOLUTION INTRAVENOUS
Status: DISCONTINUED | OUTPATIENT
Start: 2020-10-26 | End: 2020-10-26

## 2020-10-26 RX ADMIN — FENTANYL CITRATE 50 MCG: 50 INJECTION, SOLUTION INTRAMUSCULAR; INTRAVENOUS at 06:10

## 2020-10-26 RX ADMIN — ROCURONIUM BROMIDE 15 MG: 10 SOLUTION INTRAVENOUS at 09:10

## 2020-10-26 RX ADMIN — HYDROMORPHONE HYDROCHLORIDE 0.4 MG: 2 INJECTION INTRAMUSCULAR; INTRAVENOUS; SUBCUTANEOUS at 11:10

## 2020-10-26 RX ADMIN — INSULIN ASPART 10 UNITS: 100 INJECTION, SOLUTION INTRAVENOUS; SUBCUTANEOUS at 01:10

## 2020-10-26 RX ADMIN — ROSUVASTATIN CALCIUM 20 MG: 10 TABLET, FILM COATED ORAL at 08:10

## 2020-10-26 RX ADMIN — ONDANSETRON HYDROCHLORIDE 4 MG: 2 INJECTION INTRAMUSCULAR; INTRAVENOUS at 09:10

## 2020-10-26 RX ADMIN — PHENYLEPHRINE HYDROCHLORIDE 200 MCG: 10 INJECTION INTRAVENOUS at 07:10

## 2020-10-26 RX ADMIN — ROCURONIUM BROMIDE 15 MG: 10 SOLUTION INTRAVENOUS at 07:10

## 2020-10-26 RX ADMIN — GENTAMICIN SULFATE 122 MG: 40 INJECTION, SOLUTION INTRAMUSCULAR; INTRAVENOUS at 07:10

## 2020-10-26 RX ADMIN — KETOROLAC TROMETHAMINE 30 MG: 30 INJECTION, SOLUTION INTRAMUSCULAR; INTRAVENOUS at 06:10

## 2020-10-26 RX ADMIN — ALBUMIN (HUMAN): 2.5 SOLUTION INTRAVENOUS at 07:10

## 2020-10-26 RX ADMIN — Medication 25 MG: at 07:10

## 2020-10-26 RX ADMIN — HEPARIN SODIUM 5000 UNITS: 5000 INJECTION INTRAVENOUS; SUBCUTANEOUS at 05:10

## 2020-10-26 RX ADMIN — ONDANSETRON 8 MG: 8 TABLET, ORALLY DISINTEGRATING ORAL at 08:10

## 2020-10-26 RX ADMIN — SODIUM CHLORIDE, SODIUM LACTATE, POTASSIUM CHLORIDE, AND CALCIUM CHLORIDE: .6; .31; .03; .02 INJECTION, SOLUTION INTRAVENOUS at 01:10

## 2020-10-26 RX ADMIN — OXYCODONE HYDROCHLORIDE AND ACETAMINOPHEN 1 TABLET: 10; 325 TABLET ORAL at 08:10

## 2020-10-26 RX ADMIN — SODIUM CHLORIDE, SODIUM LACTATE, POTASSIUM CHLORIDE, AND CALCIUM CHLORIDE: .6; .31; .03; .02 INJECTION, SOLUTION INTRAVENOUS at 09:10

## 2020-10-26 RX ADMIN — EPHEDRINE SULFATE 20 MG: 50 INJECTION INTRAVENOUS at 07:10

## 2020-10-26 RX ADMIN — VASOPRESSIN 1 UNITS: 20 INJECTION, SOLUTION INTRAMUSCULAR; SUBCUTANEOUS at 07:10

## 2020-10-26 RX ADMIN — HYDROMORPHONE HYDROCHLORIDE 1 MG: 1 INJECTION, SOLUTION INTRAMUSCULAR; INTRAVENOUS; SUBCUTANEOUS at 01:10

## 2020-10-26 RX ADMIN — FENTANYL CITRATE 25 MCG: 50 INJECTION, SOLUTION INTRAMUSCULAR; INTRAVENOUS at 10:10

## 2020-10-26 RX ADMIN — PROPOFOL 200 MG: 10 INJECTION, EMULSION INTRAVENOUS at 07:10

## 2020-10-26 RX ADMIN — INSULIN ASPART 10 UNITS: 100 INJECTION, SOLUTION INTRAVENOUS; SUBCUTANEOUS at 11:10

## 2020-10-26 RX ADMIN — INSULIN ASPART 10 UNITS: 100 INJECTION, SOLUTION INTRAVENOUS; SUBCUTANEOUS at 06:10

## 2020-10-26 RX ADMIN — INSULIN ASPART 5 UNITS: 100 INJECTION, SOLUTION INTRAVENOUS; SUBCUTANEOUS at 09:10

## 2020-10-26 RX ADMIN — ALBUTEROL SULFATE 2.5 MG: 2.5 SOLUTION RESPIRATORY (INHALATION) at 05:10

## 2020-10-26 RX ADMIN — INSULIN DETEMIR 30 UNITS: 100 INJECTION, SOLUTION SUBCUTANEOUS at 09:10

## 2020-10-26 RX ADMIN — CALCIUM CHLORIDE 0.5 G: 100 INJECTION, SOLUTION INTRAVENOUS at 07:10

## 2020-10-26 RX ADMIN — PHENYLEPHRINE HYDROCHLORIDE 0.5 MCG/KG/MIN: 10 INJECTION INTRAVENOUS at 07:10

## 2020-10-26 RX ADMIN — CLINDAMYCIN PHOSPHATE 900 MG: 18 INJECTION, SOLUTION INTRAVENOUS at 07:10

## 2020-10-26 RX ADMIN — EPHEDRINE SULFATE 10 MG: 50 INJECTION INTRAVENOUS at 07:10

## 2020-10-26 RX ADMIN — LIDOCAINE HYDROCHLORIDE 50 MG: 20 INJECTION, SOLUTION INTRAVENOUS at 07:10

## 2020-10-26 RX ADMIN — MUPIROCIN 1 G: 20 OINTMENT TOPICAL at 08:10

## 2020-10-26 RX ADMIN — DEXAMETHASONE SODIUM PHOSPHATE 4 MG: 4 INJECTION, SOLUTION INTRAMUSCULAR; INTRAVENOUS at 07:10

## 2020-10-26 RX ADMIN — ROCURONIUM BROMIDE 50 MG: 10 SOLUTION INTRAVENOUS at 07:10

## 2020-10-26 RX ADMIN — FENTANYL CITRATE 50 MCG: 50 INJECTION, SOLUTION INTRAMUSCULAR; INTRAVENOUS at 07:10

## 2020-10-26 RX ADMIN — NEOSTIGMINE METHYLSULFATE 5 MG: 1 INJECTION INTRAVENOUS at 10:10

## 2020-10-26 RX ADMIN — CELECOXIB 400 MG: 200 CAPSULE ORAL at 05:10

## 2020-10-26 RX ADMIN — SODIUM CHLORIDE, SODIUM LACTATE, POTASSIUM CHLORIDE, AND CALCIUM CHLORIDE: 600; 310; 30; 20 INJECTION, SOLUTION INTRAVENOUS at 06:10

## 2020-10-26 RX ADMIN — GLYCOPYRROLATE 0.6 MG: 0.2 INJECTION, SOLUTION INTRAMUSCULAR; INTRAVITREAL at 10:10

## 2020-10-26 RX ADMIN — PHENYLEPHRINE HYDROCHLORIDE 100 MCG: 10 INJECTION INTRAVENOUS at 07:10

## 2020-10-26 RX ADMIN — ACETAMINOPHEN 1000 MG: 500 TABLET, FILM COATED ORAL at 05:10

## 2020-10-26 RX ADMIN — PREGABALIN 50 MG: 50 CAPSULE ORAL at 05:10

## 2020-10-26 RX ADMIN — POLYETHYLENE GLYCOL 3350 17 G: 17 POWDER, FOR SOLUTION ORAL at 06:10

## 2020-10-26 RX ADMIN — FAMOTIDINE 20 MG: 10 INJECTION INTRAVENOUS at 08:10

## 2020-10-26 RX ADMIN — SODIUM CHLORIDE, SODIUM LACTATE, POTASSIUM CHLORIDE, AND CALCIUM CHLORIDE: 600; 310; 30; 20 INJECTION, SOLUTION INTRAVENOUS at 10:10

## 2020-10-26 RX ADMIN — ROCURONIUM BROMIDE 15 MG: 10 SOLUTION INTRAVENOUS at 08:10

## 2020-10-26 RX ADMIN — ALBUMIN (HUMAN): 2.5 SOLUTION INTRAVENOUS at 09:10

## 2020-10-26 RX ADMIN — CARBOXYMETHYLCELLULOSE SODIUM 1 DROP: 2.5 SOLUTION/ DROPS OPHTHALMIC at 07:10

## 2020-10-26 RX ADMIN — HYDROMORPHONE HYDROCHLORIDE 0.4 MG: 2 INJECTION INTRAMUSCULAR; INTRAVENOUS; SUBCUTANEOUS at 10:10

## 2020-10-26 NOTE — ANESTHESIA PROCEDURE NOTES
Intubation  Performed by: Tanya Perez CRNA  Authorized by: Esvin Scherer MD     Intubation:     Induction:  Intravenous    Intubated:  Postinduction    Mask Ventilation:  Easy with oral airway    Attempts:  1    Attempted By:  CRNA    Method of Intubation:  Video laryngoscopy    Blade:  Joshua 3    Laryngeal View Grade: Grade I - full view of chords      Difficult Airway Encountered?: No      Complications:  None    Airway Device:  Oral endotracheal tube    Airway Device Size:  8.5    Style/Cuff Inflation:  Cuffed (inflated to minimal occlusive pressure)    Inflation Amount (mL):  5    Tube secured:  21    Secured at:  The lips    Placement Verified By:  Capnometry    Complicating Factors:  Obesity    Findings Post-Intubation:  BS equal bilateral and atraumatic/condition of teeth unchanged

## 2020-10-26 NOTE — ANESTHESIA POSTPROCEDURE EVALUATION
Anesthesia Post Evaluation    Patient: Navin Beck    Procedure(s) Performed: Procedure(s) (LRB):  REVISION, IMPLANTED DEVICE / MESH - ROBOTIC (N/A)  XI ROBOTIC LYSIS, ADHESIONS (N/A)  CYSTOSCOPY (N/A)    Final Anesthesia Type: general    Patient location during evaluation: PACU  Patient participation: Yes- Able to Participate  Level of consciousness: awake and alert  Post-procedure vital signs: reviewed and stable  Pain management: adequate  Airway patency: patent  HALIMA mitigation strategies: Extubation while patient is awake  PONV status at discharge: No PONV  Anesthetic complications: no      Cardiovascular status: hemodynamically stable  Respiratory status: unassisted  Hydration status: euvolemic  Follow-up not needed.          Vitals Value Taken Time   /69 10/26/20 1210   Temp 36.7 °C (98 °F) 10/26/20 1240   Pulse 89 10/26/20 1235   Resp 16 10/26/20 1240   SpO2 100 % 10/26/20 1235   Vitals shown include unvalidated device data.      Event Time   Out of Recovery 12:42:16         Pain/Lexus Score: Pain Rating Prior to Med Admin: 6 (10/26/2020 11:19 AM)  Pain Rating Post Med Admin: 5 (states tolerable. Falls off to sleep.) (10/26/2020 12:04 PM)  Lexus Score: 8 (10/26/2020 12:04 PM)

## 2020-10-26 NOTE — TRANSFER OF CARE
"Anesthesia Transfer of Care Note    Patient: Navin Beck    Procedure(s) Performed: Procedure(s) (LRB):  REVISION, IMPLANTED DEVICE / MESH - ROBOTIC (N/A)  XI ROBOTIC LYSIS, ADHESIONS (N/A)  CYSTOSCOPY (N/A)    Patient location: PACU    Anesthesia Type: general    Transport from OR: Transported from OR on 2-3 L/min O2 by NC with adequate spontaneous ventilation. Continuous SpO2 monitoring in transport    Post pain: adequate analgesia    Post assessment: no apparent anesthetic complications    Post vital signs: stable    Level of consciousness: responds to stimulation    Nausea/Vomiting: no nausea/vomiting    Complications: none    Transfer of care protocol was followed      Last vitals:   Visit Vitals  BP (!) 120/57 (BP Location: Right arm, Patient Position: Lying)   Pulse 82   Temp 36.6 °C (97.8 °F) (Oral)   Resp 16   Ht 5' 7.5" (1.715 m)   Wt 109.1 kg (240 lb 8 oz)   SpO2 95%   Breastfeeding No   BMI 37.11 kg/m²     "

## 2020-10-26 NOTE — CONSULTS
[facility] Hospital Medicine  Consult Note    Patient Name: Navin Beck  MRN: [id number]  Admission Date: 10/26/2020  Hospital Length of Stay: 0 days  Attending Physician: Wilner Goel MD   Primary Care Provider: Elvira Quinones MD           Patient information was obtained from patient, past medical records and ER records.     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 She had remote hysterectomy in 2010. She developed vaginal bleeding and was found to have erosion of vaginal mesh so presented today for revision. Post-operatively she was found to have hyperglycemia with glucose >400 so hospital medicine was consulted. She is seen post-op and is still drowsy from surgery. She has had diabetes for many years. At home she takes Levemir and aspart. Aspart was recently increased from 15 units TID to 25 units TID. She reports some mild abdominal discomfort but otherwise feeling okay. Denies SOB, chest pain, nausea.    Past Medical History:   Diagnosis Date    Allergy     Arrhythmia     Arthritis     Bronchial asthma     Diabetes mellitus     Glaucoma     Hormone disorder     hysterectomy    Hypercholesteremia     Hypertension     Hyperthyroidism     Insomnia     Kidney stones     Thyroid disease     Urine, incontinence, stress female        Past Surgical History:   Procedure Laterality Date    APPENDECTOMY      BACK SURGERY      disc removal     CARPAL TUNNEL RELEASE Bilateral     CHOLECYSTECTOMY      FRACTURE SURGERY Right     wrist    HYSTERECTOMY   2010    Dr Gordon wilburn \[facility] [place] [facility]\""    JOINT REPLACEMENT      KNEE ARTHROSCOPY W/ DEBRIDEMENT      KNEE SURGERY Right     Knee replacement    LITHOTRIPSY      UT REMOVAL OF OVARY/TUBE(S)  9/9/13    benign    removal of round ligament mass      SPINE SURGERY      WRIST FRACTURE SURGERY Left        Review of patient's allergies indicates:   Allergen Reactions    Penicillin g Shortness Of Breath    Penicillins Shortness Of Breath, Itching and Swelling       No current facility-administered medications on file prior to encounter.      Current Outpatient Medications on File Prior to Encounter   Medication Sig    albuterol (PROVENTIL/VENTOLIN HFA) 90 mcg/actuation inhaler     benazepril (LOTENSIN) 10 MG tablet Take 10 mg by mouth once daily.     buprenorphine HCL (BELBUCA) 75 mcg Film once daily.     DULoxetine (CYMBALTA) 60 MG capsule duloxetine 60 mg capsule,delayed release    flurazepam (DALMANE) 30 MG capsule nightly as needed.     fluticasone propionate (FLOVENT HFA) 44 mcg/actuation inhaler Flovent HFA 44 mcg/actuation aerosol inhaler    gabapentin (NEURONTIN) 600 MG tablet Take 1 tablet (600 mg total) by mouth 3 (three) times daily.    insulin glargine (LANTUS U-100 INSULIN) 100 unit/mL injection Inject 50 Units into the skin 2 (two) times daily.    pantoprazole (PROTONIX) 40 MG tablet Take 1 tablet (40 mg total) by mouth once daily. Start after the twice daily Protonix is complete.Follow-up with doctor for further refills.    rosuvastatin (CRESTOR) 20 MG tablet rosuvastatin 20 mg tablet    tamsulosin (FLOMAX) 0.4 mg Cp24 Take 0.4 mg by mouth once daily.     acetaminophen (TYLENOL) 500 MG tablet Take 1,000 mg by mouth 3 (three) times daily as needed (fever and chills).    blood-glucose meter (TRUE METRIX GLUCOSE METER MISC) True Metrix Glucose Meter    diphenoxylate-atropine 2.5-0.025 mg (LOMOTIL) 2.5-0.025 mg per tablet as needed.     estradioL (ESTRACE) 0.01 % (0.1 mg/gram) vaginal cream  Place 1 g vaginally 3 (three) times a week. Alternate days with Metrogel for 14 days    hydrocodone-acetaminophen 10-325mg (NORCO)  mg Tab Take 1 tablet by mouth every 4 to 6 hours as needed for Pain.    insulin aspart (NOVOLOG) 100 unit/mL injection Inject 15 Units into the skin 3 (three) times daily with meals. Three times a day with meals according to sliding scale.    METFORMIN HCL (METFORMIN ORAL) Take 500 mg by mouth 2 (two) times daily.    methimazole (TAPAZOLE) 10 MG Tab Take 10 mg by mouth once daily.     tiZANidine (ZANAFLEX) 4 MG tablet tizanidine 4 mg tablet    TRUE METRIX GLUCOSE TEST STRIP Strp     zolpidem (AMBIEN) 10 mg Tab Take 10 mg by mouth every evening.     Family History     Problem Relation (Age of Onset)    Cancer Mother, Brother, Brother, Brother, Brother    Colon cancer Brother        Tobacco Use    Smoking status: Current Every Day Smoker     Packs/day: 0.50     Years: 30.00     Pack years: 15.00    Smokeless tobacco: Never Used   Substance and Sexual Activity    Alcohol use: No    Drug use: No    Sexual activity: Not Currently     Partners: Male     Birth control/protection: Surgical     Review of Systems   Constitutional: Negative for chills and fever.   HENT: Negative.    Eyes: Negative.    Respiratory: Negative for shortness of breath.    Cardiovascular: Negative for chest pain and leg swelling.   Gastrointestinal: Positive for abdominal pain. Negative for nausea and vomiting.   Genitourinary: Negative.    Musculoskeletal: Negative.    Skin: Negative.    Neurological: Negative.    All other systems reviewed and are negative.    Objective:     Vital Signs (Most Recent):  Temp: 98 °F (36.7 °C) (10/26/20 1240)  Pulse: 94 (10/26/20 1235)  Resp: 18 (10/26/20 1348)  BP: (!) 149/69 (10/26/20 1210)  SpO2: 100 % (10/26/20 1235) Vital Signs (24h Range):  Temp:  [97.8 °F (36.6 °C)-98 °F (36.7 °C)] 98 °F (36.7 °C)  Pulse:  [] 94  Resp:  [16-20] 18  SpO2:  [95 %-100 %] 100  %  BP: (120-149)/(57-74) 149/69     Weight: 109.1 kg (240 lb 8 oz)  Body mass index is 37.11 kg/m².    Physical Exam  Vitals signs and nursing note reviewed.   Constitutional:       General: She is not in acute distress.     Appearance: Normal appearance. She is well-developed.   HENT:      Head: Normocephalic and atraumatic.   Eyes:      Conjunctiva/sclera: Conjunctivae normal.   Neck:      Musculoskeletal: Neck supple.      Vascular: No JVD.   Cardiovascular:      Rate and Rhythm: Normal rate and regular rhythm.      Heart sounds: Normal heart sounds.   Pulmonary:      Effort: Pulmonary effort is normal.      Breath sounds: Normal breath sounds. No wheezing or rales.   Abdominal:      Tenderness: There is abdominal tenderness.      Comments: Post-op dressing in place over laparoscopic sites   Musculoskeletal: Normal range of motion.      Right lower leg: No edema.      Left lower leg: No edema.   Skin:     General: Skin is warm and dry.   Neurological:      Mental Status: She is oriented to person, place, and time.      Comments: Drowsy but awakens to answer questions         Significant Labs: All pertinent labs within the past 24 hours have been reviewed.    Significant Imaging: I have reviewed and interpreted all pertinent imaging results/findings within the past 24 hours.    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 A1c 10.3  - Home meds: Lantus 50 units BID, aspart 25 units TIDWM, metformin 500 mg PO BID  - Hyperglycemic post-op after receiving Decadron 4mg IV x 1  - Glucose >400  - Renal panel reviewed. No AGMA  - Currently NPO  - Start Levemir 30 units BID, aspart 10 units TIDWM and moderate dose SSI  - Titrate to home dose once eating      Hyperlipidemia associated  with type 2 diabetes mellitus  - Resume rosuvastatin 20 mg qd      Essential hypertension  - Monitor BP post-op  - Continue benazepril 10 mg qd      VTE Risk Mitigation (From admission, onward)    None              Thank you for your consult. I will follow-up with patient. Please contact us if you have any additional questions.    Emma Brantley MD  Department of Hospital Medicine   [facility]

## 2020-10-26 NOTE — OP NOTE
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    Attestation: I performed the robotic lysis of adhesions and mobilization of the bladder robotically.  The remainder of the procedure was performed by Dr. Goel.  I was in the OR immediately available for the entire procedure    History:  60-year-old woman undergoing removal of an eroding vaginal mesh.  Dr Goel has asked me to assist with access for the procedure.  Informed consent was obtained.     Procedure In Detail:  Dr. Goel performed the access, trocar placement and docking of the robot.  Please see his separate dictation.  He also performed a time-out.  When I entered the room the robot had been docked.  The patient was Trendelenburg position.  There were dense adhesions of the sigmoid colon to the posterior wall of the bladder.  Utilizing sharp dissection with minimal energy.  The sigmoid colon was mobilized.  We were able to expose the pouch of Mario and the bladder.  Following this the urachus was divided and the medial umbilical ligaments were divided.  The space of  Retzius was entered.  There were dense adhesions within the space of Retzius apparently related to the mesh on both sides of the pelvis.  On both sides the pelvic sidewalls were dissected and the obturator nerves were identified and preserved.  The obturator foramen were also identified along with the obturator vessels.  The endopelvic fascia was dissected on both sides.  At this point the mesh was visible bilaterally.  At this point I left the room and Dr. Goel performed the remainder of the procedure.  My dissection required approximately 60 min of dissection.  Please see Dr. Goel dictation for the remaining details and the surgical counts etc.  Dr. Goel will perform all the postoperative care

## 2020-10-26 NOTE — OP NOTE
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 The patient was positioned in high lithotomy position with legs in yellow fin stirrups. Care was taken to avoid joint hyperflexion or hyperextension, and all extremity surfaces were carefully padded so as to minimize risk of neurologic injury. Intravenous antibiotics were administered preoperatively. Sequential compression devices were applied to the patient's lower extremities preoperatively VTE prophylaxis. Surgical time-out was performed, where the patient was identified and procedures confirmed. An examination under anesthesia was performed with findings described as above. The patient's abdomen, perineum, and vagina were sterilely prepped and draped. A march catheter was placed in the bladder for drainage.   Exam under anesthesia revealed stage 4 cm apically erosion of prior placed  mesh Pro some a device as well as substantially tender mid urethral sling at about 1 cm cephalad from external urethral meatus    . A 10-mm incision suprafraumbilical incision was made with a scalpel. A 5 mm laparoscoped 0 degree was placed into a 5 mm trocar under direct visualization the abdomen was entered. Insufflation was activated. Appropriate intraperitoneal pressure returned. We insufflated to 15 mm of  mercury. Reinserted the laparoscoped safe entry confirmed.   The camera was inserted through this port for visualization of all subsequent port placement. Two 8 mm ports were placed on either side of the supraumbilical port, each approximately 9 cm lateral, along the mid clavicular line. Each 8 mm trocar was inserted under direct visualization without significant trauma, at least 2 cm cephalad and medial to the anterior superior iliac spine.  This was about 17 cm cephalad from inferior aspect of the pubic bone A third 8 mm port was placed in the left upper quadrant, 2 cm below the costal margin in his lateral as possible. A 12 mm assistant port [facility], cephalad to and midway between the umbilical and 8 mm right lower quadrant port in a triangulated fashion. There were no complications with these port placements.  5 mm trocar was then switched for and a 8 mm robotic port to accomplish this I placed the 5 mm camera through the upper left port I could see that there was substantial adhesions proximal to the port I then introduced switched the camera to the lower left quadrant and an with scissor r under direct visualization took all of these down At this point, a total of 5 ports ( 4 robotic) had been placed, including the umbilical port for the camera. The robot was then docked.  Additional lysis of omental adhesions was performed until the omentum was completely freed from the anterior abdominal wall. Filmy adhesions were noted along the posterior cul-de sac and sigmoid colon. These were easily lysed.   In the lower right quadrant that port is designated as arm 1. And we placed a monopolar scissors through that under direct visualization  In the lower left quadrant that port is designated as arm number 3 arm 2. Will be holding the camera through the super umbilical port. In the upper left quadrant that will be designated as the 4th port for the 4th robotic arm.  Through the 3rd robotic  port a [place] bipolar is placed under direct visualization arm, the 4th robotic port Has a robotic cardier placed under direct visualization.      Please see operative dictation from Urology at this point    At the conclusion of the dissection I took over.  I was secondary to beautiful retropubic as well as paravesical space exposure I was able to see the trajectory of the MiniArc graft and I was able to take this down and send a sample off to pathology.  Excellent hemostasis from that point I was then able to change orientation to the apex that I knew where the defect was    The [facility] ALLY positioning system was deployed with the DYLON manipulator with small SACROTIP ATTACHED. and the peritoneum dissected off the vagina anterior and posteriorly. Great care and attention was put forth to maintain as much of the peritoneum as possible. Thus we had an anterior leaflet developed and posterior leaflet.    I was then able to see the defect on the vaginal side and see they infected graft through this attenuated area I was able to then undermine a bowel is this actually because of the cephalad traction of the min if you later this actually  by itself exposing a large aspect of graft was overlying an area of infection this was taken down graft was removed tissue was debrided was very happy with the amount of mesh low that were able to receive I was then able to developed vaginal mucosal planes.  I was then able to switch the instruments for needle drivers ringing in a 9 in 0 Vicryl V lock I then started from patient's right all the way to the left and created 2 layer closure we imbrication of the 1st layer excellent hemostasis was then able to repair internalize this area during this repair.  Of satisfied with the overall structure in reapproximation of tissue        Cystourethrsocopy: normal bladder mucosa, small area at the base of the bladder with erythema no active bleeding. The bladder mucosa  without stones or diverticulum. Bilateral ureteral jets were noted. Normal urethra. The Cifuentes catheter was reinserted.       Attention was again turned to the abdomen, close inspection of the pelvis revealed excellent hemostasis. All instruments were then removed from the abdomen and the pelvis. A suture East device was deployed to close the 12 mm air seal port an endoscopic fashion in the appropriate manner. The remainder of the skin incisions were reapproximated with 4.0 Monocryl, Steri-strips, 2x2s, and Tegaderm.The patient tolerated the procedure well. Needle, sponge lap, and instrument counts were correct x2.     The patient was transferred to recovery in stable condition. The vagina was packed with guaze.    Estimated Blood Loss: * No values recorded between 10/26/2020  7:30 AM and 10/26/2020 10:20 AM *           Specimens (From admission, onward)     Start     Ordered    10/26/20 0948  Specimen to Pathology, Surgery Gynecology and Obstetrics  Once     Question:  Procedure Type:  Answer:  Gynecology and Obstetrics    10/26/20 1006              Implants: * No implants in log *           Disposition: PACU - hemodynamically stable.           Condition: Good    Attestation:  I was present and scrubbed for the entire procedure.           Discharge Note    Admit Date: 10/26/2020    Attending Physician: Wilner Goel MD     Discharge Physician: Wilner Goel MD    Final Diagnosis: Post-Op Diagnosis Codes:     * Erosion of implanted vaginal mesh and prosthetic materials, initial encounter [T83.711A]    Disposition:  To postanesthesia care unit    Patient Instructions:   Current Discharge Medication List      CONTINUE these medications which have NOT CHANGED    Details   albuterol (PROVENTIL/VENTOLIN HFA) 90 mcg/actuation inhaler       benazepril (LOTENSIN) 10 MG tablet Take 10 mg by mouth once daily.       buprenorphine HCL (BELBUCA) 75 mcg Film once daily.     Comments: Quantity prescribed more than 7 day  supply? Press F2 and select one:[id number]        DULoxetine (CYMBALTA) 60 MG capsule duloxetine 60 mg capsule,delayed release      flurazepam (DALMANE) 30 MG capsule nightly as needed.       fluticasone propionate (FLOVENT HFA) 44 mcg/actuation inhaler Flovent HFA 44 mcg/actuation aerosol inhaler      gabapentin (NEURONTIN) 600 MG tablet Take 1 tablet (600 mg total) by mouth 3 (three) times daily.      insulin glargine (LANTUS U-100 INSULIN) 100 unit/mL injection Inject 50 Units into the skin 2 (two) times daily.      nitrofurantoin (MACRODANTIN) 100 MG capsule Take 1 capsule (100 mg total) by mouth 2 (two) times daily. for 7 days  Qty: 14 capsule, Refills: 0      pantoprazole (PROTONIX) 40 MG tablet Take 1 tablet (40 mg total) by mouth once daily. Start after the twice daily Protonix is complete.Follow-up with doctor for further refills.  Qty: 30 tablet, Refills: 1      rosuvastatin (CRESTOR) 20 MG tablet rosuvastatin 20 mg tablet      tamsulosin (FLOMAX) 0.4 mg Cp24 Take 0.4 mg by mouth once daily.       acetaminophen (TYLENOL) 500 MG tablet Take 1,000 mg by mouth 3 (three) times daily as needed (fever and chills).      blood-glucose meter (TRUE METRIX GLUCOSE METER MISC) True Metrix Glucose Meter      diphenoxylate-atropine 2.5-0.025 mg (LOMOTIL) 2.5-0.025 mg per tablet as needed.       estradioL (ESTRACE) 0.01 % (0.1 mg/gram) vaginal cream Place 1 g vaginally 3 (three) times a week. Alternate days with Metrogel for 14 days  Qty: 42.5 g, Refills: 0    Associated Diagnoses: Erosion of implanted vaginal mesh and prosthetic materials, initial encounter      hydrocodone-acetaminophen 10-325mg (NORCO)  mg Tab Take 1 tablet by mouth every 4 to 6 hours as needed for Pain.      insulin aspart (NOVOLOG) 100 unit/mL injection Inject 15 Units into the skin 3 (three) times daily with meals. Three times a day with meals according to sliding scale.      METFORMIN HCL (METFORMIN ORAL) Take 500 mg by mouth 2 (two) times daily.       methimazole (TAPAZOLE) 10 MG Tab Take 10 mg by mouth once daily.       metroNIDAZOLE (METROGEL) 0.75 % vaginal gel Place 5 applicators vaginally every evening. for 5 days  Qty: 5 applicator, Refills: 0      tiZANidine (ZANAFLEX) 4 MG tablet tizanidine 4 mg tablet      TRUE METRIX GLUCOSE TEST STRIP Strp       zolpidem (AMBIEN) 10 mg Tab Take 10 mg by mouth every evening.             Discharge Procedure Orders (must include Diet, Follow-up, Activity)  No discharge procedures on file.     Discharge Date: No discharge date for patient encounter.

## 2020-10-26 NOTE — HPI
61 y/o F with history of HTN, HLD, and insulin dependent type 2 diabetes. She had remote hysterectomy in 2010. She developed vaginal bleeding and was found to have erosion of vaginal mesh so presented today for revision. Post-operatively she was found to have hyperglycemia with glucose >400 so hospital medicine was consulted. She is seen post-op and is still drowsy from surgery. She has had diabetes for many years. At home she takes Levemir and aspart. Aspart was recently increased from 15 units TID to 25 units TID. She reports some mild abdominal discomfort but otherwise feeling okay. Denies SOB, chest pain, nausea.

## 2020-10-26 NOTE — ASSESSMENT & PLAN NOTE
- Uncontrolled. A1c 10.3  - Home meds: Lantus 50 units BID, aspart 25 units TIDWM, metformin 500 mg PO BID  - Hyperglycemic post-op after receiving Decadron 4mg IV x 1  - Glucose >400  - Renal panel reviewed. No AGMA  - Currently NPO  - Start Levemir 30 units BID, aspart 10 units TIDWM and moderate dose SSI  - Titrate to home dose once eating

## 2020-10-26 NOTE — OR NURSING
Pt blood sugar 400. Dr Wong anesthesia notified. States to follow sliding scale orders. Dr Goel notified no sliding scale insulin orders. States will enter.

## 2020-10-26 NOTE — ASSESSMENT & PLAN NOTE
- s/p revision of mesh, lysis of adhesions and cystoscopy 10/26  - Post-op management per primary

## 2020-10-26 NOTE — INTERVAL H&P NOTE
The patient has been examined and the H&P has been reviewed:    I concur with the findings and no changes have occurred since H&P was written.    Surgery risks, benefits and alternative options discussed and understood by patient/family.          Active Hospital Problems    Diagnosis  POA    Eroded bladder suspension mesh [T83.285B]  Yes      Resolved Hospital Problems   No resolved problems to display.

## 2020-10-26 NOTE — OR NURSING
Pt states pain tolerable. No change form previous assessment. prpepared for transfer to inpt room 376

## 2020-10-26 NOTE — SUBJECTIVE & OBJECTIVE
Past Medical History:   Diagnosis Date    Allergy     Arrhythmia     Arthritis     Bronchial asthma     Diabetes mellitus     Glaucoma     Hormone disorder     hysterectomy    Hypercholesteremia     Hypertension     Hyperthyroidism     Insomnia     Kidney stones     Thyroid disease     Urine, incontinence, stress female        Past Surgical History:   Procedure Laterality Date    APPENDECTOMY      BACK SURGERY      disc removal     CARPAL TUNNEL RELEASE Bilateral     CHOLECYSTECTOMY      FRACTURE SURGERY Right     wrist    [facility]CT[facility]Y  [facility]    JOINT REPLACEMENT      KNEE ARTHROSCOPY W/ DEBRIDEMENT      KNEE SURGERY Right     Knee replacement    LITHOTRIPSY      WA REMOVAL OF OVARY/TUBE(S)  9/9/13    benign    removal of round ligament mass      SPINE SURGERY      WRIST FRACTURE SURGERY Left        Review of patient's allergies indicates:   Allergen Reactions    Penicillin g Shortness Of Breath    Penicillins Shortness Of Breath, Itching and Swelling       No current facility-administered medications on file prior to encounter.      Current Outpatient Medications on File Prior to Encounter   Medication Sig    albuterol (PROVENTIL/VENTOLIN HFA) 90 mcg/actuation inhaler     benazepril (LOTENSIN) 10 MG tablet Take 10 mg by mouth once daily.     buprenorphine HCL (BELBUCA) 75 mcg Film once daily.     DULoxetine (CYMBALTA) 60 MG capsule duloxetine 60 mg capsule,delayed release    flurazepam (DALMANE) 30 MG capsule nightly as needed.     fluticasone propionate (FLOVENT HFA) 44 mcg/actuation inhaler Flovent HFA 44 mcg/actuation aerosol inhaler    gabapentin (NEURONTIN) 600 MG tablet Take 1 tablet (600 mg total) by mouth 3 (three) times daily.    insulin glargine (LANTUS U-100 INSULIN) 100 unit/mL injection Inject 50 Units into the skin 2 (two) times daily.    pantoprazole (PROTONIX) 40 MG tablet Take 1 tablet (40 mg total) by mouth once daily. Start after  the twice daily Protonix is complete.Follow-up with doctor for further refills.    rosuvastatin (CRESTOR) 20 MG tablet rosuvastatin 20 mg tablet    tamsulosin (FLOMAX) 0.4 mg Cp24 Take 0.4 mg by mouth once daily.     acetaminophen (TYLENOL) 500 MG tablet Take 1,000 mg by mouth 3 (three) times daily as needed (fever and chills).    blood-glucose meter (TRUE METRIX GLUCOSE METER MISC) True Metrix Glucose Meter    diphenoxylate-atropine 2.5-0.025 mg (LOMOTIL) 2.5-0.025 mg per tablet as needed.     estradioL (ESTRACE) 0.01 % (0.1 mg/gram) vaginal cream Place 1 g vaginally 3 (three) times a week. Alternate days with Metrogel for 14 days    hydrocodone-acetaminophen 10-325mg (NORCO)  mg Tab Take 1 tablet by mouth every 4 to 6 hours as needed for Pain.    insulin aspart (NOVOLOG) 100 unit/mL injection Inject 15 Units into the skin 3 (three) times daily with meals. Three times a day with meals according to sliding scale.    METFORMIN HCL (METFORMIN ORAL) Take 500 mg by mouth 2 (two) times daily.    methimazole (TAPAZOLE) 10 MG Tab Take 10 mg by mouth once daily.     tiZANidine (ZANAFLEX) 4 MG tablet tizanidine 4 mg tablet    TRUE METRIX GLUCOSE TEST STRIP Strp     zolpidem (AMBIEN) 10 mg Tab Take 10 mg by mouth every evening.     Family History     Problem Relation (Age of Onset)    Cancer Mother, Brother, Brother, Brother, Brother    Colon cancer Brother        Tobacco Use    Smoking status: Current Every Day Smoker     Packs/day: 0.50     Years: 30.00     Pack years: 15.00    Smokeless tobacco: Never Used   Substance and Sexual Activity    Alcohol use: No    Drug use: No    Sexual activity: Not Currently     Partners: Male     Birth control/protection: Surgical     Review of Systems   Constitutional: Negative for chills and fever.   HENT: Negative.    Eyes: Negative.    Respiratory: Negative for shortness of breath.    Cardiovascular: Negative for chest pain and leg swelling.   Gastrointestinal:  Positive for abdominal pain. Negative for nausea and vomiting.   Genitourinary: Negative.    Musculoskeletal: Negative.    Skin: Negative.    Neurological: Negative.    All other systems reviewed and are negative.    Objective:     Vital Signs (Most Recent):  Temp: 98 °F (36.7 °C) (10/26/20 1240)  Pulse: 94 (10/26/20 1235)  Resp: 18 (10/26/20 1348)  BP: (!) 149/69 (10/26/20 1210)  SpO2: 100 % (10/26/20 1235) Vital Signs (24h Range):  Temp:  [97.8 °F (36.6 °C)-98 °F (36.7 °C)] 98 °F (36.7 °C)  Pulse:  [] 94  Resp:  [16-20] 18  SpO2:  [95 %-100 %] 100 %  BP: (120-149)/(57-74) 149/69     Weight: 109.1 kg (240 lb 8 oz)  Body mass index is 37.11 kg/m².    Physical Exam  Vitals signs and nursing note reviewed.   Constitutional:       General: She is not in acute distress.     Appearance: Normal appearance. She is well-developed.   HENT:      Head: Normocephalic and atraumatic.   Eyes:      Conjunctiva/sclera: Conjunctivae normal.   Neck:      Musculoskeletal: Neck supple.      Vascular: No JVD.   Cardiovascular:      Rate and Rhythm: Normal rate and regular rhythm.      Heart sounds: Normal heart sounds.   Pulmonary:      Effort: Pulmonary effort is normal.      Breath sounds: Normal breath sounds. No wheezing or rales.   Abdominal:      Tenderness: There is abdominal tenderness.      Comments: Post-op dressing in place over laparoscopic sites   Musculoskeletal: Normal range of motion.      Right lower leg: No edema.      Left lower leg: No edema.   Skin:     General: Skin is warm and dry.   Neurological:      Mental Status: She is oriented to person, place, and time.      Comments: Drowsy but awakens to answer questions         Significant Labs: All pertinent labs within the past 24 hours have been reviewed.    Significant Imaging: I have reviewed and interpreted all pertinent imaging results/findings within the past 24 hours.

## 2020-10-26 NOTE — OPERATIVE NOTE ADDENDUM
Certification of Assistant at Surgery       Surgery Date: 10/26/2020     Participating Surgeons:  Surgeon(s) and Role:     * Wilner Goel MD - Primary    Procedures:  Procedure(s) (LRB):  REVISION, IMPLANTED DEVICE / MESH - ROBOTIC (N/A)  XI ROBOTIC LYSIS, ADHESIONS (N/A)  CYSTOSCOPY (N/A)    Assistant Surgeon's Certification of Necessity:  I understand that section 1842 (b) (6) (d) of the Social Security Act generally prohibits Medicare Part B reasonable charge payment for the services of assistants at surgery in teaching hospitals when qualified residents are available to furnish such services. I certify that the services for which payment is claimed were medically necessary, and that no qualified resident was available to perform the services. I further understand that these services are subject to post-payment review by the Medicare carrier.      Renny Gomez PA-C    10/26/2020  10:47 AM

## 2020-10-27 LAB
BASOPHILS # BLD AUTO: 0.04 K/UL (ref 0–0.2)
BASOPHILS NFR BLD: 0.3 % (ref 0–1.9)
DIFFERENTIAL METHOD: ABNORMAL
EOSINOPHIL # BLD AUTO: 0 K/UL (ref 0–0.5)
EOSINOPHIL NFR BLD: 0.1 % (ref 0–8)
ERYTHROCYTE [DISTWIDTH] IN BLOOD BY AUTOMATED COUNT: 12.4 % (ref 11.5–14.5)
FINAL PATHOLOGIC DIAGNOSIS: NORMAL
GROSS: NORMAL
HCT VFR BLD AUTO: 32 % (ref 37–48.5)
HGB BLD-MCNC: 10.2 G/DL (ref 12–16)
IMM GRANULOCYTES # BLD AUTO: 0.08 K/UL (ref 0–0.04)
IMM GRANULOCYTES NFR BLD AUTO: 0.5 % (ref 0–0.5)
LYMPHOCYTES # BLD AUTO: 2.6 K/UL (ref 1–4.8)
LYMPHOCYTES NFR BLD: 16.5 % (ref 18–48)
Lab: NORMAL
MCH RBC QN AUTO: 29.5 PG (ref 27–31)
MCHC RBC AUTO-ENTMCNC: 31.9 G/DL (ref 32–36)
MCV RBC AUTO: 93 FL (ref 82–98)
MONOCYTES # BLD AUTO: 0.9 K/UL (ref 0.3–1)
MONOCYTES NFR BLD: 5.6 % (ref 4–15)
NEUTROPHILS # BLD AUTO: 11.9 K/UL (ref 1.8–7.7)
NEUTROPHILS NFR BLD: 77 % (ref 38–73)
NRBC BLD-RTO: 0 /100 WBC
PLATELET # BLD AUTO: 270 K/UL (ref 150–350)
PMV BLD AUTO: 10.9 FL (ref 9.2–12.9)
POCT GLUCOSE: 186 MG/DL (ref 70–110)
POCT GLUCOSE: 390 MG/DL (ref 70–110)
POCT GLUCOSE: 423 MG/DL (ref 70–110)
POCT GLUCOSE: 48 MG/DL (ref 70–110)
POCT GLUCOSE: 49 MG/DL (ref 70–110)
POCT GLUCOSE: 53 MG/DL (ref 70–110)
POCT GLUCOSE: 54 MG/DL (ref 70–110)
POCT GLUCOSE: 71 MG/DL (ref 70–110)
RBC # BLD AUTO: 3.46 M/UL (ref 4–5.4)
WBC # BLD AUTO: 15.44 K/UL (ref 3.9–12.7)

## 2020-10-27 PROCEDURE — 36415 COLL VENOUS BLD VENIPUNCTURE: CPT | Mod: HCWC

## 2020-10-27 PROCEDURE — 25000003 PHARM REV CODE 250: Mod: HCWC | Performed by: STUDENT IN AN ORGANIZED HEALTH CARE EDUCATION/TRAINING PROGRAM

## 2020-10-27 PROCEDURE — 99226 PR SUBSEQUENT OBSERVATION CARE,LEVEL III: ICD-10-PCS | Mod: HCWC,,, | Performed by: INTERNAL MEDICINE

## 2020-10-27 PROCEDURE — 63600175 PHARM REV CODE 636 W HCPCS: Mod: HCWC | Performed by: OBSTETRICS & GYNECOLOGY

## 2020-10-27 PROCEDURE — 96372 THER/PROPH/DIAG INJ SC/IM: CPT

## 2020-10-27 PROCEDURE — C9399 UNCLASSIFIED DRUGS OR BIOLOG: HCPCS | Mod: HCWC | Performed by: STUDENT IN AN ORGANIZED HEALTH CARE EDUCATION/TRAINING PROGRAM

## 2020-10-27 PROCEDURE — 63600175 PHARM REV CODE 636 W HCPCS: Mod: HCWC | Performed by: STUDENT IN AN ORGANIZED HEALTH CARE EDUCATION/TRAINING PROGRAM

## 2020-10-27 PROCEDURE — 99226 PR SUBSEQUENT OBSERVATION CARE,LEVEL III: CPT | Mod: HCWC,,, | Performed by: INTERNAL MEDICINE

## 2020-10-27 PROCEDURE — 25000003 PHARM REV CODE 250: Mod: HCWC | Performed by: INTERNAL MEDICINE

## 2020-10-27 PROCEDURE — 25000003 PHARM REV CODE 250: Mod: HCWC | Performed by: OBSTETRICS & GYNECOLOGY

## 2020-10-27 PROCEDURE — G0378 HOSPITAL OBSERVATION PER HR: HCPCS | Mod: HCWC

## 2020-10-27 PROCEDURE — 85025 COMPLETE CBC W/AUTO DIFF WBC: CPT | Mod: HCWC

## 2020-10-27 RX ORDER — INSULIN ASPART 100 [IU]/ML
30 INJECTION, SOLUTION INTRAVENOUS; SUBCUTANEOUS
Status: DISCONTINUED | OUTPATIENT
Start: 2020-10-27 | End: 2020-10-27

## 2020-10-27 RX ORDER — INSULIN ASPART 100 [IU]/ML
20 INJECTION, SOLUTION INTRAVENOUS; SUBCUTANEOUS
Status: DISCONTINUED | OUTPATIENT
Start: 2020-10-27 | End: 2020-10-27

## 2020-10-27 RX ORDER — HYDROCODONE BITARTRATE AND ACETAMINOPHEN 5; 325 MG/1; MG/1
1 TABLET ORAL EVERY 4 HOURS PRN
Status: DISCONTINUED | OUTPATIENT
Start: 2020-10-27 | End: 2020-10-28 | Stop reason: HOSPADM

## 2020-10-27 RX ORDER — HYDROCODONE BITARTRATE AND ACETAMINOPHEN 10; 325 MG/1; MG/1
1 TABLET ORAL EVERY 4 HOURS PRN
Status: DISCONTINUED | OUTPATIENT
Start: 2020-10-27 | End: 2020-10-28 | Stop reason: HOSPADM

## 2020-10-27 RX ORDER — INSULIN ASPART 100 [IU]/ML
25 INJECTION, SOLUTION INTRAVENOUS; SUBCUTANEOUS
Status: DISCONTINUED | OUTPATIENT
Start: 2020-10-27 | End: 2020-10-27

## 2020-10-27 RX ORDER — IBUPROFEN 600 MG/1
600 TABLET ORAL 3 TIMES DAILY
Qty: 60 TABLET | Refills: 2 | Status: ON HOLD | OUTPATIENT
Start: 2020-10-27 | End: 2020-12-07 | Stop reason: HOSPADM

## 2020-10-27 RX ORDER — INSULIN ASPART 100 [IU]/ML
20 INJECTION, SOLUTION INTRAVENOUS; SUBCUTANEOUS
Status: DISCONTINUED | OUTPATIENT
Start: 2020-10-28 | End: 2020-10-28 | Stop reason: HOSPADM

## 2020-10-27 RX ORDER — HYDROCODONE BITARTRATE AND ACETAMINOPHEN 5; 325 MG/1; MG/1
1 TABLET ORAL EVERY 6 HOURS PRN
Qty: 20 TABLET | Refills: 0 | Status: ON HOLD | OUTPATIENT
Start: 2020-10-27 | End: 2020-11-30 | Stop reason: CLARIF

## 2020-10-27 RX ORDER — METFORMIN HYDROCHLORIDE 500 MG/1
500 TABLET ORAL 2 TIMES DAILY WITH MEALS
Status: DISCONTINUED | OUTPATIENT
Start: 2020-10-27 | End: 2020-10-28 | Stop reason: HOSPADM

## 2020-10-27 RX ADMIN — HYDROCODONE BITARTRATE AND ACETAMINOPHEN 1 TABLET: 10; 325 TABLET ORAL at 06:10

## 2020-10-27 RX ADMIN — IBUPROFEN 600 MG: 600 TABLET, FILM COATED ORAL at 05:10

## 2020-10-27 RX ADMIN — KETOROLAC TROMETHAMINE 30 MG: 30 INJECTION, SOLUTION INTRAMUSCULAR; INTRAVENOUS at 11:10

## 2020-10-27 RX ADMIN — BENAZEPRIL HYDROCHLORIDE 10 MG: 10 TABLET ORAL at 08:10

## 2020-10-27 RX ADMIN — ROSUVASTATIN CALCIUM 20 MG: 10 TABLET, FILM COATED ORAL at 08:10

## 2020-10-27 RX ADMIN — INSULIN ASPART 25 UNITS: 100 INJECTION, SOLUTION INTRAVENOUS; SUBCUTANEOUS at 08:10

## 2020-10-27 RX ADMIN — HYDROCODONE BITARTRATE AND ACETAMINOPHEN 1 TABLET: 10; 325 TABLET ORAL at 11:10

## 2020-10-27 RX ADMIN — INSULIN ASPART 30 UNITS: 100 INJECTION, SOLUTION INTRAVENOUS; SUBCUTANEOUS at 11:10

## 2020-10-27 RX ADMIN — SODIUM CHLORIDE, SODIUM LACTATE, POTASSIUM CHLORIDE, AND CALCIUM CHLORIDE: .6; .31; .03; .02 INJECTION, SOLUTION INTRAVENOUS at 05:10

## 2020-10-27 RX ADMIN — INSULIN DETEMIR 50 UNITS: 100 INJECTION, SOLUTION SUBCUTANEOUS at 08:10

## 2020-10-27 RX ADMIN — INSULIN ASPART 10 UNITS: 100 INJECTION, SOLUTION INTRAVENOUS; SUBCUTANEOUS at 08:10

## 2020-10-27 RX ADMIN — INSULIN ASPART 10 UNITS: 100 INJECTION, SOLUTION INTRAVENOUS; SUBCUTANEOUS at 11:10

## 2020-10-27 RX ADMIN — OXYCODONE HYDROCHLORIDE AND ACETAMINOPHEN 1 TABLET: 5; 325 TABLET ORAL at 12:10

## 2020-10-27 RX ADMIN — METFORMIN HYDROCHLORIDE 500 MG: 500 TABLET ORAL at 08:10

## 2020-10-27 RX ADMIN — MUPIROCIN 1 G: 20 OINTMENT TOPICAL at 09:10

## 2020-10-27 RX ADMIN — FAMOTIDINE 20 MG: 10 INJECTION INTRAVENOUS at 08:10

## 2020-10-27 RX ADMIN — HYDROMORPHONE HYDROCHLORIDE 1 MG: 1 INJECTION, SOLUTION INTRAMUSCULAR; INTRAVENOUS; SUBCUTANEOUS at 08:10

## 2020-10-27 RX ADMIN — POLYETHYLENE GLYCOL 3350 17 G: 17 POWDER, FOR SOLUTION ORAL at 08:10

## 2020-10-27 RX ADMIN — IBUPROFEN 600 MG: 600 TABLET, FILM COATED ORAL at 11:10

## 2020-10-27 RX ADMIN — OXYCODONE HYDROCHLORIDE AND ACETAMINOPHEN 1 TABLET: 5; 325 TABLET ORAL at 04:10

## 2020-10-27 RX ADMIN — KETOROLAC TROMETHAMINE 30 MG: 30 INJECTION, SOLUTION INTRAMUSCULAR; INTRAVENOUS at 05:10

## 2020-10-27 RX ADMIN — Medication 16 G: at 03:10

## 2020-10-27 RX ADMIN — HYDROCODONE BITARTRATE AND ACETAMINOPHEN 1 TABLET: 10; 325 TABLET ORAL at 02:10

## 2020-10-27 RX ADMIN — KETOROLAC TROMETHAMINE 30 MG: 30 INJECTION, SOLUTION INTRAMUSCULAR; INTRAVENOUS at 12:10

## 2020-10-27 RX ADMIN — MUPIROCIN 1 G: 20 OINTMENT TOPICAL at 08:10

## 2020-10-27 NOTE — PLAN OF CARE
Pt. needs met. VSS throughout shift.IVF as ordered. Ambulated in room and fu . Remains free from fall, injury, skin breakdown. On room air. . All alarms active and auduble.Pt presented with education, needs reinfocrcement. POC reviewed will continue to monitor and adjust POC all questions answered.Bed locked and in lowest position. Call light w/I reach. No needs at this time. Purposeful hourly rounding.    Problem: Diabetes Comorbidity  Goal: Blood Glucose Level Within Desired Range  Outcome: Ongoing, Progressing     Problem: Adjustment to Illness (Sepsis/Septic Shock)  Goal: Optimal Coping  Outcome: Ongoing, Progressing     Problem: Bleeding (Sepsis/Septic Shock)  Goal: Absence of Bleeding  Outcome: Ongoing, Progressing     Problem: Glycemic Control Impaired (Sepsis/Septic Shock)  Goal: Blood Glucose Level Within Desired Range  Outcome: Ongoing, Progressing     Problem: Hemodynamic Instability (Sepsis/Septic Shock)  Goal: Effective Tissue Perfusion  Outcome: Ongoing, Progressing     Problem: Infection (Sepsis/Septic Shock)  Goal: Absence of Infection Signs/Symptoms  Outcome: Ongoing, Progressing     Problem: Nutrition Impaired (Sepsis/Septic Shock)  Goal: Optimal Nutrition Intake  Outcome: Ongoing, Progressing     Problem: Respiratory Compromise (Sepsis/Septic Shock)  Goal: Effective Oxygenation and Ventilation  Outcome: Ongoing, Progressing     Problem: Fluid Imbalance (Pneumonia)  Goal: Fluid Balance  Outcome: Ongoing, Progressing     Problem: Infection (Pneumonia)  Goal: Resolution of Infection Signs/Symptoms  Outcome: Ongoing, Progressing     Problem: Respiratory Compromise (Pneumonia)  Goal: Effective Oxygenation and Ventilation  Outcome: Ongoing, Progressing     Problem: Adult Inpatient Plan of Care  Goal: Plan of Care Review  Outcome: Ongoing, Progressing  Goal: Patient-Specific Goal (Individualization)  Outcome: Ongoing, Progressing  Goal: Absence of Hospital-Acquired Illness or Injury  Outcome: Ongoing,  Progressing  Goal: Optimal Comfort and Wellbeing  Outcome: Ongoing, Progressing  Goal: Readiness for Transition of Care  Outcome: Ongoing, Progressing  Goal: Rounds/Family Conference  Outcome: Ongoing, Progressing     Problem: Fall Injury Risk  Goal: Absence of Fall and Fall-Related Injury  Outcome: Ongoing, Progressing     Problem: Infection  Goal: Infection Symptom Resolution  Outcome: Ongoing, Progressing

## 2020-10-27 NOTE — ASSESSMENT & PLAN NOTE
- Uncontrolled. HgbA1c 10.3.  - Reported home regimen of insulin detemir 50U subQ BID, insulin aspart 25U subQ TIDWM, metformin 500mg PO BID.  - Received dexamethasone 4mg IV x1 intra-operatively with subsequent hyperglycemia.  - Glucose variable; still elevated.  - Agree with increase to insulin detemir 50U subQ BID, insulin aspart 25U subQ TIDWM; continue moderate-dose sliding scale insulin aspart 1-10U subQ TIDWM PRN.  - Will continue to monitor and adjust further as needed.

## 2020-10-27 NOTE — PROGRESS NOTES
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   Patient is doing well this morning. She reports mild-moderate abdominal pain that is relieved by scheduled PO pain medications. She denies vaginal bleeding. She has not ambulated yet. She is voiding via march. She has not passed flatus, and has not had BM. She is tolerating a regular diet without nausea or vomiting.    Scheduled Meds:   benazepriL  10 mg Oral Daily    famotidine (PF)  20 mg Intravenous Q12H    ibuprofen  600 mg Oral Q6H    insulin aspart U-100  10 Units Subcutaneous TIDWM    insulin detemir U-100  30 Units Subcutaneous BID    ketorolac  30 mg Intravenous Q6H    mupirocin  1 g Nasal BID    polyethylene glycol  17 g Oral Daily    rosuvastatin  20 mg Oral QHS     Continuous Infusions:   lactated ringers 125 mL/hr at 10/27/20 0543     PRN Meds:bisacodyL, dextrose 50%, dextrose 50%, dextrose 50%, diphenhydrAMINE, diphenhydrAMINE, glucagon (human recombinant), glucose, glucose, HYDROcodone-acetaminophen, HYDROcodone-acetaminophen, HYDROmorphone, influenza, insulin aspart U-100, ondansetron, promethazine (PHENERGAN) IVPB, sodium chloride 0.9%    Review of patient's allergies indicates:   Allergen Reactions    Penicillin g Shortness Of Breath    Penicillins Shortness Of Breath, Itching and Swelling       Objective:     Vital Signs (Most Recent):  Temp: 98.9 °F (37.2 °C) (10/27/20 0408)  Pulse: 78 (10/27/20 0408)  Resp: 18 (10/27/20 0409)  BP: 133/63 (10/27/20 0408)  SpO2: 98 % (10/27/20 0408) Vital Signs (24h Range):  Temp:  [97.8 °F (36.6 °C)-98.9 °F (37.2 °C)] 98.9 °F (37.2 °C)  Pulse:  [71-97] 78  Resp:  [16-18] 18  SpO2:  [95 %-100 %] 98 %  BP:  (120-152)/(57-69) 133/63     Weight: 109.1 kg (240 lb 8 oz)  Body mass index is 37.11 kg/m².  No LMP recorded. Patient has had a hysterectomy.    I&O (Last 24H):    Intake/Output Summary (Last 24 hours) at 10/27/2020 0713  Last data filed at 10/27/2020 0400  Gross per 24 hour   Intake 3145 ml   Output 1932 ml   Net 1213 ml       Physical Exam:   Constitutional: She is oriented to person, place, and time. She appears well-developed.    Cardiovascular: Normal rate, regular rhythm, normal heart sounds and intact distal pulses.   Pulmonary/Chest: Effort normal and breath sounds normal. No respiratory distress. She has no wheezes.      Abdominal: Incision clean, dry, and intact, with steri-strips in place. Soft. Non-distended. There is mild tenderness  Genitourinary: packing removed, approx 5% saturated  Neurological: She is alert and oriented to person, place, and time.    Skin: Nails show no clubbing.    Psychiatric: She has a normal mood and affect.     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 [T83.711A] 10/26/2020 Yes    Eroded bladder suspension mesh [T83.718A] 10/26/2020 Yes    Type 2 diabetes mellitus with hyperglycemia, with long-term current use of insulin [E11.65, Z79.4] 07/17/2020 Not Applicable    Hyperlipidemia associated with type 2 diabetes mellitus [E11.69, E78.5] 07/26/2018 Yes    Essential hypertension [I10] 07/06/2012 Yes      Problems Resolved During this Admission:    Diagnosis Date Noted Date Resolved POA    Eroded bladder suspension mesh [T83.718A] 10/26/2020 10/26/2020 Yes       POD#1  s/p RA-mesh revision/JACLYN   - Doing well, no acute events overnight  - continue scheduled ibuprofen/PRN norco  - Continue IS  - Remove Icfuentes catheter this  morning, passive void trial  - Encourage ambulation  - Continue scheduled colace  - SCDs for DVT PPX    IDT2DM  - pt hyperglycemic to 400s following surgery  - IM consulted postoperatively   - recs: levemir 30BID, aspart 10u TIDWM, mod dose SSI > titrate to home dose when eating   - home insulin: lantus 50u BID, aspart 25 TIDWM, metformin 500mg BID   - glucose persistently elevated overnight; insulin changed to home dose for this AM, will continue to monitor glucose closely    HLD  - continue home rosuvastatin    HTN  - BP: (120-152)/(57-69) 133/63  - continue home benazepril    Anticipate discharge today pending glucose control/void trial.    Diana Lozano MD  Gynecology   [facility]

## 2020-10-27 NOTE — PLAN OF CARE
SW met with pt at bedside to complete discharge assessment, verified PCP and uses [facility] in [place] and would like bedside delivery.  No POA or LW  Pt has RW, BSC and straight cane at home but don't use.  Daughter in lawAmy will provide transportation home.  No needs identified at this time.     10/27/20 1228   Discharge Assessment   Assessment Type Discharge Planning Assessment   Confirmed/corrected address and phone number on facesheet? Yes   Assessment information obtained from? Patient   Communicated expected length of stay with patient/caregiver no   Prior to hospitilization cognitive status: Alert/Oriented   Prior to hospitalization functional status: Independent   Current cognitive status: Alert/Oriented   Lives With child(jovanny), adult   Able to Return to Prior Arrangements yes   Is patient able to care for self after discharge? Yes   Readmission Within the Last 30 Days no previous admission in last 30 days   Patient currently being followed by outpatient case management? No   Patient currently receives any other outside agency services? No   Equipment Currently Used at Home walker, rolling;cane, straight;bedside commode   Do you have any problems affording any of your prescribed medications? No   Does the patient have transportation home? Yes   Transportation Anticipated family or friend will provide   Does the patient receive services at the [facility]? No   Discharge Plan A Home   DME Needed Upon Discharge  none   Patient/Family in Agreement with Plan yes

## 2020-10-27 NOTE — NURSING
Cifuentes cath. D/C at 0715 pt voided 200 ml at 1245. Bladder scanned at at 1245 showed 215 ml post void. md notified.

## 2020-10-27 NOTE — ASSESSMENT & PLAN NOTE
- s/p revision of mesh, lysis of adhesions and cystoscopy 10/26.  - Post-op management per primary.

## 2020-10-27 NOTE — PROGRESS NOTES
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 She had remote hysterectomy in 2010. She developed vaginal bleeding and was found to have erosion of vaginal mesh so presented today for revision. Post-operatively she was found to have hyperglycemia with glucose >400 so hospital medicine was consulted. She is seen post-op and is still drowsy from surgery. She has had diabetes for many years. At home she takes Levemir and aspart. Aspart was recently increased from 15 units TID to 25 units TID. She reports some mild abdominal discomfort but otherwise feeling okay. Denies SOB, chest pain, nausea.    Interval History: No acute events overnight. Glucoses fluctuating significantly. No new concerns at this time.    Review of Systems   Constitutional: Negative for chills and fever.   Respiratory: Negative for cough and shortness of breath.    Cardiovascular: Negative for chest pain and palpitations.   Gastrointestinal: Positive for abdominal pain. Negative for nausea and vomiting.     Objective:     Vital Signs (Most Recent):  Temp: 98.3 °F (36.8 °C) (10/27/20 0753)  Pulse: 73 (10/27/20 0753)  Resp: 17 (10/27/20 0753)  BP: (!) 116/56 (10/27/20 0753)  SpO2: 95 % (10/27/20 0753) Vital Signs (24h Range):  Temp:  [98.3 °F (36.8 °C)-98.9 °F (37.2 °C)] 98.3 °F (36.8 °C)  Pulse:  [71-82] 73  Resp:  [16-18] 17  SpO2:  [95 %-100 %] 95 %  BP: (116-152)/(56-68) 116/56     Weight: 109.1 kg (240 lb 8 oz)  Body mass index is 37.11 kg/m².    Intake/Output Summary (Last 24 hours) at 10/27/2020 1410  Last data filed  at 10/27/2020 0400  Gross per 24 hour   Intake 695 ml   Output 1550 ml   Net -855 ml      Physical Exam  Vitals signs and nursing note reviewed.   Constitutional:       General: She is not in acute distress.     Appearance: She is well-developed.   HENT:      Head: Normocephalic and atraumatic.   Eyes:      General:         Right eye: No discharge.         Left eye: No discharge.      Conjunctiva/sclera: Conjunctivae normal.   Cardiovascular:      Rate and Rhythm: Normal rate.      Pulses: Normal pulses.   Pulmonary:      Effort: Pulmonary effort is normal. No respiratory distress.   Abdominal:      Palpations: Abdomen is soft.      Tenderness: There is abdominal tenderness (mild, near surgical sites).   Musculoskeletal: Normal range of motion.      Right lower leg: No edema.      Left lower leg: No edema.   Skin:     General: Skin is warm and dry.   Neurological:      Mental Status: She is alert and oriented to person, place, and time.       Significant Labs:   CBC:  Recent Labs   Lab 10/27/20  0359   WBC 15.44*   HGB 10.2*   HCT 32.0*      GRAN 77.0*  11.9*   LYMPH 16.5*  2.6   MONO 5.6  0.9   EOS 0.0   BASO 0.04      BMP:  Recent Labs   Lab 10/26/20  1224      K 4.1      CO2 19*   BUN 11   CREATININE 1.2   *   CALCIUM 8.5*   PHOS 4.1     Significant Imaging:   No new imaging this morning.      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  - Post-op management per primary.    Type 2 diabetes mellitus with hyperglycemia, with long-term current use of insulin  - Uncontrolled. HgbA1c 10.3.  - Reported home regimen of insulin detemir 50U subQ BID, insulin aspart 25U subQ TIDWM, metformin 500mg PO BID.  - Received dexamethasone 4mg IV x1 intra-operatively with subsequent hyperglycemia.  - Glucose variable; still elevated.  - Agree with increase to insulin detemir 50U subQ BID, insulin  aspart 25U subQ TIDWM; continue moderate-dose sliding scale insulin aspart 1-10U subQ TIDWM PRN.  - Will continue to monitor and adjust further as needed.    Hyperlipidemia associated with type 2 diabetes mellitus  - Continue rosuvastatin 20mg PO daily.    Essential hypertension  - Continue benazepril 10mg PO daily.    VTE Risk Mitigation (From admission, onward)    None        I will continue to follow with you. Please do not hesitate to contact me with any questions or concerns.     MARIA DEL ROSARIO South MD  Department of Hospital Medicine   [facility], [place]  [phone]

## 2020-10-27 NOTE — PLAN OF CARE
No significant events overnight. Remains free from fall, injury, skin breakdown. VSS on RA throughout the night. Positions self-independently. Pain controled w/ PO/IV meds. F/c draining clear yellow urine to gravity. Lap sites x5 CDI. Vaginal packing intact. All alarms active and auduble. TEDs/SCDs in place. Plan of care reviewed w/ pt and all questions answered. Bed locked and in lowest position. Call light w/I reach. No needs at this time. Purposeful hourly rounding. Will continue to monitor.

## 2020-10-27 NOTE — SUBJECTIVE & OBJECTIVE
Interval History: No acute events overnight. Glucoses fluctuating significantly. No new concerns at this time.    Review of Systems   Constitutional: Negative for chills and fever.   Respiratory: Negative for cough and shortness of breath.    Cardiovascular: Negative for chest pain and palpitations.   Gastrointestinal: Positive for abdominal pain. Negative for nausea and vomiting.     Objective:     Vital Signs (Most Recent):  Temp: 98.3 °F (36.8 °C) (10/27/20 0753)  Pulse: 73 (10/27/20 0753)  Resp: 17 (10/27/20 0753)  BP: (!) 116/56 (10/27/20 0753)  SpO2: 95 % (10/27/20 0753) Vital Signs (24h Range):  Temp:  [98.3 °F (36.8 °C)-98.9 °F (37.2 °C)] 98.3 °F (36.8 °C)  Pulse:  [71-82] 73  Resp:  [16-18] 17  SpO2:  [95 %-100 %] 95 %  BP: (116-152)/(56-68) 116/56     Weight: 109.1 kg (240 lb 8 oz)  Body mass index is 37.11 kg/m².    Intake/Output Summary (Last 24 hours) at 10/27/2020 1410  Last data filed at 10/27/2020 0400  Gross per 24 hour   Intake 695 ml   Output 1550 ml   Net -855 ml      Physical Exam  Vitals signs and nursing note reviewed.   Constitutional:       General: She is not in acute distress.     Appearance: She is well-developed.   HENT:      Head: Normocephalic and atraumatic.   Eyes:      General:         Right eye: No discharge.         Left eye: No discharge.      Conjunctiva/sclera: Conjunctivae normal.   Cardiovascular:      Rate and Rhythm: Normal rate.      Pulses: Normal pulses.   Pulmonary:      Effort: Pulmonary effort is normal. No respiratory distress.   Abdominal:      Palpations: Abdomen is soft.      Tenderness: There is abdominal tenderness (mild, near surgical sites).   Musculoskeletal: Normal range of motion.      Right lower leg: No edema.      Left lower leg: No edema.   Skin:     General: Skin is warm and dry.   Neurological:      Mental Status: She is alert and oriented to person, place, and time.       Significant Labs:   CBC:  Recent Labs   Lab 10/27/20  0359   WBC 15.44*   HGB  10.2*   HCT 32.0*      GRAN 77.0*  11.9*   LYMPH 16.5*  2.6   MONO 5.6  0.9   EOS 0.0   BASO 0.04      BMP:  Recent Labs   Lab 10/26/20  1224      K 4.1      CO2 19*   BUN 11   CREATININE 1.2   *   CALCIUM 8.5*   PHOS 4.1     Significant Imaging:   No new imaging this morning.

## 2020-10-27 NOTE — PLAN OF CARE
Pt remains free from injury or falls. Cifuentes catheter draining to gravity. Lap sites x 5, vaginal packing in place.  IV pain medication administered, no complaints of nausea.SCD/TEDS in place. Bed alarm set, bed in low locked position. Safety precautions maintained. Will continue to monitor.

## 2020-10-27 NOTE — PROGRESS NOTES
Hypoglycemia mid-day noted. Received a large amount of insulin this morning after not responding to 65U yesterday. Will reduce to insulin determir at 30U subQ BID, insulin aspart 20U subQ TIDWM, continue moderate-dose sliding scale insulin aspart 1-10U subQ TIDWM PRN, metformin 500mg PO BID. Likely a combination of more carbohydrate/calorie restricted diet in hospital with changes in pharmacokinetics due to high doses of insulin playing a role. Will monitor closely and continue to adjust.

## 2020-10-28 VITALS
BODY MASS INDEX: 38.76 KG/M2 | HEIGHT: 68 IN | OXYGEN SATURATION: 92 % | WEIGHT: 255.75 LBS | DIASTOLIC BLOOD PRESSURE: 73 MMHG | SYSTOLIC BLOOD PRESSURE: 143 MMHG | HEART RATE: 81 BPM | TEMPERATURE: 98 F | RESPIRATION RATE: 16 BRPM

## 2020-10-28 PROBLEM — R05.9 COUGH: Status: ACTIVE | Noted: 2020-10-28

## 2020-10-28 LAB
POCT GLUCOSE: 309 MG/DL (ref 70–110)
POCT GLUCOSE: 407 MG/DL (ref 70–110)

## 2020-10-28 PROCEDURE — 99225 PR SUBSEQUENT OBSERVATION CARE,LEVEL II: CPT | Mod: HCWC,,, | Performed by: INTERNAL MEDICINE

## 2020-10-28 PROCEDURE — 63600175 PHARM REV CODE 636 W HCPCS: Mod: HCWC | Performed by: OBSTETRICS & GYNECOLOGY

## 2020-10-28 PROCEDURE — G0378 HOSPITAL OBSERVATION PER HR: HCPCS | Mod: HCWC

## 2020-10-28 PROCEDURE — 25000003 PHARM REV CODE 250: Mod: HCWC | Performed by: OBSTETRICS & GYNECOLOGY

## 2020-10-28 PROCEDURE — C9399 UNCLASSIFIED DRUGS OR BIOLOG: HCPCS | Mod: HCWC | Performed by: INTERNAL MEDICINE

## 2020-10-28 PROCEDURE — 25000003 PHARM REV CODE 250: Mod: HCWC | Performed by: STUDENT IN AN ORGANIZED HEALTH CARE EDUCATION/TRAINING PROGRAM

## 2020-10-28 PROCEDURE — 99225 PR SUBSEQUENT OBSERVATION CARE,LEVEL II: ICD-10-PCS | Mod: HCWC,,, | Performed by: INTERNAL MEDICINE

## 2020-10-28 PROCEDURE — 96372 THER/PROPH/DIAG INJ SC/IM: CPT

## 2020-10-28 PROCEDURE — 25000003 PHARM REV CODE 250: Mod: HCWC | Performed by: INTERNAL MEDICINE

## 2020-10-28 PROCEDURE — 94799 UNLISTED PULMONARY SVC/PX: CPT | Mod: HCWC

## 2020-10-28 RX ORDER — BENZONATATE 100 MG/1
100 CAPSULE ORAL 3 TIMES DAILY PRN
Status: DISCONTINUED | OUTPATIENT
Start: 2020-10-28 | End: 2020-10-28 | Stop reason: HOSPADM

## 2020-10-28 RX ORDER — FUROSEMIDE 20 MG/1
20 TABLET ORAL ONCE
Status: COMPLETED | OUTPATIENT
Start: 2020-10-28 | End: 2020-10-28

## 2020-10-28 RX ADMIN — FAMOTIDINE 20 MG: 10 INJECTION INTRAVENOUS at 09:10

## 2020-10-28 RX ADMIN — INSULIN DETEMIR 30 UNITS: 100 INJECTION, SOLUTION SUBCUTANEOUS at 09:10

## 2020-10-28 RX ADMIN — INSULIN ASPART 8 UNITS: 100 INJECTION, SOLUTION INTRAVENOUS; SUBCUTANEOUS at 09:10

## 2020-10-28 RX ADMIN — BENAZEPRIL HYDROCHLORIDE 10 MG: 10 TABLET ORAL at 09:10

## 2020-10-28 RX ADMIN — IBUPROFEN 600 MG: 600 TABLET, FILM COATED ORAL at 12:10

## 2020-10-28 RX ADMIN — FUROSEMIDE 20 MG: 20 TABLET ORAL at 12:10

## 2020-10-28 RX ADMIN — HYDROMORPHONE HYDROCHLORIDE 1 MG: 1 INJECTION, SOLUTION INTRAMUSCULAR; INTRAVENOUS; SUBCUTANEOUS at 09:10

## 2020-10-28 RX ADMIN — POLYETHYLENE GLYCOL 3350 17 G: 17 POWDER, FOR SOLUTION ORAL at 09:10

## 2020-10-28 RX ADMIN — INSULIN ASPART 10 UNITS: 100 INJECTION, SOLUTION INTRAVENOUS; SUBCUTANEOUS at 12:10

## 2020-10-28 RX ADMIN — HYDROCODONE BITARTRATE AND ACETAMINOPHEN 1 TABLET: 10; 325 TABLET ORAL at 03:10

## 2020-10-28 RX ADMIN — METFORMIN HYDROCHLORIDE 500 MG: 500 TABLET ORAL at 09:10

## 2020-10-28 RX ADMIN — IBUPROFEN 600 MG: 600 TABLET, FILM COATED ORAL at 06:10

## 2020-10-28 RX ADMIN — MUPIROCIN 1 G: 20 OINTMENT TOPICAL at 09:10

## 2020-10-28 NOTE — ASSESSMENT & PLAN NOTE
- Suspect a degree of atelectasis given post-op.  - Check CXR, start incentive spirometry, benzonatate 100mg PO TID PRN cough.

## 2020-10-28 NOTE — PATIENT CARE CONFERENCE
After Cifuentes was placed received 500ml of clear yellow urine and bladder scan showed a remaining 6ml. Patient awaiting transport team for discharge. Patient tolerated it well. Patient denies pain.

## 2020-10-28 NOTE — PROGRESS NOTES
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   Patient is doing well this morning. She reports mild abdominal pain that is relieved by scheduled PO pain medications. She denies vaginal bleeding. She has not ambulated yet. She is voiding via march as she failed her void trial yesterday. She is passing a small amount of flatus, and has not had BM. She is tolerating a regular diet without nausea or vomiting.    Scheduled Meds:   benazepriL  10 mg Oral Daily    famotidine (PF)  20 mg Intravenous Q12H    ibuprofen  600 mg Oral Q6H    insulin aspart U-100  20 Units Subcutaneous TIDWM    insulin detemir U-100  30 Units Subcutaneous BID    metFORMIN  500 mg Oral BID WM    mupirocin  1 g Nasal BID    polyethylene glycol  17 g Oral Daily    rosuvastatin  20 mg Oral QHS     Continuous Infusions:    PRN Meds:benzonatate, bisacodyL, dextrose 50%, dextrose 50%, diphenhydrAMINE, diphenhydrAMINE, glucagon (human recombinant), glucose, glucose, HYDROcodone-acetaminophen, HYDROcodone-acetaminophen, HYDROmorphone, influenza, insulin aspart U-100, ondansetron, promethazine (PHENERGAN) IVPB, sodium chloride 0.9%    Review of patient's allergies indicates:   Allergen Reactions    Penicillin g Shortness Of Breath    Penicillins Shortness Of Breath, Itching and Swelling       Objective:     Vital Signs (Most Recent):  Temp: 98.2 °F (36.8 °C) (10/28/20 0337)  Pulse: 79 (10/28/20 0337)  Resp: 20 (10/28/20 0337)  BP: (!) 129/58 (10/28/20 0337)  SpO2: (!) 93 % (10/28/20 0337) Vital Signs (24h Range):  Temp:  [98.2 °F (36.8 °C)-99.5 °F (37.5 °C)] 98.2 °F (36.8 °C)  Pulse:  [79-91] 79  Resp:  [18-20] 20  SpO2:  [93 %-97 %] 93 %  BP:  (109-137)/(56-63) 129/58     Weight: 116 kg (255 lb 11.7 oz)  Body mass index is 39.46 kg/m².  No LMP recorded. Patient has had a hysterectomy.    I&O (Last 24H):    Intake/Output Summary (Last 24 hours) at 10/28/2020 0838  Last data filed at 10/28/2020 0400  Gross per 24 hour   Intake 240 ml   Output 1550 ml   Net -1310 ml       Physical Exam:   Constitutional: She is oriented to person, place, and time. She appears well-developed.    Cardiovascular: Normal rate, regular rhythm, normal heart sounds and intact distal pulses.   Pulmonary/Chest: Effort normal and breath sounds normal. No respiratory distress. She has no wheezes.      Abdominal: Incision clean, dry, and intact, with steri-strips in place. Soft. Non-distended. There is mild tenderness  Genitourinary: packing removed, approx 5% saturated  Neurological: She is alert and oriented to person, place, and time.    Skin: Nails show no clubbing.    Psychiatric: She has a normal mood and affect.     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 [T83.711A] 10/26/2020 Yes    Cough [R05] 10/28/2020 No    Eroded bladder suspension mesh [T83.718A] 10/26/2020 Yes    Type 2 diabetes mellitus with hyperglycemia, with long-term current use of insulin [E11.65, Z79.4] 07/17/2020 Not Applicable    Hyperlipidemia associated with type 2 diabetes mellitus [E11.69, E78.5] 07/26/2018 Yes    Essential hypertension [I10] 07/06/2012 Yes      Problems Resolved During this Admission:    Diagnosis Date Noted Date Resolved POA    Eroded bladder suspension mesh [T83.718A] 10/26/2020 10/26/2020 Yes       POD#2  s/p RA-mesh revision/JACLYN   - Doing well, no acute events overnight  - continue scheduled ibuprofen/PRN norco  - Continue  IS  - Remove March catheter this morning, passive void trial; if fails will d/c home with march  - Encourage ambulation  - Continue scheduled colace  - SCDs for DVT PPX    IDT2DM  - pt hyperglycemic to 400s following surgery  - IM continuing to follow   - initial difficulty with hyperglycemia following surgery, pt then had episode of asymptomatic hypoglycemia; insulin adjustments per IM recommendations   - home insulin: lantus 50u BID, aspart 25 TIDWM, metformin 500mg BID   - goal BG <200 postoperatively    HLD  - continue home rosuvastatin    HTN  - BP: (109-137)/(56-63) 129/58  - continue home benazepril    Anticipate discharge today pending glucose control/void trial.    Diana Lozano MD  Gynecology   [facility]

## 2020-10-28 NOTE — ASSESSMENT & PLAN NOTE
- Uncontrolled. HgbA1c 10.3.  - Reported home regimen of insulin detemir 50U subQ BID, insulin aspart 25U subQ TIDWM, metformin 500mg PO BID.  - Received dexamethasone 4mg IV x1 intra-operatively with subsequent hyperglycemia.  - Hyperglycemic this morning (309) but on chart review didn't receive insulin detemir last night.  - As such, would continue insulin detemir at 30U subQ BID, insulin aspart at 20U subQ TIDWM, moderate-dose sliding scale insulin aspart 1-10U subQ TIDWM PRN, metformin 500mg PO BID.

## 2020-10-28 NOTE — SUBJECTIVE & OBJECTIVE
Interval History: No acute events overnight. Glucose elevated this morning but did not receive her insulin detemir last night. Having some cough this AM.    Review of Systems   Constitutional: Negative for chills and fever.   Respiratory: Positive for cough. Negative for shortness of breath.    Cardiovascular: Negative for chest pain and palpitations.   Gastrointestinal: Positive for abdominal pain. Negative for nausea and vomiting.     Objective:     Vital Signs (Most Recent):  Temp: 98.2 °F (36.8 °C) (10/28/20 0337)  Pulse: 79 (10/28/20 0337)  Resp: 20 (10/28/20 0337)  BP: (!) 129/58 (10/28/20 0337)  SpO2: (!) 93 % (10/28/20 0337) Vital Signs (24h Range):  Temp:  [98.2 °F (36.8 °C)-99.5 °F (37.5 °C)] 98.2 °F (36.8 °C)  Pulse:  [73-91] 79  Resp:  [17-20] 20  SpO2:  [93 %-97 %] 93 %  BP: (109-137)/(56-63) 129/58     Weight: 116 kg (255 lb 11.7 oz)  Body mass index is 39.46 kg/m².    Intake/Output Summary (Last 24 hours) at 10/28/2020 0741  Last data filed at 10/28/2020 0400  Gross per 24 hour   Intake 240 ml   Output 1550 ml   Net -1310 ml      Physical Exam  Vitals signs and nursing note reviewed.   Constitutional:       General: She is not in acute distress.     Appearance: She is well-developed.   HENT:      Head: Normocephalic and atraumatic.   Eyes:      General:         Right eye: No discharge.         Left eye: No discharge.      Conjunctiva/sclera: Conjunctivae normal.   Cardiovascular:      Rate and Rhythm: Normal rate.      Pulses: Normal pulses.   Pulmonary:      Effort: Pulmonary effort is normal. No respiratory distress.      Comments: Mildly coarse / expiratory wheezes  Abdominal:      Palpations: Abdomen is soft.      Tenderness: There is abdominal tenderness (mild, near surgical sites).   Musculoskeletal: Normal range of motion.      Right lower leg: No edema.      Left lower leg: No edema.   Skin:     General: Skin is warm and dry.   Neurological:      Mental Status: She is alert and oriented to  person, place, and time.       Significant Labs:   POCT Glucose:  Recent Labs   Lab 10/27/20  1508 10/27/20  1528 10/27/20  1624 10/27/20  2046   POCTGLUCOSE 53* 54* 71 186*      Significant Imaging:   No new imaging this morning.

## 2020-10-28 NOTE — PLAN OF CARE
Plan of care reviewed with patient. Pt remains free from fall, injury, and skin breakdown. Activity and increase in fluids encouraged, Pt refuse schedule long acting insulin, Pt stated, she hasn't eaten anything and has only drink water, Jello and crackers given, Pt only ate 2 spoons full, Blood glucose monitoring maintained, Pain controlled with current pain regimen, Cifuentes to gravity draining clear yellow urine, Purposeful hourly rounding done. Safety maintained. Patient lying in bed in no distress. Will continue to monitor.

## 2020-10-28 NOTE — NURSING
Patient ambulated to the bathroom x3. Patient had a total of 300ml. However, the bladder scan showed 451. Cifuentes catheter will be placed for discharge per Dr. Boecking bedside orders due to retention.

## 2020-10-28 NOTE — DISCHARGE SUMMARY
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 [T83.711A]  Yes    Cough [R05]  No    Eroded bladder suspension mesh [T83.718A]  Yes    Type 2 diabetes mellitus with hyperglycemia, with long-term current use of insulin [E11.65, Z79.4]  Not Applicable    Hyperlipidemia associated with type 2 diabetes mellitus [E11.69, E78.5]  Yes    Essential hypertension [I10]  Yes      Resolved Hospital Problems    Diagnosis Date Resolved POA    Eroded bladder suspension mesh [T83.718A] 10/26/2020 Yes       Procedures: Procedure(s) (LRB):  REVISION, IMPLANTED DEVICE / MESH - ROBOTIC (N/A)  XI ROBOTIC LYSIS, ADHESIONS (N/A)  CYSTOSCOPY (N/A)    Discharged Condition: good    Hospital Course:   Navin Beck is a 60 y.o. y.o.  female who presented on 10/26/2020   for above procedures for the treatment erosion of implanted vaginal mesh and prosthetic materials. Patient tolerated procedure. PMH significant for DM, HTN, and hyperthyroid. Post-operative course was complicated by blood glucose control. Patient received dexamethasone intraoperatively, and blood sugars were elevated in the 400s in the post-operative period. Hospital medicine was consulted to assist with management in the setting of brittle diabetes mellitus. On day of discharge, patient was ambulating, blood sugar well controlled, and tolerating a regular diet without difficulty. She did not pass post operative passive void trial and went home with a march catheter. Pain was well controlled on PO medication. She was discharged home on POD#2 in stable condition with instructions to follow up with Dr. Goel for repeat void trial, and primary care physician (  Jocelyn.    Consults: Hospital Medicine    Significant Diagnostic Studies:  Recent Labs   Lab 10/27/20  0359   WBC 15.44*   HGB 10.2*   HCT 32.0*   MCV 93           Treatments:   1. Surgery as above    Disposition: Home or Self Care    Patient Instructions:   Current Discharge Medication List      START taking these medications    Details   HYDROcodone-acetaminophen (NORCO) 5-325 mg per tablet Take 1 tablet by mouth every 6 (six) hours as needed for Pain.  Qty: 20 tablet, Refills: 0    Comments: Quantity prescribed more than 7 day supply? No      ibuprofen (ADVIL,MOTRIN) 600 MG tablet Take 1 tablet (600 mg total) by mouth 3 (three) times daily.  Qty: 60 tablet, Refills: 2         CONTINUE these medications which have NOT CHANGED    Details   albuterol (PROVENTIL/VENTOLIN HFA) 90 mcg/actuation inhaler       benazepril (LOTENSIN) 10 MG tablet Take 10 mg by mouth once daily.       buprenorphine HCL (BELBUCA) 75 mcg Film once daily.     Comments: Quantity prescribed more than 7 day supply? Press F2 and select one:[id number]        DULoxetine (CYMBALTA) 60 MG capsule duloxetine 60 mg capsule,delayed release      flurazepam (DALMANE) 30 MG capsule nightly as needed.       fluticasone propionate (FLOVENT HFA) 44 mcg/actuation inhaler Flovent HFA 44 mcg/actuation aerosol inhaler      gabapentin (NEURONTIN) 600 MG tablet Take 1 tablet (600 mg total) by mouth 3 (three) times daily.      insulin glargine (LANTUS U-100 INSULIN) 100 unit/mL injection Inject 50 Units into the skin 2 (two) times daily.      pantoprazole (PROTONIX) 40 MG tablet Take 1 tablet (40 mg total) by mouth once daily. Start after the twice daily Protonix is complete.Follow-up with doctor for further refills.  Qty: 30 tablet, Refills: 1      rosuvastatin (CRESTOR) 20 MG tablet rosuvastatin 20 mg tablet      tamsulosin (FLOMAX) 0.4 mg Cp24 Take 0.4 mg by mouth once daily.       acetaminophen (TYLENOL) 500 MG tablet Take 1,000 mg by mouth 3 (three)  times daily as needed (fever and chills).      blood-glucose meter (TRUE METRIX GLUCOSE METER MISC) True Metrix Glucose Meter      diphenoxylate-atropine 2.5-0.025 mg (LOMOTIL) 2.5-0.025 mg per tablet as needed.       estradioL (ESTRACE) 0.01 % (0.1 mg/gram) vaginal cream Place 1 g vaginally 3 (three) times a week. 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 Three times a day with meals according to sliding scale.      METFORMIN HCL (METFORMIN ORAL) Take 500 mg by mouth 2 (two) times daily.      methimazole (TAPAZOLE) 10 MG Tab Take 10 mg by mouth once daily.       tiZANidine (ZANAFLEX) 4 MG tablet tizanidine 4 mg tablet      TRUE METRIX GLUCOSE TEST STRIP Strp       zolpidem (AMBIEN) 10 mg Tab Take 10 mg by mouth every evening.         STOP taking these medications       nitrofurantoin (MACRODANTIN) 100 MG capsule Comments:   Reason for Stopping:         metroNIDAZOLE (METROGEL) 0.75 % vaginal gel Comments:   Reason for Stopping:         hydrocodone-acetaminophen 10-325mg (NORCO)  mg Tab Comments:   Reason for Stopping:               Discharge Procedure Orders   Diet Adult Regular     Pelvic Rest   Order Comments: Nothing in vagina for 6 weeks     Lifting restrictions   Order Comments: Nothing heavier than 5 lbs for 6 weeks     No driving until:   Order Comments: Until not taking narcotics     Notify your health care provider if you experience any of the following:  temperature >100.4     Notify your health care provider if you experience any of the following:  persistent nausea and vomiting or diarrhea     Notify your health care provider if you experience any of the following:  severe uncontrolled pain     Notify your health care provider if you experience any of the  following:  redness, tenderness, or signs of infection (pain, swelling, redness, odor or green/yellow discharge around incision site)     Notify your health care provider if you experience any of the following:  difficulty breathing or increased cough     Notify your health care provider if you experience any of the following:  severe persistent headache     Notify your health care provider if you experience any of the following:  persistent dizziness, light-headedness, or visual disturbances     Notify your health care provider if you experience any of the following:  increased confusion or weakness     Notify your health care provider if you experience any of the following:   Order Comments: Heavy vaginal bleeding     Activity as tolerated       Follow-up Information     Schedule an appointment as soon as possible for a visit with Wilner Goel MD.    Specialties: Gynecology, Urology  Why: post operative follow up  Contact information:  [address] [place] [address]  [address]  [facility] [address] [phone]             Schedule an appointment as soon as possible for a visit with Primary Care Physician.                 Katherine Boecking MD   Ob/Gyn PGY-1

## 2020-10-28 NOTE — PROGRESS NOTES
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 She had remote hysterectomy in 2010. She developed vaginal bleeding and was found to have erosion of vaginal mesh so presented today for revision. Post-operatively she was found to have hyperglycemia with glucose >400 so hospital medicine was consulted. She is seen post-op and is still drowsy from surgery. She has had diabetes for many years. At home she takes Levemir and aspart. Aspart was recently increased from 15 units TID to 25 units TID. She reports some mild abdominal discomfort but otherwise feeling okay. Denies SOB, chest pain, nausea.    Interval History: No acute events overnight. Glucose elevated this morning but did not receive her insulin detemir last night. Having some cough this AM.    Review of Systems   Constitutional: Negative for chills and fever.   Respiratory: Positive for cough. Negative for shortness of breath.    Cardiovascular: Negative for chest pain and palpitations.   Gastrointestinal: Positive for abdominal pain. Negative for nausea and vomiting.     Objective:     Vital Signs (Most Recent):  Temp: 98.2 °F (36.8 °C) (10/28/20 0337)  Pulse: 79 (10/28/20 0337)  Resp: 20 (10/28/20 0337)  BP: (!) 129/58 (10/28/20 0337)  SpO2: (!) 93 % (10/28/20 0337) Vital Signs (24h Range):  Temp:  [98.2 °F (36.8 °C)-99.5 °F (37.5 °C)] 98.2 °F (36.8 °C)  Pulse:  [73-91] 79  Resp:  [17-20] 20  SpO2:  [93 %-97 %] 93 %  BP: (109-137)/(56-63) 129/58     Weight: 116 kg (255 lb 11.7 oz)  Body mass index is 39.46 kg/m².    Intake/Output  Summary (Last 24 hours) at 10/28/2020 0741  Last data filed at 10/28/2020 0400  Gross per 24 hour   Intake 240 ml   Output 1550 ml   Net -1310 ml      Physical Exam  Vitals signs and nursing note reviewed.   Constitutional:       General: She is not in acute distress.     Appearance: She is well-developed.   HENT:      Head: Normocephalic and atraumatic.   Eyes:      General:         Right eye: No discharge.         Left eye: No discharge.      Conjunctiva/sclera: Conjunctivae normal.   Cardiovascular:      Rate and Rhythm: Normal rate.      Pulses: Normal pulses.   Pulmonary:      Effort: Pulmonary effort is normal. No respiratory distress.      Comments: Mildly coarse / expiratory wheezes  Abdominal:      Palpations: Abdomen is soft.      Tenderness: There is abdominal tenderness (mild, near surgical sites).   Musculoskeletal: Normal range of motion.      Right lower leg: No edema.      Left lower leg: No edema.   Skin:     General: Skin is warm and dry.   Neurological:      Mental Status: She is alert and oriented to person, place, and time.       Significant Labs:   POCT Glucose:  Recent Labs   Lab 10/27/20  1508 10/27/20  1528 10/27/20  1624 10/27/20  2046   POCTGLUCOSE 53* 54* 71 186*      Significant Imaging:   No new imaging this morning.      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  - Post-op management per primary.    Cough  - Suspect a degree of atelectasis given post-op.  - Check CXR, start incentive spirometry, benzonatate 100mg PO TID PRN cough.    Type 2 diabetes mellitus with hyperglycemia, with long-term current use of insulin  - Uncontrolled. HgbA1c 10.3.  - Reported home regimen of insulin detemir 50U subQ BID, insulin aspart 25U subQ TIDWM, metformin 500mg PO BID.  - Received dexamethasone 4mg IV x1 intra-operatively with subsequent hyperglycemia.  - Hyperglycemic this morning (309) but on  chart review didn't receive insulin detemir last night.  - As such, would continue insulin detemir at 30U subQ BID, insulin aspart at 20U subQ TIDWM, moderate-dose sliding scale insulin aspart 1-10U subQ TIDWM PRN, metformin 500mg PO BID.    Hyperlipidemia associated with type 2 diabetes mellitus  - Continue rosuvastatin 20mg PO daily.    Essential hypertension  - Continue benazepril 10mg PO daily.    VTE Risk Mitigation (From admission, onward)    None        Not unreasonable upon discharge to have her resume her home insulin regimen of insulin detemir 50U subQ BID, insulin aspart 25U subQ TIDWM, metformin 500mg PO BID and follow-up with her PCP in the next week with a glucose log.    I will continue to follow with you. Please do not hesitate to contact me with any questions or concerns.     MARIA DEL ROSARIO South MD  Department of Hospital Medicine   [facility]  [phone]

## 2020-10-29 ENCOUNTER — TELEPHONE (OUTPATIENT)
Dept: UROGYNECOLOGY | Facility: CLINIC | Age: 60
End: 2020-10-29

## 2020-10-29 NOTE — TELEPHONE ENCOUNTER
----- Message from Wilner Goel MD sent at 10/28/2020 12:27 PM CDT -----  Left bring patient in on Monday for voiding trial

## 2020-10-29 NOTE — TELEPHONE ENCOUNTER
Spoke to pt. And scheduled an appt. For 11/2/2020 @1pm for voiding trial. Pt. Verbalized understanding. Pt. Also c/o severe pain around incisions. Pt stated her last dose of pain med was 2hrs. Ago but does not feel as if it is helping at all. I offered pt to come in to the clinic today by pt. Declined stating she does not have trqansportation until after 4:30pm. I informed pt. That  will give her a call shortly to check on her again. Pt. Verbalized understanding and appreciation.

## 2020-10-29 NOTE — PLAN OF CARE
10/29/20 0944   Final Note   Assessment Type Final Discharge Note   Anticipated Discharge Disposition Home   What phone number can be called within the next 1-3 days to see how you are doing after discharge?   ([phone])   Hospital Follow Up  Appt(s) scheduled? No

## 2020-11-02 ENCOUNTER — TELEPHONE (OUTPATIENT)
Dept: UROGYNECOLOGY | Facility: CLINIC | Age: 60
End: 2020-11-02

## 2020-11-02 NOTE — TELEPHONE ENCOUNTER
Pt. Stated she canceled her appt. Today due to lack of transportation. Pt. Offered to see  on 11/4/2020 @[facility],pt. Declined due to transportation.Pt. agreed to keep her next appt. On 11/9/2020., verbal per  pt. Ok to keep catheter in place until then. Pt. Verbalized understanding

## 2020-11-09 ENCOUNTER — OFFICE VISIT (OUTPATIENT)
Dept: UROGYNECOLOGY | Facility: CLINIC | Age: 60
End: 2020-11-09
Payer: MEDICARE

## 2020-11-09 VITALS — HEIGHT: 68 IN | BODY MASS INDEX: 39.46 KG/M2

## 2020-11-09 DIAGNOSIS — Z09 POSTOP CHECK: ICD-10-CM

## 2020-11-09 DIAGNOSIS — N39.0 URINARY TRACT INFECTION WITHOUT HEMATURIA, SITE UNSPECIFIED: Primary | ICD-10-CM

## 2020-11-09 PROCEDURE — 87186 SC STD MICRODIL/AGAR DIL: CPT | Mod: HCWC

## 2020-11-09 PROCEDURE — 99024 PR POST-OP FOLLOW-UP VISIT: ICD-10-PCS | Mod: HCWC,S$GLB,, | Performed by: OBSTETRICS & GYNECOLOGY

## 2020-11-09 PROCEDURE — 99999 PR PBB SHADOW E&M-EST. PATIENT-LVL III: CPT | Mod: PBBFAC,HCWC,, | Performed by: OBSTETRICS & GYNECOLOGY

## 2020-11-09 PROCEDURE — 99024 POSTOP FOLLOW-UP VISIT: CPT | Mod: HCWC,S$GLB,, | Performed by: OBSTETRICS & GYNECOLOGY

## 2020-11-09 PROCEDURE — 87086 URINE CULTURE/COLONY COUNT: CPT | Mod: HCWC

## 2020-11-09 PROCEDURE — 99999 PR PBB SHADOW E&M-EST. PATIENT-LVL III: ICD-10-PCS | Mod: PBBFAC,HCWC,, | Performed by: OBSTETRICS & GYNECOLOGY

## 2020-11-09 PROCEDURE — 87077 CULTURE AEROBIC IDENTIFY: CPT | Mod: HCWC

## 2020-11-09 PROCEDURE — 87088 URINE BACTERIA CULTURE: CPT | Mod: HCWC

## 2020-11-09 RX ORDER — METRONIDAZOLE 7.5 MG/G
1 GEL VAGINAL NIGHTLY
Qty: 70 G | Refills: 0 | Status: SHIPPED | OUTPATIENT
Start: 2020-11-09 | End: 2020-11-14

## 2020-11-09 RX ORDER — FLUCONAZOLE 150 MG/1
150 TABLET ORAL
Qty: 3 TABLET | Refills: 0 | Status: SHIPPED | OUTPATIENT
Start: 2020-11-09 | End: 2020-11-19

## 2020-11-09 RX ORDER — NITROFURANTOIN (MACROCRYSTALS) 100 MG/1
100 CAPSULE ORAL 2 TIMES DAILY
Qty: 14 CAPSULE | Refills: 0 | Status: SHIPPED | OUTPATIENT
Start: 2020-11-09 | End: 2020-11-16

## 2020-11-09 NOTE — PROGRESS NOTES
The patient was placed in dorsal lithotomy position with feet in stirrups.    The bladder was filled with approximately 40 mL of sterile water to gravity with a 60 mL march tipped syringe, at which point patient begin to have bladder spasms preventing any fluids from passing.The catheter was removed. She voided 50 ml. She tolerated the procedure well.Pt. was then assisted back to the room for exam with .

## 2020-11-09 NOTE — PROGRESS NOTES
Subjective:      Patient ID: Navin Beck is a 60 y.o. female.    Chief Complaint:  Post-op Evaluation (voiding trial )      History of Present Illness  Patient passed void trial significant the yeast in VV          Past Medical History:   Diagnosis Date    Allergy     Arrhythmia     Arthritis     Bronchial asthma     Diabetes mellitus     Glaucoma     Hormone disorder     hysterectomy    Hypercholesteremia     Hypertension     Hyperthyroidism     Insomnia     Kidney stones     Thyroid disease     Urine, incontinence, stress female        Past Surgical History:   Procedure Laterality Date    APPENDECTOMY      BACK SURGERY      disc removal     CARPAL TUNNEL RELEASE Bilateral     CHOLECYSTECTOMY      CYSTOSCOPY N/A 10/26/2020    Procedure: CYSTOSCOPY;  Surgeon: Wilner Goel MD;  Location: [facility];  Service: OB/GYN;  Laterality: N/A;    FRACTURE SURGERY Right     wrist    [facility]      [facility]    JOINT REPLACEMENT      KNEE ARTHROSCOPY W/ DEBRIDEMENT      KNEE SURGERY Right     Knee replacement    LITHOTRIPSY      ND REMOVAL OF OVARY/TUBE(S)  13    benign    removal of round ligament mass      ROBOT-ASSISTED LAPAROSCOPIC LYSIS OF ADHESIONS USING DA JAMES XI N/A 10/26/2020    Procedure: XI ROBOTIC LYSIS, ADHESIONS;  Surgeon: Wilner Goel MD;  Location: [facility] OR;  Service: OB/GYN;  Laterality: N/A;  ROBOTIC MOBILIZATION OF BLADDER- DR BURNHAM    SPINE SURGERY      WRIST FRACTURE SURGERY Left        GYN & OB History  No LMP recorded. Patient has had a hysterectomy.   Date of Last Pap: No result found    OB History    Para Term  AB Living   5 4 3 1 1 3   SAB TAB Ectopic Multiple Live Births   1       4      # Outcome Date GA Lbr Davon/2nd Weight Sex Delivery Anes PTL Lv   5 Term     M Vag-Spont   GUERRERO   4 Term     M Vag-Spont   GUERRERO   3 Term     M Vag-Spont   GUERRERO   2 SAB            1      M    [facility]  Maintenance       Date Due Completion Date    Hepatitis C Screening 1960 ---    Foot Exam 06/30/1968 ---    HIV Screening 06/30/1973 ---    TETANUS VACCINE 06/30/1976 ---    Shingles Vaccine (1 of 2) 06/30/2008 ---    Mammogram 02/17/2019 2/17/2017    Eye Exam 08/21/2019 8/21/2018    Influenza Vaccine (1) 08/01/2020 ---    Hemoglobin A1c 03/25/2021 9/25/2020    Lipid Panel 09/25/2021 9/25/2020    Colorectal Cancer Screening 08/17/2022 8/17/2012          Family History   Problem Relation Age of Onset    Cancer Mother     Cancer Brother     Colon cancer Brother     Cancer Brother     Cancer Brother     Cancer Brother     Ovarian cancer Neg Hx     Uterine cancer Neg Hx     Breast cancer Neg Hx        Social History     Socioeconomic History    Marital status:      Spouse name: Not on file    Number of children: Not on file    Years of education: Not on file    Highest education level: Not on file   Occupational History    Not on file   Social Needs    Financial resource strain: Not on file    Food insecurity     Worry: Not on file     Inability: Not on file    Transportation needs     Medical: Not on file     Non-medical: Not on file   Tobacco Use    Smoking status: Current Every Day Smoker     Packs/day: 0.50     Years: 30.00     Pack years: 15.00    Smokeless tobacco: Never Used   Substance and Sexual Activity    Alcohol use: No    Drug use: No    Sexual activity: Not Currently     Partners: Male     Birth control/protection: Surgical   Lifestyle    Physical activity     Days per week: Not on file     Minutes per session: Not on file    Stress: Not on file   Relationships    Social connections     Talks on phone: Not on file     Gets together: Not on file     Attends Synagogue service: Not on file     Active member of club or organization: Not on file     Attends meetings of clubs or organizations: Not on file     Relationship status: Not on file   Other Topics Concern    Not on  "file   Social History Narrative    Not on file       Review of Systems  Review of Systems   All other systems reviewed and are negative.         Objective:   Ht 5' 7.5" (1.715 m)   BMI 39.46 kg/m²     Physical Exam   Healing  Assessment:     1. Urinary tract infection without hematuria, site unspecified    2. Postop check            Plan:     1. Urinary tract infection without hematuria, site unspecified    2. Postop check     Patient is doing well.  I there is some bacterial vaginosis as well as yeast as well as possible UTI.  Patient will be treated with Diflucan 1 tablet at every 3 days for 3 doses patient will have MetroGel 1 applicator each night for 5 nights and Macrobid b.i.d. for 7 days.    There are no Patient Instructions on file for this visit.        "

## 2020-11-11 ENCOUNTER — TELEPHONE (OUTPATIENT)
Dept: OBSTETRICS AND GYNECOLOGY | Facility: CLINIC | Age: 60
End: 2020-11-11

## 2020-11-11 DIAGNOSIS — T83.711A EROSION OF IMPLANTED VAGINAL MESH AND PROSTHETIC MATERIALS, INITIAL ENCOUNTER: ICD-10-CM

## 2020-11-11 RX ORDER — ESTRADIOL 0.1 MG/G
1 CREAM VAGINAL
Qty: 42.5 G | Refills: 1 | Status: SHIPPED | OUTPATIENT
Start: 2020-11-11 | End: 2020-12-08

## 2020-11-12 LAB — BACTERIA UR CULT: ABNORMAL

## 2020-11-19 RX ORDER — OXYCODONE AND ACETAMINOPHEN 7.5; 325 MG/1; MG/1
1 TABLET ORAL EVERY 8 HOURS PRN
Qty: 21 TABLET | Refills: 0 | Status: ON HOLD | OUTPATIENT
Start: 2020-11-19 | End: 2020-11-30 | Stop reason: HOSPADM

## 2020-11-26 ENCOUNTER — HOSPITAL ENCOUNTER (INPATIENT)
Facility: HOSPITAL | Age: 60
LOS: 16 days | Discharge: HOME HEALTH | DRG: 637 | End: 2020-12-12
Attending: EMERGENCY MEDICINE | Admitting: INTERNAL MEDICINE
Payer: MEDICARE

## 2020-11-26 DIAGNOSIS — E11.65 TYPE 2 DIABETES MELLITUS WITH HYPERGLYCEMIA, WITH LONG-TERM CURRENT USE OF INSULIN: ICD-10-CM

## 2020-11-26 DIAGNOSIS — E05.90 HYPERTHYROIDISM: ICD-10-CM

## 2020-11-26 DIAGNOSIS — E66.01 SEVERE OBESITY (BMI 35.0-35.9 WITH COMORBIDITY): ICD-10-CM

## 2020-11-26 DIAGNOSIS — Z74.09 IMPAIRED MOBILITY AND ENDURANCE: ICD-10-CM

## 2020-11-26 DIAGNOSIS — Z79.4 TYPE 2 DIABETES MELLITUS WITH HYPERGLYCEMIA, WITH LONG-TERM CURRENT USE OF INSULIN: ICD-10-CM

## 2020-11-26 DIAGNOSIS — N17.9 AKI (ACUTE KIDNEY INJURY): ICD-10-CM

## 2020-11-26 DIAGNOSIS — D72.9 ABNORMAL WHITE BLOOD CELL (WBC): ICD-10-CM

## 2020-11-26 DIAGNOSIS — I10 ESSENTIAL HYPERTENSION: ICD-10-CM

## 2020-11-26 DIAGNOSIS — E11.10 DIABETIC KETOACIDOSIS WITHOUT COMA ASSOCIATED WITH TYPE 2 DIABETES MELLITUS: Primary | ICD-10-CM

## 2020-11-26 DIAGNOSIS — R93.41 ABNORMAL COMPUTED TOMOGRAPHY OF BLADDER: ICD-10-CM

## 2020-11-26 DIAGNOSIS — N17.9 ENCOUNTER FOR CONTINUOUS RENAL REPLACEMENT THERAPY (CRRT) FOR ACUTE RENAL FAILURE: ICD-10-CM

## 2020-11-26 DIAGNOSIS — R00.0 TACHYCARDIA: ICD-10-CM

## 2020-11-26 DIAGNOSIS — E87.1 HYPONATREMIA: ICD-10-CM

## 2020-11-26 DIAGNOSIS — R33.9 URINARY RETENTION: ICD-10-CM

## 2020-11-26 DIAGNOSIS — T81.43XA POSTPROCEDURAL INTRAABDOMINAL ABSCESS: ICD-10-CM

## 2020-11-26 DIAGNOSIS — N17.0 ACUTE KIDNEY INJURY (AKI) WITH ACUTE TUBULAR NECROSIS (ATN): ICD-10-CM

## 2020-11-26 LAB
ALBUMIN SERPL BCP-MCNC: 2.4 G/DL (ref 3.5–5.2)
ALP SERPL-CCNC: 188 U/L (ref 55–135)
ALT SERPL W/O P-5'-P-CCNC: 14 U/L (ref 10–44)
ANION GAP SERPL CALC-SCNC: 16 MMOL/L (ref 8–16)
AST SERPL-CCNC: 10 U/L (ref 10–40)
BACTERIA #/AREA URNS AUTO: ABNORMAL /HPF
BASOPHILS # BLD AUTO: 0.04 K/UL (ref 0–0.2)
BASOPHILS NFR BLD: 0.2 % (ref 0–1.9)
BILIRUB SERPL-MCNC: 0.2 MG/DL (ref 0.1–1)
BILIRUB UR QL STRIP: NEGATIVE
BUN SERPL-MCNC: 19 MG/DL (ref 8–23)
CALCIUM SERPL-MCNC: 8.9 MG/DL (ref 8.7–10.5)
CHLORIDE SERPL-SCNC: 96 MMOL/L (ref 95–110)
CLARITY UR REFRACT.AUTO: ABNORMAL
CO2 SERPL-SCNC: 16 MMOL/L (ref 23–29)
COLOR UR AUTO: ABNORMAL
CREAT SERPL-MCNC: 1.5 MG/DL (ref 0.5–1.4)
DIFFERENTIAL METHOD: ABNORMAL
EOSINOPHIL # BLD AUTO: 0 K/UL (ref 0–0.5)
EOSINOPHIL NFR BLD: 0.2 % (ref 0–8)
ERYTHROCYTE [DISTWIDTH] IN BLOOD BY AUTOMATED COUNT: 13 % (ref 11.5–14.5)
EST. GFR  (AFRICAN AMERICAN): 42.7 ML/MIN/1.73 M^2
EST. GFR  (NON AFRICAN AMERICAN): 37.1 ML/MIN/1.73 M^2
GLUCOSE SERPL-MCNC: 626 MG/DL (ref 70–110)
GLUCOSE UR QL STRIP: ABNORMAL
HCT VFR BLD AUTO: 31.7 % (ref 37–48.5)
HGB BLD-MCNC: 9.8 G/DL (ref 12–16)
HGB UR QL STRIP: ABNORMAL
IMM GRANULOCYTES # BLD AUTO: 0.13 K/UL (ref 0–0.04)
IMM GRANULOCYTES NFR BLD AUTO: 0.7 % (ref 0–0.5)
KETONES UR QL STRIP: ABNORMAL
LEUKOCYTE ESTERASE UR QL STRIP: ABNORMAL
LIPASE SERPL-CCNC: 6 U/L (ref 4–60)
LYMPHOCYTES # BLD AUTO: 3 K/UL (ref 1–4.8)
LYMPHOCYTES NFR BLD: 16.8 % (ref 18–48)
MCH RBC QN AUTO: 28.9 PG (ref 27–31)
MCHC RBC AUTO-ENTMCNC: 30.9 G/DL (ref 32–36)
MCV RBC AUTO: 94 FL (ref 82–98)
MICROSCOPIC COMMENT: ABNORMAL
MONOCYTES # BLD AUTO: 1.4 K/UL (ref 0.3–1)
MONOCYTES NFR BLD: 7.6 % (ref 4–15)
NEUTROPHILS # BLD AUTO: 13.3 K/UL (ref 1.8–7.7)
NEUTROPHILS NFR BLD: 74.5 % (ref 38–73)
NITRITE UR QL STRIP: NEGATIVE
NRBC BLD-RTO: 0 /100 WBC
PH UR STRIP: 5 [PH] (ref 5–8)
PLATELET # BLD AUTO: 657 K/UL (ref 150–350)
PMV BLD AUTO: 10.2 FL (ref 9.2–12.9)
POCT GLUCOSE: >500 MG/DL (ref 70–110)
POCT GLUCOSE: >500 MG/DL (ref 70–110)
POTASSIUM SERPL-SCNC: 5.3 MMOL/L (ref 3.5–5.1)
PROT SERPL-MCNC: 8.6 G/DL (ref 6–8.4)
PROT UR QL STRIP: NEGATIVE
RBC # BLD AUTO: 3.39 M/UL (ref 4–5.4)
RBC #/AREA URNS AUTO: 2 /HPF (ref 0–4)
SODIUM SERPL-SCNC: 128 MMOL/L (ref 136–145)
SP GR UR STRIP: 1.02 (ref 1–1.03)
SQUAMOUS #/AREA URNS AUTO: 3 /HPF
URN SPEC COLLECT METH UR: ABNORMAL
WBC # BLD AUTO: 17.85 K/UL (ref 3.9–12.7)
WBC #/AREA URNS AUTO: >100 /HPF (ref 0–5)
YEAST UR QL AUTO: ABNORMAL

## 2020-11-26 PROCEDURE — 99284 EMERGENCY DEPT VISIT MOD MDM: CPT | Mod: ,,, | Performed by: EMERGENCY MEDICINE

## 2020-11-26 PROCEDURE — 87077 CULTURE AEROBIC IDENTIFY: CPT | Mod: HCWC

## 2020-11-26 PROCEDURE — 96365 THER/PROPH/DIAG IV INF INIT: CPT | Mod: 59,HCWC

## 2020-11-26 PROCEDURE — 96374 THER/PROPH/DIAG INJ IV PUSH: CPT | Mod: HCWC

## 2020-11-26 PROCEDURE — 96375 TX/PRO/DX INJ NEW DRUG ADDON: CPT | Mod: HCWC

## 2020-11-26 PROCEDURE — 25000003 PHARM REV CODE 250: Mod: HCWC | Performed by: STUDENT IN AN ORGANIZED HEALTH CARE EDUCATION/TRAINING PROGRAM

## 2020-11-26 PROCEDURE — 99285 EMERGENCY DEPT VISIT HI MDM: CPT | Mod: 25,HCWC

## 2020-11-26 PROCEDURE — 87086 URINE CULTURE/COLONY COUNT: CPT | Mod: HCWC

## 2020-11-26 PROCEDURE — 96361 HYDRATE IV INFUSION ADD-ON: CPT | Mod: HCWC

## 2020-11-26 PROCEDURE — 63600175 PHARM REV CODE 636 W HCPCS: Mod: HCWC | Performed by: STUDENT IN AN ORGANIZED HEALTH CARE EDUCATION/TRAINING PROGRAM

## 2020-11-26 PROCEDURE — 96372 THER/PROPH/DIAG INJ SC/IM: CPT | Mod: HCWC,59

## 2020-11-26 PROCEDURE — 83690 ASSAY OF LIPASE: CPT | Mod: HCWC

## 2020-11-26 PROCEDURE — 80053 COMPREHEN METABOLIC PANEL: CPT | Mod: HCWC

## 2020-11-26 PROCEDURE — 12000002 HC ACUTE/MED SURGE SEMI-PRIVATE ROOM: Mod: HCWC

## 2020-11-26 PROCEDURE — 99284 PR EMERGENCY DEPT VISIT,LEVEL IV: ICD-10-PCS | Mod: ,,, | Performed by: EMERGENCY MEDICINE

## 2020-11-26 PROCEDURE — 82010 KETONE BODYS QUAN: CPT | Mod: HCWC

## 2020-11-26 PROCEDURE — 87088 URINE BACTERIA CULTURE: CPT | Mod: HCWC

## 2020-11-26 PROCEDURE — 85025 COMPLETE CBC W/AUTO DIFF WBC: CPT | Mod: HCWC

## 2020-11-26 PROCEDURE — 96366 THER/PROPH/DIAG IV INF ADDON: CPT | Mod: 59,HCWC

## 2020-11-26 PROCEDURE — 87186 SC STD MICRODIL/AGAR DIL: CPT | Mod: HCWC

## 2020-11-26 PROCEDURE — 81001 URINALYSIS AUTO W/SCOPE: CPT | Mod: HCWC

## 2020-11-26 RX ORDER — MORPHINE SULFATE 2 MG/ML
6 INJECTION, SOLUTION INTRAMUSCULAR; INTRAVENOUS
Status: COMPLETED | OUTPATIENT
Start: 2020-11-26 | End: 2020-11-26

## 2020-11-26 RX ORDER — HYDROMORPHONE HYDROCHLORIDE 1 MG/ML
1 INJECTION, SOLUTION INTRAMUSCULAR; INTRAVENOUS; SUBCUTANEOUS
Status: COMPLETED | OUTPATIENT
Start: 2020-11-27 | End: 2020-11-26

## 2020-11-26 RX ORDER — CEFTRIAXONE 1 G/1
1 INJECTION, POWDER, FOR SOLUTION INTRAMUSCULAR; INTRAVENOUS
Status: COMPLETED | OUTPATIENT
Start: 2020-11-26 | End: 2020-11-26

## 2020-11-26 RX ADMIN — MORPHINE SULFATE 6 MG: 2 INJECTION, SOLUTION INTRAMUSCULAR; INTRAVENOUS at 10:11

## 2020-11-26 RX ADMIN — SODIUM CHLORIDE 1000 ML: 0.9 INJECTION, SOLUTION INTRAVENOUS at 10:11

## 2020-11-26 RX ADMIN — HYDROMORPHONE HYDROCHLORIDE 1 MG: 1 INJECTION, SOLUTION INTRAMUSCULAR; INTRAVENOUS; SUBCUTANEOUS at 11:11

## 2020-11-26 RX ADMIN — INSULIN HUMAN 6 UNITS: 100 INJECTION, SOLUTION PARENTERAL at 11:11

## 2020-11-26 RX ADMIN — CEFTRIAXONE SODIUM 1 G: 1 INJECTION, POWDER, FOR SOLUTION INTRAMUSCULAR; INTRAVENOUS at 11:11

## 2020-11-27 PROBLEM — N39.0 UTI (URINARY TRACT INFECTION): Status: ACTIVE | Noted: 2020-11-27

## 2020-11-27 PROBLEM — N17.9 AKI (ACUTE KIDNEY INJURY): Status: ACTIVE | Noted: 2020-11-27

## 2020-11-27 PROBLEM — R33.9 URINARY RETENTION: Status: ACTIVE | Noted: 2020-11-27

## 2020-11-27 LAB
ALLENS TEST: ABNORMAL
ANION GAP SERPL CALC-SCNC: 10 MMOL/L (ref 8–16)
ANION GAP SERPL CALC-SCNC: 10 MMOL/L (ref 8–16)
ANION GAP SERPL CALC-SCNC: 12 MMOL/L (ref 8–16)
ANION GAP SERPL CALC-SCNC: 14 MMOL/L (ref 8–16)
APTT BLDCRRT: 30.2 SEC (ref 21–32)
B-OH-BUTYR BLD STRIP-SCNC: 4.2 MMOL/L (ref 0–0.5)
BUN SERPL-MCNC: 11 MG/DL (ref 8–23)
BUN SERPL-MCNC: 12 MG/DL (ref 8–23)
BUN SERPL-MCNC: 12 MG/DL (ref 8–23)
BUN SERPL-MCNC: 9 MG/DL (ref 8–23)
CALCIUM SERPL-MCNC: 8.6 MG/DL (ref 8.7–10.5)
CALCIUM SERPL-MCNC: 8.9 MG/DL (ref 8.7–10.5)
CALCIUM SERPL-MCNC: 9 MG/DL (ref 8.7–10.5)
CALCIUM SERPL-MCNC: 9.2 MG/DL (ref 8.7–10.5)
CHLORIDE SERPL-SCNC: 103 MMOL/L (ref 95–110)
CHLORIDE SERPL-SCNC: 103 MMOL/L (ref 95–110)
CHLORIDE SERPL-SCNC: 104 MMOL/L (ref 95–110)
CHLORIDE SERPL-SCNC: 106 MMOL/L (ref 95–110)
CO2 SERPL-SCNC: 18 MMOL/L (ref 23–29)
CO2 SERPL-SCNC: 20 MMOL/L (ref 23–29)
CO2 SERPL-SCNC: 21 MMOL/L (ref 23–29)
CO2 SERPL-SCNC: 22 MMOL/L (ref 23–29)
CREAT SERPL-MCNC: 1 MG/DL (ref 0.5–1.4)
CREAT SERPL-MCNC: 1.2 MG/DL (ref 0.5–1.4)
CREAT SERPL-MCNC: 1.3 MG/DL (ref 0.5–1.4)
CREAT UR-MCNC: 109 MG/DL (ref 15–325)
CTP QC/QA: YES
EST. GFR  (AFRICAN AMERICAN): 50.8 ML/MIN/1.73 M^2
EST. GFR  (AFRICAN AMERICAN): 56 ML/MIN/1.73 M^2
EST. GFR  (AFRICAN AMERICAN): >60 ML/MIN/1.73 M^2
EST. GFR  (NON AFRICAN AMERICAN): 44.1 ML/MIN/1.73 M^2
EST. GFR  (NON AFRICAN AMERICAN): 48.6 ML/MIN/1.73 M^2
EST. GFR  (NON AFRICAN AMERICAN): >60 ML/MIN/1.73 M^2
ESTIMATED AVG GLUCOSE: 280 MG/DL (ref 68–131)
GLUCOSE SERPL-MCNC: 109 MG/DL (ref 70–110)
GLUCOSE SERPL-MCNC: 158 MG/DL (ref 70–110)
GLUCOSE SERPL-MCNC: 222 MG/DL (ref 70–110)
GLUCOSE SERPL-MCNC: 286 MG/DL (ref 70–110)
HBA1C MFR BLD HPLC: 11.4 % (ref 4–5.6)
HCO3 UR-SCNC: 18 MMOL/L (ref 24–28)
INR PPP: 1 (ref 0.8–1.2)
IRON SERPL-MCNC: 13 UG/DL (ref 30–160)
LACTATE SERPL-SCNC: 1.3 MMOL/L (ref 0.5–2.2)
PCO2 BLDA: 39 MMHG (ref 35–45)
PH SMN: 7.27 [PH] (ref 7.35–7.45)
PO2 BLDA: 45 MMHG (ref 40–60)
POC BE: -9 MMOL/L
POC SATURATED O2: 75 % (ref 95–100)
POC TCO2: 19 MMOL/L (ref 24–29)
POCT GLUCOSE: 109 MG/DL (ref 70–110)
POCT GLUCOSE: 115 MG/DL (ref 70–110)
POCT GLUCOSE: 124 MG/DL (ref 70–110)
POCT GLUCOSE: 141 MG/DL (ref 70–110)
POCT GLUCOSE: 153 MG/DL (ref 70–110)
POCT GLUCOSE: 176 MG/DL (ref 70–110)
POCT GLUCOSE: 198 MG/DL (ref 70–110)
POCT GLUCOSE: 207 MG/DL (ref 70–110)
POCT GLUCOSE: 240 MG/DL (ref 70–110)
POCT GLUCOSE: 243 MG/DL (ref 70–110)
POCT GLUCOSE: 251 MG/DL (ref 70–110)
POCT GLUCOSE: 257 MG/DL (ref 70–110)
POCT GLUCOSE: 272 MG/DL (ref 70–110)
POCT GLUCOSE: 276 MG/DL (ref 70–110)
POCT GLUCOSE: 287 MG/DL (ref 70–110)
POCT GLUCOSE: 289 MG/DL (ref 70–110)
POCT GLUCOSE: 354 MG/DL (ref 70–110)
POCT GLUCOSE: 366 MG/DL (ref 70–110)
POCT GLUCOSE: 496 MG/DL (ref 70–110)
POCT GLUCOSE: 497 MG/DL (ref 70–110)
POCT GLUCOSE: >500 MG/DL (ref 70–110)
POTASSIUM SERPL-SCNC: 4.4 MMOL/L (ref 3.5–5.1)
POTASSIUM SERPL-SCNC: 4.5 MMOL/L (ref 3.5–5.1)
POTASSIUM SERPL-SCNC: 4.8 MMOL/L (ref 3.5–5.1)
POTASSIUM SERPL-SCNC: 5.5 MMOL/L (ref 3.5–5.1)
PROTHROMBIN TIME: 10.9 SEC (ref 9–12.5)
SAMPLE: ABNORMAL
SARS-COV-2 RDRP RESP QL NAA+PROBE: NEGATIVE
SATURATED IRON: 7 % (ref 20–50)
SITE: ABNORMAL
SODIUM SERPL-SCNC: 134 MMOL/L (ref 136–145)
SODIUM SERPL-SCNC: 135 MMOL/L (ref 136–145)
SODIUM SERPL-SCNC: 136 MMOL/L (ref 136–145)
SODIUM SERPL-SCNC: 138 MMOL/L (ref 136–145)
SODIUM UR-SCNC: 83 MMOL/L (ref 20–250)
TOTAL IRON BINDING CAPACITY: 188 UG/DL (ref 250–450)
TRANSFERRIN SERPL-MCNC: 127 MG/DL (ref 200–375)
TSH SERPL DL<=0.005 MIU/L-ACNC: 0.52 UIU/ML (ref 0.4–4)

## 2020-11-27 PROCEDURE — 63600175 PHARM REV CODE 636 W HCPCS: Mod: HCWC | Performed by: EMERGENCY MEDICINE

## 2020-11-27 PROCEDURE — 94640 AIRWAY INHALATION TREATMENT: CPT | Mod: HCWC

## 2020-11-27 PROCEDURE — 63600175 PHARM REV CODE 636 W HCPCS: Mod: HCWC | Performed by: STUDENT IN AN ORGANIZED HEALTH CARE EDUCATION/TRAINING PROGRAM

## 2020-11-27 PROCEDURE — 25000003 PHARM REV CODE 250: Mod: HCWC | Performed by: HOSPITALIST

## 2020-11-27 PROCEDURE — 84300 ASSAY OF URINE SODIUM: CPT | Mod: HCWC

## 2020-11-27 PROCEDURE — 80048 BASIC METABOLIC PNL TOTAL CA: CPT | Mod: 91,HCWC

## 2020-11-27 PROCEDURE — S0030 INJECTION, METRONIDAZOLE: HCPCS | Mod: HCWC | Performed by: EMERGENCY MEDICINE

## 2020-11-27 PROCEDURE — 83540 ASSAY OF IRON: CPT | Mod: HCWC

## 2020-11-27 PROCEDURE — 83036 HEMOGLOBIN GLYCOSYLATED A1C: CPT | Mod: HCWC

## 2020-11-27 PROCEDURE — 82803 BLOOD GASES ANY COMBINATION: CPT | Mod: HCWC

## 2020-11-27 PROCEDURE — 25000003 PHARM REV CODE 250: Mod: HCWC | Performed by: EMERGENCY MEDICINE

## 2020-11-27 PROCEDURE — 83605 ASSAY OF LACTIC ACID: CPT | Mod: HCWC

## 2020-11-27 PROCEDURE — 80048 BASIC METABOLIC PNL TOTAL CA: CPT | Mod: HCWC

## 2020-11-27 PROCEDURE — 25000242 PHARM REV CODE 250 ALT 637 W/ HCPCS: Mod: HCWC | Performed by: STUDENT IN AN ORGANIZED HEALTH CARE EDUCATION/TRAINING PROGRAM

## 2020-11-27 PROCEDURE — 25500020 PHARM REV CODE 255: Mod: HCWC | Performed by: STUDENT IN AN ORGANIZED HEALTH CARE EDUCATION/TRAINING PROGRAM

## 2020-11-27 PROCEDURE — S0030 INJECTION, METRONIDAZOLE: HCPCS | Mod: HCWC | Performed by: STUDENT IN AN ORGANIZED HEALTH CARE EDUCATION/TRAINING PROGRAM

## 2020-11-27 PROCEDURE — 85610 PROTHROMBIN TIME: CPT | Mod: HCWC

## 2020-11-27 PROCEDURE — 84443 ASSAY THYROID STIM HORMONE: CPT | Mod: HCWC

## 2020-11-27 PROCEDURE — 99284 EMERGENCY DEPT VISIT MOD MDM: CPT | Mod: HCWC,,, | Performed by: NURSE PRACTITIONER

## 2020-11-27 PROCEDURE — 20600001 HC STEP DOWN PRIVATE ROOM: Mod: HCWC

## 2020-11-27 PROCEDURE — 82570 ASSAY OF URINE CREATININE: CPT | Mod: HCWC

## 2020-11-27 PROCEDURE — 36415 COLL VENOUS BLD VENIPUNCTURE: CPT | Mod: HCWC

## 2020-11-27 PROCEDURE — 25500020 PHARM REV CODE 255: Mod: HCWC | Performed by: EMERGENCY MEDICINE

## 2020-11-27 PROCEDURE — 85730 THROMBOPLASTIN TIME PARTIAL: CPT | Mod: HCWC

## 2020-11-27 PROCEDURE — 94761 N-INVAS EAR/PLS OXIMETRY MLT: CPT | Mod: HCWC

## 2020-11-27 PROCEDURE — 25000003 PHARM REV CODE 250: Mod: HCWC | Performed by: STUDENT IN AN ORGANIZED HEALTH CARE EDUCATION/TRAINING PROGRAM

## 2020-11-27 PROCEDURE — 99900035 HC TECH TIME PER 15 MIN (STAT): Mod: HCWC

## 2020-11-27 PROCEDURE — 99223 PR INITIAL HOSPITAL CARE,LEVL III: ICD-10-PCS | Mod: HCWC,AI,GC, | Performed by: HOSPITALIST

## 2020-11-27 PROCEDURE — 99284 PR EMERGENCY DEPT VISIT,LEVEL IV: ICD-10-PCS | Mod: HCWC,,, | Performed by: NURSE PRACTITIONER

## 2020-11-27 PROCEDURE — 87040 BLOOD CULTURE FOR BACTERIA: CPT | Mod: HCWC

## 2020-11-27 PROCEDURE — 99223 1ST HOSP IP/OBS HIGH 75: CPT | Mod: HCWC,AI,GC, | Performed by: HOSPITALIST

## 2020-11-27 PROCEDURE — 82565 ASSAY OF CREATININE: CPT | Mod: HCWC

## 2020-11-27 PROCEDURE — S5010 5% DEXTROSE AND 0.45% SALINE: HCPCS | Mod: HCWC | Performed by: STUDENT IN AN ORGANIZED HEALTH CARE EDUCATION/TRAINING PROGRAM

## 2020-11-27 PROCEDURE — U0002 COVID-19 LAB TEST NON-CDC: HCPCS | Mod: HCWC | Performed by: EMERGENCY MEDICINE

## 2020-11-27 RX ORDER — DEXTROSE MONOHYDRATE 100 MG/ML
1000 INJECTION, SOLUTION INTRAVENOUS
Status: DISCONTINUED | OUTPATIENT
Start: 2020-11-27 | End: 2020-11-27 | Stop reason: SDUPTHER

## 2020-11-27 RX ORDER — AMOXICILLIN 250 MG
1 CAPSULE ORAL 2 TIMES DAILY PRN
Status: DISCONTINUED | OUTPATIENT
Start: 2020-11-27 | End: 2020-11-27

## 2020-11-27 RX ORDER — BENAZEPRIL HYDROCHLORIDE 10 MG/1
10 TABLET ORAL DAILY
Status: DISCONTINUED | OUTPATIENT
Start: 2020-11-27 | End: 2020-11-27

## 2020-11-27 RX ORDER — POLYETHYLENE GLYCOL 3350 17 G/17G
17 POWDER, FOR SOLUTION ORAL DAILY
Status: DISCONTINUED | OUTPATIENT
Start: 2020-11-27 | End: 2020-12-12 | Stop reason: HOSPADM

## 2020-11-27 RX ORDER — IBUPROFEN 200 MG
24 TABLET ORAL
Status: DISCONTINUED | OUTPATIENT
Start: 2020-11-27 | End: 2020-11-28

## 2020-11-27 RX ORDER — OXYCODONE HYDROCHLORIDE 10 MG/1
10 TABLET ORAL EVERY 6 HOURS PRN
Status: DISCONTINUED | OUTPATIENT
Start: 2020-11-27 | End: 2020-11-27

## 2020-11-27 RX ORDER — IBUPROFEN 200 MG
16 TABLET ORAL
Status: DISCONTINUED | OUTPATIENT
Start: 2020-11-27 | End: 2020-11-28

## 2020-11-27 RX ORDER — ACETAMINOPHEN 325 MG/1
650 TABLET ORAL EVERY 8 HOURS PRN
Status: DISCONTINUED | OUTPATIENT
Start: 2020-11-27 | End: 2020-12-12 | Stop reason: HOSPADM

## 2020-11-27 RX ORDER — HYDROMORPHONE HYDROCHLORIDE 1 MG/ML
1 INJECTION, SOLUTION INTRAMUSCULAR; INTRAVENOUS; SUBCUTANEOUS
Status: COMPLETED | OUTPATIENT
Start: 2020-11-27 | End: 2020-11-27

## 2020-11-27 RX ORDER — SODIUM CHLORIDE 0.9 % (FLUSH) 0.9 %
10 SYRINGE (ML) INJECTION
Status: DISCONTINUED | OUTPATIENT
Start: 2020-11-27 | End: 2020-12-12 | Stop reason: HOSPADM

## 2020-11-27 RX ORDER — HYDROMORPHONE HYDROCHLORIDE 1 MG/ML
1 INJECTION, SOLUTION INTRAMUSCULAR; INTRAVENOUS; SUBCUTANEOUS ONCE
Status: COMPLETED | OUTPATIENT
Start: 2020-11-27 | End: 2020-11-27

## 2020-11-27 RX ORDER — AMOXICILLIN 250 MG
1 CAPSULE ORAL DAILY
Status: DISCONTINUED | OUTPATIENT
Start: 2020-11-28 | End: 2020-11-27

## 2020-11-27 RX ORDER — DEXTROSE MONOHYDRATE, SODIUM CHLORIDE, AND POTASSIUM CHLORIDE 50; 1.49; 4.5 G/1000ML; G/1000ML; G/1000ML
INJECTION, SOLUTION INTRAVENOUS CONTINUOUS
Status: DISCONTINUED | OUTPATIENT
Start: 2020-11-27 | End: 2020-11-28

## 2020-11-27 RX ORDER — DULOXETIN HYDROCHLORIDE 60 MG/1
60 CAPSULE, DELAYED RELEASE ORAL DAILY
Status: DISCONTINUED | OUTPATIENT
Start: 2020-11-27 | End: 2020-11-30

## 2020-11-27 RX ORDER — ONDANSETRON 2 MG/ML
4 INJECTION INTRAMUSCULAR; INTRAVENOUS EVERY 8 HOURS PRN
Status: DISCONTINUED | OUTPATIENT
Start: 2020-11-27 | End: 2020-12-12 | Stop reason: HOSPADM

## 2020-11-27 RX ORDER — IPRATROPIUM BROMIDE AND ALBUTEROL SULFATE 2.5; .5 MG/3ML; MG/3ML
3 SOLUTION RESPIRATORY (INHALATION)
Status: DISCONTINUED | OUTPATIENT
Start: 2020-11-27 | End: 2020-12-02

## 2020-11-27 RX ORDER — DEXTROSE MONOHYDRATE 100 MG/ML
1000 INJECTION, SOLUTION INTRAVENOUS
Status: DISCONTINUED | OUTPATIENT
Start: 2020-11-27 | End: 2020-11-28

## 2020-11-27 RX ORDER — OXYCODONE HYDROCHLORIDE 5 MG/1
5 TABLET ORAL EVERY 6 HOURS PRN
Status: DISCONTINUED | OUTPATIENT
Start: 2020-11-27 | End: 2020-11-27

## 2020-11-27 RX ORDER — METRONIDAZOLE 500 MG/100ML
500 INJECTION, SOLUTION INTRAVENOUS
Status: DISCONTINUED | OUTPATIENT
Start: 2020-11-27 | End: 2020-11-28

## 2020-11-27 RX ORDER — POTASSIUM CHLORIDE 7.45 MG/ML
10 INJECTION INTRAVENOUS
Status: COMPLETED | OUTPATIENT
Start: 2020-11-27 | End: 2020-11-27

## 2020-11-27 RX ORDER — BENAZEPRIL HYDROCHLORIDE 10 MG/1
10 TABLET ORAL DAILY
Status: DISCONTINUED | OUTPATIENT
Start: 2020-11-28 | End: 2020-11-29

## 2020-11-27 RX ORDER — VANCOMYCIN HCL IN 5 % DEXTROSE 1G/250ML
1000 PLASTIC BAG, INJECTION (ML) INTRAVENOUS
Status: DISCONTINUED | OUTPATIENT
Start: 2020-11-27 | End: 2020-11-27

## 2020-11-27 RX ORDER — PANTOPRAZOLE SODIUM 40 MG/1
40 TABLET, DELAYED RELEASE ORAL DAILY
Status: DISCONTINUED | OUTPATIENT
Start: 2020-11-27 | End: 2020-12-02

## 2020-11-27 RX ORDER — METRONIDAZOLE 500 MG/100ML
500 INJECTION, SOLUTION INTRAVENOUS ONCE
Status: COMPLETED | OUTPATIENT
Start: 2020-11-27 | End: 2020-11-27

## 2020-11-27 RX ORDER — HYDROMORPHONE HYDROCHLORIDE 1 MG/ML
1 INJECTION, SOLUTION INTRAMUSCULAR; INTRAVENOUS; SUBCUTANEOUS EVERY 4 HOURS PRN
Status: DISCONTINUED | OUTPATIENT
Start: 2020-11-27 | End: 2020-11-28

## 2020-11-27 RX ORDER — AMOXICILLIN 250 MG
1 CAPSULE ORAL 2 TIMES DAILY PRN
Status: DISCONTINUED | OUTPATIENT
Start: 2020-11-27 | End: 2020-11-30

## 2020-11-27 RX ORDER — SODIUM CHLORIDE, SODIUM LACTATE, POTASSIUM CHLORIDE, CALCIUM CHLORIDE 600; 310; 30; 20 MG/100ML; MG/100ML; MG/100ML; MG/100ML
INJECTION, SOLUTION INTRAVENOUS CONTINUOUS
Status: DISCONTINUED | OUTPATIENT
Start: 2020-11-27 | End: 2020-11-27

## 2020-11-27 RX ORDER — METHIMAZOLE 10 MG/1
10 TABLET ORAL DAILY
Status: DISCONTINUED | OUTPATIENT
Start: 2020-11-27 | End: 2020-12-12 | Stop reason: HOSPADM

## 2020-11-27 RX ORDER — GLUCAGON 1 MG
1 KIT INJECTION
Status: DISCONTINUED | OUTPATIENT
Start: 2020-11-27 | End: 2020-11-28

## 2020-11-27 RX ORDER — ROSUVASTATIN CALCIUM 10 MG/1
20 TABLET, COATED ORAL DAILY
Status: DISCONTINUED | OUTPATIENT
Start: 2020-11-27 | End: 2020-12-02

## 2020-11-27 RX ORDER — GABAPENTIN 400 MG/1
400 CAPSULE ORAL 3 TIMES DAILY
Status: DISCONTINUED | OUTPATIENT
Start: 2020-11-27 | End: 2020-11-29

## 2020-11-27 RX ORDER — DEXTROSE MONOHYDRATE AND SODIUM CHLORIDE 5; .45 G/100ML; G/100ML
INJECTION, SOLUTION INTRAVENOUS CONTINUOUS
Status: DISCONTINUED | OUTPATIENT
Start: 2020-11-27 | End: 2020-11-27

## 2020-11-27 RX ORDER — TALC
6 POWDER (GRAM) TOPICAL NIGHTLY PRN
Status: DISCONTINUED | OUTPATIENT
Start: 2020-11-27 | End: 2020-12-12 | Stop reason: HOSPADM

## 2020-11-27 RX ORDER — OXYCODONE HYDROCHLORIDE 5 MG/1
5 TABLET ORAL EVERY 4 HOURS PRN
Status: DISCONTINUED | OUTPATIENT
Start: 2020-11-27 | End: 2020-11-28

## 2020-11-27 RX ORDER — OXYCODONE HYDROCHLORIDE 10 MG/1
10 TABLET ORAL EVERY 4 HOURS PRN
Status: DISCONTINUED | OUTPATIENT
Start: 2020-11-27 | End: 2020-11-28

## 2020-11-27 RX ORDER — MUPIROCIN 20 MG/G
OINTMENT TOPICAL 2 TIMES DAILY
Status: DISPENSED | OUTPATIENT
Start: 2020-11-27 | End: 2020-12-02

## 2020-11-27 RX ADMIN — SODIUM CHLORIDE, SODIUM LACTATE, POTASSIUM CHLORIDE, AND CALCIUM CHLORIDE: .6; .31; .03; .02 INJECTION, SOLUTION INTRAVENOUS at 09:11

## 2020-11-27 RX ADMIN — METRONIDAZOLE 500 MG: 500 INJECTION, SOLUTION INTRAVENOUS at 07:11

## 2020-11-27 RX ADMIN — GABAPENTIN 400 MG: 400 CAPSULE ORAL at 08:11

## 2020-11-27 RX ADMIN — HYDROMORPHONE HYDROCHLORIDE 1 MG: 1 INJECTION, SOLUTION INTRAMUSCULAR; INTRAVENOUS; SUBCUTANEOUS at 01:11

## 2020-11-27 RX ADMIN — SODIUM CHLORIDE 10.7 UNITS/HR: 9 INJECTION, SOLUTION INTRAVENOUS at 11:11

## 2020-11-27 RX ADMIN — HYDROMORPHONE HYDROCHLORIDE 1 MG: 1 INJECTION, SOLUTION INTRAMUSCULAR; INTRAVENOUS; SUBCUTANEOUS at 11:11

## 2020-11-27 RX ADMIN — IOHEXOL 75 ML: 350 INJECTION, SOLUTION INTRAVENOUS at 12:11

## 2020-11-27 RX ADMIN — SODIUM CHLORIDE 1.8 UNITS/HR: 9 INJECTION, SOLUTION INTRAVENOUS at 11:11

## 2020-11-27 RX ADMIN — MUPIROCIN: 20 OINTMENT TOPICAL at 09:11

## 2020-11-27 RX ADMIN — OXYCODONE HYDROCHLORIDE 10 MG: 10 TABLET ORAL at 08:11

## 2020-11-27 RX ADMIN — HYDROMORPHONE HYDROCHLORIDE 1 MG: 1 INJECTION, SOLUTION INTRAMUSCULAR; INTRAVENOUS; SUBCUTANEOUS at 12:11

## 2020-11-27 RX ADMIN — HYDROMORPHONE HYDROCHLORIDE 1 MG: 1 INJECTION, SOLUTION INTRAMUSCULAR; INTRAVENOUS; SUBCUTANEOUS at 03:11

## 2020-11-27 RX ADMIN — POTASSIUM CHLORIDE 10 MEQ: 10 INJECTION, SOLUTION INTRAVENOUS at 07:11

## 2020-11-27 RX ADMIN — POTASSIUM CHLORIDE 10 MEQ: 10 INJECTION, SOLUTION INTRAVENOUS at 08:11

## 2020-11-27 RX ADMIN — SODIUM CHLORIDE 1.6 UNITS/HR: 9 INJECTION, SOLUTION INTRAVENOUS at 03:11

## 2020-11-27 RX ADMIN — ACETAMINOPHEN 650 MG: 325 TABLET ORAL at 11:11

## 2020-11-27 RX ADMIN — METRONIDAZOLE 500 MG: 500 INJECTION, SOLUTION INTRAVENOUS at 03:11

## 2020-11-27 RX ADMIN — POTASSIUM CHLORIDE 10 MEQ: 7.46 INJECTION, SOLUTION INTRAVENOUS at 10:11

## 2020-11-27 RX ADMIN — CEFTRIAXONE 2 G: 2 INJECTION, SOLUTION INTRAVENOUS at 09:11

## 2020-11-27 RX ADMIN — ONDANSETRON 4 MG: 2 INJECTION INTRAMUSCULAR; INTRAVENOUS at 09:11

## 2020-11-27 RX ADMIN — IOTHALAMATE MEGLUMINE 50 ML: 172 INJECTION URETERAL at 05:11

## 2020-11-27 RX ADMIN — SODIUM CHLORIDE 5.35 UNITS/HR: 9 INJECTION, SOLUTION INTRAVENOUS at 12:11

## 2020-11-27 RX ADMIN — SODIUM CHLORIDE 2.67 UNITS/HR: 9 INJECTION, SOLUTION INTRAVENOUS at 02:11

## 2020-11-27 RX ADMIN — OXYCODONE HYDROCHLORIDE 10 MG: 10 TABLET ORAL at 11:11

## 2020-11-27 RX ADMIN — POTASSIUM CHLORIDE 10 MEQ: 7.46 INJECTION, SOLUTION INTRAVENOUS at 12:11

## 2020-11-27 RX ADMIN — HYDROMORPHONE HYDROCHLORIDE 1 MG: 1 INJECTION, SOLUTION INTRAMUSCULAR; INTRAVENOUS; SUBCUTANEOUS at 09:11

## 2020-11-27 RX ADMIN — SODIUM CHLORIDE 1.4 UNITS/HR: 9 INJECTION, SOLUTION INTRAVENOUS at 09:11

## 2020-11-27 RX ADMIN — SODIUM CHLORIDE 8 UNITS/HR: 9 INJECTION, SOLUTION INTRAVENOUS at 01:11

## 2020-11-27 RX ADMIN — IPRATROPIUM BROMIDE AND ALBUTEROL SULFATE 3 ML: .5; 2.5 SOLUTION RESPIRATORY (INHALATION) at 07:11

## 2020-11-27 RX ADMIN — OXYCODONE HYDROCHLORIDE 10 MG: 10 TABLET ORAL at 05:11

## 2020-11-27 RX ADMIN — SODIUM CHLORIDE, SODIUM LACTATE, POTASSIUM CHLORIDE, AND CALCIUM CHLORIDE 1000 ML: .6; .31; .03; .02 INJECTION, SOLUTION INTRAVENOUS at 01:11

## 2020-11-27 RX ADMIN — HYDROMORPHONE HYDROCHLORIDE 1 MG: 1 INJECTION, SOLUTION INTRAMUSCULAR; INTRAVENOUS; SUBCUTANEOUS at 07:11

## 2020-11-27 RX ADMIN — METRONIDAZOLE 500 MG: 500 INJECTION, SOLUTION INTRAVENOUS at 10:11

## 2020-11-27 RX ADMIN — HYDROMORPHONE HYDROCHLORIDE 1 MG: 1 INJECTION, SOLUTION INTRAMUSCULAR; INTRAVENOUS; SUBCUTANEOUS at 05:11

## 2020-11-27 RX ADMIN — POTASSIUM CHLORIDE 10 MEQ: 10 INJECTION, SOLUTION INTRAVENOUS at 05:11

## 2020-11-27 RX ADMIN — SODIUM CHLORIDE 1.2 UNITS/HR: 9 INJECTION, SOLUTION INTRAVENOUS at 06:11

## 2020-11-27 RX ADMIN — VANCOMYCIN HYDROCHLORIDE 2500 MG: 1 INJECTION, POWDER, LYOPHILIZED, FOR SOLUTION INTRAVENOUS at 05:11

## 2020-11-27 RX ADMIN — POTASSIUM CHLORIDE, DEXTROSE MONOHYDRATE AND SODIUM CHLORIDE: 150; 5; 450 INJECTION, SOLUTION INTRAVENOUS at 09:11

## 2020-11-27 RX ADMIN — DEXTROSE AND SODIUM CHLORIDE: 5; .45 INJECTION, SOLUTION INTRAVENOUS at 02:11

## 2020-11-27 RX ADMIN — SODIUM CHLORIDE 1 UNITS/HR: 9 INJECTION, SOLUTION INTRAVENOUS at 05:11

## 2020-11-27 NOTE — HPI
Ms. Beck is a 61yo F with PMH of HTN, HLD, T2DM, Hyperthyroidism, and Asthma who had recent GYN surgery on 10/26 due to erosion of bladder mesh / prosthetic material with Cifuentes removal on 11/19. She presents to [facility] due to 4-5 days of not feeling well, she has had loss of appetite and weakness with 10/10 throbbing lower abdominal pain associated with vaginal pain and rectal pressure and non-bloody diarrhea. She has had trouble urinating for the past 5 days as well. She takes oxycodone 5 at home for pain, but it did not help. She endorses nausea and vomiting (4 times, non-bloody) also associated with the pain. She denies fever, chills, chest pain, SOB, trouble breathing, leg swelling, or any other symptom at this time.    ED Course: VSS, afebrile but leukocytosis at 17.85. Pt found to have DKA - Gluc 626, AG16, BHB 4.2, Fluids, insulin gtt started and Glucose down-trended to 200s. UA showed 2RBC, >100 WBC, bacteria, given CTX. CT A/P showed multiple fluid collections adjacent to bladder concerning for bladder perf with multiple urinomas, with mild bilateral hydronephrosis. Urology consulted and placed a Cifuentes with 900 UOP drained. GYN consulted due to recent surgery.     Pt will be admitted to hospital medicine for further management of DKA and possible intra-abdominal infection.

## 2020-11-27 NOTE — ASSESSMENT & PLAN NOTE
Cr 1.5 on presentation.   Likely multi-factorial 2/2 pre-renal volume depletion in the context of DKA, UTI, and post-renal obstruction    - Resolving, Cr 1.3 after Cifuentes placement and IVF  - Will monitor Cr with BMPq4 while in DKA  - Treating UTI with CTX  - Maintain Cifuentes  - F/u urine lytes   - Renally dose meds  - Avoid renal toxins (ACEi/ARB, NSAIDs, contrast, etc.)  - Strict I&Os

## 2020-11-27 NOTE — CONSULTS
Radiology Consult    Navin Beck is a 60 y.o. female with a history of colporrhapy with mesh, BSO, and mesh erosion.  CT showed lobulated fluid collections/cystic masses adjacent to the bladder.    Past Medical History:   Diagnosis Date    Allergy     Arrhythmia     Arthritis     Bronchial asthma     Diabetes mellitus     Glaucoma     Hormone disorder     hysterectomy    Hypercholesteremia     Hypertension     Hyperthyroidism     Insomnia     Kidney stones     Thyroid disease     Urine, incontinence, stress female     UTI (urinary tract infection) 11/27/2020     Past Surgical History:   Procedure Laterality Date    APPENDECTOMY      BACK SURGERY      disc removal     CARPAL TUNNEL RELEASE Bilateral     CHOLECYSTECTOMY      CYSTOSCOPY N/A 10/26/2020    Procedure: CYSTOSCOPY;  Surgeon: Wilner Goel MD;  Location: [facility] OR;  Service: OB/GYN;  Laterality: N/A;    FRACTURE SURGERY Right     wrist    [facility]  2010    [facility]    JOINT REPLACEMENT      KNEE ARTHROSCOPY W/ DEBRIDEMENT      KNEE SURGERY Right     Knee replacement    LITHOTRIPSY      RI REMOVAL OF OVARY/TUBE(S)  9/9/13    benign    removal of round ligament mass      ROBOT-ASSISTED LAPAROSCOPIC LYSIS OF ADHESIONS USING DA JAMES XI N/A 10/26/2020    Procedure: XI ROBOTIC LYSIS, ADHESIONS;  Surgeon: Wilner Goel MD;  Location: [facility] OR;  Service: OB/GYN;  Laterality: N/A;  ROBOTIC MOBILIZATION OF BLADDER- DR BURNHAM    SPINE SURGERY      WRIST FRACTURE SURGERY Left      Imaging reviewed with Radiology staff, Dr. Chan.     Procedure: fluid collection aspiration    Scheduled Meds:    albuterol-ipratropium  3 mL Nebulization Q6H WAKE    cefTRIAXone (ROCEPHIN) IVPB  2 g Intravenous Q24H    mupirocin   Nasal BID     Continuous Infusions:    insulin regular 1 units/mL infusion orderable (DKA) 7.7 Units/hr (11/27/20 0937)    lactated ringers 125 mL/hr at 11/27/20 0940     PRN  Meds:acetaminophen, dextrose 10 % in water (D10W), dextrose 10 % in water (D10W), dextrose 50%, dextrose 50%, glucagon (human recombinant), glucose, glucose, HYDROmorphone, melatonin, ondansetron, oxyCODONE, oxyCODONE, senna-docusate 8.6-50 mg, sodium chloride 0.9%    Allergies:   Review of patient's allergies indicates:   Allergen Reactions    Penicillin g Shortness Of Breath    Penicillins Shortness Of Breath, Itching and Swelling       Labs:  No results for input(s): INR in the last 168 hours.    Invalid input(s):  PT,  PTT    Recent Labs   Lab 11/26/20 2156   WBC 17.85*   HGB 9.8*   HCT 31.7*   MCV 94   *      Recent Labs   Lab 11/26/20 2156 11/27/20  0850   *  --  286*   *  --  134*   K 5.3*  --  4.8   CL 96  --  103   CO2 16*  --  21*   BUN 19  --  12   CREATININE 1.5*   < > 1.2   CALCIUM 8.9  --  8.9   ALT 14  --   --    AST 10  --   --    ALBUMIN 2.4*  --   --    BILITOT 0.2  --   --     < > = values in this interval not displayed.         Vitals (Most Recent):  Temp: 99.5 °F (37.5 °C) (11/27/20 0827)  Pulse: 84 (11/27/20 0827)  Resp: 17 (11/27/20 0907)  BP: (!) 140/65 (11/27/20 0827)  SpO2: 95 % (11/27/20 0827)    Plan:   Patient is NPO now, will try to aspirate collections today however likely will have to reschedule to next week.    Michale Scnalon M.D.  PGY-III Radiology

## 2020-11-27 NOTE — ED PROVIDER NOTES
Encounter Date: 11/26/2020       History     Chief Complaint   Patient presents with    Rectal Pain     arrived by [facility], Vaginal mass removed 10/26, pain to vagina and rectum since. BS greater than 500     Ms. 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 She presents to the emergency department due to vaginal pain, rectal pain and abdominal pain.  She states that on the 26th of October she had her bladder mesh removed and a catheter was left in place.  The catheter was removed on the 19th of November and she says since then she has had severe pain in her vaginal and rectal region she describes the pain as a throbbing spasms which are continuous and worse when she tries to urinate or defecate.  She states she has frequently had to hold her urine just to avoid the pain she takes oxycodone at home but states that for the past couple days it has not been relieving her pain.  Yesterday she was in so much pain that she became nauseous, she then had 4 episodes of vomiting since last night. She denies any fevers, chills, vaginal bleeding, hematuria or rectal bleeding.  She also states that for the past couple days she has had severe abdominal pain which is worse in her lower quadrants and pelvic region.         Review of patient's allergies indicates:   Allergen Reactions    Penicillin g Shortness Of Breath    Penicillins Shortness Of Breath, Itching and Swelling     Past Medical History:   Diagnosis Date    Allergy     Arrhythmia     Arthritis     Bronchial asthma     Diabetes mellitus     Glaucoma     Hormone disorder     hysterectomy    Hypercholesteremia     Hypertension     Hyperthyroidism     Insomnia     Kidney stones     Thyroid disease     Urine, incontinence, stress female      Past Surgical History:   Procedure Laterality Date    APPENDECTOMY      BACK SURGERY      disc  removal     CARPAL TUNNEL RELEASE Bilateral     CHOLECYSTECTOMY      CYSTOSCOPY N/A 10/26/2020    Procedure: CYSTOSCOPY;  Surgeon: Wilner Goel MD;  Location: [facility], [place] OR;  Service: OB/GYN;  Laterality: N/A;    FRACTURE SURGERY Right     wrist    [facility]    JOINT REPLACEMENT      KNEE ARTHROSCOPY W/ DEBRIDEMENT      KNEE SURGERY Right     Knee replacement    LITHOTRIPSY      MT REMOVAL OF OVARY/TUBE(S)  9/9/13    benign    removal of round ligament mass      ROBOT-ASSISTED LAPAROSCOPIC LYSIS OF ADHESIONS USING DA JAMES XI N/A 10/26/2020    Procedure: XI ROBOTIC LYSIS, ADHESIONS;  Surgeon: Wilner Goel MD;  Location: [facility], [place] OR;  Service: OB/GYN;  Laterality: N/A;  ROBOTIC MOBILIZATION OF BLADDER- DR BURNHAM    SPINE SURGERY      WRIST FRACTURE SURGERY Left      Family History   Problem Relation Age of Onset    Cancer Mother     Cancer Brother     Colon cancer Brother     Cancer Brother     Cancer Brother     Cancer Brother     Ovarian cancer Neg Hx     Uterine cancer Neg Hx     Breast cancer Neg Hx      Social History     Tobacco Use    Smoking status: Current Every Day Smoker     Packs/day: 0.50     Years: 30.00     Pack years: 15.00    Smokeless tobacco: Never Used   Substance Use Topics    Alcohol use: No    Drug use: No     Review of Systems   Constitutional: Negative for activity change, appetite change, chills, fatigue and fever.   HENT: Negative for congestion, sinus pressure, sinus pain and sore throat.    Respiratory: Negative for chest tightness, shortness of breath and wheezing.    Cardiovascular: Negative for chest pain, palpitations and leg swelling.   Gastrointestinal: Positive for abdominal pain, nausea, rectal pain and vomiting. Negative for constipation and diarrhea.   Genitourinary: Positive for pelvic pain, urgency and vaginal pain. Negative for flank pain, hematuria, vaginal bleeding and vaginal discharge.   Musculoskeletal:  Negative for arthralgias and back pain.   Neurological: Negative for dizziness, light-headedness and headaches.   Psychiatric/Behavioral: Negative for agitation.       Physical Exam     Initial Vitals [11/26/20 2124]   BP Pulse Resp Temp SpO2   (!) 142/88 96 18 98.5 °F (36.9 °C) 98 %      MAP       --         Physical Exam    Nursing note and vitals reviewed.  Constitutional: She appears well-developed and well-nourished. She is not diaphoretic. No distress.   HENT:   Head: Normocephalic and atraumatic.   Right Ear: External ear normal.   Left Ear: External ear normal.   Eyes: Conjunctivae and EOM are normal. Pupils are equal, round, and reactive to light.   Neck: Normal range of motion. No tracheal deviation present. No JVD present.   Cardiovascular: Normal rate, regular rhythm, normal heart sounds and intact distal pulses. Exam reveals no gallop and no friction rub.    No murmur heard.  Pulmonary/Chest: Breath sounds normal. No stridor. No respiratory distress. She has no wheezes. She has no rhonchi. She has no rales. She exhibits no tenderness.   Abdominal: Soft. Bowel sounds are normal. There is abdominal tenderness.   Tenderness in RLQ, LLQ and periumbilical   Genitourinary: Rectum:      Tenderness present.      No anal fissure, external hemorrhoid or internal hemorrhoid.      Genitourinary Comments: Tenderness between inguinal folds     Musculoskeletal: Normal range of motion. No tenderness or edema.   Neurological: She is alert and oriented to person, place, and time. She has normal strength. No cranial nerve deficit or sensory deficit.   Skin: Skin is warm. Capillary refill takes less than 2 seconds. No erythema.         ED Course   Procedures  Labs Reviewed   CBC W/ AUTO DIFFERENTIAL - Abnormal; Notable for the following components:       Result Value    WBC 17.85 (*)     RBC 3.39 (*)     Hemoglobin 9.8 (*)     Hematocrit 31.7 (*)     MCHC 30.9 (*)     Platelets 657 (*)     Immature Granulocytes 0.7 (*)      Gran # (ANC) 13.3 (*)     Immature Grans (Abs) 0.13 (*)     Mono # 1.4 (*)     Gran % 74.5 (*)     Lymph % 16.8 (*)     All other components within normal limits   POCT GLUCOSE - Abnormal; Notable for the following components:    POCT Glucose >500 (*)     All other components within normal limits   COMPREHENSIVE METABOLIC PANEL   LIPASE   URINALYSIS, REFLEX TO URINE CULTURE   URINALYSIS MICROSCOPIC          Imaging Results    None          Medical Decision Making:   Initial Assessment:   Ms. 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 She presents to the emergency department due to vaginal pain, rectal pain and abdominal pain. Vitals were stable on exam.   Differential Diagnosis:   Urinary tract infection  Diabetic ketoacidosis   Intra-abdominal infection  Clinical Tests:   Lab Tests: Ordered  Radiological Study: Ordered  ED Management:  Patient was examined and was not in any distress, labs were ordered including a urinalysis which showed signs of infection, a CMP which showed an anion gap of 16 and hyperglycemia of 626. CBC was significant for leukocytosis and elevated left shift.  Patient was given ceftriaxone for UTI she was given fluids and insulin for possible DKA.  For pain management she was given morphine and Dilaudid.  A CT scan of her abdomen was ordered due to patient's physical exam findings.  Patient's disposition is pending imaging results.               Attending Attestation:   Physician Attestation Statement for Resident:  As the supervising MD   Physician Attestation Statement: I have personally seen and examined this patient.   I agree with the above history. -:   As the supervising MD I agree with the above PE.    As the supervising MD I agree with the above treatment, course, plan, and disposition.                    ED Course as of Nov 29 2147   Fri Nov 27, 2020   0129 Labs noted for  likely DKA patient started on insulin drip will continue to manage.  CT scan noted for concern of bladder rupture contacted Urology, currently at patient's bedside.  Also contacted gyn, will continue to reassess.     [DC]   0130 ICU critical care aware of DKA consult     [DC]   0151 As per ICU pt stabel for step down floor, screened out tof ICU, will reassess.     [DC]   0151 As per urogyn, pt had recent bladder manipulation by Dr. Corona, they plan to defer to Urology. Awaiting recommendations.     [DC]      ED Course User Index  [DC] MD Linda Salamanca Jr., MD  Resident  11/27/20 0029       Salo Chester Jr., MD  11/29/20 0244

## 2020-11-27 NOTE — SUBJECTIVE & OBJECTIVE
Past Medical History:   Diagnosis Date    Allergy     Arrhythmia     Arthritis     Bronchial asthma     Diabetes mellitus     Glaucoma     Hormone disorder     hysterectomy    Hypercholesteremia     Hypertension     Hyperthyroidism     Insomnia     Kidney stones     Thyroid disease     Urine, incontinence, stress female        Past Surgical History:   Procedure Laterality Date    APPENDECTOMY      BACK SURGERY      disc removal     CARPAL TUNNEL RELEASE Bilateral     CHOLECYSTECTOMY      CYSTOSCOPY N/A 10/26/2020    Procedure: CYSTOSCOPY;  Surgeon: Wilner Goel MD;  Location: [facility];  Service: OB/GYN;  Laterality: N/A;    FRACTURE SURGERY Right     wrist    [facility]    JOINT REPLACEMENT      KNEE ARTHROSCOPY W/ DEBRIDEMENT      KNEE SURGERY Right     Knee replacement    LITHOTRIPSY      IA REMOVAL OF OVARY/TUBE(S)  9/9/13    benign    removal of round ligament mass      ROBOT-ASSISTED LAPAROSCOPIC LYSIS OF ADHESIONS USING DA JAMES XI N/A 10/26/2020    Procedure: XI ROBOTIC LYSIS, ADHESIONS;  Surgeon: Wilner Goel MD;  Location: [facility];  Service: OB/GYN;  Laterality: N/A;  ROBOTIC MOBILIZATION OF BLADDER- DR BURNHAM    SPINE SURGERY      WRIST FRACTURE SURGERY Left        Review of patient's allergies indicates:   Allergen Reactions    Penicillin g Shortness Of Breath    Penicillins Shortness Of Breath, Itching and Swelling       Family History     Problem Relation (Age of Onset)    Cancer Mother, Brother, Brother, Brother, Brother    Colon cancer Brother          Tobacco Use    Smoking status: Current Every Day Smoker     Packs/day: 0.50     Years: 30.00     Pack years: 15.00    Smokeless tobacco: Never Used   Substance and Sexual Activity    Alcohol use: No    Drug use: No    Sexual activity: Not Currently     Partners: Male     Birth control/protection: Surgical       Review of Systems   Constitutional: Negative for appetite  change, chills, fatigue, fever and unexpected weight change.   HENT: Negative.    Eyes: Negative.    Respiratory: Negative for cough, chest tightness and shortness of breath.    Cardiovascular: Negative for chest pain, palpitations and leg swelling.   Gastrointestinal: Positive for abdominal pain, nausea and rectal pain. Negative for constipation, diarrhea and vomiting.   Genitourinary: Positive for difficulty urinating, dysuria, pelvic pain and vaginal pain. Negative for frequency, hematuria and urgency.   Musculoskeletal: Negative for back pain.   Skin: Negative for rash.   Neurological: Negative for dizziness, syncope, numbness and headaches.   Hematological: Does not bruise/bleed easily.   Psychiatric/Behavioral: Negative.        Objective:     Temp:  [98.5 °F (36.9 °C)] 98.5 °F (36.9 °C)  Pulse:  [93-99] 99  Resp:  [15-20] 16  SpO2:  [98 %-100 %] 99 %  BP: (132-144)/(60-88) 132/60     Body mass index is 39.94 kg/m².           Drains     Drain            Female External Urinary Catheter 04/14/19 1430 592 days         Urethral Catheter 10/28/20 1359 16 Fr. 29 days         Urethral Catheter 11/27/20 0145 Latex 16 Fr. less than 1 day                Physical Exam  Constitutional:       General: She is not in acute distress.     Appearance: She is well-developed.   Eyes:      General: No scleral icterus.  Cardiovascular:      Rate and Rhythm: Normal rate.   Pulmonary:      Effort: Pulmonary effort is normal. No respiratory distress.   Abdominal:      General: Bowel sounds are normal. There is no distension.      Palpations: Abdomen is soft.      Tenderness: There is abdominal tenderness. There is no guarding or rebound.      Comments: Palpable bladder.   Genitourinary:     Comments: 16 Fr march in place with clear yellow urine. Normal external female genitalia.  Musculoskeletal: Normal range of motion.   Skin:     General: Skin is warm and dry.      Findings: No rash.   Neurological:      Mental Status: She is alert  and oriented to person, place, and time.   Psychiatric:         Behavior: Behavior normal.         Significant Labs:    BMP:  Recent Labs   Lab 11/26/20 2156   *   K 5.3*   CL 96   CO2 16*   BUN 19   CREATININE 1.5*   CALCIUM 8.9       CBC:  Recent Labs   Lab 11/26/20 2156   WBC 17.85*   HGB 9.8*   HCT 31.7*   *       Blood Culture: No results for input(s): LABBLOO in the last 168 hours.  Urine Culture: No results for input(s): LABURIN in the last 168 hours.  Urine Studies:   Recent Labs   Lab 11/26/20 2206   COLORU Straw   APPEARANCEUA Cloudy*   PHUR 5.0   SPECGRAV 1.020   PROTEINUA Negative   GLUCUA 3+*   KETONESU 1+*   BILIRUBINUA Negative   OCCULTUA 2+*   NITRITE Negative   LEUKOCYTESUR 3+*   RBCUA 2   WBCUA >100*   BACTERIA Occasional   SQUAMEPITHEL 3       Significant Imaging:  CT: I have reviewed all results within the past 24 hours and my personal findings are:      Adrenals unremarkable.  Bilateral mild hydronephrosis with some mild dilation of the ureters. Unable to easily track ureters to the level of the bladder. No obstructive ureteral stone seen on CT. Multiple non-obstructing stones in the left kidney. No solid masses in kidneys bilaterally.  Bladder distended with collection of multiple loculated fluid-filled structures at the anterior and left lateral aspect of the bladder. No abdominopelvic ascites or free air seen. Multiple phleboliths.

## 2020-11-27 NOTE — ASSESSMENT & PLAN NOTE
- Patient in retention. Cifuentes placed with 800 cc urine. Gentle manual irrigation: bladder easily irrigated with 100% of fluid placed into the bladder also removed without difficulty. Clear upon return  - CT with multiple fluid collections adjacent to bladder  - CT cystography shows no evidence of bladder leak and pelvic fluid collection  - Recommend placement of drain into pelvic fluid collection by IR  - Recommend Gyn consult as patient had recent gynecologic surgery

## 2020-11-27 NOTE — ASSESSMENT & PLAN NOTE
Pt presented [place] [facility] due to 5-6 days of 10/10 lower abdomen, vaginal, and rectal pain. Pt has recent h/o GYN surgery in 10/2020 for bladder mesh removal, Cifuentes was removed on 11/19.    CT A/P 11/27: Multiple fluid collections adjacent to bladder. Mild bilateral hydronephrosis.     - GYN consulted, appreciate consult   - Agrees with Urology plan for drainage with IR  - IR consulted, appreciate recs   - NPO   - Will try to aspirate collections on 11/27, however likely will have to reschedule to next week  - Pain management:    Dilaudid IV 1mg q4 PRN for severe breakthrough pain   Oxy 10 for severe pain   Oxy 5 for moderate pain  - Continue Flagyl 500q8 for possible intra-abdominal infection

## 2020-11-27 NOTE — ED TRIAGE NOTES
Nvain Beck, a 60 y.o. female presents to the ED c/o vaginal, rectal and lower abd pain worsening since Nov 19. Pt had a bladder mesh removal and placement of cath on Oct 26. She has been taking prescribed pain meds without relief. She has not taken her insulin today. CBG by . She became nauseous last night; 4 episodes of emesis since then.       Triage note:  Chief Complaint   Patient presents with    Rectal Pain     arrived by [facility], Vaginal mass removed 10/26, pain to vagina and rectum since. BS greater than 500     Review of patient's allergies indicates:   Allergen Reactions    Penicillin g Shortness Of Breath    Penicillins Shortness Of Breath, Itching and Swelling     Past Medical History:   Diagnosis Date    Allergy     Arrhythmia     Arthritis     Bronchial asthma     Diabetes mellitus     Glaucoma     Hormone disorder     hysterectomy    Hypercholesteremia     Hypertension     Hyperthyroidism     Insomnia     Kidney stones     Thyroid disease     Urine, incontinence, stress female

## 2020-11-27 NOTE — HPI
Navin Beck is a 61 yo F w/ HTN, DM2, and POP who [place] [facility] [place] pain and urinary retention. She is s/p robotic excision of vaginal mesh with Dr. Goel in October 2020. There was significant adhesive disease per the op report and urology assisted with bladder dissection. She was discharged home on POD #1 with march in place. March was removed on 11/9/2020. Since then, she has had continued dysuria and vaginal pain/pressure. She admits to holding her urine to avoid the dysuria. No fever, chills, nausea, vomiting. She also has had poorly controlled blood sugars throughout this time.    March placed per nursing in the ED with 800 cc urine output. WBC count is 17. Cr 1.5, up from 1.0. UA with RBCs, >100 WBC, and bacteria with 3 squams. Urine cultures pending, s/p rocephin in the ED. CT A/P with multilobular fluid collection in the lower pelvis adjacent to the urinary bladder which likely causes in compression of the distal ureters bilaterally and resulting in mild bilateral hydronephrosis.     Of note, BG elevated in ED to > 500. Admitted to hospital medicine for management of DKA. Urology and gynecology consulted.

## 2020-11-27 NOTE — NURSING
Received report from [facility]. Pt brought up via stretcher on insulin drip. Transferred to bed. Pt in extreme amount of pain upon assessment. Dr. Nogueira awaiting orders for pain and nausea medication. Cifuentes cath in with clear yellow urine noted to drainage back. Right 20G infusing Insulin GGT. Orders acknowledged to follow protocol. See mar for glucose and titrating drip. Pt lying on left side stating pain to lower abdomen. IR MD now at bedside talking with pt. See vs flowsheet. Wnl. Pt verbalized no additional needs at this time. Side rails up x2, call light in reach, bed alarm on, brakes locked, door open to hallway as of now. Will continue to closely monitor.

## 2020-11-27 NOTE — HPI
59 yo female with history of HTN, DM, and POP presenting to [facility] [place] with abdominal pain, DKA, and urinary retention. Urology consulted for abnormal CT finding with concern for bladder perforation. Her urologic/gynecologic history includes an anterior colporrhapy with mesh in 2010 with Dr. Keene, mesh erosion and protrusion was noted and excised with Dr. Guzman in 2013 with a concomitant BSO with excision or round ligament tumor excision with Dr. Springer in 2013. She then represented to Dr. Goel in 2020 with vaginal pain and was noted to have additional mesh erosion on vaginal exam. She was most recently taken to the OR on 10/26 for robotic excision of mesh performed by Dr. Goel with intra-operative assistance from Dr. Trevino. Per the op note, there was significant adhesions in the pelvis, but the procedure was without complication. She was discharged with a march catheter that was removed on 11/9/20. Since then, the patient has had some complaints of continued vaginal and rectal discomfort as well as dysuria. She tries to hold her urine to avoid the pain. Today, she presented to the ED able to void some on her own, but in urinary retention. March placed per nursing in the ED with 800 cc clear yellow urine without debris or blood. UAmicroscopy shows 2 RBC, >100 WBC, and bacteria with 3 squams. She has urine cultures pending and has received rocephin in the ED. Leukocytosis to 17.8. Creatinine is 1.5 from 1.0.

## 2020-11-27 NOTE — CONSULTS
[facility]  Obstetrics & Gynecology  Consult Note    Patient Name: Navin Beck  MRN: [id number]  Admission Date: 2020  Hospital Length of Stay: 0 days  Code Status: Prior  Primary Care Provider: Elvira Quinones MD  Principal Problem: Diabetic ketoacidosis without coma associated with type 2 diabetes mellitus    Consults  Subjective:     History of Present Illness:  Navin Beck is a 61 yo F w/ HTN, DM2, and POP who presented to [facility] ED with abdominal pain and urinary retention. She is s/p robotic excision of vaginal mesh with Dr. Goel in 2020. There was significant adhesive disease per the op report and urology assisted with bladder dissection. She was discharged home on POD #1 with march in place. March was removed on 2020. Since then, she has had continued dysuria and vaginal pain/pressure. She admits to holding her urine to avoid the dysuria. No fever, chills, nausea, vomiting. She also has had poorly controlled blood sugars throughout this time.    March placed per nursing in the ED with 800 cc urine output. WBC count is 17. Cr 1.5, up from 1.0. UA with RBCs, >100 WBC, and bacteria with 3 squams. Urine cultures pending, s/p rocephin in the ED. CT A/P with multilobular fluid collection in the lower pelvis adjacent to the urinary bladder which likely causes in compression of the distal ureters bilaterally and resulting in mild bilateral hydronephrosis.     Of note, BG elevated in ED to > 500. Admitted to hospital medicine for management of DKA. Urology and gynecology consulted.        OB History    Para Term  AB Living   5 4 3 1 1 3   SAB TAB Ectopic Multiple Live Births   1 0 0 0 4      # Outcome Date GA Lbr Davon/2nd Weight Sex Delivery Anes PTL Lv   5 Term     M Vag-Spont   GUERRERO   4 Term     M Vag-Spont   GUERRERO   3 Term     M Vag-Spont   GUERRERO   2 SAB            1      M    ND     Past Medical History:   Diagnosis Date     Allergy     Arrhythmia     Arthritis     Bronchial asthma     Diabetes mellitus     Glaucoma     Hormone disorder     hysterectomy    Hypercholesteremia     Hypertension     Hyperthyroidism     Insomnia     Kidney stones     Thyroid disease     Urine, incontinence, stress female      Past Surgical History:   Procedure Laterality Date    APPENDECTOMY      BACK SURGERY      disc removal     CARPAL TUNNEL RELEASE Bilateral     CHOLECYSTECTOMY      CYSTOSCOPY N/A 10/26/2020    Procedure: CYSTOSCOPY;  Surgeon: Wilner Goel MD;  Location: [facility];  Service: OB/GYN;  Laterality: N/A;    FRACTURE SURGERY Right     wrist    [facility]    JOINT REPLACEMENT      KNEE ARTHROSCOPY W/ DEBRIDEMENT      KNEE SURGERY Right     Knee replacement    LITHOTRIPSY      NC REMOVAL OF OVARY/TUBE(S)  9/9/13    benign    removal of round ligament mass      ROBOT-ASSISTED LAPAROSCOPIC LYSIS OF ADHESIONS USING DA JAMES XI N/A 10/26/2020    Procedure: XI ROBOTIC LYSIS, ADHESIONS;  Surgeon: Wilner Goel MD;  Location: [facility];  Service: OB/GYN;  Laterality: N/A;  ROBOTIC MOBILIZATION OF BLADDER- DR BURNHAM    SPINE SURGERY      WRIST FRACTURE SURGERY Left        (Not in a hospital admission)      Review of patient's allergies indicates:   Allergen Reactions    Penicillin g Shortness Of Breath    Penicillins Shortness Of Breath, Itching and Swelling        Family History     Problem Relation (Age of Onset)    Cancer Mother, Brother, Brother, Brother, Brother    Colon cancer Brother        Tobacco Use    Smoking status: Current Every Day Smoker     Packs/day: 0.50     Years: 30.00     Pack years: 15.00    Smokeless tobacco: Never Used   Substance and Sexual Activity    Alcohol use: No    Drug use: No    Sexual activity: Not Currently     Partners: Male     Birth control/protection: Surgical     Review of Systems   Constitutional: Negative for chills and fever.    Respiratory: Negative for shortness of breath.    Cardiovascular: Negative for chest pain.   Gastrointestinal: Positive for abdominal pain. Negative for nausea and vomiting.   Genitourinary: Positive for dysuria and pelvic pain. Negative for vaginal bleeding.   Neurological: Negative for headaches.      Objective:     Vital Signs (Most Recent):  Temp: 98.5 °F (36.9 °C) (11/26/20 2124)  Pulse: 91 (11/27/20 0633)  Resp: 14 (11/27/20 0633)  BP: (!) 154/66 (11/27/20 0633)  SpO2: 97 % (11/27/20 0627) Vital Signs (24h Range):  Temp:  [98.5 °F (36.9 °C)] 98.5 °F (36.9 °C)  Pulse:  [91-99] 91  Resp:  [14-20] 14  SpO2:  [97 %-100 %] 97 %  BP: (132-159)/(60-88) 154/66     Weight: 115.7 kg (255 lb)  Body mass index is 39.94 kg/m².    No LMP recorded. Patient has had a hysterectomy.    Physical Exam:   Constitutional: She is oriented to person, place, and time. She appears well-developed and well-nourished. No distress.    HENT:   Head: Normocephalic and atraumatic.    Eyes: Conjunctivae and EOM are normal.    Neck: Normal range of motion. Neck supple. No thyromegaly present.    Cardiovascular: Normal rate and regular rhythm.     Pulmonary/Chest: Effort normal. No respiratory distress.        Abdominal: Soft. She exhibits no distension. There is abdominal tenderness.             Musculoskeletal: Normal range of motion and moves all extremeties. No tenderness or edema.       Neurological: She is alert and oriented to person, place, and time.    Skin: Skin is warm and dry. No rash noted.    Psychiatric: She has a normal mood and affect. Her behavior is normal.       Laboratory:  Recent Labs   Lab 11/26/20 2156   WBC 17.85*   HGB 9.8*   HCT 31.7*   MCV 94   *      Recent Labs   Lab 11/26/20 2156 11/27/20  0536   *  --    K 5.3*  --    CL 96  --    CO2 16*  --    BUN 19  --    CREATININE 1.5* 1.3   *  --    PROT 8.6*  --    BILITOT 0.2  --    ALKPHOS 188*  --    ALT 14  --    AST 10  --           Diagnostic  Results:  CT Urogram: Reviewed  Patient history of recent implanted pelvic mass revision with lysis of adhesions. Redemonstration of lobular hypoattenuating collections in the lower pelvis adjacent to the urinary bladder.  No definite extravasated contrast material appreciated within the pelvic collections to suggest communication with the bladder.  Findings remain non-specific and may relate to urinomas or other nonspecific postoperative collection including seroma, hematoma, or abscess. Cystic pelvic mass not excluded.  Clinical correlation with any previous preoperative imaging advised.     Bilateral vesicoureteral reflux.    Assessment/Plan:     * Diabetic ketoacidosis without coma associated with type 2 diabetes mellitus  - Continue management per primary team    Urinary retention  -- Cifuentes now in place draining urine.   -- UA with findings as noted in HPI. Culture pending. S/p vanc x 1 and rocephin x 1 in ED.  -- CT A/P and CT urogram done as noted above.  -- Continue to trend Cr. Already improved to 1.3  -- Agree with urology recommendation of placement of drain into pelvic fluid collection by IR with culture and Cr.        Thank you for your consult. I will follow-up with patient. Please contact us if you have any additional questions.    Karla Beltran MD  Obstetrics & Gynecology  [facility]

## 2020-11-27 NOTE — H&P
[facility] Hospital Medicine  History & Physical    Patient Name: Navin Beck  MRN: [id number]  Admission Date: 11/26/2020  Attending Physician: Alexa Emery MD   Primary Care Provider: Elvira Quinones MD    [facility] Medicine Team: [facility] HOSP MED 2 Ciera Maurer MD     Patient information was obtained from patient, past medical records and ER records.     Subjective:     Principal Problem:Diabetic ketoacidosis without coma associated with type 2 diabetes mellitus    Chief Complaint:   Chief Complaint   Patient presents with    Rectal Pain     arrived by [facility], Vaginal mass removed 10/26, pain to vagina and rectum since. BS greater than 500        HPI: Ms. Beck is a 61yo F with PMH of HTN, HLD, T2DM, Hyperthyroidism, and Asthma who had recent GYN surgery on 10/26 due to erosion of bladder mesh / prosthetic material with Cifuentes removal on 11/19. She presents to [facility] due to 4-5 days of not feeling well, she has had loss of appetite and weakness with 10/10 throbbing lower abdominal pain associated with vaginal pain and rectal pressure and non-bloody diarrhea. She has had trouble urinating for the past 5 days as well. She takes oxycodone 5 at home for pain, but it did not help. She endorses nausea and vomiting (4 times, non-bloody) also associated with the pain. She denies fever, chills, chest pain, SOB, trouble breathing, leg swelling, or any other symptom at this time.    ED Course: VSS, afebrile but leukocytosis at 17.85. Pt found to have DKA - Gluc 626, AG16, BHB 4.2, Fluids, insulin gtt started and Glucose down-trended to 200s. UA showed 2RBC, >100 WBC, bacteria, given CTX. CT A/P showed multiple fluid collections adjacent to bladder concerning for bladder perf with multiple urinomas, with mild bilateral hydronephrosis. Urology consulted and placed a Cifuentes with 900 UOP drained. GYN consulted due to recent surgery.     Pt will be admitted to [facility] medicine  for further management of DKA and possible intra-abdominal infection.     Past Medical History:   Diagnosis Date    Allergy     Arrhythmia     Arthritis     Bronchial asthma     Diabetes mellitus     Glaucoma     Hormone disorder     hysterectomy    Hypercholesteremia     Hypertension     Hyperthyroidism     Insomnia     Kidney stones     Thyroid disease     Urine, incontinence, stress female     UTI (urinary tract infection) 11/27/2020       Past Surgical History:   Procedure Laterality Date    APPENDECTOMY      BACK SURGERY      disc removal     CARPAL TUNNEL RELEASE Bilateral     CHOLECYSTECTOMY      CYSTOSCOPY N/A 10/26/2020    Procedure: CYSTOSCOPY;  Surgeon: Wilner Goel MD;  Location: [facility] OR;  Service: OB/GYN;  Laterality: N/A;    FRACTURE SURGERY Right     wrist    [facility]  [facility]    JOINT REPLACEMENT      KNEE ARTHROSCOPY W/ DEBRIDEMENT      KNEE SURGERY Right     Knee replacement    LITHOTRIPSY      MI REMOVAL OF OVARY/TUBE(S)  9/9/13    benign    removal of round ligament mass      ROBOT-ASSISTED LAPAROSCOPIC LYSIS OF ADHESIONS USING DA JAMES XI N/A 10/26/2020    Procedure: XI ROBOTIC LYSIS, ADHESIONS;  Surgeon: Wilner Goel MD;  Location: [facility] OR;  Service: OB/GYN;  Laterality: N/A;  ROBOTIC MOBILIZATION OF BLADDER- DR BURNHAM    SPINE SURGERY      WRIST FRACTURE SURGERY Left        Review of patient's allergies indicates:   Allergen Reactions    Penicillin g Shortness Of Breath    Penicillins Shortness Of Breath, Itching and Swelling       No current facility-administered medications on file prior to encounter.      Current Outpatient Medications on File Prior to Encounter   Medication Sig    acetaminophen (TYLENOL) 500 MG tablet Take 1,000 mg by mouth 3 (three) times daily as needed (fever and chills).    albuterol (PROVENTIL/VENTOLIN HFA) 90 mcg/actuation inhaler     benazepril (LOTENSIN) 10 MG tablet Take 10 mg by mouth  once daily.     blood-glucose meter (TRUE METRIX GLUCOSE METER MISC) True Metrix Glucose Meter    buprenorphine HCL (BELBUCA) 75 mcg Film once daily.     diphenoxylate-atropine 2.5-0.025 mg (LOMOTIL) 2.5-0.025 mg per tablet as needed.     DULoxetine (CYMBALTA) 60 MG capsule duloxetine 60 mg capsule,delayed release    estradioL (ESTRACE) 0.01 % (0.1 mg/gram) vaginal cream Place 1 g vaginally 3 (three) times a week. Alternate days with Metrogel for 14 days    flurazepam (DALMANE) 30 MG capsule nightly as needed.     fluticasone propionate (FLOVENT HFA) 44 mcg/actuation inhaler Flovent HFA 44 mcg/actuation aerosol inhaler    gabapentin (NEURONTIN) 600 MG tablet Take 1 tablet (600 mg total) by mouth 3 (three) times daily.    HYDROcodone-acetaminophen (NORCO) 5-325 mg per tablet Take 1 tablet by mouth every 6 (six) hours as needed for Pain.    ibuprofen (ADVIL,MOTRIN) 600 MG tablet Take 1 tablet (600 mg total) by mouth 3 (three) times daily.    insulin aspart (NOVOLOG) 100 unit/mL injection Inject 15 Units into the skin 3 (three) times daily with meals. Three times a day with meals according to sliding scale.    insulin glargine (LANTUS U-100 INSULIN) 100 unit/mL injection Inject 50 Units into the skin 2 (two) times daily.    METFORMIN HCL (METFORMIN ORAL) Take 500 mg by mouth 2 (two) times daily.    methimazole (TAPAZOLE) 10 MG Tab Take 10 mg by mouth once daily.     [] oxyCODONE-acetaminophen (PERCOCET) 7.5-325 mg per tablet Take 1 tablet by mouth every 8 (eight) hours as needed for Pain.    pantoprazole (PROTONIX) 40 MG tablet Take 1 tablet (40 mg total) by mouth once daily. Start after the twice daily Protonix is complete.Follow-up with doctor for further refills.    rosuvastatin (CRESTOR) 20 MG tablet rosuvastatin 20 mg tablet    tamsulosin (FLOMAX) 0.4 mg Cp24 Take 0.4 mg by mouth once daily.     tiZANidine (ZANAFLEX) 4 MG tablet tizanidine 4 mg tablet    TRUE METRIX GLUCOSE TEST STRIP  Strp     zolpidem (AMBIEN) 10 mg Tab Take 10 mg by mouth every evening.     Family History     Problem Relation (Age of Onset)    Cancer Mother, Brother, Brother, Brother, Brother    Colon cancer Brother        Tobacco Use    Smoking status: Current Every Day Smoker     Packs/day: 0.50     Years: 30.00     Pack years: 15.00    Smokeless tobacco: Never Used   Substance and Sexual Activity    Alcohol use: No    Drug use: No    Sexual activity: Not Currently     Partners: Male     Birth control/protection: Surgical     Review of Systems   Constitutional: Positive for activity change, appetite change and fatigue. Negative for chills and fever.   HENT: Negative for hearing loss, sneezing and trouble swallowing.    Respiratory: Negative for cough, shortness of breath and wheezing.    Cardiovascular: Negative for chest pain and leg swelling.   Gastrointestinal: Positive for abdominal pain, diarrhea, nausea, rectal pain and vomiting. Negative for blood in stool.   Genitourinary: Positive for difficulty urinating, dysuria and pelvic pain. Negative for flank pain.   Musculoskeletal: Negative for arthralgias, neck pain and neck stiffness.   Skin: Negative for color change, pallor and rash.   Neurological: Negative for syncope, weakness and headaches.   Psychiatric/Behavioral: Negative for confusion and hallucinations. The patient is not nervous/anxious.      Objective:     Vital Signs (Most Recent):  Temp: 99.5 °F (37.5 °C) (11/27/20 0827)  Pulse: 84 (11/27/20 0827)  Resp: 17 (11/27/20 0907)  BP: (!) 140/65 (11/27/20 0827)  SpO2: 95 % (11/27/20 0827) Vital Signs (24h Range):  Temp:  [98.5 °F (36.9 °C)-99.5 °F (37.5 °C)] 99.5 °F (37.5 °C)  Pulse:  [84-99] 84  Resp:  [14-20] 17  SpO2:  [95 %-100 %] 95 %  BP: (132-159)/(60-88) 140/65     Weight: 115.7 kg (255 lb)  Body mass index is 39.94 kg/m².    Physical Exam  Vitals signs and nursing note reviewed.   Constitutional:       General: She is not in acute distress.      Appearance: Normal appearance. She is obese.   HENT:      Head: Normocephalic and atraumatic.      Right Ear: External ear normal.      Left Ear: External ear normal.      Nose: Nose normal.      Mouth/Throat:      Mouth: Mucous membranes are moist.      Pharynx: Oropharynx is clear.   Eyes:      Conjunctiva/sclera: Conjunctivae normal.      Pupils: Pupils are equal, round, and reactive to light.   Neck:      Musculoskeletal: Normal range of motion and neck supple.   Cardiovascular:      Rate and Rhythm: Normal rate and regular rhythm.      Pulses: Normal pulses.      Heart sounds: Normal heart sounds.   Pulmonary:      Effort: Pulmonary effort is normal. No respiratory distress.      Breath sounds: Normal breath sounds. No wheezing or rhonchi.   Abdominal:      General: Abdomen is flat. Bowel sounds are decreased. There is no distension.      Palpations: Abdomen is soft.      Tenderness: There is abdominal tenderness (lower quadrants) in the suprapubic area. There is guarding. There is no right CVA tenderness, left CVA tenderness or rebound.      Hernia: No hernia is present.      Comments: White discharge from anus   Musculoskeletal: Normal range of motion.         General: No deformity or signs of injury.   Skin:     General: Skin is warm and dry.      Capillary Refill: Capillary refill takes less than 2 seconds.   Neurological:      General: No focal deficit present.      Mental Status: She is alert and oriented to person, place, and time.      Sensory: No sensory deficit.      Motor: No weakness.   Psychiatric:         Mood and Affect: Mood normal.         Behavior: Behavior normal.         Thought Content: Thought content normal.         Judgment: Judgment normal.           CRANIAL NERVES     CN III, IV, VI   Pupils are equal, round, and reactive to light.       Significant Labs:   CBC:   Recent Labs   Lab 11/26/20 2156   WBC 17.85*   HGB 9.8*   HCT 31.7*   *     CMP:   Recent Labs   Lab 11/26/20 2156  11/27/20  0536   *  --    K 5.3*  --    CL 96  --    CO2 16*  --    *  --    BUN 19  --    CREATININE 1.5* 1.3   CALCIUM 8.9  --    PROT 8.6*  --    ALBUMIN 2.4*  --    BILITOT 0.2  --    ALKPHOS 188*  --    AST 10  --    ALT 14  --    ANIONGAP 16  --    EGFRNONAA 37.1* 44.1*     All pertinent labs within the past 24 hours have been reviewed.    Significant Imaging: I have reviewed all pertinent imaging results/findings within the past 24 hours.    Assessment/Plan:     * Diabetic ketoacidosis without coma associated with type 2 diabetes mellitus  Pt presented to [facility] ED with Gluc 626, HCO3 16, AG 16, BHB 4.2, ketonuria. 2L IVF given in ED with insulin gtt started. Glucose trended downward to 274. Pt was then admitted to hospital medicine for further management of DKA.    - NPO   - LR IVF at 125/hr  - Insulin gtt   - BMP q4   - Will replace K PRN  - Accuchecks q1  - Nausea: zofran PRN     LAURIE (acute kidney injury)  Cr 1.5 on presentation.   Likely multi-factorial 2/2 pre-renal volume depletion in the context of DKA, UTI, and post-renal obstruction    - Resolving, Cr 1.3 after Cifuentes placement and IVF  - Will monitor Cr with BMPq4 while in DKA  - Treating UTI with CTX  - Maintain Cifuentes  - F/u urine lytes   - Renally dose meds  - Avoid renal toxins (ACEi/ARB, NSAIDs, contrast, etc.)  - Strict I&Os      UTI (urinary tract infection)  Pt presented with lower abdominal/vaginal/rectal pain with dysuria and urinary retention.   UA: 2 RBC, >100 WBC, 2+ leukocytes, bacteria  Recent UTIs with pan-sensitive E coli.    - Ceftriaxone started  - F/u urine cx  - Cifuentes placed for urinary retention, maintain Cifuentes per Urology      Urinary retention  Pt has recent h/o GYN surgery in 10/2020 for bladder mesh removal, Cifuentes was removed on 11/19. For the past few days pt has had increased trouble urinating associated with pain.     CT A/P 11/27: Multiple fluid collections adjacent to bladder. Mild bilateral hydronephrosis.      Retention likely 2/2 anatomic obstruction, pelvic fluid collection, diabetic bladder    - Urology consulted in ED, appreciate recs   - Cifuentes placed 800cc drained, bladder irrigated and all fluid removed with ease   - Maintain Cifuentes   - Rec drainage of pelvic fluid collection by IR with cultures & creatinine  - GYN consulted, appreciate consult   - Agrees with Urology plan for drainage with IR  - IR consulted, appreciate recs   - NPO   - Will try to aspirate collections on 11/27, however likely will have to reschedule to next week    Type 2 diabetes mellitus with hyperglycemia, with long-term current use of insulin  Last Hb A1c 10.3 on 9/25/20  Home regimen: Metformin 500 BID, Aspart 15U TID, Glargine 60 BID  Pt says her blood sugar has been running to the 300s for the past few days     - Hold oral anti-glycemics while hospitalized  - Insulin gtt while in DKA  - Accu-checks q2  - See DKA notes      Asthma  Pt states she uses Albuterol inhaler TID every day.     - Duonebs q6 PRN    Hyperthyroidism  Continue home Methimazole    - TSH ordered      Severe obesity (BMI 35.0-35.9 with comorbidity)   on exercise, diet, and weight management     Abnormal computed tomography of bladder  Pt presented to [facility] due to 5-6 days of 10/10 lower abdomen, vaginal, and rectal pain. Pt has recent h/o GYN surgery in 10/2020 for bladder mesh removal, Cifuentes was removed on 11/19.    CT A/P 11/27: Multiple fluid collections adjacent to bladder. Mild bilateral hydronephrosis.     - GYN consulted, appreciate consult   - Agrees with Urology plan for drainage with IR  - IR consulted, appreciate recs   - NPO   - Will try to aspirate collections on 11/27, however likely will have to reschedule to next week  - Pain management:    Dilaudid IV 1mg q4 PRN for severe breakthrough pain   Oxy 10 for severe pain   Oxy 5 for moderate pain  - Continue Flagyl 500q8 for possible intra-abdominal infection    Mixed hyperlipidemia  Continue home  statin      Essential hypertension  Hold home Benazepril due to LAURIE    - Will monitor BP and add anti-hypertensive as necessary         VTE Risk Mitigation (From admission, onward)         Ordered     IP VTE HIGH RISK PATIENT  Once      11/27/20 0903     Place sequential compression device  Until discontinued      11/27/20 0903     Reason for no Mechanical VTE Prophylaxis  Once     Question:  Reasons:  Answer:  Physician Provided (leave comment)  Comment:  possible procedures    11/27/20 0835     Place sequential compression device  Until discontinued      11/27/20 0110                   Ciera Maurer MD  Department of Hospital Medicine   [facility]

## 2020-11-27 NOTE — SUBJECTIVE & OBJECTIVE
Past Medical History:   Diagnosis Date    Allergy     Arrhythmia     Arthritis     Bronchial asthma     Diabetes mellitus     Glaucoma     Hormone disorder     hysterectomy    Hypercholesteremia     Hypertension     Hyperthyroidism     Insomnia     Kidney stones     Thyroid disease     Urine, incontinence, stress female     UTI (urinary tract infection) 11/27/2020       Past Surgical History:   Procedure Laterality Date    APPENDECTOMY      BACK SURGERY      disc removal     CARPAL TUNNEL RELEASE Bilateral     CHOLECYSTECTOMY      CYSTOSCOPY N/A 10/26/2020    Procedure: CYSTOSCOPY;  Surgeon: Wilner Goel MD;  Location: [facility] OR;  Service: OB/GYN;  Laterality: N/A;    FRACTURE SURGERY Right     wrist    [facility]  [facility]    JOINT REPLACEMENT      KNEE ARTHROSCOPY W/ DEBRIDEMENT      KNEE SURGERY Right     Knee replacement    LITHOTRIPSY      SC REMOVAL OF OVARY/TUBE(S)  9/9/13    benign    removal of round ligament mass      ROBOT-ASSISTED LAPAROSCOPIC LYSIS OF ADHESIONS USING DA JAMES XI N/A 10/26/2020    Procedure: XI ROBOTIC LYSIS, ADHESIONS;  Surgeon: Wilner Goel MD;  Location: [facility] OR;  Service: OB/GYN;  Laterality: N/A;  ROBOTIC MOBILIZATION OF BLADDER- DR BURNHAM    SPINE SURGERY      WRIST FRACTURE SURGERY Left        Review of patient's allergies indicates:   Allergen Reactions    Penicillin g Shortness Of Breath    Penicillins Shortness Of Breath, Itching and Swelling       No current facility-administered medications on file prior to encounter.      Current Outpatient Medications on File Prior to Encounter   Medication Sig    acetaminophen (TYLENOL) 500 MG tablet Take 1,000 mg by mouth 3 (three) times daily as needed (fever and chills).    albuterol (PROVENTIL/VENTOLIN HFA) 90 mcg/actuation inhaler     benazepril (LOTENSIN) 10 MG tablet Take 10 mg by mouth once daily.     blood-glucose meter (TRUE METRIX GLUCOSE METER MISC) True  Metrix Glucose Meter    buprenorphine HCL (BELBUCA) 75 mcg Film once daily.     diphenoxylate-atropine 2.5-0.025 mg (LOMOTIL) 2.5-0.025 mg per tablet as needed.     DULoxetine (CYMBALTA) 60 MG capsule duloxetine 60 mg capsule,delayed release    estradioL (ESTRACE) 0.01 % (0.1 mg/gram) vaginal cream Place 1 g vaginally 3 (three) times a week. Alternate days with Metrogel for 14 days    flurazepam (DALMANE) 30 MG capsule nightly as needed.     fluticasone propionate (FLOVENT HFA) 44 mcg/actuation inhaler Flovent HFA 44 mcg/actuation aerosol inhaler    gabapentin (NEURONTIN) 600 MG tablet Take 1 tablet (600 mg total) by mouth 3 (three) times daily.    HYDROcodone-acetaminophen (NORCO) 5-325 mg per tablet Take 1 tablet by mouth every 6 (six) hours as needed for Pain.    ibuprofen (ADVIL,MOTRIN) 600 MG tablet Take 1 tablet (600 mg total) by mouth 3 (three) times daily.    insulin aspart (NOVOLOG) 100 unit/mL injection Inject 15 Units into the skin 3 (three) times daily with meals. Three times a day with meals according to sliding scale.    insulin glargine (LANTUS U-100 INSULIN) 100 unit/mL injection Inject 50 Units into the skin 2 (two) times daily.    METFORMIN HCL (METFORMIN ORAL) Take 500 mg by mouth 2 (two) times daily.    methimazole (TAPAZOLE) 10 MG Tab Take 10 mg by mouth once daily.     [] oxyCODONE-acetaminophen (PERCOCET) 7.5-325 mg per tablet Take 1 tablet by mouth every 8 (eight) hours as needed for Pain.    pantoprazole (PROTONIX) 40 MG tablet Take 1 tablet (40 mg total) by mouth once daily. Start after the twice daily Protonix is complete.Follow-up with doctor for further refills.    rosuvastatin (CRESTOR) 20 MG tablet rosuvastatin 20 mg tablet    tamsulosin (FLOMAX) 0.4 mg Cp24 Take 0.4 mg by mouth once daily.     tiZANidine (ZANAFLEX) 4 MG tablet tizanidine 4 mg tablet    TRUE METRIX GLUCOSE TEST STRIP Strp     zolpidem (AMBIEN) 10 mg Tab Take 10 mg by mouth every evening.      Family History     Problem Relation (Age of Onset)    Cancer Mother, Brother, Brother, Brother, Brother    Colon cancer Brother        Tobacco Use    Smoking status: Current Every Day Smoker     Packs/day: 0.50     Years: 30.00     Pack years: 15.00    Smokeless tobacco: Never Used   Substance and Sexual Activity    Alcohol use: No    Drug use: No    Sexual activity: Not Currently     Partners: Male     Birth control/protection: Surgical     Review of Systems   Constitutional: Positive for activity change, appetite change and fatigue. Negative for chills and fever.   HENT: Negative for hearing loss, sneezing and trouble swallowing.    Respiratory: Negative for cough, shortness of breath and wheezing.    Cardiovascular: Negative for chest pain and leg swelling.   Gastrointestinal: Positive for abdominal pain, diarrhea, nausea, rectal pain and vomiting. Negative for blood in stool.   Genitourinary: Positive for difficulty urinating, dysuria and pelvic pain. Negative for flank pain.   Musculoskeletal: Negative for arthralgias, neck pain and neck stiffness.   Skin: Negative for color change, pallor and rash.   Neurological: Negative for syncope, weakness and headaches.   Psychiatric/Behavioral: Negative for confusion and hallucinations. The patient is not nervous/anxious.      Objective:     Vital Signs (Most Recent):  Temp: 99.5 °F (37.5 °C) (11/27/20 0827)  Pulse: 84 (11/27/20 0827)  Resp: 17 (11/27/20 0907)  BP: (!) 140/65 (11/27/20 0827)  SpO2: 95 % (11/27/20 0827) Vital Signs (24h Range):  Temp:  [98.5 °F (36.9 °C)-99.5 °F (37.5 °C)] 99.5 °F (37.5 °C)  Pulse:  [84-99] 84  Resp:  [14-20] 17  SpO2:  [95 %-100 %] 95 %  BP: (132-159)/(60-88) 140/65     Weight: 115.7 kg (255 lb)  Body mass index is 39.94 kg/m².    Physical Exam  Vitals signs and nursing note reviewed.   Constitutional:       General: She is not in acute distress.     Appearance: Normal appearance. She is obese.   HENT:      Head: Normocephalic  and atraumatic.      Right Ear: External ear normal.      Left Ear: External ear normal.      Nose: Nose normal.      Mouth/Throat:      Mouth: Mucous membranes are moist.      Pharynx: Oropharynx is clear.   Eyes:      Conjunctiva/sclera: Conjunctivae normal.      Pupils: Pupils are equal, round, and reactive to light.   Neck:      Musculoskeletal: Normal range of motion and neck supple.   Cardiovascular:      Rate and Rhythm: Normal rate and regular rhythm.      Pulses: Normal pulses.      Heart sounds: Normal heart sounds.   Pulmonary:      Effort: Pulmonary effort is normal. No respiratory distress.      Breath sounds: Normal breath sounds. No wheezing or rhonchi.   Abdominal:      General: Abdomen is flat. Bowel sounds are decreased. There is no distension.      Palpations: Abdomen is soft.      Tenderness: There is abdominal tenderness (lower quadrants) in the suprapubic area. There is guarding. There is no right CVA tenderness, left CVA tenderness or rebound.      Hernia: No hernia is present.      Comments: White discharge from anus   Musculoskeletal: Normal range of motion.         General: No deformity or signs of injury.   Skin:     General: Skin is warm and dry.      Capillary Refill: Capillary refill takes less than 2 seconds.   Neurological:      General: No focal deficit present.      Mental Status: She is alert and oriented to person, place, and time.      Sensory: No sensory deficit.      Motor: No weakness.   Psychiatric:         Mood and Affect: Mood normal.         Behavior: Behavior normal.         Thought Content: Thought content normal.         Judgment: Judgment normal.           CRANIAL NERVES     CN III, IV, VI   Pupils are equal, round, and reactive to light.       Significant Labs:   CBC:   Recent Labs   Lab 11/26/20 2156   WBC 17.85*   HGB 9.8*   HCT 31.7*   *     CMP:   Recent Labs   Lab 11/26/20 2156 11/27/20  0536   *  --    K 5.3*  --    CL 96  --    CO2 16*  --    GLU  626*  --    BUN 19  --    CREATININE 1.5* 1.3   CALCIUM 8.9  --    PROT 8.6*  --    ALBUMIN 2.4*  --    BILITOT 0.2  --    ALKPHOS 188*  --    AST 10  --    ALT 14  --    ANIONGAP 16  --    EGFRNONAA 37.1* 44.1*     All pertinent labs within the past 24 hours have been reviewed.    Significant Imaging: I have reviewed all pertinent imaging results/findings within the past 24 hours.

## 2020-11-27 NOTE — HPI
Patient is a 60 y.o. female with significant past medical history of HTN, HLD, DM II with recent revision of erosion of implanted vaginal mesh and prosthetic materials on 10/26 presented to hospital complaining of lower abdominal/pelvic pain. Post-op patient had been d/c'd w/ march cath which was removed on 11/19 and pain has been present since then. Patient endorses that she has been noncompliant with her home insulin/diabetes regimen for the last two days because she has not been feeling well. Imaging in ED shows multilobar fluid collection adjacent to bladder concerning for bladder perforation and bilateral hydronephrosis. Labs show a glucose of 626 w/ + bhb of 4.2 and pH 7.27, MICU had been consulted for DKA - started insulin ggt and started treatment for abx. On 12/1 nephrology wanting to start CRRT due to hyponatremia (122 on day of consultation)

## 2020-11-27 NOTE — ASSESSMENT & PLAN NOTE
--likely secondary to non-compliance with insulin/antiglycemic regimen over last two days  --acidosis is mild (pH 7.27, bicarb 16), vitals and mentation are normal  --recommend insulin gtt DKA protocol. This can be managed on a step-down unit.

## 2020-11-27 NOTE — CONSULTS
[facility]  Urology  Consult Note    Patient Name: Navin Beck  MRN: [id number]  Admission Date: 11/26/2020  Hospital Length of Stay: 0   Code Status: Prior   Attending Provider: Alfreda Baires MD   Consulting Provider: Ray Lua MD  Primary Care Physician: Elvira Quinones MD  Principal Problem:<principal problem not specified>    Inpatient consult to Urology  Consult performed by: Ray Lua MD  Consult ordered by: Salo Chester Jr., MD          Subjective:     HPI:  61 yo female with history of HTN, DM, and POP presenting [place] [facility] [place] with abdominal pain, DKA, and urinary retention. Urology consulted for abnormal CT finding with concern for bladder perforation. Her urologic/gynecologic history includes an anterior colporrhapy with mesh in 2010 with Dr. Keene, mesh erosion and protrusion was noted and excised with Dr. Guzman in 2013 with a concomitant BSO with excision or round ligament tumor excision with Dr. Springer in 2013. She then represented to Dr. Goel in 2020 with vaginal pain and was noted to have additional mesh erosion on vaginal exam. She was most recently taken to the OR on 10/26 for robotic excision of mesh performed by Dr. Goel with intra-operative assistance from Dr. Trevino. Per the op note, there was significant adhesions in the pelvis, but the procedure was without complication. She was discharged with a march catheter that was removed on 11/9/20. Since then, the patient has had some complaints of continued vaginal and rectal discomfort as well as dysuria. She tries to hold her urine to avoid the pain. Today, she presented to the ED able to void some on her own, but in urinary retention. March placed per nursing in the ED with 800 cc clear yellow urine without debris or blood. UAmicroscopy shows 2 RBC, >100 WBC, and bacteria with 3 squams. She has urine cultures pending and has received rocephin in the ED. Leukocytosis to 17.8. Creatinine  is 1.5 from 1.0.    Past Medical History:   Diagnosis Date    Allergy     Arrhythmia     Arthritis     Bronchial asthma     Diabetes mellitus     Glaucoma     Hormone disorder     hysterectomy    Hypercholesteremia     Hypertension     Hyperthyroidism     Insomnia     Kidney stones     Thyroid disease     Urine, incontinence, stress female        Past Surgical History:   Procedure Laterality Date    APPENDECTOMY      BACK SURGERY      disc removal     CARPAL TUNNEL RELEASE Bilateral     CHOLECYSTECTOMY      CYSTOSCOPY N/A 10/26/2020    Procedure: CYSTOSCOPY;  Surgeon: Wilner Goel MD;  Location: [facility] OR;  Service: OB/GYN;  Laterality: N/A;    FRACTURE SURGERY Right     wrist    [facility]    JOINT REPLACEMENT      KNEE ARTHROSCOPY W/ DEBRIDEMENT      KNEE SURGERY Right     Knee replacement    LITHOTRIPSY      MD REMOVAL OF OVARY/TUBE(S)  9/9/13    benign    removal of round ligament mass      ROBOT-ASSISTED LAPAROSCOPIC LYSIS OF ADHESIONS USING DA JAMES XI N/A 10/26/2020    Procedure: XI ROBOTIC LYSIS, ADHESIONS;  Surgeon: Wilner Goel MD;  Location: [facility] OR;  Service: OB/GYN;  Laterality: N/A;  ROBOTIC MOBILIZATION OF BLADDER- DR BURNHAM    SPINE SURGERY      WRIST FRACTURE SURGERY Left        Review of patient's allergies indicates:   Allergen Reactions    Penicillin g Shortness Of Breath    Penicillins Shortness Of Breath, Itching and Swelling       Family History     Problem Relation (Age of Onset)    Cancer Mother, Brother, Brother, Brother, Brother    Colon cancer Brother          Tobacco Use    Smoking status: Current Every Day Smoker     Packs/day: 0.50     Years: 30.00     Pack years: 15.00    Smokeless tobacco: Never Used   Substance and Sexual Activity    Alcohol use: No    Drug use: No    Sexual activity: Not Currently     Partners: Male     Birth control/protection: Surgical       Review of Systems   Constitutional:  Negative for appetite change, chills, fatigue, fever and unexpected weight change.   HENT: Negative.    Eyes: Negative.    Respiratory: Negative for cough, chest tightness and shortness of breath.    Cardiovascular: Negative for chest pain, palpitations and leg swelling.   Gastrointestinal: Positive for abdominal pain, nausea and rectal pain. Negative for constipation, diarrhea and vomiting.   Genitourinary: Positive for difficulty urinating, dysuria, pelvic pain and vaginal pain. Negative for frequency, hematuria and urgency.   Musculoskeletal: Negative for back pain.   Skin: Negative for rash.   Neurological: Negative for dizziness, syncope, numbness and headaches.   Hematological: Does not bruise/bleed easily.   Psychiatric/Behavioral: Negative.        Objective:     Temp:  [98.5 °F (36.9 °C)] 98.5 °F (36.9 °C)  Pulse:  [93-99] 99  Resp:  [15-20] 16  SpO2:  [98 %-100 %] 99 %  BP: (132-144)/(60-88) 132/60     Body mass index is 39.94 kg/m².           Drains     Drain            Female External Urinary Catheter 04/14/19 1430 592 days         Urethral Catheter 10/28/20 1359 16 Fr. 29 days         Urethral Catheter 11/27/20 0145 Latex 16 Fr. less than 1 day                Physical Exam  Constitutional:       General: She is not in acute distress.     Appearance: She is well-developed.   Eyes:      General: No scleral icterus.  Cardiovascular:      Rate and Rhythm: Normal rate.   Pulmonary:      Effort: Pulmonary effort is normal. No respiratory distress.   Abdominal:      General: Bowel sounds are normal. There is no distension.      Palpations: Abdomen is soft.      Tenderness: There is abdominal tenderness. There is no guarding or rebound.      Comments: Palpable bladder.   Genitourinary:     Comments: 16 Fr march in place with clear yellow urine. Normal external female genitalia.  Musculoskeletal: Normal range of motion.   Skin:     General: Skin is warm and dry.      Findings: No rash.   Neurological:      Mental  Status: She is alert and oriented to person, place, and time.   Psychiatric:         Behavior: Behavior normal.         Significant Labs:    BMP:  Recent Labs   Lab 11/26/20 2156   *   K 5.3*   CL 96   CO2 16*   BUN 19   CREATININE 1.5*   CALCIUM 8.9       CBC:  Recent Labs   Lab 11/26/20 2156   WBC 17.85*   HGB 9.8*   HCT 31.7*   *       Blood Culture: No results for input(s): LABBLOO in the last 168 hours.  Urine Culture: No results for input(s): LABURIN in the last 168 hours.  Urine Studies:   Recent Labs   Lab 11/26/20 2206   COLORU Straw   APPEARANCEUA Cloudy*   PHUR 5.0   SPECGRAV 1.020   PROTEINUA Negative   GLUCUA 3+*   KETONESU 1+*   BILIRUBINUA Negative   OCCULTUA 2+*   NITRITE Negative   LEUKOCYTESUR 3+*   RBCUA 2   WBCUA >100*   BACTERIA Occasional   SQUAMEPITHEL 3       Significant Imaging:  CT: I have reviewed all results within the past 24 hours and my personal findings are:      Adrenals unremarkable.  Bilateral mild hydronephrosis with some mild dilation of the ureters. Unable to easily track ureters to the level of the bladder. No obstructive ureteral stone seen on CT. Multiple non-obstructing stones in the left kidney. No solid masses in kidneys bilaterally.  Bladder distended with collection of multiple loculated fluid-filled structures at the anterior and left lateral aspect of the bladder. No abdominopelvic ascites or free air seen. Multiple phleboliths.                  Assessment and Plan:     Urinary retention  - March placed with 800 cc clear yellow urine.  - Maintain march  - Cause of retention likely multifactorial: anatomic vs diabetic bladder vs pelvic fluid collection  - Bilateral hydronephrosis on CT likely caused by urinary retention although bilateral ureteral obstruction cannot be excluded due to limitations of CT scan.  - Trend cr    Abnormal computed tomography of bladder  - Patient in retention. March placed with 800 cc urine. Gentle manual irrigation: bladder  easily irrigated with 100% of fluid placed into the bladder also removed without difficulty. Clear upon return  - CT with multiple fluid collections adjacent to bladder  - CT cystography shows no evidence of bladder leak and pelvic fluid collection  - Recommend placement of drain into pelvic fluid collection by IR, please send drain fluid for cultures and Creatinine  - Recommend Gyn consult as patient had recent gynecologic surgery        VTE Risk Mitigation (From admission, onward)         Ordered     IP VTE HIGH RISK PATIENT  Once      11/27/20 0110     Place sequential compression device  Until discontinued      11/27/20 0110                Thank you for your consult. I will follow-up with patient. Please contact us if you have any additional questions.    Ray Lua MD  Urology  [facility]

## 2020-11-27 NOTE — ASSESSMENT & PLAN NOTE
Pt presented with lower abdominal/vaginal/rectal pain with dysuria and urinary retention.   UA: 2 RBC, >100 WBC, 2+ leukocytes, bacteria  Recent UTIs with pan-sensitive E coli.    - Ceftriaxone started  - F/u urine cx  - Cifuentes placed for urinary retention, maintain Cifuentes per Urology

## 2020-11-27 NOTE — CONSULTS
[facility]  Critical Care Medicine  Consult Note    Patient Name: Navin Beck  MRN: [id number]  Admission Date: 11/26/2020  Hospital Length of Stay: 0 days  Code Status: Prior  Attending Physician: Salo Chester Jr., MD   Primary Care Provider: Elvira Quinones MD   Principal Problem: DKA  Inpatient consult to Critical Care Medicine  Consult performed by: Danisha Ivory NP  Consult ordered by: Salo Chester Jr., MD  Reason for consult: DKA        Subjective:     HPI:  Patient is a 60 y.o. female with significant past medical history of HTN, HLD, DM II with recent revision of erosion of implanted vaginal mesh and prosthetic materials on 10/26 presented to hospital complaining of lower abdominal/pelvic pain. Post-op patient had been d/c'd w/ march cath which was removed on 11/19 and pain has been present since then. Patient endorses that she has been noncompliant with her home insulin/diabetes regimen for the last two days because she has not been feeling well. Imaging in ED shows multilobar fluid collection adjacent to bladder concerning for bladder perforation and bilateral hydronephrosis. Labs show a glucose of 626 w/ + bhb of 4.2 and pH 7.27. Critical Care Medicine has been consulted for DKA.        Hospital/ICU Course:  No notes on file    Past Medical History:   Diagnosis Date    Allergy     Arrhythmia     Arthritis     Bronchial asthma     Diabetes mellitus     Glaucoma     Hormone disorder     hysterectomy    Hypercholesteremia     Hypertension     Hyperthyroidism     Insomnia     Kidney stones     Thyroid disease     Urine, incontinence, stress female        Past Surgical History:   Procedure Laterality Date    APPENDECTOMY      BACK SURGERY      disc removal     CARPAL TUNNEL RELEASE Bilateral     CHOLECYSTECTOMY      CYSTOSCOPY N/A 10/26/2020    Procedure: CYSTOSCOPY;  Surgeon: Wilner Goel MD;  Location: [facility];  Service: OB/GYN;   Laterality: N/A;    FRACTURE SURGERY Right     wrist    [facility]    JOINT REPLACEMENT      KNEE ARTHROSCOPY W/ DEBRIDEMENT      KNEE SURGERY Right     Knee replacement    LITHOTRIPSY      OR REMOVAL OF OVARY/TUBE(S)  9/9/13    benign    removal of round ligament mass      ROBOT-ASSISTED LAPAROSCOPIC LYSIS OF ADHESIONS USING DA JAMES XI N/A 10/26/2020    Procedure: XI ROBOTIC LYSIS, ADHESIONS;  Surgeon: Wilner Goel MD;  Location: [facility];  Service: OB/GYN;  Laterality: N/A;  ROBOTIC MOBILIZATION OF BLADDER- DR BURNHAM    SPINE SURGERY      WRIST FRACTURE SURGERY Left        Review of patient's allergies indicates:   Allergen Reactions    Penicillin g Shortness Of Breath    Penicillins Shortness Of Breath, Itching and Swelling       Family History     Problem Relation (Age of Onset)    Cancer Mother, Brother, Brother, Brother, Brother    Colon cancer Brother        Tobacco Use    Smoking status: Current Every Day Smoker     Packs/day: 0.50     Years: 30.00     Pack years: 15.00    Smokeless tobacco: Never Used   Substance and Sexual Activity    Alcohol use: No    Drug use: No    Sexual activity: Not Currently     Partners: Male     Birth control/protection: Surgical      Review of Systems   Constitutional: Positive for activity change and appetite change.   HENT: Negative for hearing loss, sneezing and trouble swallowing.    Respiratory: Negative for cough, shortness of breath and wheezing.    Cardiovascular: Negative for chest pain.   Gastrointestinal: Positive for abdominal pain, nausea and vomiting.   Genitourinary: Positive for difficulty urinating, dysuria and pelvic pain.   Musculoskeletal: Negative for arthralgias, neck pain and neck stiffness.   Skin: Negative for color change, pallor and rash.   Neurological: Negative for syncope, weakness and headaches.   Psychiatric/Behavioral: Negative for confusion and hallucinations. The patient is not  nervous/anxious.      Objective:     Vital Signs (Most Recent):  Temp: 98.5 °F (36.9 °C) (11/26/20 2124)  Pulse: 99 (11/27/20 0113)  Resp: 16 (11/27/20 0134)  BP: 132/60 (11/27/20 0108)  SpO2: 99 % (11/27/20 0118) Vital Signs (24h Range):  Temp:  [98.5 °F (36.9 °C)] 98.5 °F (36.9 °C)  Pulse:  [93-99] 99  Resp:  [15-20] 16  SpO2:  [98 %-100 %] 99 %  BP: (132-144)/(60-88) 132/60   Weight: 115.7 kg (255 lb)  Body mass index is 39.94 kg/m².      Intake/Output Summary (Last 24 hours) at 11/27/2020 0200  Last data filed at 11/26/2020 2316  Gross per 24 hour   Intake 1000 ml   Output --   Net 1000 ml       Physical Exam  Vitals signs and nursing note reviewed.   Constitutional:       General: She is not in acute distress.     Appearance: Normal appearance. She is obese.   HENT:      Head: Normocephalic and atraumatic.      Right Ear: External ear normal.      Left Ear: External ear normal.      Nose: Nose normal.      Mouth/Throat:      Mouth: Mucous membranes are moist.      Pharynx: Oropharynx is clear.   Eyes:      Conjunctiva/sclera: Conjunctivae normal.      Pupils: Pupils are equal, round, and reactive to light.   Neck:      Musculoskeletal: Normal range of motion and neck supple.   Cardiovascular:      Rate and Rhythm: Normal rate and regular rhythm.      Pulses: Normal pulses.      Heart sounds: Normal heart sounds.   Pulmonary:      Effort: Pulmonary effort is normal. No respiratory distress.      Breath sounds: Normal breath sounds.   Abdominal:      General: Abdomen is flat. Bowel sounds are decreased.      Palpations: Abdomen is soft.      Tenderness: There is abdominal tenderness in the suprapubic area.   Musculoskeletal: Normal range of motion.         General: No deformity or signs of injury.   Skin:     General: Skin is warm and dry.      Capillary Refill: Capillary refill takes less than 2 seconds.   Neurological:      General: No focal deficit present.      Mental Status: She is alert and oriented to  person, place, and time.   Psychiatric:         Mood and Affect: Mood normal.         Behavior: Behavior normal.         Thought Content: Thought content normal.         Judgment: Judgment normal.         Vents:     Lines/Drains/Airways     Drain            Female External Urinary Catheter 04/14/19 1430 592 days         Urethral Catheter 10/28/20 1359 16 Fr. 29 days         Urethral Catheter 11/27/20 0145 Latex 16 Fr. less than 1 day          Peripheral Intravenous Line                 Peripheral IV - Single Lumen 11/26/20 20 G Right Antecubital 1 day         Peripheral IV - Single Lumen 11/27/20 0158 22 G Left Forearm less than 1 day              Significant Labs:    CBC/Anemia Profile:  Recent Labs   Lab 11/26/20 2156   WBC 17.85*   HGB 9.8*   HCT 31.7*   *   MCV 94   RDW 13.0        Chemistries:  Recent Labs   Lab 11/26/20  2156   *   K 5.3*   CL 96   CO2 16*   BUN 19   CREATININE 1.5*   CALCIUM 8.9   ALBUMIN 2.4*   PROT 8.6*   BILITOT 0.2   ALKPHOS 188*   ALT 14   AST 10       All pertinent labs within the past 24 hours have been reviewed.    Significant Imaging: I have reviewed and interpreted all pertinent imaging results/findings within the past 24 hours.      ABG  Recent Labs   Lab 11/27/20  0053   PH 7.272*   PO2 45   PCO2 39.0   HCO3 18.0*   BE -9     Assessment/Plan:     Endocrine  Diabetic ketoacidosis without coma associated with type 2 diabetes mellitus  --likely secondary to non-compliance with insulin/antiglycemic regimen over last two days  --acidosis is mild (pH 7.27, bicarb 16), vitals and mentation are normal  --recommend insulin gtt DKA protocol. This can be managed on a step-down unit.       Patient does not require ICU level of care at this time. Urology at bedside for evaluation of possible bladder perforation. DKA can be managed by either Hospital Medicine or Endocrine on step-down unit.     Case discussed with [facility] Fellow Dr. Rahat Lyn.     Thank you for your consult. I  will sign off. Please contact us if you have any additional questions.     I spent >40 minutes reviewing patient records, examining, and counseling the patient with greater than 50% of the time spent with direct patient care and coordination.        Danisha Ivory NP  Critical Care Medicine  [facility]

## 2020-11-27 NOTE — SUBJECTIVE & OBJECTIVE
OB History    Para Term  AB Living   5 4 3 1 1 3   SAB TAB Ectopic Multiple Live Births   1 0 0 0 4      # Outcome Date GA Lbr Davon/2nd Weight Sex Delivery Anes PTL Lv   5 Term     M Vag-Spont   GUERRERO   4 Term     M Vag-Spont   GUERRERO   3 Term 1980    M Vag-Spont   GUERRERO   2 SAB            1      M    ND     Past Medical History:   Diagnosis Date    Allergy     Arrhythmia     Arthritis     Bronchial asthma     Diabetes mellitus     Glaucoma     Hormone disorder     hysterectomy    Hypercholesteremia     Hypertension     Hyperthyroidism     Insomnia     Kidney stones     Thyroid disease     Urine, incontinence, stress female      Past Surgical History:   Procedure Laterality Date    APPENDECTOMY      BACK SURGERY      disc removal     CARPAL TUNNEL RELEASE Bilateral     CHOLECYSTECTOMY      CYSTOSCOPY N/A 10/26/2020    Procedure: CYSTOSCOPY;  Surgeon: Wilner Goel MD;  Location: [facility] OR;  Service: OB/GYN;  Laterality: N/A;    FRACTURE SURGERY Right     wrist    [facility]      [facility]    JOINT REPLACEMENT      KNEE ARTHROSCOPY W/ DEBRIDEMENT      KNEE SURGERY Right     Knee replacement    LITHOTRIPSY      IL REMOVAL OF OVARY/TUBE(S)  13    benign    removal of round ligament mass      ROBOT-ASSISTED LAPAROSCOPIC LYSIS OF ADHESIONS USING DA JAMES XI N/A 10/26/2020    Procedure: XI ROBOTIC LYSIS, ADHESIONS;  Surgeon: Wilner Goel MD;  Location: [facility] OR;  Service: OB/GYN;  Laterality: N/A;  ROBOTIC MOBILIZATION OF BLADDER- DR BURNHAM    SPINE SURGERY      WRIST FRACTURE SURGERY Left        (Not in a hospital admission)      Review of patient's allergies indicates:   Allergen Reactions    Penicillin g Shortness Of Breath    Penicillins Shortness Of Breath, Itching and Swelling        Family History     Problem Relation (Age of Onset)    Cancer Mother, Brother, Brother, Brother, Brother    Colon cancer Brother        Tobacco Use     Smoking status: Current Every Day Smoker     Packs/day: 0.50     Years: 30.00     Pack years: 15.00    Smokeless tobacco: Never Used   Substance and Sexual Activity    Alcohol use: No    Drug use: No    Sexual activity: Not Currently     Partners: Male     Birth control/protection: Surgical     Review of Systems   Constitutional: Negative for chills and fever.   Respiratory: Negative for shortness of breath.    Cardiovascular: Negative for chest pain.   Gastrointestinal: Positive for abdominal pain. Negative for nausea and vomiting.   Genitourinary: Positive for dysuria and pelvic pain. Negative for vaginal bleeding.   Neurological: Negative for headaches.      Objective:     Vital Signs (Most Recent):  Temp: 98.5 °F (36.9 °C) (11/26/20 2124)  Pulse: 91 (11/27/20 0633)  Resp: 14 (11/27/20 0633)  BP: (!) 154/66 (11/27/20 0633)  SpO2: 97 % (11/27/20 0627) Vital Signs (24h Range):  Temp:  [98.5 °F (36.9 °C)] 98.5 °F (36.9 °C)  Pulse:  [91-99] 91  Resp:  [14-20] 14  SpO2:  [97 %-100 %] 97 %  BP: (132-159)/(60-88) 154/66     Weight: 115.7 kg (255 lb)  Body mass index is 39.94 kg/m².    No LMP recorded. Patient has had a hysterectomy.    Physical Exam:   Constitutional: She is oriented to person, place, and time. She appears well-developed and well-nourished. No distress.    HENT:   Head: Normocephalic and atraumatic.    Eyes: Conjunctivae and EOM are normal.    Neck: Normal range of motion. Neck supple. No thyromegaly present.    Cardiovascular: Normal rate and regular rhythm.     Pulmonary/Chest: Effort normal. No respiratory distress.        Abdominal: Soft. She exhibits no distension. There is abdominal tenderness.             Musculoskeletal: Normal range of motion and moves all extremeties. No tenderness or edema.       Neurological: She is alert and oriented to person, place, and time.    Skin: Skin is warm and dry. No rash noted.    Psychiatric: She has a normal mood and affect. Her behavior is normal.        Laboratory:  Recent Labs   Lab 11/26/20 2156   WBC 17.85*   HGB 9.8*   HCT 31.7*   MCV 94   *      Recent Labs   Lab 11/26/20 2156 11/27/20  0536   *  --    K 5.3*  --    CL 96  --    CO2 16*  --    BUN 19  --    CREATININE 1.5* 1.3   *  --    PROT 8.6*  --    BILITOT 0.2  --    ALKPHOS 188*  --    ALT 14  --    AST 10  --           Diagnostic Results:  CT Urogram: Reviewed  Patient history of recent implanted pelvic mass revision with lysis of adhesions. Redemonstration of lobular hypoattenuating collections in the lower pelvis adjacent to the urinary bladder.  No definite extravasated contrast material appreciated within the pelvic collections to suggest communication with the bladder.  Findings remain non-specific and may relate to urinomas or other nonspecific postoperative collection including seroma, hematoma, or abscess. Cystic pelvic mass not excluded.  Clinical correlation with any previous preoperative imaging advised.     Bilateral vesicoureteral reflux.

## 2020-11-27 NOTE — PT/OT/SLP PROGRESS
Physical Therapy      Patient Name:  Navin Beck   MRN:  [id number]    PT attempted to perform initial evaluation; however, pt with extreme levels of pain and nausea. RN aware of hold for today. Patient to be placed in weekend folder for attempted evaluation again on 11/28/2020 pending patient tolerance and willingness to participate. Patient not seen today secondary to Pain, Nursing care, Patient ill (Comment) nausea. Will follow-up at next scheduled session.    Priscila Koehler, PT

## 2020-11-27 NOTE — ASSESSMENT & PLAN NOTE
-- Cifuentes now in place draining urine.   -- UA with findings as noted in HPI. Culture pending. S/p vanc x 1 and rocephin x 1 in ED.  -- CT A/P and CT urogram done as noted above.  -- Continue to trend Cr. Already improved to 1.3  -- Agree with urology recommendation of placement of drain into pelvic fluid collection by IR with culture and Cr.

## 2020-11-27 NOTE — SUBJECTIVE & OBJECTIVE
Past Medical History:   Diagnosis Date    Allergy     Arrhythmia     Arthritis     Bronchial asthma     Diabetes mellitus     Glaucoma     Hormone disorder     hysterectomy    Hypercholesteremia     Hypertension     Hyperthyroidism     Insomnia     Kidney stones     Thyroid disease     Urine, incontinence, stress female        Past Surgical History:   Procedure Laterality Date    APPENDECTOMY      BACK SURGERY      disc removal     CARPAL TUNNEL RELEASE Bilateral     CHOLECYSTECTOMY      CYSTOSCOPY N/A 10/26/2020    Procedure: CYSTOSCOPY;  Surgeon: Wilner Goel MD;  Location: [facility];  Service: OB/GYN;  Laterality: N/A;    FRACTURE SURGERY Right     wrist    [facility]  [facility]    [facility]    JOINT REPLACEMENT      KNEE ARTHROSCOPY W/ DEBRIDEMENT      KNEE SURGERY Right     Knee replacement    LITHOTRIPSY      DE REMOVAL OF OVARY/TUBE(S)  9/9/13    benign    removal of round ligament mass      ROBOT-ASSISTED LAPAROSCOPIC LYSIS OF ADHESIONS USING DA JAMES XI N/A 10/26/2020    Procedure: XI ROBOTIC LYSIS, ADHESIONS;  Surgeon: Wilner Goel MD;  Location: [facility];  Service: OB/GYN;  Laterality: N/A;  ROBOTIC MOBILIZATION OF BLADDER- DR BURNHAM    SPINE SURGERY      WRIST FRACTURE SURGERY Left        Review of patient's allergies indicates:   Allergen Reactions    Penicillin g Shortness Of Breath    Penicillins Shortness Of Breath, Itching and Swelling       Family History     Problem Relation (Age of Onset)    Cancer Mother, Brother, Brother, Brother, Brother    Colon cancer Brother        Tobacco Use    Smoking status: Current Every Day Smoker     Packs/day: 0.50     Years: 30.00     Pack years: 15.00    Smokeless tobacco: Never Used   Substance and Sexual Activity    Alcohol use: No    Drug use: No    Sexual activity: Not Currently     Partners: Male     Birth control/protection: Surgical      Review of Systems   Constitutional: Positive for activity change  and appetite change.   HENT: Negative for hearing loss, sneezing and trouble swallowing.    Respiratory: Negative for cough, shortness of breath and wheezing.    Cardiovascular: Negative for chest pain.   Gastrointestinal: Positive for abdominal pain, nausea and vomiting.   Genitourinary: Positive for difficulty urinating, dysuria and pelvic pain.   Musculoskeletal: Negative for arthralgias, neck pain and neck stiffness.   Skin: Negative for color change, pallor and rash.   Neurological: Negative for syncope, weakness and headaches.   Psychiatric/Behavioral: Negative for confusion and hallucinations. The patient is not nervous/anxious.      Objective:     Vital Signs (Most Recent):  Temp: 98.5 °F (36.9 °C) (11/26/20 2124)  Pulse: 99 (11/27/20 0113)  Resp: 16 (11/27/20 0134)  BP: 132/60 (11/27/20 0108)  SpO2: 99 % (11/27/20 0118) Vital Signs (24h Range):  Temp:  [98.5 °F (36.9 °C)] 98.5 °F (36.9 °C)  Pulse:  [93-99] 99  Resp:  [15-20] 16  SpO2:  [98 %-100 %] 99 %  BP: (132-144)/(60-88) 132/60   Weight: 115.7 kg (255 lb)  Body mass index is 39.94 kg/m².      Intake/Output Summary (Last 24 hours) at 11/27/2020 0200  Last data filed at 11/26/2020 2316  Gross per 24 hour   Intake 1000 ml   Output --   Net 1000 ml       Physical Exam  Vitals signs and nursing note reviewed.   Constitutional:       General: She is not in acute distress.     Appearance: Normal appearance. She is obese.   HENT:      Head: Normocephalic and atraumatic.      Right Ear: External ear normal.      Left Ear: External ear normal.      Nose: Nose normal.      Mouth/Throat:      Mouth: Mucous membranes are moist.      Pharynx: Oropharynx is clear.   Eyes:      Conjunctiva/sclera: Conjunctivae normal.      Pupils: Pupils are equal, round, and reactive to light.   Neck:      Musculoskeletal: Normal range of motion and neck supple.   Cardiovascular:      Rate and Rhythm: Normal rate and regular rhythm.      Pulses: Normal pulses.      Heart sounds:  Normal heart sounds.   Pulmonary:      Effort: Pulmonary effort is normal. No respiratory distress.      Breath sounds: Normal breath sounds.   Abdominal:      General: Abdomen is flat. Bowel sounds are decreased.      Palpations: Abdomen is soft.      Tenderness: There is abdominal tenderness in the suprapubic area.   Musculoskeletal: Normal range of motion.         General: No deformity or signs of injury.   Skin:     General: Skin is warm and dry.      Capillary Refill: Capillary refill takes less than 2 seconds.   Neurological:      General: No focal deficit present.      Mental Status: She is alert and oriented to person, place, and time.   Psychiatric:         Mood and Affect: Mood normal.         Behavior: Behavior normal.         Thought Content: Thought content normal.         Judgment: Judgment normal.         Vents:     Lines/Drains/Airways     Drain            Female External Urinary Catheter 04/14/19 1430 592 days         Urethral Catheter 10/28/20 1359 16 Fr. 29 days         Urethral Catheter 11/27/20 0145 Latex 16 Fr. less than 1 day          Peripheral Intravenous Line                 Peripheral IV - Single Lumen 11/26/20 20 G Right Antecubital 1 day         Peripheral IV - Single Lumen 11/27/20 0158 22 G Left Forearm less than 1 day              Significant Labs:    CBC/Anemia Profile:  Recent Labs   Lab 11/26/20  2156   WBC 17.85*   HGB 9.8*   HCT 31.7*   *   MCV 94   RDW 13.0        Chemistries:  Recent Labs   Lab 11/26/20  2156   *   K 5.3*   CL 96   CO2 16*   BUN 19   CREATININE 1.5*   CALCIUM 8.9   ALBUMIN 2.4*   PROT 8.6*   BILITOT 0.2   ALKPHOS 188*   ALT 14   AST 10       All pertinent labs within the past 24 hours have been reviewed.    Significant Imaging: I have reviewed and interpreted all pertinent imaging results/findings within the past 24 hours.

## 2020-11-27 NOTE — ASSESSMENT & PLAN NOTE
Last Hb A1c 10.3 on 9/25/20  Home regimen: Metformin 500 BID, Aspart 15U TID, Glargine 60 BID  Pt says her blood sugar has been running to the 300s for the past few days     - Hold oral anti-glycemics while hospitalized  - Insulin gtt while in DKA  - Accu-checks q2  - See DKA notes

## 2020-11-27 NOTE — ASSESSMENT & PLAN NOTE
Pt has recent h/o GYN surgery in 10/2020 for bladder mesh removal, Cifuentes was removed on 11/19. For the past few days pt has had increased trouble urinating associated with pain.     CT A/P 11/27: Multiple fluid collections adjacent to bladder. Mild bilateral hydronephrosis.     Retention likely 2/2 anatomic obstruction, pelvic fluid collection, diabetic bladder    - Urology consulted in ED, appreciate recs   - Cifuentes placed 800cc drained, bladder irrigated and all fluid removed with ease   - Maintain Cifuentes   - Rec drainage of pelvic fluid collection by IR with cultures & creatinine  - GYN consulted, appreciate consult   - Agrees with Urology plan for drainage with IR  - IR consulted, appreciate recs   - NPO   - Will try to aspirate collections on 11/27, however likely will have to reschedule to next week

## 2020-11-27 NOTE — ED NOTES
This test utilizes isothermal nucleic acid amplification   technology to detect the SARS-CoV-2 RdRp nucleic acid segment.   The analytical sensitivity (limit of detection) is 125 genome   equivalents/mL.   A POSITIVE result implies infection with the SARS-CoV-2 virus;   the patient is presumed to be contagious.     A NEGATIVE result means that SARS-CoV-2 nucleic acids are not   present above the limit of detection. A NEGATIVE result should be   treated as presumptive. It does not rule out the possibility of   COVID-19 and should not be the sole basis for treatment decisions.   If COVID-19 is strongly suspected based on clinical and exposure   history, re-testing using an alternate molecular assay should be   considered.   This test is only for use under the Food and Drug   Administration s Emergency Use Authorization (EUA).   Commercial kits are provided by [employer].   Performance characteristics of the EUA have been independently   verified by [facility] Department of   Pathology and Laboratory Medicine.   _________________________________________________________________   The authorized Fact Sheet for Healthcare Providers and the authorized Fact   Sheet for Patients of the ID NOW COVID-19 are available on the FDA   website:     https://www.fda.gov/media/587205/download  https://www.fda.gov/media/189932/download

## 2020-11-27 NOTE — NURSING
Pt left arm iv infiltrated, attempted x2 to achieve new line for d5 1/2 NS infusing. CN achieved line 22G to left upper arm. Fluids infusing, Insulin drip infusing to right 20g. Pt still in a lot of pain. Notified MD via secure chat and paged x2 to IM. Awaiting call back. Pt lying semi fowlers, side rails up x2, call light in reach, bed alarm on. Will continue to closely monitor.

## 2020-11-27 NOTE — ASSESSMENT & PLAN NOTE
- March placed with 800 cc clear yellow urine.  - Maintain march  - Cause of retention likely multifactorial: anatomic vs diabetic bladder vs pelvic fluid collection  - Bilateral hydronephrosis on CT likely caused by urinary retention although bilateral ureteral obstruction cannot be excluded due to limitations of CT scan.  - Trend cr

## 2020-11-28 LAB
ANION GAP SERPL CALC-SCNC: 10 MMOL/L (ref 8–16)
ANION GAP SERPL CALC-SCNC: 11 MMOL/L (ref 8–16)
ANION GAP SERPL CALC-SCNC: 11 MMOL/L (ref 8–16)
APPEARANCE FLD: NORMAL
BACTERIA UR CULT: ABNORMAL
BASOPHILS # BLD AUTO: 0.04 K/UL (ref 0–0.2)
BASOPHILS # BLD AUTO: 0.06 K/UL (ref 0–0.2)
BASOPHILS NFR BLD: 0.2 % (ref 0–1.9)
BASOPHILS NFR BLD: 0.3 % (ref 0–1.9)
BODY FLD TYPE: NORMAL
BODY FLUID COMMENTS: NORMAL
BUN SERPL-MCNC: 7 MG/DL (ref 8–23)
BUN SERPL-MCNC: 8 MG/DL (ref 8–23)
BUN SERPL-MCNC: 8 MG/DL (ref 8–23)
CALCIUM SERPL-MCNC: 8.5 MG/DL (ref 8.7–10.5)
CALCIUM SERPL-MCNC: 8.6 MG/DL (ref 8.7–10.5)
CALCIUM SERPL-MCNC: 8.7 MG/DL (ref 8.7–10.5)
CHLORIDE SERPL-SCNC: 100 MMOL/L (ref 95–110)
CHLORIDE SERPL-SCNC: 102 MMOL/L (ref 95–110)
CHLORIDE SERPL-SCNC: 99 MMOL/L (ref 95–110)
CO2 SERPL-SCNC: 19 MMOL/L (ref 23–29)
CO2 SERPL-SCNC: 20 MMOL/L (ref 23–29)
CO2 SERPL-SCNC: 20 MMOL/L (ref 23–29)
COLOR FLD: NORMAL
CREAT SERPL-MCNC: 1 MG/DL (ref 0.5–1.4)
CREAT SERPL-MCNC: 1.1 MG/DL (ref 0.5–1.4)
CREAT SERPL-MCNC: 1.1 MG/DL (ref 0.5–1.4)
DIFFERENTIAL METHOD: ABNORMAL
DIFFERENTIAL METHOD: ABNORMAL
EOSINOPHIL # BLD AUTO: 0 K/UL (ref 0–0.5)
EOSINOPHIL # BLD AUTO: 0 K/UL (ref 0–0.5)
EOSINOPHIL NFR BLD: 0.2 % (ref 0–8)
EOSINOPHIL NFR BLD: 0.2 % (ref 0–8)
ERYTHROCYTE [DISTWIDTH] IN BLOOD BY AUTOMATED COUNT: 13.1 % (ref 11.5–14.5)
ERYTHROCYTE [DISTWIDTH] IN BLOOD BY AUTOMATED COUNT: 13.1 % (ref 11.5–14.5)
EST. GFR  (AFRICAN AMERICAN): >60 ML/MIN/1.73 M^2
EST. GFR  (NON AFRICAN AMERICAN): 53.9 ML/MIN/1.73 M^2
EST. GFR  (NON AFRICAN AMERICAN): 53.9 ML/MIN/1.73 M^2
EST. GFR  (NON AFRICAN AMERICAN): >60 ML/MIN/1.73 M^2
GLUCOSE SERPL-MCNC: 245 MG/DL (ref 70–110)
GLUCOSE SERPL-MCNC: 274 MG/DL (ref 70–110)
GLUCOSE SERPL-MCNC: 290 MG/DL (ref 70–110)
GRAM STN SPEC: NORMAL
HCT VFR BLD AUTO: 27.9 % (ref 37–48.5)
HCT VFR BLD AUTO: 28.3 % (ref 37–48.5)
HGB BLD-MCNC: 8.5 G/DL (ref 12–16)
HGB BLD-MCNC: 8.6 G/DL (ref 12–16)
IMM GRANULOCYTES # BLD AUTO: 0.12 K/UL (ref 0–0.04)
IMM GRANULOCYTES # BLD AUTO: 0.13 K/UL (ref 0–0.04)
IMM GRANULOCYTES NFR BLD AUTO: 0.6 % (ref 0–0.5)
IMM GRANULOCYTES NFR BLD AUTO: 0.7 % (ref 0–0.5)
LYMPHOCYTES # BLD AUTO: 3 K/UL (ref 1–4.8)
LYMPHOCYTES # BLD AUTO: 3 K/UL (ref 1–4.8)
LYMPHOCYTES NFR BLD: 15.2 % (ref 18–48)
LYMPHOCYTES NFR BLD: 15.6 % (ref 18–48)
MAGNESIUM SERPL-MCNC: 1.6 MG/DL (ref 1.6–2.6)
MCH RBC QN AUTO: 28.7 PG (ref 27–31)
MCH RBC QN AUTO: 29 PG (ref 27–31)
MCHC RBC AUTO-ENTMCNC: 30.4 G/DL (ref 32–36)
MCHC RBC AUTO-ENTMCNC: 30.5 G/DL (ref 32–36)
MCV RBC AUTO: 94 FL (ref 82–98)
MCV RBC AUTO: 95 FL (ref 82–98)
MONOCYTES # BLD AUTO: 1.8 K/UL (ref 0.3–1)
MONOCYTES # BLD AUTO: 1.9 K/UL (ref 0.3–1)
MONOCYTES NFR BLD: 9.4 % (ref 4–15)
MONOCYTES NFR BLD: 9.7 % (ref 4–15)
NEUTROPHILS # BLD AUTO: 14.2 K/UL (ref 1.8–7.7)
NEUTROPHILS # BLD AUTO: 14.5 K/UL (ref 1.8–7.7)
NEUTROPHILS NFR BLD: 73.9 % (ref 38–73)
NEUTROPHILS NFR BLD: 74 % (ref 38–73)
NRBC BLD-RTO: 0 /100 WBC
NRBC BLD-RTO: 0 /100 WBC
PHOSPHATE SERPL-MCNC: 3.6 MG/DL (ref 2.7–4.5)
PLATELET # BLD AUTO: 614 K/UL (ref 150–350)
PLATELET # BLD AUTO: 622 K/UL (ref 150–350)
PMV BLD AUTO: 10.2 FL (ref 9.2–12.9)
PMV BLD AUTO: 9.8 FL (ref 9.2–12.9)
POCT GLUCOSE: 183 MG/DL (ref 70–110)
POCT GLUCOSE: 223 MG/DL (ref 70–110)
POCT GLUCOSE: 234 MG/DL (ref 70–110)
POCT GLUCOSE: 237 MG/DL (ref 70–110)
POCT GLUCOSE: 250 MG/DL (ref 70–110)
POCT GLUCOSE: 258 MG/DL (ref 70–110)
POCT GLUCOSE: 258 MG/DL (ref 70–110)
POCT GLUCOSE: 268 MG/DL (ref 70–110)
POCT GLUCOSE: 286 MG/DL (ref 70–110)
POCT GLUCOSE: 310 MG/DL (ref 70–110)
POCT GLUCOSE: 310 MG/DL (ref 70–110)
POCT GLUCOSE: 322 MG/DL (ref 70–110)
POCT GLUCOSE: 322 MG/DL (ref 70–110)
POCT GLUCOSE: 328 MG/DL (ref 70–110)
POCT GLUCOSE: 338 MG/DL (ref 70–110)
POTASSIUM SERPL-SCNC: 4 MMOL/L (ref 3.5–5.1)
POTASSIUM SERPL-SCNC: 4.4 MMOL/L (ref 3.5–5.1)
POTASSIUM SERPL-SCNC: 4.7 MMOL/L (ref 3.5–5.1)
RBC # BLD AUTO: 2.96 M/UL (ref 4–5.4)
RBC # BLD AUTO: 2.97 M/UL (ref 4–5.4)
SODIUM SERPL-SCNC: 129 MMOL/L (ref 136–145)
SODIUM SERPL-SCNC: 131 MMOL/L (ref 136–145)
SODIUM SERPL-SCNC: 132 MMOL/L (ref 136–145)
WBC # BLD AUTO: 19.12 K/UL (ref 3.9–12.7)
WBC # BLD AUTO: 19.63 K/UL (ref 3.9–12.7)
WBC # FLD: NORMAL /CU MM

## 2020-11-28 PROCEDURE — 94770 HC EXHALED C02 TEST: CPT | Mod: HCWC

## 2020-11-28 PROCEDURE — S0030 INJECTION, METRONIDAZOLE: HCPCS | Mod: HCWC | Performed by: STUDENT IN AN ORGANIZED HEALTH CARE EDUCATION/TRAINING PROGRAM

## 2020-11-28 PROCEDURE — 94761 N-INVAS EAR/PLS OXIMETRY MLT: CPT | Mod: HCWC

## 2020-11-28 PROCEDURE — 97165 OT EVAL LOW COMPLEX 30 MIN: CPT | Mod: HCWC

## 2020-11-28 PROCEDURE — 85025 COMPLETE CBC W/AUTO DIFF WBC: CPT | Mod: 91,HCWC

## 2020-11-28 PROCEDURE — 25000242 PHARM REV CODE 250 ALT 637 W/ HCPCS: Mod: HCWC | Performed by: STUDENT IN AN ORGANIZED HEALTH CARE EDUCATION/TRAINING PROGRAM

## 2020-11-28 PROCEDURE — 80048 BASIC METABOLIC PNL TOTAL CA: CPT | Mod: HCWC

## 2020-11-28 PROCEDURE — 87076 CULTURE ANAEROBE IDENT EACH: CPT | Mod: HCWC

## 2020-11-28 PROCEDURE — 63600175 PHARM REV CODE 636 W HCPCS: Mod: HCWC | Performed by: STUDENT IN AN ORGANIZED HEALTH CARE EDUCATION/TRAINING PROGRAM

## 2020-11-28 PROCEDURE — 87075 CULTR BACTERIA EXCEPT BLOOD: CPT | Mod: HCWC

## 2020-11-28 PROCEDURE — 25000003 PHARM REV CODE 250: Mod: HCWC | Performed by: EMERGENCY MEDICINE

## 2020-11-28 PROCEDURE — 25000003 PHARM REV CODE 250: Mod: HCWC | Performed by: STUDENT IN AN ORGANIZED HEALTH CARE EDUCATION/TRAINING PROGRAM

## 2020-11-28 PROCEDURE — 97116 GAIT TRAINING THERAPY: CPT | Mod: HCWC

## 2020-11-28 PROCEDURE — C9399 UNCLASSIFIED DRUGS OR BIOLOG: HCPCS | Mod: HCWC | Performed by: STUDENT IN AN ORGANIZED HEALTH CARE EDUCATION/TRAINING PROGRAM

## 2020-11-28 PROCEDURE — 99900035 HC TECH TIME PER 15 MIN (STAT): Mod: HCWC

## 2020-11-28 PROCEDURE — 36415 COLL VENOUS BLD VENIPUNCTURE: CPT | Mod: HCWC

## 2020-11-28 PROCEDURE — 83735 ASSAY OF MAGNESIUM: CPT | Mod: HCWC

## 2020-11-28 PROCEDURE — 89051 BODY FLUID CELL COUNT: CPT | Mod: HCWC

## 2020-11-28 PROCEDURE — 87070 CULTURE OTHR SPECIMN AEROBIC: CPT | Mod: HCWC

## 2020-11-28 PROCEDURE — 87205 SMEAR GRAM STAIN: CPT | Mod: HCWC

## 2020-11-28 PROCEDURE — 25000003 PHARM REV CODE 250: Mod: HCWC | Performed by: HOSPITALIST

## 2020-11-28 PROCEDURE — 97535 SELF CARE MNGMENT TRAINING: CPT | Mod: HCWC

## 2020-11-28 PROCEDURE — 20600001 HC STEP DOWN PRIVATE ROOM: Mod: HCWC

## 2020-11-28 PROCEDURE — 99233 SBSQ HOSP IP/OBS HIGH 50: CPT | Mod: HCWC,GC,, | Performed by: INTERNAL MEDICINE

## 2020-11-28 PROCEDURE — 80048 BASIC METABOLIC PNL TOTAL CA: CPT | Mod: 91,HCWC

## 2020-11-28 PROCEDURE — 94640 AIRWAY INHALATION TREATMENT: CPT | Mod: HCWC

## 2020-11-28 PROCEDURE — 63600175 PHARM REV CODE 636 W HCPCS: Mod: HCWC | Performed by: RADIOLOGY

## 2020-11-28 PROCEDURE — 63600175 PHARM REV CODE 636 W HCPCS: Mod: HCWC | Performed by: INTERNAL MEDICINE

## 2020-11-28 PROCEDURE — 99233 PR SUBSEQUENT HOSPITAL CARE,LEVL III: ICD-10-PCS | Mod: HCWC,GC,, | Performed by: INTERNAL MEDICINE

## 2020-11-28 PROCEDURE — 97530 THERAPEUTIC ACTIVITIES: CPT | Mod: HCWC

## 2020-11-28 PROCEDURE — 84100 ASSAY OF PHOSPHORUS: CPT | Mod: HCWC

## 2020-11-28 PROCEDURE — 87102 FUNGUS ISOLATION CULTURE: CPT | Mod: HCWC

## 2020-11-28 PROCEDURE — 97162 PT EVAL MOD COMPLEX 30 MIN: CPT | Mod: HCWC

## 2020-11-28 RX ORDER — GLUCAGON 1 MG
1 KIT INJECTION
Status: DISCONTINUED | OUTPATIENT
Start: 2020-11-28 | End: 2020-11-28

## 2020-11-28 RX ORDER — FENTANYL CITRATE 50 UG/ML
INJECTION, SOLUTION INTRAMUSCULAR; INTRAVENOUS CODE/TRAUMA/SEDATION MEDICATION
Status: DISCONTINUED | OUTPATIENT
Start: 2020-11-28 | End: 2020-11-30

## 2020-11-28 RX ORDER — IBUPROFEN 200 MG
24 TABLET ORAL
Status: DISCONTINUED | OUTPATIENT
Start: 2020-11-28 | End: 2020-11-28

## 2020-11-28 RX ORDER — INSULIN ASPART 100 [IU]/ML
1-10 INJECTION, SOLUTION INTRAVENOUS; SUBCUTANEOUS
Status: DISCONTINUED | OUTPATIENT
Start: 2020-11-28 | End: 2020-11-29

## 2020-11-28 RX ORDER — INSULIN ASPART 100 [IU]/ML
0-10 INJECTION, SOLUTION INTRAVENOUS; SUBCUTANEOUS
Status: DISCONTINUED | OUTPATIENT
Start: 2020-11-28 | End: 2020-11-28

## 2020-11-28 RX ORDER — METRONIDAZOLE 500 MG/1
500 TABLET ORAL EVERY 8 HOURS
Status: DISCONTINUED | OUTPATIENT
Start: 2020-11-28 | End: 2020-12-12 | Stop reason: HOSPADM

## 2020-11-28 RX ORDER — HYDROMORPHONE HCL IN 0.9% NACL 6 MG/30 ML
PATIENT CONTROLLED ANALGESIA SYRINGE INTRAVENOUS CONTINUOUS
Status: DISCONTINUED | OUTPATIENT
Start: 2020-11-28 | End: 2020-11-30

## 2020-11-28 RX ORDER — NALOXONE HCL 0.4 MG/ML
0.02 VIAL (ML) INJECTION
Status: DISCONTINUED | OUTPATIENT
Start: 2020-11-28 | End: 2020-11-30

## 2020-11-28 RX ORDER — CEFEPIME HYDROCHLORIDE 2 G/1
2 INJECTION, POWDER, FOR SOLUTION INTRAVENOUS
Status: DISCONTINUED | OUTPATIENT
Start: 2020-11-28 | End: 2020-11-29

## 2020-11-28 RX ORDER — HYDROMORPHONE HYDROCHLORIDE 1 MG/ML
1 INJECTION, SOLUTION INTRAMUSCULAR; INTRAVENOUS; SUBCUTANEOUS
Status: DISCONTINUED | OUTPATIENT
Start: 2020-11-28 | End: 2020-11-28

## 2020-11-28 RX ORDER — IBUPROFEN 200 MG
16 TABLET ORAL
Status: DISCONTINUED | OUTPATIENT
Start: 2020-11-28 | End: 2020-11-29

## 2020-11-28 RX ORDER — HYDROMORPHONE HYDROCHLORIDE 1 MG/ML
2 INJECTION, SOLUTION INTRAMUSCULAR; INTRAVENOUS; SUBCUTANEOUS ONCE
Status: COMPLETED | OUTPATIENT
Start: 2020-11-28 | End: 2020-11-28

## 2020-11-28 RX ORDER — GLUCAGON 1 MG
1 KIT INJECTION
Status: DISCONTINUED | OUTPATIENT
Start: 2020-11-28 | End: 2020-11-29

## 2020-11-28 RX ORDER — IBUPROFEN 200 MG
24 TABLET ORAL
Status: DISCONTINUED | OUTPATIENT
Start: 2020-11-28 | End: 2020-11-29

## 2020-11-28 RX ORDER — VANCOMYCIN HCL IN 5 % DEXTROSE 1G/250ML
1000 PLASTIC BAG, INJECTION (ML) INTRAVENOUS
Status: DISCONTINUED | OUTPATIENT
Start: 2020-11-29 | End: 2020-11-29

## 2020-11-28 RX ORDER — DIPHENHYDRAMINE HYDROCHLORIDE 50 MG/ML
INJECTION INTRAMUSCULAR; INTRAVENOUS CODE/TRAUMA/SEDATION MEDICATION
Status: DISCONTINUED | OUTPATIENT
Start: 2020-11-28 | End: 2020-11-30

## 2020-11-28 RX ORDER — IBUPROFEN 200 MG
16 TABLET ORAL
Status: DISCONTINUED | OUTPATIENT
Start: 2020-11-28 | End: 2020-11-28

## 2020-11-28 RX ADMIN — POLYETHYLENE GLYCOL 3350 17 G: 17 POWDER, FOR SOLUTION ORAL at 09:11

## 2020-11-28 RX ADMIN — Medication: at 05:11

## 2020-11-28 RX ADMIN — CEFEPIME 2 G: 2 INJECTION, POWDER, FOR SOLUTION INTRAVENOUS at 12:11

## 2020-11-28 RX ADMIN — INSULIN HUMAN 4 UNITS/HR: 100 INJECTION, SOLUTION PARENTERAL at 03:11

## 2020-11-28 RX ADMIN — IPRATROPIUM BROMIDE AND ALBUTEROL SULFATE 3 ML: .5; 2.5 SOLUTION RESPIRATORY (INHALATION) at 09:11

## 2020-11-28 RX ADMIN — BENAZEPRIL HYDROCHLORIDE 10 MG: 10 TABLET ORAL at 09:11

## 2020-11-28 RX ADMIN — CEFEPIME 2 G: 2 INJECTION, POWDER, FOR SOLUTION INTRAVENOUS at 08:11

## 2020-11-28 RX ADMIN — METRONIDAZOLE 500 MG: 500 INJECTION, SOLUTION INTRAVENOUS at 03:11

## 2020-11-28 RX ADMIN — PANTOPRAZOLE SODIUM 40 MG: 40 TABLET, DELAYED RELEASE ORAL at 09:11

## 2020-11-28 RX ADMIN — IPRATROPIUM BROMIDE AND ALBUTEROL SULFATE 3 ML: .5; 2.5 SOLUTION RESPIRATORY (INHALATION) at 07:11

## 2020-11-28 RX ADMIN — METRONIDAZOLE 500 MG: 500 TABLET ORAL at 03:11

## 2020-11-28 RX ADMIN — MUPIROCIN: 20 OINTMENT TOPICAL at 09:11

## 2020-11-28 RX ADMIN — HYDROMORPHONE HYDROCHLORIDE 1 MG: 1 INJECTION, SOLUTION INTRAMUSCULAR; INTRAVENOUS; SUBCUTANEOUS at 07:11

## 2020-11-28 RX ADMIN — FENTANYL CITRATE 100 MCG: 50 INJECTION, SOLUTION INTRAMUSCULAR; INTRAVENOUS at 01:11

## 2020-11-28 RX ADMIN — SODIUM CHLORIDE 4 UNITS/HR: 9 INJECTION, SOLUTION INTRAVENOUS at 02:11

## 2020-11-28 RX ADMIN — SODIUM CHLORIDE 2.4 UNITS/HR: 9 INJECTION, SOLUTION INTRAVENOUS at 12:11

## 2020-11-28 RX ADMIN — SODIUM CHLORIDE 6 UNITS/HR: 9 INJECTION, SOLUTION INTRAVENOUS at 04:11

## 2020-11-28 RX ADMIN — METHIMAZOLE 10 MG: 10 TABLET ORAL at 09:11

## 2020-11-28 RX ADMIN — Medication: at 11:11

## 2020-11-28 RX ADMIN — DIPHENHYDRAMINE HYDROCHLORIDE 50 MG: 50 INJECTION INTRAMUSCULAR; INTRAVENOUS at 01:11

## 2020-11-28 RX ADMIN — INSULIN HUMAN 4 UNITS/HR: 100 INJECTION, SOLUTION PARENTERAL at 12:11

## 2020-11-28 RX ADMIN — VANCOMYCIN HYDROCHLORIDE 1750 MG: 750 INJECTION, POWDER, LYOPHILIZED, FOR SOLUTION INTRAVENOUS at 04:11

## 2020-11-28 RX ADMIN — GABAPENTIN 400 MG: 400 CAPSULE ORAL at 09:11

## 2020-11-28 RX ADMIN — OXYCODONE HYDROCHLORIDE 10 MG: 10 TABLET ORAL at 01:11

## 2020-11-28 RX ADMIN — SODIUM CHLORIDE 5 UNITS/HR: 9 INJECTION, SOLUTION INTRAVENOUS at 03:11

## 2020-11-28 RX ADMIN — OXYCODONE HYDROCHLORIDE 10 MG: 10 TABLET ORAL at 05:11

## 2020-11-28 RX ADMIN — SODIUM CHLORIDE 3 UNITS/HR: 9 INJECTION, SOLUTION INTRAVENOUS at 01:11

## 2020-11-28 RX ADMIN — POTASSIUM CHLORIDE, DEXTROSE MONOHYDRATE AND SODIUM CHLORIDE: 150; 5; 450 INJECTION, SOLUTION INTRAVENOUS at 11:11

## 2020-11-28 RX ADMIN — INSULIN ASPART 6 UNITS: 100 INJECTION, SOLUTION INTRAVENOUS; SUBCUTANEOUS at 12:11

## 2020-11-28 RX ADMIN — GABAPENTIN 400 MG: 400 CAPSULE ORAL at 03:11

## 2020-11-28 RX ADMIN — DULOXETINE HYDROCHLORIDE 60 MG: 60 CAPSULE, DELAYED RELEASE ORAL at 09:11

## 2020-11-28 RX ADMIN — OXYCODONE HYDROCHLORIDE 10 MG: 10 TABLET ORAL at 10:11

## 2020-11-28 RX ADMIN — INSULIN ASPART 2 UNITS: 100 INJECTION, SOLUTION INTRAVENOUS; SUBCUTANEOUS at 04:11

## 2020-11-28 RX ADMIN — ROSUVASTATIN CALCIUM 20 MG: 20 TABLET, FILM COATED ORAL at 09:11

## 2020-11-28 RX ADMIN — INSULIN ASPART 4 UNITS: 100 INJECTION, SOLUTION INTRAVENOUS; SUBCUTANEOUS at 09:11

## 2020-11-28 RX ADMIN — POTASSIUM CHLORIDE: 149 INJECTION, SOLUTION, CONCENTRATE INTRAVENOUS at 12:11

## 2020-11-28 RX ADMIN — HYDROMORPHONE HYDROCHLORIDE 1 MG: 1 INJECTION, SOLUTION INTRAMUSCULAR; INTRAVENOUS; SUBCUTANEOUS at 03:11

## 2020-11-28 RX ADMIN — GABAPENTIN 400 MG: 400 CAPSULE ORAL at 08:11

## 2020-11-28 RX ADMIN — METRONIDAZOLE 500 MG: 500 TABLET ORAL at 10:11

## 2020-11-28 RX ADMIN — INSULIN DETEMIR 20 UNITS: 100 INJECTION, SOLUTION SUBCUTANEOUS at 09:11

## 2020-11-28 RX ADMIN — HYDROMORPHONE HYDROCHLORIDE 2 MG: 1 INJECTION, SOLUTION INTRAMUSCULAR; INTRAVENOUS; SUBCUTANEOUS at 08:11

## 2020-11-28 RX ADMIN — METRONIDAZOLE 500 MG: 500 TABLET ORAL at 08:11

## 2020-11-28 NOTE — PT/OT/SLP EVAL
"Physical Therapy Evaluation    Patient Name:  Navin Beck   MRN:  [id number]    Recommendations:     Discharge Recommendations:  home, home health PT   Discharge Equipment Recommendations: none   Barriers to discharge: None    Assessment:     Navin Beck is a 60 y.o. female admitted with a medical diagnosis of Diabetic ketoacidosis without coma associated with type 2 diabetes mellitus.  She presents with the following impairments/functional limitations:  impaired functional mobilty, impaired self care skills, gait instability, impaired balance, decreased safety awareness, pain, weakness Eval completed with OT. Patient primarily limited by severe pain but despite tolerated session well with excellent effort, reported improvements in pain after ambulating. ModA supine to sit, Roberto toilet transfer, CGA for sit to stand, chair transfer and gait with RW. Patient is safe to return home once medically ready and would benefit from home health physical therapy in order to improve safety and independence in the home environment. Pt is safe to transfer and ambulate in halls with nursing staff x 1 person, with rolling walker.    Rehab Prognosis: Good; patient would benefit from acute skilled PT services to address these deficits and reach maximum level of function.    Recent Surgery: * No surgery found *      Plan:     During this hospitalization, patient to be seen 3 x/week to address the identified rehab impairments via gait training, therapeutic activities, therapeutic exercises, neuromuscular re-education and progress toward the following goals:    · Plan of Care Expires:  12/27/20    Subjective     Chief Complaint: abdominal pain  Patient/Family Comments/goals: "I want to walk to the bathroom"  Pain/Comfort:  · Pain Rating 1: 9/10  · Location - Orientation 1: generalized  · Location 1: abdomen  · Pain Addressed 1: Pre-medicate for activity, Reposition, Distraction, Nurse notified  · Pain Rating " Post-Intervention 1: 6/10    Patients cultural, spiritual, Denominational conflicts given the current situation: no    Living Environment:  Lives with adult son in 1st floor apartment with no RAQUEL. Son is available to assist.  Prior to admission, patients level of function was Independent with no DME. Enjoys spending time with her 17 month old grand-daughter.  Equipment used at home: none.  DME owned (not currently used): rolling walker.  Upon discharge, patient will have assistance from son.    Objective:     Communicated with nurse prior to session.  Patient found HOB elevated with march catheter, peripheral IV  upon PT entry to room.    General Precautions: Standard, fall   Orthopedic Precautions:N/A   Braces: N/A     Exams:  · RLE ROM: WNL  · RLE Strength: WNL  · LLE ROM: WNL  · LLE Strength: WNL    Functional Mobility:  · Bed Mobility:     · Rolling Left:  contact guard assistance  · Scooting: contact guard assistance  · Supine to Sit: moderate assistance  · Transfers:     · Sit to Stand:  contact guard assistance with hand-held assist  · Bed to Chair: contact guard assistance with  hand-held assist  using  Step Transfer  · Toilet Transfer: minimum assistance with  hand-held assist  using  Step Transfer  · Gait: ~12+12' with RW and CGA, into restroom and back. Decreased gait speed, decreased ravi, flexed posture, decreased step length, increased [place]. No LOB. No SOB. No c/o dizziness.     Therapeutic Activities and Exercises:   Patient educated on role of PT in the hospital.   Pt educated on plan and goals with physical therapy.   Pt educated on importance of OOB activity to decrease the risks associated with bed rest.   Instruction provided for safety and technique for gait and transfers with RW.     Patient instructed on proper hand placement with RW for safe transfers.   -Educated patient on indication for need for RW during mobility.   Recommended patient use RW for improved gait stability, gait quality, and  safety   All questions and concerns addressed within PT scope of practice.     AM-PAC 6 CLICK MOBILITY  Total Score:17     Patient left up in chair with all lines intact and call button in reach.    GOALS:   Multidisciplinary Problems     Physical Therapy Goals        Problem: Physical Therapy Goal    Goal Priority Disciplines Outcome Goal Variances Interventions   Physical Therapy Goal     PT, PT/OT Ongoing, Progressing     Description: Goals to be met by: 2020     Patient will increase functional independence with mobility by performin. Supine to sit with Modified [place]  2. Sit to supine with Modified [place]  3. Sit to stand transfer with Modified [place]  4. Bed to chair transfer with Modified [place] using Rolling Walker or no AD.  5. Gait  x 250 feet with Modified [place] using Rolling Walker or no AD.  6. Lower extremity exercise program x30 reps per handout, with independence                     History:     Past Medical History:   Diagnosis Date    Allergy     Arrhythmia     Arthritis     Bronchial asthma     Diabetes mellitus     Glaucoma     Hormone disorder     hysterectomy    Hypercholesteremia     Hypertension     Hyperthyroidism     Insomnia     Kidney stones     Thyroid disease     Urine, incontinence, stress female     UTI (urinary tract infection) 2020       Past Surgical History:   Procedure Laterality Date    APPENDECTOMY      BACK SURGERY      disc removal     CARPAL TUNNEL RELEASE Bilateral     CHOLECYSTECTOMY      CYSTOSCOPY N/A 10/26/2020    Procedure: CYSTOSCOPY;  Surgeon: Wilner Goel MD;  [facility];  Service: OB/GYN;  Laterality: N/A;    FRACTURE SURGERY Right     wrist    [facility]      [facility]    JOINT REPLACEMENT      KNEE ARTHROSCOPY W/ DEBRIDEMENT      KNEE SURGERY Right     Knee replacement    LITHOTRIPSY      IL REMOVAL OF OVARY/TUBE(S)  13    benign    removal of  round ligament mass      ROBOT-ASSISTED LAPAROSCOPIC LYSIS OF ADHESIONS USING DA JAMES XI N/A 10/26/2020    Procedure: XI ROBOTIC LYSIS, ADHESIONS;  Surgeon: Wilner Goel MD;  Location: [facility];  Service: OB/GYN;  Laterality: N/A;  ROBOTIC MOBILIZATION OF BLADDER- DR BURNHAM    SPINE SURGERY      WRIST FRACTURE SURGERY Left        Time Tracking:     PT Received On: 11/28/20  PT Start Time: 0846     PT Stop Time: 0916  PT Total Time (min): 30 min     Billable Minutes: Evaluation 7, Gait Training 10 and Therapeutic Activity 13      Nadja Bradshaw, PT  11/28/2020

## 2020-11-28 NOTE — PROGRESS NOTES
Pharmacokinetic Initial Assessment: IV Vancomycin    Assessment/Plan:    Initiate intravenous vancomycin with loading dose of 1750 (15mg/kg) mg once followed by a maintenance dose of vancomycin 1000mg IV every 12 hours. Starting conservative dose, as pt received IV contrast 11/26.  Desired empiric serum trough concentration is 15 to 20 mcg/mL  Draw vancomycin trough level 60 min prior to fourth dose on 11/30 at approximately 0300.  Pharmacy will continue to follow and monitor vancomycin.      Please contact pharmacy at extension [phone] with any questions regarding this assessment.     Thank you for the consult,   Marcy Almanza       Patient brief summary:  Navin Beck is a 60 y.o. female initiated on antimicrobial therapy with IV Vancomycin for treatment of suspected intra-abdominal infection    Drug Allergies:   Review of patient's allergies indicates:   Allergen Reactions    Penicillin g Shortness Of Breath    Penicillins Shortness Of Breath, Itching and Swelling       Actual Body Weight:   115.7kg    Renal Function:   Estimated Creatinine Clearance: 69.6 mL/min (based on SCr of 1.1 mg/dL).,     Dialysis Method (if applicable):  N/A    CBC (last 72 hours):  Recent Labs   Lab Result Units 11/26/20 2156 11/27/20  1239 11/28/20  0443   WBC K/uL 17.85*  --  19.63*  19.12*   Hemoglobin g/dL 9.8*  --  8.5*  8.6*   Hemoglobin A1C %  --  11.4*  --    Hematocrit % 31.7*  --  27.9*  28.3*   Platelets K/uL 657*  --  614*  622*   Gran % % 74.5*  --  73.9*  74.0*   Lymph % % 16.8*  --  15.2*  15.6*   Mono % % 7.6  --  9.7  9.4   Eosinophil % % 0.2  --  0.2  0.2   Basophil % % 0.2  --  0.3  0.2   Differential Method  Automated  --  Automated  Automated       Metabolic Panel (last 72 hours):  Recent Labs   Lab Result Units 11/26/20 2156 11/26/20 2206 11/27/20  0536 11/27/20  0850 11/27/20  1239 11/27/20  1518 11/27/20  1916 11/27/20 2158 11/28/20  0021 11/28/20  0442 11/28/20  0443   Sodium mmol/L 128*  --    --  134* 135* 138 136  --  132*  --  129*   Sodium, Urine mmol/L  --   --   --   --   --   --   --  83  --   --   --    Potassium mmol/L 5.3*  --   --  4.8 4.5 5.5* 4.4  --  4.4  --  4.7   Chloride mmol/L 96  --   --  103 103 106 104  --  102  --  99   CO2 mmol/L 16*  --   --  21* 20* 18* 22*  --  20*  --  19*   Glucose mg/dL 626*  --   --  286* 222* 109 158*  --  274*  --  290*   Glucose, UA   --  3+*  --   --   --   --   --   --   --   --   --    BUN mg/dL 19  --   --  12 12 11 9  --  8  --  8   Creatinine mg/dL 1.5*  --  1.3 1.2 1.0 1.0 1.0  --  1.1  --  1.1   Creatinine, Urine mg/dL  --   --   --   --   --   --   --  109.0  --   --   --    Albumin g/dL 2.4*  --   --   --   --   --   --   --   --   --   --    Total Bilirubin mg/dL 0.2  --   --   --   --   --   --   --   --   --   --    Alkaline Phosphatase U/L 188*  --   --   --   --   --   --   --   --   --   --    AST U/L 10  --   --   --   --   --   --   --   --   --   --    ALT U/L 14  --   --   --   --   --   --   --   --   --   --    Magnesium mg/dL  --   --   --   --   --   --   --   --   --  1.6  --    Phosphorus mg/dL  --   --   --   --   --   --   --   --   --  3.6  --        Drug levels (last 3 results):  No results for input(s): VANCOMYCINRA, VANCOMYCINPE, VANCOMYCINTR in the last 72 hours.    Microbiologic Results:  Microbiology Results (last 7 days)     Procedure Component Value Units Date/Time    Culture, Anaerobe [[id number]] Collected: 11/28/20 1405    Order Status: Sent Specimen: Abscess from Abdomen Updated: 11/28/20 1405    Aerobic culture [[id number]] Collected: 11/28/20 1405    Order Status: Sent Specimen: Abscess from Abdomen Updated: 11/28/20 1405    Fungus culture [[id number]] Collected: 11/28/20 1405    Order Status: Sent Specimen: Abscess from Abdomen Updated: 11/28/20 1405    Gram stain [[id number]] Collected: 11/28/20 1405    Order Status: Sent Specimen: Abscess from Abdomen Updated: 11/28/20 1405    Blood culture [[id number]] Collected:  11/27/20 1919    Order Status: Completed Specimen: Blood Updated: 11/28/20 0315     Blood Culture, Routine No Growth to date    Blood culture [[id number]] Collected: 11/27/20 1915    Order Status: Completed Specimen: Blood Updated: 11/28/20 0315     Blood Culture, Routine No Growth to date    Urine culture [[id number]]  (Abnormal) Collected: 11/26/20 2206    Order Status: Completed Specimen: Urine Updated: 11/27/20 1626     Urine Culture, Routine GRAM NEGATIVE DANE  10,000 - 49,999 cfu/ml  Identification and susceptibility pending  No other significant isolate      Narrative:      Specimen Source->Urine

## 2020-11-28 NOTE — PROGRESS NOTES
[facility] Hospital Medicine  Progress Note    Patient Name: Navin Beck  MRN: [id number]  Patient Class: IP- Inpatient   Admission Date: 11/26/2020  Length of Stay: 1 days  Attending Physician: Shirley Marquez MD  Primary Care Provider: Elvira Quinones MD    [facility] Medicine Team: [facility] HOSP MED 2 Ciera Maurer MD    Subjective:     Principal Problem:Diabetic ketoacidosis without coma associated with type 2 diabetes mellitus        HPI:  Ms. Beck is a 59yo F with PMH of HTN, HLD, T2DM, Hyperthyroidism, and Asthma who had recent GYN surgery on 10/26 due to erosion of bladder mesh / prosthetic material with Cifuentes removal on 11/19. She presents to [facility] due to 4-5 days of not feeling well, she has had loss of appetite and weakness with 10/10 throbbing lower abdominal pain associated with vaginal pain and rectal pressure and non-bloody diarrhea. She has had trouble urinating for the past 5 days as well. She takes oxycodone 5 at home for pain, but it did not help. She endorses nausea and vomiting (4 times, non-bloody) also associated with the pain. She denies fever, chills, chest pain, SOB, trouble breathing, leg swelling, or any other symptom at this time.    ED Course: VSS, afebrile but leukocytosis at 17.85. Pt found to have DKA - Gluc 626, AG16, BHB 4.2, Fluids, insulin gtt started and Glucose down-trended to 200s. UA showed 2RBC, >100 WBC, bacteria, given CTX. CT A/P showed multiple fluid collections adjacent to bladder concerning for bladder perf with multiple urinomas, with mild bilateral hydronephrosis. Urology consulted and placed a Cifuentes with 900 UOP drained. GYN consulted due to recent surgery.     Pt will be admitted to hospital medicine for further management of DKA and possible intra-abdominal infection.     Overview/Hospital Course:  Pt was admitted on 11/28 due to 10/10 abdominal pain and also found to be in DKA. Pt was started on IVF, insulin gtt, and kept NPO  with glucose downtrending and AG closed. CT abdomin showed multiple fluid collections near bladder. Urology and GYN on board, agree with plan for IR to place drain in fluid collections for culture and source control. Pt was started on CTX and Flagyl to cover for UTI and intra-abdominal infection, broadened to Cefepime and Flagyl on 11/26. Endocrine consulted and will manage pt on transitional drip while NPO.    Interval History: NAEON and VSS. WBC up-trending and pt remains with intense abdominal pain. Pt says she still has nausea controlled with zofran PRN. No other complaints today    Review of Systems   Constitutional: Positive for activity change, appetite change and fatigue. Negative for chills and fever.   HENT: Negative for hearing loss, sneezing and trouble swallowing.    Respiratory: Negative for cough, shortness of breath and wheezing.    Cardiovascular: Negative for chest pain and leg swelling.   Gastrointestinal: Positive for abdominal pain, diarrhea, nausea, rectal pain and vomiting. Negative for blood in stool.   Genitourinary: Positive for difficulty urinating, dysuria and pelvic pain. Negative for flank pain.   Musculoskeletal: Negative for arthralgias, neck pain and neck stiffness.   Skin: Negative for color change, pallor and rash.   Neurological: Negative for syncope, weakness and headaches.   Psychiatric/Behavioral: Negative for confusion and hallucinations. The patient is not nervous/anxious.      Objective:     Vital Signs (Most Recent):  Temp: 99.3 °F (37.4 °C) (11/28/20 0808)  Pulse: 96 (11/28/20 1400)  Resp: 18 (11/28/20 1504)  BP: 138/78 (11/28/20 1400)  SpO2: 96 % (11/28/20 1400) Vital Signs (24h Range):  Temp:  [98.5 °F (36.9 °C)-99.3 °F (37.4 °C)] 99.3 °F (37.4 °C)  Pulse:  [] 96  Resp:  [14-24] 18  SpO2:  [94 %-99 %] 96 %  BP: (132-150)/(63-78) 138/78     Weight: 115.7 kg (255 lb)  Body mass index is 39.94 kg/m².    Intake/Output Summary (Last 24 hours) at 11/28/2020 1506  Last  data filed at 11/28/2020 1210  Gross per 24 hour   Intake 2086.35 ml   Output 1350 ml   Net 736.35 ml      Physical Exam  Vitals signs and nursing note reviewed.   Constitutional:       General: She is not in acute distress.     Appearance: Normal appearance. She is obese.   HENT:      Head: Normocephalic and atraumatic.      Right Ear: External ear normal.      Left Ear: External ear normal.      Nose: Nose normal.      Mouth/Throat:      Mouth: Mucous membranes are moist.      Pharynx: Oropharynx is clear.   Eyes:      Conjunctiva/sclera: Conjunctivae normal.      Pupils: Pupils are equal, round, and reactive to light.   Neck:      Musculoskeletal: Normal range of motion and neck supple.   Cardiovascular:      Rate and Rhythm: Normal rate and regular rhythm.      Pulses: Normal pulses.      Heart sounds: Normal heart sounds.   Pulmonary:      Effort: Pulmonary effort is normal. No respiratory distress.      Breath sounds: Normal breath sounds. No wheezing or rhonchi.   Abdominal:      General: Abdomen is flat. Bowel sounds are decreased. There is no distension.      Palpations: Abdomen is soft.      Tenderness: There is abdominal tenderness (lower quadrants) in the suprapubic area. There is guarding. There is no right CVA tenderness, left CVA tenderness or rebound.      Hernia: No hernia is present.      Comments: White discharge from anus   Musculoskeletal: Normal range of motion.         General: No deformity or signs of injury.   Skin:     General: Skin is warm and dry.      Capillary Refill: Capillary refill takes less than 2 seconds.   Neurological:      General: No focal deficit present.      Mental Status: She is alert and oriented to person, place, and time.      Sensory: No sensory deficit.      Motor: No weakness.   Psychiatric:         Mood and Affect: Mood normal.         Behavior: Behavior normal.         Thought Content: Thought content normal.         Judgment: Judgment normal.         Significant  Labs: All pertinent labs within the past 24 hours have been reviewed.    Significant Imaging: I have reviewed all pertinent imaging results/findings within the past 24 hours.      Assessment/Plan:      * Diabetic ketoacidosis without coma associated with type 2 diabetes mellitus  Pt presented to [facility] ED with Gluc 626, HCO3 16, AG 16, BHB 4.2, ketonuria. 2L IVF given in ED with insulin gtt started. Glucose trended downward to 274. Pt was then admitted to hospital medicine for further management of DKA.    - Endocrine consulted, appreciate recs   - NPO, pt remains nauseous   - D5 NS gtt 75/hr   - Transitional Insulin gtt   - BMP q6  - Will replace K PRN  - Accuchecks q1 while on insulin gtt  - Nausea: zofran PRN     LAURIE (acute kidney injury)  Cr 1.5 on presentation.   Likely multi-factorial 2/2 pre-renal volume depletion in the context of DKA, UTI, and post-renal obstruction    - Resolving, Cr 1.3 after Cifuentes placement and IVF  - Will monitor Cr with BMPq6 while in DKA  - Treating UTI with Cefepime  - Maintain Cifuentes  - Renally dose meds  - Avoid renal toxins (ACEi/ARB, NSAIDs, contrast, etc.)  - Strict I&Os  - Cr normalized to 1.1   - Resolved      UTI (urinary tract infection)  Pt presented with lower abdominal/vaginal/rectal pain with dysuria and urinary retention.   UA: 2 RBC, >100 WBC, 2+ leukocytes, bacteria  Recent UTIs with pan-sensitive E coli.    - Ceftriaxone started  - F/u urine cx  - Cifuentes placed for urinary retention, maintain Cifuentes per Urology      Urinary retention  Pt has recent h/o GYN surgery in 10/2020 for bladder mesh removal, Cifuentes was removed on 11/19. For the past few days pt has had increased trouble urinating associated with pain.     CT A/P 11/27: Multiple fluid collections adjacent to bladder. Mild bilateral hydronephrosis.     Retention likely 2/2 anatomic obstruction, pelvic fluid collection, diabetic bladder    - Urology consulted in ED, appreciate recs   - Cifuentes placed 800cc drained,  bladder irrigated and all fluid removed with ease   - Maintain Cifuentes   - Rec drainage of pelvic fluid collection by IR with cultures & creatinine  - GYN consulted, appreciate consult   - Agrees with Urology plan for drainage with IR  - IR consulted, appreciate recs   - NPO   - IR placed drain into pelvic abscess     Type 2 diabetes mellitus with hyperglycemia, with long-term current use of insulin  Last Hb A1c 10.3 on 9/25/20  Home regimen: Metformin 500 BID, Aspart 15U TID, Glargine 60 BID  Pt says her blood sugar has been running to the 300s for the past few days     - Hold oral anti-glycemics while hospitalized  - Insulin gtt while in DKA  - Accu-checks q2  - See DKA notes      Asthma  Pt states she uses Albuterol inhaler TID every day.     - Duonebs q6 PRN    Hyperthyroidism  Continue home Methimazole    - TSH WNL      Severe obesity (BMI 35.0-35.9 with comorbidity)   on exercise, diet, and weight management     Abnormal computed tomography of bladder  [place] [facility] [place] 5-6 days of 10/10 lower abdomen, vaginal, and rectal pain. Pt has recent h/o GYN surgery in 10/2020 for bladder mesh removal, Cifuentes was removed on 11/19.    CT A/P 11/27: Multiple fluid collections adjacent to bladder. Mild bilateral hydronephrosis.     - GYN consulted, appreciate consult   - Agrees with Urology plan for drainage with IR  - IR consulted, appreciate recs   - NPO   - To aspirate fluid collections next week  - Pain management:    - PCA pump   - Cefepime and Flagyl to cover for intra-abdominal infection d2    Mixed hyperlipidemia  Continue home statin      Essential hypertension  Hold home Benazepril due to LAURIE    - Will monitor BP and add anti-hypertensive as necessary         VTE Risk Mitigation (From admission, onward)         Ordered     IP VTE HIGH RISK PATIENT  Once      11/27/20 0903                Discharge Planning   RAFA: 11/30/2020     Code Status: Full Code   Is the patient medically ready for discharge?:      Reason for patient still in hospital (select all that apply): Patient unstable, Patient trending condition, Treatment and Consult recommendations  Discharge Plan A: Home with family          Ciera Maurer MD  Department of Hospital Medicine   [facility]

## 2020-11-28 NOTE — ASSESSMENT & PLAN NOTE
Pt presented to [facility] due to 5-6 days of 10/10 lower abdomen, vaginal, and rectal pain. Pt has recent h/o GYN surgery in 10/2020 for bladder mesh removal, Cifuentes was removed on 11/19.    CT A/P 11/27: Multiple fluid collections adjacent to bladder. Mild bilateral hydronephrosis.     - GYN consulted, appreciate consult   - Agrees with Urology plan for drainage with IR  - IR consulted, appreciate recs   - NPO   - To aspirate fluid collections next week  - Pain management:    - PCA pump   - Cefepime and Flagyl to cover for intra-abdominal infection d2

## 2020-11-28 NOTE — ASSESSMENT & PLAN NOTE
Unclear etiology given no TRAb, TSI or RAIU scan  Thyroid US shows MNG so toxic MNG high in differential  She has a TSH at goal on methimazole 10 mg daily  Continue, follow up outpatient    Plan  - Continue methimazole 10 mg daily  - Outpatient followed with Endocrinology

## 2020-11-28 NOTE — SUBJECTIVE & OBJECTIVE
Interval History: NAEON and VSS. WBC up-trending and pt remains with intense abdominal pain. Pt says she still has nausea controlled with zofran PRN. No other complaints today    Review of Systems   Constitutional: Positive for activity change, appetite change and fatigue. Negative for chills and fever.   HENT: Negative for hearing loss, sneezing and trouble swallowing.    Respiratory: Negative for cough, shortness of breath and wheezing.    Cardiovascular: Negative for chest pain and leg swelling.   Gastrointestinal: Positive for abdominal pain, diarrhea, nausea, rectal pain and vomiting. Negative for blood in stool.   Genitourinary: Positive for difficulty urinating, dysuria and pelvic pain. Negative for flank pain.   Musculoskeletal: Negative for arthralgias, neck pain and neck stiffness.   Skin: Negative for color change, pallor and rash.   Neurological: Negative for syncope, weakness and headaches.   Psychiatric/Behavioral: Negative for confusion and hallucinations. The patient is not nervous/anxious.      Objective:     Vital Signs (Most Recent):  Temp: 99.3 °F (37.4 °C) (11/28/20 0808)  Pulse: 96 (11/28/20 1400)  Resp: 18 (11/28/20 1504)  BP: 138/78 (11/28/20 1400)  SpO2: 96 % (11/28/20 1400) Vital Signs (24h Range):  Temp:  [98.5 °F (36.9 °C)-99.3 °F (37.4 °C)] 99.3 °F (37.4 °C)  Pulse:  [] 96  Resp:  [14-24] 18  SpO2:  [94 %-99 %] 96 %  BP: (132-150)/(63-78) 138/78     Weight: 115.7 kg (255 lb)  Body mass index is 39.94 kg/m².    Intake/Output Summary (Last 24 hours) at 11/28/2020 1506  Last data filed at 11/28/2020 1210  Gross per 24 hour   Intake 2086.35 ml   Output 1350 ml   Net 736.35 ml      Physical Exam  Vitals signs and nursing note reviewed.   Constitutional:       General: She is not in acute distress.     Appearance: Normal appearance. She is obese.   HENT:      Head: Normocephalic and atraumatic.      Right Ear: External ear normal.      Left Ear: External ear normal.      Nose: Nose  normal.      Mouth/Throat:      Mouth: Mucous membranes are moist.      Pharynx: Oropharynx is clear.   Eyes:      Conjunctiva/sclera: Conjunctivae normal.      Pupils: Pupils are equal, round, and reactive to light.   Neck:      Musculoskeletal: Normal range of motion and neck supple.   Cardiovascular:      Rate and Rhythm: Normal rate and regular rhythm.      Pulses: Normal pulses.      Heart sounds: Normal heart sounds.   Pulmonary:      Effort: Pulmonary effort is normal. No respiratory distress.      Breath sounds: Normal breath sounds. No wheezing or rhonchi.   Abdominal:      General: Abdomen is flat. Bowel sounds are decreased. There is no distension.      Palpations: Abdomen is soft.      Tenderness: There is abdominal tenderness (lower quadrants) in the suprapubic area. There is guarding. There is no right CVA tenderness, left CVA tenderness or rebound.      Hernia: No hernia is present.      Comments: White discharge from anus   Musculoskeletal: Normal range of motion.         General: No deformity or signs of injury.   Skin:     General: Skin is warm and dry.      Capillary Refill: Capillary refill takes less than 2 seconds.   Neurological:      General: No focal deficit present.      Mental Status: She is alert and oriented to person, place, and time.      Sensory: No sensory deficit.      Motor: No weakness.   Psychiatric:         Mood and Affect: Mood normal.         Behavior: Behavior normal.         Thought Content: Thought content normal.         Judgment: Judgment normal.         Significant Labs: All pertinent labs within the past 24 hours have been reviewed.    Significant Imaging: I have reviewed all pertinent imaging results/findings within the past 24 hours.

## 2020-11-28 NOTE — ASSESSMENT & PLAN NOTE
- Urinary retention upon admission. March placed with 800 cc clear yellow urin. Maintain march  - CT with multiple fluid collections adjacent to bladder  - CT cystography shows no evidence of bladder leak and pelvic fluid collection  - IR to place pelvic drains  - Gyn following

## 2020-11-28 NOTE — H&P
Inpatient Radiology Pre-procedure Note    History of Present Illness:  Navin Beck is a 60 y.o. female with pertinent PMHx of anterior colporrhapy with mesh in 2010, mesh erosion and protrusion s/p excision in 2013 and additional mesh erosion in 2020 s/p robotic excision of mesh on 10/26/20 complicated by interval development of multiple complex multi-lobulated vs multi-loculated pelvic fluid collection(s) along the anterior and left lateral margins of the urinary bladder associated with pain and leukocytosis concerning for abscess, requiring fluid sampling and decompression for diagnosis, potential source control, treatment management and symptom relief.     A new inpatient IR consult received for CT-guided placement of a percutaneous anterior pelvic approach drainage catheter into the complex pelvic fluid collection.    Admission H&P reviewed.    Past Medical History:   Diagnosis Date    Allergy     Arrhythmia     Arthritis     Bronchial asthma     Diabetes mellitus     Glaucoma     Hormone disorder     hysterectomy    Hypercholesteremia     Hypertension     Hyperthyroidism     Insomnia     Kidney stones     Thyroid disease     Urine, incontinence, stress female     UTI (urinary tract infection) 11/27/2020     Past Surgical History:   Procedure Laterality Date    APPENDECTOMY      BACK SURGERY      disc removal     CARPAL TUNNEL RELEASE Bilateral     CHOLECYSTECTOMY      CYSTOSCOPY N/A 10/26/2020    Procedure: CYSTOSCOPY;  Surgeon: Wilner Goel MD;  [facility];  Service: OB/GYN;  Laterality: N/A;    FRACTURE SURGERY Right     wrist    [facility]    JOINT REPLACEMENT      KNEE ARTHROSCOPY W/ DEBRIDEMENT      KNEE SURGERY Right     Knee replacement    LITHOTRIPSY      [place] REMOVAL OF OVARY/TUBE(S)  9/9/13    benign    removal of round ligament mass      ROBOT-ASSISTED LAPAROSCOPIC LYSIS OF ADHESIONS USING DA JAMES XI N/A 10/26/2020     Procedure: XI ROBOTIC LYSIS, ADHESIONS;  Surgeon: Wilner Goel MD;  Location: [facility];  Service: OB/GYN;  Laterality: N/A;  ROBOTIC MOBILIZATION OF BLADDER- DR BURNHAM    SPINE SURGERY      WRIST FRACTURE SURGERY Left      Review of Systems:   As documented in primary team H&P    Home Meds:   Prior to Admission medications    Medication Sig Start Date End Date Taking? Authorizing Provider   acetaminophen (TYLENOL) 500 MG tablet Take 1,000 mg by mouth 3 (three) times daily as needed (fever and chills).    Historical Provider   albuterol (PROVENTIL/VENTOLIN HFA) 90 mcg/actuation inhaler  4/17/20   Historical Provider   benazepril (LOTENSIN) 10 MG tablet Take 10 mg by mouth once daily.     Historical Provider   blood-glucose meter (TRUE METRIX GLUCOSE METER MISC) True Metrix Glucose Meter    Historical Provider   buprenorphine HCL (BELBUCA) 75 mcg Film once daily.  12/26/18   Historical Provider   diphenoxylate-atropine 2.5-0.025 mg (LOMOTIL) 2.5-0.025 mg per tablet as needed.     Historical Provider   DULoxetine (CYMBALTA) 60 MG capsule duloxetine 60 mg capsule,delayed release    Historical Provider   estradioL (ESTRACE) 0.01 % (0.1 mg/gram) vaginal cream Place 1 g vaginally 3 (three) times a week. Alternate days with Metrogel for 14 days 11/11/20 11/25/20  Devi Boyle MD   flurazepam (DALMANE) 30 MG capsule nightly as needed.     Historical Provider   fluticasone propionate (FLOVENT HFA) 44 mcg/actuation inhaler Flovent HFA 44 mcg/actuation aerosol inhaler    Historical Provider   gabapentin (NEURONTIN) 600 MG tablet Take 1 tablet (600 mg total) by mouth 3 (three) times daily. 4/17/19   Pallavi Sunkara, MD   HYDROcodone-acetaminophen (NORCO) 5-325 mg per tablet Take 1 tablet by mouth every 6 (six) hours as needed for Pain. 10/27/20   Katherine C Boecking, MD   ibuprofen (ADVIL,MOTRIN) 600 MG tablet Take 1 tablet (600 mg total) by mouth 3 (three) times daily. 10/27/20   Katherine C Boecking, MD    insulin aspart (NOVOLOG) 100 unit/mL injection Inject 15 Units into the skin 3 (three) times daily with meals. Three times a day with meals according to sliding scale.    Historical Provider   insulin glargine (LANTUS U-100 INSULIN) 100 unit/mL injection Inject 50 Units into the skin 2 (two) times daily. 4/17/19   Pallavi Sunkara, MD   METFORMIN HCL (METFORMIN ORAL) Take 500 mg by mouth 2 (two) times daily.    Historical Provider   methimazole (TAPAZOLE) 10 MG Tab Take 10 mg by mouth once daily.     Historical Provider   pantoprazole (PROTONIX) 40 MG tablet Take 1 tablet (40 mg total) by mouth once daily. Start after the twice daily Protonix is complete.Follow-up with doctor for further refills. 8/11/17   Mat Sanchez MD   rosuvastatin (CRESTOR) 20 MG tablet rosuvastatin 20 mg tablet    Historical Provider   tamsulosin (FLOMAX) 0.4 mg Cp24 Take 0.4 mg by mouth once daily.     Historical Provider   tiZANidine (ZANAFLEX) 4 MG tablet tizanidine 4 mg tablet    Historical Provider   TRUE METRIX GLUCOSE TEST STRIP Strp  7/3/20   Historical Provider   zolpidem (AMBIEN) 10 mg Tab Take 10 mg by mouth every evening.    Historical Provider     Scheduled Meds:    albuterol-ipratropium  3 mL Nebulization Q6H WAKE    benazepriL  10 mg Oral Daily    ceFEPime (MAXIPIME) IVPB  2 g Intravenous Q8H    DULoxetine  60 mg Oral Daily    gabapentin  400 mg Oral TID    methIMAzole  10 mg Oral Daily    metroNIDAZOLE  500 mg Oral Q8H    mupirocin   Nasal BID    pantoprazole  40 mg Oral Daily    polyethylene glycol  17 g Oral Daily    rosuvastatin  20 mg Oral Daily     Continuous Infusions:    custom IV infusion builder      hydromorphone in 0.9 % NaCl 6 mg/30 ml      insulin regular 1 units/mL infusion orderable (TRANSFER) 4 Units/hr (11/28/20 1245)     PRN Meds:acetaminophen, dextrose 50%, dextrose 50%, glucagon (human recombinant), glucose, glucose, insulin aspart U-100, melatonin, naloxone, ondansetron,  oxyCODONE, oxyCODONE, senna-docusate 8.6-50 mg, sodium chloride 0.9%     Anticoagulants/Antiplatelets: no anticoagulation    Allergies:   Review of patient's allergies indicates:   Allergen Reactions    Penicillin g Shortness Of Breath    Penicillins Shortness Of Breath, Itching and Swelling     Sedation Hx: have not been any systemic reactions    Labs:  Recent Labs   Lab 11/27/20  1239   INR 1.0       Recent Labs   Lab 11/28/20  0443   WBC 19.63*  19.12*   HGB 8.5*  8.6*   HCT 27.9*  28.3*   MCV 94  95   *  622*      Recent Labs   Lab 11/26/20  2156  11/28/20  0442 11/28/20  0443   *   < >  --  290*   *   < >  --  129*   K 5.3*   < >  --  4.7   CL 96   < >  --  99   CO2 16*   < >  --  19*   BUN 19   < >  --  8   CREATININE 1.5*   < >  --  1.1   CALCIUM 8.9   < >  --  8.7   MG  --   --  1.6  --    ALT 14  --   --   --    AST 10  --   --   --    ALBUMIN 2.4*  --   --   --    BILITOT 0.2  --   --   --     < > = values in this interval not displayed.     Vitals:  Temp: 99.3 °F (37.4 °C) (11/28/20 0808)  Pulse: 90 (11/28/20 0935)  Resp: (!) 24 (11/28/20 1107)  BP: 132/69 (11/28/20 0808)  SpO2: 96 % (11/28/20 0935)     Physical Exam:  ASA: 3  Mallampati: 3    General: no acute distress  Mental Status: alert and oriented to person, place and time  HEENT: normocephalic, atraumatic  Chest: unlabored breathing  Heart: regular heart rate  Abdomen: nondistended  Extremity: moves all extremities    A/P:  60 y.o. female with multiple complex multi-lobulated vs multi-loculated pelvic fluid collections associated with pain and leukocytosis concerning for abscess, requiring fluid sampling and decompression for diagnosis, potential source control, treatment management and symptom relief.     1. Complex multi-lobulated vs multi-loculated pelvic fluid collection(s) - Will attempt CT-guided placement of a percutaneous anterior pelvic approach intra-peritoneal drainage catheter into the complex pelvic fluid  collection with up to moderate conscious sedation.    Risks (including, but not limited to, pain, bleeding, infection, damage to nearby structures, failure to obtain sufficient material for a diagnosis, the need for additional procedures, and death), benefits, and alternatives were discussed with the patient. All questions were answered to the best of my abilities. The patient wishes to proceed with the procedure. Written informed consent was obtained.    Thank you for considering IR for the care of your patient.     Josue Kauffman MD  Interventional Radiology      Emmanuel Doll MD, MS  Radiology  PGY-3  Pager: [phone]  [phone]

## 2020-11-28 NOTE — BRIEF OP NOTE
Radiology Post-Procedure Note    Pre Op Diagnosis: Complex multi-lobulated pelvic fluid collection  Post Op Diagnosis: Complex multi-lobulated pelvic abscess    Procedure: CT-guided placement of a percutaneous anterior midline pelvic approach 10-Fr drainage catheter into the pelvic abscess    Procedure performed by: Josue Kauffman MD    Written Informed Consent Obtained: Yes  Specimen Removed: YES, 250-cc of thick, frankly purulent fluid  Estimated Blood Loss: none    Findings:   Successful CT-guided placement of a percutaneous anterior midline pelvic approach 10-Fr drainage catheter into the pelvic abscess under minimal sedation. Patient tolerated the procedure well. No immediate post-procedural complications noted.     Pt transferred back to room for 2 hours post-op bed rest.    Drain must be flushed with 20-cc of sterile NS BID to assist with drainage and prevent catheter clogging for as long as drain remains indwelling.    Thank you for considering IR for the care of your patient.     Josue Kauffman MD  Interventional Radiology

## 2020-11-28 NOTE — PLAN OF CARE
Patient tolerated procedure without issue. 250CC of pus removed. Labs taken and will be sent to lab. Patient to be taken back to room by RN and RT. Report given to Narcisa CHAPARRO. ]  Due to failure of Epic not able to chart intraprocedure. All charting done in flowsheets

## 2020-11-28 NOTE — ASSESSMENT & PLAN NOTE
History of poorly controlled T2DM (A1c 11.4%) on MDI insulin and metformin    Presented with intra-abdominal infection and DKA    DKA resolved with IV insulin, IVFs    No steroids, no TFs  Diet: Clears no sugar    Switched to basal insulin calculated with a TDD of 0.7/u/kg/day and had significant hyperglycemia. Will increase to 1 U/kg/day given expected resistance with active infection.    Plan:  - Levemir 30 U BID  - Continue moderate dose insulin correction scale  - Continue no carb diet  - BG checks ACHS  - Inpatient glucose goal 140-180 mg/dL.

## 2020-11-28 NOTE — PLAN OF CARE
PHARMACY: [id number] - [place], [place]    PCP: Angel Quinones MD    PAYOR: [employer]         11/28/20 0959   Discharge Assessment   Assessment Type Discharge Planning Assessment   Confirmed/corrected address and phone number on facesheet? Yes   Assessment information obtained from? Caregiver;Medical Record   Expected Length of Stay (days) 3   Communicated expected length of stay with patient/caregiver yes   Prior to hospitilization cognitive status: Alert/Oriented   Prior to hospitalization functional status: Independent   Current cognitive status: Alert/Oriented   Current Functional Status: Needs Assistance   Lives With child(jovanny), adult   Able to Return to Prior Arrangements yes   Is patient able to care for self after discharge? Yes   Who are your caregiver(s) and their phone number(s)? Sons and daughter-in-law   Patient's perception of discharge disposition home or selfcare   Readmission Within the Last 30 Days current reason for admission unrelated to previous admission   Patient currently being followed by outpatient case management? No   Patient currently receives any other outside agency services? No   Equipment Currently Used at Home walker, rolling;cane, straight;bedside commode  (at home but not currently using)   Do you have any problems affording any of your prescribed medications? No   Is the patient taking medications as prescribed? yes   Does the patient have transportation home? Yes   Transportation Anticipated family or friend will provide   Does the patient receive services at the [facility]? No   Discharge Plan A Home with family   Discharge Plan B Home Health   DME Needed Upon Discharge  other (see comments)  (TBD)   Patient/Family in Agreement with Plan yes       Hanna Carbajal RN  PRN  - [facility]  [phone]

## 2020-11-28 NOTE — PLAN OF CARE
Problem: Occupational Therapy Goal  Goal: Occupational Therapy Goal  Description: Goals to be met by: 12/11/20     Patient will increase functional independence with ADLs by performing:    Feeding with [place].  UE Dressing with [place].  LE Dressing with Stand-by Assistance.  Grooming while standing at sink with Supervision.  Toileting from toilet with Minimal Assistance for hygiene and clothing management.   Toilet transfer to toilet with Supervision.    Outcome: Ongoing, Progressing     Evaluation complete-see note for details. Goals and POC established.    MG Barlow  11/28/2020   Pager #: [phone]

## 2020-11-28 NOTE — ASSESSMENT & PLAN NOTE
Pt has recent h/o GYN surgery in 10/2020 for bladder mesh removal, Cifuentes was removed on 11/19. For the past few days pt has had increased trouble urinating associated with pain.     PLAN  CT A/P 11/27: Multiple fluid collections adjacent to bladder. Mild bilateral hydronephrosis.   Retention likely 2/2 anatomic obstruction, pelvic fluid collection, diabetic bladder  - Urology consulted in ED, appreciate recs   - Cifuentes placed 800cc drained, bladder irrigated and all fluid removed with ease   - Maintain Cifuentes   - Rec drainage of pelvic fluid collection by IR with cultures & creatinine   - CT RSS ordered by uro 11/29 for left CVAT, incr SCr, decr UOP  - GYN consulted, appreciate consult   - Agrees with Urology plan for drainage with IR  - IR consulted, appreciate recs   - NPO   - IR placed drain 11/28 into pelvic abscess

## 2020-11-28 NOTE — PLAN OF CARE
Pt arrived to [address]  for Pelvic Drain placement.  Pt oriented to unit and staff. Plan of care reviewed with patient, patient verbalizes understanding. Comfort measures utilized. Pt safely transferred from stretcher to procedural table. Fall risk reviewed with patient, fall risk interventions maintained. Safety strap applied, positioner pillows utilized to minimize pressure points. Blankets applied. Pt prepped and draped utilizing standard sterile technique.. Timeouts completed utilizing standard universal time-out, per department and facility policy. Pt resting comfortably. Denies pain/discomfort. Will continue to monitor. See flow sheets for monitoring, medication administration, and updates.

## 2020-11-28 NOTE — PLAN OF CARE
Problem: Physical Therapy Goal  Goal: Physical Therapy Goal  Description: Goals to be met by: 2020     Patient will increase functional independence with mobility by performin. Supine to sit with Modified [place]  2. Sit to supine with Modified [place]  3. Sit to stand transfer with Modified [place]  4. Bed to chair transfer with Modified [place] using Rolling Walker or no AD.  5. Gait  x 250 feet with Modified [place] using Rolling Walker or no AD.  6. Lower extremity exercise program x30 reps per handout, with independence    Outcome: Ongoing, Progressing       PT eval complete. Patient is safe to return home once medically ready and would benefit from home health physical therapy in order to improve safety and independence in the home environment.     Nadja Bradshaw, PT, DPT  2020

## 2020-11-28 NOTE — PLAN OF CARE
Pt AAOX4. Pain managed with PRN pain meds. NPO with ice chips, no complaints of nausea or vomiting. Cifuentes, adequate urine output overnight. Q1 insulin drip infusing per MAR. VS stable on room air. Pt slept between care. Bed low and locked, call bell within reach. Will continue to monitor.

## 2020-11-28 NOTE — CONSULTS
Spoke with beside RN, stated pt has adequate access. Assistance not needed from PICC team at this time.

## 2020-11-28 NOTE — HPI
Reason for Consult: Management of T2DM, Hyperglycemia     Diabetes diagnosis year: > 10 years ago    Home Diabetes Medications:    Lantus 50 U daily  Novolog 15 U AC  Metformin 500 mg BID    How often checking glucose at home? 1-3 x day   BG readings on regimen: 200s-300s  Hypoglycemia on the regimen?  No  Missed doses on regimen?  Yes    Diabetes Complications include:     Diabetic nephropathy        HPI:   Patient is a 60 y.o. female with a diagnosis of HTN, T2DM on MDi insulin, hyperthyroidism who presented with weakness, poor appetite and abdominal pain associated with vaginal pain and rectal pressure and non-bloody diarrhea. She was found to be in DKA and started on DKA protocol with IVF and IV insulin. She is being treated with IV antibiotics for pelvic fluids collections suspected to be infectious.     On admission labs:   11/26/2020    Sodium 128 (L)   Potassium 5.3 (H)   Anion Gap 16   CO2 16 (L)   Creatinine 1.5 (H)   Glucose 626 (HH)   Beta-Hydroxybutyrate 4.2 (H)     Lab Results   Component Value Date    HGBA1C 11.4 (H) 11/27/2020     Lab Results   Component Value Date    TSH 0.519 11/27/2020       She also has a history of hyperthyroidism on methimazole 10 mg daily. Thyroid US shows MNG. No RAIU scan on record. No TRAb or TSI. TSH at goal.

## 2020-11-28 NOTE — PT/OT/SLP EVAL
Occupational Therapy  Co-Evaluation/Treatment    Name: Navin Beck  MRN: [id number]  Admitting Diagnosis:  Diabetic ketoacidosis without coma associated with type 2 diabetes mellitus      Recommendations:     Discharge Recommendations: home health OT  Discharge Equipment Recommendations:  none  Barriers to discharge:  None    Assessment:     Navin Beck is a 60 y.o. female with a medical diagnosis of Diabetic ketoacidosis without coma associated with type 2 diabetes mellitus.  She presents with 9.5/10 abdominal pain upon OT/PT entry. RN arrived to administer pain medication during treatment, where pt reported pain decreased to 6/10. Pt tolerated session well, however, is functioning below baseline. Performance deficits affecting function: weakness, impaired endurance, impaired self care skills, impaired functional mobilty, gait instability, impaired balance, decreased safety awareness, pain.  Pt would benefit from skilled OT services in order to maximize independence with ADLs and facilitate safe discharge. Pt would benefit from home health OT once medically stable for discharge.    Rehab Prognosis: Good; patient would benefit from acute skilled OT services to address these deficits and reach maximum level of function.       Plan:     Patient to be seen 3 x/week to address the above listed problems via self-care/home management, therapeutic activities, therapeutic exercises  · Plan of Care Expires: 12/28/20  · Plan of Care Reviewed with: patient    Subjective     Chief Complaint: abdominal pain and need to have BM  Patient/Family Comments/goals: to return home    Occupational Profile:  Living Environment: Pt lives with her son in a first floor apartment with 0 RAQUEL  Previous level of function: independent  Roles and Routines: takes care of her grandchild  Equipment Used at Home:  walker, rolling(owns does not use)  Assistance upon Discharge: Upon discharge, pt will have assistance from her  son    Pain/Comfort:  · Pain Rating 1: 9/10  · Location - Orientation 1: generalized  · Location 1: abdomen  · Pain Addressed 1: Pre-medicate for activity, Reposition, Distraction, Cessation of Activity  · Pain Rating Post-Intervention 1: 6/10    Patients cultural, spiritual, [employer]ism conflicts given the current situation: no    Objective:     Communicated with: RN and PT prior to session.  Patient found HOB elevated with march catheter, peripheral IV upon OT entry to room.    General Precautions: Standard, fall   Orthopedic Precautions:N/A   Braces: N/A     Occupational Performance:    Bed Mobility:    · Patient completed Rolling/Turning to Left with  contact guard assistance  · Patient completed Scooting/Bridging with contact guard assistance  · Patient completed Supine to Sit with moderate assistance    Functional Mobility/Transfers:  · Patient completed Sit <> Stand Transfer with contact guard assistance  with  hand-held assist   · Patient completed Toilet Transfer Step Transfer technique with minimum assistance with  rolling walker and grab bars  · Functional Mobility: Pt ambulated bed<>bathroom with CGA and RW in order to maximize functional activity tolerance and standing balance required for engagement in occupations of choice.    · No LOB, SOB, or c/o of dizziness    Activities of Daily Living:  · Toileting: total assistance posterior pooja-care in standing with RW; march in place    Cognitive/Visual Perceptual:  Cognitive/Psychosocial Skills:     -       Oriented to: Person, Place, Time and Situation   -       Follows Commands/attention:Follows one-step commands  -       Communication: clear/fluent  -       Memory: No Deficits noted  -       Safety awareness/insight to disability: intact   -       Mood/Affect/Coping skills/emotional control: Appropriate to situation    Physical Exam:  Upper Extremity Range of Motion:     -       Right Upper Extremity: WFL  -       Left Upper Extremity: WFL  Upper  Extremity Strength:    -       Right Upper Extremity: WFL  -       Left Upper Extremity: WFL   Strength:    -       Right Upper Extremity: WFL  -       Left Upper Extremity: WFL  Fine Motor Coordination:    -       Intact    AMPAC 6 Click ADL:  AMPAC Total Score: 16    Treatment & Education:  -Therapist provided facilitation and instruction of proper body mechanics, energy conservation, and fall prevention strategies during tasks listed above.  -Co-tx with PT performed due to need for education and assistance from two skilled therapy disciplines at pt's current functional level.    -Pt educated on role of OT, POC and goals for therapy  -Pt educated on importance of OOB activities with staff member assistance and sitting OOB majority of the day.   -Pt verbalized understanding. Pt expressed no further concerns/questions  -Whiteboard updated   Education:    Patient left up in chair with all lines intact, call button in reach and RN notified    GOALS:   Multidisciplinary Problems     Occupational Therapy Goals        Problem: Occupational Therapy Goal    Goal Priority Disciplines Outcome Interventions   Occupational Therapy Goal     OT, PT/OT Ongoing, Progressing    Description: Goals to be met by: 12/11/20     Patient will increase functional independence with ADLs by performing:    Feeding with [place].  UE Dressing with [place].  LE Dressing with Stand-by Assistance.  Grooming while standing at sink with Supervision.  Toileting from toilet with Minimal Assistance for hygiene and clothing management.   Toilet transfer to toilet with Supervision.                     History:     Past Medical History:   Diagnosis Date    Allergy     Arrhythmia     Arthritis     Bronchial asthma     Diabetes mellitus     Glaucoma     Hormone disorder     hysterectomy    Hypercholesteremia     Hypertension     Hyperthyroidism     Insomnia     Kidney stones     Thyroid disease     Urine, incontinence, stress  female     UTI (urinary tract infection) 11/27/2020       Past Surgical History:   Procedure Laterality Date    APPENDECTOMY      BACK SURGERY      disc removal     CARPAL TUNNEL RELEASE Bilateral     CHOLECYSTECTOMY      CYSTOSCOPY N/A 10/26/2020    Procedure: CYSTOSCOPY;  Surgeon: Wilner Goel MD;  Location: [facility];  Service: OB/GYN;  Laterality: N/A;    FRACTURE SURGERY Right     wrist    [facility]  2010    [facility]    JOINT REPLACEMENT      KNEE ARTHROSCOPY W/ DEBRIDEMENT      KNEE SURGERY Right     Knee replacement    LITHOTRIPSY      NE REMOVAL OF OVARY/TUBE(S)  9/9/13    benign    removal of round ligament mass      ROBOT-ASSISTED LAPAROSCOPIC LYSIS OF ADHESIONS USING DA JAMES XI N/A 10/26/2020    Procedure: XI ROBOTIC LYSIS, ADHESIONS;  Surgeon: Wilner Goel MD;  Location: [facility];  Service: OB/GYN;  Laterality: N/A;  ROBOTIC MOBILIZATION OF BLADDER- DR BURNHAM    SPINE SURGERY      WRIST FRACTURE SURGERY Left        Time Tracking:     OT Date of Treatment: 11/28/20  OT Start Time: 0846  OT Stop Time: 0914  OT Total Time (min): 28 min    Billable Minutes:Evaluation 10  Self Care/Home Management 18    Jailyn Sahu, OT  11/28/2020

## 2020-11-28 NOTE — PROGRESS NOTES
[facility]  Urology  Progress Note    Patient Name: Navin Beck  MRN: [id number]  Admission Date: 11/26/2020  Hospital Length of Stay: 1 days  Code Status: Full Code   Attending Provider: Shirley Marquez MD   Primary Care Physician: Elvira Quinones MD    Subjective:     HPI:  61 yo female with history of HTN, DM, and POP presenting [place] [facility] [place] with abdominal pain, DKA, and urinary retention. Urology consulted for abnormal CT finding with concern for bladder perforation. Her urologic/gynecologic history includes an anterior colporrhapy with mesh in 2010 with Dr. Keene, mesh erosion and protrusion was noted and excised with Dr. Guzman in 2013 with a concomitant BSO with excision or round ligament tumor excision with Dr. Springer in 2013. She then represented to Dr. Goel in 2020 with vaginal pain and was noted to have additional mesh erosion on vaginal exam. She was most recently taken to the OR on 10/26 for robotic excision of mesh performed by Dr. Goel with intra-operative assistance from Dr. Trevino. Per the op note, there was significant adhesions in the pelvis, but the procedure was without complication. She was discharged with a mrach catheter that was removed on 11/9/20. Since then, the patient has had some complaints of continued vaginal and rectal discomfort as well as dysuria. She tries to hold her urine to avoid the pain. Today, she presented to the ED able to void some on her own, but in urinary retention. March placed per nursing in the ED with 800 cc clear yellow urine without debris or blood. UAmicroscopy shows 2 RBC, >100 WBC, and bacteria with 3 squams. She has urine cultures pending and has received rocephin in the ED. Leukocytosis to 17.8. Creatinine is 1.5 from 1.0.    Interval History: NAEON. AFVSS.  IR consulted to place pelvic drain(s), but unable to do so yesterday. Plan for drain some time next week.  Has been NPO. Denies nausea or vomiting.  Complaints  of pelvic pain unchanged since admission.  UOP good with clear yellow urine.    Objective:     Temp:  [98.2 °F (36.8 °C)-99.6 °F (37.6 °C)] 99.3 °F (37.4 °C)  Pulse:  [76-92] 82  Resp:  [14-21] 18  SpO2:  [95 %-100 %] 97 %  BP: (132-176)/(59-69) 132/69     Body mass index is 39.94 kg/m².           Drains     Drain                 Urethral Catheter 11/27/20 0145 Latex 16 Fr. 1 day                Physical Exam  Constitutional:       General: She is not in acute distress.     Appearance: She is well-developed.   Eyes:      General: No scleral icterus.  Cardiovascular:      Rate and Rhythm: Normal rate.   Pulmonary:      Effort: Pulmonary effort is normal. No respiratory distress.   Abdominal:      General: Bowel sounds are normal. There is no distension.      Palpations: Abdomen is soft.      Tenderness: There is abdominal tenderness. There is no guarding or rebound.      Comments: Palpable bladder.   Genitourinary:     Comments: 16 Fr march in place with clear yellow urine. Normal external female genitalia.  Musculoskeletal: Normal range of motion.   Skin:     General: Skin is warm and dry.      Findings: No rash.   Neurological:      Mental Status: She is alert and oriented to person, place, and time.   Psychiatric:         Behavior: Behavior normal.         Significant Labs:    BMP:  Recent Labs   Lab 11/27/20  1916 11/28/20  0021 11/28/20  0443    132* 129*   K 4.4 4.4 4.7    102 99   CO2 22* 20* 19*   BUN 9 8 8   CREATININE 1.0 1.1 1.1   CALCIUM 9.0 8.5* 8.7       CBC:   Recent Labs   Lab 11/26/20  2156 11/28/20  0443   WBC 17.85* 19.63*  19.12*   HGB 9.8* 8.5*  8.6*   HCT 31.7* 27.9*  28.3*   * 614*  622*       All pertinent labs results from the past 24 hours have been reviewed.    Significant Imaging:  All pertinent imaging results/findings from the past 24 hours have been reviewed.                  Assessment/Plan:     Urinary retention  - March placed with 800 cc clear yellow urine.  -  Maintain march  - Cause of retention likely multifactorial: anatomic vs diabetic bladder vs pelvic fluid collection  - Bilateral hydronephrosis on CT likely caused by urinary retention although bilateral ureteral obstruction cannot be excluded due to limitations of CT scan.  - Trend cr    Abnormal computed tomography of bladder  - Urinary retention upon admission. March placed with 800 cc clear yellow urin. Maintain march  - CT with multiple fluid collections adjacent to bladder  - CT cystography shows no evidence of bladder leak and pelvic fluid collection  - IR to place pelvic drains  - Gyn following        VTE Risk Mitigation (From admission, onward)         Ordered     IP VTE HIGH RISK PATIENT  Once      11/27/20 0903     Place sequential compression device  Until discontinued      11/27/20 0903     Reason for no Mechanical VTE Prophylaxis  Once     Question:  Reasons:  Answer:  Physician Provided (leave comment)  Comment:  possible procedures    11/27/20 0835     Place sequential compression device  Until discontinued      11/27/20 0110                Catracho Mena MD  Urology  [facility]

## 2020-11-28 NOTE — ASSESSMENT & PLAN NOTE
[place] [facility] [place] Gluc 626, HCO3 16, AG 16, BHB 4.2, ketonuria. 2L IVF given in ED with insulin gtt started. Glucose trended downward to 274. Pt was then admitted to hospital medicine for further management of DKA.    - Endocrine consulted, appreciate recs   - NPO, pt remains nauseous   - D5 NS gtt 75/hr   - Transitional Insulin gtt   - BMP q6  - Will replace K PRN  - Accuchecks q1 while on insulin gtt  - Nausea: zofran PRN

## 2020-11-28 NOTE — ASSESSMENT & PLAN NOTE
Cr 1.5 on presentation.   Likely multi-factorial 2/2 pre-renal volume depletion in the context of DKA, UTI, and post-renal obstruction    - Resolving, Cr 1.3 after Cifuentes placement and IVF  - Will monitor Cr with BMPq6 while in DKA  - Treating UTI with Cefepime  - Maintain Cifuentes  - Renally dose meds  - Avoid renal toxins (ACEi/ARB, NSAIDs, contrast, etc.)  - Strict I&Os  - Cr normalized to 1.1   - Resolved

## 2020-11-28 NOTE — HOSPITAL COURSE
"Pt was admitted on 11/28 due to 10/10 abdominal pain and also found to be in DKA. Pt was started on IVF, insulin gtt, and kept NPO with glucose downtrending and AG closed. CT abdomen showed multiple fluid collections near bladder. Renal cystography with "hypoattenuating collections in the lower pelvis adjacent to the urinary bladder with no definite extravasated contrast material appreciated within the pelvic collections to suggest communication with the bladder".  Urology and GYN on board, agree with plan for IR to place drain in fluid collections for culture and source control. Pt was started on CTX and Flagyl to cover for UTI and intra-abdominal infection, broadened to Cefepime, and Flagyl on 11/26; vanc added later. Endocrine consulted and managed the patient while acutely ill on insulin drip then transitioned to SQ when BG stabilized. IR place low abdominal drain to abscess on 11/28 with 250ml purulent material immediately expressed. Drained remained in place after the procedure. Urology continued to assess patient and recommended CT RSS after identifying some left CVAT and increased SCr with reduced UOP on 11/29, no hydronephrosis does not believe LAURIE is post-renal in nature. 11/30 pt remains anuric, Cr up-trending to 4.2, with hyponatremia. Nephrology consulted. 12/1 pt remains anuric after Lasix + Diuril challenge. Nephrology decided for initiation of CRRT due to low pressures with unresponsive ARF. Patient transferred to ICU for CRRT. Pt transferred back to floor as no more need for CRRT on 12/5. Cystoscopy with retrograde pyleogram for 12/7 by Urology. Repeat CT showed Interval resolution of multilobular fluid collection in the lower pelvis. There were some concerns of pneumonia at this time and patient briefly on different regimens (zosyn > ceftriaxone > levaquin) but this was determined to likely be atelectasis. Patient had ROSAURA drain and march removed on 12/11 after evaluation by Urogyn and Nephrology. " Patient passed voiding trial, discussed with ID who recommended patient be discharged on cefpodoxine 200 QD, metronidazole 500 TID with no follow up required. Patient will follow up with Urogyn, Nephrology, and PCP. Urogyn and Nephrology stated they will make the patient's appointments.       * Postprocedural intraabdominal abscess  [place] [facility] [place]-6 days of 10/10 lower abdomen, vaginal, and rectal pain. Pt has recent h/o GYN surgery in 10/2020 for bladder mesh removal, Cifuentes was removed on 11/19.  CT A/P 11/27: Multiple fluid collections adjacent to bladder. Mild bilateral hydronephrosis.   CT A/P 12/8: Interval resolution of previously identified multilobular fluid collection in the lower pelvis with drainage catheter in position.  Residual scattered small volume of air is noted.   UCx 11/26: E.coli sensitive to CTX, cefepime   BCx 12/5: Peptostrptococcus   Cystography 12/6: No bladder leak     Pain management: PCA pump initially. PCA pump d/c on 11/30, now on PRN opiates.     PLAN  - Urology consulted, appreciate recs  - GYN consulted, appreciate consult.  - IR consulted, appreciate recs; Drain in places. Removed 12/11.  - ID consulted for intra-abdominal infection, appreciate recs          - CTX & Flagyl x14 days, repeat imaging to assess for cleared infection prior to d/c abx          - If d/c before 14d course, may switch to augmentin          - 12/9: broadened ABX to cover HAP, switched CTX-->Cefepime, added vanc, and continued flagyl              - 12/10: Transitioned patient levaquin + metronidazole.           - 12/11: Discussed patient with ID, cefpodoxime + MALIK a better option given Peptostreptococcus           Atelectasis of both lungs  Patient on 3L NC, although sat 97-98%, weaned down to RA. Patient with longstanding obstructive lung disease (asthma) -- goal sat >88%.     12/7 CXR: Interval progression in the extent of right perihilar and right lower lung zone infiltrate and or  atelectasis.  New and or mildly increased small right pleural effusion.   12/8 CTAP: Bibasilar consolidative changes, right more than left with small volume right-sided pleural effusion.  Findings likely related to pneumonia versus inflammatory process.     12/9 CT Chest w/o Contrast:   · Bilateral lower lobe consolidative opacities right greater than left and bandlike atelectasis bilaterally with trace pleural effusions similar to exam 12/08/2020.  Differential diagnosis includes aspiration, pneumonia, atelectasis, and less likely noninfectious inflammation.  · Slight ground-glass opacities predominantly right apex which may represent infectious or noninfectious inflammatory etiology.   · Partially aerated retained secretions within the trachea.   · Scattered calcified mediastinal lymph nodes and soft tissue density in the anterior mediastinum relatively unchanged dating back to 07/27/2012.      - Initially Abx broadened due to concern for HCAP given oxygen sats, however given that patient is afebrile, history of Asthma, and bilateral nature of consolidative changes, this may be atelectasis.  - Tracheal secretions in airway noted on CT -- will add BID cough assists for help with expectoration  - wean supplemental O2 for goal sat >88% (pt with chronic obstructive lung disease - asthma)     Yeast infection  - Fluconazole 150mg PO  - Miconazole cream     Elevated alkaline phosphatase level  Alk Phos has been trending up this hospitalization, on 12/9 . Unclear etiology. Other LFTs wnl - T bili, AST/ALT.      Recent Labs   Lab 12/11/20  0616   ALT 6*   AST 13   ALKPHOS 364*   BILITOT 0.2   PROT 7.6   ALBUMIN 2.2*          Recent Labs   Lab 12/09/20  0909   *      -  Improving  - Outpatient workup     Anemia  Pt Hb 9.8 on admit. BL ~10.   Most likely due to anemia of chronic disease, acute blood loss, and frequent phlebotomy while hospitalized.          Recent Labs   Lab 12/07/20  0511 12/08/20  0354  12/09/20  0909 12/10/20  0301   HGB 7.9* 8.0* 9.0* 8.2*      - Required 1U PRBC on 12/2  - Required 1U PRBC on 12/6 2/2      Impaired mobility and endurance     - PT/OT     Hyponatremia  Earlier this howpitalizarion hyponatremia to 120s with spontaneous jerking motions 11/29. Na now normalized. Nephrology continuing to follow for LAURIE. Possibly 2/2 D5 IVF with decreased UOP while in DKA. Fluid restrict 1.5L          Recent Labs   Lab 12/06/20  0752 12/07/20  0511 12/08/20  0354 12/09/20  0909 12/10/20  0300   * 136 134* 135* 135*         - Nephrology on board, appreciate recs        Acute kidney injury (LAURIE) with acute tubular necrosis (ATN)  Cr 1.5 on presentation. Likely multi-factorial on presentation 2/2 pre-renal volume depletion in the context of DKA, UTI, and post-renal obstruction. Repeat LAURIE on 11/29 likely intrinsic ATN in etiology.  Initiation of CRRT, transfer to ICU on 12/2. Transferred to floor on 12/4. Trialysis catheter to RIJ removed on 12/9          Recent Labs   Lab 12/07/20  0511 12/08/20  0354 12/09/20  0909 12/10/20  0300 12/11/20  0616   CREATININE 5.1* 5.3* 4.7* 4.4* 3.7*      - Nephrology consulted, appreciate recs  - Watch for post-ATN diuresis, consider IVF replacement if continued significant diuresis  - Out-pt nephro clinic follow up  - Renally dose meds  - Avoid renal toxins (ACEi/ARB, NSAIDs, contrast, etc.)  - Strict I&Os        Urinary retention  Pt has recent h/o GYN surgery in 10/2020 for bladder mesh removal, Cifuentes was removed on 11/19. For the past few days pt has had increased trouble urinating associated with pain.   CT A/P 11/27: Multiple fluid collections adjacent to bladder. Mild bilateral hydronephrosis.   CT RSS 11/29 hydronephrosis resolved. 12/6 spoke with urology concerning ROSAURA drain output concern for urinary contents. Per Urology, since ROSAURA fluid Cr 1.7 is less than serum Cr 4.5, the fluid is not urine. Negative cystogram and normal retrograde pyelogram     -  Urology consulted, recs appreciated;  - Cifuentes removed 12/11, will monitor output  - Will f/u UroGyn outpatient     Type 2 diabetes mellitus with hyperglycemia, with long-term current use of insulin  Last Hb A1c 10.3 on 9/25/20  Home regimen: Metformin 500 BID, Aspart 15U TID, Glargine 60 BID  Pt says her blood sugar has been running to the 300s for the past few days  - Hold oral anti-glycemics while hospitalized  - Endocrine consulted, recs appreciated  - Accu-checks TIDAC + qhs  - See DKA notes        Asthma  Pt states she uses Albuterol inhaler TID every day.   - Duonebs q6 PRN     Diabetic ketoacidosis without coma associated with type 2 diabetes mellitus  [place] [facility] [place] Gluc 626, HCO3 16, AG 16, BHB 4.2, ketonuria. 2L IVF given in ED with insulin gtt started. Glucose trended downward to 274. Pt was then admitted to hospital medicine for further management of DKA.     - Endocrine consulted, appreciate recs  - Detemir 35U BID, Aspart 18U TID WM  - Goal gluc 140-180  - Diabetic diet  - BMP daily  - Will replace K PRN  - Nausea: zofran PRN      Hyperthyroidism  Continue home Methimazole     - TSH WNL  - Endocrine aware     Severe obesity (BMI 35.0-35.9 with comorbidity)  -  on exercise, diet, and weight management      Mixed hyperlipidemia  - Continue home statin        Essential hypertension  Hold home Benazepril due to LAURIE, low BP     - Will monitor BP and add anti-hypertensive as necessary

## 2020-11-28 NOTE — SUBJECTIVE & OBJECTIVE
Interval History: NAEON. AFVSS.  IR consulted to place pelvic drain(s), but unable to do so yesterday. Plan for drain some time next week.  Has been NPO. Denies nausea or vomiting.  Complaints of pelvic pain unchanged since admission.  UOP good with clear yellow urine.    Objective:     Temp:  [98.2 °F (36.8 °C)-99.6 °F (37.6 °C)] 99.3 °F (37.4 °C)  Pulse:  [76-92] 82  Resp:  [14-21] 18  SpO2:  [95 %-100 %] 97 %  BP: (132-176)/(59-69) 132/69     Body mass index is 39.94 kg/m².           Drains     Drain                 Urethral Catheter 11/27/20 0145 Latex 16 Fr. 1 day                Physical Exam  Constitutional:       General: She is not in acute distress.     Appearance: She is well-developed.   Eyes:      General: No scleral icterus.  Cardiovascular:      Rate and Rhythm: Normal rate.   Pulmonary:      Effort: Pulmonary effort is normal. No respiratory distress.   Abdominal:      General: Bowel sounds are normal. There is no distension.      Palpations: Abdomen is soft.      Tenderness: There is abdominal tenderness. There is no guarding or rebound.      Comments: Palpable bladder.   Genitourinary:     Comments: 16 Fr march in place with clear yellow urine. Normal external female genitalia.  Musculoskeletal: Normal range of motion.   Skin:     General: Skin is warm and dry.      Findings: No rash.   Neurological:      Mental Status: She is alert and oriented to person, place, and time.   Psychiatric:         Behavior: Behavior normal.         Significant Labs:    BMP:  Recent Labs   Lab 11/27/20  1916 11/28/20  0021 11/28/20  0443    132* 129*   K 4.4 4.4 4.7    102 99   CO2 22* 20* 19*   BUN 9 8 8   CREATININE 1.0 1.1 1.1   CALCIUM 9.0 8.5* 8.7       CBC:   Recent Labs   Lab 11/26/20  2156 11/28/20  0443   WBC 17.85* 19.63*  19.12*   HGB 9.8* 8.5*  8.6*   HCT 31.7* 27.9*  28.3*   * 614*  622*       All pertinent labs results from the past 24 hours have been reviewed.    Significant  Imaging:  All pertinent imaging results/findings from the past 24 hours have been reviewed.

## 2020-11-29 PROBLEM — K65.1 POSTPROCEDURAL INTRAABDOMINAL ABSCESS: Status: ACTIVE | Noted: 2020-11-29

## 2020-11-29 PROBLEM — T81.43XA POSTPROCEDURAL INTRAABDOMINAL ABSCESS: Status: ACTIVE | Noted: 2020-11-29

## 2020-11-29 LAB
ALLENS TEST: ABNORMAL
ANION GAP SERPL CALC-SCNC: 10 MMOL/L (ref 8–16)
ANION GAP SERPL CALC-SCNC: 10 MMOL/L (ref 8–16)
ANION GAP SERPL CALC-SCNC: 11 MMOL/L (ref 8–16)
ANION GAP SERPL CALC-SCNC: 11 MMOL/L (ref 8–16)
BASOPHILS # BLD AUTO: 0.03 K/UL (ref 0–0.2)
BASOPHILS NFR BLD: 0.1 % (ref 0–1.9)
BUN SERPL-MCNC: 12 MG/DL (ref 8–23)
BUN SERPL-MCNC: 14 MG/DL (ref 8–23)
BUN SERPL-MCNC: 15 MG/DL (ref 8–23)
BUN SERPL-MCNC: 8 MG/DL (ref 8–23)
CALCIUM SERPL-MCNC: 8.3 MG/DL (ref 8.7–10.5)
CALCIUM SERPL-MCNC: 8.5 MG/DL (ref 8.7–10.5)
CALCIUM SERPL-MCNC: 8.5 MG/DL (ref 8.7–10.5)
CALCIUM SERPL-MCNC: 8.6 MG/DL (ref 8.7–10.5)
CHLORIDE SERPL-SCNC: 100 MMOL/L (ref 95–110)
CHLORIDE SERPL-SCNC: 93 MMOL/L (ref 95–110)
CHLORIDE SERPL-SCNC: 96 MMOL/L (ref 95–110)
CHLORIDE SERPL-SCNC: 97 MMOL/L (ref 95–110)
CO2 SERPL-SCNC: 16 MMOL/L (ref 23–29)
CO2 SERPL-SCNC: 17 MMOL/L (ref 23–29)
CO2 SERPL-SCNC: 18 MMOL/L (ref 23–29)
CO2 SERPL-SCNC: 19 MMOL/L (ref 23–29)
CREAT SERPL-MCNC: 1.8 MG/DL (ref 0.5–1.4)
CREAT SERPL-MCNC: 2.9 MG/DL (ref 0.5–1.4)
CREAT SERPL-MCNC: 3.1 MG/DL (ref 0.5–1.4)
CREAT SERPL-MCNC: 3.5 MG/DL (ref 0.5–1.4)
DELSYS: ABNORMAL
DIFFERENTIAL METHOD: ABNORMAL
EOSINOPHIL # BLD AUTO: 0 K/UL (ref 0–0.5)
EOSINOPHIL NFR BLD: 0.2 % (ref 0–8)
ERYTHROCYTE [DISTWIDTH] IN BLOOD BY AUTOMATED COUNT: 13.2 % (ref 11.5–14.5)
EST. GFR  (AFRICAN AMERICAN): 15.3 ML/MIN/1.73 M^2
EST. GFR  (AFRICAN AMERICAN): 17.8 ML/MIN/1.73 M^2
EST. GFR  (AFRICAN AMERICAN): 19.3 ML/MIN/1.73 M^2
EST. GFR  (AFRICAN AMERICAN): 34.3 ML/MIN/1.73 M^2
EST. GFR  (NON AFRICAN AMERICAN): 13.3 ML/MIN/1.73 M^2
EST. GFR  (NON AFRICAN AMERICAN): 15.4 ML/MIN/1.73 M^2
EST. GFR  (NON AFRICAN AMERICAN): 16.7 ML/MIN/1.73 M^2
EST. GFR  (NON AFRICAN AMERICAN): 29.7 ML/MIN/1.73 M^2
FIO2: 32
FLOW: 3
GLUCOSE SERPL-MCNC: 220 MG/DL (ref 70–110)
GLUCOSE SERPL-MCNC: 343 MG/DL (ref 70–110)
GLUCOSE SERPL-MCNC: 480 MG/DL (ref 70–110)
GLUCOSE SERPL-MCNC: 541 MG/DL (ref 70–110)
HCO3 UR-SCNC: 18.9 MMOL/L (ref 24–28)
HCT VFR BLD AUTO: 28.4 % (ref 37–48.5)
HGB BLD-MCNC: 8.5 G/DL (ref 12–16)
IMM GRANULOCYTES # BLD AUTO: 0.27 K/UL (ref 0–0.04)
IMM GRANULOCYTES NFR BLD AUTO: 1.3 % (ref 0–0.5)
LYMPHOCYTES # BLD AUTO: 3 K/UL (ref 1–4.8)
LYMPHOCYTES NFR BLD: 14.8 % (ref 18–48)
MAGNESIUM SERPL-MCNC: 1.3 MG/DL (ref 1.6–2.6)
MCH RBC QN AUTO: 28.6 PG (ref 27–31)
MCHC RBC AUTO-ENTMCNC: 29.9 G/DL (ref 32–36)
MCV RBC AUTO: 96 FL (ref 82–98)
MODE: ABNORMAL
MONOCYTES # BLD AUTO: 2 K/UL (ref 0.3–1)
MONOCYTES NFR BLD: 10.1 % (ref 4–15)
NEUTROPHILS # BLD AUTO: 14.7 K/UL (ref 1.8–7.7)
NEUTROPHILS NFR BLD: 73.5 % (ref 38–73)
NRBC BLD-RTO: 0 /100 WBC
OSMOLALITY SERPL: 281 MOSM/KG (ref 275–295)
PCO2 BLDA: 45 MMHG (ref 35–45)
PH SMN: 7.23 [PH] (ref 7.35–7.45)
PHOSPHATE SERPL-MCNC: 3.8 MG/DL (ref 2.7–4.5)
PLATELET # BLD AUTO: 594 K/UL (ref 150–350)
PMV BLD AUTO: 9.9 FL (ref 9.2–12.9)
PO2 BLDA: 40 MMHG (ref 40–60)
POC BE: -9 MMOL/L
POC SATURATED O2: 65 % (ref 95–100)
POC TCO2: 20 MMOL/L (ref 24–29)
POCT GLUCOSE: 209 MG/DL (ref 70–110)
POCT GLUCOSE: 218 MG/DL (ref 70–110)
POCT GLUCOSE: 227 MG/DL (ref 70–110)
POCT GLUCOSE: 248 MG/DL (ref 70–110)
POCT GLUCOSE: 252 MG/DL (ref 70–110)
POCT GLUCOSE: 267 MG/DL (ref 70–110)
POCT GLUCOSE: 361 MG/DL (ref 70–110)
POCT GLUCOSE: 455 MG/DL (ref 70–110)
POCT GLUCOSE: 463 MG/DL (ref 70–110)
POCT GLUCOSE: 491 MG/DL (ref 70–110)
POCT GLUCOSE: 494 MG/DL (ref 70–110)
POCT GLUCOSE: >500 MG/DL (ref 70–110)
POCT GLUCOSE: >500 MG/DL (ref 70–110)
POTASSIUM SERPL-SCNC: 4.5 MMOL/L (ref 3.5–5.1)
POTASSIUM SERPL-SCNC: 4.6 MMOL/L (ref 3.5–5.1)
POTASSIUM SERPL-SCNC: 4.8 MMOL/L (ref 3.5–5.1)
RBC # BLD AUTO: 2.97 M/UL (ref 4–5.4)
SAMPLE: ABNORMAL
SITE: ABNORMAL
SODIUM SERPL-SCNC: 121 MMOL/L (ref 136–145)
SODIUM SERPL-SCNC: 123 MMOL/L (ref 136–145)
SODIUM SERPL-SCNC: 125 MMOL/L (ref 136–145)
SODIUM SERPL-SCNC: 129 MMOL/L (ref 136–145)
WBC # BLD AUTO: 20.1 K/UL (ref 3.9–12.7)

## 2020-11-29 PROCEDURE — 51798 US URINE CAPACITY MEASURE: CPT | Mod: HCWC

## 2020-11-29 PROCEDURE — 99233 PR SUBSEQUENT HOSPITAL CARE,LEVL III: ICD-10-PCS | Mod: HCWC,GC,, | Performed by: INTERNAL MEDICINE

## 2020-11-29 PROCEDURE — 84100 ASSAY OF PHOSPHORUS: CPT | Mod: HCWC

## 2020-11-29 PROCEDURE — 99233 SBSQ HOSP IP/OBS HIGH 50: CPT | Mod: HCWC,GC,, | Performed by: INTERNAL MEDICINE

## 2020-11-29 PROCEDURE — 99900035 HC TECH TIME PER 15 MIN (STAT): Mod: HCWC

## 2020-11-29 PROCEDURE — 27000190 HC CPAP FULL FACE MASK W/VALVE: Mod: HCWC

## 2020-11-29 PROCEDURE — 93010 ELECTROCARDIOGRAM REPORT: CPT | Mod: HCWC,,, | Performed by: INTERNAL MEDICINE

## 2020-11-29 PROCEDURE — 83930 ASSAY OF BLOOD OSMOLALITY: CPT | Mod: HCWC

## 2020-11-29 PROCEDURE — 94761 N-INVAS EAR/PLS OXIMETRY MLT: CPT | Mod: HCWC

## 2020-11-29 PROCEDURE — 27000221 HC OXYGEN, UP TO 24 HOURS: Mod: HCWC

## 2020-11-29 PROCEDURE — 94640 AIRWAY INHALATION TREATMENT: CPT | Mod: HCWC

## 2020-11-29 PROCEDURE — 25000003 PHARM REV CODE 250: Mod: HCWC | Performed by: INTERNAL MEDICINE

## 2020-11-29 PROCEDURE — 63600175 PHARM REV CODE 636 W HCPCS: Mod: HCWC | Performed by: STUDENT IN AN ORGANIZED HEALTH CARE EDUCATION/TRAINING PROGRAM

## 2020-11-29 PROCEDURE — 25000242 PHARM REV CODE 250 ALT 637 W/ HCPCS: Mod: HCWC | Performed by: STUDENT IN AN ORGANIZED HEALTH CARE EDUCATION/TRAINING PROGRAM

## 2020-11-29 PROCEDURE — 80048 BASIC METABOLIC PNL TOTAL CA: CPT | Mod: 91,HCWC

## 2020-11-29 PROCEDURE — 93010 EKG 12-LEAD: ICD-10-PCS | Mod: HCWC,,, | Performed by: INTERNAL MEDICINE

## 2020-11-29 PROCEDURE — 99223 PR INITIAL HOSPITAL CARE,LEVL III: ICD-10-PCS | Mod: HCWC,,, | Performed by: INTERNAL MEDICINE

## 2020-11-29 PROCEDURE — C9399 UNCLASSIFIED DRUGS OR BIOLOG: HCPCS | Mod: HCWC | Performed by: STUDENT IN AN ORGANIZED HEALTH CARE EDUCATION/TRAINING PROGRAM

## 2020-11-29 PROCEDURE — 63600175 PHARM REV CODE 636 W HCPCS: Mod: HCWC | Performed by: INTERNAL MEDICINE

## 2020-11-29 PROCEDURE — 83735 ASSAY OF MAGNESIUM: CPT | Mod: HCWC

## 2020-11-29 PROCEDURE — 20600001 HC STEP DOWN PRIVATE ROOM: Mod: HCWC

## 2020-11-29 PROCEDURE — 36415 COLL VENOUS BLD VENIPUNCTURE: CPT | Mod: HCWC

## 2020-11-29 PROCEDURE — 85025 COMPLETE CBC W/AUTO DIFF WBC: CPT | Mod: HCWC

## 2020-11-29 PROCEDURE — 25000003 PHARM REV CODE 250: Mod: HCWC | Performed by: STUDENT IN AN ORGANIZED HEALTH CARE EDUCATION/TRAINING PROGRAM

## 2020-11-29 PROCEDURE — 94770 HC EXHALED C02 TEST: CPT | Mod: HCWC

## 2020-11-29 PROCEDURE — 82803 BLOOD GASES ANY COMBINATION: CPT | Mod: HCWC

## 2020-11-29 PROCEDURE — 93005 ELECTROCARDIOGRAM TRACING: CPT | Mod: HCWC

## 2020-11-29 PROCEDURE — 94660 CPAP INITIATION&MGMT: CPT | Mod: HCWC

## 2020-11-29 PROCEDURE — 99223 1ST HOSP IP/OBS HIGH 75: CPT | Mod: HCWC,,, | Performed by: INTERNAL MEDICINE

## 2020-11-29 PROCEDURE — 84132 ASSAY OF SERUM POTASSIUM: CPT | Mod: HCWC

## 2020-11-29 RX ORDER — GABAPENTIN 100 MG/1
200 CAPSULE ORAL 3 TIMES DAILY
Status: DISCONTINUED | OUTPATIENT
Start: 2020-11-29 | End: 2020-11-30

## 2020-11-29 RX ORDER — MAGNESIUM SULFATE HEPTAHYDRATE 40 MG/ML
2 INJECTION, SOLUTION INTRAVENOUS
Status: COMPLETED | OUTPATIENT
Start: 2020-11-29 | End: 2020-11-29

## 2020-11-29 RX ORDER — CEFEPIME HYDROCHLORIDE 2 G/1
2 INJECTION, POWDER, FOR SOLUTION INTRAVENOUS
Status: DISCONTINUED | OUTPATIENT
Start: 2020-11-30 | End: 2020-11-30

## 2020-11-29 RX ORDER — MAGNESIUM SULFATE HEPTAHYDRATE 40 MG/ML
2 INJECTION, SOLUTION INTRAVENOUS ONCE
Status: COMPLETED | OUTPATIENT
Start: 2020-11-29 | End: 2020-11-29

## 2020-11-29 RX ADMIN — DULOXETINE HYDROCHLORIDE 60 MG: 60 CAPSULE, DELAYED RELEASE ORAL at 10:11

## 2020-11-29 RX ADMIN — POLYETHYLENE GLYCOL 3350 17 G: 17 POWDER, FOR SOLUTION ORAL at 09:11

## 2020-11-29 RX ADMIN — INSULIN ASPART 10 UNITS: 100 INJECTION, SOLUTION INTRAVENOUS; SUBCUTANEOUS at 08:11

## 2020-11-29 RX ADMIN — GABAPENTIN 400 MG: 400 CAPSULE ORAL at 02:11

## 2020-11-29 RX ADMIN — METRONIDAZOLE 500 MG: 500 TABLET ORAL at 07:11

## 2020-11-29 RX ADMIN — IPRATROPIUM BROMIDE AND ALBUTEROL SULFATE 3 ML: .5; 2.5 SOLUTION RESPIRATORY (INHALATION) at 05:11

## 2020-11-29 RX ADMIN — Medication: at 05:11

## 2020-11-29 RX ADMIN — PANTOPRAZOLE SODIUM 40 MG: 40 TABLET, DELAYED RELEASE ORAL at 09:11

## 2020-11-29 RX ADMIN — SODIUM CHLORIDE 1.3 UNITS/HR: 9 INJECTION, SOLUTION INTRAVENOUS at 10:11

## 2020-11-29 RX ADMIN — SODIUM CHLORIDE 2 UNITS/HR: 9 INJECTION, SOLUTION INTRAVENOUS at 05:11

## 2020-11-29 RX ADMIN — VANCOMYCIN HYDROCHLORIDE 1000 MG: 1 INJECTION, POWDER, LYOPHILIZED, FOR SOLUTION INTRAVENOUS at 05:11

## 2020-11-29 RX ADMIN — MAGNESIUM SULFATE IN WATER 2 G: 40 INJECTION, SOLUTION INTRAVENOUS at 07:11

## 2020-11-29 RX ADMIN — INSULIN ASPART 10 UNITS: 100 INJECTION, SOLUTION INTRAVENOUS; SUBCUTANEOUS at 11:11

## 2020-11-29 RX ADMIN — METHIMAZOLE 10 MG: 10 TABLET ORAL at 09:11

## 2020-11-29 RX ADMIN — MUPIROCIN: 20 OINTMENT TOPICAL at 09:11

## 2020-11-29 RX ADMIN — MAGNESIUM SULFATE 2 G: 2 INJECTION INTRAVENOUS at 09:11

## 2020-11-29 RX ADMIN — METRONIDAZOLE 500 MG: 500 TABLET ORAL at 02:11

## 2020-11-29 RX ADMIN — SODIUM CHLORIDE 2 UNITS/HR: 9 INJECTION, SOLUTION INTRAVENOUS at 08:11

## 2020-11-29 RX ADMIN — SODIUM CHLORIDE, SODIUM LACTATE, POTASSIUM CHLORIDE, AND CALCIUM CHLORIDE 1000 ML: .6; .31; .03; .02 INJECTION, SOLUTION INTRAVENOUS at 07:11

## 2020-11-29 RX ADMIN — INSULIN DETEMIR 30 UNITS: 100 INJECTION, SOLUTION SUBCUTANEOUS at 09:11

## 2020-11-29 RX ADMIN — SODIUM CHLORIDE 6 UNITS/HR: 9 INJECTION, SOLUTION INTRAVENOUS at 02:11

## 2020-11-29 RX ADMIN — IPRATROPIUM BROMIDE AND ALBUTEROL SULFATE 3 ML: .5; 2.5 SOLUTION RESPIRATORY (INHALATION) at 07:11

## 2020-11-29 RX ADMIN — ROSUVASTATIN CALCIUM 20 MG: 20 TABLET, FILM COATED ORAL at 09:11

## 2020-11-29 RX ADMIN — Medication: at 03:11

## 2020-11-29 RX ADMIN — SODIUM CHLORIDE 2.6 UNITS/HR: 9 INJECTION, SOLUTION INTRAVENOUS at 09:11

## 2020-11-29 RX ADMIN — SODIUM CHLORIDE 1.5 UNITS/HR: 9 INJECTION, SOLUTION INTRAVENOUS at 06:11

## 2020-11-29 RX ADMIN — GABAPENTIN 200 MG: 100 CAPSULE ORAL at 08:11

## 2020-11-29 RX ADMIN — IPRATROPIUM BROMIDE AND ALBUTEROL SULFATE 3 ML: .5; 2.5 SOLUTION RESPIRATORY (INHALATION) at 01:11

## 2020-11-29 RX ADMIN — METRONIDAZOLE 500 MG: 500 TABLET ORAL at 10:11

## 2020-11-29 RX ADMIN — CEFEPIME 2 G: 2 INJECTION, POWDER, FOR SOLUTION INTRAVENOUS at 05:11

## 2020-11-29 RX ADMIN — MAGNESIUM SULFATE 2 G: 2 INJECTION INTRAVENOUS at 08:11

## 2020-11-29 RX ADMIN — SODIUM CHLORIDE 7 UNITS/HR: 9 INJECTION, SOLUTION INTRAVENOUS at 03:11

## 2020-11-29 RX ADMIN — SODIUM CHLORIDE 4 UNITS/HR: 9 INJECTION, SOLUTION INTRAVENOUS at 04:11

## 2020-11-29 RX ADMIN — MUPIROCIN: 20 OINTMENT TOPICAL at 08:11

## 2020-11-29 RX ADMIN — GABAPENTIN 400 MG: 400 CAPSULE ORAL at 09:11

## 2020-11-29 RX ADMIN — SODIUM CHLORIDE 4 UNITS/HR: 9 INJECTION, SOLUTION INTRAVENOUS at 01:11

## 2020-11-29 NOTE — PLAN OF CARE
POC reviewed with pt, verbalized understanding. AAOx4. VSS on RA.     -Tolerating clear bariatric diet, denies n/v.   -LLQ IR drain, flushed per order. Malodorous, purulent, red tinged output.  -Cfiuentes, adequate UOP.  -Up to chair for couple of hours, stand-by assist  -IVF maintained  -Insulin gtt per order, BG trending down. Insulin given per sliding scale       Pain better managed with PCA, resting comfortably in bed. Bed in lowest position, Call light within reach. WCTM.

## 2020-11-29 NOTE — ASSESSMENT & PLAN NOTE
- Creatinine 1.8 from 1.0 yesterday  - Patient complains of some left flank pain  - CT RSS ordered to reassess pelvis and evaluate for hydronephrosis

## 2020-11-29 NOTE — PROGRESS NOTES
Pharmacist Renal Dose Adjustment Note    Navin Beck is a 60 y.o. female being treated with the medication cefepime    Patient Data:    Vital Signs (Most Recent):  Temp: 98.4 °F (36.9 °C) (11/29/20 0748)  Pulse: 98 (11/29/20 0748)  Resp: 16 (11/29/20 0748)  BP: (!) 112/57 (11/29/20 0748)  SpO2: (!) 90 % (11/29/20 0748)   Vital Signs (72h Range):  Temp:  [98.1 °F (36.7 °C)-99.6 °F (37.6 °C)]   Pulse:  []   Resp:  [14-24]   BP: (112-176)/(55-88)   SpO2:  [90 %-100 %]      Recent Labs   Lab 11/28/20  1228 11/29/20  0027 11/29/20  0932   CREATININE 1.0 1.8* 2.9*     Serum creatinine: 2.9 mg/dL (H) 11/29/20 0932  Estimated creatinine clearance: 26.4 mL/min (A)    Medication:cefepime dose: 2g frequency Q8 will be changed to medication:cefepime dose:2g frequency:Q24    Pharmacist's Name: Marcy Almanza  Pharmacist's Extension: [phone]

## 2020-11-29 NOTE — CONSULTS
[facility]  Endocrinology  Diabetes Consult Note    Consult Requested by: Alessia Johnston MD   Reason for admit: Diabetic ketoacidosis without coma associated with type 2 diabetes mellitus    HISTORY OF PRESENT ILLNESS:  Reason for Consult: Management of T2DM, Hyperglycemia     Diabetes diagnosis year: > 10 years ago    Home Diabetes Medications:    Lantus 50 U daily  Novolog 15 U AC  Metformin 500 mg BID    How often checking glucose at home? 1-3 x day   BG readings on regimen: 200s-300s  Hypoglycemia on the regimen?  No  Missed doses on regimen?  Yes    Diabetes Complications include:     Diabetic nephropathy        HPI:   Patient is a 60 y.o. female with a diagnosis of HTN, T2DM on MDi insulin, hyperthyroidism who presented with weakness, poor appetite and abdominal pain associated with vaginal pain and rectal pressure and non-bloody diarrhea. She was found to be in DKA and started on DKA protocol with IVF and IV insulin. She is being treated with IV antibiotics for pelvic fluids collections suspected to be infectious.     On admission labs:   2020    Sodium 128 (L)   Potassium 5.3 (H)   Anion Gap 16   CO2 16 (L)   Creatinine 1.5 (H)   Glucose 626 (HH)   Beta-Hydroxybutyrate 4.2 (H)     Lab Results   Component Value Date    HGBA1C 11.4 (H) 2020     Lab Results   Component Value Date    TSH 0.519 2020       She also has a history of hyperthyroidism on methimazole 10 mg daily. Thyroid US shows MNG. No RAIU scan on record. No TRAb or TSI. TSH at goal.     Interval HPI: Started on MDI at 0.7 U/kg/day but still went really hyperglycemia. BMP with normal gap.  Overnight events: Glucose > 500  Eatin% - clears with no sugar  Nausea: Yes  Hypoglycemia and intervention: No  Fever: No  TPN and/or TF: No  If yes, type of TF/TPN and rate: N/A    PMH, PSH, FH, SH updated and reviewed     ROS:    Review of Systems   Constitutional: Positive for appetite change and fatigue. Negative  for unexpected weight change.   Eyes: Negative for visual disturbance.   Respiratory: Negative for shortness of breath.    Cardiovascular: Negative for chest pain.   Gastrointestinal: Positive for abdominal pain and nausea.   Musculoskeletal: Negative for arthralgias.   Skin: Negative for wound.   Allergic/Immunologic: Negative for food allergies.   Neurological: Negative for headaches.   Hematological: Does not bruise/bleed easily.           PHYSICAL EXAMINATION:  Vitals:    11/29/20 0748   BP: (!) 112/57   Pulse: 98   Resp: 16   Temp: 98.4 °F (36.9 °C)     Body mass index is 39.94 kg/m².    Physical Exam  Constitutional:       Appearance: She is well-developed.   HENT:      Head: Normocephalic and atraumatic.   Neck:      Musculoskeletal: Neck supple.      Thyroid: No thyromegaly.   Cardiovascular:      Rate and Rhythm: Normal rate and regular rhythm.      Heart sounds: Normal heart sounds.   Pulmonary:      Effort: Pulmonary effort is normal. No respiratory distress.   Abdominal:      Palpations: Abdomen is soft.      Tenderness: There is no abdominal tenderness.   Skin:     General: Skin is warm.      Findings: No rash.   Neurological:      Mental Status: She is alert and oriented to person, place, and time.   Psychiatric:         Behavior: Behavior normal.           Labs Reviewed and Include   Recent Labs   Lab 11/29/20  0027   *   CALCIUM 8.6*   *   K 4.6   CO2 19*      BUN 8   CREATININE 1.8*     Lab Results   Component Value Date    WBC 20.10 (H) 11/29/2020    HGB 8.5 (L) 11/29/2020    HCT 28.4 (L) 11/29/2020    MCV 96 11/29/2020     (H) 11/29/2020     Recent Labs   Lab 11/27/20  1239   TSH 0.519     Lab Results   Component Value Date    HGBA1C 11.4 (H) 11/27/2020       Nutritional status:   Body mass index is 39.94 kg/m².  Lab Results   Component Value Date    ALBUMIN 2.4 (L) 11/26/2020    ALBUMIN 4.1 10/26/2020    ALBUMIN 4.8 09/25/2020     No results found for:  PREALBUMIN    Estimated Creatinine Clearance: 42.6 mL/min (A) (based on SCr of 1.8 mg/dL (H)).    Accu-Checks  Recent Labs     11/28/20  0640 11/28/20  0758 11/28/20  0902 11/28/20  0954 11/28/20  1057 11/28/20  1159 11/28/20  1640 11/28/20  2104 11/29/20  0756 11/29/20  0758   POCTGLUCOSE 268* 237* 223* 234* 250* 258* 183* 338* 494* >500*        ASSESSMENT and PLAN    * Diabetic ketoacidosis without coma associated with type 2 diabetes mellitus  History of poorly controlled T2DM (A1c 11.4%) on MDI insulin and metformin    Presented with intra-abdominal infection and DKA    DKA resolved with IV insulin, IVFs    No steroids, no TFs  Diet: Clears no sugar    Switched to basal insulin calculated with a TDD of 0.7/u/kg/day and had significant hyperglycemia. Will increase to 1 U/kg/day given expected resistance with active infection.    Plan:  - Levemir 30 U BID  - Continue moderate dose insulin correction scale  - Continue no carb diet  - BG checks ACHS  - Inpatient glucose goal 140-180 mg/dL.      UTI (urinary tract infection)  Antibiotics per primary team      Type 2 diabetes mellitus with hyperglycemia, with long-term current use of insulin  See DKA    Hyperthyroidism  Unclear etiology given no TRAb, TSI or RAIU scan  Thyroid US shows MNG so toxic MNG high in differential  She has a TSH at goal on methimazole 10 mg daily  Continue, follow up outpatient    Plan  - Continue methimazole 10 mg daily  - Outpatient followed with Endocrinology      Severe obesity (BMI 35.0-35.9 with comorbidity)  Benefits from addition of GLP-1 RA as outpatient          Plan discussed with patient, family, and RN at bedside.     Donato Griggs MD  Endocrinology  [facility]

## 2020-11-29 NOTE — ASSESSMENT & PLAN NOTE
Cr 1.5 on presentation.   Likely multi-factorial 2/2 pre-renal volume depletion in the context of DKA, UTI, and post-renal obstruction    PLAN  - Cr 1.3 after Cifuentes placement and IVF --> increasing --> 2 on 11/29  - Will monitor Cr with BMPq6 while in DKA  - Treating UTI with Cefepime  - Maintain Cifuentes  - Renally dose meds  - Avoid renal toxins (ACEi/ARB, NSAIDs, contrast, etc.)  - Strict I&Os  - SCr increasing 11/29; trend q6h

## 2020-11-29 NOTE — ASSESSMENT & PLAN NOTE
Pt presented to [facility] ED with Gluc 626, HCO3 16, AG 16, BHB 4.2, ketonuria. 2L IVF given in ED with insulin gtt started. Glucose trended downward to 274. Pt was then admitted to hospital medicine for further management of DKA.    PLAN  - Endocrine consulted, appreciate recs   - NPO, SQ trial 11/28 not successful   - CLD; IVF boluses PRN   - Transitional Insulin gtt   - BMP q6  - Will replace K PRN  - Accuchecks q1 while on insulin gtt  - Nausea: zofran PRN

## 2020-11-29 NOTE — ASSESSMENT & PLAN NOTE
Pt presented to [facility] due to 5-6 days of 10/10 lower abdomen, vaginal, and rectal pain. Pt has recent h/o GYN surgery in 10/2020 for bladder mesh removal, Cifuentes was removed on 11/19.  CT A/P 11/27: Multiple fluid collections adjacent to bladder. Mild bilateral hydronephrosis.   UCx 11/26: E.coli sensitive to CTX, cefepime    PLAN  - GYN consulted, appreciate consult; Agrees with Urology plan for drainage with IR  - IR consulted, appreciate recs; Drain in place; cultures sent 11/28  - Pain management: PCA pump; discussed w/ patient transitioning to IVP prn on 11/30  - Cefepime, Vanc and Flagyl to cover for intra-abdominal infection d3  - Urology recs appreciated; left CVAT, ordered CT RSS 11/29

## 2020-11-29 NOTE — ASSESSMENT & PLAN NOTE
Mesh removed by GYN 10/26; march placed  March removed 11/19 --> increased pain  Admitted 11/27 with worsening abd pain found to be related to intra-abd fluid collections.   UCx 11/26 with E.coli    PLAN  - Urology consulted; lesions adjacent to bladder per CT cysto 11/27, appreciate recs; f/u CT RSS 11/29  - Gyn consulted  - IR consulted drained abscess 11/28; f/u cultures  - Broad spectrum abx: Cefepime, Vanc, flagyl

## 2020-11-29 NOTE — ASSESSMENT & PLAN NOTE
Last Hb A1c 10.3 on 9/25/20  Home regimen: Metformin 500 BID, Aspart 15U TID, Glargine 60 BID  Pt says her blood sugar has been running to the 300s for the past few days     PLAN  - Hold oral anti-glycemics while hospitalized  - Endocrine consulted, recs appreciated  - Accu-checks qhour  - See DKA notes

## 2020-11-29 NOTE — PROGRESS NOTES
[facility] Hospital Medicine  Progress Note    Patient Name: Navin Beck  MRN: [id number]  Patient Class: IP- Inpatient   Admission Date: 11/26/2020  Length of Stay: 2 days  Attending Physician: Alessia Johnston MD  Primary Care Provider: Elvira Quinones MD    [facility] Medicine Team: [facility] 2 Hanna Andres MD    Subjective:     Principal Problem:Postprocedural intraabdominal abscess        HPI:  Ms. Beck is a 61yo F with PMH of HTN, HLD, T2DM, Hyperthyroidism, and Asthma who had recent GYN surgery on 10/26 due to erosion of bladder mesh / prosthetic material with Cifuentes removal on 11/19. She [place] [facility] due to 4-5 days of not feeling well, she has had loss of appetite and weakness with 10/10 throbbing lower abdominal pain associated with vaginal pain and rectal pressure and non-bloody diarrhea. She has had trouble urinating for the past 5 days as well. She takes oxycodone 5 at home for pain, but it did not help. She endorses nausea and vomiting (4 times, non-bloody) also associated with the pain. She denies fever, chills, chest pain, SOB, trouble breathing, leg swelling, or any other symptom at this time.    ED Course: VSS, afebrile but leukocytosis at 17.85. Pt found to have DKA - Gluc 626, AG16, BHB 4.2, Fluids, insulin gtt started and Glucose down-trended to 200s. UA showed 2RBC, >100 WBC, bacteria, given CTX. CT A/P showed multiple fluid collections adjacent to bladder concerning for bladder perf with multiple urinomas, with mild bilateral hydronephrosis. Urology consulted and placed a Cifuentes with 900 UOP drained. GYN consulted due to recent surgery.     Pt will be admitted to hospital medicine for further management of DKA and possible intra-abdominal infection.     Overview/Hospital Course:  Pt was admitted on 11/28 due to 10/10 abdominal pain and also found to be in DKA. Pt was started on IVF, insulin gtt, and kept NPO with glucose downtrending and AG closed.  "CT abdomen showed multiple fluid collections near bladder. Renal cystography with "hypoattenuating collections in the lower pelvis adjacent to the urinary bladder with no definite extravasated contrast material appreciated within the pelvic collections to suggest communication with the bladder".  Urology and GYN on board, agree with plan for IR to place drain in fluid collections for culture and source control. Pt was started on CTX and Flagyl to cover for UTI and intra-abdominal infection, broadened to Cefepime, and Flagyl on 11/26; vanc added later. Endocrine consulted and managed the patient while acutely ill on insulin drip then transitioned to SQ when BG stabilized. IR place low abdominal drain to abscess on 11/28 with 250ml purulent material immediately expressed. Drained remained in place after the procedure. Urology continued to assess patient and recommended CT RSS after identifying some left CVAT and increased SCr with reduced UOP on 11/29.     Interval History: NAEON, VSS but BP is soft; benazepril held. SCr increased and UOP falling off; 1L bolus LR this am. IR drained 250ml purulent material and left drain in place after procedure. Pain much relieved with drainage, remains on PCA for additional relief. Urology continued to assess patient and recommended CT RSS after identifying some left CVAT and increased SCr with reduced UOP on 11/29. BMP q6h while on insulin drip for DKA (endocrine following and attempting SQ when possible).       Review of Systems   Constitutional: Positive for activity change, appetite change and fatigue. Negative for chills and fever.   HENT: Negative for hearing loss, sneezing and trouble swallowing.    Respiratory: Negative for cough, shortness of breath and wheezing.    Cardiovascular: Negative for chest pain and leg swelling.   Gastrointestinal: Positive for abdominal pain, diarrhea, nausea, rectal pain and vomiting. Negative for blood in stool.   Genitourinary: Positive for " difficulty urinating, dysuria and pelvic pain. Negative for flank pain.   Musculoskeletal: Negative for arthralgias, neck pain and neck stiffness.   Skin: Negative for color change, pallor and rash.   Neurological: Negative for syncope, weakness and headaches.   Psychiatric/Behavioral: Negative for confusion and hallucinations. The patient is not nervous/anxious.      Objective:     Vital Signs (Most Recent):  Temp: 98.4 °F (36.9 °C) (11/29/20 1129)  Pulse: 93 (11/29/20 1307)  Resp: 14 (11/29/20 1509)  BP: (!) 109/51 (11/29/20 1129)  SpO2: 95 % (11/29/20 1307) Vital Signs (24h Range):  Temp:  [98.1 °F (36.7 °C)-99.2 °F (37.3 °C)] 98.4 °F (36.9 °C)  Pulse:  [] 93  Resp:  [14-20] 14  SpO2:  [90 %-97 %] 95 %  BP: (109-132)/(51-68) 109/51     Weight: 115.7 kg (255 lb)  Body mass index is 39.94 kg/m².    Intake/Output Summary (Last 24 hours) at 11/29/2020 1515  Last data filed at 11/29/2020 1314  Gross per 24 hour   Intake 990 ml   Output 430 ml   Net 560 ml      Physical Exam  Vitals signs and nursing note reviewed.   Constitutional:       General: She is not in acute distress.     Appearance: Normal appearance. She is obese.   HENT:      Head: Normocephalic and atraumatic.      Right Ear: External ear normal.      Left Ear: External ear normal.      Nose: Nose normal.      Mouth/Throat:      Mouth: Mucous membranes are moist.      Pharynx: Oropharynx is clear.   Eyes:      Conjunctiva/sclera: Conjunctivae normal.      Pupils: Pupils are equal, round, and reactive to light.   Neck:      Musculoskeletal: Normal range of motion and neck supple.   Cardiovascular:      Rate and Rhythm: Normal rate and regular rhythm.      Pulses: Normal pulses.      Heart sounds: Normal heart sounds.   Pulmonary:      Effort: Pulmonary effort is normal. No respiratory distress.      Breath sounds: Normal breath sounds. No wheezing or rhonchi.   Abdominal:      General: Abdomen is flat. Bowel sounds are decreased. There is no  distension.      Palpations: Abdomen is soft.      Tenderness: There is abdominal tenderness (lower quadrants) in the suprapubic area. There is guarding. There is no right CVA tenderness, left CVA tenderness or rebound.      Hernia: No hernia is present.      Comments: White discharge from anus  IR drain to lower abd area; purulent drainage    Genitourinary:     Comments: Left CVAT  Musculoskeletal: Normal range of motion.         General: No deformity or signs of injury.   Skin:     General: Skin is warm and dry.      Capillary Refill: Capillary refill takes less than 2 seconds.   Neurological:      General: No focal deficit present.      Mental Status: She is alert and oriented to person, place, and time.      Sensory: No sensory deficit.      Motor: No weakness.   Psychiatric:         Mood and Affect: Mood normal.         Behavior: Behavior normal.         Thought Content: Thought content normal.         Judgment: Judgment normal.         Significant Labs:   BMP:   Recent Labs   Lab 11/29/20  0554  11/29/20  1234   GLU  --    < > 480*   NA  --    < > 121*   K  --    < > 4.6  4.6   CL  --    < > 93*   CO2  --    < > 17*   BUN  --    < > 14   CREATININE  --    < > 3.1*   CALCIUM  --    < > 8.5*   MG 1.3*  --   --     < > = values in this interval not displayed.     CBC:   Recent Labs   Lab 11/28/20  0443 11/29/20  0554   WBC 19.63*  19.12* 20.10*   HGB 8.5*  8.6* 8.5*   HCT 27.9*  28.3* 28.4*   *  622* 594*       Significant Imaging: CT: I have reviewed all pertinent results/findings within the past 24 hours and my personal findings are:  CT RSS pending 11/29      Assessment/Plan:      * Postprocedural intraabdominal abscess  Mesh removed by GYN 10/26; march placed  March removed 11/19 --> increased pain  Admitted 11/27 with worsening abd pain found to be related to intra-abd fluid collections.   UCx 11/26 with E.coli    PLAN  - Urology consulted; lesions adjacent to bladder per CT cysto 11/27,  appreciate recs; f/u CT RSS 11/29  - Gyn consulted  - IR consulted drained abscess 11/28; f/u cultures  - Broad spectrum abx: Cefepime, Vanc, flagyl      Abnormal computed tomography of bladder  Pt presented to [facility] due to 5-6 days of 10/10 lower abdomen, vaginal, and rectal pain. Pt has recent h/o GYN surgery in 10/2020 for bladder mesh removal, Cifuentes was removed on 11/19.  CT A/P 11/27: Multiple fluid collections adjacent to bladder. Mild bilateral hydronephrosis.   UCx 11/26: E.coli sensitive to CTX, cefepime    PLAN  - GYN consulted, appreciate consult; Agrees with Urology plan for drainage with IR  - IR consulted, appreciate recs; Drain in place; cultures sent 11/28  - Pain management: PCA pump; discussed w/ patient transitioning to IVP prn on 11/30  - Cefepime, Vanc and Flagyl to cover for intra-abdominal infection d3  - Urology recs appreciated; left CVAT, ordered CT RSS 11/29    Urinary retention  Pt has recent h/o GYN surgery in 10/2020 for bladder mesh removal, Cifuentes was removed on 11/19. For the past few days pt has had increased trouble urinating associated with pain.     PLAN  CT A/P 11/27: Multiple fluid collections adjacent to bladder. Mild bilateral hydronephrosis.   Retention likely 2/2 anatomic obstruction, pelvic fluid collection, diabetic bladder  - Urology consulted in ED, appreciate recs   - Cifuentes placed 800cc drained, bladder irrigated and all fluid removed with ease   - Maintain Cifuentes   - Rec drainage of pelvic fluid collection by IR with cultures & creatinine   - CT RSS ordered by uro 11/29 for left CVAT, incr SCr, decr UOP  - GYN consulted, appreciate consult   - Agrees with Urology plan for drainage with IR  - IR consulted, appreciate recs   - NPO   - IR placed drain 11/28 into pelvic abscess     UTI (urinary tract infection)  Pt presented with lower abdominal/vaginal/rectal pain with dysuria and urinary retention.   UA: 2 RBC, >100 WBC, 2+ leukocytes, bacteria  Recent UTIs with  pan-sensitive E coli.    PLAN  - Ceftriaxone started  - UCx with E coli sens to cefepime and ceftriaxone  - Cifuentes placed for urinary retention, maintain Cifuentes per Urology      Diabetic ketoacidosis without coma associated with type 2 diabetes mellitus  Pt [place] [facility] [place] with Gluc 626, HCO3 16, AG 16, BHB 4.2, ketonuria. 2L IVF given in ED with insulin gtt started. Glucose trended downward to 274. Pt was then admitted to hospital medicine for further management of DKA.    PLAN  - Endocrine consulted, appreciate recs   - NPO, SQ trial 11/28 not successful   - CLD; IVF boluses PRN   - Transitional Insulin gtt   - BMP q6  - Will replace K PRN  - Accuchecks q1 while on insulin gtt  - Nausea: zofran PRN     Type 2 diabetes mellitus with hyperglycemia, with long-term current use of insulin  Last Hb A1c 10.3 on 9/25/20  Home regimen: Metformin 500 BID, Aspart 15U TID, Glargine 60 BID  Pt says her blood sugar has been running to the 300s for the past few days     PLAN  - Hold oral anti-glycemics while hospitalized  - Endocrine consulted, recs appreciated  - Accu-checks qhour  - See DKA notes      LAURIE (acute kidney injury)  Cr 1.5 on presentation.   Likely multi-factorial 2/2 pre-renal volume depletion in the context of DKA, UTI, and post-renal obstruction    PLAN  - Cr 1.3 after Cifuentes placement and IVF --> increasing --> 2 on 11/29  - Will monitor Cr with BMPq6 while in DKA  - Treating UTI with Cefepime  - Maintain Cifuentes  - Renally dose meds  - Avoid renal toxins (ACEi/ARB, NSAIDs, contrast, etc.)  - Strict I&Os  - SCr increasing 11/29; trend q6h    Asthma  Pt states she uses Albuterol inhaler TID every day.     PRN  - Duonebs q6 PRN    Hyperthyroidism  Continue home Methimazole    PLAN  - TSH WNL  - Endocrine aware    Severe obesity (BMI 35.0-35.9 with comorbidity)   on exercise, diet, and weight management     Mixed hyperlipidemia  Continue home statin      Essential hypertension  Hold home Benazepril due to  LAURIE, low BP    PLAN  - Will monitor BP and add anti-hypertensive as necessary         VTE Risk Mitigation (From admission, onward)         Ordered     IP VTE HIGH RISK PATIENT  Once      11/27/20 0903                Discharge Planning   RAFA:  ? Likely 12/2 or after    Code Status: Full Code   Is the patient medically ready for discharge?:     Reason for patient still in hospital (select all that apply): Patient unstable and Patient trending condition  Discharge Plan A: Home with family   HH PT/OT on 11/28          Hanna Andres MD  Department of Hospital Medicine   [facility]

## 2020-11-29 NOTE — ASSESSMENT & PLAN NOTE
Pt presented with lower abdominal/vaginal/rectal pain with dysuria and urinary retention.   UA: 2 RBC, >100 WBC, 2+ leukocytes, bacteria  Recent UTIs with pan-sensitive E coli.    PLAN  - Ceftriaxone started  - UCx with E coli sens to cefepime and ceftriaxone  - Cifuentes placed for urinary retention, maintain Cifuentes per Urology

## 2020-11-29 NOTE — SUBJECTIVE & OBJECTIVE
Interval History: NAEON, VSS but BP is soft; benazepril held. SCr increased and UOP falling off; 1L bolus LR this am. IR drained 250ml purulent material and left drain in place after procedure. Pain much relieved with drainage, remains on PCA for additional relief. Urology continued to assess patient and recommended CT RSS after identifying some left CVAT and increased SCr with reduced UOP on 11/29. BMP q6h while on insulin drip for DKA (endocrine following and attempting SQ when possible).       Review of Systems   Constitutional: Positive for activity change, appetite change and fatigue. Negative for chills and fever.   HENT: Negative for hearing loss, sneezing and trouble swallowing.    Respiratory: Negative for cough, shortness of breath and wheezing.    Cardiovascular: Negative for chest pain and leg swelling.   Gastrointestinal: Positive for abdominal pain, diarrhea, nausea, rectal pain and vomiting. Negative for blood in stool.   Genitourinary: Positive for difficulty urinating, dysuria and pelvic pain. Negative for flank pain.   Musculoskeletal: Negative for arthralgias, neck pain and neck stiffness.   Skin: Negative for color change, pallor and rash.   Neurological: Negative for syncope, weakness and headaches.   Psychiatric/Behavioral: Negative for confusion and hallucinations. The patient is not nervous/anxious.      Objective:     Vital Signs (Most Recent):  Temp: 98.4 °F (36.9 °C) (11/29/20 1129)  Pulse: 93 (11/29/20 1307)  Resp: 14 (11/29/20 1509)  BP: (!) 109/51 (11/29/20 1129)  SpO2: 95 % (11/29/20 1307) Vital Signs (24h Range):  Temp:  [98.1 °F (36.7 °C)-99.2 °F (37.3 °C)] 98.4 °F (36.9 °C)  Pulse:  [] 93  Resp:  [14-20] 14  SpO2:  [90 %-97 %] 95 %  BP: (109-132)/(51-68) 109/51     Weight: 115.7 kg (255 lb)  Body mass index is 39.94 kg/m².    Intake/Output Summary (Last 24 hours) at 11/29/2020 1515  Last data filed at 11/29/2020 1314  Gross per 24 hour   Intake 990 ml   Output 430 ml   Net 560        Physical Exam  Vitals signs and nursing note reviewed.   Constitutional:       General: She is not in acute distress.     Appearance: Normal appearance. She is obese.   HENT:      Head: Normocephalic and atraumatic.      Right Ear: External ear normal.      Left Ear: External ear normal.      Nose: Nose normal.      Mouth/Throat:      Mouth: Mucous membranes are moist.      Pharynx: Oropharynx is clear.   Eyes:      Conjunctiva/sclera: Conjunctivae normal.      Pupils: Pupils are equal, round, and reactive to light.   Neck:      Musculoskeletal: Normal range of motion and neck supple.   Cardiovascular:      Rate and Rhythm: Normal rate and regular rhythm.      Pulses: Normal pulses.      Heart sounds: Normal heart sounds.   Pulmonary:      Effort: Pulmonary effort is normal. No respiratory distress.      Breath sounds: Normal breath sounds. No wheezing or rhonchi.   Abdominal:      General: Abdomen is flat. Bowel sounds are decreased. There is no distension.      Palpations: Abdomen is soft.      Tenderness: There is abdominal tenderness (lower quadrants) in the suprapubic area. There is guarding. There is no right CVA tenderness, left CVA tenderness or rebound.      Hernia: No hernia is present.      Comments: White discharge from anus  IR drain to lower abd area; purulent drainage    Genitourinary:     Comments: Left CVAT  Musculoskeletal: Normal range of motion.         General: No deformity or signs of injury.   Skin:     General: Skin is warm and dry.      Capillary Refill: Capillary refill takes less than 2 seconds.   Neurological:      General: No focal deficit present.      Mental Status: She is alert and oriented to person, place, and time.      Sensory: No sensory deficit.      Motor: No weakness.   Psychiatric:         Mood and Affect: Mood normal.         Behavior: Behavior normal.         Thought Content: Thought content normal.         Judgment: Judgment normal.         Significant Labs:   BMP:    Recent Labs   Lab 11/29/20  0554  11/29/20  1234   GLU  --    < > 480*   NA  --    < > 121*   K  --    < > 4.6  4.6   CL  --    < > 93*   CO2  --    < > 17*   BUN  --    < > 14   CREATININE  --    < > 3.1*   CALCIUM  --    < > 8.5*   MG 1.3*  --   --     < > = values in this interval not displayed.     CBC:   Recent Labs   Lab 11/28/20  0443 11/29/20  0554   WBC 19.63*  19.12* 20.10*   HGB 8.5*  8.6* 8.5*   HCT 27.9*  28.3* 28.4*   *  622* 594*       Significant Imaging: CT: I have reviewed all pertinent results/findings within the past 24 hours and my personal findings are:  CT RSS pending 11/29

## 2020-11-29 NOTE — ASSESSMENT & PLAN NOTE
- Urinary retention upon admission. March placed with 800 cc clear yellow urin. Maintain march  - CT with multiple fluid collections adjacent to bladder  - IR placed drain yesterday with output of pus. Maintain drain.  - CT cystography shows no evidence of bladder leak and pelvic fluid collection  - Gyn following

## 2020-11-29 NOTE — SUBJECTIVE & OBJECTIVE
Interval History: NAEON. AFVSS.  IR placed drain in pelvic collection with 250 cc immediate return of pus. Additional 120 cc output overnight.  Continues to have pelvic pain, but improved.  Has been NPO. Denies nausea or vomiting.  mIVF running.  UOP good with clear yellow urine. March in place.  Creatinine 1.8 from 1.0 yesterday. Left flank pain.    Objective:     Temp:  [98.1 °F (36.7 °C)-99.2 °F (37.3 °C)] 98.4 °F (36.9 °C)  Pulse:  [] 98  Resp:  [16-24] 16  SpO2:  [90 %-99 %] 90 %  BP: (112-150)/(55-78) 112/57     Body mass index is 39.94 kg/m².      Bladder Scan Volume (mL): 19 mL (11/29/20 0614)    Drains     Drain                 Urethral Catheter 11/27/20 0145 Latex 16 Fr. 1 day                Physical Exam  Constitutional:       General: She is not in acute distress.     Appearance: She is well-developed.   Eyes:      General: No scleral icterus.  Cardiovascular:      Rate and Rhythm: Normal rate.   Pulmonary:      Effort: Pulmonary effort is normal. No respiratory distress.   Abdominal:      General: Bowel sounds are normal. There is no distension.      Palpations: Abdomen is soft.      Tenderness: There is abdominal tenderness (improved from yesterday). There is no guarding or rebound.      Comments: Left CVA tenderness.   Genitourinary:     Comments: 16 Fr march in place with clear yellow urine.  Musculoskeletal: Normal range of motion.   Skin:     General: Skin is warm and dry.      Findings: No rash.   Neurological:      Mental Status: She is alert and oriented to person, place, and time.   Psychiatric:         Behavior: Behavior normal.         Significant Labs:    BMP:  Recent Labs   Lab 11/28/20  0443 11/28/20  1228 11/29/20  0027   * 131* 129*   K 4.7 4.0 4.6   CL 99 100 100   CO2 19* 20* 19*   BUN 8 7* 8   CREATININE 1.1 1.0 1.8*   CALCIUM 8.7 8.6* 8.6*       CBC:   Recent Labs   Lab 11/26/20  2156 11/28/20  0443 11/29/20  0554   WBC 17.85* 19.63*  19.12* 20.10*   HGB 9.8* 8.5*  8.6*  8.5*   HCT 31.7* 27.9*  28.3* 28.4*   * 614*  622* 594*       All pertinent labs results from the past 24 hours have been reviewed.    Significant Imaging:  All pertinent imaging results/findings from the past 24 hours have been reviewed.

## 2020-11-29 NOTE — PROGRESS NOTES
On call Dr for IM2 was notified of the results of the EKG sinus Tachycardia, also the pt's urine output was 60 cc and had a UTI looking appearance and very Malodorous. The on call Dr requested the nurse to to a UTI and the result was 19 cc. The pt is very obese and accuracy of a bladder scan is questionable.

## 2020-11-29 NOTE — PLAN OF CARE
Pt is A&Ox4 injury free. Pt repositions self. Breathing is regular equal and unlabored bilaterally on room air. Urine out put low and on call Dr notified also informed the Dr is was Malodorous and was cloudy with sediments.  Pain controlled with PCA. Vitals taken on the lower legs.

## 2020-11-29 NOTE — SUBJECTIVE & OBJECTIVE
Interval HPI: Started on MDI at 0.7 U/kg/day but still went really hyperglycemia. BMP with normal gap.  Overnight events: Glucose > 500  Eatin% - clears with no sugar  Nausea: Yes  Hypoglycemia and intervention: No  Fever: No  TPN and/or TF: No  If yes, type of TF/TPN and rate: N/A    PMH, PSH, FH, SH updated and reviewed     ROS:    Review of Systems   Constitutional: Positive for appetite change and fatigue. Negative for unexpected weight change.   Eyes: Negative for visual disturbance.   Respiratory: Negative for shortness of breath.    Cardiovascular: Negative for chest pain.   Gastrointestinal: Positive for abdominal pain and nausea.   Musculoskeletal: Negative for arthralgias.   Skin: Negative for wound.   Allergic/Immunologic: Negative for food allergies.   Neurological: Negative for headaches.   Hematological: Does not bruise/bleed easily.           PHYSICAL EXAMINATION:  Vitals:    20 0748   BP: (!) 112/57   Pulse: 98   Resp: 16   Temp: 98.4 °F (36.9 °C)     Body mass index is 39.94 kg/m².    Physical Exam  Constitutional:       Appearance: She is well-developed.   HENT:      Head: Normocephalic and atraumatic.   Neck:      Musculoskeletal: Neck supple.      Thyroid: No thyromegaly.   Cardiovascular:      Rate and Rhythm: Normal rate and regular rhythm.      Heart sounds: Normal heart sounds.   Pulmonary:      Effort: Pulmonary effort is normal. No respiratory distress.   Abdominal:      Palpations: Abdomen is soft.      Tenderness: There is no abdominal tenderness.   Skin:     General: Skin is warm.      Findings: No rash.   Neurological:      Mental Status: She is alert and oriented to person, place, and time.   Psychiatric:         Behavior: Behavior normal.

## 2020-11-29 NOTE — PROGRESS NOTES
[facility]  Urology  Progress Note    Patient Name: Navin Beck  MRN: [id number]  Admission Date: 11/26/2020  Hospital Length of Stay: 2 days  Code Status: Full Code   Attending Provider: Alessia Johnston MD   Primary Care Physician: Elvira Quinones MD    Subjective:     HPI:  61 yo female with history of HTN, DM, and POP [place] [facility] [place] abdominal pain, DKA, and urinary retention. Urology consulted for abnormal CT finding with concern for bladder perforation. Her urologic/gynecologic history includes an anterior colporrhapy with mesh in 2010 with Dr. Keene, mesh erosion and protrusion was noted and excised with Dr. Guzman in 2013 with a concomitant BSO with excision or round ligament tumor excision with Dr. Springer in 2013. She then represented to Dr. Goel in 2020 with vaginal pain and was noted to have additional mesh erosion on vaginal exam. She was most recently taken to the OR on 10/26 for robotic excision of mesh performed by Dr. Goel with intra-operative assistance from Dr. Trevino. Per the op note, there was significant adhesions in the pelvis, but the procedure was without complication. She was discharged with a march catheter that was removed on 11/9/20. Since then, the patient has had some complaints of continued vaginal and rectal discomfort as well as dysuria. She tries to hold her urine to avoid the pain. Today, she presented to the ED able to void some on her own, but in urinary retention. March placed per nursing in the ED with 800 cc clear yellow urine without debris or blood. UAmicroscopy shows 2 RBC, >100 WBC, and bacteria with 3 squams. She has urine cultures pending and has received rocephin in the ED. Leukocytosis to 17.8. Creatinine is 1.5 from 1.0.    Interval History: NAEON. AFVSS.  IR placed drain in pelvic collection with 250 cc immediate return of pus. Additional 120 cc output overnight.  Continues to have pelvic pain, but improved.  Has  been NPO. Denies nausea or vomiting.  mIVF running.  UOP good with clear yellow urine. March in place.  Creatinine 1.8 from 1.0 yesterday. Left flank pain.    Objective:     Temp:  [98.1 °F (36.7 °C)-99.2 °F (37.3 °C)] 98.4 °F (36.9 °C)  Pulse:  [] 98  Resp:  [16-24] 16  SpO2:  [90 %-99 %] 90 %  BP: (112-150)/(55-78) 112/57     Body mass index is 39.94 kg/m².      Bladder Scan Volume (mL): 19 mL (11/29/20 0614)    Drains     Drain                 Urethral Catheter 11/27/20 0145 Latex 16 Fr. 1 day                Physical Exam  Constitutional:       General: She is not in acute distress.     Appearance: She is well-developed.   Eyes:      General: No scleral icterus.  Cardiovascular:      Rate and Rhythm: Normal rate.   Pulmonary:      Effort: Pulmonary effort is normal. No respiratory distress.   Abdominal:      General: Bowel sounds are normal. There is no distension.      Palpations: Abdomen is soft.      Tenderness: There is abdominal tenderness (improved from yesterday). There is no guarding or rebound.      Comments: Left CVA tenderness.   Genitourinary:     Comments: 16 Fr march in place with clear yellow urine.  Musculoskeletal: Normal range of motion.   Skin:     General: Skin is warm and dry.      Findings: No rash.   Neurological:      Mental Status: She is alert and oriented to person, place, and time.   Psychiatric:         Behavior: Behavior normal.         Significant Labs:    BMP:  Recent Labs   Lab 11/28/20  0443 11/28/20  1228 11/29/20  0027   * 131* 129*   K 4.7 4.0 4.6   CL 99 100 100   CO2 19* 20* 19*   BUN 8 7* 8   CREATININE 1.1 1.0 1.8*   CALCIUM 8.7 8.6* 8.6*       CBC:   Recent Labs   Lab 11/26/20  2156 11/28/20  0443 11/29/20  0554   WBC 17.85* 19.63*  19.12* 20.10*   HGB 9.8* 8.5*  8.6* 8.5*   HCT 31.7* 27.9*  28.3* 28.4*   * 614*  622* 594*       All pertinent labs results from the past 24 hours have been reviewed.    Significant Imaging:  All pertinent imaging  results/findings from the past 24 hours have been reviewed.        Assessment/Plan:     LAURIE (acute kidney injury)  - Creatinine 1.8 from 1.0 yesterday  - Patient complains of some left flank pain  - CT RSS ordered to reassess pelvis and evaluate for hydronephrosis    Urinary retention  - March placed with 800 cc clear yellow urine.  - Maintain march  - Cause of retention likely multifactorial: anatomic vs diabetic bladder vs pelvic fluid collection  - Bilateral hydronephrosis on CT likely caused by urinary retention although bilateral ureteral obstruction cannot be excluded due to limitations of CT scan.  - Trend cr    Abnormal computed tomography of bladder  - Urinary retention upon admission. March placed with 800 cc clear yellow urin. Maintain march  - CT with multiple fluid collections adjacent to bladder  - IR placed drain yesterday with output of pus. Maintain drain.  - CT cystography shows no evidence of bladder leak and pelvic fluid collection  - Gyn following        VTE Risk Mitigation (From admission, onward)         Ordered     IP VTE HIGH RISK PATIENT  Once      11/27/20 0903                Catracho Mena MD  Urology  [facility]

## 2020-11-29 NOTE — ASSESSMENT & PLAN NOTE
Hold home Benazepril due to LAURIE, low BP    PLAN  - Will monitor BP and add anti-hypertensive as necessary

## 2020-11-30 PROBLEM — N17.0 ACUTE KIDNEY INJURY (AKI) WITH ACUTE TUBULAR NECROSIS (ATN): Status: ACTIVE | Noted: 2020-11-27

## 2020-11-30 PROBLEM — E87.1 HYPONATREMIA: Status: ACTIVE | Noted: 2020-11-30

## 2020-11-30 LAB
ABO + RH BLD: NORMAL
ANION GAP SERPL CALC-SCNC: 10 MMOL/L (ref 8–16)
ANION GAP SERPL CALC-SCNC: 10 MMOL/L (ref 8–16)
ANION GAP SERPL CALC-SCNC: 11 MMOL/L (ref 8–16)
ANION GAP SERPL CALC-SCNC: 12 MMOL/L (ref 8–16)
ANION GAP SERPL CALC-SCNC: 9 MMOL/L (ref 8–16)
ANION GAP SERPL CALC-SCNC: 9 MMOL/L (ref 8–16)
BASOPHILS # BLD AUTO: 0.02 K/UL (ref 0–0.2)
BASOPHILS # BLD AUTO: 0.03 K/UL (ref 0–0.2)
BASOPHILS NFR BLD: 0.1 % (ref 0–1.9)
BASOPHILS NFR BLD: 0.2 % (ref 0–1.9)
BLD GP AB SCN CELLS X3 SERPL QL: NORMAL
BUN SERPL-MCNC: 16 MG/DL (ref 8–23)
BUN SERPL-MCNC: 18 MG/DL (ref 8–23)
BUN SERPL-MCNC: 19 MG/DL (ref 8–23)
BUN SERPL-MCNC: 20 MG/DL (ref 8–23)
CALCIUM SERPL-MCNC: 7.8 MG/DL (ref 8.7–10.5)
CALCIUM SERPL-MCNC: 7.8 MG/DL (ref 8.7–10.5)
CALCIUM SERPL-MCNC: 7.9 MG/DL (ref 8.7–10.5)
CALCIUM SERPL-MCNC: 8 MG/DL (ref 8.7–10.5)
CALCIUM SERPL-MCNC: 8.1 MG/DL (ref 8.7–10.5)
CALCIUM SERPL-MCNC: 8.2 MG/DL (ref 8.7–10.5)
CHLORIDE SERPL-SCNC: 96 MMOL/L (ref 95–110)
CHLORIDE SERPL-SCNC: 96 MMOL/L (ref 95–110)
CHLORIDE SERPL-SCNC: 97 MMOL/L (ref 95–110)
CHLORIDE SERPL-SCNC: 97 MMOL/L (ref 95–110)
CHLORIDE SERPL-SCNC: 98 MMOL/L (ref 95–110)
CHLORIDE SERPL-SCNC: 99 MMOL/L (ref 95–110)
CO2 SERPL-SCNC: 12 MMOL/L (ref 23–29)
CO2 SERPL-SCNC: 12 MMOL/L (ref 23–29)
CO2 SERPL-SCNC: 14 MMOL/L (ref 23–29)
CO2 SERPL-SCNC: 15 MMOL/L (ref 23–29)
CO2 SERPL-SCNC: 16 MMOL/L (ref 23–29)
CO2 SERPL-SCNC: 18 MMOL/L (ref 23–29)
CREAT SERPL-MCNC: 4.2 MG/DL (ref 0.5–1.4)
CREAT SERPL-MCNC: 4.6 MG/DL (ref 0.5–1.4)
CREAT SERPL-MCNC: 4.9 MG/DL (ref 0.5–1.4)
CREAT SERPL-MCNC: 4.9 MG/DL (ref 0.5–1.4)
CREAT SERPL-MCNC: 5 MG/DL (ref 0.5–1.4)
CREAT SERPL-MCNC: 5.1 MG/DL (ref 0.5–1.4)
CREAT UR-MCNC: 103 MG/DL (ref 15–325)
DIFFERENTIAL METHOD: ABNORMAL
DIFFERENTIAL METHOD: ABNORMAL
EOSINOPHIL # BLD AUTO: 0.1 K/UL (ref 0–0.5)
EOSINOPHIL # BLD AUTO: 0.2 K/UL (ref 0–0.5)
EOSINOPHIL NFR BLD: 0.8 % (ref 0–8)
EOSINOPHIL NFR BLD: 0.9 % (ref 0–8)
ERYTHROCYTE [DISTWIDTH] IN BLOOD BY AUTOMATED COUNT: 13.2 % (ref 11.5–14.5)
ERYTHROCYTE [DISTWIDTH] IN BLOOD BY AUTOMATED COUNT: 13.3 % (ref 11.5–14.5)
EST. GFR  (AFRICAN AMERICAN): 10 ML/MIN/1.73 M^2
EST. GFR  (AFRICAN AMERICAN): 10.2 ML/MIN/1.73 M^2
EST. GFR  (AFRICAN AMERICAN): 10.2 ML/MIN/1.73 M^2
EST. GFR  (AFRICAN AMERICAN): 11 ML/MIN/1.73 M^2
EST. GFR  (AFRICAN AMERICAN): 12.3 ML/MIN/1.73 M^2
EST. GFR  (AFRICAN AMERICAN): 9.7 ML/MIN/1.73 M^2
EST. GFR  (NON AFRICAN AMERICAN): 10.7 ML/MIN/1.73 M^2
EST. GFR  (NON AFRICAN AMERICAN): 8.4 ML/MIN/1.73 M^2
EST. GFR  (NON AFRICAN AMERICAN): 8.6 ML/MIN/1.73 M^2
EST. GFR  (NON AFRICAN AMERICAN): 8.9 ML/MIN/1.73 M^2
EST. GFR  (NON AFRICAN AMERICAN): 8.9 ML/MIN/1.73 M^2
EST. GFR  (NON AFRICAN AMERICAN): 9.6 ML/MIN/1.73 M^2
GLUCOSE SERPL-MCNC: 143 MG/DL (ref 70–110)
GLUCOSE SERPL-MCNC: 204 MG/DL (ref 70–110)
GLUCOSE SERPL-MCNC: 209 MG/DL (ref 70–110)
GLUCOSE SERPL-MCNC: 267 MG/DL (ref 70–110)
GLUCOSE SERPL-MCNC: 273 MG/DL (ref 70–110)
GLUCOSE SERPL-MCNC: 274 MG/DL (ref 70–110)
HCT VFR BLD AUTO: 23.7 % (ref 37–48.5)
HCT VFR BLD AUTO: 27.5 % (ref 37–48.5)
HGB BLD-MCNC: 7.2 G/DL (ref 12–16)
HGB BLD-MCNC: 8.3 G/DL (ref 12–16)
IMM GRANULOCYTES # BLD AUTO: 0.18 K/UL (ref 0–0.04)
IMM GRANULOCYTES # BLD AUTO: 0.18 K/UL (ref 0–0.04)
IMM GRANULOCYTES NFR BLD AUTO: 1 % (ref 0–0.5)
IMM GRANULOCYTES NFR BLD AUTO: 1.1 % (ref 0–0.5)
LACTATE SERPL-SCNC: 1 MMOL/L (ref 0.5–2.2)
LACTATE SERPL-SCNC: 1.4 MMOL/L (ref 0.5–2.2)
LACTATE SERPL-SCNC: 2.1 MMOL/L (ref 0.5–2.2)
LYMPHOCYTES # BLD AUTO: 2.4 K/UL (ref 1–4.8)
LYMPHOCYTES # BLD AUTO: 2.4 K/UL (ref 1–4.8)
LYMPHOCYTES NFR BLD: 13.4 % (ref 18–48)
LYMPHOCYTES NFR BLD: 14.6 % (ref 18–48)
MAGNESIUM SERPL-MCNC: 2.7 MG/DL (ref 1.6–2.6)
MCH RBC QN AUTO: 28.9 PG (ref 27–31)
MCH RBC QN AUTO: 29 PG (ref 27–31)
MCHC RBC AUTO-ENTMCNC: 30.2 G/DL (ref 32–36)
MCHC RBC AUTO-ENTMCNC: 30.4 G/DL (ref 32–36)
MCV RBC AUTO: 95 FL (ref 82–98)
MCV RBC AUTO: 96 FL (ref 82–98)
MONOCYTES # BLD AUTO: 1.3 K/UL (ref 0.3–1)
MONOCYTES # BLD AUTO: 1.3 K/UL (ref 0.3–1)
MONOCYTES NFR BLD: 7.2 % (ref 4–15)
MONOCYTES NFR BLD: 7.7 % (ref 4–15)
NEUTROPHILS # BLD AUTO: 12.5 K/UL (ref 1.8–7.7)
NEUTROPHILS # BLD AUTO: 14.1 K/UL (ref 1.8–7.7)
NEUTROPHILS NFR BLD: 75.7 % (ref 38–73)
NEUTROPHILS NFR BLD: 77.3 % (ref 38–73)
NRBC BLD-RTO: 0 /100 WBC
NRBC BLD-RTO: 0 /100 WBC
OSMOLALITY UR: 273 MOSM/KG (ref 50–1200)
PHOSPHATE SERPL-MCNC: 3.9 MG/DL (ref 2.7–4.5)
PLATELET # BLD AUTO: 489 K/UL (ref 150–350)
PLATELET # BLD AUTO: 558 K/UL (ref 150–350)
PMV BLD AUTO: 9.7 FL (ref 9.2–12.9)
PMV BLD AUTO: 9.9 FL (ref 9.2–12.9)
POCT GLUCOSE: 150 MG/DL (ref 70–110)
POCT GLUCOSE: 165 MG/DL (ref 70–110)
POCT GLUCOSE: 169 MG/DL (ref 70–110)
POCT GLUCOSE: 206 MG/DL (ref 70–110)
POCT GLUCOSE: 210 MG/DL (ref 70–110)
POCT GLUCOSE: 212 MG/DL (ref 70–110)
POCT GLUCOSE: 215 MG/DL (ref 70–110)
POCT GLUCOSE: 229 MG/DL (ref 70–110)
POCT GLUCOSE: 233 MG/DL (ref 70–110)
POCT GLUCOSE: 235 MG/DL (ref 70–110)
POCT GLUCOSE: 239 MG/DL (ref 70–110)
POCT GLUCOSE: 255 MG/DL (ref 70–110)
POCT GLUCOSE: 271 MG/DL (ref 70–110)
POCT GLUCOSE: 300 MG/DL (ref 70–110)
POCT GLUCOSE: 309 MG/DL (ref 70–110)
POCT GLUCOSE: 310 MG/DL (ref 70–110)
POCT GLUCOSE: 310 MG/DL (ref 70–110)
POCT GLUCOSE: 313 MG/DL (ref 70–110)
POCT GLUCOSE: 314 MG/DL (ref 70–110)
POCT GLUCOSE: 314 MG/DL (ref 70–110)
POCT GLUCOSE: 329 MG/DL (ref 70–110)
POCT GLUCOSE: 345 MG/DL (ref 70–110)
POTASSIUM SERPL-SCNC: 4.3 MMOL/L (ref 3.5–5.1)
POTASSIUM SERPL-SCNC: 4.4 MMOL/L (ref 3.5–5.1)
POTASSIUM SERPL-SCNC: 4.7 MMOL/L (ref 3.5–5.1)
POTASSIUM SERPL-SCNC: 4.8 MMOL/L (ref 3.5–5.1)
POTASSIUM SERPL-SCNC: 5 MMOL/L (ref 3.5–5.1)
POTASSIUM SERPL-SCNC: 5.1 MMOL/L (ref 3.5–5.1)
RBC # BLD AUTO: 2.49 M/UL (ref 4–5.4)
RBC # BLD AUTO: 2.86 M/UL (ref 4–5.4)
SODIUM SERPL-SCNC: 120 MMOL/L (ref 136–145)
SODIUM SERPL-SCNC: 120 MMOL/L (ref 136–145)
SODIUM SERPL-SCNC: 121 MMOL/L (ref 136–145)
SODIUM SERPL-SCNC: 123 MMOL/L (ref 136–145)
SODIUM SERPL-SCNC: 123 MMOL/L (ref 136–145)
SODIUM SERPL-SCNC: 124 MMOL/L (ref 136–145)
SODIUM UR-SCNC: 54 MMOL/L (ref 20–250)
VANCOMYCIN SERPL-MCNC: 25.5 UG/ML
WBC # BLD AUTO: 16.52 K/UL (ref 3.9–12.7)
WBC # BLD AUTO: 18.24 K/UL (ref 3.9–12.7)

## 2020-11-30 PROCEDURE — 94640 AIRWAY INHALATION TREATMENT: CPT | Mod: HCWC

## 2020-11-30 PROCEDURE — 94799 UNLISTED PULMONARY SVC/PX: CPT | Mod: HCWC

## 2020-11-30 PROCEDURE — 99233 SBSQ HOSP IP/OBS HIGH 50: CPT | Mod: HCWC,GC,, | Performed by: INTERNAL MEDICINE

## 2020-11-30 PROCEDURE — 25000003 PHARM REV CODE 250: Mod: HCWC | Performed by: INTERNAL MEDICINE

## 2020-11-30 PROCEDURE — 99233 PR SUBSEQUENT HOSPITAL CARE,LEVL III: ICD-10-PCS | Mod: HCWC,GC,, | Performed by: INTERNAL MEDICINE

## 2020-11-30 PROCEDURE — 94761 N-INVAS EAR/PLS OXIMETRY MLT: CPT | Mod: HCWC

## 2020-11-30 PROCEDURE — 86901 BLOOD TYPING SEROLOGIC RH(D): CPT | Mod: HCWC

## 2020-11-30 PROCEDURE — 82570 ASSAY OF URINE CREATININE: CPT | Mod: HCWC

## 2020-11-30 PROCEDURE — 83605 ASSAY OF LACTIC ACID: CPT | Mod: 91,HCWC

## 2020-11-30 PROCEDURE — 86920 COMPATIBILITY TEST SPIN: CPT | Mod: HCWC

## 2020-11-30 PROCEDURE — 27000221 HC OXYGEN, UP TO 24 HOURS: Mod: HCWC

## 2020-11-30 PROCEDURE — 84100 ASSAY OF PHOSPHORUS: CPT | Mod: HCWC

## 2020-11-30 PROCEDURE — 80048 BASIC METABOLIC PNL TOTAL CA: CPT | Mod: 91,HCWC

## 2020-11-30 PROCEDURE — 25000003 PHARM REV CODE 250: Mod: HCWC | Performed by: STUDENT IN AN ORGANIZED HEALTH CARE EDUCATION/TRAINING PROGRAM

## 2020-11-30 PROCEDURE — 99223 PR INITIAL HOSPITAL CARE,LEVL III: ICD-10-PCS | Mod: HCWC,,, | Performed by: HOSPITALIST

## 2020-11-30 PROCEDURE — 99223 1ST HOSP IP/OBS HIGH 75: CPT | Mod: HCWC,,, | Performed by: HOSPITALIST

## 2020-11-30 PROCEDURE — 63600175 PHARM REV CODE 636 W HCPCS: Mod: HCWC | Performed by: STUDENT IN AN ORGANIZED HEALTH CARE EDUCATION/TRAINING PROGRAM

## 2020-11-30 PROCEDURE — 99900035 HC TECH TIME PER 15 MIN (STAT): Mod: HCWC

## 2020-11-30 PROCEDURE — 25000242 PHARM REV CODE 250 ALT 637 W/ HCPCS: Mod: HCWC | Performed by: STUDENT IN AN ORGANIZED HEALTH CARE EDUCATION/TRAINING PROGRAM

## 2020-11-30 PROCEDURE — 94660 CPAP INITIATION&MGMT: CPT | Mod: HCWC

## 2020-11-30 PROCEDURE — 63600175 PHARM REV CODE 636 W HCPCS: Mod: HCWC | Performed by: INTERNAL MEDICINE

## 2020-11-30 PROCEDURE — 83935 ASSAY OF URINE OSMOLALITY: CPT | Mod: HCWC

## 2020-11-30 PROCEDURE — 85025 COMPLETE CBC W/AUTO DIFF WBC: CPT | Mod: 91,HCWC

## 2020-11-30 PROCEDURE — 80202 ASSAY OF VANCOMYCIN: CPT | Mod: HCWC

## 2020-11-30 PROCEDURE — 84300 ASSAY OF URINE SODIUM: CPT | Mod: HCWC

## 2020-11-30 PROCEDURE — 83735 ASSAY OF MAGNESIUM: CPT | Mod: HCWC

## 2020-11-30 PROCEDURE — 20600001 HC STEP DOWN PRIVATE ROOM: Mod: HCWC

## 2020-11-30 PROCEDURE — 83605 ASSAY OF LACTIC ACID: CPT | Mod: HCWC

## 2020-11-30 PROCEDURE — 36415 COLL VENOUS BLD VENIPUNCTURE: CPT | Mod: HCWC

## 2020-11-30 PROCEDURE — 97802 MEDICAL NUTRITION INDIV IN: CPT | Mod: HCWC

## 2020-11-30 PROCEDURE — 80048 BASIC METABOLIC PNL TOTAL CA: CPT | Mod: HCWC

## 2020-11-30 RX ORDER — HYDROMORPHONE HYDROCHLORIDE 1 MG/ML
0.5 INJECTION, SOLUTION INTRAMUSCULAR; INTRAVENOUS; SUBCUTANEOUS EVERY 6 HOURS PRN
Status: DISCONTINUED | OUTPATIENT
Start: 2020-11-30 | End: 2020-12-03

## 2020-11-30 RX ORDER — FUROSEMIDE 10 MG/ML
100 INJECTION INTRAMUSCULAR; INTRAVENOUS ONCE
Status: COMPLETED | OUTPATIENT
Start: 2020-11-30 | End: 2020-11-30

## 2020-11-30 RX ORDER — HYDROCODONE BITARTRATE AND ACETAMINOPHEN 10; 325 MG/1; MG/1
1 TABLET ORAL EVERY 8 HOURS PRN
COMMUNITY
End: 2022-10-13

## 2020-11-30 RX ORDER — FUROSEMIDE 10 MG/ML
80 INJECTION INTRAMUSCULAR; INTRAVENOUS ONCE
Status: COMPLETED | OUTPATIENT
Start: 2020-11-30 | End: 2020-11-30

## 2020-11-30 RX ORDER — OXYCODONE HYDROCHLORIDE 10 MG/1
10 TABLET ORAL EVERY 4 HOURS PRN
Status: DISCONTINUED | OUTPATIENT
Start: 2020-11-30 | End: 2020-12-11

## 2020-11-30 RX ORDER — OXYCODONE HYDROCHLORIDE 5 MG/1
5 TABLET ORAL EVERY 4 HOURS PRN
Status: DISCONTINUED | OUTPATIENT
Start: 2020-11-30 | End: 2020-12-11

## 2020-11-30 RX ORDER — GABAPENTIN 300 MG/1
300 CAPSULE ORAL DAILY
Status: DISCONTINUED | OUTPATIENT
Start: 2020-12-01 | End: 2020-12-12 | Stop reason: HOSPADM

## 2020-11-30 RX ORDER — SODIUM BICARBONATE 650 MG/1
1300 TABLET ORAL 3 TIMES DAILY
Status: DISCONTINUED | OUTPATIENT
Start: 2020-11-30 | End: 2020-12-07

## 2020-11-30 RX ORDER — SODIUM CHLORIDE 9 MG/ML
INJECTION, SOLUTION INTRAVENOUS CONTINUOUS
Status: DISCONTINUED | OUTPATIENT
Start: 2020-11-30 | End: 2020-11-30

## 2020-11-30 RX ORDER — AMOXICILLIN 250 MG
1 CAPSULE ORAL 2 TIMES DAILY
Status: DISCONTINUED | OUTPATIENT
Start: 2020-11-30 | End: 2020-12-12 | Stop reason: HOSPADM

## 2020-11-30 RX ORDER — SODIUM CHLORIDE 9 MG/ML
INJECTION, SOLUTION INTRAVENOUS CONTINUOUS
Status: ACTIVE | OUTPATIENT
Start: 2020-11-30 | End: 2020-11-30

## 2020-11-30 RX ADMIN — IPRATROPIUM BROMIDE AND ALBUTEROL SULFATE 3 ML: .5; 2.5 SOLUTION RESPIRATORY (INHALATION) at 01:11

## 2020-11-30 RX ADMIN — FUROSEMIDE 80 MG: 10 INJECTION, SOLUTION INTRAMUSCULAR; INTRAVENOUS at 11:11

## 2020-11-30 RX ADMIN — ROSUVASTATIN CALCIUM 20 MG: 20 TABLET, FILM COATED ORAL at 09:11

## 2020-11-30 RX ADMIN — SODIUM CHLORIDE 2.3 UNITS/HR: 9 INJECTION, SOLUTION INTRAVENOUS at 12:11

## 2020-11-30 RX ADMIN — OXYCODONE HYDROCHLORIDE 10 MG: 10 TABLET ORAL at 01:11

## 2020-11-30 RX ADMIN — DOCUSATE SODIUM AND SENNOSIDES 1 TABLET: 8.6; 5 TABLET, FILM COATED ORAL at 09:11

## 2020-11-30 RX ADMIN — GABAPENTIN 200 MG: 100 CAPSULE ORAL at 09:11

## 2020-11-30 RX ADMIN — SODIUM BICARBONATE 650 MG TABLET 1300 MG: at 11:11

## 2020-11-30 RX ADMIN — MUPIROCIN: 20 OINTMENT TOPICAL at 09:11

## 2020-11-30 RX ADMIN — SODIUM CHLORIDE 1.3 UNITS/HR: 9 INJECTION, SOLUTION INTRAVENOUS at 10:11

## 2020-11-30 RX ADMIN — CHLOROTHIAZIDE SODIUM 500 MG: 500 INJECTION, POWDER, LYOPHILIZED, FOR SOLUTION INTRAVENOUS at 11:11

## 2020-11-30 RX ADMIN — METRONIDAZOLE 500 MG: 500 TABLET ORAL at 09:11

## 2020-11-30 RX ADMIN — FUROSEMIDE 100 MG: 10 INJECTION, SOLUTION INTRAMUSCULAR; INTRAVENOUS at 11:11

## 2020-11-30 RX ADMIN — GABAPENTIN 200 MG: 100 CAPSULE ORAL at 02:11

## 2020-11-30 RX ADMIN — SODIUM CHLORIDE 1000 ML: 0.9 INJECTION, SOLUTION INTRAVENOUS at 03:11

## 2020-11-30 RX ADMIN — POLYETHYLENE GLYCOL 3350 17 G: 17 POWDER, FOR SOLUTION ORAL at 09:11

## 2020-11-30 RX ADMIN — SODIUM CHLORIDE 3.9 UNITS/HR: 9 INJECTION, SOLUTION INTRAVENOUS at 02:11

## 2020-11-30 RX ADMIN — OXYCODONE HYDROCHLORIDE 10 MG: 10 TABLET ORAL at 09:11

## 2020-11-30 RX ADMIN — HYDROMORPHONE HYDROCHLORIDE 0.5 MG: 1 INJECTION, SOLUTION INTRAMUSCULAR; INTRAVENOUS; SUBCUTANEOUS at 02:11

## 2020-11-30 RX ADMIN — SODIUM CHLORIDE 2 UNITS/HR: 9 INJECTION, SOLUTION INTRAVENOUS at 11:11

## 2020-11-30 RX ADMIN — SODIUM CHLORIDE 0.9 UNITS/HR: 9 INJECTION, SOLUTION INTRAVENOUS at 08:11

## 2020-11-30 RX ADMIN — IPRATROPIUM BROMIDE AND ALBUTEROL SULFATE 3 ML: .5; 2.5 SOLUTION RESPIRATORY (INHALATION) at 08:11

## 2020-11-30 RX ADMIN — MUPIROCIN: 20 OINTMENT TOPICAL at 10:11

## 2020-11-30 RX ADMIN — CEFEPIME 2 G: 2 INJECTION, POWDER, FOR SOLUTION INTRAVENOUS at 05:11

## 2020-11-30 RX ADMIN — DULOXETINE HYDROCHLORIDE 60 MG: 60 CAPSULE, DELAYED RELEASE ORAL at 09:11

## 2020-11-30 RX ADMIN — SODIUM CHLORIDE 1.3 UNITS/HR: 9 INJECTION, SOLUTION INTRAVENOUS at 07:11

## 2020-11-30 RX ADMIN — METRONIDAZOLE 500 MG: 500 TABLET ORAL at 01:11

## 2020-11-30 RX ADMIN — SODIUM CHLORIDE 2.9 UNITS/HR: 9 INJECTION, SOLUTION INTRAVENOUS at 01:11

## 2020-11-30 RX ADMIN — OXYCODONE HYDROCHLORIDE 10 MG: 10 TABLET ORAL at 07:11

## 2020-11-30 RX ADMIN — SODIUM CHLORIDE 1.7 UNITS/HR: 9 INJECTION, SOLUTION INTRAVENOUS at 10:11

## 2020-11-30 RX ADMIN — SODIUM CHLORIDE 6.9 UNITS/HR: 9 INJECTION, SOLUTION INTRAVENOUS at 06:11

## 2020-11-30 RX ADMIN — METHIMAZOLE 10 MG: 10 TABLET ORAL at 09:11

## 2020-11-30 RX ADMIN — IPRATROPIUM BROMIDE AND ALBUTEROL SULFATE 3 ML: .5; 2.5 SOLUTION RESPIRATORY (INHALATION) at 07:11

## 2020-11-30 RX ADMIN — SODIUM CHLORIDE 6.9 UNITS/HR: 9 INJECTION, SOLUTION INTRAVENOUS at 04:11

## 2020-11-30 RX ADMIN — PANTOPRAZOLE SODIUM 40 MG: 40 TABLET, DELAYED RELEASE ORAL at 09:11

## 2020-11-30 RX ADMIN — SODIUM CHLORIDE 4.9 UNITS/HR: 9 INJECTION, SOLUTION INTRAVENOUS at 03:11

## 2020-11-30 RX ADMIN — SODIUM CHLORIDE 0.9 UNITS/HR: 9 INJECTION, SOLUTION INTRAVENOUS at 02:11

## 2020-11-30 RX ADMIN — SODIUM CHLORIDE 1.9 UNITS/HR: 9 INJECTION, SOLUTION INTRAVENOUS at 08:11

## 2020-11-30 RX ADMIN — SODIUM CHLORIDE 1.3 UNITS/HR: 9 INJECTION, SOLUTION INTRAVENOUS at 09:11

## 2020-11-30 RX ADMIN — METRONIDAZOLE 500 MG: 500 TABLET ORAL at 05:11

## 2020-11-30 RX ADMIN — SODIUM CHLORIDE: 0.9 INJECTION, SOLUTION INTRAVENOUS at 09:11

## 2020-11-30 NOTE — ASSESSMENT & PLAN NOTE
- S/p IR pelvic drain placement   - 250cc of pus immediately removed with total of 260cc additional pus-like fluid removed since drain placed on 11/28   - 50cc drain output over last 12 hours   - Symptomatic improvement as mentioned above   - Continue drain management per Urology

## 2020-11-30 NOTE — ASSESSMENT & PLAN NOTE
Brief Hx: 60 y.o. female with a diagnosis of HTN, T2DM on MDi insulin, hyperthyroidism who presented with weakness, poor appetite and abdominal pain associated with vaginal pain and rectal pressure and non-bloody diarrhea.   She was found to be in DKA and started on DKA protocol with IVF and IV insulin.   She is being treated with IV antibiotics for pelvic fluids collections suspected to be infectious.     History of poorly controlled T2DM (A1c 11.4%) on MDI insulin and metformin  Presented with intra-abdominal infection and DKA  DKA resolved with IV insulin, IVFs  No steroids, no TFs    Consulted for: dka  DM type: T2DM  A1C: 11.4*  Hypoglycemia: none  Steroids: none  Diet/Tfs: clears  Glucose Goals: 140-180 mg/dL    Glucose Trend x 24hr:   206 - 455    Home Regimen:    Metformin 500 bid  Lantus 50 units daily  Aspart 15 units AC    AG 9  CO2 18  GFR 12      PLAN:     -  Intensive insulin gtt at 0.9 Units/hr   -  Plan to convert to transition gtt today and advance diet    -  Accucheck Q1H    Initially switched to basal insulin calculated with a TDD of 0.7/u/kg/day and had significant hyperglycemia.   Consider increase TDD to 1 U/kg/day given expected resistance with active infection.

## 2020-11-30 NOTE — CARE UPDATE
Updated son on patient's status. All questions answered.  Also updated patient at bedside regarding labs, CT RSS findings, reviewed urien culture and antibiotics. She feels the sensation to urinate but based on CT, bladder appears empty. Likely from adjacent irritation.   Urine studies ordered for LAURIE. Mg replaced. Lab returning for next BMP.    Hanna Andres MD, PGY2  Internal Medicine, IM2

## 2020-11-30 NOTE — ASSESSMENT & PLAN NOTE
-- Cifuentes now in place draining urine, though output decreased (see LAURIE problem).   -- UA with findings as noted in HPI. Culture pending. S/p vanc x 1 and rocephin x 1 in ED.  -- CT A/P and CT urogram done as noted above.  -- Cr: See LAURIE problem

## 2020-11-30 NOTE — PROGRESS NOTES
Pharmacokinetic Assessment Follow Up: IV Vancomycin    Vancomycin serum concentration assessment(s):    Vancomycin concentration = 25.5 mcg/mL. Drawn 24 hours after previous dose.  Patient with LAURIE (Scr 4.2 from baseline of 1).    Vancomycin Regimen Plan:  1. Hold vancomycin today  2. Obtain concentration in AM on 12/1/20  3. Goal trough = 15-20 mcg/mL    Thank you for the consult, will continue to follow  Angel Adams Pharm.D., BCPS  [id number]    Drug levels (last 3 results):  Recent Labs   Lab Result Units 11/30/20  0532   Vancomycin, Random ug/mL 25.5        Patient brief summary:  Navin Beck is a 60 y.o. female initiated on antimicrobial therapy with IV Vancomycin for treatment of intraabdominal abscess (PCN allergy, covering empirically for enterococcus)    Drug Allergies:   Review of patient's allergies indicates:   Allergen Reactions    Penicillin g Shortness Of Breath    Penicillins Shortness Of Breath, Itching and Swelling       Actual Body Weight:   115.7 kg    Renal Function:   Estimated Creatinine Clearance: 18.2 mL/min (A) (based on SCr of 4.2 mg/dL (H)).,       CBC (last 72 hours):  Recent Labs   Lab Result Units 11/27/20  1239 11/28/20  0443 11/29/20  0554 11/30/20  0531   WBC K/uL  --  19.63*  19.12* 20.10* 16.52*   Hemoglobin g/dL  --  8.5*  8.6* 8.5* 7.2*   Hemoglobin A1C % 11.4*  --   --   --    Hematocrit %  --  27.9*  28.3* 28.4* 23.7*   Platelets K/uL  --  614*  622* 594* 489*   Gran % %  --  73.9*  74.0* 73.5* 75.7*   Lymph % %  --  15.2*  15.6* 14.8* 14.6*   Mono % %  --  9.7  9.4 10.1 7.7   Eosinophil % %  --  0.2  0.2 0.2 0.8   Basophil % %  --  0.3  0.2 0.1 0.1   Differential Method   --  Automated  Automated Automated Automated       Metabolic Panel (last 72 hours):  Recent Labs   Lab Result Units 11/27/20  1239 11/27/20  1518 11/27/20  1916 11/27/20  2158 11/28/20  0021 11/28/20  0442 11/28/20  0443 11/28/20  1228 11/29/20  0027 11/29/20  0554 11/29/20  0932  11/29/20  1234 11/29/20  1822 11/30/20  0530 11/30/20  0532   Sodium mmol/L 135* 138 136  --  132*  --  129* 131* 129*  --  123* 121* 125*  --  124*   Sodium, Urine mmol/L  --   --   --  83  --   --   --   --   --   --   --   --   --  54  --    Potassium mmol/L 4.5 5.5* 4.4  --  4.4  --  4.7 4.0 4.6  --  4.8 4.6  4.6 4.5  --  4.4   Chloride mmol/L 103 106 104  --  102  --  99 100 100  --  96 93* 97  --  97   CO2 mmol/L 20* 18* 22*  --  20*  --  19* 20* 19*  --  16* 17* 18*  --  18*   Glucose mg/dL 222* 109 158*  --  274*  --  290* 245* 343*  --  541* 480* 220*  --  209*   BUN mg/dL 12 11 9  --  8  --  8 7* 8  --  12 14 15  --  16   Creatinine mg/dL 1.0 1.0 1.0  --  1.1  --  1.1 1.0 1.8*  --  2.9* 3.1* 3.5*  --  4.2*   Creatinine, Urine mg/dL  --   --   --  109.0  --   --   --   --   --   --   --   --   --  103.0  --    Magnesium mg/dL  --   --   --   --   --  1.6  --   --   --  1.3*  --   --   --   --  2.7*   Phosphorus mg/dL  --   --   --   --   --  3.6  --   --   --  3.8  --   --   --   --  3.9       Vancomycin Administrations:  vancomycin given in the last 96 hours                   vancomycin in dextrose 5 % 1 gram/250 mL IVPB 1,000 mg (mg) 1,000 mg New Bag 11/29/20 0506    vancomycin 1.75 g in 5 % dextrose 500 mL IVPB (mg) 1,750 mg New Bag 11/28/20 1639    vancomycin (VANCOCIN) 2,500 mg in dextrose 5 % 500 mL IVPB (mg) 2,500 mg New Bag 11/27/20 0532                Microbiologic Results:  Microbiology Results (last 7 days)     Procedure Component Value Units Date/Time    Culture, Anaerobe [[id number]] Collected: 11/28/20 1405    Order Status: Completed Specimen: Abscess from Abdomen Updated: 11/30/20 0640     Anaerobic Culture Culture in progress    Narrative:      Right pelvis    Blood culture [[id number]] Collected: 11/27/20 1919    Order Status: Completed Specimen: Blood Updated: 11/29/20 2212     Blood Culture, Routine No Growth to date      No Growth to date      No Growth to date    Blood culture  [[id number]] Collected: 11/27/20 1915    Order Status: Completed Specimen: Blood Updated: 11/29/20 2212     Blood Culture, Routine No Growth to date      No Growth to date      No Growth to date    Aerobic culture [[id number]] Collected: 11/28/20 1405    Order Status: Completed Specimen: Abscess from Abdomen Updated: 11/29/20 0739     Aerobic Bacterial Culture No growth    Narrative:      Right pelvis    Gram stain [[id number]] Collected: 11/28/20 1405    Order Status: Completed Specimen: Abscess from Abdomen Updated: 11/28/20 2234     Gram Stain Result Moderate WBC's      Moderate Gram positive cocci      Few Gram negative rods      Rare Gram positive rods    Narrative:      Right pelvis    Urine culture [[id number]]  (Abnormal)  (Susceptibility) Collected: 11/26/20 2206    Order Status: Completed Specimen: Urine Updated: 11/28/20 2115     Urine Culture, Routine ESCHERICHIA COLI  10,000 - 49,999 cfu/ml  No other significant isolate      Narrative:      Specimen Source->Urine    Fungus culture [[id number]] Collected: 11/28/20 1405    Order Status: Sent Specimen: Abscess from Abdomen Updated: 11/28/20 7572

## 2020-11-30 NOTE — CONSULTS
[facility]  Nephrology  Consult Note    Patient Name: Navin Beck  MRN: [id number]  Admission Date: 11/26/2020  Hospital Length of Stay: 3 days  Attending Provider: Alessia Johnston MD   Primary Care Physician: Elvira Quinones MD  Principal Problem:Diabetic ketoacidosis without coma associated with type 2 diabetes mellitus    Inpatient consult to Nephrology  Consult performed by: Wei Swain NP  Consult ordered by: Ciera Maurer MD  Reason for consult: LAURIE        Subjective:     HPI: Ms Beck is a 60 year old female with a past medical history of HTN, LD, T2DM, hyperparathyroidism, and asthma who [place] [facility] [place] c/o abdominal pain s/p bladder mesh surgery. Pt noted to be in DKA on presentation and is currently on insulin gtt and being followed by endocrine. CT abdomen obtained revealed multiple fluid collections near bladder; IR was consulted and pt is s/p drain placement. Initial coverage provided with vanc, now on cefepime and flagyl. Baseline sCr roughly 1.0-1.2, now elevated to 4.6 at time of consult (11/30/2020). Pt now anuric with 20mL UOP in the past 24hrs at time of consult (11/30/2020). Pt denies endorses lower abdominal tenderness. Pt denies SOB. Nephrology has been consulted for LAURIE.     -HPI was obtained from patient interview, and from review of the patient's EMR.         Past Medical History:   Diagnosis Date    Allergy     Arrhythmia     Arthritis     Bronchial asthma     Diabetes mellitus     Glaucoma     Hormone disorder     hysterectomy    Hypercholesteremia     Hypertension     Hyperthyroidism     Insomnia     Kidney stones     Thyroid disease     Urine, incontinence, stress female     UTI (urinary tract infection) 11/27/2020       Past Surgical History:   Procedure Laterality Date    APPENDECTOMY      BACK SURGERY      disc removal     CARPAL TUNNEL RELEASE Bilateral     CHOLECYSTECTOMY      CYSTOSCOPY N/A 10/26/2020     Procedure: CYSTOSCOPY;  Surgeon: Wilner Goel MD;  Location: [facility] OR;  Service: OB/GYN;  Laterality: N/A;    FRACTURE SURGERY Right     wrist    [facility]  2010    [facility]    JOINT REPLACEMENT      KNEE ARTHROSCOPY W/ DEBRIDEMENT      KNEE SURGERY Right     Knee replacement    LITHOTRIPSY      CA REMOVAL OF OVARY/TUBE(S)  9/9/13    benign    removal of round ligament mass      ROBOT-ASSISTED LAPAROSCOPIC LYSIS OF ADHESIONS USING DA JAMES XI N/A 10/26/2020    Procedure: XI ROBOTIC LYSIS, ADHESIONS;  Surgeon: Wilner Goel MD;  Location: [facility] OR;  Service: OB/GYN;  Laterality: N/A;  ROBOTIC MOBILIZATION OF BLADDER- DR BURNHAM    SPINE SURGERY      WRIST FRACTURE SURGERY Left        Review of patient's allergies indicates:   Allergen Reactions    Penicillins Shortness Of Breath, Itching and Swelling     Tolerates cefepime 11/30/2020     Current Facility-Administered Medications   Medication Frequency    0.9%  NaCl infusion Continuous    acetaminophen tablet 650 mg Q8H PRN    albuterol-ipratropium 2.5 mg-0.5 mg/3 mL nebulizer solution 3 mL Q6H WAKE    [START ON 12/1/2020] cefTRIAXone (ROCEPHIN) 2 g/50 mL D5W IVPB Q24H    dextrose 50% injection 12.5 g PRN    dextrose 50% injection 25 g PRN    [START ON 12/1/2020] gabapentin capsule 300 mg Daily    HYDROmorphone injection 0.5 mg Q6H PRN    insulin regular 1 Units/mL in sodium chloride 0.9% 100 mL infusion Continuous    melatonin tablet 6 mg Nightly PRN    methIMAzole tablet 10 mg Daily    metroNIDAZOLE tablet 500 mg Q8H    mupirocin 2 % ointment BID    ondansetron injection 4 mg Q8H PRN    oxyCODONE immediate release tablet 5 mg Q4H PRN    oxyCODONE immediate release tablet Tab 10 mg Q4H PRN    pantoprazole EC tablet 40 mg Daily    polyethylene glycol packet 17 g Daily    rosuvastatin tablet 20 mg Daily    senna-docusate 8.6-50 mg per tablet 1 tablet BID    sodium chloride 0.9% bolus 1,000 mL Once    sodium  chloride 0.9% flush 10 mL PRN    vancomycin - pharmacy to dose pharmacy to manage frequency     Family History     Problem Relation (Age of Onset)    Cancer Mother, Brother, Brother, Brother, Brother    Colon cancer Brother        Tobacco Use    Smoking status: Current Every Day Smoker     Packs/day: 0.50     Years: 30.00     Pack years: 15.00    Smokeless tobacco: Never Used   Substance and Sexual Activity    Alcohol use: No    Drug use: No    Sexual activity: Not Currently     Partners: Male     Birth control/protection: Surgical     Review of Systems   Constitutional: Negative.    HENT: Negative.    Eyes: Negative.    Respiratory: Negative.    Gastrointestinal: Positive for abdominal pain.   Genitourinary: Negative.    Musculoskeletal: Negative.    Skin: Negative.    Neurological: Negative.    Psychiatric/Behavioral: Negative.      Objective:     Vital Signs (Most Recent):  Temp: 98 °F (36.7 °C) (11/30/20 1124)  Pulse: 74 (11/30/20 1302)  Resp: 14 (11/30/20 1444)  BP: (!) 104/54 (11/30/20 1124)  SpO2: (!) 94 % (11/30/20 1302)  O2 Device (Oxygen Therapy): room air (11/30/20 1302) Vital Signs (24h Range):  Temp:  [98 °F (36.7 °C)-99.5 °F (37.5 °C)] 98 °F (36.7 °C)  Pulse:  [71-91] 74  Resp:  [12-19] 14  SpO2:  [91 %-100 %] 94 %  BP: ()/(52-56) 104/54     Weight: 115.7 kg (255 lb) (11/27/20 1126)  Body mass index is 39.94 kg/m².  Body surface area is 2.34 meters squared.    I/O last 3 completed shifts:  In: 1330 [P.O.:980; I.V.:100; IV Piggyback:250]  Out: 280 [Urine:80; Drains:200]    Physical Exam  Constitutional:       Appearance: She is obese.   HENT:      Head: Normocephalic and atraumatic.      Nose: Nose normal.      Mouth/Throat:      Mouth: Mucous membranes are moist.      Pharynx: Oropharynx is clear.   Eyes:      Conjunctiva/sclera: Conjunctivae normal.      Pupils: Pupils are equal, round, and reactive to light.   Neck:      Musculoskeletal: Normal range of motion and neck supple.    Cardiovascular:      Rate and Rhythm: Regular rhythm.   Pulmonary:      Effort: Pulmonary effort is normal.   Abdominal:      General: Abdomen is flat.      Palpations: Abdomen is soft.      Tenderness: There is abdominal tenderness.   Musculoskeletal: Normal range of motion.      Right lower leg: No edema.      Left lower leg: No edema.   Skin:     General: Skin is warm and dry.   Neurological:      General: No focal deficit present.      Mental Status: She is alert and oriented to person, place, and time.   Psychiatric:         Mood and Affect: Mood normal.         Behavior: Behavior normal.         Significant Labs:  CBC:   Recent Labs   Lab 11/30/20  0531   WBC 16.52*   RBC 2.49*   HGB 7.2*   HCT 23.7*   *   MCV 95   MCH 28.9   MCHC 30.4*     CMP:   Recent Labs   Lab 11/26/20  2156  11/30/20  1313   *   < > 273*   CALCIUM 8.9   < > 8.2*   ALBUMIN 2.4*  --   --    PROT 8.6*  --   --    *   < > 121*   K 5.3*   < > 5.1   CO2 16*   < > 14*   CL 96   < > 96   BUN 19   < > 18   CREATININE 1.5*   < > 4.9*   ALKPHOS 188*  --   --    ALT 14  --   --    AST 10  --   --    BILITOT 0.2  --   --     < > = values in this interval not displayed.     All labs within the past 24 hours have been reviewed.        Assessment/Plan:     * Diabetic ketoacidosis without coma associated with type 2 diabetes mellitus  -Management per primary team     Hyponatremia        See assessment and plan for LAURIE   -Sodium corrected for hyperglycemia; corrected sodium is 124    Acute kidney injury (LAURIE) with acute tubular necrosis (ATN)  Pt with baseline sCr roughly around 1.0-1.2 that has since trended up to 4.6. Pt anuric with 20mL UOP in the past 24hrs. Review of vitals showed low BP's. Pt also with UTI and purulent drainage from abdominal ROSAURA drain that is similar in appearance to her urine; concerning for possibility of communication between bladder and fluid collection. Cultures from fluid collection are currently pending.  Of note, pt received contrast on 11/27/2020. Urine microscopy preformed in the nephrology lab on 11/30/2020 revealed many muddy brown granular casts, as well as gross amount of WBC's. Pt also noted to be hyponatremic; possibly 2/2 combination of D5 administration with decreased UOP. Pt now receiving NS gtt at 125cc/hr for fluid challenge. Pt does not appear volume overloaded on physical exam and denies SOB, but there does appear to be some pulmonary congestion on CXR. Pt currently undergoing fluid challenge with close monitoring of UOP. Likely that pt will require dialysis by the end of tomorrow if UOP does not improve drastically.     LAURIE likely 2/2 spesis/ATN as well as contrast associated nephropathy.       Plan/Recommendations:     -Agree NS gtt at 125cc/hr for 10hrs  -Recommend additional fluid bolus of 1L of NS and monitor UOP. Stop IV fluids if pt becomes short of breath.   -Stop duloxetine    -Limit gtts with free water   -Recommend against further administration of CT contrast   -Decrease gabapentin to 300mg q day   -Ordered urine lytes and urea   -Will order retroperitoneal US   -Will maintain low threshold to initiate dialysis   -Maintain Hgb >7.0  -Keep MAP >65  -Renally dose meds and avoid nephrotoxic meds  -Will continue to follow closely   -Plan discussed with staff, Dr Rose         Thank you for your consult. I will follow-up with patient. Please contact us if you have any additional questions.    Wei Swain NP  Nephrology  [facility]

## 2020-11-30 NOTE — PROGRESS NOTES
Food & Nutrition  Education    Diet Education: Diabetic   Learners: Pt       Nutrition Education provided with handouts: Carbohydrate Counting for People with Diabetes       Comments: Pt resting in bed with no family at bedside. Dicussed A1c 11.4. Pt reports previous diabetic diet education. Pt states that she takes insulin and checks blood sugars 6-8x/day. Dicussed CHO serving sizes. Encourage pt to eat well balanced meals and avoid sugary beverages. Pt verbalized understanding with no questions or concerns.       Pt reports fair appetite with wt gain PTA. NFPE not warranted. Pt appears nourished.       Follow-Up: Yes     Please consult as needed.

## 2020-11-30 NOTE — ASSESSMENT & PLAN NOTE
- Cr increased to 4.2 from 1.5 on admission (initially improved, now worsening)  - UOP 20 cc/last 24 hours   - Management per urology

## 2020-11-30 NOTE — PROGRESS NOTES
[facility]  Urology  Progress Note    Patient Name: Navin Beck  MRN: [id number]  Admission Date: 11/26/2020  Hospital Length of Stay: 3 days  Code Status: Full Code   Attending Provider: Alessia Johnston MD   Primary Care Physician: Elvira Quinones MD    Subjective:     HPI:  59 yo female with history of HTN, DM, and POP presenting to [facility] ED with abdominal pain, DKA, and urinary retention. Urology consulted for abnormal CT finding with concern for bladder perforation. Her urologic/gynecologic history includes an anterior colporrhapy with mesh in 2010 with Dr. Keene, mesh erosion and protrusion was noted and excised with Dr. Guzman in 2013 with a concomitant BSO with excision or round ligament tumor excision with Dr. Springer in 2013. She then represented to Dr. Goel in 2020 with vaginal pain and was noted to have additional mesh erosion on vaginal exam. She was most recently taken to the OR on 10/26 for robotic excision of mesh performed by Dr. Goel with intra-operative assistance from Dr. Trevino. Per the op note, there was significant adhesions in the pelvis, but the procedure was without complication. She was discharged with a march catheter that was removed on 11/9/20. Since then, the patient has had some complaints of continued vaginal and rectal discomfort as well as dysuria. She tries to hold her urine to avoid the pain. Today, she presented to the ED able to void some on her own, but in urinary retention. March placed per nursing in the ED with 800 cc clear yellow urine without debris or blood. UAmicroscopy shows 2 RBC, >100 WBC, and bacteria with 3 squams. She has urine cultures pending and has received rocephin in the ED. Leukocytosis to 17.8. Creatinine is 1.5 from 1.0.    Interval History: NAEON. AFVSS. Minimal UOP. Drain output 90 cc. Cr continues to rise. CT showed no signs of obstruction. Patient complaining of bladder spasms.    Review of Systems  Objective:      Temp:  [98.3 °F (36.8 °C)-99.5 °F (37.5 °C)] 99.5 °F (37.5 °C)  Pulse:  [71-98] 71  Resp:  [14-19] 19  SpO2:  [90 %-100 %] 96 %  BP: ()/(51-57) 97/54     Body mass index is 39.94 kg/m².      Bladder Scan Volume (mL): 19 mL (11/29/20 0614)    Drains     Drain                 Urethral Catheter 11/27/20 0145 Latex 16 Fr. 3 days         Closed/Suction Drain 11/28/20 1358 Right Abdomen Bulb 10 Fr. 1 day                Physical Exam  Constitutional:       General: She is not in acute distress.     Appearance: She is well-developed.   Eyes:      General: No scleral icterus.  Cardiovascular:      Rate and Rhythm: Normal rate.   Pulmonary:      Effort: Pulmonary effort is normal. No respiratory distress.   Abdominal:      General: Bowel sounds are normal. There is no distension.      Palpations: Abdomen is soft.      Tenderness: There is abdominal tenderness (improved from yesterday). There is no guarding or rebound.      Comments: Left CVA tenderness.   Genitourinary:     Comments: 16 Fr march in place, clear yellow urine in bag, none in tube  Musculoskeletal: Normal range of motion.   Skin:     General: Skin is warm and dry.      Findings: No rash.   Neurological:      Mental Status: She is alert and oriented to person, place, and time.   Psychiatric:         Behavior: Behavior normal.         Significant Labs:    BMP:  Recent Labs   Lab 11/29/20  1234 11/29/20  1822 11/30/20  0532   * 125* 124*   K 4.6  4.6 4.5 4.4   CL 93* 97 97   CO2 17* 18* 18*   BUN 14 15 16   CREATININE 3.1* 3.5* 4.2*   CALCIUM 8.5* 8.5* 8.0*       CBC:   Recent Labs   Lab 11/26/20  2156 11/28/20  0443 11/29/20  0554   WBC 17.85* 19.63*  19.12* 20.10*   HGB 9.8* 8.5*  8.6* 8.5*   HCT 31.7* 27.9*  28.3* 28.4*   * 614*  622* 594*       All pertinent labs results from the past 24 hours have been reviewed.    Significant Imaging:  All pertinent imaging results/findings from the past 24 hours have been  reviewed.                  Assessment/Plan:     LAURIE (acute kidney injury)  - Creatinine 1.8 from 1.0 yesterday  - Patient complains of some left flank pain  - CT RSS ordered to reassess pelvis and evaluate for hydronephrosis    Urinary retention  - March placed with 800 cc clear yellow urine.  - Maintain march  - Cause of retention likely multifactorial: anatomic vs diabetic bladder vs pelvic fluid collection  - Bilateral hydronephrosis on CT likely caused by urinary retention although bilateral ureteral obstruction cannot be excluded due to limitations of CT scan.  - Trend cr    Abnormal computed tomography of bladder  - Urinary retention upon admission. March placed with 800 cc clear yellow urine. Patient having bladder spasms. Can d/c March per primary.   - CT with multiple fluid collections adjacent to bladder  - IR placed drain with output of pus. Maintain drain. Nursing to flush drain daily.  - CT cystography shows no evidence of bladder leak and pelvic fluid collection  - repeat CT shows no hydronephrosis or other signs of obstruction. LAURIE appears to be prerenal or intrinsic renal in origin. No indication for urologic intervention or management.          VTE Risk Mitigation (From admission, onward)         Ordered     IP VTE HIGH RISK PATIENT  Once      11/27/20 0903                Brian Penn MD  Urology  [facility]

## 2020-11-30 NOTE — SUBJECTIVE & OBJECTIVE
Past Medical History:   Diagnosis Date    Allergy     Arrhythmia     Arthritis     Bronchial asthma     Diabetes mellitus     Glaucoma     Hormone disorder     hysterectomy    Hypercholesteremia     Hypertension     Hyperthyroidism     Insomnia     Kidney stones     Thyroid disease     Urine, incontinence, stress female     UTI (urinary tract infection) 11/27/2020       Past Surgical History:   Procedure Laterality Date    APPENDECTOMY      BACK SURGERY      disc removal     CARPAL TUNNEL RELEASE Bilateral     CHOLECYSTECTOMY      CYSTOSCOPY N/A 10/26/2020    Procedure: CYSTOSCOPY;  Surgeon: Wilner Goel MD;  Location: [facility] OR;  Service: OB/GYN;  Laterality: N/A;    FRACTURE SURGERY Right     wrist    [facility]Y  [facility]    JOINT REPLACEMENT      KNEE ARTHROSCOPY W/ DEBRIDEMENT      KNEE SURGERY Right     Knee replacement    LITHOTRIPSY      NV REMOVAL OF OVARY/TUBE(S)  9/9/13    benign    removal of round ligament mass      ROBOT-ASSISTED LAPAROSCOPIC LYSIS OF ADHESIONS USING DA JAMES XI N/A 10/26/2020    Procedure: XI ROBOTIC LYSIS, ADHESIONS;  Surgeon: Wilner Goel MD;  Location: [facility] OR;  Service: OB/GYN;  Laterality: N/A;  ROBOTIC MOBILIZATION OF BLADDER- DR BURNHAM    SPINE SURGERY      WRIST FRACTURE SURGERY Left        Review of patient's allergies indicates:   Allergen Reactions    Penicillins Shortness Of Breath, Itching and Swelling     Tolerates cefepime 11/30/2020     Current Facility-Administered Medications   Medication Frequency    0.9%  NaCl infusion Continuous    acetaminophen tablet 650 mg Q8H PRN    albuterol-ipratropium 2.5 mg-0.5 mg/3 mL nebulizer solution 3 mL Q6H WAKE    [START ON 12/1/2020] cefTRIAXone (ROCEPHIN) 2 g/50 mL D5W IVPB Q24H    dextrose 50% injection 12.5 g PRN    dextrose 50% injection 25 g PRN    [START ON 12/1/2020] gabapentin capsule 300 mg Daily    HYDROmorphone injection 0.5 mg Q6H PRN    insulin  regular 1 Units/mL in sodium chloride 0.9% 100 mL infusion Continuous    melatonin tablet 6 mg Nightly PRN    methIMAzole tablet 10 mg Daily    metroNIDAZOLE tablet 500 mg Q8H    mupirocin 2 % ointment BID    ondansetron injection 4 mg Q8H PRN    oxyCODONE immediate release tablet 5 mg Q4H PRN    oxyCODONE immediate release tablet Tab 10 mg Q4H PRN    pantoprazole EC tablet 40 mg Daily    polyethylene glycol packet 17 g Daily    rosuvastatin tablet 20 mg Daily    senna-docusate 8.6-50 mg per tablet 1 tablet BID    sodium chloride 0.9% bolus 1,000 mL Once    sodium chloride 0.9% flush 10 mL PRN    vancomycin - pharmacy to dose pharmacy to manage frequency     Family History     Problem Relation (Age of Onset)    Cancer Mother, Brother, Brother, Brother, Brother    Colon cancer Brother        Tobacco Use    Smoking status: Current Every Day Smoker     Packs/day: 0.50     Years: 30.00     Pack years: 15.00    Smokeless tobacco: Never Used   Substance and Sexual Activity    Alcohol use: No    Drug use: No    Sexual activity: Not Currently     Partners: Male     Birth control/protection: Surgical     Review of Systems   Constitutional: Negative.    HENT: Negative.    Eyes: Negative.    Respiratory: Negative.    Gastrointestinal: Positive for abdominal pain.   Genitourinary: Negative.    Musculoskeletal: Negative.    Skin: Negative.    Neurological: Negative.    Psychiatric/Behavioral: Negative.      Objective:     Vital Signs (Most Recent):  Temp: 98 °F (36.7 °C) (11/30/20 1124)  Pulse: 74 (11/30/20 1302)  Resp: 14 (11/30/20 1444)  BP: (!) 104/54 (11/30/20 1124)  SpO2: (!) 94 % (11/30/20 1302)  O2 Device (Oxygen Therapy): room air (11/30/20 1302) Vital Signs (24h Range):  Temp:  [98 °F (36.7 °C)-99.5 °F (37.5 °C)] 98 °F (36.7 °C)  Pulse:  [71-91] 74  Resp:  [12-19] 14  SpO2:  [91 %-100 %] 94 %  BP: ()/(52-56) 104/54     Weight: 115.7 kg (255 lb) (11/27/20 1126)  Body mass index is 39.94  kg/m².  Body surface area is 2.34 meters squared.    I/O last 3 completed shifts:  In: 1330 [P.O.:980; I.V.:100; IV Piggyback:250]  Out: 280 [Urine:80; Drains:200]    Physical Exam  Constitutional:       Appearance: She is obese.   HENT:      Head: Normocephalic and atraumatic.      Nose: Nose normal.      Mouth/Throat:      Mouth: Mucous membranes are moist.      Pharynx: Oropharynx is clear.   Eyes:      Conjunctiva/sclera: Conjunctivae normal.      Pupils: Pupils are equal, round, and reactive to light.   Neck:      Musculoskeletal: Normal range of motion and neck supple.   Cardiovascular:      Rate and Rhythm: Regular rhythm.   Pulmonary:      Effort: Pulmonary effort is normal.   Abdominal:      General: Abdomen is flat.      Palpations: Abdomen is soft.      Tenderness: There is abdominal tenderness.   Musculoskeletal: Normal range of motion.      Right lower leg: No edema.      Left lower leg: No edema.   Skin:     General: Skin is warm and dry.   Neurological:      General: No focal deficit present.      Mental Status: She is alert and oriented to person, place, and time.   Psychiatric:         Mood and Affect: Mood normal.         Behavior: Behavior normal.         Significant Labs:  CBC:   Recent Labs   Lab 11/30/20  0531   WBC 16.52*   RBC 2.49*   HGB 7.2*   HCT 23.7*   *   MCV 95   MCH 28.9   MCHC 30.4*     CMP:   Recent Labs   Lab 11/26/20  2156  11/30/20  1313   *   < > 273*   CALCIUM 8.9   < > 8.2*   ALBUMIN 2.4*  --   --    PROT 8.6*  --   --    *   < > 121*   K 5.3*   < > 5.1   CO2 16*   < > 14*   CL 96   < > 96   BUN 19   < > 18   CREATININE 1.5*   < > 4.9*   ALKPHOS 188*  --   --    ALT 14  --   --    AST 10  --   --    BILITOT 0.2  --   --     < > = values in this interval not displayed.     All labs within the past 24 hours have been reviewed.

## 2020-11-30 NOTE — NURSING
Upon assessment, RN noted pressure injury on bridge of nose. Previous night Pt on CPAP. RN suspects equipment related injury.

## 2020-11-30 NOTE — PROGRESS NOTES
"[facility]  Endocrinology  Progress Note    Admit Date: 2020     Reason for Consult: Management of T2DM, Hyperglycemia     Diabetes diagnosis year: > 10 years ago    Home Diabetes Medications:    Lantus 50 U daily  Novolog 15 U AC  Metformin 500 mg BID    How often checking glucose at home? 1-3 x day   BG readings on regimen: 200s-300s  Hypoglycemia on the regimen?  No  Missed doses on regimen?  Yes    Diabetes Complications include:     Diabetic nephropathy        HPI:   Patient is a 60 y.o. female with a diagnosis of HTN, T2DM on MDi insulin, hyperthyroidism who presented with weakness, poor appetite and abdominal pain associated with vaginal pain and rectal pressure and non-bloody diarrhea. She was found to be in DKA and started on DKA protocol with IVF and IV insulin. She is being treated with IV antibiotics for pelvic fluids collections suspected to be infectious.     On admission labs:   2020    Sodium 128 (L)   Potassium 5.3 (H)   Anion Gap 16   CO2 16 (L)   Creatinine 1.5 (H)   Glucose 626 (HH)   Beta-Hydroxybutyrate 4.2 (H)     Lab Results   Component Value Date    HGBA1C 11.4 (H) 2020     Lab Results   Component Value Date    TSH 0.519 2020       She also has a history of hyperthyroidism on methimazole 10 mg daily. Thyroid US shows MNG. No RAIU scan on record. No TRAb or TSI. TSH at goal.     Interval HPI:   Presented in DKA, transitioned to MDI however required insulin gtt  dye to uncontrolled hyperglycemia.   CO2 18, tolerating clears. AG not elevated  Insulin requirements decreasing    Overnight events: none  Eatin%  Nausea: No  Hypoglycemia and intervention: No  Fever: No  TPN and/or TF: No  If yes, type of TF/TPN and rate: na    BP (!) 97/54 (BP Location: Left leg, Patient Position: Lying)   Pulse 75   Temp 99.5 °F (37.5 °C) (Oral)   Resp 17   Ht 5' 7" (1.702 m)   Wt 115.7 kg (255 lb)   SpO2 100%   BMI 39.94 kg/m²     Labs Reviewed and " Include    Recent Labs   Lab 11/30/20  0532   *   CALCIUM 8.0*   *   K 4.4   CO2 18*   CL 97   BUN 16   CREATININE 4.2*     Lab Results   Component Value Date    WBC 20.10 (H) 11/29/2020    HGB 8.5 (L) 11/29/2020    HCT 28.4 (L) 11/29/2020    MCV 96 11/29/2020     (H) 11/29/2020     Recent Labs   Lab 11/27/20  1239   TSH 0.519     Lab Results   Component Value Date    HGBA1C 11.4 (H) 11/27/2020       Nutritional status:   Body mass index is 39.94 kg/m².  Lab Results   Component Value Date    ALBUMIN 2.4 (L) 11/26/2020    ALBUMIN 4.1 10/26/2020    ALBUMIN 4.8 09/25/2020     No results found for: PREALBUMIN    Estimated Creatinine Clearance: 18.2 mL/min (A) (based on SCr of 4.2 mg/dL (H)).    Accu-Checks  Recent Labs     11/29/20  2030 11/29/20  2129 11/29/20  2240 11/29/20  2341 11/30/20  0033 11/30/20  0135 11/30/20  0234 11/30/20  0337 11/30/20  0536 11/30/20  0649   POCTGLUCOSE 252* 267* 218* 209* 212* 239* 206* 215* 210* 233*       Current Medications and/or Treatments Impacting Glycemic Control  Immunotherapy:    Immunosuppressants     None        Steroids:   Hormones (From admission, onward)    Start     Stop Route Frequency Ordered    11/27/20 0959  melatonin tablet 6 mg      -- Oral Nightly PRN 11/27/20 0903        Pressors:    Autonomic Drugs (From admission, onward)    None        Hyperglycemia/Diabetes Medications:   Antihyperglycemics (From admission, onward)    Start     Stop Route Frequency Ordered    11/29/20 1315  insulin regular 1 Units/mL in sodium chloride 0.9% 100 mL infusion     Question:  Enter initial dose from Infusion Protocol Chart (Units/hr):  Answer:  4    -- IV Continuous 11/29/20 1202    11/28/20 1245  insulin regular in 0.9 % NaCl 100 unit/100 mL (1 unit/mL) infusion     Question:  Enter initial dose (Units/hr):  Answer:  4    11/28 2300 IV Continuous 11/28/20 1133          ASSESSMENT and PLAN    * Diabetic ketoacidosis without coma associated with type 2 diabetes  mellitus  Brief Hx: 60 y.o. female with a diagnosis of HTN, T2DM on MDi insulin, hyperthyroidism who presented with weakness, poor appetite and abdominal pain associated with vaginal pain and rectal pressure and non-bloody diarrhea.   She was found to be in DKA and started on DKA protocol with IVF and IV insulin.   She is being treated with IV antibiotics for pelvic fluids collections suspected to be infectious.          History of poorly controlled T2DM (A1c 11.4%) on MDI insulin and metformin  Presented with intra-abdominal infection and DKA  DKA resolved with IV insulin, IVFs  No steroids, no TFs    Consulted for: dka  DM type: T2DM  A1C: 11.4*  Hypoglycemia: none  Steroids: none  Diet/Tfs: clears  Glucose Goals: 140-180 mg/dL    Glucose Trend x 24hr:   206 - 455    Home Regimen:    Metformin 500 bid  Lantus 50 units daily  Aspart 15 units AC    AG 9  CO2 18  GFR 12      PLAN:   -  Uncontrolled hyperglycemia on NS fluids, c/o polydipsia improving   -  Intensive insulin gtt at 1.3 Units/hr with increasing insulin requirements   -  Plan to convert to transition gtt today and advance diet    -  Accucheck Q1H    Initially switched to basal insulin calculated with a TDD of 0.7/u/kg/day and had significant hyperglycemia.   Consider increase TDD to 1 U/kg/day given expected resistance with active infection.      UTI (urinary tract infection)  Antibiotics per primary team      Type 2 diabetes mellitus with hyperglycemia, with long-term current use of insulin  See DKA    Hyperthyroidism  Unclear etiology given no TRAb, TSI or RAIU scan  Thyroid US shows MNG so toxic MNG high in differential  She has a TSH at goal on methimazole 10 mg daily  Continue, follow up outpatient    Plan  - Continue methimazole 10 mg daily  - Outpatient followed with Endocrinology      Severe obesity (BMI 35.0-35.9 with comorbidity)  Benefits from addition of GLP-1 RA as outpatient      Postprocedural intraabdominal abscess  - S/p IR pelvic drain  placement   - 250cc of pus immediately removed with total of 260cc additional pus-like fluid removed since drain placed on 11/28     Jordan Hurtado MD  Endocrinology  [facility]

## 2020-11-30 NOTE — SUBJECTIVE & OBJECTIVE
Interval History: Hypoxia overnight to 75% on RA, only increased to 80s on NC, SpO2 >90 on CPAP, CXR showed R sided atelectasis. Pt also became anuric with increasingly worse LAURIE. Remains on insulin gtt for DKA. Hyponatremic to 120s, NPO for fluid restriction. Pt still has 7/10 abdominal pain, ok to d/c PCA pump for PRN pain meds. No other complaints today.    Review of Systems   Constitutional: Positive for activity change, appetite change and fatigue. Negative for chills and fever.   HENT: Negative for hearing loss, sneezing and trouble swallowing.    Respiratory: Negative for cough, shortness of breath and wheezing.    Cardiovascular: Negative for chest pain and leg swelling.   Gastrointestinal: Positive for abdominal pain, diarrhea, nausea, rectal pain and vomiting. Negative for blood in stool.   Genitourinary: Positive for difficulty urinating, dysuria and pelvic pain. Negative for flank pain.   Musculoskeletal: Negative for arthralgias, neck pain and neck stiffness.   Skin: Negative for color change, pallor and rash.   Neurological: Negative for syncope, weakness and headaches.   Psychiatric/Behavioral: Negative for confusion and hallucinations. The patient is not nervous/anxious.      Objective:     Vital Signs (Most Recent):  Temp: 97.8 °F (36.6 °C) (11/30/20 1544)  Pulse: 82 (11/30/20 1544)  Resp: 20 (11/30/20 1544)  BP: (!) 110/49 (11/30/20 1544)  SpO2: (!) 93 % (11/30/20 1544) Vital Signs (24h Range):  Temp:  [97.8 °F (36.6 °C)-99.5 °F (37.5 °C)] 97.8 °F (36.6 °C)  Pulse:  [71-91] 82  Resp:  [12-20] 20  SpO2:  [91 %-100 %] 93 %  BP: ()/(49-55) 110/49     Weight: 115.7 kg (255 lb)  Body mass index is 39.94 kg/m².    Intake/Output Summary (Last 24 hours) at 11/30/2020 1701  Last data filed at 11/30/2020 1225  Gross per 24 hour   Intake 1420 ml   Output 90 ml   Net 1330 ml      Physical Exam  Vitals signs and nursing note reviewed.   Constitutional:       General: She is not in acute distress.      Appearance: Normal appearance. She is obese.   HENT:      Head: Normocephalic and atraumatic.      Right Ear: External ear normal.      Left Ear: External ear normal.      Nose: Nose normal.      Mouth/Throat:      Mouth: Mucous membranes are moist.      Pharynx: Oropharynx is clear.   Eyes:      Conjunctiva/sclera: Conjunctivae normal.      Pupils: Pupils are equal, round, and reactive to light.   Neck:      Musculoskeletal: Normal range of motion and neck supple.   Cardiovascular:      Rate and Rhythm: Normal rate and regular rhythm.      Pulses: Normal pulses.      Heart sounds: Normal heart sounds.   Pulmonary:      Effort: Pulmonary effort is normal. No respiratory distress.      Breath sounds: Normal breath sounds. No wheezing or rhonchi.   Abdominal:      General: Abdomen is flat. Bowel sounds are decreased. There is no distension.      Palpations: Abdomen is soft.      Tenderness: There is abdominal tenderness (lower quadrants) in the suprapubic area. There is guarding. There is no right CVA tenderness, left CVA tenderness or rebound.      Hernia: No hernia is present.      Comments: White discharge from anus  IR drain to lower abd area; purulent drainage    Genitourinary:     Comments: Left CVAT  Musculoskeletal: Normal range of motion.         General: No deformity or signs of injury.   Skin:     General: Skin is warm and dry.      Capillary Refill: Capillary refill takes less than 2 seconds.   Neurological:      General: No focal deficit present.      Mental Status: She is alert and oriented to person, place, and time.      Sensory: No sensory deficit.      Motor: No weakness.   Psychiatric:         Mood and Affect: Mood normal.         Behavior: Behavior normal.         Thought Content: Thought content normal.         Judgment: Judgment normal.         Significant Labs:   BMP:   Recent Labs   Lab 11/30/20  0532  11/30/20  1628   *   < > 274*   *   < > 120*   K 4.4   < > 4.8   CL 97   < > 98    CO2 18*   < > 12*   BUN 16   < > 20   CREATININE 4.2*   < > 4.9*   CALCIUM 8.0*   < > 7.8*   MG 2.7*  --   --     < > = values in this interval not displayed.     CBC:   Recent Labs   Lab 11/29/20  0554 11/30/20  0531 11/30/20  1414   WBC 20.10* 16.52* 18.24*   HGB 8.5* 7.2* 8.3*   HCT 28.4* 23.7* 27.5*   * 489* 558*     Lactic Acid:   Recent Labs   Lab 11/30/20  0842 11/30/20  1027 11/30/20  1628   LACTATE 1.4 2.1 1.0       Significant Imaging: I have reviewed all pertinent imaging results/findings within the past 24 hours.

## 2020-11-30 NOTE — HPI
Ms Beck is a 60 year old female with a past medical history of HTN, LD, T2DM, hyperparathyroidism, and asthma who presented to [facility] with c/o abdominal pain s/p bladder mesh surgery. Pt noted to be in DKA on presentation and is currently on insulin gtt and being followed by endocrine. CT abdomen obtained revealed multiple fluid collections near bladder; IR was consulted and pt is s/p drain placement. Initial coverage provided with vanc, now on cefepime and flagyl. Baseline sCr roughly 1.0-1.2, now elevated to 4.6 at time of consult (11/30/2020). Pt now anuric with 20mL UOP in the past 24hrs at time of consult (11/30/2020). Pt denies endorses lower abdominal tenderness. Pt denies SOB. Nephrology has been consulted for LAURIE.     -HPI was obtained from patient interview, and from review of the patient's EMR.

## 2020-11-30 NOTE — SUBJECTIVE & OBJECTIVE
Interval History: NAEON. Patient reports pain has markedly improved since drain placement. Reports she is  near where the drain is placed, but better than before. Denies N/V. No fevers, chills.         OB History    Para Term  AB Living   5 4 3 1 1 3   SAB TAB Ectopic Multiple Live Births   1 0 0 0 4      # Outcome Date GA Lbr Davon/2nd Weight Sex Delivery Anes PTL Lv   5 Term 2000    M Vag-Spont   GUERRERO   4 Term     M Vag-Spont   GUERRERO   3 Term     M Vag-Spont   GUERRERO   2 SAB            1      M    ND     Past Medical History:   Diagnosis Date    Allergy     Arrhythmia     Arthritis     Bronchial asthma     Diabetes mellitus     Glaucoma     Hormone disorder     hysterectomy    Hypercholesteremia     Hypertension     Hyperthyroidism     Insomnia     Kidney stones     Thyroid disease     Urine, incontinence, stress female     UTI (urinary tract infection) 2020     Past Surgical History:   Procedure Laterality Date    APPENDECTOMY      BACK SURGERY      disc removal     CARPAL TUNNEL RELEASE Bilateral     CHOLECYSTECTOMY      CYSTOSCOPY N/A 10/26/2020    Procedure: CYSTOSCOPY;  Surgeon: Wilner Goel MD;  [facility];  Service: OB/GYN;  Laterality: N/A;    FRACTURE SURGERY Right     wrist    [facility]      [facility]    JOINT REPLACEMENT      KNEE ARTHROSCOPY W/ DEBRIDEMENT      KNEE SURGERY Right     Knee replacement    LITHOTRIPSY      W[place] REMOVAL OF OVARY/TUBE(S)  13    benign    removal of round ligament mass      ROBOT-ASSISTED LAPAROSCOPIC LYSIS OF ADHESIONS USING DA JAMES XI N/A 10/26/2020    Procedure: XI ROBOTIC LYSIS, ADHESIONS;  Surgeon: Wilner Goel MD;  Location: [facility] [place] OR;  Service: OB/GYN;  Laterality: N/A;  ROBOTIC MOBILIZATION OF BLADDER- DR BURNHAM    SPINE SURGERY      WRIST FRACTURE SURGERY Left        PTA Medications   Medication Sig    acetaminophen (TYLENOL) 500 MG tablet Take 1,000 mg by  mouth 3 (three) times daily as needed (fever and chills).    albuterol (PROVENTIL/VENTOLIN HFA) 90 mcg/actuation inhaler     benazepril (LOTENSIN) 10 MG tablet Take 10 mg by mouth once daily.     blood-glucose meter (TRUE METRIX GLUCOSE METER MISC) True Metrix Glucose Meter    buprenorphine HCL (BELBUCA) 75 mcg Film once daily.     diphenoxylate-atropine 2.5-0.025 mg (LOMOTIL) 2.5-0.025 mg per tablet as needed.     DULoxetine (CYMBALTA) 60 MG capsule duloxetine 60 mg capsule,delayed release    estradioL (ESTRACE) 0.01 % (0.1 mg/gram) vaginal cream Place 1 g vaginally 3 (three) times a week. Alternate days with Metrogel for 14 days    flurazepam (DALMANE) 30 MG capsule nightly as needed.     fluticasone propionate (FLOVENT HFA) 44 mcg/actuation inhaler Flovent HFA 44 mcg/actuation aerosol inhaler    gabapentin (NEURONTIN) 600 MG tablet Take 1 tablet (600 mg total) by mouth 3 (three) times daily.    HYDROcodone-acetaminophen (NORCO) 5-325 mg per tablet Take 1 tablet by mouth every 6 (six) hours as needed for Pain.    ibuprofen (ADVIL,MOTRIN) 600 MG tablet Take 1 tablet (600 mg total) by mouth 3 (three) times daily.    insulin aspart (NOVOLOG) 100 unit/mL injection Inject 15 Units into the skin 3 (three) times daily with meals. Three times a day with meals according to sliding scale.    insulin glargine (LANTUS U-100 INSULIN) 100 unit/mL injection Inject 50 Units into the skin 2 (two) times daily.    METFORMIN HCL (METFORMIN ORAL) Take 500 mg by mouth 2 (two) times daily.    methimazole (TAPAZOLE) 10 MG Tab Take 10 mg by mouth once daily.     [] oxyCODONE-acetaminophen (PERCOCET) 7.5-325 mg per tablet Take 1 tablet by mouth every 8 (eight) hours as needed for Pain.    pantoprazole (PROTONIX) 40 MG tablet Take 1 tablet (40 mg total) by mouth once daily. Start after the twice daily Protonix is complete.Follow-up with doctor for further refills.    rosuvastatin (CRESTOR) 20 MG tablet rosuvastatin  20 mg tablet    tamsulosin (FLOMAX) 0.4 mg Cp24 Take 0.4 mg by mouth once daily.     tiZANidine (ZANAFLEX) 4 MG tablet tizanidine 4 mg tablet    TRUE METRIX GLUCOSE TEST STRIP Strp     zolpidem (AMBIEN) 10 mg Tab Take 10 mg by mouth every evening.       Review of patient's allergies indicates:   Allergen Reactions    Penicillin g Shortness Of Breath    Penicillins Shortness Of Breath, Itching and Swelling        Family History     Problem Relation (Age of Onset)    Cancer Mother, Brother, Brother, Brother, Brother    Colon cancer Brother        Tobacco Use    Smoking status: Current Every Day Smoker     Packs/day: 0.50     Years: 30.00     Pack years: 15.00    Smokeless tobacco: Never Used   Substance and Sexual Activity    Alcohol use: No    Drug use: No    Sexual activity: Not Currently     Partners: Male     Birth control/protection: Surgical     Review of Systems   Constitutional: Negative for chills and fever.   Respiratory: Negative for shortness of breath.    Cardiovascular: Negative for chest pain.   Gastrointestinal: Positive for abdominal pain (LLQ, improving ). Negative for nausea and vomiting.   Endocrine: Positive for diabetes.   Genitourinary: Negative for hematuria.   Musculoskeletal: Negative for back pain.   Neurological: Negative for headaches.   Psychiatric/Behavioral: Negative for depression.      Objective:     Vital Signs (Most Recent):  Temp: 98.6 °F (37 °C) (11/30/20 0824)  Pulse: 74 (11/30/20 0824)  Resp: 12 (11/30/20 0824)  BP: (!) 98/52 (11/30/20 0824)  SpO2: 96 % (11/30/20 0824) Vital Signs (24h Range):  Temp:  [98.3 °F (36.8 °C)-99.5 °F (37.5 °C)] 98.6 °F (37 °C)  Pulse:  [71-94] 74  Resp:  [12-19] 12  SpO2:  [92 %-100 %] 96 %  BP: ()/(51-56) 98/52     Weight: 115.7 kg (255 lb)  Body mass index is 39.94 kg/m².    No LMP recorded. Patient has had a hysterectomy.    Physical Exam:   Constitutional: She is oriented to person, place, and time. She appears well-developed and  "well-nourished.    HENT:   Head: Normocephalic and atraumatic.    Eyes: EOM are normal.    Neck: Normal range of motion.    Cardiovascular: Normal rate.     Pulmonary/Chest: Effort normal.        Abdominal: Soft. She exhibits no distension and no mass. There is abdominal tenderness (LLQ, TTP over area where drain is placed). There is no rebound.       ROSAURA drain with jamarcus pus draining from area marked with red "X", approximately 5 cc in drain, tubing filled with pus     Genitourinary:    Genitourinary Comments: Cifuentes catheter in place with approximately 30 cc of yellow urine in the catheter bag              Musculoskeletal: Normal range of motion.       Neurological: She is alert and oriented to person, place, and time.    Skin: Skin is warm and dry.    Psychiatric: She has a normal mood and affect. Her behavior is normal. Judgment and thought content normal.       Laboratory:  CBC:   Recent Labs   Lab 11/30/20  0531   WBC 16.52*   RBC 2.49*   HGB 7.2*   HCT 23.7*   *   MCV 95   MCH 28.9   MCHC 30.4*     CMP:   Recent Labs   Lab 11/30/20  0532   *   CALCIUM 8.0*   *   K 4.4   CO2 18*   CL 97   BUN 16   CREATININE 4.2*       Diagnostic Results:  CT Urogram 11/26: Reviewed  Patient history of recent implanted pelvic mass revision with lysis of adhesions. Redemonstration of lobular hypoattenuating collections in the lower pelvis adjacent to the urinary bladder.  No definite extravasated contrast material appreciated within the pelvic collections to suggest communication with the bladder.  Findings remain non-specific and may relate to urinomas or other nonspecific postoperative collection including seroma, hematoma, or abscess. Cystic pelvic mass not excluded.  Clinical correlation with any previous preoperative imaging advised.     Bilateral vesicoureteral reflux.    CT guided percutaneous  Pelvic abscess drainage catheter placement 11/28, reviewed.   "

## 2020-11-30 NOTE — HOSPITAL COURSE
11/28: Patient had IR drain placement to drain fluid collection with immediate return of 250 cc pus.  11/29: 120 cc output from IR drain overnight. UOP decreasing, ACEI held, 1L bolus given.   11/30: 140cc output from IR drain overnight. 20cc UOP in last 24 hours. AM Cr 4.2. Insulin drip continued for DKA.  this AM. Pain improved.   12/1-12/3: Patient became anuric with severe LAURIE and Cr to 4.2 and was stepped up to the ICU. Now on RRT per nephrology. Improving mentation. Drain still in place, purulent drainage. DKA resolving, Insulin GTT off.

## 2020-11-30 NOTE — ASSESSMENT & PLAN NOTE
- Urinary retention upon admission. Cifuentes placed with 800 cc clear yellow urine. Patient having bladder spasms. Can d/c Cifuentes per primary.   - CT with multiple fluid collections adjacent to bladder  - IR placed drain with output of pus. Maintain drain. Nursing to flush drain daily.  - CT cystography shows no evidence of bladder leak and pelvic fluid collection  - repeat CT shows no hydronephrosis or other signs of obstruction. LAURIE appears to be prerenal or intrinsic renal in origin. No indication for urologic intervention or management.

## 2020-11-30 NOTE — SUBJECTIVE & OBJECTIVE
Interval History: NAEON. AFVSS. Minimal UOP. Drain output 90 cc. Cr continues to rise. CT showed no signs of obstruction. Patient complaining of bladder spasms.    Review of Systems  Objective:     Temp:  [98.3 °F (36.8 °C)-99.5 °F (37.5 °C)] 99.5 °F (37.5 °C)  Pulse:  [71-98] 71  Resp:  [14-19] 19  SpO2:  [90 %-100 %] 96 %  BP: ()/(51-57) 97/54     Body mass index is 39.94 kg/m².      Bladder Scan Volume (mL): 19 mL (11/29/20 0614)    Drains     Drain                 Urethral Catheter 11/27/20 0145 Latex 16 Fr. 3 days         Closed/Suction Drain 11/28/20 1358 Right Abdomen Bulb 10 Fr. 1 day                Physical Exam  Constitutional:       General: She is not in acute distress.     Appearance: She is well-developed.   Eyes:      General: No scleral icterus.  Cardiovascular:      Rate and Rhythm: Normal rate.   Pulmonary:      Effort: Pulmonary effort is normal. No respiratory distress.   Abdominal:      General: Bowel sounds are normal. There is no distension.      Palpations: Abdomen is soft.      Tenderness: There is abdominal tenderness (improved from yesterday). There is no guarding or rebound.      Comments: Left CVA tenderness.   Genitourinary:     Comments: 16 Fr march in place, clear yellow urine in bag, none in tube  Musculoskeletal: Normal range of motion.   Skin:     General: Skin is warm and dry.      Findings: No rash.   Neurological:      Mental Status: She is alert and oriented to person, place, and time.   Psychiatric:         Behavior: Behavior normal.         Significant Labs:    BMP:  Recent Labs   Lab 11/29/20  1234 11/29/20  1822 11/30/20  0532   * 125* 124*   K 4.6  4.6 4.5 4.4   CL 93* 97 97   CO2 17* 18* 18*   BUN 14 15 16   CREATININE 3.1* 3.5* 4.2*   CALCIUM 8.5* 8.5* 8.0*       CBC:   Recent Labs   Lab 11/26/20  2156 11/28/20  0443 11/29/20  0554   WBC 17.85* 19.63*  19.12* 20.10*   HGB 9.8* 8.5*  8.6* 8.5*   HCT 31.7* 27.9*  28.3* 28.4*   * 614*  622* 594*        All pertinent labs results from the past 24 hours have been reviewed.    Significant Imaging:  All pertinent imaging results/findings from the past 24 hours have been reviewed.

## 2020-11-30 NOTE — ASSESSMENT & PLAN NOTE
Pt with baseline sCr roughly around 1.0-1.2 that has since trended up to 4.6. Pt anuric with 20mL UOP in the past 24hrs. Review of vitals showed low BP's. Pt also with UTI and purulent drainage from abdominal ROSAURA drain that is similar in appearance to her urine; concerning for possibility of communication between bladder and fluid collection. Cultures from fluid collection are currently pending. Of note, pt received contrast on 11/27/2020. Urine microscopy preformed in the nephrology lab on 11/30/2020 revealed many muddy brown granular casts, as well as gross amount of WBC's. Pt also noted to be hyponatremic; possibly 2/2 combination of D5 administration with decreased UOP. Pt now receiving NS gtt at 125cc/hr for fluid challenge. Pt does not appear volume overloaded on physical exam and denies SOB, but there does appear to be some pulmonary congestion on CXR. Pt currently undergoing fluid challenge with close monitoring of UOP. Likely that pt will require dialysis by the end of tomorrow if UOP does not improve drastically.     LAURIE likely 2/2 spesis/ATN as well as contrast associated nephropathy.       Plan/Recommendations:     -Agree NS gtt at 125cc/hr for 10hrs  -Recommend additional fluid bolus of 1L of NS and monitor UOP. Stop IV fluids if pt becomes short of breath.   -Stop duloxetine   -Recommend against further administration of CT contrast   -Decrease gabapentin to 300mg q day   -Ordered urine lytes and urea   -Will order retroperitoneal US   -Will maintain low threshold to initiate dialysis   -Maintain Hgb >7.0  -Keep MAP >65  -Renally dose meds and avoid nephrotoxic meds  -Will continue to follow closely   -Plan discussed with staff, Dr Rose

## 2020-11-30 NOTE — SUBJECTIVE & OBJECTIVE
"Interval HPI:   Presented in DKA, transitioned to MDI however required insulin gtt  dye to uncontrolled hyperglycemia.   CO2 18, tolerating clears. AG not elevated  Insulin requirements decreasing    Overnight events: none  Eatin%  Nausea: No  Hypoglycemia and intervention: No  Fever: No  TPN and/or TF: No  If yes, type of TF/TPN and rate: na    BP (!) 97/54 (BP Location: Left leg, Patient Position: Lying)   Pulse 75   Temp 99.5 °F (37.5 °C) (Oral)   Resp 17   Ht 5' 7" (1.702 m)   Wt 115.7 kg (255 lb)   SpO2 100%   BMI 39.94 kg/m²     Labs Reviewed and Include    Recent Labs   Lab 20  0532   *   CALCIUM 8.0*   *   K 4.4   CO2 18*   CL 97   BUN 16   CREATININE 4.2*     Lab Results   Component Value Date    WBC 20.10 (H) 2020    HGB 8.5 (L) 2020    HCT 28.4 (L) 2020    MCV 96 2020     (H) 2020     Recent Labs   Lab 20  1239   TSH 0.519     Lab Results   Component Value Date    HGBA1C 11.4 (H) 2020       Nutritional status:   Body mass index is 39.94 kg/m².  Lab Results   Component Value Date    ALBUMIN 2.4 (L) 2020    ALBUMIN 4.1 10/26/2020    ALBUMIN 4.8 2020     No results found for: PREALBUMIN    Estimated Creatinine Clearance: 18.2 mL/min (A) (based on SCr of 4.2 mg/dL (H)).    Accu-Checks  Recent Labs     20  2030 20  2129 20  2240 20  2341 20  0033 20  0135 20  0234 20  0337 20  0536 20  0649   POCTGLUCOSE 252* 267* 218* 209* 212* 239* 206* 215* 210* 233*       Current Medications and/or Treatments Impacting Glycemic Control  Immunotherapy:    Immunosuppressants     None        Steroids:   Hormones (From admission, onward)    Start     Stop Route Frequency Ordered    20 0959  melatonin tablet 6 mg      -- Oral Nightly PRN 20 0903        Pressors:    Autonomic Drugs (From admission, onward)    None        Hyperglycemia/Diabetes Medications: "   Antihyperglycemics (From admission, onward)    Start     Stop Route Frequency Ordered    11/29/20 1315  insulin regular 1 Units/mL in sodium chloride 0.9% 100 mL infusion     Question:  Enter initial dose from Infusion Protocol Chart (Units/hr):  Answer:  4    -- IV Continuous 11/29/20 1202    11/28/20 1245  insulin regular in 0.9 % NaCl 100 unit/100 mL (1 unit/mL) infusion     Question:  Enter initial dose (Units/hr):  Answer:  4    11/28 2300 IV Continuous 11/28/20 1133

## 2020-11-30 NOTE — PLAN OF CARE
POC reviewed with pt, verbalized understanding. AAOx4. VSS on RA.     -Tolerating clear bariatric diet, denies n/v.   -Cifuentes to gravity, 20cc during shift. MD aware.   -up in chair for several hours.   -LLQ flushed per protocol, tan malodorous output.  -pt unsteady with walker, stand-by       Pain controlled with PRN meds. Bed in lowest position, Call light within reach. WCTM.

## 2020-11-30 NOTE — PROGRESS NOTES
[facility]  Obstetrics & Gynecology  Progress Note    Patient Name: Navin Beck  MRN: [id number]  Admission Date: 2020  Primary Care Provider: Elvira Quinones MD  Principal Problem: Diabetic ketoacidosis without coma associated with type 2 diabetes mellitus    Subjective:     HPI:  Navin Beck is a 61 yo F w/ HTN, DM2, and POP who presented to [facility] ED with abdominal pain and urinary retention. She is s/p robotic excision of vaginal mesh with Dr. Goel in 2020. There was significant adhesive disease per the op report and urology assisted with bladder dissection. She was discharged home on POD #1 with march in place. March was removed on 2020. Since then, she has had continued dysuria and vaginal pain/pressure. She admits to holding her urine to avoid the dysuria. No fever, chills, nausea, vomiting. She also has had poorly controlled blood sugars throughout this time.    March placed per nursing in the ED with 800 cc urine output. WBC count is 17. Cr 1.5, up from 1.0. UA with RBCs, >100 WBC, and bacteria with 3 squams. Urine cultures pending, s/p rocephin in the ED. CT A/P with multilobular fluid collection in the lower pelvis adjacent to the urinary bladder which likely causes in compression of the distal ureters bilaterally and resulting in mild bilateral hydronephrosis.     Of note, BG elevated in ED to > 500. Admitted to hospital medicine for management of DKA. Urology and gynecology consulted.    Interval History: NAEON. Patient reports pain has markedly improved since drain placement. Reports she is  near where the drain is placed, but better than before. Denies N/V. No fevers, chills.         OB History    Para Term  AB Living   5 4 3 1 1 3   SAB TAB Ectopic Multiple Live Births   1 0 0 0 4      # Outcome Date GA Lbr Davon/2nd Weight Sex Delivery Anes PTL Lv   5 Term     M Vag-Spont   GUERRERO   4 Term     M Vag-Spont    GUERRERO   3 Term 1980    M Vag-Spont   GUERRERO   2 SAB            1      M    ND     Past Medical History:   Diagnosis Date    Allergy     Arrhythmia     Arthritis     Bronchial asthma     Diabetes mellitus     Glaucoma     Hormone disorder     hysterectomy    Hypercholesteremia     Hypertension     Hyperthyroidism     Insomnia     Kidney stones     Thyroid disease     Urine, incontinence, stress female     UTI (urinary tract infection) 2020     Past Surgical History:   Procedure Laterality Date    APPENDECTOMY      BACK SURGERY      disc removal     CARPAL TUNNEL RELEASE Bilateral     CHOLECYSTECTOMY      CYSTOSCOPY N/A 10/26/2020    Procedure: CYSTOSCOPY;  Surgeon: Wilner Goel MD;  Location: [facility] OR;  Service: OB/GYN;  Laterality: N/A;    FRACTURE SURGERY Right     wrist    [facility]    JOINT REPLACEMENT      KNEE ARTHROSCOPY W/ DEBRIDEMENT      KNEE SURGERY Right     Knee replacement    LITHOTRIPSY      VT REMOVAL OF OVARY/TUBE(S)  13    benign    removal of round ligament mass      ROBOT-ASSISTED LAPAROSCOPIC LYSIS OF ADHESIONS USING DA JAMES XI N/A 10/26/2020    Procedure: XI ROBOTIC LYSIS, ADHESIONS;  Surgeon: Wilner Goel MD;  Location: [facility] OR;  Service: OB/GYN;  Laterality: N/A;  ROBOTIC MOBILIZATION OF BLADDER- DR BURNHAM    SPINE SURGERY      WRIST FRACTURE SURGERY Left        PTA Medications   Medication Sig    acetaminophen (TYLENOL) 500 MG tablet Take 1,000 mg by mouth 3 (three) times daily as needed (fever and chills).    albuterol (PROVENTIL/VENTOLIN HFA) 90 mcg/actuation inhaler     benazepril (LOTENSIN) 10 MG tablet Take 10 mg by mouth once daily.     blood-glucose meter (TRUE METRIX GLUCOSE METER MISC) True Metrix Glucose Meter    buprenorphine HCL (BELBUCA) 75 mcg Film once daily.     diphenoxylate-atropine 2.5-0.025 mg (LOMOTIL) 2.5-0.025 mg per tablet as needed.     DULoxetine (CYMBALTA) 60 MG capsule  duloxetine 60 mg capsule,delayed release    estradioL (ESTRACE) 0.01 % (0.1 mg/gram) vaginal cream Place 1 g vaginally 3 (three) times a week. Alternate days with Metrogel for 14 days    flurazepam (DALMANE) 30 MG capsule nightly as needed.     fluticasone propionate (FLOVENT HFA) 44 mcg/actuation inhaler Flovent HFA 44 mcg/actuation aerosol inhaler    gabapentin (NEURONTIN) 600 MG tablet Take 1 tablet (600 mg total) by mouth 3 (three) times daily.    HYDROcodone-acetaminophen (NORCO) 5-325 mg per tablet Take 1 tablet by mouth every 6 (six) hours as needed for Pain.    ibuprofen (ADVIL,MOTRIN) 600 MG tablet Take 1 tablet (600 mg total) by mouth 3 (three) times daily.    insulin aspart (NOVOLOG) 100 unit/mL injection Inject 15 Units into the skin 3 (three) times daily with meals. Three times a day with meals according to sliding scale.    insulin glargine (LANTUS U-100 INSULIN) 100 unit/mL injection Inject 50 Units into the skin 2 (two) times daily.    METFORMIN HCL (METFORMIN ORAL) Take 500 mg by mouth 2 (two) times daily.    methimazole (TAPAZOLE) 10 MG Tab Take 10 mg by mouth once daily.     [] oxyCODONE-acetaminophen (PERCOCET) 7.5-325 mg per tablet Take 1 tablet by mouth every 8 (eight) hours as needed for Pain.    pantoprazole (PROTONIX) 40 MG tablet Take 1 tablet (40 mg total) by mouth once daily. Start after the twice daily Protonix is complete.Follow-up with doctor for further refills.    rosuvastatin (CRESTOR) 20 MG tablet rosuvastatin 20 mg tablet    tamsulosin (FLOMAX) 0.4 mg Cp24 Take 0.4 mg by mouth once daily.     tiZANidine (ZANAFLEX) 4 MG tablet tizanidine 4 mg tablet    TRUE METRIX GLUCOSE TEST STRIP Strp     zolpidem (AMBIEN) 10 mg Tab Take 10 mg by mouth every evening.       Review of patient's allergies indicates:   Allergen Reactions    Penicillin g Shortness Of Breath    Penicillins Shortness Of Breath, Itching and Swelling        Family History     Problem Relation  "(Age of Onset)    Cancer Mother, Brother, Brother, Brother, Brother    Colon cancer Brother        Tobacco Use    Smoking status: Current Every Day Smoker     Packs/day: 0.50     Years: 30.00     Pack years: 15.00    Smokeless tobacco: Never Used   Substance and Sexual Activity    Alcohol use: No    Drug use: No    Sexual activity: Not Currently     Partners: Male     Birth control/protection: Surgical     Review of Systems   Constitutional: Negative for chills and fever.   Respiratory: Negative for shortness of breath.    Cardiovascular: Negative for chest pain.   Gastrointestinal: Positive for abdominal pain (LLQ, improving ). Negative for nausea and vomiting.   Endocrine: Positive for diabetes.   Genitourinary: Negative for hematuria.   Musculoskeletal: Negative for back pain.   Neurological: Negative for headaches.   Psychiatric/Behavioral: Negative for depression.      Objective:     Vital Signs (Most Recent):  Temp: 98.6 °F (37 °C) (11/30/20 0824)  Pulse: 74 (11/30/20 0824)  Resp: 12 (11/30/20 0824)  BP: (!) 98/52 (11/30/20 0824)  SpO2: 96 % (11/30/20 0824) Vital Signs (24h Range):  Temp:  [98.3 °F (36.8 °C)-99.5 °F (37.5 °C)] 98.6 °F (37 °C)  Pulse:  [71-94] 74  Resp:  [12-19] 12  SpO2:  [92 %-100 %] 96 %  BP: ()/(51-56) 98/52     Weight: 115.7 kg (255 lb)  Body mass index is 39.94 kg/m².    No LMP recorded. Patient has had a hysterectomy.    Physical Exam:   Constitutional: She is oriented to person, place, and time. She appears well-developed and well-nourished.    HENT:   Head: Normocephalic and atraumatic.    Eyes: EOM are normal.    Neck: Normal range of motion.    Cardiovascular: Normal rate.     Pulmonary/Chest: Effort normal.        Abdominal: Soft. She exhibits no distension and no mass. There is abdominal tenderness (LLQ, TTP over area where drain is placed). There is no rebound.       ROSAURA drain with jamarcus pus draining from area marked with red "X", approximately 5 cc in drain, tubing " filled with pus     Genitourinary:    Genitourinary Comments: Cifuentes catheter in place with approximately 30 cc of yellow urine in the catheter bag              Musculoskeletal: Normal range of motion.       Neurological: She is alert and oriented to person, place, and time.    Skin: Skin is warm and dry.    Psychiatric: She has a normal mood and affect. Her behavior is normal. Judgment and thought content normal.       Laboratory:  CBC:   Recent Labs   Lab 11/30/20  0531   WBC 16.52*   RBC 2.49*   HGB 7.2*   HCT 23.7*   *   MCV 95   MCH 28.9   MCHC 30.4*     CMP:   Recent Labs   Lab 11/30/20  0532   *   CALCIUM 8.0*   *   K 4.4   CO2 18*   CL 97   BUN 16   CREATININE 4.2*       Diagnostic Results:  CT Urogram 11/26: Reviewed  Patient history of recent implanted pelvic mass revision with lysis of adhesions. Redemonstration of lobular hypoattenuating collections in the lower pelvis adjacent to the urinary bladder.  No definite extravasated contrast material appreciated within the pelvic collections to suggest communication with the bladder.  Findings remain non-specific and may relate to urinomas or other nonspecific postoperative collection including seroma, hematoma, or abscess. Cystic pelvic mass not excluded.  Clinical correlation with any previous preoperative imaging advised.     Bilateral vesicoureteral reflux.    CT guided percutaneous  Pelvic abscess drainage catheter placement 11/28, reviewed.     Assessment/Plan:     * Diabetic ketoacidosis without coma associated with type 2 diabetes mellitus  - Continue management per primary team    Postprocedural intraabdominal abscess  - S/p IR pelvic drain placement   - 250cc of pus immediately removed with total of 260cc additional pus-like fluid removed since drain placed on 11/28   - 50cc drain output over last 12 hours   - Current abx regimen: Cefepime, Flagyl; previously received Rocephin and vancomycin as below  - Vancomycin held per pharmacy  due to LAURIE   - Symptomatic improvement as mentioned above   - Continue drain management per primary team    LAURIE (acute kidney injury)  - Cr increased to 4.2 from 1.5 on admission (initially improved, now worsening)  - UOP 20 cc/last 24 hours   -Vancomycin held per pharmacy  - Management per primary team     Urinary retention  -- Cifuentes now in place draining urine, though output decreased (see LAURIE problem).   -- UA with findings as noted in HPI. Culture pending. S/p vanc x 1 and rocephin x 1 in ED.  -- CT A/P and CT urogram done as noted above.  -- Cr: See LAURIE problem         Lulú Sethi MD  Obstetrics & Gynecology  [facility]

## 2020-11-30 NOTE — PROGRESS NOTES
On call for IM2 was notified of a change in the pt's oxygen saturation. The first reading on room air pt sat's were 75 %, on 2 liters of oxygen pt came up to a solid 85 %, on 4 liters pt maintained 93 %. Pt was easily aroused and a temperature was taken under the tongue. The on call said she would put in a order for a ABG. Also the Dr wanted raspatory to come to pt's room to do a duo neb treatment now and to ask them what they think about putting pt on a BiPap

## 2020-11-30 NOTE — PLAN OF CARE
Pt is A&Ox4 injury free. Pt was put on CPap durng the night shift to help pt breather easier while resting. Urine labs were sent at end of shift, the march had been clamped all night shift to try and get enough urine for the sample. The nurse saw the rounding team leave pt's room at 0600 and a resident asked the nurse to flush the march with 60 cc of sterile water, and he complied. Blood sugars were monitored Q 1 hr during night shift and insulin drip was adjusted per the nomogram.  Pain was controlled during night shift with a PCA

## 2020-12-01 PROBLEM — J18.9 PNEUMONIA OF RIGHT LOWER LOBE DUE TO INFECTIOUS ORGANISM: Status: RESOLVED | Noted: 2019-04-12 | Resolved: 2020-12-01

## 2020-12-01 PROBLEM — K29.80 DUODENITIS: Status: RESOLVED | Noted: 2017-08-07 | Resolved: 2020-12-01

## 2020-12-01 PROBLEM — R05.9 COUGH: Status: RESOLVED | Noted: 2020-10-28 | Resolved: 2020-12-01

## 2020-12-01 PROBLEM — E78.5 HYPERLIPIDEMIA ASSOCIATED WITH TYPE 2 DIABETES MELLITUS: Status: RESOLVED | Noted: 2018-07-26 | Resolved: 2020-12-01

## 2020-12-01 PROBLEM — A41.9 SEPSIS: Status: RESOLVED | Noted: 2019-04-13 | Resolved: 2020-12-01

## 2020-12-01 PROBLEM — J06.9 UPPER RESPIRATORY INFECTION: Status: RESOLVED | Noted: 2017-08-07 | Resolved: 2020-12-01

## 2020-12-01 PROBLEM — Z74.09 IMPAIRED MOBILITY AND ENDURANCE: Status: ACTIVE | Noted: 2020-12-01

## 2020-12-01 PROBLEM — E11.69 HYPERLIPIDEMIA ASSOCIATED WITH TYPE 2 DIABETES MELLITUS: Status: RESOLVED | Noted: 2018-07-26 | Resolved: 2020-12-01

## 2020-12-01 PROBLEM — B96.81 H PYLORI ULCER: Status: RESOLVED | Noted: 2017-08-10 | Resolved: 2020-12-01

## 2020-12-01 PROBLEM — E66.9 OBESITY WITH BODY MASS INDEX 30 OR GREATER: Status: RESOLVED | Noted: 2020-07-17 | Resolved: 2020-12-01

## 2020-12-01 PROBLEM — I10 HYPERTENSIVE DISORDER: Status: RESOLVED | Noted: 2020-07-17 | Resolved: 2020-12-01

## 2020-12-01 PROBLEM — K27.9 H PYLORI ULCER: Status: RESOLVED | Noted: 2017-08-10 | Resolved: 2020-12-01

## 2020-12-01 LAB
ALBUMIN SERPL BCP-MCNC: 1.6 G/DL (ref 3.5–5.2)
ALLENS TEST: ABNORMAL
ANION GAP SERPL CALC-SCNC: 10 MMOL/L (ref 8–16)
ANION GAP SERPL CALC-SCNC: 11 MMOL/L (ref 8–16)
ANION GAP SERPL CALC-SCNC: 9 MMOL/L (ref 8–16)
ANISOCYTOSIS BLD QL SMEAR: SLIGHT
BACTERIA SPEC AEROBE CULT: NO GROWTH
BASOPHILS # BLD AUTO: 0.03 K/UL (ref 0–0.2)
BASOPHILS NFR BLD: 0.2 % (ref 0–1.9)
BUN SERPL-MCNC: 21 MG/DL (ref 8–23)
BUN SERPL-MCNC: 21 MG/DL (ref 8–23)
BUN SERPL-MCNC: 22 MG/DL (ref 8–23)
BUN SERPL-MCNC: 22 MG/DL (ref 8–23)
BUN SERPL-MCNC: 23 MG/DL (ref 8–23)
BURR CELLS BLD QL SMEAR: ABNORMAL
CALCIUM SERPL-MCNC: 7.4 MG/DL (ref 8.7–10.5)
CALCIUM SERPL-MCNC: 7.6 MG/DL (ref 8.7–10.5)
CALCIUM SERPL-MCNC: 7.6 MG/DL (ref 8.7–10.5)
CALCIUM SERPL-MCNC: 7.7 MG/DL (ref 8.7–10.5)
CALCIUM SERPL-MCNC: 8 MG/DL (ref 8.7–10.5)
CHLORIDE SERPL-SCNC: 96 MMOL/L (ref 95–110)
CHLORIDE SERPL-SCNC: 96 MMOL/L (ref 95–110)
CHLORIDE SERPL-SCNC: 97 MMOL/L (ref 95–110)
CO2 SERPL-SCNC: 14 MMOL/L (ref 23–29)
CO2 SERPL-SCNC: 15 MMOL/L (ref 23–29)
CO2 SERPL-SCNC: 17 MMOL/L (ref 23–29)
CREAT SERPL-MCNC: 5.4 MG/DL (ref 0.5–1.4)
CREAT SERPL-MCNC: 5.6 MG/DL (ref 0.5–1.4)
CREAT SERPL-MCNC: 5.8 MG/DL (ref 0.5–1.4)
CREAT SERPL-MCNC: 5.9 MG/DL (ref 0.5–1.4)
CREAT SERPL-MCNC: 5.9 MG/DL (ref 0.5–1.4)
DELSYS: ABNORMAL
DIFFERENTIAL METHOD: ABNORMAL
EOSINOPHIL # BLD AUTO: 0.2 K/UL (ref 0–0.5)
EOSINOPHIL NFR BLD: 1.1 % (ref 0–8)
ERYTHROCYTE [DISTWIDTH] IN BLOOD BY AUTOMATED COUNT: 13.4 % (ref 11.5–14.5)
ERYTHROCYTE [SEDIMENTATION RATE] IN BLOOD BY WESTERGREN METHOD: 18 MM/H
EST. GFR  (AFRICAN AMERICAN): 8.2 ML/MIN/1.73 M^2
EST. GFR  (AFRICAN AMERICAN): 8.2 ML/MIN/1.73 M^2
EST. GFR  (AFRICAN AMERICAN): 8.3 ML/MIN/1.73 M^2
EST. GFR  (AFRICAN AMERICAN): 8.7 ML/MIN/1.73 M^2
EST. GFR  (AFRICAN AMERICAN): 9.1 ML/MIN/1.73 M^2
EST. GFR  (NON AFRICAN AMERICAN): 7.1 ML/MIN/1.73 M^2
EST. GFR  (NON AFRICAN AMERICAN): 7.1 ML/MIN/1.73 M^2
EST. GFR  (NON AFRICAN AMERICAN): 7.2 ML/MIN/1.73 M^2
EST. GFR  (NON AFRICAN AMERICAN): 7.5 ML/MIN/1.73 M^2
EST. GFR  (NON AFRICAN AMERICAN): 7.9 ML/MIN/1.73 M^2
FIO2: 21
GLUCOSE SERPL-MCNC: 129 MG/DL (ref 70–110)
GLUCOSE SERPL-MCNC: 180 MG/DL (ref 70–110)
GLUCOSE SERPL-MCNC: 202 MG/DL (ref 70–110)
GLUCOSE SERPL-MCNC: 212 MG/DL (ref 70–110)
GLUCOSE SERPL-MCNC: 215 MG/DL (ref 70–110)
HCO3 UR-SCNC: 15 MMOL/L (ref 24–28)
HCT VFR BLD AUTO: 26.1 % (ref 37–48.5)
HGB BLD-MCNC: 7.9 G/DL (ref 12–16)
IMM GRANULOCYTES # BLD AUTO: 0.17 K/UL (ref 0–0.04)
IMM GRANULOCYTES NFR BLD AUTO: 1 % (ref 0–0.5)
LYMPHOCYTES # BLD AUTO: 2.1 K/UL (ref 1–4.8)
LYMPHOCYTES NFR BLD: 12.7 % (ref 18–48)
MAGNESIUM SERPL-MCNC: 2.4 MG/DL (ref 1.6–2.6)
MAGNESIUM SERPL-MCNC: 2.6 MG/DL (ref 1.6–2.6)
MCH RBC QN AUTO: 29 PG (ref 27–31)
MCHC RBC AUTO-ENTMCNC: 30.3 G/DL (ref 32–36)
MCV RBC AUTO: 96 FL (ref 82–98)
MODE: ABNORMAL
MONOCYTES # BLD AUTO: 1.3 K/UL (ref 0.3–1)
MONOCYTES NFR BLD: 7.6 % (ref 4–15)
NEUTROPHILS # BLD AUTO: 12.9 K/UL (ref 1.8–7.7)
NEUTROPHILS NFR BLD: 77.4 % (ref 38–73)
NRBC BLD-RTO: 0 /100 WBC
OVALOCYTES BLD QL SMEAR: ABNORMAL
PCO2 BLDA: 30.7 MMHG (ref 35–45)
PH SMN: 7.3 [PH] (ref 7.35–7.45)
PHOSPHATE SERPL-MCNC: 4.3 MG/DL (ref 2.7–4.5)
PHOSPHATE SERPL-MCNC: 4.3 MG/DL (ref 2.7–4.5)
PLATELET # BLD AUTO: 480 K/UL (ref 150–350)
PLATELET BLD QL SMEAR: ABNORMAL
PMV BLD AUTO: 10.5 FL (ref 9.2–12.9)
PO2 BLDA: 98 MMHG (ref 40–60)
POC BE: -11 MMOL/L
POC SATURATED O2: 97 % (ref 95–100)
POC TCO2: 16 MMOL/L (ref 24–29)
POCT GLUCOSE: 161 MG/DL (ref 70–110)
POCT GLUCOSE: 165 MG/DL (ref 70–110)
POCT GLUCOSE: 170 MG/DL (ref 70–110)
POCT GLUCOSE: 171 MG/DL (ref 70–110)
POCT GLUCOSE: 178 MG/DL (ref 70–110)
POCT GLUCOSE: 179 MG/DL (ref 70–110)
POCT GLUCOSE: 182 MG/DL (ref 70–110)
POCT GLUCOSE: 198 MG/DL (ref 70–110)
POCT GLUCOSE: 198 MG/DL (ref 70–110)
POCT GLUCOSE: 218 MG/DL (ref 70–110)
POCT GLUCOSE: 236 MG/DL (ref 70–110)
POIKILOCYTOSIS BLD QL SMEAR: SLIGHT
POTASSIUM SERPL-SCNC: 4.3 MMOL/L (ref 3.5–5.1)
POTASSIUM SERPL-SCNC: 4.4 MMOL/L (ref 3.5–5.1)
POTASSIUM SERPL-SCNC: 4.5 MMOL/L (ref 3.5–5.1)
POTASSIUM SERPL-SCNC: 4.5 MMOL/L (ref 3.5–5.1)
POTASSIUM SERPL-SCNC: 5.1 MMOL/L (ref 3.5–5.1)
RBC # BLD AUTO: 2.72 M/UL (ref 4–5.4)
SAMPLE: ABNORMAL
SITE: ABNORMAL
SODIUM SERPL-SCNC: 121 MMOL/L (ref 136–145)
SODIUM SERPL-SCNC: 122 MMOL/L (ref 136–145)
SODIUM SERPL-SCNC: 122 MMOL/L (ref 136–145)
SODIUM SERPL-SCNC: 123 MMOL/L (ref 136–145)
SODIUM SERPL-SCNC: 123 MMOL/L (ref 136–145)
SP02: 97
VANCOMYCIN SERPL-MCNC: 20.8 UG/ML
WBC # BLD AUTO: 16.65 K/UL (ref 3.9–12.7)

## 2020-12-01 PROCEDURE — 94640 AIRWAY INHALATION TREATMENT: CPT | Mod: HCWC

## 2020-12-01 PROCEDURE — 90945 DIALYSIS ONE EVALUATION: CPT | Mod: HCWC

## 2020-12-01 PROCEDURE — 99232 SBSQ HOSP IP/OBS MODERATE 35: CPT | Mod: HCWC,,, | Performed by: NURSE PRACTITIONER

## 2020-12-01 PROCEDURE — 63600175 PHARM REV CODE 636 W HCPCS: Mod: HCWC | Performed by: STUDENT IN AN ORGANIZED HEALTH CARE EDUCATION/TRAINING PROGRAM

## 2020-12-01 PROCEDURE — 36415 COLL VENOUS BLD VENIPUNCTURE: CPT | Mod: HCWC

## 2020-12-01 PROCEDURE — 94761 N-INVAS EAR/PLS OXIMETRY MLT: CPT | Mod: HCWC

## 2020-12-01 PROCEDURE — 82803 BLOOD GASES ANY COMBINATION: CPT | Mod: HCWC

## 2020-12-01 PROCEDURE — 84100 ASSAY OF PHOSPHORUS: CPT | Mod: HCWC

## 2020-12-01 PROCEDURE — 25000003 PHARM REV CODE 250: Mod: HCWC | Performed by: STUDENT IN AN ORGANIZED HEALTH CARE EDUCATION/TRAINING PROGRAM

## 2020-12-01 PROCEDURE — 20000000 HC ICU ROOM: Mod: HCWC

## 2020-12-01 PROCEDURE — 80069 RENAL FUNCTION PANEL: CPT | Mod: HCWC

## 2020-12-01 PROCEDURE — 25000003 PHARM REV CODE 250: Mod: HCWC | Performed by: INTERNAL MEDICINE

## 2020-12-01 PROCEDURE — 25000003 PHARM REV CODE 250: Mod: HCWC

## 2020-12-01 PROCEDURE — 83735 ASSAY OF MAGNESIUM: CPT | Mod: HCWC

## 2020-12-01 PROCEDURE — 99900035 HC TECH TIME PER 15 MIN (STAT): Mod: HCWC

## 2020-12-01 PROCEDURE — 99233 PR SUBSEQUENT HOSPITAL CARE,LEVL III: ICD-10-PCS | Mod: HCWC,GC,, | Performed by: INTERNAL MEDICINE

## 2020-12-01 PROCEDURE — 63600175 PHARM REV CODE 636 W HCPCS: Mod: HCWC | Performed by: NURSE PRACTITIONER

## 2020-12-01 PROCEDURE — 99233 SBSQ HOSP IP/OBS HIGH 50: CPT | Mod: HCWC,GC,, | Performed by: INTERNAL MEDICINE

## 2020-12-01 PROCEDURE — 99233 PR SUBSEQUENT HOSPITAL CARE,LEVL III: ICD-10-PCS | Mod: HCWC,,, | Performed by: HOSPITALIST

## 2020-12-01 PROCEDURE — 25000242 PHARM REV CODE 250 ALT 637 W/ HCPCS: Mod: HCWC | Performed by: STUDENT IN AN ORGANIZED HEALTH CARE EDUCATION/TRAINING PROGRAM

## 2020-12-01 PROCEDURE — 85025 COMPLETE CBC W/AUTO DIFF WBC: CPT | Mod: HCWC

## 2020-12-01 PROCEDURE — 27202415 HC CARTRIDGE, CRRT: Mod: HCWC

## 2020-12-01 PROCEDURE — 99222 1ST HOSP IP/OBS MODERATE 55: CPT | Mod: HCWC,,, | Performed by: NURSE PRACTITIONER

## 2020-12-01 PROCEDURE — 27000221 HC OXYGEN, UP TO 24 HOURS: Mod: HCWC

## 2020-12-01 PROCEDURE — 99233 SBSQ HOSP IP/OBS HIGH 50: CPT | Mod: HCWC,,, | Performed by: HOSPITALIST

## 2020-12-01 PROCEDURE — 83735 ASSAY OF MAGNESIUM: CPT | Mod: 91,HCWC

## 2020-12-01 PROCEDURE — S0030 INJECTION, METRONIDAZOLE: HCPCS | Mod: HCWC | Performed by: STUDENT IN AN ORGANIZED HEALTH CARE EDUCATION/TRAINING PROGRAM

## 2020-12-01 PROCEDURE — 80048 BASIC METABOLIC PNL TOTAL CA: CPT | Mod: 91,HCWC

## 2020-12-01 PROCEDURE — 99232 PR SUBSEQUENT HOSPITAL CARE,LEVL II: ICD-10-PCS | Mod: HCWC,,, | Performed by: NURSE PRACTITIONER

## 2020-12-01 PROCEDURE — 80048 BASIC METABOLIC PNL TOTAL CA: CPT | Mod: HCWC

## 2020-12-01 PROCEDURE — 63600175 PHARM REV CODE 636 W HCPCS: Mod: HCWC | Performed by: INTERNAL MEDICINE

## 2020-12-01 PROCEDURE — 80202 ASSAY OF VANCOMYCIN: CPT | Mod: HCWC

## 2020-12-01 PROCEDURE — 99222 PR INITIAL HOSPITAL CARE,LEVL II: ICD-10-PCS | Mod: HCWC,,, | Performed by: NURSE PRACTITIONER

## 2020-12-01 PROCEDURE — 94660 CPAP INITIATION&MGMT: CPT | Mod: HCWC

## 2020-12-01 RX ORDER — IBUPROFEN 200 MG
16 TABLET ORAL
Status: DISCONTINUED | OUTPATIENT
Start: 2020-12-01 | End: 2020-12-02

## 2020-12-01 RX ORDER — LORAZEPAM 2 MG/ML
0.5 INJECTION INTRAMUSCULAR ONCE
Status: COMPLETED | OUTPATIENT
Start: 2020-12-01 | End: 2020-12-01

## 2020-12-01 RX ORDER — DEXTROSE MONOHYDRATE 50 MG/ML
INJECTION, SOLUTION INTRAVENOUS CONTINUOUS
Status: DISCONTINUED | OUTPATIENT
Start: 2020-12-01 | End: 2020-12-04

## 2020-12-01 RX ORDER — GLUCAGON 1 MG
1 KIT INJECTION
Status: DISCONTINUED | OUTPATIENT
Start: 2020-12-01 | End: 2020-12-02

## 2020-12-01 RX ORDER — INSULIN ASPART 100 [IU]/ML
0-5 INJECTION, SOLUTION INTRAVENOUS; SUBCUTANEOUS
Status: DISCONTINUED | OUTPATIENT
Start: 2020-12-01 | End: 2020-12-01

## 2020-12-01 RX ORDER — LIDOCAINE HYDROCHLORIDE 10 MG/ML
INJECTION INFILTRATION; PERINEURAL
Status: COMPLETED
Start: 2020-12-01 | End: 2020-12-01

## 2020-12-01 RX ORDER — METRONIDAZOLE 500 MG/100ML
500 INJECTION, SOLUTION INTRAVENOUS ONCE
Status: COMPLETED | OUTPATIENT
Start: 2020-12-01 | End: 2020-12-02

## 2020-12-01 RX ORDER — MAGNESIUM SULFATE HEPTAHYDRATE 40 MG/ML
2 INJECTION, SOLUTION INTRAVENOUS
Status: DISCONTINUED | OUTPATIENT
Start: 2020-12-01 | End: 2020-12-02

## 2020-12-01 RX ORDER — HYDROCODONE BITARTRATE AND ACETAMINOPHEN 500; 5 MG/1; MG/1
TABLET ORAL CONTINUOUS
Status: DISCONTINUED | OUTPATIENT
Start: 2020-12-01 | End: 2020-12-02

## 2020-12-01 RX ORDER — HEPARIN SODIUM 5000 [USP'U]/ML
5000 INJECTION, SOLUTION INTRAVENOUS; SUBCUTANEOUS EVERY 8 HOURS
Status: DISCONTINUED | OUTPATIENT
Start: 2020-12-01 | End: 2020-12-12 | Stop reason: HOSPADM

## 2020-12-01 RX ORDER — INSULIN ASPART 100 [IU]/ML
1-10 INJECTION, SOLUTION INTRAVENOUS; SUBCUTANEOUS EVERY 6 HOURS PRN
Status: DISCONTINUED | OUTPATIENT
Start: 2020-12-01 | End: 2020-12-01

## 2020-12-01 RX ORDER — IBUPROFEN 200 MG
24 TABLET ORAL
Status: DISCONTINUED | OUTPATIENT
Start: 2020-12-01 | End: 2020-12-02

## 2020-12-01 RX ORDER — INSULIN ASPART 100 [IU]/ML
7 INJECTION, SOLUTION INTRAVENOUS; SUBCUTANEOUS
Status: DISCONTINUED | OUTPATIENT
Start: 2020-12-01 | End: 2020-12-01

## 2020-12-01 RX ORDER — INSULIN ASPART 100 [IU]/ML
0-4 INJECTION, SOLUTION INTRAVENOUS; SUBCUTANEOUS
Status: DISCONTINUED | OUTPATIENT
Start: 2020-12-01 | End: 2020-12-02

## 2020-12-01 RX ADMIN — HEPARIN SODIUM 5000 UNITS: 5000 INJECTION INTRAVENOUS; SUBCUTANEOUS at 04:12

## 2020-12-01 RX ADMIN — HYDROMORPHONE HYDROCHLORIDE 0.5 MG: 1 INJECTION, SOLUTION INTRAMUSCULAR; INTRAVENOUS; SUBCUTANEOUS at 05:12

## 2020-12-01 RX ADMIN — INSULIN ASPART 1 UNITS: 100 INJECTION, SOLUTION INTRAVENOUS; SUBCUTANEOUS at 12:12

## 2020-12-01 RX ADMIN — DEXTROSE 290 ML/HR: 5 SOLUTION INTRAVENOUS at 07:12

## 2020-12-01 RX ADMIN — LIDOCAINE HYDROCHLORIDE 200 MG: 10 INJECTION, SOLUTION INFILTRATION; PERINEURAL at 03:12

## 2020-12-01 RX ADMIN — METRONIDAZOLE 500 MG: 500 TABLET ORAL at 05:12

## 2020-12-01 RX ADMIN — HEPARIN SODIUM 5000 UNITS: 5000 INJECTION INTRAVENOUS; SUBCUTANEOUS at 10:12

## 2020-12-01 RX ADMIN — INSULIN ASPART 1 UNITS: 100 INJECTION, SOLUTION INTRAVENOUS; SUBCUTANEOUS at 08:12

## 2020-12-01 RX ADMIN — INSULIN ASPART 2 UNITS: 100 INJECTION, SOLUTION INTRAVENOUS; SUBCUTANEOUS at 04:12

## 2020-12-01 RX ADMIN — CEFTRIAXONE 2 G: 2 INJECTION, SOLUTION INTRAVENOUS at 10:12

## 2020-12-01 RX ADMIN — OXYCODONE HYDROCHLORIDE 10 MG: 10 TABLET ORAL at 03:12

## 2020-12-01 RX ADMIN — IPRATROPIUM BROMIDE AND ALBUTEROL SULFATE 3 ML: .5; 2.5 SOLUTION RESPIRATORY (INHALATION) at 07:12

## 2020-12-01 RX ADMIN — IPRATROPIUM BROMIDE AND ALBUTEROL SULFATE 3 ML: .5; 2.5 SOLUTION RESPIRATORY (INHALATION) at 08:12

## 2020-12-01 RX ADMIN — METRONIDAZOLE 500 MG: 500 INJECTION, SOLUTION INTRAVENOUS at 11:12

## 2020-12-01 RX ADMIN — LORAZEPAM 0.5 MG: 2 INJECTION INTRAMUSCULAR; INTRAVENOUS at 03:12

## 2020-12-01 RX ADMIN — MUPIROCIN: 20 OINTMENT TOPICAL at 10:12

## 2020-12-01 NOTE — CONSULTS
[facility]  Physical Medicine & Rehab  Consult Note    Patient Name: Navin Beck  MRN: [id number]  Admission Date: 11/26/2020  Hospital Length of Stay: 4 days  Attending Physician: Chetan Lang MD     Inpatient consult to Physical Medicine & Rehabilitation  Consult performed by: Tiffany Rowell NP  Consult requested by:  Chetan Lang MD    Collaborating Physician: Daya Munguia MD  Reason for Consult:  Rehab evaluation     Consults  Subjective:     Principal Problem: Type 2 diabetes mellitus with hyperglycemia, with long-term current use of insulin    HPI: Navin Beck is a 60-year-old female with PMHx of HTN, HLD,DMII, hyperthyroidism, asthma, glaucoma, insomnia, and revision of erosion of implanted vaginal mesh and prosthetic materials on 10/26.  Patient presented to [facility] on 11/26/20 for vaginal pain, rectal pain, abdominal pain, and urinary retention.  On arrival, found to be in DKA with multilobar fluid collection adjacent to bladder concerning for bladder perforation and bilateral hydronephrosis.  Admitted to Hospital Medicine for DKA, LAURIE, complicated UTI, and pelvic fluid collections.  Started on ceftriaxone and march inserted.  Urology and GYN consulted.  IR consulted and drain placed on 11/28.  Hospital course further complicated by Laurie (nephrology following), hyponatremia, and AMS.  PM&R consulted for rehab evaluation.      Functional History: Patient lives in [place] with her son in a 1st floor apartment without steps to enter.  Prior to admission, she was (I) with ADLs and mobility.  DME: none.    Hospital Course: 11/28/20:  Evaluated by PT and OT.  BM CGA-modA.  Sit to stand CGA and transfer CGA-Bro.  Ambulated ~12 ft + 12 ft CGA with RW.  Toileting totalA.    Past Medical History:   Diagnosis Date    Allergy     Arrhythmia     Arthritis     Bronchial asthma     Diabetes mellitus     Glaucoma     Hormone disorder     hysterectomy    Hypercholesteremia      Hypertension     Hyperthyroidism     Insomnia     Kidney stones     Thyroid disease     Urine, incontinence, stress female     UTI (urinary tract infection) 11/27/2020     Past Surgical History:   Procedure Laterality Date    APPENDECTOMY      BACK SURGERY      disc removal     CARPAL TUNNEL RELEASE Bilateral     CHOLECYSTECTOMY      CYSTOSCOPY N/A 10/26/2020    Procedure: CYSTOSCOPY;  Surgeon: Wilner Goel MD;  Location: [facility] OR;  Service: OB/GYN;  Laterality: N/A;    FRACTURE SURGERY Right     wrist    [facility]  [facility]    JOINT REPLACEMENT      KNEE ARTHROSCOPY W/ DEBRIDEMENT      KNEE SURGERY Right     Knee replacement    LITHOTRIPSY      ID REMOVAL OF OVARY/TUBE(S)  9/9/13    benign    removal of round ligament mass      ROBOT-ASSISTED LAPAROSCOPIC LYSIS OF ADHESIONS USING DA JAMES XI N/A 10/26/2020    Procedure: XI ROBOTIC LYSIS, ADHESIONS;  Surgeon: Wilner Goel MD;  Location: [facility] OR;  Service: OB/GYN;  Laterality: N/A;  ROBOTIC MOBILIZATION OF BLADDER- DR BURNHAM    SPINE SURGERY      WRIST FRACTURE SURGERY Left      Review of patient's allergies indicates:   Allergen Reactions    Penicillins Shortness Of Breath, Itching and Swelling     Tolerates cefepime 11/30/2020       Scheduled Medications:    albuterol-ipratropium  3 mL Nebulization Q6H WAKE    cefTRIAXone (ROCEPHIN) IVPB  2 g Intravenous Q24H    gabapentin  300 mg Oral Daily    heparin (porcine)  5,000 Units Subcutaneous Q8H    methIMAzole  10 mg Oral Daily    metroNIDAZOLE  500 mg Oral Q8H    mupirocin   Nasal BID    pantoprazole  40 mg Oral Daily    polyethylene glycol  17 g Oral Daily    rosuvastatin  20 mg Oral Daily    senna-docusate 8.6-50 mg  1 tablet Oral BID    sodium bicarbonate  1,300 mg Oral TID       PRN Medications: acetaminophen, glucagon (human recombinant), glucose, glucose, HYDROmorphone, insulin aspart U-100, magnesium sulfate IVPB, melatonin, ondansetron,  oxyCODONE, oxyCODONE, sodium chloride 0.9%, sodium phosphate IVPB, sodium phosphate IVPB, sodium phosphate IVPB, Pharmacy to dose Vancomycin consult **AND** vancomycin - pharmacy to dose    Family History     Problem Relation (Age of Onset)    Cancer Mother, Brother, Brother, Brother, Brother    Colon cancer Brother        Tobacco Use    Smoking status: Current Every Day Smoker     Packs/day: 0.50     Years: 30.00     Pack years: 15.00    Smokeless tobacco: Never Used   Substance and Sexual Activity    Alcohol use: No    Drug use: No    Sexual activity: Not Currently     Partners: Male     Birth control/protection: Surgical     Review of Systems   Unable to perform ROS: Mental status change (lethargy)     Objective:     Vital Signs (Most Recent):  Temp: 98 °F (36.7 °C) (12/01/20 1400)  Pulse: 78 (12/01/20 1400)  Resp: (!) 24 (12/01/20 1400)  BP: (!) 154/64 (12/01/20 1400)  SpO2: 100 % (12/01/20 1400)    Vital Signs (24h Range):  Temp:  [97.3 °F (36.3 °C)-98.3 °F (36.8 °C)] 98 °F (36.7 °C)  Pulse:  [73-82] 78  Resp:  [17-24] 24  SpO2:  [93 %-100 %] 100 %  BP: (110-154)/(49-73) 154/64     Body mass index is 37.22 kg/m².    Physical Exam  Vitals signs reviewed.   Constitutional:       General: She is not in acute distress.     Appearance: She is well-developed.   HENT:      Head: Normocephalic and atraumatic.      Right Ear: External ear normal.      Left Ear: External ear normal.      Nose: Nose normal.   Eyes:      General: No scleral icterus.        Right eye: No discharge.         Left eye: No discharge.      Comments: Bilateral periorbital edema   Neck:      Musculoskeletal: Normal range of motion.   Cardiovascular:      Rate and Rhythm: Normal rate.   Pulmonary:      Effort: Pulmonary effort is normal. No respiratory distress.   Abdominal:      General: There is no distension.      Palpations: Abdomen is soft.      Tenderness: There is no abdominal tenderness.   Musculoskeletal:         General: Swelling and  tenderness present.      Comments: General weakness and deconditioning    Skin:     General: Skin is warm and dry.      Findings: No rash.   Neurological:      Mental Status: She is lethargic and confused.      Motor: Weakness and tremor present.      Comments: Follows commands   Psychiatric:         Attention and Perception: She is inattentive.         Behavior: Behavior is slowed.         Cognition and Memory: Cognition is impaired.     Diagnostic Results:   Labs: Reviewed  X-Ray: Reviewed  CT: Reviewed    Assessment/Plan:     Postprocedural intraabdominal abscess  -mesh removed by GYN 10/26, march placed   -march removed 11/19  -presented with worsening abd pain found to be related to intra-abd fluid collections  -UCx 11/26 with E.coli  -s/p IR drainage on 11/28  -on ceftriaxone  -management per primary team    Acute kidney injury (LAURIE) with acute tubular necrosis (ATN)  -nephrology following    Diabetic ketoacidosis without coma associated with type 2 diabetes mellitus  -on insulin gtt  -endocrine following    Severe obesity (BMI 35.0-35.9 with comorbidity)  -Body mass index is 37.22 kg/m².  -bariatric equipment as needed  -encourage lifestyle modifications    Impaired mobility and endurance  -related to prolonged/acute hospital course  -(I) with ADLs and mobility at baseline  See hospital course for functional status.    Recommendations  -  Encourage mobility, OOB in chair, and early ambulation as appropriate  -  PT/OT evaluate and treat  -  Pain management  -  DVT prophylaxis if appropriate   -  Monitor for and prevent skin breakdown and pressure ulcers  · Early mobility, repositioning/weight shifting every 20-30 minutes when sitting, turn patient every 2 hours, proper mattress/overlay and chair cushioning, pressure relief/heel protector boots    Post-acute recommendation: transferred to ICU today, will follow chart for medical stability and therapy progress, likely rehab candidate once medically stable      Thank you for your consult.     LAINE Domínguez  Department of Physical Medicine & Rehab  [facility]

## 2020-12-01 NOTE — ASSESSMENT & PLAN NOTE
Pt presented to [facility] ED with Gluc 626, HCO3 16, AG 16, BHB 4.2, ketonuria. 2L IVF given in ED with insulin gtt started. Glucose trended downward to 274. Pt was then admitted to hospital medicine for further management of DKA.    PLAN  - Endocrine consulted, appreciate recs   - NPO, SQ trial 11/28 not successful   - CLD; IVF boluses PRN   - Transitional Insulin gtt stopped 12/1   - Glucose controlled today 100s   - Diabetic diet + transition to subQ insulin with accuchecks qAC/HS  - BMP q6  - Will replace K PRN  - Nausea: zofran PRN

## 2020-12-01 NOTE — ASSESSMENT & PLAN NOTE
Hold home Benazepril due to LAURIE, low BP    PLAN  - Will monitor BP and add anti-hypertensive as necessary   - Hypotensive overnight

## 2020-12-01 NOTE — ASSESSMENT & PLAN NOTE
History of gyn surgery 10/2020 for bladder mesh removal, CT abdomen pelvis showing fluid collections, IR drain placed 11/28 showing GPCs,     On Cefepime  Urology following, appreciate recs

## 2020-12-01 NOTE — PROGRESS NOTES
[facility] Hospital Medicine  Progress Note    Patient Name: Navin Beck  MRN: [id number]  Patient Class: IP- Inpatient   Admission Date: 11/26/2020  Length of Stay: 4 days  Attending Physician: Chetan Lang MD  Primary Care Provider: Elvira Quinones MD    Hospital Medicine Team: Networked reference to record PCT  Ciera Maurer MD    Subjective:     Principal Problem:Type 2 diabetes mellitus with hyperglycemia, with long-term current use of insulin        HPI:  Ms. Beck is a 59yo F with PMH of HTN, HLD, T2DM, Hyperthyroidism, and Asthma who had recent GYN surgery on 10/26 due to erosion of bladder mesh / prosthetic material with Cifuentes removal on 11/19. She presents to [facility] due to 4-5 days of not feeling well, she has had loss of appetite and weakness with 10/10 throbbing lower abdominal pain associated with vaginal pain and rectal pressure and non-bloody diarrhea. She has had trouble urinating for the past 5 days as well. She takes oxycodone 5 at home for pain, but it did not help. She endorses nausea and vomiting (4 times, non-bloody) also associated with the pain. She denies fever, chills, chest pain, SOB, trouble breathing, leg swelling, or any other symptom at this time.    ED Course: VSS, afebrile but leukocytosis at 17.85. Pt found to have DKA - Gluc 626, AG16, BHB 4.2, Fluids, insulin gtt started and Glucose down-trended to 200s. UA showed 2RBC, >100 WBC, bacteria, given CTX. CT A/P showed multiple fluid collections adjacent to bladder concerning for bladder perf with multiple urinomas, with mild bilateral hydronephrosis. Urology consulted and placed a Cifuentes with 900 UOP drained. GYN consulted due to recent surgery.     Pt will be admitted to hospital medicine for further management of DKA and possible intra-abdominal infection.     Overview/Hospital Course:  Pt was admitted on 11/28 due to 10/10 abdominal pain and also found to be in DKA. Pt was started on IVF,  "insulin gtt, and kept NPO with glucose downtrending and AG closed. CT abdomen showed multiple fluid collections near bladder. Renal cystography with "hypoattenuating collections in the lower pelvis adjacent to the urinary bladder with no definite extravasated contrast material appreciated within the pelvic collections to suggest communication with the bladder".  Urology and GYN on board, agree with plan for IR to place drain in fluid collections for culture and source control. Pt was started on CTX and Flagyl to cover for UTI and intra-abdominal infection, broadened to Cefepime, and Flagyl on 11/26; vanc added later. Endocrine consulted and managed the patient while acutely ill on insulin drip then transitioned to SQ when BG stabilized. IR place low abdominal drain to abscess on 11/28 with 250ml purulent material immediately expressed. Drained remained in place after the procedure. Urology continued to assess patient and recommended CT RSS after identifying some left CVAT and increased SCr with reduced UOP on 11/29, no hydronephrosis does not believe LAURIE is post-renal in nature. 11/30 pt remains anuric, Cr up-trending to 4.2, with hyponatremia. Nephrology consulted. 12/1 pt remains anuric after Lasix + Diuril challenge. Nephrology decided for initiation of CRRT due to low pressures with unresponsive ARF. Will transfer to ICU for CRRT.    Interval History: Afebrile overnight, hypotensive episodes MAP 69-82. SpO2 >90% on RA. Pt remains anuric after Lasix + Diuril challenge. Hyponatremia worsened today to 122. Still on insulin gtt and NPO. Pt is lethargic but arousable and oriented x3 although waxes and wanes. Pt complains of continued lower abdominal pain. No other complaints today.    Review of Systems   Constitutional: Positive for activity change, appetite change and fatigue. Negative for chills and fever.   HENT: Negative for hearing loss, sneezing and trouble swallowing.    Respiratory: Negative for cough, " shortness of breath and wheezing.    Cardiovascular: Negative for chest pain and leg swelling.   Gastrointestinal: Positive for abdominal pain, diarrhea, nausea, rectal pain and vomiting. Negative for blood in stool.   Genitourinary: Positive for difficulty urinating, dysuria and pelvic pain. Negative for flank pain.   Musculoskeletal: Negative for arthralgias, neck pain and neck stiffness.   Skin: Negative for color change, pallor and rash.   Neurological: Negative for syncope, weakness and headaches.   Psychiatric/Behavioral: Negative for confusion and hallucinations. The patient is not nervous/anxious.      Objective:     Vital Signs (Most Recent):  Temp: 98.3 °F (36.8 °C) (11/30/20 1808)  Pulse: 76 (11/30/20 1808)  Resp: 18 (11/30/20 1808)  BP: (!) 121/54 (11/30/20 1808)  SpO2: 99 % (11/30/20 1808) Vital Signs (24h Range):  Temp:  [97.8 °F (36.6 °C)-99.5 °F (37.5 °C)] 98.3 °F (36.8 °C)  Pulse:  [71-89] 76  Resp:  [12-20] 18  SpO2:  [91 %-100 %] 99 %  BP: ()/(49-55) 121/54     Weight: 115.7 kg (255 lb)  Body mass index is 39.94 kg/m².    Intake/Output Summary (Last 24 hours) at 11/30/2020 1852  Last data filed at 11/30/2020 1225  Gross per 24 hour   Intake 920 ml   Output 90 ml   Net 830 ml      Physical Exam  Vitals signs and nursing note reviewed.   Constitutional:       General: She is not in acute distress.     Appearance: Normal appearance. She is obese.   HENT:      Head: Normocephalic and atraumatic.      Right Ear: External ear normal.      Left Ear: External ear normal.      Nose: Nose normal.      Mouth/Throat:      Mouth: Mucous membranes are moist.      Pharynx: Oropharynx is clear.   Eyes:      Conjunctiva/sclera: Conjunctivae normal.      Pupils: Pupils are equal, round, and reactive to light.   Neck:      Musculoskeletal: Normal range of motion and neck supple.   Cardiovascular:      Rate and Rhythm: Normal rate and regular rhythm.      Pulses: Normal pulses.      Heart sounds: Normal heart  sounds.   Pulmonary:      Effort: Pulmonary effort is normal. No respiratory distress.      Breath sounds: Normal breath sounds. No wheezing or rhonchi.   Abdominal:      General: Abdomen is flat. Bowel sounds are decreased. There is no distension.      Palpations: Abdomen is soft.      Tenderness: There is abdominal tenderness (lower quadrants) in the suprapubic area. There is guarding. There is no right CVA tenderness, left CVA tenderness or rebound.      Hernia: No hernia is present.      Comments: White discharge from anus  IR drain to lower abd area; purulent drainage    Genitourinary:     Comments: Left CVAT  Musculoskeletal: Normal range of motion.         General: No deformity or signs of injury.   Skin:     General: Skin is warm and dry.      Capillary Refill: Capillary refill takes less than 2 seconds.   Neurological:      General: No focal deficit present.      Mental Status: She is alert and oriented to person, place, and time.      Sensory: No sensory deficit.      Motor: No weakness.   Psychiatric:         Mood and Affect: Mood normal.         Behavior: Behavior normal.         Thought Content: Thought content normal.         Judgment: Judgment normal.         Significant Labs:   CBC:   Recent Labs   Lab 11/30/20  0531 11/30/20  1414 12/01/20  0226   WBC 16.52* 18.24* 16.65*   HGB 7.2* 8.3* 7.9*   HCT 23.7* 27.5* 26.1*   * 558* 480*     CMP:   Recent Labs   Lab 12/01/20  0226 12/01/20  0720 12/01/20  1103   * 121* 122*   K 5.1 4.5 4.5   CL 97 96 96   CO2 14* 15* 17*   * 180* 202*   BUN 21 21 22   CREATININE 5.4* 5.6* 5.9*   CALCIUM 8.0* 7.7* 7.6*   ANIONGAP 11 10 9   EGFRNONAA 7.9* 7.5* 7.1*     Lactic Acid:   Recent Labs   Lab 11/30/20  0842 11/30/20  1027 11/30/20  1628   LACTATE 1.4 2.1 1.0     All pertinent labs within the past 24 hours have been reviewed.    Significant Imaging: I have reviewed all pertinent imaging results/findings within the past 24  hours.      Assessment/Plan:      * Type 2 diabetes mellitus with hyperglycemia, with long-term current use of insulin  Last Hb A1c 10.3 on 9/25/20  Home regimen: Metformin 500 BID, Aspart 15U TID, Glargine 60 BID  Pt says her blood sugar has been running to the 300s for the past few days     PLAN  - Hold oral anti-glycemics while hospitalized  - Endocrine consulted, recs appreciated  - Accu-checks qhour  - See DKA notes      Hyponatremia  Pt hyponatremia to 120s with spontaneous jerking motions today.     - Nephrology on board, appreciate recs   - Possibly 2/2 D5 IVF with decreased UOP while in DKA   - CRRT today with D5 gtt post-filter to avoid overcorrection of Na   - BMP q4   - Max Na over 24h 128    Postprocedural intraabdominal abscess  Mesh removed by GYN 10/26; march placed  March removed 11/19 --> increased pain  Admitted 11/27 with worsening abd pain found to be related to intra-abd fluid collections.   UCx 11/26 with E.coli    PLAN  - Urology consulted; lesions adjacent to bladder per CT cysto 11/27  - Gyn consulted  - IR consulted drained abscess 11/28; f/u cultures  - Broad spectrum abx: CTX, Vanc, flagyl d6      Acute kidney injury (LAURIE) with acute tubular necrosis (ATN)  Cr 1.5 on presentation.   Likely multi-factorial on presentation 2/2 pre-renal volume depletion in the context of DKA, UTI, and post-renal obstruction  Repeat LAURIE on 11/29 likely intrinsic ATN in etiology    PLAN  - Initial LAURIE on presentation resolved with IVF and placement of March with resolution of hydronephrosis on imaging   - Maintain March per Urology   - Treating UTI with Cefepime  - LAURIE due to ATN began on 11/29 and continues to worsen   - Remains anuric after IVF bolus, Lasix + Diuril challenge   - Resp status stable but Cr uptrending, pt lethargic and waxing/waning alertness  - Nephrology consulted, appreciate recs   - Initiation of CRRT, transfer to ICU   - BMP q4   - Renally dose meds   - Avoid renal toxins (ACEi/ARB,  NSAIDs, contrast, etc.)   - Strict I&Os    UTI (urinary tract infection)  Pt presented with lower abdominal/vaginal/rectal pain with dysuria and urinary retention.   UA: 2 RBC, >100 WBC, 2+ leukocytes, bacteria  Recent UTIs with pan-sensitive E coli.    PLAN  - Ceftriaxone started  - UCx with E coli sens to cefepime and ceftriaxone  - Cifuentes placed for urinary retention, maintain Cifuentes per Urology      Urinary retention  Pt has recent h/o GYN surgery in 10/2020 for bladder mesh removal, Cifuentes was removed on 11/19. For the past few days pt has had increased trouble urinating associated with pain.     PLAN  CT A/P 11/27: Multiple fluid collections adjacent to bladder. Mild bilateral hydronephrosis.   Retention likely 2/2 anatomic obstruction, pelvic fluid collection, diabetic bladder  - Urology consulted in ED, appreciate recs   - Cifuentes placed 800cc drained, bladder irrigated and all fluid removed with ease   - Maintain Cifuentes   - Rec drainage of pelvic fluid collection by IR with cultures & creatinine   - CT RSS ordered by uro 11/29 for left CVAT, incr SCr, decr UOP   - No hydronephrosis seen on CT, most likely not post-renal in etiology  - GYN consulted, appreciate consult   - Agrees with Urology plan for drainage with IR  - IR consulted, appreciate recs   - IR placed drain 11/28 into pelvic abscess, draining pustular fluid    Asthma  Pt states she uses Albuterol inhaler TID every day.     PRN  - Duonebs q6 PRN    Diabetic ketoacidosis without coma associated with type 2 diabetes mellitus  Pt presented to [facility] ED with Gluc 626, HCO3 16, AG 16, BHB 4.2, ketonuria. 2L IVF given in ED with insulin gtt started. Glucose trended downward to 274. Pt was then admitted to hospital medicine for further management of DKA.    PLAN  - Endocrine consulted, appreciate recs   - NPO, SQ trial 11/28 not successful   - CLD; IVF boluses PRN   - Transitional Insulin gtt stopped 12/1   - Glucose controlled today 100s   - Diabetic diet +  transition to subQ insulin with accuchecks qAC/HS  - BMP q6  - Will replace K PRN  - Nausea: zofran PRN     Hyperthyroidism  Continue home Methimazole    PLAN  - TSH WNL  - Endocrine aware    Severe obesity (BMI 35.0-35.9 with comorbidity)   on exercise, diet, and weight management     Abnormal computed tomography of bladder  Pt presented to [facility] due to 5-6 days of 10/10 lower abdomen, vaginal, and rectal pain. Pt has recent h/o GYN surgery in 10/2020 for bladder mesh removal, Cifuentes was removed on 11/19.  CT A/P 11/27: Multiple fluid collections adjacent to bladder. Mild bilateral hydronephrosis.   UCx 11/26: E.coli sensitive to CTX, cefepime    PLAN  - GYN consulted, appreciate consult; Agrees with Urology plan for drainage with IR  - IR consulted, appreciate recs; Drain in place; cultures sent 11/28    - Gram stain, many GPC   - Cultures pending  - Pain management: PCA pump initially   - PCA pump d/c on 11/30   - PRN opiates  - ID: Cefepime, Vanc and Flagyl to cover for intra-abdominal infection d4   - Cefepime switched to CTX 11/30   - Urology recs appreciated; left CVAT, ordered CT RSS 11/29 hydronephrosis resolved   - Maintain Cifuentes   - UOP dropped, anuric on 11/30   - Remains anuric on 12/1 after Lasix + Diuril challenge   - LAURIE most likely not post-renal in nature per urology    Mixed hyperlipidemia  Continue home statin      Essential hypertension  Hold home Benazepril due to LAURIE, low BP    PLAN  - Will monitor BP and add anti-hypertensive as necessary   - Hypotensive overnight      VTE Risk Mitigation (From admission, onward)         Ordered     heparin (porcine) injection 5,000 Units  Every 8 hours      12/01/20 1426     Place sequential compression device  Until discontinued      12/01/20 1409     IP VTE HIGH RISK PATIENT  Once      12/01/20 1409                Discharge Planning   RAFA: 12/4/2020     Code Status: Full Code   Is the patient medically ready for discharge?: No    Reason for patient  still in hospital (select all that apply): Patient unstable, Patient trending condition, Laboratory test, Treatment and Consult recommendations  Discharge Plan A: Home Health   Discharge Delays: (!) Change in Medical Condition      Ciera Maurer MD  Department of Hospital Medicine   [facility]

## 2020-12-01 NOTE — PLAN OF CARE
Patient is not medically ready for discharge.      12/01/20 1147   Discharge Reassessment   Assessment Type Discharge Planning Reassessment   Discharge Plan A Home Health   Discharge Plan B Other  (TBD)   DME Needed Upon Discharge  none   Anticipated Discharge Disposition Home-Health     Janny Louis RN, CM   Ext: [phone]

## 2020-12-01 NOTE — PROGRESS NOTES
"[facility]  Endocrinology  Progress Note    Admit Date: 11/26/2020     Reason for Consult: Management of T2DM, Hyperglycemia     Diabetes diagnosis year: > 10 years ago    Home Diabetes Medications:    Lantus 50 U daily  Novolog 15 U AC  Metformin 500 mg BID    How often checking glucose at home? 1-3 x day   BG readings on regimen: 200s-300s  Hypoglycemia on the regimen?  No  Missed doses on regimen?  Yes    Diabetes Complications include:     Diabetic nephropathy        HPI:   Patient is a 60 y.o. female with a diagnosis of HTN, T2DM on MDi insulin, hyperthyroidism who presented with weakness, poor appetite and abdominal pain associated with vaginal pain and rectal pressure and non-bloody diarrhea. She was found to be in DKA and started on DKA protocol with IVF and IV insulin. She is being treated with IV antibiotics for pelvic fluids collections suspected to be infectious.     On admission labs:   11/26/2020    Sodium 128 (L)   Potassium 5.3 (H)   Anion Gap 16   CO2 16 (L)   Creatinine 1.5 (H)   Glucose 626 (HH)   Beta-Hydroxybutyrate 4.2 (H)     Lab Results   Component Value Date    HGBA1C 11.4 (H) 11/27/2020     Lab Results   Component Value Date    TSH 0.519 11/27/2020       She also has a history of hyperthyroidism on methimazole 10 mg daily. Thyroid US shows MNG. No RAIU scan on record. No TRAb or TSI. TSH at goal.     Interval HPI:   NPO today. HD.   BG trend 124-229 on Iv insulin infusion at 1.3 u/hr overnight.   Overnight events: NAEON  Eating:   NPO   Nausea: No  Hypoglycemia and intervention: No  Fever: No  TPN and/or TF: No  If yes, type of TF/TPN and rate: na    BP (!) 115/54   Pulse 78   Temp 97.3 °F (36.3 °C) (Oral)   Resp 20   Ht 5' 7" (1.702 m)   Wt 115.7 kg (255 lb)   SpO2 97%   BMI 39.94 kg/m²     Labs Reviewed and Include    Recent Labs   Lab 12/01/20  0226   *   CALCIUM 8.0*   *   K 5.1   CO2 14*   CL 97   BUN 21   CREATININE 5.4*     Lab Results "   Component Value Date    WBC 16.65 (H) 12/01/2020    HGB 7.9 (L) 12/01/2020    HCT 26.1 (L) 12/01/2020    MCV 96 12/01/2020     (H) 12/01/2020     Recent Labs   Lab 11/27/20  1239   TSH 0.519     Lab Results   Component Value Date    HGBA1C 11.4 (H) 11/27/2020       Nutritional status:   Body mass index is 39.94 kg/m².  Lab Results   Component Value Date    ALBUMIN 2.4 (L) 11/26/2020    ALBUMIN 4.1 10/26/2020    ALBUMIN 4.8 09/25/2020     No results found for: PREALBUMIN    Estimated Creatinine Clearance: 14.2 mL/min (A) (based on SCr of 5.4 mg/dL (H)).    Accu-Checks  Recent Labs     11/30/20  1940 11/30/20 2057 11/30/20  2207 11/30/20  2319 12/01/20  0012 12/01/20  0118 12/01/20  0218 12/01/20  0318 12/01/20  0409 12/01/20  0522   POCTGLUCOSE 229* 165* 150* 169* 161* 179* 171* 182* 165* 170*       Current Medications and/or Treatments Impacting Glycemic Control  Immunotherapy:    Immunosuppressants     None        Steroids:   Hormones (From admission, onward)    Start     Stop Route Frequency Ordered    11/27/20 0959  melatonin tablet 6 mg      -- Oral Nightly PRN 11/27/20 0903        Pressors:    Autonomic Drugs (From admission, onward)    None        Hyperglycemia/Diabetes Medications:   Antihyperglycemics (From admission, onward)    Start     Stop Route Frequency Ordered    12/01/20 1130  insulin aspart U-100 pen 7 Units      -- SubQ 3 times daily with meals 12/01/20 0757    12/01/20 0900  insulin regular in 0.9 % NaCl 100 unit/100 mL (1 unit/mL) infusion     Question:  Enter initial dose (Units/hr):  Answer:  1.3    -- IV Continuous 12/01/20 0757    12/01/20 0859  insulin aspart U-100 pen 1-10 Units      -- SubQ Every 6 hours PRN 12/01/20 0759    11/28/20 1245  insulin regular in 0.9 % NaCl 100 unit/100 mL (1 unit/mL) infusion     Question:  Enter initial dose (Units/hr):  Answer:  4    11/28 2300 IV Continuous 11/28/20 0703          ASSESSMENT and PLAN    * Type 2 diabetes mellitus with  hyperglycemia, with long-term current use of insulin  Brief Hx: 60 y.o. female with a diagnosis of HTN, T2DM on MDi insulin, hyperthyroidism who presented with weakness, poor appetite and abdominal pain associated with vaginal pain and rectal pressure and non-bloody diarrhea.   She was found to be in DKA and started on DKA protocol with IVF and IV insulin.   She is being treated with IV antibiotics for pelvic fluids collections suspected to be infectious.     History of poorly controlled T2DM (A1c 11.4%) on MDI insulin and metformin  Presented with intra-abdominal infection and DKA  DKA resolved with IV insulin, IVFs  No steroids, no TFs    Consulted for: dka  DM type: T2DM  A1C: 11.4*  Hypoglycemia: none  Steroids: none  Diet/Tfs: npo  Glucose Goals: 140-180 mg/dL        Home Regimen:    Metformin 500 bid  Lantus 50 units BID  Aspart 10-15 units AC and SSI    GFR 9.1    PLAN:      -  continue Transition gtt 1.3 units/hr   -  Low correction scale   -  NPO for procedure, advance to DM diet per primary   -  Accucheck Q4H while NPO then ELFR1ap once procedures completed and tolerating a diet   -  Discontinue scheduled novolog. Please notify endocrine with changes in diet/ nutrition.     Initially switched to basal insulin calculated with a TDD of 0.7/u/kg/day and had significant hyperglycemia.   Consider increase TDD to 1 U/kg/day given expected resistance with active infection.    ADDENDUM: increase transition to 1.5 u/hr.     Hyponatremia  Na remains low 120s relatively unchanged this AM  Being evaluated by nephrology  Diuresis per primary    UTI (urinary tract infection)  Antibiotics per primary team      Diabetic ketoacidosis without coma associated with type 2 diabetes mellitus  Improving      Hyperthyroidism  Unclear etiology given no TRAb, TSI or RAIU scan  Thyroid US shows MNG so toxic MNG high in differential  She has a TSH at goal on methimazole 10 mg daily  Continue, follow up outpatient    Plan  - Continue  methimazole 10 mg daily  - Outpatient followed with Endocrinology      Severe obesity (BMI 35.0-35.9 with comorbidity)  Benefits from addition of GLP-1 RA as outpatient          Jordan Hurtado MD  Endocrinology  [facility]

## 2020-12-01 NOTE — ASSESSMENT & PLAN NOTE
Na remains low 120s relatively unchanged this AM  Being evaluated by nephrology  Diuresis per primary

## 2020-12-01 NOTE — ASSESSMENT & PLAN NOTE
ATN secondary to insults from pyelonephritis/urinary obstructions/contrast administration. Central line placed for CRRT needs. Patient with hyponatremia and nephrology following to correct Na slowly from 122.     BMP q4 for na checks   -max na to 130  CRRT orders per nephrology

## 2020-12-01 NOTE — NURSING
"Increased drowsiness. Arousable with some probing. Answering orientation questions appropriately with delayed response. Mid conversation Pt will fall asleep.While awake Pt would ask "am I going to die?" Pt presents with more frequent twitching. Pt have cream colored d/c between legs. VSS. MD paged, came to bedside.   "

## 2020-12-01 NOTE — ASSESSMENT & PLAN NOTE
ATN in setting of contrast administration and sepsis 2/2 UTI, baseline cr 1.0-1.2 that trended up to 4.6 at time of consult.     -received contrast on 11/27/2020  -Urine [id number]/30/2020 many muddy brown granular casts, massive WBC's  -purulent drainage from abdominal ROSAUAR drain similar in appearance to her urine; concerning for possibility of communication between bladder and fluid collection  -Worsening Cr today and minimal UO overnight despite lasix and diuril boluses  -Will plan on initiating prismax today 12/1 with D5 ggt post-filter to prevent overcorrection of hyponatremia  -q 4 BMPs  -Maximum gabapentin dose 300mg in CKD patients  -Maintain Hgb >7.0, Keep MAP >65  -Renally dose meds and avoid nephrotoxic meds  -Will continue to follow closely   -Plan discussed with staff, Dr Rose

## 2020-12-01 NOTE — ASSESSMENT & PLAN NOTE
Mesh removed by GYN 10/26; march placed  March removed 11/19 --> increased pain  Admitted 11/27 with worsening abd pain found to be related to intra-abd fluid collections.   UCx 11/26 with E.coli    PLAN  - Urology consulted; lesions adjacent to bladder per CT cysto 11/27  - Gyn consulted  - IR consulted drained abscess 11/28; f/u cultures  - Broad spectrum abx: CTX, Vanc, flagyl

## 2020-12-01 NOTE — ASSESSMENT & PLAN NOTE
Cr 1.5 on presentation.   Likely multi-factorial on presentation 2/2 pre-renal volume depletion in the context of DKA, UTI, and post-renal obstruction  Repeat LAURIE on 11/29 likely intrinsic ATN in etiology    PLAN  - Initial LAURIE on presentation resolved with IVF and placement of Cifuentes with resolution of hydronephrosis on imaging   - Maintain Cifuentes per Urology   - Treating UTI with Cefepime  - LAURIE due to ATN began on 11/29 and continues to worsen   - Remains anuric after IVF bolus, Lasix + Diuril challenge   - Resp status stable but Cr uptrending, pt lethargic and waxing/waning alertness  - Nephrology consulted, appreciate recs   - Initiation of CRRT, transfer to ICU   - BMP q4   - Renally dose meds   - Avoid renal toxins (ACEi/ARB, NSAIDs, contrast, etc.)   - Strict I&Os

## 2020-12-01 NOTE — ASSESSMENT & PLAN NOTE
-Body mass index is 37.22 kg/m².  -bariatric equipment as needed  -encourage lifestyle modifications

## 2020-12-01 NOTE — SUBJECTIVE & OBJECTIVE
Interval History: Afebrile overnight, hypotensive episodes MAP 69-82. SpO2 >90% on RA. Pt remains anuric after Lasix + Diuril challenge. Hyponatremia worsened today to 122. Still on insulin gtt and NPO. Pt is lethargic but arousable and oriented x3 although waxes and wanes. Pt complains of continued lower abdominal pain. No other complaints today.    Review of Systems   Constitutional: Positive for activity change, appetite change and fatigue. Negative for chills and fever.   HENT: Negative for hearing loss, sneezing and trouble swallowing.    Respiratory: Negative for cough, shortness of breath and wheezing.    Cardiovascular: Negative for chest pain and leg swelling.   Gastrointestinal: Positive for abdominal pain, diarrhea, nausea, rectal pain and vomiting. Negative for blood in stool.   Genitourinary: Positive for difficulty urinating, dysuria and pelvic pain. Negative for flank pain.   Musculoskeletal: Negative for arthralgias, neck pain and neck stiffness.   Skin: Negative for color change, pallor and rash.   Neurological: Negative for syncope, weakness and headaches.   Psychiatric/Behavioral: Negative for confusion and hallucinations. The patient is not nervous/anxious.      Objective:     Vital Signs (Most Recent):  Temp: 98.3 °F (36.8 °C) (11/30/20 1808)  Pulse: 76 (11/30/20 1808)  Resp: 18 (11/30/20 1808)  BP: (!) 121/54 (11/30/20 1808)  SpO2: 99 % (11/30/20 1808) Vital Signs (24h Range):  Temp:  [97.8 °F (36.6 °C)-99.5 °F (37.5 °C)] 98.3 °F (36.8 °C)  Pulse:  [71-89] 76  Resp:  [12-20] 18  SpO2:  [91 %-100 %] 99 %  BP: ()/(49-55) 121/54     Weight: 115.7 kg (255 lb)  Body mass index is 39.94 kg/m².    Intake/Output Summary (Last 24 hours) at 11/30/2020 1852  Last data filed at 11/30/2020 1225  Gross per 24 hour   Intake 920 ml   Output 90 ml   Net 830 ml      Physical Exam  Vitals signs and nursing note reviewed.   Constitutional:       General: She is not in acute distress.     Appearance: Normal  appearance. She is obese.   HENT:      Head: Normocephalic and atraumatic.      Right Ear: External ear normal.      Left Ear: External ear normal.      Nose: Nose normal.      Mouth/Throat:      Mouth: Mucous membranes are moist.      Pharynx: Oropharynx is clear.   Eyes:      Conjunctiva/sclera: Conjunctivae normal.      Pupils: Pupils are equal, round, and reactive to light.   Neck:      Musculoskeletal: Normal range of motion and neck supple.   Cardiovascular:      Rate and Rhythm: Normal rate and regular rhythm.      Pulses: Normal pulses.      Heart sounds: Normal heart sounds.   Pulmonary:      Effort: Pulmonary effort is normal. No respiratory distress.      Breath sounds: Normal breath sounds. No wheezing or rhonchi.   Abdominal:      General: Abdomen is flat. Bowel sounds are decreased. There is no distension.      Palpations: Abdomen is soft.      Tenderness: There is abdominal tenderness (lower quadrants) in the suprapubic area. There is guarding. There is no right CVA tenderness, left CVA tenderness or rebound.      Hernia: No hernia is present.      Comments: White discharge from anus  IR drain to lower abd area; purulent drainage    Genitourinary:     Comments: Left CVAT  Musculoskeletal: Normal range of motion.         General: No deformity or signs of injury.   Skin:     General: Skin is warm and dry.      Capillary Refill: Capillary refill takes less than 2 seconds.   Neurological:      General: No focal deficit present.      Mental Status: She is alert and oriented to person, place, and time.      Sensory: No sensory deficit.      Motor: No weakness.   Psychiatric:         Mood and Affect: Mood normal.         Behavior: Behavior normal.         Thought Content: Thought content normal.         Judgment: Judgment normal.         Significant Labs:   CBC:   Recent Labs   Lab 11/30/20  0531 11/30/20  1414 12/01/20  0226   WBC 16.52* 18.24* 16.65*   HGB 7.2* 8.3* 7.9*   HCT 23.7* 27.5* 26.1*   *  558* 480*     CMP:   Recent Labs   Lab 12/01/20  0226 12/01/20  0720 12/01/20  1103   * 121* 122*   K 5.1 4.5 4.5   CL 97 96 96   CO2 14* 15* 17*   * 180* 202*   BUN 21 21 22   CREATININE 5.4* 5.6* 5.9*   CALCIUM 8.0* 7.7* 7.6*   ANIONGAP 11 10 9   EGFRNONAA 7.9* 7.5* 7.1*     Lactic Acid:   Recent Labs   Lab 11/30/20  0842 11/30/20  1027 11/30/20  1628   LACTATE 1.4 2.1 1.0     All pertinent labs within the past 24 hours have been reviewed.    Significant Imaging: I have reviewed all pertinent imaging results/findings within the past 24 hours.

## 2020-12-01 NOTE — H&P
[facility]  Critical Care Medicine  History & Physical    Patient Name: aNvin Beck  MRN: [id number]  Admission Date: 11/26/2020  Hospital Length of Stay: 4 days  Code Status: Full Code  Attending Physician: Chetan Lang MD   Primary Care Provider: Elvira Quinones MD   Principal Problem: Type 2 diabetes mellitus with hyperglycemia, with long-term current use of insulin    Subjective:     HPI:  Patient is a 60 y.o. female with significant past medical history of HTN, HLD, DM II with recent revision of erosion of implanted vaginal mesh and prosthetic materials on 10/26 presented to hospital complaining of lower abdominal/pelvic pain. Post-op patient had been d/c'd w/ march cath which was removed on 11/19 and pain has been present since then. Patient endorses that she has been noncompliant with her home insulin/diabetes regimen for the last two days because she has not been feeling well. Imaging in ED shows multilobar fluid collection adjacent to bladder concerning for bladder perforation and bilateral hydronephrosis. Labs show a glucose of 626 w/ + bhb of 4.2 and pH 7.27, MICU had been consulted for DKA - started insulin ggt and started treatment for abx. On 12/1 nephrology wanting to start CRRT due to hyponatremia (122 on day of consultation)       Hospital/ICU Course:  60 yaer old female with history of HTN, HLD, noncompliance with T2DM with recent revision of erosion of implanted mesh/prosthetic materials found to have intra-abdominal abscesses sp IR drain placement (11/28), in DKA (beta hydroxy 4.2, glucose 626 and initial bicarb 16), now with anuria requiring CRRT for both metabolic clearance and slow correction of her hyponatremia (120s).      Past Medical History:   Diagnosis Date    Allergy     Arrhythmia     Arthritis     Bronchial asthma     Diabetes mellitus     Glaucoma     Hormone disorder     hysterectomy    Hypercholesteremia     Hypertension      Hyperthyroidism     Insomnia     Kidney stones     Thyroid disease     Urine, incontinence, stress female     UTI (urinary tract infection) 11/27/2020       Past Surgical History:   Procedure Laterality Date    APPENDECTOMY      BACK SURGERY      disc removal     CARPAL TUNNEL RELEASE Bilateral     CHOLECYSTECTOMY      CYSTOSCOPY N/A 10/26/2020    Procedure: CYSTOSCOPY;  Surgeon: Wilner Goel MD;  [facility];  Service: OB/GYN;  Laterality: N/A;    FRACTURE SURGERY Right     wrist    [facility]  [facility]    JOINT REPLACEMENT      KNEE ARTHROSCOPY W/ DEBRIDEMENT      KNEE SURGERY Right     Knee replacement    LITHOTRIPSY      HI REMOVAL OF OVARY/TUBE(S)  9/9/13    benign    removal of round ligament mass      ROBOT-ASSISTED LAPAROSCOPIC LYSIS OF ADHESIONS USING DA JAMES XI N/A 10/26/2020    Procedure: XI ROBOTIC LYSIS, ADHESIONS;  Surgeon: Wilner Goel MD;  Location: [facility] OR;  Service: OB/GYN;  Laterality: N/A;  ROBOTIC MOBILIZATION OF BLADDER- DR BURNHAM    SPINE SURGERY      WRIST FRACTURE SURGERY Left        Review of patient's allergies indicates:   Allergen Reactions    Penicillins Shortness Of Breath, Itching and Swelling     Tolerates cefepime 11/30/2020       Family History     Problem Relation (Age of Onset)    Cancer Mother, Brother, Brother, Brother, Brother    Colon cancer Brother        Tobacco Use    Smoking status: Current Every Day Smoker     Packs/day: 0.50     Years: 30.00     Pack years: 15.00    Smokeless tobacco: Never Used   Substance and Sexual Activity    Alcohol use: No    Drug use: No    Sexual activity: Not Currently     Partners: Male     Birth control/protection: Surgical      Review of Systems   Unable to perform ROS: Mental status change     Objective:     Vital Signs (Most Recent):  Temp: 98 °F (36.7 °C) (12/01/20 1400)  Pulse: 81 (12/01/20 1500)  Resp: (!) 24 (12/01/20 1500)  BP: 136/60 (12/01/20 1500)  SpO2: 100 %  (12/01/20 1500) Vital Signs (24h Range):  Temp:  [97.3 °F (36.3 °C)-98.3 °F (36.8 °C)] 98 °F (36.7 °C)  Pulse:  [73-82] 81  Resp:  [17-24] 24  SpO2:  [94 %-100 %] 100 %  BP: (110-154)/(51-73) 136/60   Weight: 107.8 kg (237 lb 10.5 oz)  Body mass index is 37.22 kg/m².      Intake/Output Summary (Last 24 hours) at 12/1/2020 1614  Last data filed at 12/1/2020 1600  Gross per 24 hour   Intake 21.5 ml   Output 224 ml   Net -202.5 ml       Physical Exam  Constitutional:       General: She is not in acute distress.     Appearance: She is obese. She is not ill-appearing or toxic-appearing.   HENT:      Head: Normocephalic and atraumatic.      Nose: No congestion or rhinorrhea.      Mouth/Throat:      Pharynx: No oropharyngeal exudate or posterior oropharyngeal erythema.   Eyes:      General: No scleral icterus.        Right eye: No discharge.         Left eye: No discharge.      Extraocular Movements: Extraocular movements intact.      Pupils: Pupils are equal, round, and reactive to light.   Neck:      Musculoskeletal: Normal range of motion and neck supple. No neck rigidity or muscular tenderness.   Cardiovascular:      Rate and Rhythm: Normal rate and regular rhythm.      Pulses: Normal pulses.      Heart sounds: No murmur.   Pulmonary:      Effort: Pulmonary effort is normal. No respiratory distress.      Breath sounds: Normal breath sounds. No stridor. No wheezing.   Abdominal:      General: Abdomen is flat. Bowel sounds are normal. There is no distension.      Palpations: Abdomen is soft. There is no mass.      Tenderness: There is abdominal tenderness (drain in place, dressings clean dry and intact).      Hernia: No hernia is present.   Musculoskeletal: Normal range of motion.         General: No swelling, tenderness or deformity.   Skin:     General: Skin is warm and dry.      Coloration: Skin is not jaundiced or pale.      Findings: No bruising.   Neurological:      General: No focal deficit present.      Mental  Status: She is alert. Mental status is at baseline. She is disoriented.      Comments: Somewhat tangential on exam, incoherency          Vents:  Oxygen Concentration (%): 30 (11/30/20 0332)  Lines/Drains/Airways     Central Venous Catheter Line            Trialysis (Dialysis) Catheter 12/01/20 1547 right internal jugular less than 1 day          Drain                 Urethral Catheter 11/27/20 0145 Latex 16 Fr. 4 days         Closed/Suction Drain 11/28/20 1358 Right Abdomen Bulb 10 Fr. 3 days          Peripheral Intravenous Line                 Peripheral IV - Single Lumen 11/30/20 1142 20 G;1 3/4 in Left;Anterior Upper Arm 1 day         Peripheral IV - Single Lumen 11/30/20 1142 20 G;1 3/4 in Right;Anterior Upper Arm 1 day              Significant Labs:    CBC/Anemia Profile:  Recent Labs   Lab 11/30/20  0531 11/30/20  1414 12/01/20  0226   WBC 16.52* 18.24* 16.65*   HGB 7.2* 8.3* 7.9*   HCT 23.7* 27.5* 26.1*   * 558* 480*   MCV 95 96 96   RDW 13.3 13.2 13.4        Chemistries:  Recent Labs   Lab 11/30/20  0532  12/01/20  0226 12/01/20  0720 12/01/20  1103   *   < > 122* 121* 122*   K 4.4   < > 5.1 4.5 4.5   CL 97   < > 97 96 96   CO2 18*   < > 14* 15* 17*   BUN 16   < > 21 21 22   CREATININE 4.2*   < > 5.4* 5.6* 5.9*   CALCIUM 8.0*   < > 8.0* 7.7* 7.6*   MG 2.7*  --  2.6  --   --    PHOS 3.9  --  4.3  --   --     < > = values in this interval not displayed.       BMP:   Recent Labs   Lab 12/01/20  0226  12/01/20  1103   *   < > 202*   *   < > 122*   K 5.1   < > 4.5   CL 97   < > 96   CO2 14*   < > 17*   BUN 21   < > 22   CREATININE 5.4*   < > 5.9*   CALCIUM 8.0*   < > 7.6*   MG 2.6  --   --     < > = values in this interval not displayed.     CMP:   Recent Labs   Lab 12/01/20  0226 12/01/20  0720 12/01/20  1103   * 121* 122*   K 5.1 4.5 4.5   CL 97 96 96   CO2 14* 15* 17*   * 180* 202*   BUN 21 21 22   CREATININE 5.4* 5.6* 5.9*   CALCIUM 8.0* 7.7* 7.6*   ANIONGAP 11 10 9    EGFRNONAA 7.9* 7.5* 7.1*       Significant Imaging: I have reviewed all pertinent imaging results/findings within the past 24 hours.    Assessment/Plan:     Pulmonary  Asthma  No PFTs recorded    On duo nebs  Continue to monitor    Cardiac/Vascular  Mixed hyperlipidemia  Home rosuvastatin    Continue rosuvastatin    Essential hypertension  Home benzapril    Holding - normotensive here in hosptial    Renal/  Hyponatremia  See ATN    UTI (urinary tract infection)  Ecoli in urine.    Continue ceftriaxone    Urinary retention  10/2020 with bladder mesh removal, initial urinary retention secondary to anatomic obstruction.  Sp march placed by urology  Renal us with bilateral hydronephrosis; follow up CT after drains showed no hyrdonephrosis    Continue march    Abnormal computed tomography of bladder  History of gyn surgery 10/2020 for bladder mesh removal, CT abdomen pelvis showing fluid collections, IR drain placed 11/28 showing GPCs,     On Cefepime  Urology following, appreciate recs    Endocrine  * Type 2 diabetes mellitus with hyperglycemia, with long-term current use of insulin  Medication noncompliance, hemoglobin A1c 11.4 on admission    Continue insulin ggt  Endocrine following      Diabetic ketoacidosis without coma associated with type 2 diabetes mellitus  DKA secondary to medication non-compliance  On day of admission 7.297/30/98. AG gap - 9      Continues to be on insulin ggt   -follow DKA protocol   -gap closed, had hyperglycemia after DKA protocol finished, restarted insulin ggt  Endocrine following - appreciate recs    Hyperthyroidism  History of Grave's disease 2017  TSH this admission nl  Home methimazole  Endocrine following, appreciate recs    Severe obesity (BMI 35.0-35.9 with comorbidity)  Will assess issue when stepped down    Other  Acute kidney injury (LAURIE) with acute tubular necrosis (ATN)  ATN secondary to insults from pyelonephritis/urinary obstructions/contrast administration. Central  line placed for CRRT needs. Patient with hyponatremia and nephrology following to correct Na slowly from 122.     BMP q4 for na checks   -max na to 130  CRRT orders per nephrology            Critical Care Daily Checklist:    A: Awake: RASS Goal/Actual Goal:    Actual: Duarte Agitation Sedation Scale (RASS): Restless   B: Spontaneous Breathing Trial Performed?     C: SAT & SBT Coordinated?  na                      D: Delirium: CAM-ICU     E: Early Mobility Performed? Yes   F: Feeding Goal:    Status:     Current Diet Order   Procedures    Diet NPO Except for: Ice Chips, Sips with Medication     Order Specific Question:   Except for     Answer:   Ice Chips     Order Specific Question:   Except for     Answer:   Sips with Medication      AS: Analgesia/Sedation none   T: Thromboembolic Prophylaxis Heparin 5k   H: HOB > 300 Yes   U: Stress Ulcer Prophylaxis (if needed) NA   G: Glucose Control Insulin ggt   B: Bowel Function Stool Occurrence: 0   I: Indwelling Catheter (Lines & Cifuentes) Necessity Cifuentes in place   D: De-escalation of Antimicrobials/Pharmacotherapies na    Plan for the day/ETD Central line and CRRT    Code Status:  Family/Goals of Care: Full Code  NA       Critical secondary to Patient has a condition that poses threat to life and bodily function: CRRT     Critical care was time spent personally by me on the following activities: development of treatment plan with patient or surrogate and bedside caregivers, discussions with consultants, evaluation of patient's response to treatment, examination of patient, ordering and performing treatments and interventions, ordering and review of laboratory studies, ordering and review of radiographic studies, pulse oximetry, re-evaluation of patient's condition. This critical care time did not overlap with that of any other provider or involve time for any procedures.     Giovanni Franks MD  Critical Care Medicine  [facility]

## 2020-12-01 NOTE — PLAN OF CARE
POC reviewed with pt, Collins. No s/s of respiratory or cardiac distress. NSR on telemetry. VS stable on RA. Cifuentes in place for retention. Insulin infusing at 1.3 units/kg/hr. NPO status maintained except for sips with meds. No c/o nausea. Pain managed with PRN. Blood glucose monitored per order; q1h.    IR drain w/ tan drainage to LLQ. Flushed 20 mL per order.  Pt twitching w/ no change during the shift.      Pt free from falls and injuries, bed in low position, side rails up x2, call light within reach.    Will continue to monitor.

## 2020-12-01 NOTE — PLAN OF CARE
CMICU DAILY GOALS       A: Awake    RASS: Goal -    Actual - RASS (Duarte Agitation-Sedation Scale): 1-->restless   Restraint necessity:    B: Breath   SBT: Not intubated   C: Coordinate A & B, analgesics/sedatives   Pain: managed    SAT: Not intubated  D: Delirium   CAM-ICU:    E: Early(intubated/ Progressive (non-intubated) Mobility   MOVE Screen: Fail   Activity: Activity Management: arm raise - L1  FAS: Feeding/Nutrition   Diet order: Diet/Nutrition Received: NPO, ice chips, sips of water, Specialty Diet/Nutrition Received: (fluid rest. 500ml) Fluid restriction:    T: Thrombus   DVT prophylaxis: VTE Required Core Measure: (SCDs) Sequential compression device initiated/maintained  H: HOB Elevation   Head of Bed (HOB): HOB at 30-45 degrees  U: Ulcer Prophylaxis   GI: yes  G: Glucose control   managed Glycemic Management: blood glucose monitoring, insulin infusion adjusted  S: Skin   Bundle compliance: yes   Bathing/Skin Care: bedtime care Date: 12/01/2020 @ 17:00  B: Bowel Function   no issues   I: Indwelling Catheters   Cifuentes necessity:      Urethral Catheter 11/27/20 0145 Latex 16 Fr.-Reason for Continuing Urinary Catheterization: Critically ill in ICU and requiring hourly monitoring of intake/output   CVC necessity: Yes   IPAD offered: No  D: De-escalation Antibx   Yes  Plan for the day   Patient admitted to CMICU room 6088. Patient is AAOx4. VSS. Insulin gtt continued. Accu checks Q4H continued. Cifuentes continued with minimal UOP. Physician placed trialysis cath today. Planning for CRRT. Will continue plan of care.  Family/Goals of care/Code Status   Code Status: Full Code     No acute events throughout day, VS and assessment per flow sheet, patient progressing towards goals as tolerated, plan of care reviewed with Navin Beck and family, all concerns addressed, will continue to monitor.

## 2020-12-01 NOTE — SUBJECTIVE & OBJECTIVE
"Interval HPI:   NPO today  Has decreasing glucose requirements  BG trend 124-229  Overnight events:victorina  Eating:   NPO  Nausea: No  Hypoglycemia and intervention: No  Fever: No  TPN and/or TF: No  If yes, type of TF/TPN and rate: na    BP (!) 115/54   Pulse 78   Temp 97.3 °F (36.3 °C) (Oral)   Resp 20   Ht 5' 7" (1.702 m)   Wt 115.7 kg (255 lb)   SpO2 97%   BMI 39.94 kg/m²     Labs Reviewed and Include    Recent Labs   Lab 12/01/20  0226   *   CALCIUM 8.0*   *   K 5.1   CO2 14*   CL 97   BUN 21   CREATININE 5.4*     Lab Results   Component Value Date    WBC 16.65 (H) 12/01/2020    HGB 7.9 (L) 12/01/2020    HCT 26.1 (L) 12/01/2020    MCV 96 12/01/2020     (H) 12/01/2020     Recent Labs   Lab 11/27/20  1239   TSH 0.519     Lab Results   Component Value Date    HGBA1C 11.4 (H) 11/27/2020       Nutritional status:   Body mass index is 39.94 kg/m².  Lab Results   Component Value Date    ALBUMIN 2.4 (L) 11/26/2020    ALBUMIN 4.1 10/26/2020    ALBUMIN 4.8 09/25/2020     No results found for: PREALBUMIN    Estimated Creatinine Clearance: 14.2 mL/min (A) (based on SCr of 5.4 mg/dL (H)).    Accu-Checks  Recent Labs     11/30/20  1940 11/30/20  2057 11/30/20  2207 11/30/20  2319 12/01/20  0012 12/01/20  0118 12/01/20  0218 12/01/20  0318 12/01/20  0409 12/01/20  0522   POCTGLUCOSE 229* 165* 150* 169* 161* 179* 171* 182* 165* 170*       Current Medications and/or Treatments Impacting Glycemic Control  Immunotherapy:    Immunosuppressants     None        Steroids:   Hormones (From admission, onward)    Start     Stop Route Frequency Ordered    11/27/20 0959  melatonin tablet 6 mg      -- Oral Nightly PRN 11/27/20 0903        Pressors:    Autonomic Drugs (From admission, onward)    None        Hyperglycemia/Diabetes Medications:   Antihyperglycemics (From admission, onward)    Start     Stop Route Frequency Ordered    12/01/20 1130  insulin aspart U-100 pen 7 Units      -- SubQ 3 times daily with meals " 12/01/20 0757    12/01/20 0900  insulin regular in 0.9 % NaCl 100 unit/100 mL (1 unit/mL) infusion     Question:  Enter initial dose (Units/hr):  Answer:  1.3    -- IV Continuous 12/01/20 0757    12/01/20 0859  insulin aspart U-100 pen 1-10 Units      -- SubQ Every 6 hours PRN 12/01/20 0759    11/28/20 1245  insulin regular in 0.9 % NaCl 100 unit/100 mL (1 unit/mL) infusion     Question:  Enter initial dose (Units/hr):  Answer:  4    11/28 2300 IV Continuous 11/28/20 1133

## 2020-12-01 NOTE — HOSPITAL COURSE
11/28/20:  Evaluated by PT and OT.  BM CGA-modA.  Sit to stand CGA and transfer CGA-Bro.  Ambulated ~12 ft + 12 ft CGA with RW.  Toileting totalA.  12/7/20:  Participated with PT and OT.  BM SBA.  Sit to stand modA and transfers min-modA with RW.  Ambulated ~4 steps Bro.  Grooming SBA.  UBD Bro.   12/8/20:  Participated with PT.  BM SBA.  Sit to stand and transfers Bro with RW.  Ambulated ~3-4 ft Bro with RW.

## 2020-12-01 NOTE — ASSESSMENT & PLAN NOTE
Medication noncompliance, hemoglobin A1c 11.4 on admission    Continue insulin ggt  Endocrine following

## 2020-12-01 NOTE — HPI
Navin Beck is a 60-year-old female with PMHx of HTN, HLD,DMII, hyperthyroidism, asthma, glaucoma, insomnia, and revision of erosion of implanted vaginal mesh and prosthetic materials on 10/26.  Patient presented to [facility] on 11/26/20 for vaginal pain, rectal pain, abdominal pain, and urinary retention.  On arrival, found to be in DKA with multilobar fluid collection adjacent to bladder concerning for bladder perforation and bilateral hydronephrosis.  Admitted to Hospital Medicine for DKA, SALLY, complicated UTI, and pelvic fluid collections.  Started on ceftriaxone and march inserted.  Urology and GYN consulted.  IR consulted and drain placed on 11/28.  Hospital course further complicated by Sally (nephrology following), hyponatremia, and AMS.  PM&R consulted for rehab evaluation.      Functional History: Patient lives in [place] with her son in a 1st floor apartment without steps to enter.  Prior to admission, she was (I) with ADLs and mobility.  DME: none.

## 2020-12-01 NOTE — PROGRESS NOTES
POC reviewed with pt, verbalized understanding. Spoke to marilee Giang (number on sticky note) about pts transfer. VSS on RA. AAOX4, lethargic. Remains free of falls and injury. Sacrum WNL. waffle mattress in place.     - march in place, minimal urine output, march care, some discharge noted, not new  - LT IR drain in place, flushing orders, tan/purulent drainage   - transitional insulin gtt, accu Q 4, sliding scale    - slight breakdown noted on bridge of nose from cpap    NPO, denies nausea, pt had 5 ice chips today in total, no other intake. Pain controlled, rapid team and critical care said to hold pain meds for now. Telemetry, NSR.  Patient denies chest pain & SOB.    Report given to ICU nurse. ICU will be resuming care.

## 2020-12-01 NOTE — CONSULTS
Inpatient consult to Physical Medicine Rehab  Consult performed by: LAINE Coe  Consult ordered by: Alessia Johnston MD  Reason for consult: rehab evaluation      Consult received.  Reviewed patient history and current admission.  PM&R following.    SIDRA Coe, LAINE-C  Physical Medicine & Rehabilitation   12/01/2020

## 2020-12-01 NOTE — SUBJECTIVE & OBJECTIVE
Interval History: Pt seen at bedside. Encephalopathic this AM, drifts off during conversation. Cr continues to uptrend 5.6 today and no significant UO recorded overnight with lasix and diuril boluses. Na 121    Objective:     Vital Signs (Most Recent):  Temp: 98.1 °F (36.7 °C) (12/01/20 1158)  Pulse: 78 (12/01/20 1158)  Resp: 20 (12/01/20 1158)  BP: 135/60 (12/01/20 1158)  SpO2: 100 % (12/01/20 1158)  O2 Device (Oxygen Therapy): room air (12/01/20 1158) Vital Signs (24h Range):  Temp:  [97.3 °F (36.3 °C)-98.3 °F (36.8 °C)] 98.1 °F (36.7 °C)  Pulse:  [73-82] 78  Resp:  [14-20] 20  SpO2:  [93 %-100 %] 100 %  BP: (110-150)/(49-73) 135/60     Weight: 115.7 kg (255 lb) (11/27/20 1126)  Body mass index is 39.94 kg/m².  Body surface area is 2.34 meters squared.    I/O last 3 completed shifts:  In: 920 [P.O.:820; I.V.:100]  Out: 234 [Urine:94; Drains:140]    Physical Exam  Constitutional:       Appearance: She is obese.      Comments: lethargic   HENT:      Head: Normocephalic and atraumatic.      Nose: Nose normal.      Mouth/Throat:      Mouth: Mucous membranes are moist.      Pharynx: Oropharynx is clear.   Eyes:      Conjunctiva/sclera: Conjunctivae normal.      Pupils: Pupils are equal, round, and reactive to light.   Neck:      Musculoskeletal: Normal range of motion and neck supple.   Cardiovascular:      Rate and Rhythm: Regular rhythm.   Pulmonary:      Effort: Pulmonary effort is normal.   Abdominal:      General: Abdomen is flat.      Palpations: Abdomen is soft.      Tenderness: There is abdominal tenderness.   Musculoskeletal: Normal range of motion.      Right lower leg: No edema.      Left lower leg: No edema.   Skin:     General: Skin is warm and dry.   Neurological:      General: No focal deficit present.      Mental Status: She is alert.         Significant Labs:  CBC:   Recent Labs   Lab 12/01/20  0226   WBC 16.65*   RBC 2.72*   HGB 7.9*   HCT 26.1*   *   MCV 96   MCH 29.0   MCHC 30.3*     CMP:    Recent Labs   Lab 11/26/20  2156  12/01/20  0720   *   < > 180*   CALCIUM 8.9   < > 7.7*   ALBUMIN 2.4*  --   --    PROT 8.6*  --   --    *   < > 121*   K 5.3*   < > 4.5   CO2 16*   < > 15*   CL 96   < > 96   BUN 19   < > 21   CREATININE 1.5*   < > 5.6*   ALKPHOS 188*  --   --    ALT 14  --   --    AST 10  --   --    BILITOT 0.2  --   --     < > = values in this interval not displayed.     All labs within the past 24 hours have been reviewed.

## 2020-12-01 NOTE — ASSESSMENT & PLAN NOTE
10/2020 with bladder mesh removal, initial urinary retention secondary to anatomic obstruction.  Sp march placed by urology  Renal us with bilateral hydronephrosis; follow up CT after drains showed no hyrdonephrosis    Continue march

## 2020-12-01 NOTE — PROGRESS NOTES
"    Received a call from Dr Johnston regarding patient's current condition and possible need for ICU.     Assessed patient. VS are WNL except for mild hypertension 150/90.  She is oriented but reports feeling " not herself". She is jittery, has asterixis and sudden spastic movements of her extremities.   She has only produced 40cc of urine since this morning (does have a march catheter). Na decreased from 130 to 120 in 24 hours. (last Na improved to 123). Worsening LAURIE with Cr 5/BUN7 and worsening metabolic acidosis with CO2 12 K 5.0      Plan:  1. Spoke to Nephrology- recommended:  · 1.5 mg/kg Lasix challenge (total of 180 mg) with 250 mg of Diuril.   · Sodium Bicarb 1300 mg PO TID  · They do not feel strong about starting emergent dialysis but will continue to monitor labs and clinical condition.  · ABG    2. Spoke to ICU:  · They will assess patient and follow Nephrology recs.       Will continue to follow closely      Aurelio Solis MD.  Internal Medicine PGY-2    "

## 2020-12-01 NOTE — ASSESSMENT & PLAN NOTE
Mesh removed by GYN 10/26; march placed  March removed 11/19 --> increased pain  Admitted 11/27 with worsening abd pain found to be related to intra-abd fluid collections.   UCx 11/26 with E.coli    PLAN  - Urology consulted; lesions adjacent to bladder per CT cysto 11/27  - Gyn consulted  - IR consulted drained abscess 11/28; f/u cultures  - Broad spectrum abx: CTX, Vanc, flagyl d6

## 2020-12-01 NOTE — PLAN OF CARE
POC reviewed with pt, verbalized understanding. AAOx4. VSS on RA. Please refer to pervious note.    -NPO, denies nausea  -IVF for during shift, 1L bolus given  -Cifuentes in place, 40cc of urine early morning. No urine after bolus.   -Up in chair for several hrs. X1 assist with walker.   -redness to bridge of nose noted this shift.   -LLQ IR drain flushed with per protocol, purulent tan output.  -Increased twitching during shift. Presenting in upper and lower extremities.       Pain managed with PRN meds. Bed in lowest position, Call light within reach. WCTM.

## 2020-12-01 NOTE — PROGRESS NOTES
[facility] Hospital Medicine  Progress Note    Patient Name: Navin Beck  MRN: [id number]  Patient Class: IP- Inpatient   Admission Date: 11/26/2020  Length of Stay: 3 days  Attending Physician: Alessia Johnston MD  Primary Care Provider: Elvira Quinones MD    [facility] Medicine Team: [facility]OSP MED 2 Ciera Maurer MD    Subjective:     Principal Problem:Diabetic ketoacidosis without coma associated with type 2 diabetes mellitus        HPI:  Ms. Beck is a 59yo F with PMH of HTN, HLD, T2DM, Hyperthyroidism, and Asthma who had recent GYN surgery on 10/26 due to erosion of bladder mesh / prosthetic material with Cifuentes removal on 11/19. She presents to [facility] due to 4-5 days of not feeling well, she has had loss of appetite and weakness with 10/10 throbbing lower abdominal pain associated with vaginal pain and rectal pressure and non-bloody diarrhea. She has had trouble urinating for the past 5 days as well. She takes oxycodone 5 at home for pain, but it did not help. She endorses nausea and vomiting (4 times, non-bloody) also associated with the pain. She denies fever, chills, chest pain, SOB, trouble breathing, leg swelling, or any other symptom at this time.    ED Course: VSS, afebrile but leukocytosis at 17.85. Pt found to have DKA - Gluc 626, AG16, BHB 4.2, Fluids, insulin gtt started and Glucose down-trended to 200s. UA showed 2RBC, >100 WBC, bacteria, given CTX. CT A/P showed multiple fluid collections adjacent to bladder concerning for bladder perf with multiple urinomas, with mild bilateral hydronephrosis. Urology consulted and placed a Cifuentes with 900 UOP drained. GYN consulted due to recent surgery.     Pt will be admitted to hospital medicine for further management of DKA and possible intra-abdominal infection.     Overview/Hospital Course:  Pt was admitted on 11/28 due to 10/10 abdominal pain and also found to be in DKA. Pt was started on IVF, insulin gtt, and kept NPO  "with glucose downtrending and AG closed. CT abdomen showed multiple fluid collections near bladder. Renal cystography with "hypoattenuating collections in the lower pelvis adjacent to the urinary bladder with no definite extravasated contrast material appreciated within the pelvic collections to suggest communication with the bladder".  Urology and GYN on board, agree with plan for IR to place drain in fluid collections for culture and source control. Pt was started on CTX and Flagyl to cover for UTI and intra-abdominal infection, broadened to Cefepime, and Flagyl on 11/26; vanc added later. Endocrine consulted and managed the patient while acutely ill on insulin drip then transitioned to SQ when BG stabilized. IR place low abdominal drain to abscess on 11/28 with 250ml purulent material immediately expressed. Drained remained in place after the procedure. Urology continued to assess patient and recommended CT RSS after identifying some left CVAT and increased SCr with reduced UOP on 11/29, no hydronephrosis does not believe LAURIE is post-renal in nature. 11/30 pt remains anuric, Cr up-trending to 4.2, with hyponatremia. Nephrology consulted.    Interval History: Hypoxia overnight to 75% on RA, only increased to 80s on NC, SpO2 >90 on CPAP, CXR showed R sided atelectasis. Pt also became anuric with increasingly worse LAURIE. Remains on insulin gtt for DKA. Hyponatremic to 120s, NPO for fluid restriction. Pt still has 7/10 abdominal pain, ok to d/c PCA pump for PRN pain meds. No other complaints today.    Review of Systems   Constitutional: Positive for activity change, appetite change and fatigue. Negative for chills and fever.   HENT: Negative for hearing loss, sneezing and trouble swallowing.    Respiratory: Negative for cough, shortness of breath and wheezing.    Cardiovascular: Negative for chest pain and leg swelling.   Gastrointestinal: Positive for abdominal pain, diarrhea, nausea, rectal pain and vomiting. " Negative for blood in stool.   Genitourinary: Positive for difficulty urinating, dysuria and pelvic pain. Negative for flank pain.   Musculoskeletal: Negative for arthralgias, neck pain and neck stiffness.   Skin: Negative for color change, pallor and rash.   Neurological: Negative for syncope, weakness and headaches.   Psychiatric/Behavioral: Negative for confusion and hallucinations. The patient is not nervous/anxious.      Objective:     Vital Signs (Most Recent):  Temp: 97.8 °F (36.6 °C) (11/30/20 1544)  Pulse: 82 (11/30/20 1544)  Resp: 20 (11/30/20 1544)  BP: (!) 110/49 (11/30/20 1544)  SpO2: (!) 93 % (11/30/20 1544) Vital Signs (24h Range):  Temp:  [97.8 °F (36.6 °C)-99.5 °F (37.5 °C)] 97.8 °F (36.6 °C)  Pulse:  [71-91] 82  Resp:  [12-20] 20  SpO2:  [91 %-100 %] 93 %  BP: ()/(49-55) 110/49     Weight: 115.7 kg (255 lb)  Body mass index is 39.94 kg/m².    Intake/Output Summary (Last 24 hours) at 11/30/2020 1701  Last data filed at 11/30/2020 1225  Gross per 24 hour   Intake 1420 ml   Output 90 ml   Net 1330 ml      Physical Exam  Vitals signs and nursing note reviewed.   Constitutional:       General: She is not in acute distress.     Appearance: Normal appearance. She is obese.   HENT:      Head: Normocephalic and atraumatic.      Right Ear: External ear normal.      Left Ear: External ear normal.      Nose: Nose normal.      Mouth/Throat:      Mouth: Mucous membranes are moist.      Pharynx: Oropharynx is clear.   Eyes:      Conjunctiva/sclera: Conjunctivae normal.      Pupils: Pupils are equal, round, and reactive to light.   Neck:      Musculoskeletal: Normal range of motion and neck supple.   Cardiovascular:      Rate and Rhythm: Normal rate and regular rhythm.      Pulses: Normal pulses.      Heart sounds: Normal heart sounds.   Pulmonary:      Effort: Pulmonary effort is normal. No respiratory distress.      Breath sounds: Normal breath sounds. No wheezing or rhonchi.   Abdominal:      General:  Abdomen is flat. Bowel sounds are decreased. There is no distension.      Palpations: Abdomen is soft.      Tenderness: There is abdominal tenderness (lower quadrants) in the suprapubic area. There is guarding. There is no right CVA tenderness, left CVA tenderness or rebound.      Hernia: No hernia is present.      Comments: White discharge from anus  IR drain to lower abd area; purulent drainage    Genitourinary:     Comments: Left CVAT  Musculoskeletal: Normal range of motion.         General: No deformity or signs of injury.   Skin:     General: Skin is warm and dry.      Capillary Refill: Capillary refill takes less than 2 seconds.   Neurological:      General: No focal deficit present.      Mental Status: She is alert and oriented to person, place, and time.      Sensory: No sensory deficit.      Motor: No weakness.   Psychiatric:         Mood and Affect: Mood normal.         Behavior: Behavior normal.         Thought Content: Thought content normal.         Judgment: Judgment normal.         Significant Labs:   BMP:   Recent Labs   Lab 11/30/20  0532  11/30/20  1628   *   < > 274*   *   < > 120*   K 4.4   < > 4.8   CL 97   < > 98   CO2 18*   < > 12*   BUN 16   < > 20   CREATININE 4.2*   < > 4.9*   CALCIUM 8.0*   < > 7.8*   MG 2.7*  --   --     < > = values in this interval not displayed.     CBC:   Recent Labs   Lab 11/29/20  0554 11/30/20  0531 11/30/20  1414   WBC 20.10* 16.52* 18.24*   HGB 8.5* 7.2* 8.3*   HCT 28.4* 23.7* 27.5*   * 489* 558*     Lactic Acid:   Recent Labs   Lab 11/30/20  0842 11/30/20  1027 11/30/20  1628   LACTATE 1.4 2.1 1.0       Significant Imaging: I have reviewed all pertinent imaging results/findings within the past 24 hours.      Assessment/Plan:      * Diabetic ketoacidosis without coma associated with type 2 diabetes mellitus  Pt presented to [facility] ED with Gluc 626, HCO3 16, AG 16, BHB 4.2, ketonuria. 2L IVF given in ED with insulin gtt started. Glucose trended  downward to 274. Pt was then admitted to hospital medicine for further management of DKA.    PLAN  - Endocrine consulted, appreciate recs   - NPO, SQ trial 11/28 not successful   - CLD; IVF boluses PRN   - Transitional Insulin gtt   - Glucose remains uncontrolled (300s today) 2/2 active infection  - BMP q6  - Will replace K PRN  - Accuchecks q1 while on insulin gtt  - Nausea: zofran PRN     Hyponatremia  Pt hyponatremia to 120s with spontaneous jerking motions today.     - Avoid hypotonic fluids  - NPO, fluid restriction  - Trend BMP q6  - 125 cc NS + 1L NS    Postprocedural intraabdominal abscess  Mesh removed by GYN 10/26; march placed  March removed 11/19 --> increased pain  Admitted 11/27 with worsening abd pain found to be related to intra-abd fluid collections.   UCx 11/26 with E.coli    PLAN  - Urology consulted; lesions adjacent to bladder per CT cysto 11/27  - Gyn consulted  - IR consulted drained abscess 11/28; f/u cultures  - Broad spectrum abx: CTX, Vanc, flagyl      Acute kidney injury (LAURIE) with acute tubular necrosis (ATN)  Cr 1.5 on presentation.   Likely multi-factorial on presentation 2/2 pre-renal volume depletion in the context of DKA, UTI, and post-renal obstruction  Repeat LAURIE on 11/29 likely intrinsic ATN in etiology    PLAN  - Cr 1.3 after March placement and IVF --> increasing --> 4.5 on 11/30  - Will monitor Cr with BMPq6 while in DKA  - Treating UTI with Cefepime  - Maintain March  - Renally dose meds  - Avoid renal toxins (ACEi/ARB, NSAIDs, contrast, etc.)  - Strict I&Os  - SCr increasing 11/30; trend q6h  - Nephrology consulted, appreciate recs  -NS gtt at 125cc/hr for 10hrs  -Additional fluid bolus of 1L of NS and monitor UOP.   - Stop IV fluids if pt becomes short of breath.   -Recommend against further administration of CT contrast   -Ordered urine lytes and urea   -Will order retroperitoneal US   -Will maintain low threshold to initiate dialysis   -Maintain Hgb >7.0  -Keep MAP  >65  -Renally dose meds and avoid nephrotoxic meds    UTI (urinary tract infection)  Pt presented with lower abdominal/vaginal/rectal pain with dysuria and urinary retention.   UA: 2 RBC, >100 WBC, 2+ leukocytes, bacteria  Recent UTIs with pan-sensitive E coli.    PLAN  - Ceftriaxone started  - UCx with E coli sens to cefepime and ceftriaxone  - Cifuentes placed for urinary retention, maintain Cifuentes per Urology      Urinary retention  Pt has recent h/o GYN surgery in 10/2020 for bladder mesh removal, Cifuentes was removed on 11/19. For the past few days pt has had increased trouble urinating associated with pain.     PLAN  CT A/P 11/27: Multiple fluid collections adjacent to bladder. Mild bilateral hydronephrosis.   Retention likely 2/2 anatomic obstruction, pelvic fluid collection, diabetic bladder  - Urology consulted in ED, appreciate recs   - Cifuentes placed 800cc drained, bladder irrigated and all fluid removed with ease   - Maintain Cifuentes   - Rec drainage of pelvic fluid collection by IR with cultures & creatinine   - CT RSS ordered by uro 11/29 for left CVAT, incr SCr, decr UOP   - No hydronephrosis seen on CT, most likely not post-renal in etiology  - GYN consulted, appreciate consult   - Agrees with Urology plan for drainage with IR  - IR consulted, appreciate recs   - IR placed drain 11/28 into pelvic abscess, draining pink pustular fluid    Type 2 diabetes mellitus with hyperglycemia, with long-term current use of insulin  Last Hb A1c 10.3 on 9/25/20  Home regimen: Metformin 500 BID, Aspart 15U TID, Glargine 60 BID  Pt says her blood sugar has been running to the 300s for the past few days     PLAN  - Hold oral anti-glycemics while hospitalized  - Endocrine consulted, recs appreciated  - Accu-checks qhour  - See DKA notes      Asthma  Pt states she uses Albuterol inhaler TID every day.     PRN  - Duonebs q6 PRN    Hyperthyroidism  Continue home Methimazole    PLAN  - TSH WNL  - Endocrine aware    Severe obesity (BMI  35.0-35.9 with comorbidity)   on exercise, diet, and weight management     Abnormal computed tomography of bladder  [place] [facility] [place] to 5-6 days of 10/10 lower abdomen, vaginal, and rectal pain. Pt has recent h/o GYN surgery in 10/2020 for bladder mesh removal, Cifuentes was removed on 11/19.  CT A/P 11/27: Multiple fluid collections adjacent to bladder. Mild bilateral hydronephrosis.   UCx 11/26: E.coli sensitive to CTX, cefepime    PLAN  - GYN consulted, appreciate consult; Agrees with Urology plan for drainage with IR  - IR consulted, appreciate recs; Drain in place; cultures sent 11/28    - Gram stain, many GPC   - Cultures pending  - Pain management: PCA pump initially   - PCA pump d/c on 11/30   - PRN opiates  - ID: Cefepime, Vanc and Flagyl to cover for intra-abdominal infection d3   - Cefepime switched to CTX 11/30   - Urology recs appreciated; left CVAT, ordered CT RSS 11/29 hydronephrosis resolved   - Maintain Cifuentes   - UOP dropped, anuric on 11/30   - LAURIE most likely not post-renal in nature per urology    Mixed hyperlipidemia  Continue home statin      Essential hypertension  Hold home Benazepril due to LAURIE, low BP    PLAN  - Will monitor BP and add anti-hypertensive as necessary         VTE Risk Mitigation (From admission, onward)         Ordered     IP VTE HIGH RISK PATIENT  Once      11/27/20 0903                Discharge Planning   RAFA: 12/3/2020     Code Status: Full Code   Is the patient medically ready for discharge?: No    Reason for patient still in hospital (select all that apply): Patient unstable, Patient trending condition, Laboratory test, Treatment and Consult recommendations  Discharge Plan A: Home with family          Ciera Maurer MD  Department of Hospital Medicine   [facility]

## 2020-12-01 NOTE — PROGRESS NOTES
0900 - Rapid response, Dr Johnston, charge nurses and nephrology MDs assessed this AM. Labs being monitored. VSS on RA. Pt still having moderate amounts of twitching. Pt Oriented X 4 but very lethargic and falls asleep when communicating. No change from yesterday.     1230 - spoke to rapid team, they said pt will be transferring to ICU today for line placement and dialysis. Updated rapid team on pt status. Charge nurse aware.     Upon assessment, not safe for patient to swallow any meds or eat due to lethargy. Transitional insulin gtt w/ PRN sliding scale, endocrine involved.     Awaiting further instruction,  Wctm.

## 2020-12-01 NOTE — CONSULTS
[facility]  Critical Care Medicine  Consult Note    Patient Name: Navin Beck  MRN: [id number]  Admission Date: 11/26/2020  Hospital Length of Stay: 4 days  Code Status: Full Code  Attending Physician: Chetan Lang MD   Primary Care Provider: Elvira Quinones MD   Principal Problem: Type 2 diabetes mellitus with hyperglycemia, with long-term current use of insulin    Inpatient consult to Critical Care Medicine  Consult performed by: Adriana Altamirano MD  Consult ordered by: Ciera Maurer MD          Thank you for the consult. Pt to be admitted to MICU.   Full H&P to follow.      Adriana Altamirano MD  Critical Care Medicine  [facility]

## 2020-12-01 NOTE — ASSESSMENT & PLAN NOTE
DKA secondary to medication non-compliance  On day of admission 7.297/30/98. AG gap - 9      Continues to be on insulin ggt   -follow DKA protocol   -gap closed, had hyperglycemia after DKA protocol finished, restarted insulin ggt  Endocrine following - appreciate recs

## 2020-12-01 NOTE — ASSESSMENT & PLAN NOTE
History of Grave's disease 2017  TSH this admission nl  Home methimazole  Endocrine following, appreciate recs

## 2020-12-01 NOTE — ASSESSMENT & PLAN NOTE
-worsening hyponatremia, possibly 2/2 combination of D5 administration with decreased UOP  -Stop duloxetine   -initiating RRT today with D5 ggt post-filter to avoid overcorrection of sodium  -max Na over next 24 hours- 128

## 2020-12-01 NOTE — HOSPITAL COURSE
60 yaer old female with history of HTN, HLD, noncompliance with T2DM with recent revision of erosion of implanted mesh/prosthetic materials found to have intra-abdominal abscesses sp IR drain placement (11/28), in DKA (beta hydroxy 4.2, glucose 626 and initial bicarb 16), now with anuria requiring CRRT for both metabolic clearance and slow correction of her hyponatremia (120s).

## 2020-12-01 NOTE — ASSESSMENT & PLAN NOTE
Pt presented to [facility] due to 5-6 days of 10/10 lower abdomen, vaginal, and rectal pain. Pt has recent h/o GYN surgery in 10/2020 for bladder mesh removal, Cifuentes was removed on 11/19.  CT A/P 11/27: Multiple fluid collections adjacent to bladder. Mild bilateral hydronephrosis.   UCx 11/26: E.coli sensitive to CTX, cefepime    PLAN  - GYN consulted, appreciate consult; Agrees with Urology plan for drainage with IR  - IR consulted, appreciate recs; Drain in place; cultures sent 11/28    - Gram stain, many GPC   - Cultures pending  - Pain management: PCA pump initially   - PCA pump d/c on 11/30   - PRN opiates  - ID: Cefepime, Vanc and Flagyl to cover for intra-abdominal infection d3   - Cefepime switched to CTX 11/30   - Urology recs appreciated; left CVAT, ordered CT RSS 11/29 hydronephrosis resolved   - Maintain Cifuentes   - UOP dropped, anuric on 11/30   - LAURIE most likely not post-renal in nature per urology

## 2020-12-01 NOTE — CARE UPDATE
"RAPID RESPONSE NURSE PROACTIVE ROUNDING NOTE     Time of Visit: 0945    Admit Date: 2020  LOS: 4  Code Status: Full Code   Date of Visit: 2020  : 1960  Age: 60 y.o.  Sex: female  Race: Black or   Bed: 10621062 A:   MRN: [id number]  Was the patient discharged from an ICU this admission? no   Was the patient discharged from a PACU within last 24 hours?  no  Did the patient receive conscious sedation/general anesthesia in last 24 hours?  no  Was the patient in the ED within the past 24 hours?  no  Was the patient started on NIPPV within the past 24 hours?  no  Attending Physician: Alessia Johnston MD  Primary Service: [facility] MED 2    ASSESSMENT     Notified by charge RN during rounding.  Reason for alert: AMS, worsening kidney function    Diagnosis: Type 2 diabetes mellitus with hyperglycemia, with long-term current use of insulin    Abnormal Vital Signs: /60 (BP Location: Right leg, Patient Position: Lying)   Pulse 78   Temp 98.1 °F (36.7 °C) (Oral)   Resp 20   Ht 5' 7" (1.702 m)   Wt 115.7 kg (255 lb)   SpO2 100%   BMI 39.94 kg/m²      Clinical Issues: Neuro    Patient  has a past medical history of Allergy, Arrhythmia, Arthritis, Bronchial asthma, Diabetes mellitus, Glaucoma, Hormone disorder, Hypercholesteremia, Hypertension, Hyperthyroidism, Insomnia, Kidney stones, Thyroid disease, Urine, incontinence, stress female, and UTI (urinary tract infection).      Asked by charge RN to assess pt for AMS and increasing lethargy. Upon arrival to bedside, pt arousing to voice, AAOx3, drowsy and falling asleep while talking. Complaining of mild stomach pain and says she feels tired. Pt with intermittent twitching in extremities while talking. Pt's kidney function worsening with no response to lasix challenge and diuril overnight. Nephro consulted, to assess dialysis needs today.      INTERVENTIONS/ RECOMMENDATIONS     NPO if pt remains lethargic, drowsy. Hold narcotics. Monitor " VS and strict I/O's. Will continue to follow, pending Nephrology recs.    Discussed plan of care with RN, Kiki.    PHYSICIAN ESCALATION     Yes/No  yes    Orders received and case discussed with Dr. Johnston.    Disposition: Remain in room 1062.    FOLLOW-UP     Call back the Rapid Response Nurse, Toan Shah RN at [phone] for additional questions or concerns.      \

## 2020-12-01 NOTE — ASSESSMENT & PLAN NOTE
Pt hyponatremia to 120s with spontaneous jerking motions today.     - Avoid hypotonic fluids  - NPO, fluid restriction  - Trend BMP q6  - 125 cc NS + 1L NS

## 2020-12-01 NOTE — PROGRESS NOTES
[facility]  Nephrology  Progress Note    Patient Name: Navin Beck  MRN: [id number]  Admission Date: 11/26/2020  Hospital Length of Stay: 4 days  Attending Provider: Alessia Johnston MD   Primary Care Physician: Elvira Quinones MD  Principal Problem:Type 2 diabetes mellitus with hyperglycemia, with long-term current use of insulin    Subjective:     HPI: Ms Beck is a 60 year old female with a past medical history of HTN, LD, T2DM, hyperparathyroidism, and asthma who presented to [facility] with c/o abdominal pain s/p bladder mesh surgery. Pt noted to be in DKA on presentation and is currently on insulin gtt and being followed by endocrine. CT abdomen obtained revealed multiple fluid collections near bladder; IR was consulted and pt is s/p drain placement. Initial coverage provided with vanc, now on cefepime and flagyl. Baseline sCr roughly 1.0-1.2, now elevated to 4.6 at time of consult (11/30/2020). Pt now anuric with 20mL UOP in the past 24hrs at time of consult (11/30/2020). Pt denies endorses lower abdominal tenderness. Pt denies SOB. Nephrology has been consulted for LAURIE.     -HPI was obtained from patient interview, and from review of the patient's EMR.         Interval History: Pt seen at bedside. Encephalopathic this AM, drifts off during conversation. Cr continues to uptrend 5.6 today and no significant UO recorded overnight with lasix and diuril boluses. Na 121    Objective:     Vital Signs (Most Recent):  Temp: 98.1 °F (36.7 °C) (12/01/20 1158)  Pulse: 78 (12/01/20 1158)  Resp: 20 (12/01/20 1158)  BP: 135/60 (12/01/20 1158)  SpO2: 100 % (12/01/20 1158)  O2 Device (Oxygen Therapy): room air (12/01/20 1158) Vital Signs (24h Range):  Temp:  [97.3 °F (36.3 °C)-98.3 °F (36.8 °C)] 98.1 °F (36.7 °C)  Pulse:  [73-82] 78  Resp:  [14-20] 20  SpO2:  [93 %-100 %] 100 %  BP: (110-150)/(49-73) 135/60     Weight: 115.7 kg (255 lb) (11/27/20 1126)  Body mass index is 39.94 kg/m².  Body surface  area is 2.34 meters squared.    I/O last 3 completed shifts:  In: 920 [P.O.:820; I.V.:100]  Out: 234 [Urine:94; Drains:140]    Physical Exam  Constitutional:       Appearance: She is obese.      Comments: lethargic   HENT:      Head: Normocephalic and atraumatic.      Nose: Nose normal.      Mouth/Throat:      Mouth: Mucous membranes are moist.      Pharynx: Oropharynx is clear.   Eyes:      Conjunctiva/sclera: Conjunctivae normal.      Pupils: Pupils are equal, round, and reactive to light.   Neck:      Musculoskeletal: Normal range of motion and neck supple.   Cardiovascular:      Rate and Rhythm: Regular rhythm.   Pulmonary:      Effort: Pulmonary effort is normal.   Abdominal:      General: Abdomen is flat.      Palpations: Abdomen is soft.      Tenderness: There is abdominal tenderness.   Musculoskeletal: Normal range of motion.      Right lower leg: No edema.      Left lower leg: No edema.   Skin:     General: Skin is warm and dry.   Neurological:      General: No focal deficit present.      Mental Status: She is alert.         Significant Labs:  CBC:   Recent Labs   Lab 12/01/20  0226   WBC 16.65*   RBC 2.72*   HGB 7.9*   HCT 26.1*   *   MCV 96   MCH 29.0   MCHC 30.3*     CMP:   Recent Labs   Lab 11/26/20  2156  12/01/20  0720   *   < > 180*   CALCIUM 8.9   < > 7.7*   ALBUMIN 2.4*  --   --    PROT 8.6*  --   --    *   < > 121*   K 5.3*   < > 4.5   CO2 16*   < > 15*   CL 96   < > 96   BUN 19   < > 21   CREATININE 1.5*   < > 5.6*   ALKPHOS 188*  --   --    ALT 14  --   --    AST 10  --   --    BILITOT 0.2  --   --     < > = values in this interval not displayed.     All labs within the past 24 hours have been reviewed.        Assessment/Plan:     Hyponatremia  -worsening hyponatremia, possibly 2/2 combination of D5 administration with decreased UOP  -Stop duloxetine   -initiating RRT today with D5 ggt post-filter to avoid overcorrection of sodium  -max Na over next 24 hours- 128      Acute  kidney injury (LAURIE) with acute tubular necrosis (ATN)  ATN in setting of contrast administration and sepsis 2/2 UTI, baseline cr 1.0-1.2 that trended up to 4.6 at time of consult.     -received contrast on 11/27/2020  -Urine [id number]/30/2020 many muddy brown granular casts, massive WBC's  -purulent drainage from abdominal ROSAURA drain similar in appearance to her urine; concerning for possibility of communication between bladder and fluid collection  -Worsening Cr today and minimal UO overnight despite lasix and diuril boluses  -Will plan on initiating prismax today 12/1 with D5 ggt post-filter to prevent overcorrection of hyponatremia  -q 4 BMPs  -Maximum gabapentin dose 300mg in CKD patients  -Maintain Hgb >7.0, Keep MAP >65  -Renally dose meds and avoid nephrotoxic meds  -Will continue to follow closely   -Plan discussed with staff, Dr Rose     Diabetic ketoacidosis without coma associated with type 2 diabetes mellitus  -Management per primary team         Thank you for your consult. I will follow-up with patient. Please contact us if you have any additional questions.    Usha Ames MD  Nephrology  [facility]

## 2020-12-01 NOTE — ASSESSMENT & PLAN NOTE
-related to prolonged/acute hospital course  -(I) with ADLs and mobility at baseline  See hospital course for functional status.    Recommendations  -  Encourage mobility, OOB in chair, and early ambulation as appropriate  -  PT/OT evaluate and treat  -  Pain management  -  DVT prophylaxis if appropriate   -  Monitor for and prevent skin breakdown and pressure ulcers  · Early mobility, repositioning/weight shifting every 20-30 minutes when sitting, turn patient every 2 hours, proper mattress/overlay and chair cushioning, pressure relief/heel protector boots

## 2020-12-01 NOTE — SUBJECTIVE & OBJECTIVE
Past Medical History:   Diagnosis Date    Allergy     Arrhythmia     Arthritis     Bronchial asthma     Diabetes mellitus     Glaucoma     Hormone disorder     hysterectomy    Hypercholesteremia     Hypertension     Hyperthyroidism     Insomnia     Kidney stones     Thyroid disease     Urine, incontinence, stress female     UTI (urinary tract infection) 11/27/2020     Past Surgical History:   Procedure Laterality Date    APPENDECTOMY      BACK SURGERY      disc removal     CARPAL TUNNEL RELEASE Bilateral     CHOLECYSTECTOMY      CYSTOSCOPY N/A 10/26/2020    Procedure: CYSTOSCOPY;  Surgeon: Wilner Goel MD;  Location: [facility] OR;  Service: OB/GYN;  Laterality: N/A;    FRACTURE SURGERY Right     wrist    [facility]  2010    [facility]    JOINT REPLACEMENT      KNEE ARTHROSCOPY W/ DEBRIDEMENT      KNEE SURGERY Right     Knee replacement    LITHOTRIPSY      IN REMOVAL OF OVARY/TUBE(S)  9/9/13    benign    removal of round ligament mass      ROBOT-ASSISTED LAPAROSCOPIC LYSIS OF ADHESIONS USING DA JAMES XI N/A 10/26/2020    Procedure: XI ROBOTIC LYSIS, ADHESIONS;  Surgeon: Wilner Goel MD;  Location: [facility] OR;  Service: OB/GYN;  Laterality: N/A;  ROBOTIC MOBILIZATION OF BLADDER- DR BURNHAM    SPINE SURGERY      WRIST FRACTURE SURGERY Left      Review of patient's allergies indicates:   Allergen Reactions    Penicillins Shortness Of Breath, Itching and Swelling     Tolerates cefepime 11/30/2020       Scheduled Medications:    albuterol-ipratropium  3 mL Nebulization Q6H WAKE    cefTRIAXone (ROCEPHIN) IVPB  2 g Intravenous Q24H    gabapentin  300 mg Oral Daily    heparin (porcine)  5,000 Units Subcutaneous Q8H    methIMAzole  10 mg Oral Daily    metroNIDAZOLE  500 mg Oral Q8H    mupirocin   Nasal BID    pantoprazole  40 mg Oral Daily    polyethylene glycol  17 g Oral Daily    rosuvastatin  20 mg Oral Daily    senna-docusate 8.6-50 mg  1 tablet Oral BID     sodium bicarbonate  1,300 mg Oral TID       PRN Medications: acetaminophen, glucagon (human recombinant), glucose, glucose, HYDROmorphone, insulin aspart U-100, magnesium sulfate IVPB, melatonin, ondansetron, oxyCODONE, oxyCODONE, sodium chloride 0.9%, sodium phosphate IVPB, sodium phosphate IVPB, sodium phosphate IVPB, Pharmacy to dose Vancomycin consult **AND** vancomycin - pharmacy to dose    Family History     Problem Relation (Age of Onset)    Cancer Mother, Brother, Brother, Brother, Brother    Colon cancer Brother        Tobacco Use    Smoking status: Current Every Day Smoker     Packs/day: 0.50     Years: 30.00     Pack years: 15.00    Smokeless tobacco: Never Used   Substance and Sexual Activity    Alcohol use: No    Drug use: No    Sexual activity: Not Currently     Partners: Male     Birth control/protection: Surgical     Review of Systems   Unable to perform ROS: Mental status change (lethargy)     Objective:     Vital Signs (Most Recent):  Temp: 98 °F (36.7 °C) (12/01/20 1400)  Pulse: 78 (12/01/20 1400)  Resp: (!) 24 (12/01/20 1400)  BP: (!) 154/64 (12/01/20 1400)  SpO2: 100 % (12/01/20 1400)    Vital Signs (24h Range):  Temp:  [97.3 °F (36.3 °C)-98.3 °F (36.8 °C)] 98 °F (36.7 °C)  Pulse:  [73-82] 78  Resp:  [17-24] 24  SpO2:  [93 %-100 %] 100 %  BP: (110-154)/(49-73) 154/64     Body mass index is 37.22 kg/m².    Physical Exam  Vitals signs reviewed.   Constitutional:       General: She is not in acute distress.     Appearance: She is well-developed.   HENT:      Head: Normocephalic and atraumatic.      Right Ear: External ear normal.      Left Ear: External ear normal.      Nose: Nose normal.   Eyes:      General: No scleral icterus.        Right eye: No discharge.         Left eye: No discharge.      Comments: Bilateral periorbital edema   Neck:      Musculoskeletal: Normal range of motion.   Cardiovascular:      Rate and Rhythm: Normal rate.   Pulmonary:      Effort: Pulmonary effort is normal.  No respiratory distress.   Abdominal:      General: There is no distension.      Palpations: Abdomen is soft.      Tenderness: There is no abdominal tenderness.   Musculoskeletal:         General: Swelling and tenderness present.      Comments: General weakness and deconditioning    Skin:     General: Skin is warm and dry.      Findings: No rash.   Neurological:      Mental Status: She is lethargic and confused.      Motor: Weakness and tremor present.      Comments: Follows commands   Psychiatric:         Attention and Perception: She is inattentive.         Behavior: Behavior is slowed.         Cognition and Memory: Cognition is impaired.            Diagnostic Results:   Labs: Reviewed  X-Ray: Reviewed  CT: Reviewed

## 2020-12-01 NOTE — ASSESSMENT & PLAN NOTE
Pt has recent h/o GYN surgery in 10/2020 for bladder mesh removal, Cifuentes was removed on 11/19. For the past few days pt has had increased trouble urinating associated with pain.     PLAN  CT A/P 11/27: Multiple fluid collections adjacent to bladder. Mild bilateral hydronephrosis.   Retention likely 2/2 anatomic obstruction, pelvic fluid collection, diabetic bladder  - Urology consulted in ED, appreciate recs   - Cifuentes placed 800cc drained, bladder irrigated and all fluid removed with ease   - Maintain Cifuentes   - Rec drainage of pelvic fluid collection by IR with cultures & creatinine   - CT RSS ordered by uro 11/29 for left CVAT, incr SCr, decr UOP   - No hydronephrosis seen on CT, most likely not post-renal in etiology  - GYN consulted, appreciate consult   - Agrees with Urology plan for drainage with IR  - IR consulted, appreciate recs   - IR placed drain 11/28 into pelvic abscess, draining pink pustular fluid

## 2020-12-01 NOTE — ASSESSMENT & PLAN NOTE
Cr 1.5 on presentation.   Likely multi-factorial on presentation 2/2 pre-renal volume depletion in the context of DKA, UTI, and post-renal obstruction  Repeat LAURIE on 11/29 likely intrinsic ATN in etiology    PLAN  - Cr 1.3 after Cifuentes placement and IVF --> increasing --> 4.5 on 11/30  - Will monitor Cr with BMPq6 while in DKA  - Treating UTI with Cefepime  - Maintain Cifuentes  - Renally dose meds  - Avoid renal toxins (ACEi/ARB, NSAIDs, contrast, etc.)  - Strict I&Os  - SCr increasing 11/30; trend q6h  - Nephrology consulted, appreciate recs  -NS gtt at 125cc/hr for 10hrs  -Additional fluid bolus of 1L of NS and monitor UOP.   - Stop IV fluids if pt becomes short of breath.   -Recommend against further administration of CT contrast   -Ordered urine lytes and urea   -Will order retroperitoneal US   -Will maintain low threshold to initiate dialysis   -Maintain Hgb >7.0  -Keep MAP >65  -Renally dose meds and avoid nephrotoxic meds

## 2020-12-01 NOTE — CARE UPDATE
"RAPID RESPONSE NURSE PROACTIVE ROUNDING NOTE     Time of Visit: 1800    Admit Date: 2020  LOS: 3  Code Status: Full Code   Date of Visit: 2020  : 1960  Age: 60 y.o.  Sex: female  Race: Black or   Bed: [address] [place] [address] A:   MRN: [id number]  Was the patient discharged from an ICU this admission? no   Was the patient discharged from a PACU within last 24 hours?  no  Did the patient receive conscious sedation/general anesthesia in last 24 hours?  no  Was the patient in the ED within the past 24 hours?  no  Was the patient started on NIPPV within the past 24 hours?  no  Attending Physician: Alessia Johnston MD  Primary Service: [facility] MED 2    ASSESSMENT     Notified by Resident on Med Team.  Reason for alert: Hyponatremia    Diagnosis: Diabetic ketoacidosis without coma associated with type 2 diabetes mellitus    Abnormal Vital Signs: BP (!) 121/54 (Patient Position: Lying)   Pulse 76   Temp 98.3 °F (36.8 °C) (Oral)   Resp 18   Ht 5' 7" (1.702 m)   Wt 115.7 kg (255 lb)   SpO2 99%   BMI 39.94 kg/m²      Clinical Issues: Neuro    Patient  has a past medical history of Allergy, Arrhythmia, Arthritis, Bronchial asthma, Diabetes mellitus, Glaucoma, Hormone disorder, Hypercholesteremia, Hypertension, Hyperthyroidism, Insomnia, Kidney stones, Thyroid disease, Urine, incontinence, stress female, and UTI (urinary tract infection).      Went to pt room, pt aroused to voice, oriented x3. Clementine CHAPARRO ordered to stop NS after pt received bolus. Assessed pt drain site, purulent drainage around vaginal area. Contacted team to suggest salt tabs for hyponatremia.      INTERVENTIONS/ RECOMMENDATIONS     Salt tabs, dialysis, more frequent Na checks.     Discussed plan of care with Clementine CHAPARRO.    PHYSICIAN ESCALATION     Yes/No  yes    Orders received and case discussed with Dr. Andres.    Disposition: Remain in room 1062.    FOLLOW-UP     Call back the Rapid Response Nurse, Michael Urbano RN at [phone] for " additional questions or concerns.

## 2020-12-01 NOTE — ASSESSMENT & PLAN NOTE
Pt presented to [facility] due to 5-6 days of 10/10 lower abdomen, vaginal, and rectal pain. Pt has recent h/o GYN surgery in 10/2020 for bladder mesh removal, Cifuentes was removed on 11/19.  CT A/P 11/27: Multiple fluid collections adjacent to bladder. Mild bilateral hydronephrosis.   UCx 11/26: E.coli sensitive to CTX, cefepime    PLAN  - GYN consulted, appreciate consult; Agrees with Urology plan for drainage with IR  - IR consulted, appreciate recs; Drain in place; cultures sent 11/28    - Gram stain, many GPC   - Cultures pending  - Pain management: PCA pump initially   - PCA pump d/c on 11/30   - PRN opiates  - ID: Cefepime, Vanc and Flagyl to cover for intra-abdominal infection d4   - Cefepime switched to CTX 11/30   - Urology recs appreciated; left CVAT, ordered CT RSS 11/29 hydronephrosis resolved   - Maintain Cifuentes   - UOP dropped, anuric on 11/30   - Remains anuric on 12/1 after Lasix + Diuril challenge   - LAURIE most likely not post-renal in nature per urology

## 2020-12-01 NOTE — PT/OT/SLP PROGRESS
Physical Therapy      Patient Name:  Navin Beck   MRN:  [id number]    Attempted to visit pt in AM for treatment session, RN request hold at this time as pt not appropriate for therapy.  RN states pt with altered alertness and continued tremors. Will follow-up next scheduled date.    Cam Whaley, PT   12/1/2020

## 2020-12-01 NOTE — PT/OT/SLP PROGRESS
Occupational Therapy      Patient Name:  Navin Beck   MRN:  [id number]    Patient not seen today secondary to (RN hold). RN hold 2* AMS, increased tremors, and possible transfer to ICU. Will follow-up as scheduled.    Jailyn Sahu OT  12/1/2020

## 2020-12-01 NOTE — SUBJECTIVE & OBJECTIVE
Past Medical History:   Diagnosis Date    Allergy     Arrhythmia     Arthritis     Bronchial asthma     Diabetes mellitus     Glaucoma     Hormone disorder     hysterectomy    Hypercholesteremia     Hypertension     Hyperthyroidism     Insomnia     Kidney stones     Thyroid disease     Urine, incontinence, stress female     UTI (urinary tract infection) 11/27/2020       Past Surgical History:   Procedure Laterality Date    APPENDECTOMY      BACK SURGERY      disc removal     CARPAL TUNNEL RELEASE Bilateral     CHOLECYSTECTOMY      CYSTOSCOPY N/A 10/26/2020    Procedure: CYSTOSCOPY;  Surgeon: Wilner Goel MD;  Location: [facility] OR;  Service: OB/GYN;  Laterality: N/A;    FRACTURE SURGERY Right     wrist    [facility]  2010    [facility]    JOINT REPLACEMENT      KNEE ARTHROSCOPY W/ DEBRIDEMENT      KNEE SURGERY Right     Knee replacement    LITHOTRIPSY      GA REMOVAL OF OVARY/TUBE(S)  9/9/13    benign    removal of round ligament mass      ROBOT-ASSISTED LAPAROSCOPIC LYSIS OF ADHESIONS USING DA JAMES XI N/A 10/26/2020    Procedure: XI ROBOTIC LYSIS, ADHESIONS;  Surgeon: Wilner Goel MD;  Location: [facility] OR;  Service: OB/GYN;  Laterality: N/A;  ROBOTIC MOBILIZATION OF BLADDER- DR BURNHAM    SPINE SURGERY      WRIST FRACTURE SURGERY Left        Review of patient's allergies indicates:   Allergen Reactions    Penicillins Shortness Of Breath, Itching and Swelling     Tolerates cefepime 11/30/2020       Family History     Problem Relation (Age of Onset)    Cancer Mother, Brother, Brother, Brother, Brother    Colon cancer Brother        Tobacco Use    Smoking status: Current Every Day Smoker     Packs/day: 0.50     Years: 30.00     Pack years: 15.00    Smokeless tobacco: Never Used   Substance and Sexual Activity    Alcohol use: No    Drug use: No    Sexual activity: Not Currently     Partners: Male     Birth control/protection: Surgical      Review of Systems    Unable to perform ROS: Mental status change     Objective:     Vital Signs (Most Recent):  Temp: 98 °F (36.7 °C) (12/01/20 1400)  Pulse: 81 (12/01/20 1500)  Resp: (!) 24 (12/01/20 1500)  BP: 136/60 (12/01/20 1500)  SpO2: 100 % (12/01/20 1500) Vital Signs (24h Range):  Temp:  [97.3 °F (36.3 °C)-98.3 °F (36.8 °C)] 98 °F (36.7 °C)  Pulse:  [73-82] 81  Resp:  [17-24] 24  SpO2:  [94 %-100 %] 100 %  BP: (110-154)/(51-73) 136/60   Weight: 107.8 kg (237 lb 10.5 oz)  Body mass index is 37.22 kg/m².      Intake/Output Summary (Last 24 hours) at 12/1/2020 1614  Last data filed at 12/1/2020 1600  Gross per 24 hour   Intake 21.5 ml   Output 224 ml   Net -202.5 ml       Physical Exam  Constitutional:       General: She is not in acute distress.     Appearance: She is obese. She is not ill-appearing or toxic-appearing.   HENT:      Head: Normocephalic and atraumatic.      Nose: No congestion or rhinorrhea.      Mouth/Throat:      Pharynx: No oropharyngeal exudate or posterior oropharyngeal erythema.   Eyes:      General: No scleral icterus.        Right eye: No discharge.         Left eye: No discharge.      Extraocular Movements: Extraocular movements intact.      Pupils: Pupils are equal, round, and reactive to light.   Neck:      Musculoskeletal: Normal range of motion and neck supple. No neck rigidity or muscular tenderness.   Cardiovascular:      Rate and Rhythm: Normal rate and regular rhythm.      Pulses: Normal pulses.      Heart sounds: No murmur.   Pulmonary:      Effort: Pulmonary effort is normal. No respiratory distress.      Breath sounds: Normal breath sounds. No stridor. No wheezing.   Abdominal:      General: Abdomen is flat. Bowel sounds are normal. There is no distension.      Palpations: Abdomen is soft. There is no mass.      Tenderness: There is abdominal tenderness (drain in place, dressings clean dry and intact).      Hernia: No hernia is present.   Musculoskeletal: Normal range of motion.          General: No swelling, tenderness or deformity.   Skin:     General: Skin is warm and dry.      Coloration: Skin is not jaundiced or pale.      Findings: No bruising.   Neurological:      General: No focal deficit present.      Mental Status: She is alert. Mental status is at baseline. She is disoriented.      Comments: Somewhat tangential on exam, incoherency          Vents:  Oxygen Concentration (%): 30 (11/30/20 0332)  Lines/Drains/Airways     Central Venous Catheter Line            Trialysis (Dialysis) Catheter 12/01/20 1547 right internal jugular less than 1 day          Drain                 Urethral Catheter 11/27/20 0145 Latex 16 Fr. 4 days         Closed/Suction Drain 11/28/20 1358 Right Abdomen Bulb 10 Fr. 3 days          Peripheral Intravenous Line                 Peripheral IV - Single Lumen 11/30/20 1142 20 G;1 3/4 in Left;Anterior Upper Arm 1 day         Peripheral IV - Single Lumen 11/30/20 1142 20 G;1 3/4 in Right;Anterior Upper Arm 1 day              Significant Labs:    CBC/Anemia Profile:  Recent Labs   Lab 11/30/20  0531 11/30/20  1414 12/01/20 0226   WBC 16.52* 18.24* 16.65*   HGB 7.2* 8.3* 7.9*   HCT 23.7* 27.5* 26.1*   * 558* 480*   MCV 95 96 96   RDW 13.3 13.2 13.4        Chemistries:  Recent Labs   Lab 11/30/20  0532  12/01/20  0226 12/01/20  0720 12/01/20  1103   *   < > 122* 121* 122*   K 4.4   < > 5.1 4.5 4.5   CL 97   < > 97 96 96   CO2 18*   < > 14* 15* 17*   BUN 16   < > 21 21 22   CREATININE 4.2*   < > 5.4* 5.6* 5.9*   CALCIUM 8.0*   < > 8.0* 7.7* 7.6*   MG 2.7*  --  2.6  --   --    PHOS 3.9  --  4.3  --   --     < > = values in this interval not displayed.       BMP:   Recent Labs   Lab 12/01/20  0226  12/01/20  1103   *   < > 202*   *   < > 122*   K 5.1   < > 4.5   CL 97   < > 96   CO2 14*   < > 17*   BUN 21   < > 22   CREATININE 5.4*   < > 5.9*   CALCIUM 8.0*   < > 7.6*   MG 2.6  --   --     < > = values in this interval not displayed.     CMP:   Recent  Labs   Lab 12/01/20  0226 12/01/20  0720 12/01/20  1103   * 121* 122*   K 5.1 4.5 4.5   CL 97 96 96   CO2 14* 15* 17*   * 180* 202*   BUN 21 21 22   CREATININE 5.4* 5.6* 5.9*   CALCIUM 8.0* 7.7* 7.6*   ANIONGAP 11 10 9   EGFRNONAA 7.9* 7.5* 7.1*       Significant Imaging: I have reviewed all pertinent imaging results/findings within the past 24 hours.

## 2020-12-01 NOTE — ASSESSMENT & PLAN NOTE
-mesh removed by GYN 10/26, march placed   -march removed 11/19  -presented with worsening abd pain found to be related to intra-abd fluid collections  -UCx 11/26 with E.coli  -s/p IR drainage on 11/28  -on ceftriaxone  -management per primary team

## 2020-12-01 NOTE — ASSESSMENT & PLAN NOTE
Pt hyponatremia to 120s with spontaneous jerking motions today.     - Nephrology on board, appreciate recs   - Possibly 2/2 D5 IVF with decreased UOP while in DKA   - CRRT today with D5 gtt post-filter to avoid overcorrection of Na   - BMP q4   - Max Na over 24h 128

## 2020-12-01 NOTE — ASSESSMENT & PLAN NOTE
Pt has recent h/o GYN surgery in 10/2020 for bladder mesh removal, Cifuentes was removed on 11/19. For the past few days pt has had increased trouble urinating associated with pain.     PLAN  CT A/P 11/27: Multiple fluid collections adjacent to bladder. Mild bilateral hydronephrosis.   Retention likely 2/2 anatomic obstruction, pelvic fluid collection, diabetic bladder  - Urology consulted in ED, appreciate recs   - Cifuentes placed 800cc drained, bladder irrigated and all fluid removed with ease   - Maintain Cifuentes   - Rec drainage of pelvic fluid collection by IR with cultures & creatinine   - CT RSS ordered by uro 11/29 for left CVAT, incr SCr, decr UOP   - No hydronephrosis seen on CT, most likely not post-renal in etiology  - GYN consulted, appreciate consult   - Agrees with Urology plan for drainage with IR  - IR consulted, appreciate recs   - IR placed drain 11/28 into pelvic abscess, draining pustular fluid

## 2020-12-01 NOTE — ASSESSMENT & PLAN NOTE
Brief Hx: 60 y.o. female with a diagnosis of HTN, T2DM on MDi insulin, hyperthyroidism who presented with weakness, poor appetite and abdominal pain associated with vaginal pain and rectal pressure and non-bloody diarrhea.   She was found to be in DKA and started on DKA protocol with IVF and IV insulin.   She is being treated with IV antibiotics for pelvic fluids collections suspected to be infectious.     History of poorly controlled T2DM (A1c 11.4%) on MDI insulin and metformin  Presented with intra-abdominal infection and DKA  DKA resolved with IV insulin, IVFs  No steroids, no TFs    Consulted for: dka  DM type: T2DM  A1C: 11.4*  Hypoglycemia: none  Steroids: none  Diet/Tfs: npo  Glucose Goals: 140-180 mg/dL    Glucose Trend x 24hr:   129-229    Home Regimen:    Metformin 500 bid  Lantus 50 units BID  Aspart 10-15 units AC and SSI    GFR 9.1    PLAN:      -  Bridge to Transition gtt 1.3 units/hr   -  Moderate correction scale   -  NPO for procedure, advance to DM diet per primary   -  Accucheck Q4H while NPO then [id number] once procedures completed and tolerating a diet   -  Can hold prandial insulin if patient not eating    Initially switched to basal insulin calculated with a TDD of 0.7/u/kg/day and had significant hyperglycemia.   Consider increase TDD to 1 U/kg/day given expected resistance with active infection.

## 2020-12-01 NOTE — PROGRESS NOTES
Pharmacokinetic Assessment Follow Up: IV Vancomycin    Vancomycin serum concentration assessment(s):    Vancomycin concentration = 20.8 mcg/mL. Drawn ~48 hours after previous dose.  Patient with worsening LAURIE (Scr 5.6 from baseline of 1), 94 mL UOP 24H. Anticipate minimal clearance.    Vancomycin Regimen Plan:  1. Hold vancomycin today  2. Obtain concentration in AM on 12/2/20  3. Goal trough = 15-20 mcg/mL    Thank you for the consult, will continue to follow  Angel Adams Pharm.D., BCPS  [id number]    Drug levels (last 3 results):  Recent Labs   Lab Result Units 11/30/20  0532 12/01/20  0226   Vancomycin, Random ug/mL 25.5 20.8        Patient brief summary:  Navin Beck is a 60 y.o. female initiated on antimicrobial therapy with IV Vancomycin for treatment of intraabdominal abscess (PCN allergy, covering empirically for enterococcus)    Drug Allergies:   Review of patient's allergies indicates:   Allergen Reactions    Penicillins Shortness Of Breath, Itching and Swelling     Tolerates cefepime 11/30/2020       Actual Body Weight:   115.7 kg    Renal Function:   Estimated Creatinine Clearance: 13.7 mL/min (A) (based on SCr of 5.6 mg/dL (H)).,       CBC (last 72 hours):  Recent Labs   Lab Result Units 11/29/20  0554 11/30/20  0531 11/30/20  1414 12/01/20  0226   WBC K/uL 20.10* 16.52* 18.24* 16.65*   Hemoglobin g/dL 8.5* 7.2* 8.3* 7.9*   Hematocrit % 28.4* 23.7* 27.5* 26.1*   Platelets K/uL 594* 489* 558* 480*   Gran % % 73.5* 75.7* 77.3* 77.4*   Lymph % % 14.8* 14.6* 13.4* 12.7*   Mono % % 10.1 7.7 7.2 7.6   Eosinophil % % 0.2 0.8 0.9 1.1   Basophil % % 0.1 0.1 0.2 0.2   Differential Method  Automated Automated Automated Automated       Metabolic Panel (last 72 hours):  Recent Labs   Lab Result Units 11/28/20  1228 11/29/20  0027 11/29/20  0554 11/29/20  0932 11/29/20  1234 11/29/20  1822 11/30/20  0530 11/30/20  0532 11/30/20  1027 11/30/20  1313 11/30/20  1628 11/30/20  1903 11/30/20  2200 12/01/20  0226  12/01/20  0720   Sodium mmol/L 131* 129*  --  123* 121* 125*  --  124* 120* 121* 120* 123* 123* 122* 121*   Sodium, Urine mmol/L  --   --   --   --   --   --  54  --   --   --   --   --   --   --   --    Potassium mmol/L 4.0 4.6  --  4.8 4.6  4.6 4.5  --  4.4 4.7 5.1 4.8 5.0 4.3 5.1 4.5   Chloride mmol/L 100 100  --  96 93* 97  --  97 96 96 98 99 97 97 96   CO2 mmol/L 20* 19*  --  16* 17* 18*  --  18* 15* 14* 12* 12* 16* 14* 15*   Glucose mg/dL 245* 343*  --  541* 480* 220*  --  209* 267* 273* 274* 204* 143* 129* 180*   BUN mg/dL 7* 8  --  12 14 15  --  16 19 18 20 20 20 21 21   Creatinine mg/dL 1.0 1.8*  --  2.9* 3.1* 3.5*  --  4.2* 4.6* 4.9* 4.9* 5.0* 5.1* 5.4* 5.6*   Creatinine, Urine mg/dL  --   --   --   --   --   --  103.0  --   --   --   --   --   --   --   --    Magnesium mg/dL  --   --  1.3*  --   --   --   --  2.7*  --   --   --   --   --  2.6  --    Phosphorus mg/dL  --   --  3.8  --   --   --   --  3.9  --   --   --   --   --  4.3  --        Vancomycin Administrations:  vancomycin given in the last 96 hours                   vancomycin in dextrose 5 % 1 gram/250 mL IVPB 1,000 mg (mg) 1,000 mg New Bag 11/29/20 0506    vancomycin 1.75 g in 5 % dextrose 500 mL IVPB (mg) 1,750 mg New Bag 11/28/20 1639    vancomycin (VANCOCIN) 2,500 mg in dextrose 5 % 500 mL IVPB (mg) 2,500 mg New Bag 11/27/20 0532                Microbiologic Results:  Microbiology Results (last 7 days)     Procedure Component Value Units Date/Time    Blood culture [[id number]] Collected: 11/27/20 1919    Order Status: Completed Specimen: Blood Updated: 11/30/20 2212     Blood Culture, Routine No Growth to date      No Growth to date      No Growth to date      No Growth to date    Blood culture [[id number]] Collected: 11/27/20 1915    Order Status: Completed Specimen: Blood Updated: 11/30/20 2212     Blood Culture, Routine No Growth to date      No Growth to date      No Growth to date      No Growth to date    Aerobic culture [[id number]]  Collected: 11/28/20 1405    Order Status: Completed Specimen: Abscess from Abdomen Updated: 11/30/20 1252     Aerobic Bacterial Culture No growth    Narrative:      Right pelvis    Fungus culture [[id number]] Collected: 11/28/20 1405    Order Status: Completed Specimen: Abscess from Abdomen Updated: 11/30/20 0945     Fungus (Mycology) Culture Culture in progress    Narrative:      Right pelvis    Culture, Anaerobe [[id number]] Collected: 11/28/20 1405    Order Status: Completed Specimen: Abscess from Abdomen Updated: 11/30/20 0640     Anaerobic Culture Culture in progress    Narrative:      Right pelvis    Gram stain [[id number]] Collected: 11/28/20 1405    Order Status: Completed Specimen: Abscess from Abdomen Updated: 11/28/20 2234     Gram Stain Result Moderate WBC's      Moderate Gram positive cocci      Few Gram negative rods      Rare Gram positive rods    Narrative:      Right pelvis    Urine culture [[id number]]  (Abnormal)  (Susceptibility) Collected: 11/26/20 2206    Order Status: Completed Specimen: Urine Updated: 11/28/20 2115     Urine Culture, Routine ESCHERICHIA COLI  10,000 - 49,999 cfu/ml  No other significant isolate      Narrative:      Specimen Source->Urine

## 2020-12-02 LAB
ALBUMIN SERPL BCP-MCNC: 1.6 G/DL (ref 3.5–5.2)
ALP SERPL-CCNC: 203 U/L (ref 55–135)
ALT SERPL W/O P-5'-P-CCNC: 9 U/L (ref 10–44)
ANION GAP SERPL CALC-SCNC: 7 MMOL/L (ref 8–16)
ANION GAP SERPL CALC-SCNC: 7 MMOL/L (ref 8–16)
ANION GAP SERPL CALC-SCNC: 8 MMOL/L (ref 8–16)
ANION GAP SERPL CALC-SCNC: 9 MMOL/L (ref 8–16)
AST SERPL-CCNC: 12 U/L (ref 10–40)
BACTERIA BLD CULT: NORMAL
BACTERIA BLD CULT: NORMAL
BASOPHILS # BLD AUTO: 0.03 K/UL (ref 0–0.2)
BASOPHILS NFR BLD: 0.2 % (ref 0–1.9)
BILIRUB SERPL-MCNC: 0.1 MG/DL (ref 0.1–1)
BLD PROD TYP BPU: NORMAL
BLOOD UNIT EXPIRATION DATE: NORMAL
BLOOD UNIT TYPE CODE: 6200
BLOOD UNIT TYPE: NORMAL
BUN SERPL-MCNC: 16 MG/DL (ref 8–23)
BUN SERPL-MCNC: 16 MG/DL (ref 8–23)
BUN SERPL-MCNC: 18 MG/DL (ref 8–23)
BUN SERPL-MCNC: 19 MG/DL (ref 8–23)
BUN SERPL-MCNC: 20 MG/DL (ref 8–23)
BUN SERPL-MCNC: 20 MG/DL (ref 8–23)
CA-I BLDV-SCNC: 1.09 MMOL/L (ref 1.06–1.42)
CALCIUM SERPL-MCNC: 7.1 MG/DL (ref 8.7–10.5)
CALCIUM SERPL-MCNC: 7.2 MG/DL (ref 8.7–10.5)
CALCIUM SERPL-MCNC: 7.3 MG/DL (ref 8.7–10.5)
CALCIUM SERPL-MCNC: 7.3 MG/DL (ref 8.7–10.5)
CHLORIDE SERPL-SCNC: 97 MMOL/L (ref 95–110)
CHLORIDE SERPL-SCNC: 98 MMOL/L (ref 95–110)
CO2 SERPL-SCNC: 17 MMOL/L (ref 23–29)
CO2 SERPL-SCNC: 18 MMOL/L (ref 23–29)
CO2 SERPL-SCNC: 19 MMOL/L (ref 23–29)
CO2 SERPL-SCNC: 19 MMOL/L (ref 23–29)
CODING SYSTEM: NORMAL
CREAT SERPL-MCNC: 4.1 MG/DL (ref 0.5–1.4)
CREAT SERPL-MCNC: 4.1 MG/DL (ref 0.5–1.4)
CREAT SERPL-MCNC: 4.7 MG/DL (ref 0.5–1.4)
CREAT SERPL-MCNC: 4.8 MG/DL (ref 0.5–1.4)
CREAT SERPL-MCNC: 4.9 MG/DL (ref 0.5–1.4)
CREAT SERPL-MCNC: 4.9 MG/DL (ref 0.5–1.4)
CREAT SERPL-MCNC: 5 MG/DL (ref 0.5–1.4)
CREAT SERPL-MCNC: 5 MG/DL (ref 0.5–1.4)
CREAT SERPL-MCNC: 5.1 MG/DL (ref 0.5–1.4)
CREAT SERPL-MCNC: 5.1 MG/DL (ref 0.5–1.4)
DIFFERENTIAL METHOD: ABNORMAL
DISPENSE STATUS: NORMAL
EOSINOPHIL # BLD AUTO: 0.1 K/UL (ref 0–0.5)
EOSINOPHIL NFR BLD: 0.9 % (ref 0–8)
ERYTHROCYTE [DISTWIDTH] IN BLOOD BY AUTOMATED COUNT: 13.2 % (ref 11.5–14.5)
EST. GFR  (AFRICAN AMERICAN): 10 ML/MIN/1.73 M^2
EST. GFR  (AFRICAN AMERICAN): 10 ML/MIN/1.73 M^2
EST. GFR  (AFRICAN AMERICAN): 10.2 ML/MIN/1.73 M^2
EST. GFR  (AFRICAN AMERICAN): 10.2 ML/MIN/1.73 M^2
EST. GFR  (AFRICAN AMERICAN): 10.5 ML/MIN/1.73 M^2
EST. GFR  (AFRICAN AMERICAN): 10.7 ML/MIN/1.73 M^2
EST. GFR  (AFRICAN AMERICAN): 12.7 ML/MIN/1.73 M^2
EST. GFR  (AFRICAN AMERICAN): 12.7 ML/MIN/1.73 M^2
EST. GFR  (AFRICAN AMERICAN): 9.7 ML/MIN/1.73 M^2
EST. GFR  (AFRICAN AMERICAN): 9.7 ML/MIN/1.73 M^2
EST. GFR  (NON AFRICAN AMERICAN): 11 ML/MIN/1.73 M^2
EST. GFR  (NON AFRICAN AMERICAN): 11 ML/MIN/1.73 M^2
EST. GFR  (NON AFRICAN AMERICAN): 8.4 ML/MIN/1.73 M^2
EST. GFR  (NON AFRICAN AMERICAN): 8.4 ML/MIN/1.73 M^2
EST. GFR  (NON AFRICAN AMERICAN): 8.6 ML/MIN/1.73 M^2
EST. GFR  (NON AFRICAN AMERICAN): 8.6 ML/MIN/1.73 M^2
EST. GFR  (NON AFRICAN AMERICAN): 8.9 ML/MIN/1.73 M^2
EST. GFR  (NON AFRICAN AMERICAN): 8.9 ML/MIN/1.73 M^2
EST. GFR  (NON AFRICAN AMERICAN): 9.1 ML/MIN/1.73 M^2
EST. GFR  (NON AFRICAN AMERICAN): 9.3 ML/MIN/1.73 M^2
GLUCOSE SERPL-MCNC: 212 MG/DL (ref 70–110)
GLUCOSE SERPL-MCNC: 221 MG/DL (ref 70–110)
GLUCOSE SERPL-MCNC: 221 MG/DL (ref 70–110)
GLUCOSE SERPL-MCNC: 222 MG/DL (ref 70–110)
GLUCOSE SERPL-MCNC: 222 MG/DL (ref 70–110)
GLUCOSE SERPL-MCNC: 225 MG/DL (ref 70–110)
GLUCOSE SERPL-MCNC: 225 MG/DL (ref 70–110)
GLUCOSE SERPL-MCNC: 231 MG/DL (ref 70–110)
GLUCOSE SERPL-MCNC: 231 MG/DL (ref 70–110)
GLUCOSE SERPL-MCNC: 259 MG/DL (ref 70–110)
GLUCOSE SERPL-MCNC: 259 MG/DL (ref 70–110)
GLUCOSE SERPL-MCNC: 262 MG/DL (ref 70–110)
HCT VFR BLD AUTO: 22 % (ref 37–48.5)
HGB BLD-MCNC: 6.9 G/DL (ref 12–16)
IMM GRANULOCYTES # BLD AUTO: 0.13 K/UL (ref 0–0.04)
IMM GRANULOCYTES NFR BLD AUTO: 1 % (ref 0–0.5)
LYMPHOCYTES # BLD AUTO: 1.4 K/UL (ref 1–4.8)
LYMPHOCYTES NFR BLD: 10.2 % (ref 18–48)
MAGNESIUM SERPL-MCNC: 2.2 MG/DL (ref 1.6–2.6)
MAGNESIUM SERPL-MCNC: 2.3 MG/DL (ref 1.6–2.6)
MCH RBC QN AUTO: 28.6 PG (ref 27–31)
MCHC RBC AUTO-ENTMCNC: 31.4 G/DL (ref 32–36)
MCV RBC AUTO: 91 FL (ref 82–98)
MONOCYTES # BLD AUTO: 0.9 K/UL (ref 0.3–1)
MONOCYTES NFR BLD: 7.1 % (ref 4–15)
NEUTROPHILS # BLD AUTO: 10.7 K/UL (ref 1.8–7.7)
NEUTROPHILS NFR BLD: 80.6 % (ref 38–73)
NRBC BLD-RTO: 0 /100 WBC
PHOSPHATE SERPL-MCNC: 4.3 MG/DL (ref 2.7–4.5)
PHOSPHATE SERPL-MCNC: 4.3 MG/DL (ref 2.7–4.5)
PHOSPHATE SERPL-MCNC: 4.4 MG/DL (ref 2.7–4.5)
PHOSPHATE SERPL-MCNC: 4.4 MG/DL (ref 2.7–4.5)
PHOSPHATE SERPL-MCNC: 4.5 MG/DL (ref 2.7–4.5)
PHOSPHATE SERPL-MCNC: 4.5 MG/DL (ref 2.7–4.5)
PLATELET # BLD AUTO: 458 K/UL (ref 150–350)
PMV BLD AUTO: 9.6 FL (ref 9.2–12.9)
POCT GLUCOSE: 210 MG/DL (ref 70–110)
POCT GLUCOSE: 219 MG/DL (ref 70–110)
POCT GLUCOSE: 235 MG/DL (ref 70–110)
POCT GLUCOSE: 237 MG/DL (ref 70–110)
POCT GLUCOSE: 238 MG/DL (ref 70–110)
POCT GLUCOSE: 240 MG/DL (ref 70–110)
POCT GLUCOSE: 246 MG/DL (ref 70–110)
POCT GLUCOSE: 250 MG/DL (ref 70–110)
POCT GLUCOSE: 289 MG/DL (ref 70–110)
POTASSIUM SERPL-SCNC: 4.2 MMOL/L (ref 3.5–5.1)
POTASSIUM SERPL-SCNC: 4.3 MMOL/L (ref 3.5–5.1)
POTASSIUM SERPL-SCNC: 4.3 MMOL/L (ref 3.5–5.1)
POTASSIUM SERPL-SCNC: 4.4 MMOL/L (ref 3.5–5.1)
POTASSIUM SERPL-SCNC: 4.5 MMOL/L (ref 3.5–5.1)
PROT SERPL-MCNC: 5.9 G/DL (ref 6–8.4)
RBC # BLD AUTO: 2.41 M/UL (ref 4–5.4)
SODIUM SERPL-SCNC: 122 MMOL/L (ref 136–145)
SODIUM SERPL-SCNC: 122 MMOL/L (ref 136–145)
SODIUM SERPL-SCNC: 123 MMOL/L (ref 136–145)
SODIUM SERPL-SCNC: 124 MMOL/L (ref 136–145)
TRANS ERYTHROCYTES VOL PATIENT: NORMAL ML
VANCOMYCIN SERPL-MCNC: 15.4 UG/ML
WBC # BLD AUTO: 13.31 K/UL (ref 3.9–12.7)

## 2020-12-02 PROCEDURE — 82330 ASSAY OF CALCIUM: CPT | Mod: HCWC

## 2020-12-02 PROCEDURE — 94640 AIRWAY INHALATION TREATMENT: CPT | Mod: HCWC

## 2020-12-02 PROCEDURE — 94660 CPAP INITIATION&MGMT: CPT | Mod: HCWC

## 2020-12-02 PROCEDURE — 97530 THERAPEUTIC ACTIVITIES: CPT | Mod: HCWC

## 2020-12-02 PROCEDURE — 90945 DIALYSIS ONE EVALUATION: CPT | Mod: HCWC

## 2020-12-02 PROCEDURE — 99233 SBSQ HOSP IP/OBS HIGH 50: CPT | Mod: HCWC,,, | Performed by: HOSPITALIST

## 2020-12-02 PROCEDURE — 99900035 HC TECH TIME PER 15 MIN (STAT): Mod: HCWC

## 2020-12-02 PROCEDURE — 63600175 PHARM REV CODE 636 W HCPCS: Mod: HCWC | Performed by: INTERNAL MEDICINE

## 2020-12-02 PROCEDURE — 27000221 HC OXYGEN, UP TO 24 HOURS: Mod: HCWC

## 2020-12-02 PROCEDURE — 99223 1ST HOSP IP/OBS HIGH 75: CPT | Mod: AI,HCWC,, | Performed by: INTERNAL MEDICINE

## 2020-12-02 PROCEDURE — 97164 PT RE-EVAL EST PLAN CARE: CPT | Mod: HCWC

## 2020-12-02 PROCEDURE — 80053 COMPREHEN METABOLIC PANEL: CPT | Mod: HCWC

## 2020-12-02 PROCEDURE — 94761 N-INVAS EAR/PLS OXIMETRY MLT: CPT | Mod: HCWC

## 2020-12-02 PROCEDURE — 80069 RENAL FUNCTION PANEL: CPT | Mod: HCWC

## 2020-12-02 PROCEDURE — 25000003 PHARM REV CODE 250: Mod: HCWC | Performed by: STUDENT IN AN ORGANIZED HEALTH CARE EDUCATION/TRAINING PROGRAM

## 2020-12-02 PROCEDURE — 97535 SELF CARE MNGMENT TRAINING: CPT | Mod: HCWC

## 2020-12-02 PROCEDURE — 80100008 HC CRRT DAILY MAINTENANCE: Mod: HCWC

## 2020-12-02 PROCEDURE — 25000003 PHARM REV CODE 250: Mod: HCWC | Performed by: INTERNAL MEDICINE

## 2020-12-02 PROCEDURE — 99232 SBSQ HOSP IP/OBS MODERATE 35: CPT | Mod: HCWC,,, | Performed by: NURSE PRACTITIONER

## 2020-12-02 PROCEDURE — 83735 ASSAY OF MAGNESIUM: CPT | Mod: 91,HCWC

## 2020-12-02 PROCEDURE — P9021 RED BLOOD CELLS UNIT: HCPCS | Mod: HCWC

## 2020-12-02 PROCEDURE — 63600175 PHARM REV CODE 636 W HCPCS: Mod: HCWC | Performed by: NURSE PRACTITIONER

## 2020-12-02 PROCEDURE — 80202 ASSAY OF VANCOMYCIN: CPT | Mod: HCWC

## 2020-12-02 PROCEDURE — 25000003 PHARM REV CODE 250: Mod: HCWC | Performed by: NURSE PRACTITIONER

## 2020-12-02 PROCEDURE — 25000242 PHARM REV CODE 250 ALT 637 W/ HCPCS: Mod: HCWC | Performed by: STUDENT IN AN ORGANIZED HEALTH CARE EDUCATION/TRAINING PROGRAM

## 2020-12-02 PROCEDURE — 97168 OT RE-EVAL EST PLAN CARE: CPT | Mod: HCWC

## 2020-12-02 PROCEDURE — 63600175 PHARM REV CODE 636 W HCPCS: Mod: JG,HCWC | Performed by: STUDENT IN AN ORGANIZED HEALTH CARE EDUCATION/TRAINING PROGRAM

## 2020-12-02 PROCEDURE — 20000000 HC ICU ROOM: Mod: HCWC

## 2020-12-02 PROCEDURE — 99232 PR SUBSEQUENT HOSPITAL CARE,LEVL II: ICD-10-PCS | Mod: HCWC,,, | Performed by: NURSE PRACTITIONER

## 2020-12-02 PROCEDURE — 63600175 PHARM REV CODE 636 W HCPCS: Mod: HCWC | Performed by: STUDENT IN AN ORGANIZED HEALTH CARE EDUCATION/TRAINING PROGRAM

## 2020-12-02 PROCEDURE — 99233 PR SUBSEQUENT HOSPITAL CARE,LEVL III: ICD-10-PCS | Mod: HCWC,,, | Performed by: HOSPITALIST

## 2020-12-02 PROCEDURE — 80069 RENAL FUNCTION PANEL: CPT | Mod: 91,HCWC

## 2020-12-02 PROCEDURE — 36430 TRANSFUSION BLD/BLD COMPNT: CPT

## 2020-12-02 PROCEDURE — 80048 BASIC METABOLIC PNL TOTAL CA: CPT | Mod: HCWC

## 2020-12-02 PROCEDURE — 99223 PR INITIAL HOSPITAL CARE,LEVL III: ICD-10-PCS | Mod: AI,HCWC,, | Performed by: INTERNAL MEDICINE

## 2020-12-02 PROCEDURE — 85025 COMPLETE CBC W/AUTO DIFF WBC: CPT | Mod: HCWC

## 2020-12-02 PROCEDURE — 27202415 HC CARTRIDGE, CRRT: Mod: HCWC

## 2020-12-02 PROCEDURE — C9399 UNCLASSIFIED DRUGS OR BIOLOG: HCPCS | Mod: HCWC | Performed by: NURSE PRACTITIONER

## 2020-12-02 RX ORDER — INSULIN ASPART 100 [IU]/ML
0-5 INJECTION, SOLUTION INTRAVENOUS; SUBCUTANEOUS EVERY 4 HOURS PRN
Status: DISCONTINUED | OUTPATIENT
Start: 2020-12-02 | End: 2020-12-03

## 2020-12-02 RX ORDER — HYDROCODONE BITARTRATE AND ACETAMINOPHEN 500; 5 MG/1; MG/1
TABLET ORAL
Status: DISCONTINUED | OUTPATIENT
Start: 2020-12-02 | End: 2020-12-07

## 2020-12-02 RX ORDER — ROSUVASTATIN CALCIUM 10 MG/1
10 TABLET, COATED ORAL DAILY
Status: DISCONTINUED | OUTPATIENT
Start: 2020-12-03 | End: 2020-12-08

## 2020-12-02 RX ORDER — IPRATROPIUM BROMIDE AND ALBUTEROL SULFATE 2.5; .5 MG/3ML; MG/3ML
3 SOLUTION RESPIRATORY (INHALATION) EVERY 6 HOURS PRN
Status: DISCONTINUED | OUTPATIENT
Start: 2020-12-02 | End: 2020-12-12 | Stop reason: HOSPADM

## 2020-12-02 RX ORDER — MAGNESIUM SULFATE HEPTAHYDRATE 40 MG/ML
2 INJECTION, SOLUTION INTRAVENOUS
Status: DISCONTINUED | OUTPATIENT
Start: 2020-12-02 | End: 2020-12-03

## 2020-12-02 RX ORDER — GLUCAGON 1 MG
1 KIT INJECTION
Status: DISCONTINUED | OUTPATIENT
Start: 2020-12-02 | End: 2020-12-03

## 2020-12-02 RX ORDER — HYDROCODONE BITARTRATE AND ACETAMINOPHEN 500; 5 MG/1; MG/1
TABLET ORAL CONTINUOUS
Status: DISCONTINUED | OUTPATIENT
Start: 2020-12-02 | End: 2020-12-03

## 2020-12-02 RX ADMIN — METHIMAZOLE 10 MG: 10 TABLET ORAL at 10:12

## 2020-12-02 RX ADMIN — OXYCODONE HYDROCHLORIDE 10 MG: 10 TABLET ORAL at 04:12

## 2020-12-02 RX ADMIN — HEPARIN SODIUM 5000 UNITS: 5000 INJECTION INTRAVENOUS; SUBCUTANEOUS at 05:12

## 2020-12-02 RX ADMIN — METRONIDAZOLE 500 MG: 500 TABLET ORAL at 06:12

## 2020-12-02 RX ADMIN — DEXTROSE 290 ML/HR: 5 SOLUTION INTRAVENOUS at 12:12

## 2020-12-02 RX ADMIN — INSULIN DETEMIR 22 UNITS: 100 INJECTION, SOLUTION SUBCUTANEOUS at 02:12

## 2020-12-02 RX ADMIN — ALTEPLASE 2 MG: 2.2 INJECTION, POWDER, LYOPHILIZED, FOR SOLUTION INTRAVENOUS at 05:12

## 2020-12-02 RX ADMIN — ROSUVASTATIN CALCIUM 20 MG: 20 TABLET, FILM COATED ORAL at 08:12

## 2020-12-02 RX ADMIN — INSULIN ASPART 3 UNITS: 100 INJECTION, SOLUTION INTRAVENOUS; SUBCUTANEOUS at 04:12

## 2020-12-02 RX ADMIN — GABAPENTIN 300 MG: 300 CAPSULE ORAL at 08:12

## 2020-12-02 RX ADMIN — POLYETHYLENE GLYCOL 3350 17 G: 17 POWDER, FOR SOLUTION ORAL at 08:12

## 2020-12-02 RX ADMIN — INSULIN ASPART 2 UNITS: 100 INJECTION, SOLUTION INTRAVENOUS; SUBCUTANEOUS at 08:12

## 2020-12-02 RX ADMIN — METRONIDAZOLE 500 MG: 500 TABLET ORAL at 02:12

## 2020-12-02 RX ADMIN — SODIUM BICARBONATE 650 MG TABLET 1300 MG: at 02:12

## 2020-12-02 RX ADMIN — HEPARIN SODIUM 5000 UNITS: 5000 INJECTION INTRAVENOUS; SUBCUTANEOUS at 02:12

## 2020-12-02 RX ADMIN — DOCUSATE SODIUM AND SENNOSIDES 1 TABLET: 8.6; 5 TABLET, FILM COATED ORAL at 08:12

## 2020-12-02 RX ADMIN — INSULIN ASPART 2 UNITS: 100 INJECTION, SOLUTION INTRAVENOUS; SUBCUTANEOUS at 05:12

## 2020-12-02 RX ADMIN — SODIUM BICARBONATE 650 MG TABLET 1300 MG: at 08:12

## 2020-12-02 RX ADMIN — INSULIN ASPART 2 UNITS: 100 INJECTION, SOLUTION INTRAVENOUS; SUBCUTANEOUS at 12:12

## 2020-12-02 RX ADMIN — DEXTROSE 290 ML/HR: 5 SOLUTION INTRAVENOUS at 09:12

## 2020-12-02 RX ADMIN — OXYCODONE 5 MG: 5 TABLET ORAL at 08:12

## 2020-12-02 RX ADMIN — INSULIN ASPART 1 UNITS: 100 INJECTION, SOLUTION INTRAVENOUS; SUBCUTANEOUS at 09:12

## 2020-12-02 RX ADMIN — METRONIDAZOLE 500 MG: 500 TABLET ORAL at 11:12

## 2020-12-02 RX ADMIN — HEPARIN SODIUM 5000 UNITS: 5000 INJECTION INTRAVENOUS; SUBCUTANEOUS at 11:12

## 2020-12-02 RX ADMIN — INSULIN ASPART 1 UNITS: 100 INJECTION, SOLUTION INTRAVENOUS; SUBCUTANEOUS at 12:12

## 2020-12-02 RX ADMIN — PANTOPRAZOLE SODIUM 40 MG: 40 TABLET, DELAYED RELEASE ORAL at 08:12

## 2020-12-02 RX ADMIN — CEFTRIAXONE 2 G: 2 INJECTION, SOLUTION INTRAVENOUS at 08:12

## 2020-12-02 RX ADMIN — VANCOMYCIN HYDROCHLORIDE 500 MG: 500 INJECTION, POWDER, LYOPHILIZED, FOR SOLUTION INTRAVENOUS at 09:12

## 2020-12-02 RX ADMIN — IPRATROPIUM BROMIDE AND ALBUTEROL SULFATE 3 ML: .5; 2.5 SOLUTION RESPIRATORY (INHALATION) at 07:12

## 2020-12-02 NOTE — PLAN OF CARE
Pt transferred from [facility] 10th floor. PM&R consulted and recommends ip rehab once medically stable.  SW/CM to continue to follow and assist w/post acute care need.       12/02/20 1112   Discharge Reassessment   Assessment Type Discharge Planning Reassessment   Discharge Plan A Rehab   Discharge Plan B Home Health   DME Needed Upon Discharge  other (see comments)  (TBD by PT)   Anticipated Discharge Disposition Rehab   Post-Acute Status   Post-Acute Authorization Placement  (Rehab)   Post-Acute Placement Status Awaiting Internal Medical Clearance   Discharge Delays None known at this time     Nehal Kinney LMSW  [facility]  [phone]

## 2020-12-02 NOTE — PLAN OF CARE
Problem: Physical Therapy Goal  Goal: Physical Therapy Goal  Description: Goals to be met by: 2020     Patient will increase functional independence with mobility by performin. Pt supine to sit with minimum assistance- not met  3. Sit to stand transfer with Minimum assistance and RW if needed. - not met  4. Bed to chair transfer with Minimum assistance  using Rolling Walker or no AD.-not met  5. Gait  x 50 feet with Minimum assistanc using Rolling Walker or no AD. -not met  6. Lower extremity exercise program x20 reps, with independence -not met    Outcome: Ongoing, Progressing   Re-evaluation completed and goals appropriate. Vonnie Musa, PT  2020

## 2020-12-02 NOTE — PROGRESS NOTES
Pharmacokinetic Assessment Follow Up: IV Vancomycin    Vancomycin serum concentration assessment(s):    - Vancomycin concentration = 15.4 mcg/mL (therapeutic) - vancomycin pulled off with CRRT 12/01/2020   - Patient with worsening LAURIE requiring CRRT (on hold)   - Anticipate minimal clearance while CRRT is on hold     Vancomycin Regimen Plan:  1. Give vancomycin 500 mg once today   2. Obtain concentration in AM on 12/3/20  3. Goal trough = 10-20 mcg/mL  4. Re-dose based on levels and CRRT plans     Thank you for the consult, will continue to follow  Nell Vela, Pharm.D  [phone]    Drug levels (last 3 results):  Recent Labs   Lab Result Units 11/30/20  0532 12/01/20  0226 12/02/20  0442   Vancomycin, Random ug/mL 25.5 20.8 15.4        Patient brief summary:  Navin Beck is a 60 y.o. female initiated on antimicrobial therapy with IV Vancomycin for treatment of intraabdominal abscess (PCN allergy, covering empirically for enterococcus)    Drug Allergies:   Review of patient's allergies indicates:   Allergen Reactions    Penicillins Shortness Of Breath, Itching and Swelling     Tolerates cefepime 11/30/2020       Actual Body Weight:   115.8 kg    Renal Function:   Estimated Creatinine Clearance: 16.3 mL/min (A) (based on SCr of 4.7 mg/dL (H)).,     Dialysis: CRRT (on hold)     CBC (last 72 hours):  Recent Labs   Lab Result Units 11/30/20  0531 11/30/20  1414 12/01/20  0226 12/02/20  0442   WBC K/uL 16.52* 18.24* 16.65* 13.31*   Hemoglobin g/dL 7.2* 8.3* 7.9* 6.9*   Hematocrit % 23.7* 27.5* 26.1* 22.0*   Platelets K/uL 489* 558* 480* 458*   Gran % % 75.7* 77.3* 77.4* 80.6*   Lymph % % 14.6* 13.4* 12.7* 10.2*   Mono % % 7.7 7.2 7.6 7.1   Eosinophil % % 0.8 0.9 1.1 0.9   Basophil % % 0.1 0.2 0.2 0.2   Differential Method  Automated Automated Automated Automated       Metabolic Panel (last 72 hours):  Recent Labs   Lab Result Units 11/29/20  0932 11/29/20  1234 11/29/20  1822 11/30/20  0530 11/30/20  0532  11/30/20  1027 11/30/20  1313 11/30/20  1628 11/30/20  1903 11/30/20  2200 12/01/20  0226 12/01/20  0720 12/01/20  1103 12/01/20  2134 12/02/20  0043 12/02/20  0442   Sodium mmol/L 123* 121* 125*  --  124* 120* 121* 120* 123* 123* 122* 121* 122* 123*  123* 124*  124* 123*  123*  123*   Sodium, Urine mmol/L  --   --   --  54  --   --   --   --   --   --   --   --   --   --   --   --    Potassium mmol/L 4.8 4.6  4.6 4.5  --  4.4 4.7 5.1 4.8 5.0 4.3 5.1 4.5 4.5 4.3  4.4 4.2  4.2 4.5  4.5  4.5   Chloride mmol/L 96 93* 97  --  97 96 96 98 99 97 97 96 96 97  97 98  98 97  97  97   CO2 mmol/L 16* 17* 18*  --  18* 15* 14* 12* 12* 16* 14* 15* 17* 17*  17* 17*  17* 17*  17*  17*   Glucose mg/dL 541* 480* 220*  --  209* 267* 273* 274* 204* 143* 129* 180* 202* 212*  215* 222*  222* 259*  259*  262*   BUN mg/dL 12 14 15  --  16 19 18 20 20 20 21 21 22 23  22 20  20 18  18  18   Creatinine mg/dL 2.9* 3.1* 3.5*  --  4.2* 4.6* 4.9* 4.9* 5.0* 5.1* 5.4* 5.6* 5.9* 5.9*  5.8* 5.1*  5.1* 4.7*  4.7*  4.7*   Creatinine, Urine mg/dL  --   --   --  103.0  --   --   --   --   --   --   --   --   --   --   --   --    Albumin g/dL  --   --   --   --   --   --   --   --   --   --   --   --   --  1.6* 1.6* 1.6*  1.6*   Total Bilirubin mg/dL  --   --   --   --   --   --   --   --   --   --   --   --   --   --   --  0.1   Alkaline Phosphatase U/L  --   --   --   --   --   --   --   --   --   --   --   --   --   --   --  203*   AST U/L  --   --   --   --   --   --   --   --   --   --   --   --   --   --   --  12   ALT U/L  --   --   --   --   --   --   --   --   --   --   --   --   --   --   --  9*   Magnesium mg/dL  --   --   --   --  2.7*  --   --   --   --   --  2.6  --   --  2.4  --  2.3   Phosphorus mg/dL  --   --   --   --  3.9  --   --   --   --   --  4.3  --   --  4.3 4.3 4.4  4.4       Vancomycin Administrations:  vancomycin given in the last 96 hours                   vancomycin in dextrose 5 % 1 gram/250 mL  IVPB 1,000 mg (mg) 1,000 mg New Bag 11/29/20 0506    vancomycin 1.75 g in 5 % dextrose 500 mL IVPB (mg) 1,750 mg New Bag 11/28/20 1639    vancomycin (VANCOCIN) 2,500 mg in dextrose 5 % 500 mL IVPB (mg) 2,500 mg New Bag 11/27/20 0532                Microbiologic Results:  Microbiology Results (last 7 days)     Procedure Component Value Units Date/Time    Blood culture [[id number]] Collected: 11/27/20 1919    Order Status: Completed Specimen: Blood Updated: 12/01/20 2212     Blood Culture, Routine No Growth to date      No Growth to date      No Growth to date      No Growth to date      No Growth to date    Blood culture [[id number]] Collected: 11/27/20 1915    Order Status: Completed Specimen: Blood Updated: 12/01/20 2212     Blood Culture, Routine No Growth to date      No Growth to date      No Growth to date      No Growth to date      No Growth to date    Aerobic culture [[id number]] Collected: 11/28/20 1405    Order Status: Completed Specimen: Abscess from Abdomen Updated: 12/01/20 1013     Aerobic Bacterial Culture No growth    Narrative:      Right pelvis    Culture, Anaerobe [[id number]] Collected: 11/28/20 1405    Order Status: Completed Specimen: Abscess from Abdomen Updated: 12/01/20 0858     Anaerobic Culture Culture in progress    Narrative:      Right pelvis    Fungus culture [[id number]] Collected: 11/28/20 1405    Order Status: Completed Specimen: Abscess from Abdomen Updated: 11/30/20 0945     Fungus (Mycology) Culture Culture in progress    Narrative:      Right pelvis    Gram stain [[id number]] Collected: 11/28/20 1405    Order Status: Completed Specimen: Abscess from Abdomen Updated: 11/28/20 2234     Gram Stain Result Moderate WBC's      Moderate Gram positive cocci      Few Gram negative rods      Rare Gram positive rods    Narrative:      Right pelvis    Urine culture [[id number]]  (Abnormal)  (Susceptibility) Collected: 11/26/20 2206    Order Status: Completed Specimen: Urine Updated: 11/28/20 2115      Urine Culture, Routine ESCHERICHIA COLI  10,000 - 49,999 cfu/ml  No other significant isolate      Narrative:      Specimen Source->Urine

## 2020-12-02 NOTE — ASSESSMENT & PLAN NOTE
DKA secondary to medication non-compliance  On day of admission 7.297/30/98. AG gap - 9  Transitioned off DKA protocol yesterday    Endocrine following - appreciate recs   -22U Lantus BID   -Moderate correction scale

## 2020-12-02 NOTE — SUBJECTIVE & OBJECTIVE
Interval History: Pt seen at bedside. Mentation today improving. Initiated prismax yesterday evening. Na today up to 124. Issues with line clotting x2 overnight, tPA instilled and will restart this AM.  cc past 24 hrs. Net positive 2 L past 24 hrs.     Objective:     Vital Signs (Most Recent):  Temp: 97.7 °F (36.5 °C) (12/02/20 0700)  Pulse: 71 (12/02/20 0900)  Resp: (!) 22 (12/02/20 0900)  BP: (!) 131/59 (12/02/20 0900)  SpO2: 100 % (12/02/20 0900)  O2 Device (Oxygen Therapy): room air (12/02/20 0900) Vital Signs (24h Range):  Temp:  [97.2 °F (36.2 °C)-98.1 °F (36.7 °C)] 97.7 °F (36.5 °C)  Pulse:  [67-83] 71  Resp:  [11-30] 22  SpO2:  [94 %-100 %] 100 %  BP: ()/(50-64) 131/59     Weight: 115.8 kg (255 lb 4.7 oz) (12/02/20 0600)  Body mass index is 39.98 kg/m².  Body surface area is 2.34 meters squared.    I/O last 3 completed shifts:  In: 2206.3 [P.O.:20; I.V.:2066.3; Other:20; IV Piggyback:100]  Out: 280 [Urine:160; Drains:120]    Physical Exam  Constitutional:       Appearance: She is obese.      Comments: lethargic   HENT:      Head: Normocephalic and atraumatic.      Nose: Nose normal.      Mouth/Throat:      Mouth: Mucous membranes are moist.      Pharynx: Oropharynx is clear.   Eyes:      Conjunctiva/sclera: Conjunctivae normal.      Pupils: Pupils are equal, round, and reactive to light.   Neck:      Musculoskeletal: Normal range of motion and neck supple.   Cardiovascular:      Rate and Rhythm: Regular rhythm.   Pulmonary:      Effort: Pulmonary effort is normal.   Abdominal:      General: Abdomen is flat.      Palpations: Abdomen is soft.      Tenderness: There is abdominal tenderness.   Musculoskeletal: Normal range of motion.      Right lower leg: No edema.      Left lower leg: No edema.   Skin:     General: Skin is warm and dry.   Neurological:      General: No focal deficit present.      Mental Status: She is alert.         Significant Labs:  CBC:   Recent Labs   Lab 12/02/20  0442   WBC  13.31*   RBC 2.41*   HGB 6.9*   HCT 22.0*   *   MCV 91   MCH 28.6   MCHC 31.4*     CMP:   Recent Labs   Lab 12/02/20  0442 12/02/20  0747   *  259*  262* 231*  231*   CALCIUM 7.1*  7.1*  7.2* 7.3*  7.3*   ALBUMIN 1.6*  1.6* 1.6*   PROT 5.9*  --    *  123*  123* 124*  124*   K 4.5  4.5  4.5 4.4  4.4   CO2 17*  17*  17* 18*  18*   CL 97  97  97 98  98   BUN 18  18  18 19  19   CREATININE 4.7*  4.7*  4.7* 4.9*  4.9*   ALKPHOS 203*  --    ALT 9*  --    AST 12  --    BILITOT 0.1  --      All labs within the past 24 hours have been reviewed.

## 2020-12-02 NOTE — ASSESSMENT & PLAN NOTE
Titrate insulin slowly to avoid hypoglycemia as the risk of hypoglycemia increases with decreased creatinine clearance.    Estimated Creatinine Clearance: 15.3 mL/min (A) (based on SCr of 5 mg/dL (H)).

## 2020-12-02 NOTE — PLAN OF CARE
No acute changes overnight. VS and assessment per flow sheet. Pt lethargic, but following commands. Pt on RA with CPAP QHS.  Transitional insulin gtt infusing per MAR. Prismax treatment done overnight. Pt clotted off and treatment was stopped. Cathflow instilled in venous cath. Pt resting. No needs at this time. Call bell in reach. Will continue to monitor.     Problem: Fall Injury Risk  Goal: Absence of Fall and Fall-Related Injury  Outcome: Ongoing, Progressing     Problem: Adult Inpatient Plan of Care  Goal: Plan of Care Review  Outcome: Ongoing, Progressing  Goal: Patient-Specific Goal (Individualization)  Outcome: Ongoing, Progressing  Flowsheets (Taken 12/2/2020 0603)  Individualized Care Needs: drain care  Anxieties, Fears or Concerns: ELÍAS pt lethargic  Patient-Specific Goals (Include Timeframe): continue prismax treatment

## 2020-12-02 NOTE — PLAN OF CARE
[facility] DAILY GOALS       A: Awake    RASS: Goal -    Actual - RASS (Duarte Agitation-Sedation Scale): -1-->drowsy   Restraint necessity:    B: Breath   SBT: Not intubated   C: Coordinate A & B, analgesics/sedatives   Pain: managed    SAT: Not intubated  D: Delirium   CAM-ICU: Overall CAM-ICU: Positive  E: Early(intubated/ Progressive (non-intubated) Mobility   MOVE Screen: Pass   Activity: Activity Management: rolling - L1  FAS: Feeding/Nutrition   Diet order: Diet/Nutrition Received: consistent carb/diabetic diet, Specialty Diet/Nutrition Received: (fluid rest. 500ml) Fluid restriction:    T: Thrombus   DVT prophylaxis: VTE Required Core Measure: (SCDs) Sequential compression device initiated/maintained  H: HOB Elevation   Head of Bed (HOB): HOB at 30-45 degrees  U: Ulcer Prophylaxis   GI: yes  G: Glucose control   managed Glycemic Management: blood glucose monitoring  S: Skin   Bundle compliance: yes   Bathing/Skin Care: bath, chlorhexidine, bath, complete, foot care, incontinence care, linen changed Date: 12/02/2020 @ 14:00  B: Bowel Function   diarrhea   I: Indwelling Catheters   Cifuentes necessity:      Urethral Catheter 11/27/20 0145 Latex 16 Fr.-Reason for Continuing Urinary Catheterization: Critically ill in ICU and requiring hourly monitoring of intake/output   CVC necessity: Yes   IPAD offered: No  D: De-escalation Antibx   No  Plan for the day   Patient less lethargic today. AAOx4 and VSS. PrisMax initiated at around 12:30 and then stopped at 15:00 due to consistent negative pressure alarms. Physician repositioned trialysis line in hopes to resolve issue. Awaiting chest x-ray confirmation in order to initiate PrisMax again. Cifuentes continued, minimal OUP. ROSAURA drain intact and flushed per order. Patient had two BMs. Day bath completed. Will continue plan of care.   Family/Goals of care/Code Status   Code Status: Full Code     No acute events throughout day, VS and assessment per flow sheet, patient progressing  towards goals as tolerated, plan of care reviewed with Navin Beck and family, all concerns addressed, will continue to monitor.

## 2020-12-02 NOTE — SUBJECTIVE & OBJECTIVE
Interval History/Significant Events: No acute events overnight - continues to be on sled    Review of Systems   Constitutional: Negative for chills and fever.   HENT: Negative for rhinorrhea and sore throat.    Respiratory: Negative for cough and shortness of breath.    Cardiovascular: Negative for chest pain and palpitations.   Gastrointestinal: Negative for abdominal pain, constipation, diarrhea and nausea.   Genitourinary: Negative for dysuria and flank pain.   Neurological: Negative for light-headedness, numbness and headaches.     Objective:     Vital Signs (Most Recent):  Temp: 97.6 °F (36.4 °C) (12/02/20 0645)  Pulse: 74 (12/02/20 0645)  Resp: (!) 28 (12/02/20 0645)  BP: (!) 120/59 (12/02/20 0645)  SpO2: 100 % (12/02/20 0645) Vital Signs (24h Range):  Temp:  [97.2 °F (36.2 °C)-98.1 °F (36.7 °C)] 97.6 °F (36.4 °C)  Pulse:  [67-83] 74  Resp:  [11-30] 28  SpO2:  [94 %-100 %] 100 %  BP: ()/(50-64) 120/59   Weight: 115.8 kg (255 lb 4.7 oz)  Body mass index is 39.98 kg/m².      Intake/Output Summary (Last 24 hours) at 12/2/2020 0657  Last data filed at 12/2/2020 0600  Gross per 24 hour   Intake 2199.83 ml   Output 196 ml   Net 2003.83 ml       Physical Exam  Constitutional:       General: She is not in acute distress.     Appearance: She is obese. She is not ill-appearing or toxic-appearing.   HENT:      Head: Normocephalic and atraumatic.      Nose: No congestion or rhinorrhea.      Mouth/Throat:      Pharynx: No oropharyngeal exudate or posterior oropharyngeal erythema.   Eyes:      General: No scleral icterus.        Right eye: No discharge.         Left eye: No discharge.      Extraocular Movements: Extraocular movements intact.      Pupils: Pupils are equal, round, and reactive to light.   Neck:      Musculoskeletal: Normal range of motion and neck supple. No neck rigidity or muscular tenderness.   Cardiovascular:      Rate and Rhythm: Normal rate and regular rhythm.      Pulses: Normal pulses.       Heart sounds: No murmur.   Pulmonary:      Effort: Pulmonary effort is normal. No respiratory distress.      Breath sounds: Normal breath sounds. No stridor. No wheezing.   Abdominal:      General: Abdomen is flat. Bowel sounds are normal. There is no distension.      Palpations: Abdomen is soft. There is no mass.      Tenderness: There is abdominal tenderness (drain in place, dressings clean dry and intact).      Hernia: No hernia is present.      Comments: Drain with purulent fluid   Musculoskeletal: Normal range of motion.         General: No swelling, tenderness or deformity.   Skin:     General: Skin is warm and dry.      Coloration: Skin is not jaundiced or pale.      Findings: No bruising.   Neurological:      General: No focal deficit present.      Mental Status: She is alert. Mental status is at baseline.         Vents:  Oxygen Concentration (%): 21 (12/02/20 0244)  Lines/Drains/Airways     Central Venous Catheter Line            Trialysis (Dialysis) Catheter 12/01/20 1547 right internal jugular less than 1 day          Drain                 Urethral Catheter 11/27/20 0145 Latex 16 Fr. 5 days         Closed/Suction Drain 11/28/20 1358 Right Abdomen Bulb 10 Fr. 3 days          Peripheral Intravenous Line                 Peripheral IV - Single Lumen 11/30/20 1142 20 G;1 3/4 in Left;Anterior Upper Arm 1 day         Peripheral IV - Single Lumen 11/30/20 1142 20 G;1 3/4 in Right;Anterior Upper Arm 1 day              Significant Labs:    CBC/Anemia Profile:  Recent Labs   Lab 11/30/20  1414 12/01/20  0226 12/02/20  0442   WBC 18.24* 16.65* 13.31*   HGB 8.3* 7.9* 6.9*   HCT 27.5* 26.1* 22.0*   * 480* 458*   MCV 96 96 91   RDW 13.2 13.4 13.2        Chemistries:  Recent Labs   Lab 12/01/20  0226  12/01/20  2134 12/02/20  0043 12/02/20  0442   *   < > 123*  123* 124*  124* 123*  123*  123*   K 5.1   < > 4.3  4.4 4.2  4.2 4.5  4.5  4.5   CL 97   < > 97  97 98  98 97  97  97   CO2 14*   < > 17*   17* 17*  17* 17*  17*  17*   BUN 21   < > 23  22 20  20 18  18  18   CREATININE 5.4*   < > 5.9*  5.8* 5.1*  5.1* 4.7*  4.7*  4.7*   CALCIUM 8.0*   < > 7.4*  7.6* 7.2*  7.2* 7.1*  7.1*  7.2*   ALBUMIN  --   --  1.6* 1.6* 1.6*  1.6*   PROT  --   --   --   --  5.9*   BILITOT  --   --   --   --  0.1   ALKPHOS  --   --   --   --  203*   ALT  --   --   --   --  9*   AST  --   --   --   --  12   MG 2.6  --  2.4  --  2.3   PHOS 4.3  --  4.3 4.3 4.4  4.4    < > = values in this interval not displayed.       BMP:   Recent Labs   Lab 12/02/20  0442   *  259*  262*   *  123*  123*   K 4.5  4.5  4.5   CL 97  97  97   CO2 17*  17*  17*   BUN 18  18  18   CREATININE 4.7*  4.7*  4.7*   CALCIUM 7.1*  7.1*  7.2*   MG 2.3     CMP:   Recent Labs   Lab 12/01/20  2134 12/02/20  0043 12/02/20  0442   *  123* 124*  124* 123*  123*  123*   K 4.3  4.4 4.2  4.2 4.5  4.5  4.5   CL 97  97 98  98 97  97  97   CO2 17*  17* 17*  17* 17*  17*  17*   *  215* 222*  222* 259*  259*  262*   BUN 23  22 20  20 18  18  18   CREATININE 5.9*  5.8* 5.1*  5.1* 4.7*  4.7*  4.7*   CALCIUM 7.4*  7.6* 7.2*  7.2* 7.1*  7.1*  7.2*   PROT  --   --  5.9*   ALBUMIN 1.6* 1.6* 1.6*  1.6*   BILITOT  --   --  0.1   ALKPHOS  --   --  203*   AST  --   --  12   ALT  --   --  9*   ANIONGAP 9  9 9  9 9  9  9   EGFRNONAA 7.1*  7.2* 8.4*  8.4* 9.3*  9.3*  9.3*       Significant Imaging:  I have reviewed all pertinent imaging results/findings within the past 24 hours.

## 2020-12-02 NOTE — PROGRESS NOTES
[facility]  Nephrology  Progress Note    Patient Name: Navin Beck  MRN: [id number]  Admission Date: 11/26/2020  Hospital Length of Stay: 5 days  Attending Provider: Chetan Lang MD   Primary Care Physician: Elvira Quinones MD  Principal Problem:Type 2 diabetes mellitus with hyperglycemia, with long-term current use of insulin    Subjective:     HPI: Ms Beck is a 60 year old female with a past medical history of HTN, LD, T2DM, hyperparathyroidism, and asthma who presented to [facility] with c/o abdominal pain s/p bladder mesh surgery. Pt noted to be in DKA on presentation and is currently on insulin gtt and being followed by endocrine. CT abdomen obtained revealed multiple fluid collections near bladder; IR was consulted and pt is s/p drain placement. Initial coverage provided with vanc, now on cefepime and flagyl. Baseline sCr roughly 1.0-1.2, now elevated to 4.6 at time of consult (11/30/2020). Pt now anuric with 20mL UOP in the past 24hrs at time of consult (11/30/2020). Pt denies endorses lower abdominal tenderness. Pt denies SOB. Nephrology has been consulted for LAURIE.     -HPI was obtained from patient interview, and from review of the patient's EMR.         Interval History: Pt seen at bedside. Mentation today improving. Initiated prismax yesterday evening. Na today up to 124. Issues with line clotting x2 overnight, tPA instilled and will restart this AM.  cc past 24 hrs. Net positive 2 L past 24 hrs.     Objective:     Vital Signs (Most Recent):  Temp: 97.7 °F (36.5 °C) (12/02/20 0700)  Pulse: 71 (12/02/20 0900)  Resp: (!) 22 (12/02/20 0900)  BP: (!) 131/59 (12/02/20 0900)  SpO2: 100 % (12/02/20 0900)  O2 Device (Oxygen Therapy): room air (12/02/20 0900) Vital Signs (24h Range):  Temp:  [97.2 °F (36.2 °C)-98.1 °F (36.7 °C)] 97.7 °F (36.5 °C)  Pulse:  [67-83] 71  Resp:  [11-30] 22  SpO2:  [94 %-100 %] 100 %  BP: ()/(50-64) 131/59     Weight: 115.8 kg (255 lb 4.7 oz)  (12/02/20 0600)  Body mass index is 39.98 kg/m².  Body surface area is 2.34 meters squared.    I/O last 3 completed shifts:  In: 2206.3 [P.O.:20; I.V.:2066.3; Other:20; IV Piggyback:100]  Out: 280 [Urine:160; Drains:120]    Physical Exam  Constitutional:       Appearance: She is obese.      Comments: lethargic   HENT:      Head: Normocephalic and atraumatic.      Nose: Nose normal.      Mouth/Throat:      Mouth: Mucous membranes are moist.      Pharynx: Oropharynx is clear.   Eyes:      Conjunctiva/sclera: Conjunctivae normal.      Pupils: Pupils are equal, round, and reactive to light.   Neck:      Musculoskeletal: Normal range of motion and neck supple.   Cardiovascular:      Rate and Rhythm: Regular rhythm.   Pulmonary:      Effort: Pulmonary effort is normal.   Abdominal:      General: Abdomen is flat.      Palpations: Abdomen is soft.      Tenderness: There is abdominal tenderness.   Musculoskeletal: Normal range of motion.      Right lower leg: No edema.      Left lower leg: No edema.   Skin:     General: Skin is warm and dry.   Neurological:      General: No focal deficit present.      Mental Status: She is alert.         Significant Labs:  CBC:   Recent Labs   Lab 12/02/20  0442   WBC 13.31*   RBC 2.41*   HGB 6.9*   HCT 22.0*   *   MCV 91   MCH 28.6   MCHC 31.4*     CMP:   Recent Labs   Lab 12/02/20  0442 12/02/20  0747   *  259*  262* 231*  231*   CALCIUM 7.1*  7.1*  7.2* 7.3*  7.3*   ALBUMIN 1.6*  1.6* 1.6*   PROT 5.9*  --    *  123*  123* 124*  124*   K 4.5  4.5  4.5 4.4  4.4   CO2 17*  17*  17* 18*  18*   CL 97  97  97 98  98   BUN 18  18  18 19  19   CREATININE 4.7*  4.7*  4.7* 4.9*  4.9*   ALKPHOS 203*  --    ALT 9*  --    AST 12  --    BILITOT 0.1  --      All labs within the past 24 hours have been reviewed.        Assessment/Plan:     Hyponatremia  -hyponatremia, likely 2/2 combination of D5 administration with decreased UOP  -duloxetine  discontinued  -initiating RRT 12/1 with D5 ggt post-filter to avoid overcorrection of sodium  -max Na over next 24 hours- 129      Acute kidney injury (LAURIE) with acute tubular necrosis (ATN)  ATN in setting of contrast administration and sepsis 2/2 UTI, baseline cr 1.0-1.2 that trended up to 4.6 at time of consult.     -received contrast on 11/27/2020  -Urine kaacqvouhe58/30/2020 many muddy brown granular casts, massive WBC's  -purulent drainage from abdominal ROSAURA drain similar in appearance to her urine; concerning for possibility of communication between bladder and fluid collection  -Initiated prismax 12/1 with D5 ggt post-filter to prevent overcorrection of hyponatremia; Na today improved to 124  -Will plan on continuing prismax today  -q 4 BMPs  -Maximum gabapentin dose 300mg in CKD patients  -Maintain Hgb >7.0, Keep MAP >65  -Renally dose meds and avoid nephrotoxic meds  -Will continue to follow closely   -Plan discussed with staff, Dr Rose     Diabetic ketoacidosis without coma associated with type 2 diabetes mellitus  -Management per primary team         Thank you for your consult. I will follow-up with patient. Please contact us if you have any additional questions.    Usha Ames MD  Nephrology  [facility]

## 2020-12-02 NOTE — SUBJECTIVE & OBJECTIVE
"Interval HPI:   Overnight events: Transferred to CMICU. CRRT per nephrology. BG slightly above goal on IV insulin infusion at 1.8 u/hr.   Eating:   NPO Diet NPO Except for: Ice Chips, Sips with Medication  Nausea: No  Hypoglycemia and intervention: No  Fever: No  TPN and/or TF: No    /67 (BP Location: Right arm, Patient Position: Lying)   Pulse 72   Temp 97.7 °F (36.5 °C) (Oral)   Resp 20   Ht 5' 7" (1.702 m)   Wt 115.5 kg (254 lb 10.1 oz)   SpO2 98%   BMI 39.88 kg/m²     Labs Reviewed and Include    Recent Labs   Lab 12/02/20  0442  12/02/20  1109   *  259*  262*   < > 221*  221*   CALCIUM 7.1*  7.1*  7.2*   < > 7.2*  7.2*   ALBUMIN 1.6*  1.6*   < > 1.6*   PROT 5.9*  --   --    *  123*  123*   < > 122*  122*   K 4.5  4.5  4.5   < > 4.2  4.2   CO2 17*  17*  17*   < > 17*  17*   CL 97  97  97   < > 97  97   BUN 18  18  18   < > 19  19   CREATININE 4.7*  4.7*  4.7*   < > 5.0*  5.0*   ALKPHOS 203*  --   --    ALT 9*  --   --    AST 12  --   --    BILITOT 0.1  --   --     < > = values in this interval not displayed.     Lab Results   Component Value Date    WBC 13.31 (H) 12/02/2020    HGB 6.9 (L) 12/02/2020    HCT 22.0 (L) 12/02/2020    MCV 91 12/02/2020     (H) 12/02/2020     Recent Labs   Lab 11/27/20  1239   TSH 0.519     Lab Results   Component Value Date    HGBA1C 11.4 (H) 11/27/2020       Nutritional status:   Body mass index is 39.88 kg/m².  Lab Results   Component Value Date    ALBUMIN 1.6 (L) 12/02/2020    ALBUMIN 1.6 (L) 12/02/2020    ALBUMIN 1.6 (L) 12/02/2020    ALBUMIN 1.6 (L) 12/02/2020     No results found for: PREALBUMIN    Estimated Creatinine Clearance: 15.3 mL/min (A) (based on SCr of 5 mg/dL (H)).    Accu-Checks  Recent Labs     12/01/20  0720 12/01/20  0825 12/01/20  1203 12/01/20  1621 12/01/20  2030 12/02/20  0041 12/02/20  0443 12/02/20  0750 12/02/20  0849 12/02/20  1254   POCTGLUCOSE 178* 198* 218* 236* 198* 246* 289* 250* 235* 210* "       Current Medications and/or Treatments Impacting Glycemic Control  Immunotherapy:    Immunosuppressants     None        Steroids:   Hormones (From admission, onward)    Start     Stop Route Frequency Ordered    11/27/20 0959  melatonin tablet 6 mg      -- Oral Nightly PRN 11/27/20 0903        Pressors:    Autonomic Drugs (From admission, onward)    None        Hyperglycemia/Diabetes Medications:   Antihyperglycemics (From admission, onward)    Start     Stop Route Frequency Ordered    12/01/20 0957  insulin aspart U-100 pen 0-4 Units      -- SubQ As needed (PRN) 12/01/20 0858    12/01/20 0900  insulin regular in 0.9 % NaCl 100 unit/100 mL (1 unit/mL) infusion     Question:  Enter initial dose (Units/hr):  Answer:  1.3    -- IV Continuous 12/01/20 0757    11/28/20 1245  insulin regular in 0.9 % NaCl 100 unit/100 mL (1 unit/mL) infusion     Question:  Enter initial dose (Units/hr):  Answer:  4    11/28 2300 IV Continuous 11/28/20 1133

## 2020-12-02 NOTE — ASSESSMENT & PLAN NOTE
-hyponatremia, likely 2/2 combination of D5 administration with decreased UOP  -duloxetine discontinued  -initiating RRT 12/1 with D5 ggt post-filter to avoid overcorrection of sodium  -max Na over next 24 hours- 129

## 2020-12-02 NOTE — PT/OT/SLP RE-EVAL
Occupational Therapy  Co- Re-evaluation    Name: Navin Beck  MRN: [id number]  Admitting Diagnosis:  Type 2 diabetes mellitus with hyperglycemia, with long-term current use of insulin    Pt was t/f to ICU for CRRT needs.   Recommendations:     Discharge Recommendations: nursing facility, skilled    Assessment:     Navin Beck is a 60 y.o. female with a medical diagnosis of Type 2 diabetes mellitus with hyperglycemia, with long-term current use of insulin.   Performance deficits affecting function are weakness, impaired endurance, impaired self care skills, impaired functional mobilty, gait instability, impaired balance, decreased safety awareness, pain.  Pt tolerated session fairly well. VSS throughout session Pt not safe for d/c home and may benefit from post acute therapy prior to return home.  Goals and POC updated this date to reflect current functional performance.     Rehab Prognosis:  Good ; patient would benefit from acute skilled OT services to address these deficits and reach maximum level of function.       Plan:     Patient to be seen 3 x/week to address the above listed problems via self-care/home management, therapeutic activities, therapeutic exercises  · Plan of Care Expires: 12/28/20  · Plan of Care Reviewed with: patient    Subjective     Pt reports stomach pain. Pt unable to rate pain.  Repositioning provided.     · Communicated with: nsg prior to session.      Objective:     Communicated with: nsg prior to session. Pt found supine in bed receiving blood.   Co-re-eval completed this date to optimize functional safety and performance given impaired tolerance for activity and increased medical acuity.     General Precautions: Standard, fall       Occupational Performance:    Bed Mobility:    TOTAL A x 2 supine<>sit.     Activities of Daily Living:  G/H:: wash face with MOD A and hands.   UE/LE dressing: TOTAL A   Toileting: TOTAL A     Cognitive/Visual Perceptual:  Pt awake but  lethargic. Pt oriented x 4. Pt follows simple commands. Pt with slow responses and needs  increased time for all activity.   Physical Exam:  Pt demo 2+/5 UE strength and Good .     AMPAC 6 Click:  AMPAC Total Score: 7    Treatment & Education:  Pt tolerated sitting EOB aprpox 5 min with MIN A for postural control. Pt with right UE jerking motion at times leading to decreased safety and balance. Increased vc and hand over hand assistance needed to engage in basic self care skills.   Education provided re: OT POC and safety with functional mobility/ADl skills.     Patient left supine with all lines intact, call button in reach and nsg notified    GOALS:   Multidisciplinary Problems     Occupational Therapy Goals        Problem: Occupational Therapy Goal    Goal Priority Disciplines Outcome Interventions   Occupational Therapy Goal     OT, PT/OT Ongoing, Progressing    Description: Goals to be met by: 12/16/20     Patient will increase functional independence with ADLs by performing:    Feeding with [place].  UE Dressing with MIN A   LE Dressing with MIN A .  Grooming while seated with Supervision.  Toileting from toilet with Moderate Assistance for hygiene and clothing management.   Toilet transfer to toilet with SBA.                     History:     Past Medical History:   Diagnosis Date    Allergy     Arrhythmia     Arthritis     Bronchial asthma     Diabetes mellitus     Glaucoma     Hormone disorder     hysterectomy    Hypercholesteremia     Hypertension     Hyperthyroidism     Insomnia     Kidney stones     Thyroid disease     Urine, incontinence, stress female     UTI (urinary tract infection) 11/27/2020       Past Surgical History:   Procedure Laterality Date    APPENDECTOMY      BACK SURGERY      disc removal     CARPAL TUNNEL RELEASE Bilateral     CHOLECYSTECTOMY      CYSTOSCOPY N/A 10/26/2020    Procedure: CYSTOSCOPY;  Surgeon: Wilner Goel MD;  [facility];  Service:  OB/GYN;  Laterality: N/A;    FRACTURE SURGERY Right     wrist    [facility]    JOINT REPLACEMENT      KNEE ARTHROSCOPY W/ DEBRIDEMENT      KNEE SURGERY Right     Knee replacement    LITHOTRIPSY      [place] REMOVAL OF OVARY/TUBE(S)  9/9/13    benign    removal of round ligament mass      ROBOT-ASSISTED LAPAROSCOPIC LYSIS OF ADHESIONS USING DA JAMES XI N/A 10/26/2020    Procedure: XI ROBOTIC LYSIS, ADHESIONS;  Surgeon: Wilner Goel MD;  Location: [facility];  Service: OB/GYN;  Laterality: N/A;  ROBOTIC MOBILIZATION OF BLADDER- DR BURNHAM    SPINE SURGERY      WRIST FRACTURE SURGERY Left        Time Tracking:     OT Date of Treatment: 12/02/20  OT Start Time: 0830  OT Stop Time: 0850  OT Total Time (min): 20 min    Billable Minutes:Re-eval 10  Self Care/Home Management 10    MG Gomes  12/2/2020

## 2020-12-02 NOTE — PLAN OF CARE
Problem: Occupational Therapy Goal  Goal: Occupational Therapy Goal  Description: Goals to be met by: 12/16/20     Patient will increase functional independence with ADLs by performing:    Feeding with [place].  UE Dressing with MIN A   LE Dressing with MIN A .  Grooming while seated with Supervision.  Toileting from toilet with Moderate Assistance for hygiene and clothing management.   Toilet transfer to toilet with SBA.    Outcome: Ongoing, Progressing

## 2020-12-02 NOTE — ASSESSMENT & PLAN NOTE
Brief Hx: 60 y.o. female with a diagnosis of HTN, T2DM on MDi insulin, hyperthyroidism who presented with weakness, poor appetite and abdominal pain associated with vaginal pain and rectal pressure and non-bloody diarrhea.   She was found to be in DKA and started on DKA protocol with IVF and IV insulin.   She is being treated with IV antibiotics for pelvic fluids collections suspected to be infectious.     History of poorly controlled T2DM (A1c 11.4%) on MDI insulin and metformin  Presented with intra-abdominal infection and DKA  DKA resolved with IV insulin, IVFs      Consulted for: dka  DM type: T2DM  A1C: 11.4*  Glucose Goals: 140-180 mg/dL    Home Regimen:    Metformin 500 bid  Lantus 50 units BID  Aspart 10-15 units AC and SSI      PLAN:      -  Change to Levemir 22 units BID; discontinue IV insulin infusion.    -  Moderate correction scale   -  NPO for procedure, advance to DM diet per primary   -  Accucheck Q4H while NPO

## 2020-12-02 NOTE — PROGRESS NOTES
Patient's ring removed s/t swelling in upper extremities. Ring placed in bag with patient labels and taped to window trim in room.

## 2020-12-02 NOTE — PROGRESS NOTES
"[facility]  Endocrinology  Progress Note    Admit Date: 11/26/2020     Reason for Consult: Management of T2DM, Hyperglycemia     Diabetes diagnosis year: > 10 years ago    Home Diabetes Medications:    Lantus 50 U daily  Novolog 15 U AC  Metformin 500 mg BID    How often checking glucose at home? 1-3 x day   BG readings on regimen: 200s-300s  Hypoglycemia on the regimen?  No  Missed doses on regimen?  Yes    Diabetes Complications include:     Diabetic nephropathy        HPI:   Patient is a 60 y.o. female with a diagnosis of HTN, T2DM on MDi insulin, hyperthyroidism who presented with weakness, poor appetite and abdominal pain associated with vaginal pain and rectal pressure and non-bloody diarrhea. She was found to be in DKA and started on DKA protocol with IVF and IV insulin. She is being treated with IV antibiotics for pelvic fluids collections suspected to be infectious.     On admission labs:   11/26/2020    Sodium 128 (L)   Potassium 5.3 (H)   Anion Gap 16   CO2 16 (L)   Creatinine 1.5 (H)   Glucose 626 (HH)   Beta-Hydroxybutyrate 4.2 (H)     Lab Results   Component Value Date    HGBA1C 11.4 (H) 11/27/2020     Lab Results   Component Value Date    TSH 0.519 11/27/2020       She also has a history of hyperthyroidism on methimazole 10 mg daily. Thyroid US shows MNG. No RAIU scan on record. No TRAb or TSI. TSH at goal.     Interval HPI:   Overnight events: Transferred to CMICU. CRRT per nephrology. BG slightly above goal on IV insulin infusion at 1.8 u/hr.   Eating:   NPO Diet NPO Except for: Ice Chips, Sips with Medication  Nausea: No  Hypoglycemia and intervention: No  Fever: No  TPN and/or TF: No    /67 (BP Location: Right arm, Patient Position: Lying)   Pulse 72   Temp 97.7 °F (36.5 °C) (Oral)   Resp 20   Ht 5' 7" (1.702 m)   Wt 115.5 kg (254 lb 10.1 oz)   SpO2 98%   BMI 39.88 kg/m²     Labs Reviewed and Include    Recent Labs   Lab 12/02/20  0442  12/02/20  1109   *  " 259*  262*   < > 221*  221*   CALCIUM 7.1*  7.1*  7.2*   < > 7.2*  7.2*   ALBUMIN 1.6*  1.6*   < > 1.6*   PROT 5.9*  --   --    *  123*  123*   < > 122*  122*   K 4.5  4.5  4.5   < > 4.2  4.2   CO2 17*  17*  17*   < > 17*  17*   CL 97  97  97   < > 97  97   BUN 18  18  18   < > 19  19   CREATININE 4.7*  4.7*  4.7*   < > 5.0*  5.0*   ALKPHOS 203*  --   --    ALT 9*  --   --    AST 12  --   --    BILITOT 0.1  --   --     < > = values in this interval not displayed.     Lab Results   Component Value Date    WBC 13.31 (H) 12/02/2020    HGB 6.9 (L) 12/02/2020    HCT 22.0 (L) 12/02/2020    MCV 91 12/02/2020     (H) 12/02/2020     Recent Labs   Lab 11/27/20  1239   TSH 0.519     Lab Results   Component Value Date    HGBA1C 11.4 (H) 11/27/2020       Nutritional status:   Body mass index is 39.88 kg/m².  Lab Results   Component Value Date    ALBUMIN 1.6 (L) 12/02/2020    ALBUMIN 1.6 (L) 12/02/2020    ALBUMIN 1.6 (L) 12/02/2020    ALBUMIN 1.6 (L) 12/02/2020     No results found for: PREALBUMIN    Estimated Creatinine Clearance: 15.3 mL/min (A) (based on SCr of 5 mg/dL (H)).    Accu-Checks  Recent Labs     12/01/20  0720 12/01/20  0825 12/01/20  1203 12/01/20  1621 12/01/20  2030 12/02/20  0041 12/02/20  0443 12/02/20  0750 12/02/20  0849 12/02/20  1254   POCTGLUCOSE 178* 198* 218* 236* 198* 246* 289* 250* 235* 210*       Current Medications and/or Treatments Impacting Glycemic Control  Immunotherapy:    Immunosuppressants     None        Steroids:   Hormones (From admission, onward)    Start     Stop Route Frequency Ordered    11/27/20 0959  melatonin tablet 6 mg      -- Oral Nightly PRN 11/27/20 0903        Pressors:    Autonomic Drugs (From admission, onward)    None        Hyperglycemia/Diabetes Medications:   Antihyperglycemics (From admission, onward)    Start     Stop Route Frequency Ordered    12/01/20 0957  insulin aspart U-100 pen 0-4 Units      -- SubQ As needed (PRN) 12/01/20  0858    12/01/20 0900  insulin regular in 0.9 % NaCl 100 unit/100 mL (1 unit/mL) infusion     Question:  Enter initial dose (Units/hr):  Answer:  1.3    -- IV Continuous 12/01/20 0757    11/28/20 1245  insulin regular in 0.9 % NaCl 100 unit/100 mL (1 unit/mL) infusion     Question:  Enter initial dose (Units/hr):  Answer:  4    11/28 2300 IV Continuous 11/28/20 1133          ASSESSMENT and PLAN    * Acute kidney injury (LAURIE) with acute tubular necrosis (ATN)    Titrate insulin slowly to avoid hypoglycemia as the risk of hypoglycemia increases with decreased creatinine clearance.    Estimated Creatinine Clearance: 15.3 mL/min (A) (based on SCr of 5 mg/dL (H)).      Type 2 diabetes mellitus with hyperglycemia, with long-term current use of insulin  Brief Hx: 60 y.o. female with a diagnosis of HTN, T2DM on MDi insulin, hyperthyroidism who presented with weakness, poor appetite and abdominal pain associated with vaginal pain and rectal pressure and non-bloody diarrhea.   She was found to be in DKA and started on DKA protocol with IVF and IV insulin.   She is being treated with IV antibiotics for pelvic fluids collections suspected to be infectious.     History of poorly controlled T2DM (A1c 11.4%) on MDI insulin and metformin  Presented with intra-abdominal infection and DKA  DKA resolved with IV insulin, IVFs      Consulted for: dka  DM type: T2DM  A1C: 11.4*  Glucose Goals: 140-180 mg/dL    Home Regimen:    Metformin 500 bid  Lantus 50 units BID  Aspart 10-15 units AC and SSI      PLAN:      -  Change to Levemir 22 units BID; discontinue IV insulin infusion.    -  Moderate correction scale   -  NPO for procedure, advance to DM diet per primary   -  Accucheck Q4H while NPO          Hyperthyroidism  Unclear etiology given no TRAb, TSI or RAIU scan  Thyroid US shows MNG so toxic MNG high in differential  She has a TSH at goal on methimazole 10 mg daily  Continue, follow up outpatient    Plan  - Continue methimazole 10 mg  daily  - Outpatient followed with Endocrinology          Ksenia Tovar NP  Endocrinology  [facility]

## 2020-12-02 NOTE — ASSESSMENT & PLAN NOTE
ATN in setting of contrast administration and sepsis 2/2 UTI, baseline cr 1.0-1.2 that trended up to 4.6 at time of consult.     -received contrast on 11/27/2020  -Urine cwloknghlm43/30/2020 many muddy brown granular casts, massive WBC's  -purulent drainage from abdominal ROSAURA drain similar in appearance to her urine; concerning for possibility of communication between bladder and fluid collection  -Initiated prismax 12/1 with D5 ggt post-filter to prevent overcorrection of hyponatremia; Na today improved to 124  -Will plan on continuing prismax today  -q 4 BMPs  -Maximum gabapentin dose 300mg in CKD patients  -Maintain Hgb >7.0, Keep MAP >65  -Renally dose meds and avoid nephrotoxic meds  -Will continue to follow closely   -Plan discussed with staff, Dr Rose

## 2020-12-02 NOTE — PT/OT/SLP RE-EVAL
Physical Therapy Co- Re-evaluation and co-treatment with OT    Patient Name:  Navin Beck   MRN:  [id number]    Recommendations:     Discharge Recommendations:  nursing facility, skilled   Discharge Equipment Recommendations: (will determine DME Needs closer to discharge.)   Barriers to discharge: Decreased caregiver support family will not be able to assist at current functional level.     Assessment:     Navin Beck is a 60 y.o. female admitted with a medical diagnosis of Type 2 diabetes mellitus with hyperglycemia, with long-term current use of insulin.  She presents with the following impairments/functional limitations:  weakness, impaired endurance, impaired cognition, decreased coordination, impaired functional mobilty, gait instability, impaired balance, decreased safety awareness, decreased lower extremity function. pt tolerated treatment well but is at a very low functional level. Pt will benefit from skilled PT 3x/wk to progress physically. Pt will need SNF placement to maximize rehab potential. Pt was evaluated 11/28 with diagnosis of diabetic ketoacidosis. [facility].     SOCIAL: pt lives with her son ( who works full-time) in 1st floor apt with slab entrance. Pt owns and uses RW. Pt son will assist after work.     Rehab Prognosis:  Good ; patient would benefit from acute skilled PT services to address these deficits and reach maximum level of function.      Recent Surgery: * No surgery found *      Plan:     During this hospitalization, patient to be seen 3 x/week to address the above listed problems via gait training, therapeutic activities, therapeutic exercises, neuromuscular re-education  · Plan of Care Expires:  12/31/20   Plan of Care Reviewed with: patient    Subjective     Communicated with nurse prior to session.  Patient found supine with telemetry, pulse ox (continuous), blood pressure cuff, march catheter, central line,  "peripheral IV upon PT entry to room, agreeable to evaluation.      Chief Complaint: pt c/o pain in abdomen and nausea during treatment.   Patient comments/goals: to get better and go home.   Pain/Comfort:  · Pain Rating 1: (pt stated that she had pain in abdomen but did not rate it.)  · Pain Rating Post-Intervention 1: (see above)    Patients cultural, spiritual, Restoration conflicts given the current situation: no      Objective:     Patient found with: telemetry, pulse ox (continuous), blood pressure cuff, march catheter, central line, peripheral IV     General Precautions: Standard, fall   Orthopedic Precautions:N/A   Braces:       Exams:  · Cognitive Exam:  Patient is oriented to Person, Place, Time and Situation  · RLE ROM: WFL  · RLE Strength: pt did not follow commands for muscle testing.   · LLE ROM: WFL  · LLE Strength: pt did not follow commands for muscle testing.     Functional Mobility:  · Bed Mobility:  Pt needed verbal cues for hand placement and sequencing for functional mobility.    · Rolling Right: total assistance and of 2 persons  · Supine to Sit: total assistance and of 2 persons  · Sit to Supine: total assistance and of 2 persons  ·   · Balance: pt sat on EOB x 5 minutes with minimum assistance for sitting balance. pt had uncontolled "jerky movement' of RUE with sitting EOB.  PT faciliated trunk control and postural alignment during evaluation and treatment. With improved sitting balance, pt was able to perform ADLS with OT.     PT concentrated on BLE ROM and sitting balance during re-evaluation and treatment.     AM-PAC 6 CLICK MOBILITY  Total Score:8       Therapeutic Activities and Exercises:   pt received verbal instruction in role of PT and POC. Pt verbally expressed understanding of such.     Patient left supine with all lines intact, call button in reach and RN present.    GOALS:   Multidisciplinary Problems     Physical Therapy Goals        Problem: Physical Therapy Goal    Goal " Priority Disciplines Outcome Goal Variances Interventions   Physical Therapy Goal     PT, PT/OT Ongoing, Progressing     Description: Goals to be met by: 2020     Patient will increase functional independence with mobility by performin. Pt supine to sit with minimum assistance- not met  3. Sit to stand transfer with Minimum assistance and RW if needed. - not met  4. Bed to chair transfer with Minimum assistance  using Rolling Walker or no AD.-not met  5. Gait  x 50 feet with Minimum assistanc using Rolling Walker or no AD. -not met  6. Lower extremity exercise program x20 reps, with independence -not met                     History:     Past Medical History:   Diagnosis Date    Allergy     Arrhythmia     Arthritis     Bronchial asthma     Diabetes mellitus     Glaucoma     Hormone disorder     hysterectomy    Hypercholesteremia     Hypertension     Hyperthyroidism     Insomnia     Kidney stones     Thyroid disease     Urine, incontinence, stress female     UTI (urinary tract infection) 2020       Past Surgical History:   Procedure Laterality Date    APPENDECTOMY      BACK SURGERY      disc removal     CARPAL TUNNEL RELEASE Bilateral     CHOLECYSTECTOMY      CYSTOSCOPY N/A 10/26/2020    Procedure: CYSTOSCOPY;  Surgeon: Wilner Goel MD;  Location: [facility];  Service: OB/GYN;  Laterality: N/A;    FRACTURE SURGERY Right     wrist    [facility]      [facility]    JOINT REPLACEMENT      KNEE ARTHROSCOPY W/ DEBRIDEMENT      KNEE SURGERY Right     Knee replacement    LITHOTRIPSY      [place] REMOVAL OF OVARY/TUBE(S)  13    benign    removal of round ligament mass      ROBOT-ASSISTED LAPAROSCOPIC LYSIS OF ADHESIONS USING DA JAMES XI N/A 10/26/2020    Procedure: XI ROBOTIC LYSIS, ADHESIONS;  Surgeon: Wilner Goel MD;  Location: [facility] [place] OR;  Service: OB/GYN;  Laterality: N/A;  ROBOTIC MOBILIZATION OF BLADDER- DR BURNHAM    SPINE SURGERY      WRIST  FRACTURE SURGERY Left        Time Tracking:     PT Received On: 12/02/20  PT Start Time: 0850     PT Stop Time: 0906  PT Total Time (min): 16 min     Billable Minutes: Re-eval 8 min and Therapeutic Activity 8 min      Vonnie Musa, PT  12/02/2020

## 2020-12-02 NOTE — PROGRESS NOTES
CRRT  Access pressure and filter pressure alarm.   Able to rinsed back half of the blood in the circuit.     CRRT restarted using PRismax machine.  CVVHDF mode.    NET EVEN

## 2020-12-02 NOTE — PROGRESS NOTES
Attempted to restart Prismax CVVHDF this shift. Frequent alarms related to negative pressure (same issue last evening, pt clotted off twice overnight). Rinsed back around 1500. Giovanni Franks MD repositioned line. CXR pending to confirm. Will restart dialysis after line confirmation.

## 2020-12-02 NOTE — PROGRESS NOTES
[facility]  Critical Care Medicine  Progress Note    Patient Name: Navin Beck  MRN: [id number]  Admission Date: 11/26/2020  Hospital Length of Stay: 5 days  Code Status: Full Code  Attending Provider: Chetan Lang MD  Primary Care Provider: Elvira Quinones MD   Principal Problem: Type 2 diabetes mellitus with hyperglycemia, with long-term current use of insulin    Subjective:     HPI:  Patient is a 60 y.o. female with significant past medical history of HTN, HLD, DM II with recent revision of erosion of implanted vaginal mesh and prosthetic materials on 10/26 presented to hospital complaining of lower abdominal/pelvic pain. Post-op patient had been d/c'd w/ march cath which was removed on 11/19 and pain has been present since then. Patient endorses that she has been noncompliant with her home insulin/diabetes regimen for the last two days because she has not been feeling well. Imaging in ED shows multilobar fluid collection adjacent to bladder concerning for bladder perforation and bilateral hydronephrosis. Labs show a glucose of 626 w/ + bhb of 4.2 and pH 7.27, MICU had been consulted for DKA - started insulin ggt and started treatment for abx. On 12/1 nephrology wanting to start CRRT due to hyponatremia (122 on day of consultation)       Hospital/ICU Course:  60 yaer old female with history of HTN, HLD, noncompliance with T2DM with recent revision of erosion of implanted mesh/prosthetic materials found to have intra-abdominal abscesses sp IR drain placement (11/28), in DKA (beta hydroxy 4.2, glucose 626 and initial bicarb 16), now with anuria requiring CRRT for both metabolic clearance and slow correction of her hyponatremia (120s).     Interval History/Significant Events: No acute events overnight - continues to be on sled    Review of Systems   Constitutional: Negative for chills and fever.   HENT: Negative for rhinorrhea and sore throat.    Respiratory: Negative for cough and  shortness of breath.    Cardiovascular: Negative for chest pain and palpitations.   Gastrointestinal: Negative for abdominal pain, constipation, diarrhea and nausea.   Genitourinary: Negative for dysuria and flank pain.   Neurological: Negative for light-headedness, numbness and headaches.     Objective:     Vital Signs (Most Recent):  Temp: 97.6 °F (36.4 °C) (12/02/20 0645)  Pulse: 74 (12/02/20 0645)  Resp: (!) 28 (12/02/20 0645)  BP: (!) 120/59 (12/02/20 0645)  SpO2: 100 % (12/02/20 0645) Vital Signs (24h Range):  Temp:  [97.2 °F (36.2 °C)-98.1 °F (36.7 °C)] 97.6 °F (36.4 °C)  Pulse:  [67-83] 74  Resp:  [11-30] 28  SpO2:  [94 %-100 %] 100 %  BP: ()/(50-64) 120/59   Weight: 115.8 kg (255 lb 4.7 oz)  Body mass index is 39.98 kg/m².      Intake/Output Summary (Last 24 hours) at 12/2/2020 0657  Last data filed at 12/2/2020 0600  Gross per 24 hour   Intake 2199.83 ml   Output 196 ml   Net 2003.83 ml       Physical Exam  Constitutional:       General: She is not in acute distress.     Appearance: She is obese. She is not ill-appearing or toxic-appearing.   HENT:      Head: Normocephalic and atraumatic.      Nose: No congestion or rhinorrhea.      Mouth/Throat:      Pharynx: No oropharyngeal exudate or posterior oropharyngeal erythema.   Eyes:      General: No scleral icterus.        Right eye: No discharge.         Left eye: No discharge.      Extraocular Movements: Extraocular movements intact.      Pupils: Pupils are equal, round, and reactive to light.   Neck:      Musculoskeletal: Normal range of motion and neck supple. No neck rigidity or muscular tenderness.   Cardiovascular:      Rate and Rhythm: Normal rate and regular rhythm.      Pulses: Normal pulses.      Heart sounds: No murmur.   Pulmonary:      Effort: Pulmonary effort is normal. No respiratory distress.      Breath sounds: Normal breath sounds. No stridor. No wheezing.   Abdominal:      General: Abdomen is flat. Bowel sounds are normal. There is no  distension.      Palpations: Abdomen is soft. There is no mass.      Tenderness: There is abdominal tenderness (drain in place, dressings clean dry and intact).      Hernia: No hernia is present.      Comments: Drain with purulent fluid   Musculoskeletal: Normal range of motion.         General: No swelling, tenderness or deformity.   Skin:     General: Skin is warm and dry.      Coloration: Skin is not jaundiced or pale.      Findings: No bruising.   Neurological:      General: No focal deficit present.      Mental Status: She is alert. Mental status is at baseline.         Vents:  Oxygen Concentration (%): 21 (12/02/20 0244)  Lines/Drains/Airways     Central Venous Catheter Line            Trialysis (Dialysis) Catheter 12/01/20 1547 right internal jugular less than 1 day          Drain                 Urethral Catheter 11/27/20 0145 Latex 16 Fr. 5 days         Closed/Suction Drain 11/28/20 1358 Right Abdomen Bulb 10 Fr. 3 days          Peripheral Intravenous Line                 Peripheral IV - Single Lumen 11/30/20 1142 20 G;1 3/4 in Left;Anterior Upper Arm 1 day         Peripheral IV - Single Lumen 11/30/20 1142 20 G;1 3/4 in Right;Anterior Upper Arm 1 day              Significant Labs:    CBC/Anemia Profile:  Recent Labs   Lab 11/30/20  1414 12/01/20  0226 12/02/20  0442   WBC 18.24* 16.65* 13.31*   HGB 8.3* 7.9* 6.9*   HCT 27.5* 26.1* 22.0*   * 480* 458*   MCV 96 96 91   RDW 13.2 13.4 13.2        Chemistries:  Recent Labs   Lab 12/01/20  0226  12/01/20  2134 12/02/20  0043 12/02/20  0442   *   < > 123*  123* 124*  124* 123*  123*  123*   K 5.1   < > 4.3  4.4 4.2  4.2 4.5  4.5  4.5   CL 97   < > 97  97 98  98 97  97  97   CO2 14*   < > 17*  17* 17*  17* 17*  17*  17*   BUN 21   < > 23  22 20  20 18  18  18   CREATININE 5.4*   < > 5.9*  5.8* 5.1*  5.1* 4.7*  4.7*  4.7*   CALCIUM 8.0*   < > 7.4*  7.6* 7.2*  7.2* 7.1*  7.1*  7.2*   ALBUMIN  --   --  1.6* 1.6* 1.6*  1.6*    PROT  --   --   --   --  5.9*   BILITOT  --   --   --   --  0.1   ALKPHOS  --   --   --   --  203*   ALT  --   --   --   --  9*   AST  --   --   --   --  12   MG 2.6  --  2.4  --  2.3   PHOS 4.3  --  4.3 4.3 4.4  4.4    < > = values in this interval not displayed.       BMP:   Recent Labs   Lab 12/02/20  0442   *  259*  262*   *  123*  123*   K 4.5  4.5  4.5   CL 97  97  97   CO2 17*  17*  17*   BUN 18  18  18   CREATININE 4.7*  4.7*  4.7*   CALCIUM 7.1*  7.1*  7.2*   MG 2.3     CMP:   Recent Labs   Lab 12/01/20  2134 12/02/20  0043 12/02/20  0442   *  123* 124*  124* 123*  123*  123*   K 4.3  4.4 4.2  4.2 4.5  4.5  4.5   CL 97  97 98  98 97  97  97   CO2 17*  17* 17*  17* 17*  17*  17*   *  215* 222*  222* 259*  259*  262*   BUN 23  22 20  20 18  18  18   CREATININE 5.9*  5.8* 5.1*  5.1* 4.7*  4.7*  4.7*   CALCIUM 7.4*  7.6* 7.2*  7.2* 7.1*  7.1*  7.2*   PROT  --   --  5.9*   ALBUMIN 1.6* 1.6* 1.6*  1.6*   BILITOT  --   --  0.1   ALKPHOS  --   --  203*   AST  --   --  12   ALT  --   --  9*   ANIONGAP 9  9 9  9 9  9  9   EGFRNONAA 7.1*  7.2* 8.4*  8.4* 9.3*  9.3*  9.3*       Significant Imaging:  I have reviewed all pertinent imaging results/findings within the past 24 hours.      ABG  Recent Labs   Lab 12/01/20  0924   PH 7.297*   PO2 98*   PCO2 30.7*   HCO3 15.0*   BE -11     Assessment/Plan:     Pulmonary  Asthma  No PFTs recorded    On duo nebs  Continue to monitor    Cardiac/Vascular  Mixed hyperlipidemia  Home rosuvastatin    Continue rosuvastatin    Essential hypertension  Home benzapril    Holding - normotensive here in hosptial    Renal/  Hyponatremia  See ATN    UTI (urinary tract infection)  Ecoli in urine.    Continue ceftriaxone    Urinary retention  10/2020 with bladder mesh removal, initial urinary retention secondary to anatomic obstruction.  Sp march placed by urology  Renal us with bilateral hydronephrosis;  follow up CT after drains showed no hyrdonephrosis    Continue march    Abnormal computed tomography of bladder  History of gyn surgery 10/2020 for bladder mesh removal, CT abdomen pelvis showing fluid collections, IR drain placed 11/28 showing GPCs,     On Cefepime  Urology following, appreciate recs    Endocrine  * Type 2 diabetes mellitus with hyperglycemia, with long-term current use of insulin  Medication noncompliance, hemoglobin A1c 11.4 on admission    Continue insulin ggt  Endocrine following      Diabetic ketoacidosis without coma associated with type 2 diabetes mellitus  DKA secondary to medication non-compliance  On day of admission 7.297/30/98. AG gap - 9      Continues to be on insulin ggt   -follow DKA protocol   -gap closed, had hyperglycemia after DKA protocol finished, restarted insulin ggt  Endocrine following - appreciate recs   -transition to subq    Hyperthyroidism  History of Grave's disease 2017  TSH this admission nl  Home methimazole  Endocrine following, appreciate recs    Severe obesity (BMI 35.0-35.9 with comorbidity)  Will assess issue when stepped down    Other  Acute kidney injury (LAURIE) with acute tubular necrosis (ATN)  ATN secondary to insults from pyelonephritis/urinary obstructions/contrast administration. Central line placed for CRRT needs. Patient with hyponatremia and nephrology following to correct Na slowly from 122.     BMP q4 for na checks   -max na to 130  CRRT orders per nephrology        Critical secondary to Patient has a condition that poses threat to life and bodily function: Acute Renal Failure      Critical care was time spent personally by me on the following activities: development of treatment plan with patient or surrogate and bedside caregivers, discussions with consultants, evaluation of patient's response to treatment, examination of patient, ordering and performing treatments and interventions, ordering and review of laboratory studies, ordering and review  of radiographic studies, pulse oximetry, re-evaluation of patient's condition. This critical care time did not overlap with that of any other provider or involve time for any procedures.     Giovanni Franks MD  Critical Care Medicine  [facility]

## 2020-12-03 LAB
ALBUMIN SERPL BCP-MCNC: 1.5 G/DL (ref 3.5–5.2)
ALBUMIN SERPL BCP-MCNC: 1.5 G/DL (ref 3.5–5.2)
ALBUMIN SERPL BCP-MCNC: 1.6 G/DL (ref 3.5–5.2)
ALP SERPL-CCNC: 179 U/L (ref 55–135)
ALT SERPL W/O P-5'-P-CCNC: 7 U/L (ref 10–44)
ANION GAP SERPL CALC-SCNC: 7 MMOL/L (ref 8–16)
ANION GAP SERPL CALC-SCNC: 8 MMOL/L (ref 8–16)
ANION GAP SERPL CALC-SCNC: 8 MMOL/L (ref 8–16)
AST SERPL-CCNC: 11 U/L (ref 10–40)
BASOPHILS # BLD AUTO: 0.05 K/UL (ref 0–0.2)
BASOPHILS NFR BLD: 0.4 % (ref 0–1.9)
BILIRUB SERPL-MCNC: 0.2 MG/DL (ref 0.1–1)
BUN SERPL-MCNC: 14 MG/DL (ref 8–23)
BUN SERPL-MCNC: 15 MG/DL (ref 8–23)
BUN SERPL-MCNC: 15 MG/DL (ref 8–23)
BUN SERPL-MCNC: 16 MG/DL (ref 8–23)
BUN SERPL-MCNC: 17 MG/DL (ref 8–23)
CA-I BLDV-SCNC: 1.09 MMOL/L (ref 1.06–1.42)
CALCIUM SERPL-MCNC: 7 MG/DL (ref 8.7–10.5)
CALCIUM SERPL-MCNC: 7 MG/DL (ref 8.7–10.5)
CALCIUM SERPL-MCNC: 7.1 MG/DL (ref 8.7–10.5)
CALCIUM SERPL-MCNC: 7.2 MG/DL (ref 8.7–10.5)
CALCIUM SERPL-MCNC: 8.2 MG/DL (ref 8.7–10.5)
CHLORIDE SERPL-SCNC: 95 MMOL/L (ref 95–110)
CHLORIDE SERPL-SCNC: 96 MMOL/L (ref 95–110)
CHLORIDE SERPL-SCNC: 97 MMOL/L (ref 95–110)
CO2 SERPL-SCNC: 18 MMOL/L (ref 23–29)
CO2 SERPL-SCNC: 19 MMOL/L (ref 23–29)
CO2 SERPL-SCNC: 19 MMOL/L (ref 23–29)
CO2 SERPL-SCNC: 20 MMOL/L (ref 23–29)
CO2 SERPL-SCNC: 21 MMOL/L (ref 23–29)
CREAT SERPL-MCNC: 3.9 MG/DL (ref 0.5–1.4)
CREAT SERPL-MCNC: 3.9 MG/DL (ref 0.5–1.4)
CREAT SERPL-MCNC: 4 MG/DL (ref 0.5–1.4)
CREAT SERPL-MCNC: 4 MG/DL (ref 0.5–1.4)
CREAT SERPL-MCNC: 4.3 MG/DL (ref 0.5–1.4)
CREAT SERPL-MCNC: 4.3 MG/DL (ref 0.5–1.4)
CREAT SERPL-MCNC: 4.4 MG/DL (ref 0.5–1.4)
DIFFERENTIAL METHOD: ABNORMAL
EOSINOPHIL # BLD AUTO: 0.2 K/UL (ref 0–0.5)
EOSINOPHIL NFR BLD: 1.3 % (ref 0–8)
ERYTHROCYTE [DISTWIDTH] IN BLOOD BY AUTOMATED COUNT: 13.9 % (ref 11.5–14.5)
EST. GFR  (AFRICAN AMERICAN): 11.6 ML/MIN/1.73 M^2
EST. GFR  (AFRICAN AMERICAN): 12 ML/MIN/1.73 M^2
EST. GFR  (AFRICAN AMERICAN): 12 ML/MIN/1.73 M^2
EST. GFR  (AFRICAN AMERICAN): 13.1 ML/MIN/1.73 M^2
EST. GFR  (AFRICAN AMERICAN): 13.1 ML/MIN/1.73 M^2
EST. GFR  (AFRICAN AMERICAN): 13.5 ML/MIN/1.73 M^2
EST. GFR  (AFRICAN AMERICAN): 13.5 ML/MIN/1.73 M^2
EST. GFR  (NON AFRICAN AMERICAN): 10.1 ML/MIN/1.73 M^2
EST. GFR  (NON AFRICAN AMERICAN): 10.4 ML/MIN/1.73 M^2
EST. GFR  (NON AFRICAN AMERICAN): 10.4 ML/MIN/1.73 M^2
EST. GFR  (NON AFRICAN AMERICAN): 11.3 ML/MIN/1.73 M^2
EST. GFR  (NON AFRICAN AMERICAN): 11.3 ML/MIN/1.73 M^2
EST. GFR  (NON AFRICAN AMERICAN): 11.7 ML/MIN/1.73 M^2
EST. GFR  (NON AFRICAN AMERICAN): 11.7 ML/MIN/1.73 M^2
GLUCOSE SERPL-MCNC: 226 MG/DL (ref 70–110)
GLUCOSE SERPL-MCNC: 253 MG/DL (ref 70–110)
GLUCOSE SERPL-MCNC: 253 MG/DL (ref 70–110)
GLUCOSE SERPL-MCNC: 268 MG/DL (ref 70–110)
GLUCOSE SERPL-MCNC: 291 MG/DL (ref 70–110)
GLUCOSE SERPL-MCNC: 291 MG/DL (ref 70–110)
GLUCOSE SERPL-MCNC: 326 MG/DL (ref 70–110)
HCT VFR BLD AUTO: 24.1 % (ref 37–48.5)
HGB BLD-MCNC: 7.6 G/DL (ref 12–16)
IMM GRANULOCYTES # BLD AUTO: 0.13 K/UL (ref 0–0.04)
IMM GRANULOCYTES NFR BLD AUTO: 1.1 % (ref 0–0.5)
LYMPHOCYTES # BLD AUTO: 1.6 K/UL (ref 1–4.8)
LYMPHOCYTES NFR BLD: 14.1 % (ref 18–48)
MAGNESIUM SERPL-MCNC: 2.1 MG/DL (ref 1.6–2.6)
MAGNESIUM SERPL-MCNC: 2.2 MG/DL (ref 1.6–2.6)
MCH RBC QN AUTO: 28.9 PG (ref 27–31)
MCHC RBC AUTO-ENTMCNC: 31.5 G/DL (ref 32–36)
MCV RBC AUTO: 92 FL (ref 82–98)
MONOCYTES # BLD AUTO: 1 K/UL (ref 0.3–1)
MONOCYTES NFR BLD: 8.5 % (ref 4–15)
NEUTROPHILS # BLD AUTO: 8.6 K/UL (ref 1.8–7.7)
NEUTROPHILS NFR BLD: 74.6 % (ref 38–73)
NRBC BLD-RTO: 0 /100 WBC
PHOSPHATE SERPL-MCNC: 4.3 MG/DL (ref 2.7–4.5)
PHOSPHATE SERPL-MCNC: 4.5 MG/DL (ref 2.7–4.5)
PHOSPHATE SERPL-MCNC: 4.7 MG/DL (ref 2.7–4.5)
PLATELET # BLD AUTO: 402 K/UL (ref 150–350)
PMV BLD AUTO: 9.7 FL (ref 9.2–12.9)
POCT GLUCOSE: 235 MG/DL (ref 70–110)
POCT GLUCOSE: 317 MG/DL (ref 70–110)
POCT GLUCOSE: 333 MG/DL (ref 70–110)
POCT GLUCOSE: 349 MG/DL (ref 70–110)
POCT GLUCOSE: 356 MG/DL (ref 70–110)
POTASSIUM SERPL-SCNC: 4 MMOL/L (ref 3.5–5.1)
POTASSIUM SERPL-SCNC: 4.1 MMOL/L (ref 3.5–5.1)
POTASSIUM SERPL-SCNC: 4.1 MMOL/L (ref 3.5–5.1)
POTASSIUM SERPL-SCNC: 4.4 MMOL/L (ref 3.5–5.1)
POTASSIUM SERPL-SCNC: 4.4 MMOL/L (ref 3.5–5.1)
POTASSIUM SERPL-SCNC: 4.5 MMOL/L (ref 3.5–5.1)
POTASSIUM SERPL-SCNC: 4.5 MMOL/L (ref 3.5–5.1)
PROT SERPL-MCNC: 5.7 G/DL (ref 6–8.4)
RBC # BLD AUTO: 2.63 M/UL (ref 4–5.4)
SODIUM SERPL-SCNC: 122 MMOL/L (ref 136–145)
SODIUM SERPL-SCNC: 122 MMOL/L (ref 136–145)
SODIUM SERPL-SCNC: 123 MMOL/L (ref 136–145)
SODIUM SERPL-SCNC: 126 MMOL/L (ref 136–145)
VANCOMYCIN SERPL-MCNC: 16.1 UG/ML
WBC # BLD AUTO: 11.56 K/UL (ref 3.9–12.7)

## 2020-12-03 PROCEDURE — 94761 N-INVAS EAR/PLS OXIMETRY MLT: CPT | Mod: HCWC

## 2020-12-03 PROCEDURE — 83735 ASSAY OF MAGNESIUM: CPT | Mod: 91,HCWC

## 2020-12-03 PROCEDURE — 63600175 PHARM REV CODE 636 W HCPCS: Mod: HCWC | Performed by: STUDENT IN AN ORGANIZED HEALTH CARE EDUCATION/TRAINING PROGRAM

## 2020-12-03 PROCEDURE — 63600175 PHARM REV CODE 636 W HCPCS: Mod: HCWC | Performed by: NURSE PRACTITIONER

## 2020-12-03 PROCEDURE — 82330 ASSAY OF CALCIUM: CPT | Mod: HCWC

## 2020-12-03 PROCEDURE — 25000003 PHARM REV CODE 250: Mod: HCWC | Performed by: STUDENT IN AN ORGANIZED HEALTH CARE EDUCATION/TRAINING PROGRAM

## 2020-12-03 PROCEDURE — 80069 RENAL FUNCTION PANEL: CPT | Mod: HCWC

## 2020-12-03 PROCEDURE — 25000003 PHARM REV CODE 250: Mod: HCWC | Performed by: INTERNAL MEDICINE

## 2020-12-03 PROCEDURE — 80202 ASSAY OF VANCOMYCIN: CPT | Mod: HCWC

## 2020-12-03 PROCEDURE — 99233 SBSQ HOSP IP/OBS HIGH 50: CPT | Mod: HCWC,,, | Performed by: HOSPITALIST

## 2020-12-03 PROCEDURE — 99233 PR SUBSEQUENT HOSPITAL CARE,LEVL III: ICD-10-PCS | Mod: HCWC,,, | Performed by: INTERNAL MEDICINE

## 2020-12-03 PROCEDURE — 25000003 PHARM REV CODE 250: Mod: HCWC | Performed by: NURSE PRACTITIONER

## 2020-12-03 PROCEDURE — 86901 BLOOD TYPING SEROLOGIC RH(D): CPT | Mod: HCWC

## 2020-12-03 PROCEDURE — 80048 BASIC METABOLIC PNL TOTAL CA: CPT | Mod: HCWC

## 2020-12-03 PROCEDURE — 80100008 HC CRRT DAILY MAINTENANCE: Mod: HCWC

## 2020-12-03 PROCEDURE — 94660 CPAP INITIATION&MGMT: CPT | Mod: HCWC

## 2020-12-03 PROCEDURE — 80053 COMPREHEN METABOLIC PANEL: CPT | Mod: HCWC

## 2020-12-03 PROCEDURE — 80069 RENAL FUNCTION PANEL: CPT | Mod: 91,HCWC

## 2020-12-03 PROCEDURE — 85025 COMPLETE CBC W/AUTO DIFF WBC: CPT | Mod: HCWC

## 2020-12-03 PROCEDURE — 90945 DIALYSIS ONE EVALUATION: CPT | Mod: HCWC

## 2020-12-03 PROCEDURE — 27202415 HC CARTRIDGE, CRRT: Mod: HCWC

## 2020-12-03 PROCEDURE — 63600175 PHARM REV CODE 636 W HCPCS: Mod: HCWC | Performed by: INTERNAL MEDICINE

## 2020-12-03 PROCEDURE — 82570 ASSAY OF URINE CREATININE: CPT | Mod: HCWC

## 2020-12-03 PROCEDURE — C9399 UNCLASSIFIED DRUGS OR BIOLOG: HCPCS | Mod: HCWC | Performed by: NURSE PRACTITIONER

## 2020-12-03 PROCEDURE — 86920 COMPATIBILITY TEST SPIN: CPT | Mod: HCWC

## 2020-12-03 PROCEDURE — 27000221 HC OXYGEN, UP TO 24 HOURS: Mod: HCWC

## 2020-12-03 PROCEDURE — 99233 PR SUBSEQUENT HOSPITAL CARE,LEVL III: ICD-10-PCS | Mod: HCWC,,, | Performed by: HOSPITALIST

## 2020-12-03 PROCEDURE — 20000000 HC ICU ROOM: Mod: HCWC

## 2020-12-03 PROCEDURE — 99233 SBSQ HOSP IP/OBS HIGH 50: CPT | Mod: HCWC,,, | Performed by: INTERNAL MEDICINE

## 2020-12-03 PROCEDURE — 99900035 HC TECH TIME PER 15 MIN (STAT): Mod: HCWC

## 2020-12-03 PROCEDURE — 84100 ASSAY OF PHOSPHORUS: CPT | Mod: HCWC

## 2020-12-03 RX ORDER — IBUPROFEN 200 MG
16 TABLET ORAL
Status: DISCONTINUED | OUTPATIENT
Start: 2020-12-03 | End: 2020-12-03

## 2020-12-03 RX ORDER — GLUCAGON 1 MG
1 KIT INJECTION
Status: DISCONTINUED | OUTPATIENT
Start: 2020-12-03 | End: 2020-12-12 | Stop reason: HOSPADM

## 2020-12-03 RX ORDER — IBUPROFEN 200 MG
24 TABLET ORAL
Status: DISCONTINUED | OUTPATIENT
Start: 2020-12-03 | End: 2020-12-03

## 2020-12-03 RX ORDER — MAGNESIUM SULFATE HEPTAHYDRATE 40 MG/ML
2 INJECTION, SOLUTION INTRAVENOUS
Status: ACTIVE | OUTPATIENT
Start: 2020-12-03 | End: 2020-12-04

## 2020-12-03 RX ORDER — INSULIN ASPART 100 [IU]/ML
0-5 INJECTION, SOLUTION INTRAVENOUS; SUBCUTANEOUS
Status: DISCONTINUED | OUTPATIENT
Start: 2020-12-03 | End: 2020-12-12 | Stop reason: HOSPADM

## 2020-12-03 RX ORDER — IBUPROFEN 200 MG
24 TABLET ORAL
Status: DISCONTINUED | OUTPATIENT
Start: 2020-12-03 | End: 2020-12-12 | Stop reason: HOSPADM

## 2020-12-03 RX ORDER — INSULIN ASPART 100 [IU]/ML
8-12 INJECTION, SOLUTION INTRAVENOUS; SUBCUTANEOUS
Status: DISCONTINUED | OUTPATIENT
Start: 2020-12-03 | End: 2020-12-04

## 2020-12-03 RX ORDER — HYDROMORPHONE HYDROCHLORIDE 1 MG/ML
0.5 INJECTION, SOLUTION INTRAMUSCULAR; INTRAVENOUS; SUBCUTANEOUS EVERY 6 HOURS PRN
Status: DISCONTINUED | OUTPATIENT
Start: 2020-12-04 | End: 2020-12-11

## 2020-12-03 RX ORDER — IBUPROFEN 200 MG
16 TABLET ORAL
Status: DISCONTINUED | OUTPATIENT
Start: 2020-12-03 | End: 2020-12-12 | Stop reason: HOSPADM

## 2020-12-03 RX ORDER — INSULIN ASPART 100 [IU]/ML
1-10 INJECTION, SOLUTION INTRAVENOUS; SUBCUTANEOUS
Status: DISCONTINUED | OUTPATIENT
Start: 2020-12-03 | End: 2020-12-03

## 2020-12-03 RX ADMIN — ACETAMINOPHEN 650 MG: 325 TABLET ORAL at 01:12

## 2020-12-03 RX ADMIN — INSULIN ASPART 1 UNITS: 100 INJECTION, SOLUTION INTRAVENOUS; SUBCUTANEOUS at 06:12

## 2020-12-03 RX ADMIN — METRONIDAZOLE 500 MG: 500 TABLET ORAL at 10:12

## 2020-12-03 RX ADMIN — CEFTRIAXONE 2 G: 2 INJECTION, SOLUTION INTRAVENOUS at 08:12

## 2020-12-03 RX ADMIN — OXYCODONE 5 MG: 5 TABLET ORAL at 10:12

## 2020-12-03 RX ADMIN — DEXTROSE 200 ML/HR: 5 SOLUTION INTRAVENOUS at 04:12

## 2020-12-03 RX ADMIN — CALCIUM GLUCONATE 30 G: 98 INJECTION, SOLUTION INTRAVENOUS at 04:12

## 2020-12-03 RX ADMIN — DEXTROSE MONOHYDRATE, SODIUM CITRATE, AND CITRIC ACID MONOHYDRATE: 2.45; 2.2; .8 INJECTION, SOLUTION INTRAVENOUS at 04:12

## 2020-12-03 RX ADMIN — GABAPENTIN 300 MG: 300 CAPSULE ORAL at 08:12

## 2020-12-03 RX ADMIN — INSULIN ASPART 4 UNITS: 100 INJECTION, SOLUTION INTRAVENOUS; SUBCUTANEOUS at 04:12

## 2020-12-03 RX ADMIN — METRONIDAZOLE 500 MG: 500 TABLET ORAL at 01:12

## 2020-12-03 RX ADMIN — INSULIN ASPART 4 UNITS: 100 INJECTION, SOLUTION INTRAVENOUS; SUBCUTANEOUS at 08:12

## 2020-12-03 RX ADMIN — SODIUM BICARBONATE 650 MG TABLET 1300 MG: at 03:12

## 2020-12-03 RX ADMIN — INSULIN DETEMIR 25 UNITS: 100 INJECTION, SOLUTION SUBCUTANEOUS at 09:12

## 2020-12-03 RX ADMIN — VANCOMYCIN HYDROCHLORIDE 500 MG: 500 INJECTION, POWDER, LYOPHILIZED, FOR SOLUTION INTRAVENOUS at 01:12

## 2020-12-03 RX ADMIN — HYDROMORPHONE HYDROCHLORIDE 0.5 MG: 1 INJECTION, SOLUTION INTRAMUSCULAR; INTRAVENOUS; SUBCUTANEOUS at 11:12

## 2020-12-03 RX ADMIN — HEPARIN SODIUM 5000 UNITS: 5000 INJECTION INTRAVENOUS; SUBCUTANEOUS at 01:12

## 2020-12-03 RX ADMIN — SODIUM BICARBONATE 650 MG TABLET 1300 MG: at 08:12

## 2020-12-03 RX ADMIN — INSULIN ASPART 4 UNITS: 100 INJECTION, SOLUTION INTRAVENOUS; SUBCUTANEOUS at 11:12

## 2020-12-03 RX ADMIN — HEPARIN SODIUM 5000 UNITS: 5000 INJECTION INTRAVENOUS; SUBCUTANEOUS at 06:12

## 2020-12-03 RX ADMIN — INSULIN ASPART 1 UNITS: 100 INJECTION, SOLUTION INTRAVENOUS; SUBCUTANEOUS at 08:12

## 2020-12-03 RX ADMIN — METHIMAZOLE 10 MG: 10 TABLET ORAL at 08:12

## 2020-12-03 RX ADMIN — DOCUSATE SODIUM AND SENNOSIDES 1 TABLET: 8.6; 5 TABLET, FILM COATED ORAL at 08:12

## 2020-12-03 RX ADMIN — HEPARIN SODIUM 5000 UNITS: 5000 INJECTION INTRAVENOUS; SUBCUTANEOUS at 10:12

## 2020-12-03 RX ADMIN — INSULIN DETEMIR 22 UNITS: 100 INJECTION, SOLUTION SUBCUTANEOUS at 08:12

## 2020-12-03 RX ADMIN — METRONIDAZOLE 500 MG: 500 TABLET ORAL at 06:12

## 2020-12-03 RX ADMIN — INSULIN ASPART 8 UNITS: 100 INJECTION, SOLUTION INTRAVENOUS; SUBCUTANEOUS at 04:12

## 2020-12-03 RX ADMIN — ROSUVASTATIN CALCIUM 10 MG: 10 TABLET, FILM COATED ORAL at 08:12

## 2020-12-03 NOTE — ASSESSMENT & PLAN NOTE
- Cr increased to 4.4 from 1.5 on admission (initially improved, now worsening)  - Stepped up to ICU on 12/1, now on RRT   -  cc/last 24 hours, slowly improving   - Management per Nephrology/Critical Care (primary team)

## 2020-12-03 NOTE — PROGRESS NOTES
[facility]  Obstetrics & Gynecology  Progress Note    Patient Name: Navin Beck  MRN: [id number]  Admission Date: 2020  Primary Care Provider: Elvira Quinones MD  Principal Problem: Acute kidney injury (ALURIE) with acute tubular necrosis (ATN)    Subjective:     HPI:  Navin Beck is a 61 yo F w/ HTN, DM2, and POP who presented to [facility] ED with abdominal pain and urinary retention. She is s/p robotic excision of vaginal mesh with Dr. Goel in 2020. There was significant adhesive disease per the op report and urology assisted with bladder dissection. She was discharged home on POD #1 with march in place. March was removed on 2020. Since then, she has had continued dysuria and vaginal pain/pressure. She admits to holding her urine to avoid the dysuria. No fever, chills, nausea, vomiting. She also has had poorly controlled blood sugars throughout this time.    March placed per nursing in the ED with 800 cc urine output. WBC count is 17. Cr 1.5, up from 1.0. UA with RBCs, >100 WBC, and bacteria with 3 squams. Urine cultures pending, s/p rocephin in the ED. CT A/P with multilobular fluid collection in the lower pelvis adjacent to the urinary bladder which likely causes in compression of the distal ureters bilaterally and resulting in mild bilateral hydronephrosis.     Of note, BG elevated in ED to > 500. Admitted to hospital medicine for management of DKA. Urology and gynecology consulted.    Interval History: Patient stepped up to the ICU on  for concerns over LAURIE and anuria. She is now requiring RRT, but has started making small amounts of urine. She is mentating appropriately. She reports abdominal pain is minimal, otherwise denies fevers/chills, vaginal pain, N/V.         OB History    Para Term  AB Living   5 4 3 1 1 3   SAB TAB Ectopic Multiple Live Births   1 0 0 0 4      # Outcome Date GA Lbr Davon/2nd Weight Sex Delivery Anes PTL Lv   5  Term 2000    M Vag-Spont   GUERRERO   4 Term     M Vag-Spont   GUERRERO   3 Term 1980    M Vag-Spont   GUERRERO   2 SAB            1      M    ND     Past Medical History:   Diagnosis Date    Allergy     Arrhythmia     Arthritis     Bronchial asthma     Diabetes mellitus     Glaucoma     Hormone disorder     hysterectomy    Hypercholesteremia     Hypertension     Hyperthyroidism     Insomnia     Kidney stones     Thyroid disease     Urine, incontinence, stress female     UTI (urinary tract infection) 2020     Past Surgical History:   Procedure Laterality Date    APPENDECTOMY      BACK SURGERY      disc removal     CARPAL TUNNEL RELEASE Bilateral     CHOLECYSTECTOMY      CYSTOSCOPY N/A 10/26/2020    Procedure: CYSTOSCOPY;  Surgeon: Wilner Goel MD;  Location: [facility] OR;  Service: OB/GYN;  Laterality: N/A;    FRACTURE SURGERY Right     wrist    [facility]      [facility]    JOINT REPLACEMENT      KNEE ARTHROSCOPY W/ DEBRIDEMENT      KNEE SURGERY Right     Knee replacement    LITHOTRIPSY      KY REMOVAL OF OVARY/TUBE(S)  13    benign    removal of round ligament mass      ROBOT-ASSISTED LAPAROSCOPIC LYSIS OF ADHESIONS USING DA JAMES XI N/A 10/26/2020    Procedure: XI ROBOTIC LYSIS, ADHESIONS;  Surgeon: Wilner Goel MD;  Location: [facility] OR;  Service: OB/GYN;  Laterality: N/A;  ROBOTIC MOBILIZATION OF BLADDER- DR BURNHAM    SPINE SURGERY      WRIST FRACTURE SURGERY Left        PTA Medications   Medication Sig    HYDROcodone-acetaminophen (NORCO)  mg per tablet Take 1 tablet by mouth every 8 (eight) hours as needed for Pain.    acetaminophen (TYLENOL) 500 MG tablet Take 1,000 mg by mouth 3 (three) times daily as needed (fever and chills).    albuterol (PROVENTIL/VENTOLIN HFA) 90 mcg/actuation inhaler     benazepril (LOTENSIN) 10 MG tablet Take 10 mg by mouth once daily.     blood-glucose meter (TRUE METRIX GLUCOSE METER MISC) True Metrix Glucose  Meter    DULoxetine (CYMBALTA) 60 MG capsule duloxetine 60 mg capsule,delayed release    estradioL (ESTRACE) 0.01 % (0.1 mg/gram) vaginal cream Place 1 g vaginally 3 (three) times a week. Alternate days with Metrogel for 14 days    fluticasone propionate (FLOVENT HFA) 44 mcg/actuation inhaler Flovent HFA 44 mcg/actuation aerosol inhaler    gabapentin (NEURONTIN) 600 MG tablet Take 1 tablet (600 mg total) by mouth 3 (three) times daily.    ibuprofen (ADVIL,MOTRIN) 600 MG tablet Take 1 tablet (600 mg total) by mouth 3 (three) times daily.    insulin aspart (NOVOLOG) 100 unit/mL injection Inject 15 Units into the skin 3 (three) times daily with meals. Three times a day with meals according to sliding scale.    insulin glargine (LANTUS U-100 INSULIN) 100 unit/mL injection Inject 50 Units into the skin 2 (two) times daily.    METFORMIN HCL (METFORMIN ORAL) Take 500 mg by mouth 2 (two) times daily.    methimazole (TAPAZOLE) 10 MG Tab Take 10 mg by mouth once daily.     pantoprazole (PROTONIX) 40 MG tablet Take 1 tablet (40 mg total) by mouth once daily. Start after the twice daily Protonix is complete.Follow-up with doctor for further refills.    rosuvastatin (CRESTOR) 20 MG tablet rosuvastatin 20 mg tablet    tamsulosin (FLOMAX) 0.4 mg Cp24 Take 0.4 mg by mouth once daily.     tiZANidine (ZANAFLEX) 4 MG tablet tizanidine 4 mg tablet    TRUE METRIX GLUCOSE TEST STRIP Strp     zolpidem (AMBIEN) 10 mg Tab Take 10 mg by mouth every evening.    [DISCONTINUED] flurazepam (DALMANE) 30 MG capsule nightly as needed.     [DISCONTINUED] oxyCODONE-acetaminophen (PERCOCET) 7.5-325 mg per tablet Take 1 tablet by mouth every 8 (eight) hours as needed for Pain.       Review of patient's allergies indicates:   Allergen Reactions    Penicillins Shortness Of Breath, Itching and Swelling     Tolerates cefepime 11/30/2020        Family History     Problem Relation (Age of Onset)    Cancer Mother, Brother, Brother, Brother,  "Brother    Colon cancer Brother        Tobacco Use    Smoking status: Current Every Day Smoker     Packs/day: 0.50     Years: 30.00     Pack years: 15.00    Smokeless tobacco: Never Used   Substance and Sexual Activity    Alcohol use: No    Drug use: No    Sexual activity: Not Currently     Partners: Male     Birth control/protection: Surgical     Review of Systems   Constitutional: Negative for chills and fever.   Respiratory: Negative for shortness of breath.    Cardiovascular: Negative for chest pain.   Gastrointestinal: Positive for abdominal pain (LLQ, improving ). Negative for nausea and vomiting.   Endocrine: Positive for diabetes.   Genitourinary: Negative for hematuria.   Musculoskeletal: Negative for back pain.   Neurological: Negative for headaches.   Psychiatric/Behavioral: Negative for depression.      Objective:     Vital Signs (Most Recent):  Temp: 97.4 °F (36.3 °C) (12/03/20 1105)  Pulse: 75 (12/03/20 1619)  Resp: 15 (12/03/20 1619)  BP: (!) 121/57 (12/03/20 1600)  SpO2: 96 % (12/03/20 1619) Vital Signs (24h Range):  Temp:  [97.4 °F (36.3 °C)-98 °F (36.7 °C)] 97.4 °F (36.3 °C)  Pulse:  [66-80] 75  Resp:  [11-27] 15  SpO2:  [94 %-99 %] 96 %  BP: ()/(51-75) 121/57     Weight: 115.5 kg (254 lb 10.1 oz)  Body mass index is 39.88 kg/m².    No LMP recorded. Patient has had a hysterectomy.    Physical Exam:   Constitutional: She is oriented to person, place, and time. She appears well-developed and well-nourished.    HENT:   Head: Normocephalic and atraumatic.    Eyes: EOM are normal.    Neck: Normal range of motion.    Cardiovascular: Normal rate.     Pulmonary/Chest: Effort normal.        Abdominal: Soft. She exhibits no distension and no mass. There is abdominal tenderness (minimal tenderness with deep palpation over LLQ ) in the left upper quadrant. There is no rebound.       ROSAURA drain with jamarcus pus draining from area marked with red "X", approximately 5 cc in drain, tubing filled with pus   "   Genitourinary:    Genitourinary Comments: Cifuentes catheter in place with approximately 25 cc of yellow urine in the catheter bag              Musculoskeletal: Normal range of motion.       Neurological: She is alert and oriented to person, place, and time.    Skin: Skin is warm and dry.    Psychiatric: She has a normal mood and affect. Her behavior is normal. Judgment and thought content normal.       Laboratory:  CBC:   Recent Labs   Lab 12/03/20  0431   WBC 11.56   RBC 2.63*   HGB 7.6*   HCT 24.1*   *   MCV 92   MCH 28.9   MCHC 31.5*     CMP:   Recent Labs   Lab 12/03/20  0431 12/03/20  0847 12/03/20  1139   * 291*  291* 326*   CALCIUM 7.2* 7.2*  7.2* 7.1*   ALBUMIN 1.5* 1.6*  --    PROT 5.7*  --   --    * 123*  123* 122*   K 4.4 4.5  4.5 4.4   CO2 18* 19*  19* 20*   CL 96 97  97 95   BUN 14 16  16 17   CREATININE 4.0* 4.3*  4.3* 4.4*   ALKPHOS 179*  --   --    ALT 7*  --   --    AST 11  --   --    BILITOT 0.2  --   --        Diagnostic Results:  CT Urogram 11/26: Reviewed  Patient history of recent implanted pelvic mass revision with lysis of adhesions. Redemonstration of lobular hypoattenuating collections in the lower pelvis adjacent to the urinary bladder.  No definite extravasated contrast material appreciated within the pelvic collections to suggest communication with the bladder.  Findings remain non-specific and may relate to urinomas or other nonspecific postoperative collection including seroma, hematoma, or abscess. Cystic pelvic mass not excluded.  Clinical correlation with any previous preoperative imaging advised.     Bilateral vesicoureteral reflux.    CT guided percutaneous  Pelvic abscess drainage catheter placement 11/28, reviewed.     Assessment/Plan:     * Acute kidney injury (LAURIE) with acute tubular necrosis (ATN)  - Cr increased to 4.4 from 1.5 on admission (initially improved, now worsening)  - Stepped up to ICU on 12/1, now on RRT   -  cc/last 24 hours,  slowly improving   - Management per Nephrology/Critical Care (primary team)    Postprocedural intraabdominal abscess  - S/p IR pelvic drain placement   - 250cc of pus immediately removed   - 55cc drain output over last 24 hours, however, more pus present in the line  tubing  - Symptomatic improvement as mentioned above   - Continue drain management per primary team     Urinary retention  -- Cifuentes now in place draining urine, though output decreased (see LAURIE problem).   -- UA with findings as noted in HPI. UCx positive for E. Coli <50,000cfu/ml. S/p vanc x 1 and rocephin x 1 in ED.  -- CT A/P and CT urogram done as noted above.  -- Cr: See LAURIE problem       Diabetic ketoacidosis without coma associated with type 2 diabetes mellitus  - Continue management per primary team        Lulú Sethi MD  Obstetrics & Gynecology  [facility]

## 2020-12-03 NOTE — PROGRESS NOTES
[facility]  Nephrology  Progress Note    Patient Name: Navin Beck  MRN: [id number]  Admission Date: 11/26/2020  Hospital Length of Stay: 6 days  Attending Provider: Chetan Lang MD   Primary Care Physician: Elvira Quinones MD  Principal Problem:Acute kidney injury (LAURIE) with acute tubular necrosis (ATN)    Subjective:     HPI: Ms Beck is a 60 year old female with a past medical history of HTN, LD, T2DM, hyperparathyroidism, and asthma who presented to [facility] with c/o abdominal pain s/p bladder mesh surgery. Pt noted to be in DKA on presentation and is currently on insulin gtt and being followed by endocrine. CT abdomen obtained revealed multiple fluid collections near bladder; IR was consulted and pt is s/p drain placement. Initial coverage provided with vanc, now on cefepime and flagyl. Baseline sCr roughly 1.0-1.2, now elevated to 4.6 at time of consult (11/30/2020). Pt now anuric with 20mL UOP in the past 24hrs at time of consult (11/30/2020). Pt denies endorses lower abdominal tenderness. Pt denies SOB. Nephrology has been consulted for LAURIE.     -HPI was obtained from patient interview, and from review of the patient's EMR.         Interval History: Pt seen at bedside. Mentation improving. Received prismax x 5 hours before clotting, machine currently paused. Net positive 3 L past 24 hours and minimal UO.     Objective:     Vital Signs (Most Recent):  Temp: 97.5 °F (36.4 °C) (12/03/20 0906)  Pulse: 71 (12/03/20 0906)  Resp: 15 (12/03/20 0906)  BP: (!) 153/67 (12/03/20 0906)  SpO2: 97 % (12/03/20 0906)  O2 Device (Oxygen Therapy): room air (12/03/20 0906) Vital Signs (24h Range):  Temp:  [97.5 °F (36.4 °C)-98.2 °F (36.8 °C)] 97.5 °F (36.4 °C)  Pulse:  [66-80] 71  Resp:  [11-24] 15  SpO2:  [94 %-100 %] 97 %  BP: ()/(51-87) 153/67     Weight: 115.5 kg (254 lb 10.1 oz) (12/02/20 1119)  Body mass index is 39.88 kg/m².  Body surface area is 2.34 meters squared.    I/O last 3  completed shifts:  In: 5468.5 [P.O.:250; I.V.:4578.5; Blood:350; Other:40; IV Piggyback:250]  Out: 326 [Urine:236; Drains:90]    Physical Exam  Constitutional:       Appearance: She is obese.      Comments: lethargic   HENT:      Head: Normocephalic and atraumatic.      Nose: Nose normal.      Mouth/Throat:      Mouth: Mucous membranes are moist.      Pharynx: Oropharynx is clear.   Eyes:      Conjunctiva/sclera: Conjunctivae normal.      Pupils: Pupils are equal, round, and reactive to light.   Neck:      Musculoskeletal: Normal range of motion and neck supple.   Cardiovascular:      Rate and Rhythm: Regular rhythm.   Pulmonary:      Effort: Pulmonary effort is normal.   Abdominal:      General: Abdomen is flat.      Palpations: Abdomen is soft.      Tenderness: There is abdominal tenderness.   Musculoskeletal: Normal range of motion.      Right lower leg: No edema.      Left lower leg: No edema.   Skin:     General: Skin is warm and dry.   Neurological:      General: No focal deficit present.      Mental Status: She is alert.         Significant Labs:  CBC:   Recent Labs   Lab 12/03/20  0431   WBC 11.56   RBC 2.63*   HGB 7.6*   HCT 24.1*   *   MCV 92   MCH 28.9   MCHC 31.5*     CMP:   Recent Labs   Lab 12/03/20  0431   *   CALCIUM 7.2*   ALBUMIN 1.5*   PROT 5.7*   *   K 4.4   CO2 18*   CL 96   BUN 14   CREATININE 4.0*   ALKPHOS 179*   ALT 7*   AST 11   BILITOT 0.2     All labs within the past 24 hours have been reviewed.        Assessment/Plan:     * Acute kidney injury (LAURIE) with acute tubular necrosis (ATN)  ATN in setting of contrast administration and sepsis 2/2 UTI, baseline cr 1.0-1.2 that trended up to 4.6 at time of consult.     -received contrast on 11/27/2020  -Urine [id number]/30/2020 many muddy brown granular casts, massive WBC's  -purulent drainage from abdominal ROSAURA drain similar in appearance to her urine; concerning for communication between bladder and fluid  collection  -Initiated prismax 12/1   -Will plan on restarting prismax today with citrate for clotting issues overnight. Calcium ggt post filter while on citrate, and q 8 hour iCa2+ checks  -D5 ggt post-filter to prevent overcorrection of hyponatremia  -q 4 BMPs  -Maximum gabapentin dose 300mg in CKD patients  -Maintain Hgb >7.0, Keep MAP >65  -Renally dose meds and avoid nephrotoxic meds  -Will continue to follow closely   -Plan discussed with staff, Dr Rose     Hyponatremia  -hyponatremia, likely 2/2 combination of D5 administration with decreased UOP  -duloxetine discontinued  -initiating RRT 12/1 with D5 ggt post-filter to avoid overcorrection of sodium        Diabetic ketoacidosis without coma associated with type 2 diabetes mellitus  -Management per primary team         Thank you for your consult. I will follow-up with patient. Please contact us if you have any additional questions.    Usha Ames MD  Nephrology  [facility]

## 2020-12-03 NOTE — ASSESSMENT & PLAN NOTE
-- Cifuentes now in place draining urine, though output decreased (see LAURIE problem).   -- UA with findings as noted in HPI. UCx positive for E. Coli <50,000cfu/ml. S/p vanc x 1 and rocephin x 1 in ED.  -- CT A/P and CT urogram done as noted above.  -- Cr: See LAURIE problem

## 2020-12-03 NOTE — ASSESSMENT & PLAN NOTE
-hyponatremia, likely 2/2 combination of D5 administration with decreased UOP  -duloxetine discontinued  -initiating RRT 12/1 with D5 ggt post-filter to avoid overcorrection of sodium

## 2020-12-03 NOTE — ASSESSMENT & PLAN NOTE
- S/p IR pelvic drain placement   - 250cc of pus immediately removed   - 55cc drain output over last 24 hours, however, more pus present in the line  tubing  - Symptomatic improvement as mentioned above   - Continue drain management per primary team

## 2020-12-03 NOTE — ASSESSMENT & PLAN NOTE
ATN in setting of contrast administration and sepsis 2/2 UTI, baseline cr 1.0-1.2 that trended up to 4.6 at time of consult.     -received contrast on 11/27/2020  -Urine [id number]/30/2020 many muddy brown granular casts, massive WBC's  -purulent drainage from abdominal ROSAURA drain similar in appearance to her urine; concerning for communication between bladder and fluid collection  -Initiated prismax 12/1   -Will plan on restarting prismax today with citrate for clotting issues overnight. Calcium ggt post filter while on citrate, and q 8 hour iCa2+ checks  -D5 ggt post-filter to prevent overcorrection of hyponatremia  -q 4 BMPs  -Maximum gabapentin dose 300mg in CKD patients  -Maintain Hgb >7.0, Keep MAP >65  -Renally dose meds and avoid nephrotoxic meds  -Will continue to follow closely   -Plan discussed with staff, Dr Rose

## 2020-12-03 NOTE — ASSESSMENT & PLAN NOTE
History of Grave's disease 2017  TSH this admission nl  Home methimazole  Endocrine following, appreciate recs    -Continue methimazole 10mg daily

## 2020-12-03 NOTE — PROGRESS NOTES
Pharmacokinetic Assessment Follow Up: IV Vancomycin    Vancomycin serum concentration assessment(s):    - Vancomycin concentration = 16.1 mcg/mL (therapeutic) - vancomycin pulled off with CRRT 12/02/2020   - Patient with worsening LAURIE requiring CRRT (will get CVVHDHF today 12/03)   - Anticipate clearance with CRRT     Vancomycin Regimen Plan:  1. Give vancomycin 500 mg once today   2. Obtain concentration in AM on 12/4/20  3. Goal trough = 10-20 mcg/mL  4. Re-dose based on levels and CRRT plans     Thank you for the consult, will continue to follow  Nell Vela, Pharm.D  [id number]    Drug levels (last 3 results):  Recent Labs   Lab Result Units 12/01/20  0226 12/02/20  0442 12/03/20  0431   Vancomycin, Random ug/mL 20.8 15.4 16.1        Patient brief summary:  Navin Beck is a 60 y.o. female initiated on antimicrobial therapy with IV Vancomycin for treatment of intraabdominal abscess (PCN allergy, covering empirically for enterococcus)    Drug Allergies:   Review of patient's allergies indicates:   Allergen Reactions    Penicillins Shortness Of Breath, Itching and Swelling     Tolerates cefepime 11/30/2020       Actual Body Weight:   115.8 kg    Renal Function:   Estimated Creatinine Clearance: 17.8 mL/min (A) (based on SCr of 4.3 mg/dL (H)).,     Dialysis: CRRT (on hold)     CBC (last 72 hours):  Recent Labs   Lab Result Units 11/30/20  1414 12/01/20  0226 12/02/20  0442 12/03/20  0431   WBC K/uL 18.24* 16.65* 13.31* 11.56   Hemoglobin g/dL 8.3* 7.9* 6.9* 7.6*   Hematocrit % 27.5* 26.1* 22.0* 24.1*   Platelets K/uL 558* 480* 458* 402*   Gran % % 77.3* 77.4* 80.6* 74.6*   Lymph % % 13.4* 12.7* 10.2* 14.1*   Mono % % 7.2 7.6 7.1 8.5   Eosinophil % % 0.9 1.1 0.9 1.3   Basophil % % 0.2 0.2 0.2 0.4   Differential Method  Automated Automated Automated Automated       Metabolic Panel (last 72 hours):  Recent Labs   Lab Result Units 11/30/20  1313 11/30/20  1628 11/30/20  1903 11/30/20  2200 12/01/20  0229  12/01/20  0720 12/01/20  1103 12/01/20  2134 12/02/20  0043 12/02/20  0442 12/02/20  0747 12/02/20  1109 12/02/20  1529 12/02/20  2101 12/03/20  0030 12/03/20  0431 12/03/20  0847   Sodium mmol/L 121* 120* 123* 123* 122* 121* 122* 123*  123* 124*  124* 123*  123*  123* 124*  124* 122*  122* 123*  123* 123* 123*  123* 122* 123*  123*   Potassium mmol/L 5.1 4.8 5.0 4.3 5.1 4.5 4.5 4.3  4.4 4.2  4.2 4.5  4.5  4.5 4.4  4.4 4.2  4.2 4.3  4.3 4.4 4.1  4.1 4.4 4.5  4.5   Chloride mmol/L 96 98 99 97 97 96 96 97  97 98  98 97  97  97 98  98 97  97 97  97 97 96  96 96 97  97   CO2 mmol/L 14* 12* 12* 16* 14* 15* 17* 17*  17* 17*  17* 17*  17*  17* 18*  18* 17*  17* 19*  19* 18* 20*  20* 18* 19*  19*   Glucose mg/dL 273* 274* 204* 143* 129* 180* 202* 212*  215* 222*  222* 259*  259*  262* 231*  231* 221*  221* 225*  225* 212* 253*  253* 268* 291*  291*   BUN mg/dL 18 20 20 20 21 21 22 23  22 20  20 18  18  18 19  19 19  19 16  16 18 15  15 14 16  16   Creatinine mg/dL 4.9* 4.9* 5.0* 5.1* 5.4* 5.6* 5.9* 5.9*  5.8* 5.1*  5.1* 4.7*  4.7*  4.7* 4.9*  4.9* 5.0*  5.0* 4.1*  4.1* 4.8* 3.9*  3.9* 4.0* 4.3*  4.3*   Albumin g/dL  --   --   --   --   --   --   --  1.6* 1.6* 1.6*  1.6* 1.6* 1.6* 1.6*  --  1.5* 1.5* 1.6*   Total Bilirubin mg/dL  --   --   --   --   --   --   --   --   --  0.1  --   --   --   --   --  0.2  --    Alkaline Phosphatase U/L  --   --   --   --   --   --   --   --   --  203*  --   --   --   --   --  179*  --    AST U/L  --   --   --   --   --   --   --   --   --  12  --   --   --   --   --  11  --    ALT U/L  --   --   --   --   --   --   --   --   --  9*  --   --   --   --   --  7*  --    Magnesium mg/dL  --   --   --   --  2.6  --   --  2.4  --  2.3  --   --  2.2  --  2.2 2.1  --    Phosphorus mg/dL  --   --   --   --  4.3  --   --  4.3 4.3 4.4  4.4 4.5 4.5 4.3  --  4.3 4.5 4.7*       Vancomycin Administrations:  vancomycin given in the last 96  hours                   vancomycin in dextrose 5 % 1 gram/250 mL IVPB 1,000 mg (mg) 1,000 mg New Bag 11/29/20 0506    vancomycin 1.75 g in 5 % dextrose 500 mL IVPB (mg) 1,750 mg New Bag 11/28/20 1639    vancomycin (VANCOCIN) 2,500 mg in dextrose 5 % 500 mL IVPB (mg) 2,500 mg New Bag 11/27/20 0532                Microbiologic Results:  Microbiology Results (last 7 days)     Procedure Component Value Units Date/Time    Blood culture [[id number]] Collected: 11/27/20 1919    Order Status: Completed Specimen: Blood Updated: 12/02/20 2212     Blood Culture, Routine No growth after 5 days.    Blood culture [[id number]] Collected: 11/27/20 1915    Order Status: Completed Specimen: Blood Updated: 12/02/20 2212     Blood Culture, Routine No growth after 5 days.    Aerobic culture [[id number]] Collected: 11/28/20 1405    Order Status: Completed Specimen: Abscess from Abdomen Updated: 12/01/20 1013     Aerobic Bacterial Culture No growth    Narrative:      Right pelvis    Culture, Anaerobe [[id number]] Collected: 11/28/20 1405    Order Status: Completed Specimen: Abscess from Abdomen Updated: 12/01/20 0858     Anaerobic Culture Culture in progress    Narrative:      Right pelvis    Fungus culture [[id number]] Collected: 11/28/20 1405    Order Status: Completed Specimen: Abscess from Abdomen Updated: 11/30/20 0945     Fungus (Mycology) Culture Culture in progress    Narrative:      Right pelvis    Gram stain [[id number]] Collected: 11/28/20 1405    Order Status: Completed Specimen: Abscess from Abdomen Updated: 11/28/20 2234     Gram Stain Result Moderate WBC's      Moderate Gram positive cocci      Few Gram negative rods      Rare Gram positive rods    Narrative:      Right pelvis    Urine culture [[id number]]  (Abnormal)  (Susceptibility) Collected: 11/26/20 2206    Order Status: Completed Specimen: Urine Updated: 11/28/20 2115     Urine Culture, Routine ESCHERICHIA COLI  10,000 - 49,999 cfu/ml  No other significant isolate       Narrative:      Specimen Source->Urine

## 2020-12-03 NOTE — PROGRESS NOTES
[facility]  Critical Care Medicine  Progress Note    Patient Name: Navin Beck  MRN: [id number]  Admission Date: 11/26/2020  Hospital Length of Stay: 6 days  Code Status: Full Code  Attending Provider: Chetan Lang MD  Primary Care Provider: Elvira Quinones MD   Principal Problem: Acute kidney injury (LAURIE) with acute tubular necrosis (ATN)    Subjective:     HPI:  Patient is a 60 y.o. female with significant past medical history of HTN, HLD, DM II with recent revision of erosion of implanted vaginal mesh and prosthetic materials on 10/26 presented to hospital complaining of lower abdominal/pelvic pain. Post-op patient had been d/c'd w/ march cath which was removed on 11/19 and pain has been present since then. Patient endorses that she has been noncompliant with her home insulin/diabetes regimen for the last two days because she has not been feeling well. Imaging in ED shows multilobar fluid collection adjacent to bladder concerning for bladder perforation and bilateral hydronephrosis. Labs show a glucose of 626 w/ + bhb of 4.2 and pH 7.27, MICU had been consulted for DKA - started insulin ggt and started treatment for abx. On 12/1 nephrology wanting to start CRRT due to hyponatremia (122 on day of consultation)       Hospital/ICU Course:  60 yaer old female with history of HTN, HLD, noncompliance with T2DM with recent revision of erosion of implanted mesh/prosthetic materials found to have intra-abdominal abscesses sp IR drain placement (11/28), in DKA (beta hydroxy 4.2, glucose 626 and initial bicarb 16), now with anuria requiring CRRT for both metabolic clearance and slow correction of her hyponatremia (120s).     Interval History/Significant Events: No acute events overnight - Patient will receive prismax today for hyponatremia. Plan to step down tomorrow.  Off DKA protocol.    Review of Systems   Constitutional: Negative for chills and fever.   HENT: Negative for rhinorrhea  and sore throat.    Respiratory: Negative for cough and shortness of breath.    Cardiovascular: Negative for chest pain and palpitations.   Gastrointestinal: Negative for abdominal pain, constipation, diarrhea and nausea.   Genitourinary: Negative for dysuria and flank pain.   Neurological: Negative for light-headedness, numbness and headaches.     Objective:     Vital Signs (Most Recent):  Temp: 97.5 °F (36.4 °C) (12/03/20 0300)  Pulse: 76 (12/03/20 0400)  Resp: 14 (12/03/20 0400)  BP: 120/63 (12/03/20 0400)  SpO2: 98 % (12/03/20 0400) Vital Signs (24h Range):  Temp:  [97.5 °F (36.4 °C)-98.2 °F (36.8 °C)] 97.5 °F (36.4 °C)  Pulse:  [67-80] 76  Resp:  [11-28] 14  SpO2:  [95 %-100 %] 98 %  BP: ()/(51-87) 120/63   Weight: 115.5 kg (254 lb 10.1 oz)  Body mass index is 39.88 kg/m².      Intake/Output Summary (Last 24 hours) at 12/3/2020 0451  Last data filed at 12/3/2020 0400  Gross per 24 hour   Intake 3309.19 ml   Output 291 ml   Net 3018.19 ml       Physical Exam  Constitutional:       General: She is not in acute distress.     Appearance: She is obese. She is not ill-appearing or toxic-appearing.   HENT:      Head: Normocephalic and atraumatic.      Nose: No congestion or rhinorrhea.      Mouth/Throat:      Pharynx: No oropharyngeal exudate or posterior oropharyngeal erythema.   Eyes:      General: No scleral icterus.        Right eye: No discharge.         Left eye: No discharge.      Extraocular Movements: Extraocular movements intact.      Pupils: Pupils are equal, round, and reactive to light.   Neck:      Musculoskeletal: Normal range of motion and neck supple. No neck rigidity or muscular tenderness.   Cardiovascular:      Rate and Rhythm: Normal rate and regular rhythm.      Pulses: Normal pulses.      Heart sounds: No murmur.   Pulmonary:      Effort: Pulmonary effort is normal. No respiratory distress.      Breath sounds: Normal breath sounds. No stridor. No wheezing.   Abdominal:      General:  Abdomen is flat. Bowel sounds are normal. There is no distension.      Palpations: Abdomen is soft. There is no mass.      Tenderness: There is abdominal tenderness (drain in place, dressings clean dry and intact).      Hernia: No hernia is present.      Comments: Drain with purulent fluid   Musculoskeletal: Normal range of motion.         General: No swelling, tenderness or deformity.   Skin:     General: Skin is warm and dry.      Coloration: Skin is not jaundiced or pale.      Findings: No bruising.   Neurological:      General: No focal deficit present.      Mental Status: She is alert. Mental status is at baseline.         Vents:  Oxygen Concentration (%): 21 (12/03/20 0330)  Lines/Drains/Airways     Central Venous Catheter Line            Trialysis (Dialysis) Catheter 12/01/20 1547 right internal jugular 1 day          Drain                 Urethral Catheter 11/27/20 0145 Latex 16 Fr. 6 days         Closed/Suction Drain 11/28/20 1358 Right Abdomen Bulb 10 Fr. 4 days          Peripheral Intravenous Line                 Peripheral IV - Single Lumen 11/30/20 1142 20 G;1 3/4 in Left;Anterior Upper Arm 2 days         Peripheral IV - Single Lumen 11/30/20 1142 20 G;1 3/4 in Right;Anterior Upper Arm 2 days              Significant Labs:    CBC/Anemia Profile:  Recent Labs   Lab 12/02/20  0442 12/03/20  0431   WBC 13.31* 11.56   HGB 6.9* 7.6*   HCT 22.0* 24.1*   * 402*   MCV 91 92   RDW 13.2 13.9        Chemistries:  Recent Labs   Lab 12/02/20  0442  12/02/20  1109 12/02/20  1529 12/02/20  2101 12/03/20  0030   *  123*  123*   < > 122*  122* 123*  123* 123* 123*  123*   K 4.5  4.5  4.5   < > 4.2  4.2 4.3  4.3 4.4 4.1  4.1   CL 97  97  97   < > 97  97 97  97 97 96  96   CO2 17*  17*  17*   < > 17*  17* 19*  19* 18* 20*  20*   BUN 18  18  18   < > 19  19 16  16 18 15  15   CREATININE 4.7*  4.7*  4.7*   < > 5.0*  5.0* 4.1*  4.1* 4.8* 3.9*  3.9*   CALCIUM 7.1*  7.1*  7.2*   < >  7.2*  7.2* 7.1*  7.1* 7.2* 7.0*  7.0*   ALBUMIN 1.6*  1.6*   < > 1.6* 1.6*  --  1.5*   PROT 5.9*  --   --   --   --   --    BILITOT 0.1  --   --   --   --   --    ALKPHOS 203*  --   --   --   --   --    ALT 9*  --   --   --   --   --    AST 12  --   --   --   --   --    MG 2.3  --   --  2.2  --  2.2   PHOS 4.4  4.4   < > 4.5 4.3  --  4.3    < > = values in this interval not displayed.       BMP:   Recent Labs   Lab 12/03/20  0030   *  253*   *  123*   K 4.1  4.1   CL 96  96   CO2 20*  20*   BUN 15  15   CREATININE 3.9*  3.9*   CALCIUM 7.0*  7.0*   MG 2.2     CMP:   Recent Labs   Lab 12/02/20  0442  12/02/20  1109 12/02/20  1529 12/02/20  2101 12/03/20  0030   *  123*  123*   < > 122*  122* 123*  123* 123* 123*  123*   K 4.5  4.5  4.5   < > 4.2  4.2 4.3  4.3 4.4 4.1  4.1   CL 97  97  97   < > 97  97 97  97 97 96  96   CO2 17*  17*  17*   < > 17*  17* 19*  19* 18* 20*  20*   *  259*  262*   < > 221*  221* 225*  225* 212* 253*  253*   BUN 18  18  18   < > 19  19 16  16 18 15  15   CREATININE 4.7*  4.7*  4.7*   < > 5.0*  5.0* 4.1*  4.1* 4.8* 3.9*  3.9*   CALCIUM 7.1*  7.1*  7.2*   < > 7.2*  7.2* 7.1*  7.1* 7.2* 7.0*  7.0*   PROT 5.9*  --   --   --   --   --    ALBUMIN 1.6*  1.6*   < > 1.6* 1.6*  --  1.5*   BILITOT 0.1  --   --   --   --   --    ALKPHOS 203*  --   --   --   --   --    AST 12  --   --   --   --   --    ALT 9*  --   --   --   --   --    ANIONGAP 9  9  9   < > 8  8 7*  7* 8 7*  7*   EGFRNONAA 9.3*  9.3*  9.3*   < > 8.6*  8.6* 11.0*  11.0* 9.1* 11.7*  11.7*    < > = values in this interval not displayed.       Significant Imaging:  I have reviewed all pertinent imaging results/findings within the past 24 hours.      [facility]  Recent Labs   Lab 12/01/20  0924   PH 7.297*   PO2 98*   PCO2 30.7*   HCO3 15.0*   BE -11     Assessment/Plan:     Pulmonary  Asthma  No PFTs recorded    On duo nebs  Continue to  monitor    Cardiac/Vascular  Mixed hyperlipidemia  Home rosuvastatin    Continue rosuvastatin    Essential hypertension  Home benzapril    Holding - normotensive here in hosptial    Renal/  Hyponatremia  See ATN    UTI (urinary tract infection)  Ecoli in urine.    Continue ceftriaxone    Urinary retention  10/2020 with bladder mesh removal, initial urinary retention secondary to anatomic obstruction.  Sp march placed by urology  Renal us with bilateral hydronephrosis; follow up CT after drains showed no hyrdonephrosis    Continue march    Abnormal computed tomography of bladder  History of gyn surgery 10/2020 for bladder mesh removal, CT abdomen pelvis showing fluid collections, IR drain placed 11/28 showing GPCs,     On Cefepime  Urology following, appreciate recs    Endocrine  Type 2 diabetes mellitus with hyperglycemia, with long-term current use of insulin  Medication noncompliance, hemoglobin A1c 11.4 on admission  Initially on insulin gtt, now BID lantus    Started lantus BID + SSI  Endocrine following      Diabetic ketoacidosis without coma associated with type 2 diabetes mellitus  DKA secondary to medication non-compliance  On day of admission 7.297/30/98. AG gap - 9  Transitioned off DKA protocol yesterday    Endocrine following - appreciate recs   -22U Lantus BID   -Moderate correction scale    Hyperthyroidism  History of Grave's disease 2017  TSH this admission nl  Home methimazole  Endocrine following, appreciate recs    -Continue methimazole 10mg daily    Severe obesity (BMI 35.0-35.9 with comorbidity)  Will assess issue when stepped down    Other  * Acute kidney injury (LAURIE) with acute tubular necrosis (ATN)  ATN secondary to insults from pyelonephritis/urinary obstructions/contrast administration. Central line placed for CRRT needs. Patient with hyponatremia and nephrology following to correct Na slowly from 122.     BMP q4 for na checks   -max na to 130  CRRT orders per nephrology              Critical care was time spent personally by me on the following activities: development of treatment plan with patient or surrogate and bedside caregivers, discussions with consultants, evaluation of patient's response to treatment, examination of patient, ordering and performing treatments and interventions, ordering and review of laboratory studies, ordering and review of radiographic studies, pulse oximetry, re-evaluation of patient's condition. This critical care time did not overlap with that of any other provider or involve time for any procedures.     Catrina Flanagan MD  Critical Care Medicine  [facility]

## 2020-12-03 NOTE — PLAN OF CARE
[facility] DAILY GOALS       A: Awake    RASS: Goal -    Actual - RASS (Duarte Agitation-Sedation Scale): -1-->drowsy   Restraint necessity:    B: Breath   SBT: Not intubated   C: Coordinate A & B, analgesics/sedatives   Pain: managed    SAT: Not intubated  D: Delirium   CAM-ICU: Overall CAM-ICU: Negative  E: Early(intubated/ Progressive (non-intubated) Mobility   Activity: Activity Management: rolling - L1  FAS: Feeding/Nutrition   Diet order: Diet/Nutrition Received: consistent carb/diabetic diet, Specialty Diet/Nutrition Received: (fluid rest. 500ml) Fluid restriction:    T: Thrombus   DVT prophylaxis: VTE Required Core Measure: (SCDs) Sequential compression device initiated/maintained  H: HOB Elevation   Head of Bed (HOB): HOB lowered  G: Glucose control   uncontrolled Glycemic Management: blood glucose monitoring  S: Skin   Bundle compliance: yes   Bathing/Skin Care: bath, complete, dressed/undressed, incontinence care, linen changed Date: 12/3  B: Bowel Function   diarrhea   I: Indwelling Catheters   Cifuentes necessity:      Urethral Catheter 11/27/20 0145 Latex 16 Fr.-Reason for Continuing Urinary Catheterization: Critically ill in ICU and requiring hourly monitoring of intake/output     D: De-escalation Antibx   Yes  Plan for the day   Pt started on prismaflex at approximately 1730.  Pt hyperglycemic throughout shift; insulin regimen adjusted per MD.    Family/Goals of care/Code Status   Code Status: Full Code     No acute events throughout day, VS and assessment per flow sheet, patient progressing towards goals as tolerated, plan of care reviewed with Navin Beck, all concerns addressed, will continue to monitor.

## 2020-12-03 NOTE — SUBJECTIVE & OBJECTIVE
Interval History: Patient stepped up to the ICU on  for concerns over LAURIE and anuria. She is now requiring RRT, but has started making small amounts of urine. She is mentating appropriately. She reports abdominal pain is minimal, otherwise denies fevers/chills, vaginal pain, N/V.         OB History    Para Term  AB Living   5 4 3 1 1 3   SAB TAB Ectopic Multiple Live Births   1 0 0 0 4      # Outcome Date GA Lbr Davon/2nd Weight Sex Delivery Anes PTL Lv   5 Term     M Vag-Spont   GUERRERO   4 Term     M Vag-Spont   GUERRERO   3 Term     M Vag-Spont   GUERRERO   2 SAB            1      M    ND     Past Medical History:   Diagnosis Date    Allergy     Arrhythmia     Arthritis     Bronchial asthma     Diabetes mellitus     Glaucoma     Hormone disorder     hysterectomy    Hypercholesteremia     Hypertension     Hyperthyroidism     Insomnia     Kidney stones     Thyroid disease     Urine, incontinence, stress female     UTI (urinary tract infection) 2020     Past Surgical History:   Procedure Laterality Date    APPENDECTOMY      BACK SURGERY      disc removal     CARPAL TUNNEL RELEASE Bilateral     CHOLECYSTECTOMY      CYSTOSCOPY N/A 10/26/2020    Procedure: CYSTOSCOPY;  Surgeon: Wilner Goel MD;  Location: [facility] OR;  Service: OB/GYN;  Laterality: N/A;    FRACTURE SURGERY Right     wrist    [facility]      [facility]    JOINT REPLACEMENT      KNEE ARTHROSCOPY W/ DEBRIDEMENT      KNEE SURGERY Right     Knee replacement    LITHOTRIPSY      IN REMOVAL OF OVARY/TUBE(S)  13    benign    removal of round ligament mass      ROBOT-ASSISTED LAPAROSCOPIC LYSIS OF ADHESIONS USING DA JAMES XI N/A 10/26/2020    Procedure: XI ROBOTIC LYSIS, ADHESIONS;  Surgeon: Wilner Goel MD;  Location: [facility] OR;  Service: OB/GYN;  Laterality: N/A;  ROBOTIC MOBILIZATION OF BLADDER- DR BURNHAM    SPINE SURGERY      WRIST FRACTURE SURGERY Left        PTA  Medications   Medication Sig    HYDROcodone-acetaminophen (NORCO)  mg per tablet Take 1 tablet by mouth every 8 (eight) hours as needed for Pain.    acetaminophen (TYLENOL) 500 MG tablet Take 1,000 mg by mouth 3 (three) times daily as needed (fever and chills).    albuterol (PROVENTIL/VENTOLIN HFA) 90 mcg/actuation inhaler     benazepril (LOTENSIN) 10 MG tablet Take 10 mg by mouth once daily.     blood-glucose meter (TRUE METRIX GLUCOSE METER MISC) True Metrix Glucose Meter    DULoxetine (CYMBALTA) 60 MG capsule duloxetine 60 mg capsule,delayed release    estradioL (ESTRACE) 0.01 % (0.1 mg/gram) vaginal cream Place 1 g vaginally 3 (three) times a week. Alternate days with Metrogel for 14 days    fluticasone propionate (FLOVENT HFA) 44 mcg/actuation inhaler Flovent HFA 44 mcg/actuation aerosol inhaler    gabapentin (NEURONTIN) 600 MG tablet Take 1 tablet (600 mg total) by mouth 3 (three) times daily.    ibuprofen (ADVIL,MOTRIN) 600 MG tablet Take 1 tablet (600 mg total) by mouth 3 (three) times daily.    insulin aspart (NOVOLOG) 100 unit/mL injection Inject 15 Units into the skin 3 (three) times daily with meals. Three times a day with meals according to sliding scale.    insulin glargine (LANTUS U-100 INSULIN) 100 unit/mL injection Inject 50 Units into the skin 2 (two) times daily.    METFORMIN HCL (METFORMIN ORAL) Take 500 mg by mouth 2 (two) times daily.    methimazole (TAPAZOLE) 10 MG Tab Take 10 mg by mouth once daily.     pantoprazole (PROTONIX) 40 MG tablet Take 1 tablet (40 mg total) by mouth once daily. Start after the twice daily Protonix is complete.Follow-up with doctor for further refills.    rosuvastatin (CRESTOR) 20 MG tablet rosuvastatin 20 mg tablet    tamsulosin (FLOMAX) 0.4 mg Cp24 Take 0.4 mg by mouth once daily.     tiZANidine (ZANAFLEX) 4 MG tablet tizanidine 4 mg tablet    TRUE METRIX GLUCOSE TEST STRIP Strp     zolpidem (AMBIEN) 10 mg Tab Take 10 mg by mouth every  evening.    [DISCONTINUED] flurazepam (DALMANE) 30 MG capsule nightly as needed.     [DISCONTINUED] oxyCODONE-acetaminophen (PERCOCET) 7.5-325 mg per tablet Take 1 tablet by mouth every 8 (eight) hours as needed for Pain.       Review of patient's allergies indicates:   Allergen Reactions    Penicillins Shortness Of Breath, Itching and Swelling     Tolerates cefepime 11/30/2020        Family History     Problem Relation (Age of Onset)    Cancer Mother, Brother, Brother, Brother, Brother    Colon cancer Brother        Tobacco Use    Smoking status: Current Every Day Smoker     Packs/day: 0.50     Years: 30.00     Pack years: 15.00    Smokeless tobacco: Never Used   Substance and Sexual Activity    Alcohol use: No    Drug use: No    Sexual activity: Not Currently     Partners: Male     Birth control/protection: Surgical     Review of Systems   Constitutional: Negative for chills and fever.   Respiratory: Negative for shortness of breath.    Cardiovascular: Negative for chest pain.   Gastrointestinal: Positive for abdominal pain (LLQ, improving ). Negative for nausea and vomiting.   Endocrine: Positive for diabetes.   Genitourinary: Negative for hematuria.   Musculoskeletal: Negative for back pain.   Neurological: Negative for headaches.   Psychiatric/Behavioral: Negative for depression.      Objective:     Vital Signs (Most Recent):  Temp: 97.4 °F (36.3 °C) (12/03/20 1105)  Pulse: 75 (12/03/20 1619)  Resp: 15 (12/03/20 1619)  BP: (!) 121/57 (12/03/20 1600)  SpO2: 96 % (12/03/20 1619) Vital Signs (24h Range):  Temp:  [97.4 °F (36.3 °C)-98 °F (36.7 °C)] 97.4 °F (36.3 °C)  Pulse:  [66-80] 75  Resp:  [11-27] 15  SpO2:  [94 %-99 %] 96 %  BP: ()/(51-75) 121/57     Weight: 115.5 kg (254 lb 10.1 oz)  Body mass index is 39.88 kg/m².    No LMP recorded. Patient has had a hysterectomy.    Physical Exam:   Constitutional: She is oriented to person, place, and time. She appears well-developed and well-nourished.   "  HENT:   Head: Normocephalic and atraumatic.    Eyes: EOM are normal.    Neck: Normal range of motion.    Cardiovascular: Normal rate.     Pulmonary/Chest: Effort normal.        Abdominal: Soft. She exhibits no distension and no mass. There is abdominal tenderness (minimal tenderness with deep palpation over LLQ ) in the left upper quadrant. There is no rebound.       ROSAURA drain with jamarcus pus draining from area marked with red "X", approximately 5 cc in drain, tubing filled with pus     Genitourinary:    Genitourinary Comments: Cifuentes catheter in place with approximately 25 cc of yellow urine in the catheter bag              Musculoskeletal: Normal range of motion.       Neurological: She is alert and oriented to person, place, and time.    Skin: Skin is warm and dry.    Psychiatric: She has a normal mood and affect. Her behavior is normal. Judgment and thought content normal.       Laboratory:  CBC:   Recent Labs   Lab 12/03/20  0431   WBC 11.56   RBC 2.63*   HGB 7.6*   HCT 24.1*   *   MCV 92   MCH 28.9   MCHC 31.5*     CMP:   Recent Labs   Lab 12/03/20  0431 12/03/20  0847 12/03/20  1139   * 291*  291* 326*   CALCIUM 7.2* 7.2*  7.2* 7.1*   ALBUMIN 1.5* 1.6*  --    PROT 5.7*  --   --    * 123*  123* 122*   K 4.4 4.5  4.5 4.4   CO2 18* 19*  19* 20*   CL 96 97  97 95   BUN 14 16  16 17   CREATININE 4.0* 4.3*  4.3* 4.4*   ALKPHOS 179*  --   --    ALT 7*  --   --    AST 11  --   --    BILITOT 0.2  --   --        Diagnostic Results:  CT Urogram 11/26: Reviewed  Patient history of recent implanted pelvic mass revision with lysis of adhesions. Redemonstration of lobular hypoattenuating collections in the lower pelvis adjacent to the urinary bladder.  No definite extravasated contrast material appreciated within the pelvic collections to suggest communication with the bladder.  Findings remain non-specific and may relate to urinomas or other nonspecific postoperative collection including seroma, " hematoma, or abscess. Cystic pelvic mass not excluded.  Clinical correlation with any previous preoperative imaging advised.     Bilateral vesicoureteral reflux.    CT guided percutaneous  Pelvic abscess drainage catheter placement 11/28, reviewed.

## 2020-12-03 NOTE — ASSESSMENT & PLAN NOTE
Medication noncompliance, hemoglobin A1c 11.4 on admission  Initially on insulin gtt, now BID lantus    Started lantus BID + SSI  Endocrine following

## 2020-12-03 NOTE — CARE UPDATE
BG goal 140-180    Remains in [facility], NAEON. BG above goal with scheduled insulin and prn correction scale. Diet advanced. No mealtime insulin ordered.     Plan:  Increase to Levemir 25 units BID.   Start novolog 8-12 units with meals.   BG monitoring ac/hs and low dose correction scale given kidney function.     Discharge planning:tbd     Endocrine to continue to follow    ** Please call Endocrine for any BG related issues **

## 2020-12-03 NOTE — PROGRESS NOTES
CRRT:    Treatment ran for 5 Hours until system clotted off.  Patient is stable, off pressors, on room air, responsive to voice.   Called Nephro on-call suggesting HD since patient has clotting issues on Prismax. Ordered by Nephro to HOLD CRRT and patient will be evaluated in the morning.

## 2020-12-03 NOTE — PROGRESS NOTES
CVVHDF restarted via right IJ trialysis; with good flow on both ports.    D5% started at 290ml/hr

## 2020-12-03 NOTE — SUBJECTIVE & OBJECTIVE
Interval History/Significant Events: No acute events overnight - patient tolerated HD well yesterday.  Off DKA protocol.    Review of Systems   Constitutional: Negative for chills and fever.   HENT: Negative for rhinorrhea and sore throat.    Respiratory: Negative for cough and shortness of breath.    Cardiovascular: Negative for chest pain and palpitations.   Gastrointestinal: Negative for abdominal pain, constipation, diarrhea and nausea.   Genitourinary: Negative for dysuria and flank pain.   Neurological: Negative for light-headedness, numbness and headaches.     Objective:     Vital Signs (Most Recent):  Temp: 97.5 °F (36.4 °C) (12/03/20 0300)  Pulse: 76 (12/03/20 0400)  Resp: 14 (12/03/20 0400)  BP: 120/63 (12/03/20 0400)  SpO2: 98 % (12/03/20 0400) Vital Signs (24h Range):  Temp:  [97.5 °F (36.4 °C)-98.2 °F (36.8 °C)] 97.5 °F (36.4 °C)  Pulse:  [67-80] 76  Resp:  [11-28] 14  SpO2:  [95 %-100 %] 98 %  BP: ()/(51-87) 120/63   Weight: 115.5 kg (254 lb 10.1 oz)  Body mass index is 39.88 kg/m².      Intake/Output Summary (Last 24 hours) at 12/3/2020 0451  Last data filed at 12/3/2020 0400  Gross per 24 hour   Intake 3309.19 ml   Output 291 ml   Net 3018.19 ml       Physical Exam  Constitutional:       General: She is not in acute distress.     Appearance: She is obese. She is not ill-appearing or toxic-appearing.   HENT:      Head: Normocephalic and atraumatic.      Nose: No congestion or rhinorrhea.      Mouth/Throat:      Pharynx: No oropharyngeal exudate or posterior oropharyngeal erythema.   Eyes:      General: No scleral icterus.        Right eye: No discharge.         Left eye: No discharge.      Extraocular Movements: Extraocular movements intact.      Pupils: Pupils are equal, round, and reactive to light.   Neck:      Musculoskeletal: Normal range of motion and neck supple. No neck rigidity or muscular tenderness.   Cardiovascular:      Rate and Rhythm: Normal rate and regular rhythm.      Pulses:  Normal pulses.      Heart sounds: No murmur.   Pulmonary:      Effort: Pulmonary effort is normal. No respiratory distress.      Breath sounds: Normal breath sounds. No stridor. No wheezing.   Abdominal:      General: Abdomen is flat. Bowel sounds are normal. There is no distension.      Palpations: Abdomen is soft. There is no mass.      Tenderness: There is abdominal tenderness (drain in place, dressings clean dry and intact).      Hernia: No hernia is present.      Comments: Drain with purulent fluid   Musculoskeletal: Normal range of motion.         General: No swelling, tenderness or deformity.   Skin:     General: Skin is warm and dry.      Coloration: Skin is not jaundiced or pale.      Findings: No bruising.   Neurological:      General: No focal deficit present.      Mental Status: She is alert. Mental status is at baseline.         Vents:  Oxygen Concentration (%): 21 (12/03/20 0330)  Lines/Drains/Airways     Central Venous Catheter Line            Trialysis (Dialysis) Catheter 12/01/20 1547 right internal jugular 1 day          Drain                 Urethral Catheter 11/27/20 0145 Latex 16 Fr. 6 days         Closed/Suction Drain 11/28/20 1358 Right Abdomen Bulb 10 Fr. 4 days          Peripheral Intravenous Line                 Peripheral IV - Single Lumen 11/30/20 1142 20 G;1 3/4 in Left;Anterior Upper Arm 2 days         Peripheral IV - Single Lumen 11/30/20 1142 20 G;1 3/4 in Right;Anterior Upper Arm 2 days              Significant Labs:    CBC/Anemia Profile:  Recent Labs   Lab 12/02/20  0442 12/03/20  0431   WBC 13.31* 11.56   HGB 6.9* 7.6*   HCT 22.0* 24.1*   * 402*   MCV 91 92   RDW 13.2 13.9        Chemistries:  Recent Labs   Lab 12/02/20  0442  12/02/20  1109 12/02/20  1529 12/02/20  2101 12/03/20  0030   *  123*  123*   < > 122*  122* 123*  123* 123* 123*  123*   K 4.5  4.5  4.5   < > 4.2  4.2 4.3  4.3 4.4 4.1  4.1   CL 97  97  97   < > 97  97 97  97 97 96  96   CO2 17*   17*  17*   < > 17*  17* 19*  19* 18* 20*  20*   BUN 18  18  18   < > 19  19 16  16 18 15  15   CREATININE 4.7*  4.7*  4.7*   < > 5.0*  5.0* 4.1*  4.1* 4.8* 3.9*  3.9*   CALCIUM 7.1*  7.1*  7.2*   < > 7.2*  7.2* 7.1*  7.1* 7.2* 7.0*  7.0*   ALBUMIN 1.6*  1.6*   < > 1.6* 1.6*  --  1.5*   PROT 5.9*  --   --   --   --   --    BILITOT 0.1  --   --   --   --   --    ALKPHOS 203*  --   --   --   --   --    ALT 9*  --   --   --   --   --    AST 12  --   --   --   --   --    MG 2.3  --   --  2.2  --  2.2   PHOS 4.4  4.4   < > 4.5 4.3  --  4.3    < > = values in this interval not displayed.       BMP:   Recent Labs   Lab 12/03/20  0030   *  253*   *  123*   K 4.1  4.1   CL 96  96   CO2 20*  20*   BUN 15  15   CREATININE 3.9*  3.9*   CALCIUM 7.0*  7.0*   MG 2.2     CMP:   Recent Labs   Lab 12/02/20  0442  12/02/20  1109 12/02/20  1529 12/02/20  2101 12/03/20  0030   *  123*  123*   < > 122*  122* 123*  123* 123* 123*  123*   K 4.5  4.5  4.5   < > 4.2  4.2 4.3  4.3 4.4 4.1  4.1   CL 97  97  97   < > 97  97 97  97 97 96  96   CO2 17*  17*  17*   < > 17*  17* 19*  19* 18* 20*  20*   *  259*  262*   < > 221*  221* 225*  225* 212* 253*  253*   BUN 18  18  18   < > 19  19 16  16 18 15  15   CREATININE 4.7*  4.7*  4.7*   < > 5.0*  5.0* 4.1*  4.1* 4.8* 3.9*  3.9*   CALCIUM 7.1*  7.1*  7.2*   < > 7.2*  7.2* 7.1*  7.1* 7.2* 7.0*  7.0*   PROT 5.9*  --   --   --   --   --    ALBUMIN 1.6*  1.6*   < > 1.6* 1.6*  --  1.5*   BILITOT 0.1  --   --   --   --   --    ALKPHOS 203*  --   --   --   --   --    AST 12  --   --   --   --   --    ALT 9*  --   --   --   --   --    ANIONGAP 9  9  9   < > 8  8 7*  7* 8 7*  7*   EGFRNONAA 9.3*  9.3*  9.3*   < > 8.6*  8.6* 11.0*  11.0* 9.1* 11.7*  11.7*    < > = values in this interval not displayed.       Significant Imaging:  I have reviewed all pertinent imaging results/findings within the past 24  hours.

## 2020-12-03 NOTE — SUBJECTIVE & OBJECTIVE
Interval History: Pt seen at bedside. Mentation improving. Received prismax x 5 hours before clotting, machine currently paused. Net positive 3 L past 24 hours and minimal UO.     Objective:     Vital Signs (Most Recent):  Temp: 97.5 °F (36.4 °C) (12/03/20 0906)  Pulse: 71 (12/03/20 0906)  Resp: 15 (12/03/20 0906)  BP: (!) 153/67 (12/03/20 0906)  SpO2: 97 % (12/03/20 0906)  O2 Device (Oxygen Therapy): room air (12/03/20 0906) Vital Signs (24h Range):  Temp:  [97.5 °F (36.4 °C)-98.2 °F (36.8 °C)] 97.5 °F (36.4 °C)  Pulse:  [66-80] 71  Resp:  [11-24] 15  SpO2:  [94 %-100 %] 97 %  BP: ()/(51-87) 153/67     Weight: 115.5 kg (254 lb 10.1 oz) (12/02/20 1119)  Body mass index is 39.88 kg/m².  Body surface area is 2.34 meters squared.    I/O last 3 completed shifts:  In: 5468.5 [P.O.:250; I.V.:4578.5; Blood:350; Other:40; IV Piggyback:250]  Out: 326 [Urine:236; Drains:90]    Physical Exam  Constitutional:       Appearance: She is obese.      Comments: lethargic   HENT:      Head: Normocephalic and atraumatic.      Nose: Nose normal.      Mouth/Throat:      Mouth: Mucous membranes are moist.      Pharynx: Oropharynx is clear.   Eyes:      Conjunctiva/sclera: Conjunctivae normal.      Pupils: Pupils are equal, round, and reactive to light.   Neck:      Musculoskeletal: Normal range of motion and neck supple.   Cardiovascular:      Rate and Rhythm: Regular rhythm.   Pulmonary:      Effort: Pulmonary effort is normal.   Abdominal:      General: Abdomen is flat.      Palpations: Abdomen is soft.      Tenderness: There is abdominal tenderness.   Musculoskeletal: Normal range of motion.      Right lower leg: No edema.      Left lower leg: No edema.   Skin:     General: Skin is warm and dry.   Neurological:      General: No focal deficit present.      Mental Status: She is alert.         Significant Labs:  CBC:   Recent Labs   Lab 12/03/20  0431   WBC 11.56   RBC 2.63*   HGB 7.6*   HCT 24.1*   *   MCV 92   MCH 28.9    MCHC 31.5*     CMP:   Recent Labs   Lab 12/03/20  0431   *   CALCIUM 7.2*   ALBUMIN 1.5*   PROT 5.7*   *   K 4.4   CO2 18*   CL 96   BUN 14   CREATININE 4.0*   ALKPHOS 179*   ALT 7*   AST 11   BILITOT 0.2     All labs within the past 24 hours have been reviewed.

## 2020-12-04 LAB
ABO + RH BLD: NORMAL
ALBUMIN SERPL BCP-MCNC: 1.5 G/DL (ref 3.5–5.2)
ALBUMIN SERPL BCP-MCNC: 1.5 G/DL (ref 3.5–5.2)
ALBUMIN SERPL BCP-MCNC: 1.6 G/DL (ref 3.5–5.2)
ALBUMIN SERPL BCP-MCNC: 1.6 G/DL (ref 3.5–5.2)
ALP SERPL-CCNC: 198 U/L (ref 55–135)
ALT SERPL W/O P-5'-P-CCNC: <5 U/L (ref 10–44)
ANION GAP SERPL CALC-SCNC: 10 MMOL/L (ref 8–16)
ANION GAP SERPL CALC-SCNC: 7 MMOL/L (ref 8–16)
ANION GAP SERPL CALC-SCNC: 8 MMOL/L (ref 8–16)
ANION GAP SERPL CALC-SCNC: 9 MMOL/L (ref 8–16)
ANION GAP SERPL CALC-SCNC: 9 MMOL/L (ref 8–16)
AST SERPL-CCNC: 11 U/L (ref 10–40)
BACTERIA SPEC ANAEROBE CULT: ABNORMAL
BASOPHILS # BLD AUTO: 0.07 K/UL (ref 0–0.2)
BASOPHILS NFR BLD: 0.6 % (ref 0–1.9)
BILIRUB SERPL-MCNC: 0.2 MG/DL (ref 0.1–1)
BLD GP AB SCN CELLS X3 SERPL QL: NORMAL
BUN SERPL-MCNC: 10 MG/DL (ref 8–23)
BUN SERPL-MCNC: 10 MG/DL (ref 8–23)
BUN SERPL-MCNC: 12 MG/DL (ref 8–23)
BUN SERPL-MCNC: 13 MG/DL (ref 8–23)
BUN SERPL-MCNC: 7 MG/DL (ref 8–23)
BUN SERPL-MCNC: 8 MG/DL (ref 8–23)
BUN SERPL-MCNC: 8 MG/DL (ref 8–23)
BUN SERPL-MCNC: 9 MG/DL (ref 8–23)
CA-I BLDV-SCNC: 1.06 MMOL/L (ref 1.06–1.42)
CA-I BLDV-SCNC: 1.24 MMOL/L (ref 1.06–1.42)
CA-I BLDV-SCNC: 1.28 MMOL/L (ref 1.06–1.42)
CALCIUM SERPL-MCNC: 10.2 MG/DL (ref 8.7–10.5)
CALCIUM SERPL-MCNC: 10.3 MG/DL (ref 8.7–10.5)
CALCIUM SERPL-MCNC: 10.5 MG/DL (ref 8.7–10.5)
CALCIUM SERPL-MCNC: 8.8 MG/DL (ref 8.7–10.5)
CALCIUM SERPL-MCNC: 9 MG/DL (ref 8.7–10.5)
CALCIUM SERPL-MCNC: 9.2 MG/DL (ref 8.7–10.5)
CALCIUM SERPL-MCNC: 9.2 MG/DL (ref 8.7–10.5)
CALCIUM SERPL-MCNC: 9.3 MG/DL (ref 8.7–10.5)
CALCIUM SERPL-MCNC: 9.5 MG/DL (ref 8.7–10.5)
CALCIUM SERPL-MCNC: 9.9 MG/DL (ref 8.7–10.5)
CHLORIDE SERPL-SCNC: 93 MMOL/L (ref 95–110)
CHLORIDE SERPL-SCNC: 95 MMOL/L (ref 95–110)
CHLORIDE SERPL-SCNC: 96 MMOL/L (ref 95–110)
CHLORIDE SERPL-SCNC: 97 MMOL/L (ref 95–110)
CO2 SERPL-SCNC: 20 MMOL/L (ref 23–29)
CO2 SERPL-SCNC: 22 MMOL/L (ref 23–29)
CO2 SERPL-SCNC: 22 MMOL/L (ref 23–29)
CO2 SERPL-SCNC: 23 MMOL/L (ref 23–29)
CO2 SERPL-SCNC: 23 MMOL/L (ref 23–29)
CO2 SERPL-SCNC: 25 MMOL/L (ref 23–29)
CO2 SERPL-SCNC: 26 MMOL/L (ref 23–29)
CO2 SERPL-SCNC: 26 MMOL/L (ref 23–29)
CREAT SERPL-MCNC: 2.2 MG/DL (ref 0.5–1.4)
CREAT SERPL-MCNC: 2.3 MG/DL (ref 0.5–1.4)
CREAT SERPL-MCNC: 2.3 MG/DL (ref 0.5–1.4)
CREAT SERPL-MCNC: 2.5 MG/DL (ref 0.5–1.4)
CREAT SERPL-MCNC: 2.6 MG/DL (ref 0.5–1.4)
CREAT SERPL-MCNC: 2.7 MG/DL (ref 0.5–1.4)
CREAT SERPL-MCNC: 2.9 MG/DL (ref 0.5–1.4)
CREAT SERPL-MCNC: 2.9 MG/DL (ref 0.5–1.4)
CREAT SERPL-MCNC: 3.1 MG/DL (ref 0.5–1.4)
CREAT SERPL-MCNC: 3.4 MG/DL (ref 0.5–1.4)
DIFFERENTIAL METHOD: ABNORMAL
EOSINOPHIL # BLD AUTO: 0.1 K/UL (ref 0–0.5)
EOSINOPHIL NFR BLD: 0.8 % (ref 0–8)
ERYTHROCYTE [DISTWIDTH] IN BLOOD BY AUTOMATED COUNT: 14.2 % (ref 11.5–14.5)
EST. GFR  (AFRICAN AMERICAN): 15.9 ML/MIN/1.73 M^2
EST. GFR  (AFRICAN AMERICAN): 17.8 ML/MIN/1.73 M^2
EST. GFR  (AFRICAN AMERICAN): 19.3 ML/MIN/1.73 M^2
EST. GFR  (AFRICAN AMERICAN): 19.3 ML/MIN/1.73 M^2
EST. GFR  (AFRICAN AMERICAN): 21 ML/MIN/1.73 M^2
EST. GFR  (AFRICAN AMERICAN): 22 ML/MIN/1.73 M^2
EST. GFR  (AFRICAN AMERICAN): 23 ML/MIN/1.73 M^2
EST. GFR  (AFRICAN AMERICAN): 25.5 ML/MIN/1.73 M^2
EST. GFR  (AFRICAN AMERICAN): 25.5 ML/MIN/1.73 M^2
EST. GFR  (AFRICAN AMERICAN): 26.9 ML/MIN/1.73 M^2
EST. GFR  (NON AFRICAN AMERICAN): 13.8 ML/MIN/1.73 M^2
EST. GFR  (NON AFRICAN AMERICAN): 15.4 ML/MIN/1.73 M^2
EST. GFR  (NON AFRICAN AMERICAN): 16.7 ML/MIN/1.73 M^2
EST. GFR  (NON AFRICAN AMERICAN): 16.7 ML/MIN/1.73 M^2
EST. GFR  (NON AFRICAN AMERICAN): 18.2 ML/MIN/1.73 M^2
EST. GFR  (NON AFRICAN AMERICAN): 19.1 ML/MIN/1.73 M^2
EST. GFR  (NON AFRICAN AMERICAN): 20 ML/MIN/1.73 M^2
EST. GFR  (NON AFRICAN AMERICAN): 22.1 ML/MIN/1.73 M^2
EST. GFR  (NON AFRICAN AMERICAN): 22.1 ML/MIN/1.73 M^2
EST. GFR  (NON AFRICAN AMERICAN): 23.3 ML/MIN/1.73 M^2
GLUCOSE SERPL-MCNC: 231 MG/DL (ref 70–110)
GLUCOSE SERPL-MCNC: 236 MG/DL (ref 70–110)
GLUCOSE SERPL-MCNC: 240 MG/DL (ref 70–110)
GLUCOSE SERPL-MCNC: 244 MG/DL (ref 70–110)
GLUCOSE SERPL-MCNC: 253 MG/DL (ref 70–110)
GLUCOSE SERPL-MCNC: 254 MG/DL (ref 70–110)
GLUCOSE SERPL-MCNC: 255 MG/DL (ref 70–110)
GLUCOSE SERPL-MCNC: 255 MG/DL (ref 70–110)
GLUCOSE SERPL-MCNC: 258 MG/DL (ref 70–110)
GLUCOSE SERPL-MCNC: 258 MG/DL (ref 70–110)
HCT VFR BLD AUTO: 22 % (ref 37–48.5)
HGB BLD-MCNC: 7 G/DL (ref 12–16)
IMM GRANULOCYTES # BLD AUTO: 0.16 K/UL (ref 0–0.04)
IMM GRANULOCYTES NFR BLD AUTO: 1.3 % (ref 0–0.5)
LYMPHOCYTES # BLD AUTO: 1.8 K/UL (ref 1–4.8)
LYMPHOCYTES NFR BLD: 14.3 % (ref 18–48)
MAGNESIUM SERPL-MCNC: 1.9 MG/DL (ref 1.6–2.6)
MAGNESIUM SERPL-MCNC: 2 MG/DL (ref 1.6–2.6)
MAGNESIUM SERPL-MCNC: 2.1 MG/DL (ref 1.6–2.6)
MCH RBC QN AUTO: 28.7 PG (ref 27–31)
MCHC RBC AUTO-ENTMCNC: 31.8 G/DL (ref 32–36)
MCV RBC AUTO: 90 FL (ref 82–98)
MONOCYTES # BLD AUTO: 1.1 K/UL (ref 0.3–1)
MONOCYTES NFR BLD: 9.1 % (ref 4–15)
NEUTROPHILS # BLD AUTO: 9.2 K/UL (ref 1.8–7.7)
NEUTROPHILS NFR BLD: 73.9 % (ref 38–73)
NRBC BLD-RTO: 0 /100 WBC
PHOSPHATE SERPL-MCNC: 3.8 MG/DL (ref 2.7–4.5)
PHOSPHATE SERPL-MCNC: 4.1 MG/DL (ref 2.7–4.5)
PHOSPHATE SERPL-MCNC: 4.1 MG/DL (ref 2.7–4.5)
PHOSPHATE SERPL-MCNC: 4.2 MG/DL (ref 2.7–4.5)
PLATELET # BLD AUTO: 411 K/UL (ref 150–350)
PMV BLD AUTO: 9.8 FL (ref 9.2–12.9)
POCT GLUCOSE: 245 MG/DL (ref 70–110)
POCT GLUCOSE: 251 MG/DL (ref 70–110)
POCT GLUCOSE: 267 MG/DL (ref 70–110)
POCT GLUCOSE: 286 MG/DL (ref 70–110)
POTASSIUM SERPL-SCNC: 3.8 MMOL/L (ref 3.5–5.1)
POTASSIUM SERPL-SCNC: 3.9 MMOL/L (ref 3.5–5.1)
POTASSIUM SERPL-SCNC: 3.9 MMOL/L (ref 3.5–5.1)
POTASSIUM SERPL-SCNC: 4 MMOL/L (ref 3.5–5.1)
POTASSIUM SERPL-SCNC: 4.1 MMOL/L (ref 3.5–5.1)
PROT SERPL-MCNC: 5.5 G/DL (ref 6–8.4)
RBC # BLD AUTO: 2.44 M/UL (ref 4–5.4)
SODIUM SERPL-SCNC: 125 MMOL/L (ref 136–145)
SODIUM SERPL-SCNC: 125 MMOL/L (ref 136–145)
SODIUM SERPL-SCNC: 127 MMOL/L (ref 136–145)
SODIUM SERPL-SCNC: 128 MMOL/L (ref 136–145)
SODIUM SERPL-SCNC: 129 MMOL/L (ref 136–145)
SODIUM SERPL-SCNC: 129 MMOL/L (ref 136–145)
SODIUM SERPL-SCNC: 130 MMOL/L (ref 136–145)
VANCOMYCIN SERPL-MCNC: 14.3 UG/ML
WBC # BLD AUTO: 12.39 K/UL (ref 3.9–12.7)

## 2020-12-04 PROCEDURE — 80202 ASSAY OF VANCOMYCIN: CPT | Mod: HCWC

## 2020-12-04 PROCEDURE — 63600175 PHARM REV CODE 636 W HCPCS: Mod: HCWC | Performed by: INTERNAL MEDICINE

## 2020-12-04 PROCEDURE — 25000003 PHARM REV CODE 250: Mod: HCWC | Performed by: INTERNAL MEDICINE

## 2020-12-04 PROCEDURE — 94761 N-INVAS EAR/PLS OXIMETRY MLT: CPT | Mod: HCWC

## 2020-12-04 PROCEDURE — 90945 DIALYSIS ONE EVALUATION: CPT | Mod: HCWC

## 2020-12-04 PROCEDURE — 99233 PR SUBSEQUENT HOSPITAL CARE,LEVL III: ICD-10-PCS | Mod: HCWC,,, | Performed by: INTERNAL MEDICINE

## 2020-12-04 PROCEDURE — 85025 COMPLETE CBC W/AUTO DIFF WBC: CPT | Mod: HCWC

## 2020-12-04 PROCEDURE — 25000003 PHARM REV CODE 250: Mod: HCWC | Performed by: STUDENT IN AN ORGANIZED HEALTH CARE EDUCATION/TRAINING PROGRAM

## 2020-12-04 PROCEDURE — 83735 ASSAY OF MAGNESIUM: CPT | Mod: 91,HCWC

## 2020-12-04 PROCEDURE — 25000003 PHARM REV CODE 250: Mod: HCWC | Performed by: HOSPITALIST

## 2020-12-04 PROCEDURE — 99232 SBSQ HOSP IP/OBS MODERATE 35: CPT | Mod: HCWC,,, | Performed by: NURSE PRACTITIONER

## 2020-12-04 PROCEDURE — 99900035 HC TECH TIME PER 15 MIN (STAT): Mod: HCWC

## 2020-12-04 PROCEDURE — 63600175 PHARM REV CODE 636 W HCPCS: Mod: HCWC | Performed by: STUDENT IN AN ORGANIZED HEALTH CARE EDUCATION/TRAINING PROGRAM

## 2020-12-04 PROCEDURE — 99232 PR SUBSEQUENT HOSPITAL CARE,LEVL II: ICD-10-PCS | Mod: HCWC,,, | Performed by: NURSE PRACTITIONER

## 2020-12-04 PROCEDURE — 80069 RENAL FUNCTION PANEL: CPT | Mod: 91,HCWC

## 2020-12-04 PROCEDURE — 99233 SBSQ HOSP IP/OBS HIGH 50: CPT | Mod: HCWC,,, | Performed by: HOSPITALIST

## 2020-12-04 PROCEDURE — 27000221 HC OXYGEN, UP TO 24 HOURS: Mod: HCWC

## 2020-12-04 PROCEDURE — 80048 BASIC METABOLIC PNL TOTAL CA: CPT | Mod: 91,HCWC

## 2020-12-04 PROCEDURE — 82330 ASSAY OF CALCIUM: CPT | Mod: HCWC

## 2020-12-04 PROCEDURE — 94660 CPAP INITIATION&MGMT: CPT | Mod: HCWC

## 2020-12-04 PROCEDURE — 99233 SBSQ HOSP IP/OBS HIGH 50: CPT | Mod: HCWC,,, | Performed by: INTERNAL MEDICINE

## 2020-12-04 PROCEDURE — 80100008 HC CRRT DAILY MAINTENANCE: Mod: HCWC

## 2020-12-04 PROCEDURE — 80053 COMPREHEN METABOLIC PANEL: CPT | Mod: HCWC

## 2020-12-04 PROCEDURE — 80048 BASIC METABOLIC PNL TOTAL CA: CPT | Mod: HCWC

## 2020-12-04 PROCEDURE — 20000000 HC ICU ROOM: Mod: HCWC

## 2020-12-04 PROCEDURE — 99233 PR SUBSEQUENT HOSPITAL CARE,LEVL III: ICD-10-PCS | Mod: HCWC,,, | Performed by: HOSPITALIST

## 2020-12-04 RX ORDER — INSULIN ASPART 100 [IU]/ML
10-15 INJECTION, SOLUTION INTRAVENOUS; SUBCUTANEOUS
Status: DISCONTINUED | OUTPATIENT
Start: 2020-12-04 | End: 2020-12-05

## 2020-12-04 RX ORDER — FUROSEMIDE 10 MG/ML
100 INJECTION INTRAMUSCULAR; INTRAVENOUS ONCE
Status: COMPLETED | OUTPATIENT
Start: 2020-12-04 | End: 2020-12-04

## 2020-12-04 RX ADMIN — HEPARIN SODIUM 5000 UNITS: 5000 INJECTION INTRAVENOUS; SUBCUTANEOUS at 10:12

## 2020-12-04 RX ADMIN — CEFTRIAXONE 2 G: 2 INJECTION, SOLUTION INTRAVENOUS at 09:12

## 2020-12-04 RX ADMIN — GABAPENTIN 300 MG: 300 CAPSULE ORAL at 08:12

## 2020-12-04 RX ADMIN — INSULIN DETEMIR 25 UNITS: 100 INJECTION, SOLUTION SUBCUTANEOUS at 09:12

## 2020-12-04 RX ADMIN — INSULIN ASPART 8 UNITS: 100 INJECTION, SOLUTION INTRAVENOUS; SUBCUTANEOUS at 09:12

## 2020-12-04 RX ADMIN — INSULIN ASPART 3 UNITS: 100 INJECTION, SOLUTION INTRAVENOUS; SUBCUTANEOUS at 06:12

## 2020-12-04 RX ADMIN — HYDROMORPHONE HYDROCHLORIDE 0.5 MG: 1 INJECTION, SOLUTION INTRAMUSCULAR; INTRAVENOUS; SUBCUTANEOUS at 04:12

## 2020-12-04 RX ADMIN — HEPARIN SODIUM 5000 UNITS: 5000 INJECTION INTRAVENOUS; SUBCUTANEOUS at 01:12

## 2020-12-04 RX ADMIN — METRONIDAZOLE 500 MG: 500 TABLET ORAL at 05:12

## 2020-12-04 RX ADMIN — HYDROMORPHONE HYDROCHLORIDE 0.5 MG: 1 INJECTION, SOLUTION INTRAMUSCULAR; INTRAVENOUS; SUBCUTANEOUS at 05:12

## 2020-12-04 RX ADMIN — ONDANSETRON 4 MG: 2 INJECTION INTRAMUSCULAR; INTRAVENOUS at 04:12

## 2020-12-04 RX ADMIN — DOCUSATE SODIUM AND SENNOSIDES 1 TABLET: 8.6; 5 TABLET, FILM COATED ORAL at 08:12

## 2020-12-04 RX ADMIN — OXYCODONE 5 MG: 5 TABLET ORAL at 09:12

## 2020-12-04 RX ADMIN — SODIUM BICARBONATE 650 MG TABLET 1300 MG: at 02:12

## 2020-12-04 RX ADMIN — DEXTROSE 125 ML/HR: 5 SOLUTION INTRAVENOUS at 09:12

## 2020-12-04 RX ADMIN — HEPARIN SODIUM 5000 UNITS: 5000 INJECTION INTRAVENOUS; SUBCUTANEOUS at 05:12

## 2020-12-04 RX ADMIN — CALCIUM GLUCONATE 30 G: 98 INJECTION, SOLUTION INTRAVENOUS at 07:12

## 2020-12-04 RX ADMIN — SODIUM BICARBONATE 650 MG TABLET 1300 MG: at 09:12

## 2020-12-04 RX ADMIN — METRONIDAZOLE 500 MG: 500 TABLET ORAL at 02:12

## 2020-12-04 RX ADMIN — ROSUVASTATIN CALCIUM 10 MG: 10 TABLET, FILM COATED ORAL at 09:12

## 2020-12-04 RX ADMIN — INSULIN ASPART 10 UNITS: 100 INJECTION, SOLUTION INTRAVENOUS; SUBCUTANEOUS at 05:12

## 2020-12-04 RX ADMIN — INSULIN ASPART 2 UNITS: 100 INJECTION, SOLUTION INTRAVENOUS; SUBCUTANEOUS at 05:12

## 2020-12-04 RX ADMIN — METRONIDAZOLE 500 MG: 500 TABLET ORAL at 10:12

## 2020-12-04 RX ADMIN — DOCUSATE SODIUM AND SENNOSIDES 1 TABLET: 8.6; 5 TABLET, FILM COATED ORAL at 09:12

## 2020-12-04 RX ADMIN — FUROSEMIDE 100 MG: 10 INJECTION, SOLUTION INTRAMUSCULAR; INTRAVENOUS at 01:12

## 2020-12-04 RX ADMIN — METHIMAZOLE 10 MG: 10 TABLET ORAL at 08:12

## 2020-12-04 RX ADMIN — SODIUM BICARBONATE 650 MG TABLET 1300 MG: at 08:12

## 2020-12-04 RX ADMIN — OXYCODONE HYDROCHLORIDE 10 MG: 10 TABLET ORAL at 09:12

## 2020-12-04 RX ADMIN — CHLOROTHIAZIDE SODIUM 500 MG: 500 INJECTION INTRAVENOUS at 01:12

## 2020-12-04 NOTE — ASSESSMENT & PLAN NOTE
ATN in setting of contrast administration and sepsis 2/2 UTI, baseline cr 1.0-1.2 that trended up to 4.6 at time of consult.     -received contrast on 11/27/2020  -Urine [id number]/30/2020 many muddy brown granular casts, massive WBC's  -purulent drainage from abdominal ROSAURA drain similar in appearance to her urine; concerning for communication between bladder and fluid collection   -Initiated prismax 12/1     Recomendations    -Received prismax overnight with citrate and calcium ggt post filter, with improvement in Na to 128   -Continue to run prismax while in ICU, can discontinue prismax on step down, and will then hold dialysis for rest of day and reassess pt's RRT needs tomorrow  -q 8 BMPs  -Maximum gabapentin dose 300mg in CKD patients  -Maintain Hgb >7.0, Keep MAP >65  -Renally dose meds and avoid nephrotoxic meds  -Will continue to follow closely   -Plan discussed with staff, Dr Rose

## 2020-12-04 NOTE — SUBJECTIVE & OBJECTIVE
Interval History/Significant Events: No acute events overnight - Received prismax and tolerated well.  Hopeful transition to floor.    Review of Systems   Constitutional: Negative for chills and fever.   HENT: Negative for rhinorrhea and sore throat.    Respiratory: Negative for cough and shortness of breath.    Cardiovascular: Negative for chest pain and palpitations.   Gastrointestinal: Negative for abdominal pain, constipation, diarrhea and nausea.   Genitourinary: Negative for dysuria and flank pain.   Neurological: Negative for light-headedness, numbness and headaches.     Objective:     Vital Signs (Most Recent):  Temp: 97.9 °F (36.6 °C) (12/04/20 0300)  Pulse: 76 (12/04/20 0416)  Resp: 14 (12/04/20 0416)  BP: (!) 116/56 (12/04/20 0300)  SpO2: 97 % (12/04/20 0416) Vital Signs (24h Range):  Temp:  [97.4 °F (36.3 °C)-98.2 °F (36.8 °C)] 97.9 °F (36.6 °C)  Pulse:  [66-81] 76  Resp:  [11-27] 14  SpO2:  [92 %-99 %] 97 %  BP: (115-160)/(53-75) 116/56   Weight: 115.5 kg (254 lb 10.1 oz)  Body mass index is 39.88 kg/m².      Intake/Output Summary (Last 24 hours) at 12/4/2020 0506  Last data filed at 12/4/2020 0400  Gross per 24 hour   Intake 5160 ml   Output -180 ml   Net 5340 ml       Physical Exam  Constitutional:       General: She is not in acute distress.     Appearance: She is obese. She is not ill-appearing or toxic-appearing.   HENT:      Head: Normocephalic and atraumatic.      Nose: No congestion or rhinorrhea.      Mouth/Throat:      Pharynx: No oropharyngeal exudate or posterior oropharyngeal erythema.   Eyes:      General: No scleral icterus.        Right eye: No discharge.         Left eye: No discharge.      Extraocular Movements: Extraocular movements intact.      Pupils: Pupils are equal, round, and reactive to light.   Neck:      Musculoskeletal: Normal range of motion and neck supple. No neck rigidity or muscular tenderness.   Cardiovascular:      Rate and Rhythm: Normal rate and regular rhythm.       Pulses: Normal pulses.      Heart sounds: No murmur.   Pulmonary:      Effort: Pulmonary effort is normal. No respiratory distress.      Breath sounds: Normal breath sounds. No stridor. No wheezing.   Abdominal:      General: Abdomen is flat. Bowel sounds are normal. There is no distension.      Palpations: Abdomen is soft. There is no mass.      Tenderness: There is abdominal tenderness (drain in place, dressings clean dry and intact).      Hernia: No hernia is present.      Comments: Drain with purulent fluid   Musculoskeletal: Normal range of motion.         General: No swelling, tenderness or deformity.   Skin:     General: Skin is warm and dry.      Coloration: Skin is not jaundiced or pale.      Findings: No bruising.   Neurological:      General: No focal deficit present.      Mental Status: She is alert. Mental status is at baseline.         Vents:  Oxygen Concentration (%): 28 (12/04/20 0416)  Lines/Drains/Airways     Central Venous Catheter Line            Trialysis (Dialysis) Catheter 12/01/20 1547 right internal jugular 2 days          Drain                 Urethral Catheter 11/27/20 0145 Latex 16 Fr. 7 days         Closed/Suction Drain 11/28/20 1358 Right Abdomen Bulb 10 Fr. 5 days          Peripheral Intravenous Line                 Peripheral IV - Single Lumen 11/30/20 1142 20 G;1 3/4 in Left;Anterior Upper Arm 3 days         Peripheral IV - Single Lumen 11/30/20 1142 20 G;1 3/4 in Right;Anterior Upper Arm 3 days              Significant Labs:    CBC/Anemia Profile:  Recent Labs   Lab 12/03/20  0431   WBC 11.56   HGB 7.6*   HCT 24.1*   *   MCV 92   RDW 13.9        Chemistries:  Recent Labs   Lab 12/03/20  0030 12/03/20  0431 12/03/20  0847  12/03/20  2025 12/03/20  2328 12/04/20  0142   *  123* 122* 123*  123*   < > 126* 125* 127*   K 4.1  4.1 4.4 4.5  4.5   < > 4.0 4.0 4.0   CL 96  96 96 97  97   < > 97 95 97   CO2 20*  20* 18* 19*  19*   < > 21* 20* 22*   BUN 15  15 14 16  16    < > 16 13 12   CREATININE 3.9*  3.9* 4.0* 4.3*  4.3*   < > 4.0* 3.4* 3.1*   CALCIUM 7.0*  7.0* 7.2* 7.2*  7.2*   < > 8.2* 9.0 8.8   ALBUMIN 1.5* 1.5* 1.6*  --   --  1.6*  --    PROT  --  5.7*  --   --   --   --   --    BILITOT  --  0.2  --   --   --   --   --    ALKPHOS  --  179*  --   --   --   --   --    ALT  --  7*  --   --   --   --   --    AST  --  11  --   --   --   --   --    MG 2.2 2.1  --   --   --  2.1  --    PHOS 4.3 4.5 4.7*  --   --  4.2  --     < > = values in this interval not displayed.       BMP:   Recent Labs   Lab 12/03/20 2328 12/04/20 0142   * 253*   * 127*   K 4.0 4.0   CL 95 97   CO2 20* 22*   BUN 13 12   CREATININE 3.4* 3.1*   CALCIUM 9.0 8.8   MG 2.1  --      CMP:   Recent Labs   Lab 12/03/20  0431 12/03/20  0847  12/03/20 2025 12/03/20 2328 12/04/20 0142   * 123*  123*   < > 126* 125* 127*   K 4.4 4.5  4.5   < > 4.0 4.0 4.0   CL 96 97  97   < > 97 95 97   CO2 18* 19*  19*   < > 21* 20* 22*   * 291*  291*   < > 226* 254* 253*   BUN 14 16  16   < > 16 13 12   CREATININE 4.0* 4.3*  4.3*   < > 4.0* 3.4* 3.1*   CALCIUM 7.2* 7.2*  7.2*   < > 8.2* 9.0 8.8   PROT 5.7*  --   --   --   --   --    ALBUMIN 1.5* 1.6*  --   --  1.6*  --    BILITOT 0.2  --   --   --   --   --    ALKPHOS 179*  --   --   --   --   --    AST 11  --   --   --   --   --    ALT 7*  --   --   --   --   --    ANIONGAP 8 7*  7*   < > 8 10 8   EGFRNONAA 11.3* 10.4*  10.4*   < > 11.3* 13.8* 15.4*    < > = values in this interval not displayed.       Significant Imaging:  I have reviewed all pertinent imaging results/findings within the past 24 hours.

## 2020-12-04 NOTE — PROGRESS NOTES
[facility]  Nephrology  Progress Note    Patient Name: Navin Beck  MRN: [id number]  Admission Date: 11/26/2020  Hospital Length of Stay: 7 days  Attending Provider: Chetan Lang MD   Primary Care Physician: Elvira Quinones MD  Principal Problem:Acute kidney injury (LAURIE) with acute tubular necrosis (ATN)    Subjective:     HPI: Ms Beck is a 60 year old female with a past medical history of HTN, LD, T2DM, hyperparathyroidism, and asthma who [place] [facility] [place] pain s/p bladder mesh surgery. Pt noted to be in DKA on presentation and is currently on insulin gtt and being followed by endocrine. CT abdomen obtained revealed multiple fluid collections near bladder; IR was consulted and pt is s/p drain placement. Initial coverage provided with vanc, now on cefepime and flagyl. Baseline sCr roughly 1.0-1.2, now elevated to 4.6 at time of consult (11/30/2020). Pt now anuric with 20mL UOP in the past 24hrs at time of consult (11/30/2020). Pt denies endorses lower abdominal tenderness. Pt denies SOB. Nephrology has been consulted for LAURIE.     -HPI was obtained from patient interview, and from review of the patient's EMR.         Interval History: Pt seen at bedside. Mentation improving. Received prismax overnight and sodium today improved to 128. Minimal UO    Objective:     Vital Signs (Most Recent):  Temp: 97.6 °F (36.4 °C) (12/04/20 1100)  Pulse: 70 (12/04/20 1300)  Resp: 12 (12/04/20 1300)  BP: 133/63 (12/04/20 1300)  SpO2: 98 % (12/04/20 1300)  O2 Device (Oxygen Therapy): room air (12/04/20 0856) Vital Signs (24h Range):  Temp:  [97.4 °F (36.3 °C)-98.2 °F (36.8 °C)] 97.6 °F (36.4 °C)  Pulse:  [68-81] 70  Resp:  [11-25] 12  SpO2:  [92 %-99 %] 98 %  BP: (116-160)/(56-74) 133/63     Weight: 115.5 kg (254 lb 10.1 oz) (12/02/20 1119)  Body mass index is 39.88 kg/m².  Body surface area is 2.34 meters squared.    I/O last 3 completed shifts:  In: 8145 [P.O.:430; I.V.:3465; IV  Piggyback:4250]  Out: -265.5 [Urine:398; Drains:25]    Physical Exam  Constitutional:       Appearance: She is obese.      Comments: lethargic   HENT:      Head: Normocephalic and atraumatic.      Nose: Nose normal.      Mouth/Throat:      Mouth: Mucous membranes are moist.      Pharynx: Oropharynx is clear.   Eyes:      Conjunctiva/sclera: Conjunctivae normal.      Pupils: Pupils are equal, round, and reactive to light.   Neck:      Musculoskeletal: Normal range of motion and neck supple.   Cardiovascular:      Rate and Rhythm: Regular rhythm.   Pulmonary:      Effort: Pulmonary effort is normal.   Abdominal:      General: Abdomen is flat.      Palpations: Abdomen is soft.      Tenderness: There is abdominal tenderness.   Musculoskeletal: Normal range of motion.      Right lower leg: No edema.      Left lower leg: No edema.   Skin:     General: Skin is warm and dry.   Neurological:      General: No focal deficit present.      Mental Status: She is alert.         Significant Labs:  CBC:   Recent Labs   Lab 12/04/20  0620   WBC 12.39   RBC 2.44*   HGB 7.0*   HCT 22.0*   *   MCV 90   MCH 28.7   MCHC 31.8*     CMP:   Recent Labs   Lab 12/04/20  0620 12/04/20  0800   * 258*   CALCIUM 9.3 9.5   ALBUMIN 1.5*  --    PROT 5.5*  --    * 128*   K 3.9 3.9   CO2 22* 26   CL 95 95   BUN 9 8   CREATININE 2.7* 2.5*   ALKPHOS 198*  --    ALT <5*  --    AST 11  --    BILITOT 0.2  --      All labs within the past 24 hours have been reviewed.        Assessment/Plan:     * Acute kidney injury (LAURIE) with acute tubular necrosis (ATN)  ATN in setting of contrast administration and sepsis 2/2 UTI, baseline cr 1.0-1.2 that trended up to 4.6 at time of consult.     -received contrast on 11/27/2020  -Urine mybaktuupp76/30/2020 many muddy brown granular casts, massive WBC's  -purulent drainage from abdominal ROSAURA drain similar in appearance to her urine; concerning for communication between bladder and fluid collection    -Initiated prismax 12/1     Recomendations    -Received prismax overnight with citrate and calcium ggt post filter, with improvement in Na to 128   -Continue to run prismax while in ICU, can discontinue prismax on step down, and will then hold dialysis for rest of day and reassess pt's RRT needs tomorrow  -q 8 BMPs  -Maximum gabapentin dose 300mg in CKD patients  -Maintain Hgb >7.0, Keep MAP >65  -Renally dose meds and avoid nephrotoxic meds  -Will continue to follow closely   -Plan discussed with staff, Dr Rose     Hyponatremia  -hyponatremia, likely 2/2 combination of D5 administration with decreased UOP  -duloxetine discontinued  -improved to 128 with RRT            Thank you for your consult. I will follow-up with patient. Please contact us if you have any additional questions.    Usha Ames MD  Nephrology  [facility]

## 2020-12-04 NOTE — PT/OT/SLP PROGRESS
Physical Therapy      Patient Name:  Navin Beck   MRN:  [id number]    Patient not seen today secondary to (AM pt not seen due to being very lethargic. PT was not able to make second attempt for treatment.). Will follow-up at a later date..    Vonnie Musa, PT   12/4/2020

## 2020-12-04 NOTE — NURSING
Called critical care team and notified that CRRT has been stopped and the elevated BP. Per Dr. Lyn, will order a chest X ray to make sure the patient's trialysis line has not moved.

## 2020-12-04 NOTE — PROGRESS NOTES
Therapy with vancomycin complete and/or consult discontinued by provider.  Pharmacy will sign off, please re-consult as needed.      Nell Vela, PharmD  Ext [phone]

## 2020-12-04 NOTE — PROGRESS NOTES
"[facility]  Endocrinology  Progress Note    Admit Date: 11/26/2020     Reason for Consult: Management of T2DM, Hyperglycemia     Diabetes diagnosis year: > 10 years ago    Home Diabetes Medications:    Lantus 50 U daily  Novolog 15 U AC  Metformin 500 mg BID    How often checking glucose at home? 1-3 x day   BG readings on regimen: 200s-300s  Hypoglycemia on the regimen?  No  Missed doses on regimen?  Yes    Diabetes Complications include:     Diabetic nephropathy        HPI:   Patient is a 60 y.o. female with a diagnosis of HTN, T2DM on MDi insulin, hyperthyroidism who presented with weakness, poor appetite and abdominal pain associated with vaginal pain and rectal pressure and non-bloody diarrhea. She was found to be in DKA and started on DKA protocol with IVF and IV insulin. She is being treated with IV antibiotics for pelvic fluids collections suspected to be infectious.     On admission labs:   11/26/2020    Sodium 128 (L)   Potassium 5.3 (H)   Anion Gap 16   CO2 16 (L)   Creatinine 1.5 (H)   Glucose 626 (HH)   Beta-Hydroxybutyrate 4.2 (H)     Lab Results   Component Value Date    HGBA1C 11.4 (H) 11/27/2020     Lab Results   Component Value Date    TSH 0.519 11/27/2020       She also has a history of hyperthyroidism on methimazole 10 mg daily. Thyroid US shows MNG. No RAIU scan on record. No TRAb or TSI. TSH at goal.     Interval HPI:   Overnight events: Remains in ICU. BG above goal with basal insulin and prn correction scale. PO intake poor. Decreased appetite. Dialysis per nephrology.   Eating:   <25%  Nausea: No  Hypoglycemia and intervention: No  Fever: No  TPN and/or TF: No      BP (!) 148/67   Pulse 74   Temp 97.6 °F (36.4 °C) (Axillary)   Resp 12   Ht 5' 7" (1.702 m)   Wt 115.5 kg (254 lb 10.1 oz)   SpO2 99%   BMI 39.88 kg/m²     Labs Reviewed and Include    Recent Labs   Lab 12/04/20  0620  12/04/20  1430   *   < > 236*  240*   CALCIUM 9.3   < > 10.5  10.2   ALBUMIN " 1.5*  --  1.6*   PROT 5.5*  --   --    *   < > 129*  129*   K 3.9   < > 4.0  4.0   CO2 22*   < > 25  25   CL 95   < > 95  96   BUN 9   < > 7*  7*   CREATININE 2.7*   < > 2.2*  2.3*   ALKPHOS 198*  --   --    ALT <5*  --   --    AST 11  --   --    BILITOT 0.2  --   --     < > = values in this interval not displayed.     Lab Results   Component Value Date    WBC 12.39 12/04/2020    HGB 7.0 (L) 12/04/2020    HCT 22.0 (L) 12/04/2020    MCV 90 12/04/2020     (H) 12/04/2020     No results for input(s): TSH, FREET4 in the last 168 hours.  Lab Results   Component Value Date    HGBA1C 11.4 (H) 11/27/2020       Nutritional status:   Body mass index is 39.88 kg/m².  Lab Results   Component Value Date    ALBUMIN 1.6 (L) 12/04/2020    ALBUMIN 1.5 (L) 12/04/2020    ALBUMIN 1.5 (L) 12/04/2020     No results found for: PREALBUMIN    Estimated Creatinine Clearance: 34.8 mL/min (A) (based on SCr of 2.2 mg/dL (H)).    Accu-Checks  Recent Labs     12/02/20  1659 12/02/20  2049 12/03/20  0850 12/03/20  1142 12/03/20  1547 12/03/20  1549 12/03/20  2008 12/04/20  0752 12/04/20  0916 12/04/20  1232   POCTGLUCOSE 237* 219* 317* 333* 356* 349* 235* 267* 286* 251*       Current Medications and/or Treatments Impacting Glycemic Control  Immunotherapy:    Immunosuppressants     None        Steroids:   Hormones (From admission, onward)    Start     Stop Route Frequency Ordered    11/27/20 0959  melatonin tablet 6 mg      -- Oral Nightly PRN 11/27/20 0903        Pressors:    Autonomic Drugs (From admission, onward)    None        Hyperglycemia/Diabetes Medications:   Antihyperglycemics (From admission, onward)    Start     Stop Route Frequency Ordered    12/04/20 2100  insulin detemir U-100 pen 30 Units      -- SubQ 2 times daily 12/04/20 1711    12/04/20 1130  insulin aspart U-100 pen 10-15 Units      -- SubQ 3 times daily with meals 12/04/20 0947    12/03/20 1658  insulin aspart U-100 pen 0-5 Units      -- SubQ Before meals &  nightly PRN 12/03/20 1558    11/28/20 1245  insulin regular in 0.9 % NaCl 100 unit/100 mL (1 unit/mL) infusion     Question:  Enter initial dose (Units/hr):  Answer:  4    11/28 2300 IV Continuous 11/28/20 1133          ASSESSMENT and PLAN    * Acute kidney injury (LAURIE) with acute tubular necrosis (ATN)    Titrate insulin slowly to avoid hypoglycemia as the risk of hypoglycemia increases with decreased creatinine clearance.    Estimated Creatinine Clearance: 34.8 mL/min (A) (based on SCr of 2.2 mg/dL (H)).      Type 2 diabetes mellitus with hyperglycemia, with long-term current use of insulin  Brief Hx: 60 y.o. female with a diagnosis of HTN, T2DM on MDi insulin, hyperthyroidism who presented with weakness, poor appetite and abdominal pain associated with vaginal pain and rectal pressure and non-bloody diarrhea.   She was found to be in DKA and started on DKA protocol with IVF and IV insulin.   She is being treated with IV antibiotics for pelvic fluids collections suspected to be infectious.     History of poorly controlled T2DM (A1c 11.4%) on MDI insulin and metformin  Presented with intra-abdominal infection and DKA  DKA resolved with IV insulin, IVFs      Consulted for: dka  DM type: T2DM  A1C: 11.4*  Glucose Goals: 140-180 mg/dL    Home Regimen:    Metformin 500 bid  Lantus 50 units BID  Aspart 10-15 units AC and SSI      PLAN:      -  Increase to  Levemir 30 units BID   -  Moderate correction scale   -  NPO for procedure, advance to DM diet per primary   -  Accucheck Q4H while NPO          Hyperthyroidism  Unclear etiology given no TRAb, TSI or RAIU scan  Thyroid US shows MNG so toxic MNG high in differential  She has a TSH at goal on methimazole 10 mg daily  Continue, follow up outpatient    Plan  - Continue methimazole 10 mg daily  - Outpatient followed with Endocrinology          Ksenia Tovar NP  Endocrinology  [facility]

## 2020-12-04 NOTE — ASSESSMENT & PLAN NOTE
Titrate insulin slowly to avoid hypoglycemia as the risk of hypoglycemia increases with decreased creatinine clearance.    Estimated Creatinine Clearance: 34.8 mL/min (A) (based on SCr of 2.2 mg/dL (H)).

## 2020-12-04 NOTE — RESIDENT HANDOFF
Handoff     Primary Team: [facility] HOSP MED 2 Room Number: 6088/6088 A     Patient Name: Navin Beck MRN: [id number]     Date of Birth: [id number] Allergies: Penicillins     Age: 60 y.o. Admit Date: 11/26/2020     Sex: female  BMI: Body mass index is 39.88 kg/m².     Code Status: Full Code        Illness Level (current clinical status): Watcher - No    Reason for Admission: Acute kidney injury (LAURIE) with acute tubular necrosis (ATN)    Brief HPI (pertinent PMH and diagnosis or differential diagnosis): 60 year old female with history of HTN, HLD, noncompliance with T2DM with recent revision of erosion of implanted mesh/prosthetic materials found to have intra-abdominal abscesses sp IR drain placement (11/28), in DKA (beta hydroxy 4.2, glucose 626 and initial bicarb 16), now with anuria requiring CRRT for both metabolic clearance and slow correction of her hyponatremia (120s). Nephrology started patient on Prismax but is able to have HD once on floor. Followed by urology/gyn for IR drain.    Procedure Date: IR drain 11/28    Hospital Course (updated, brief assessment by system or problem, significant events):   - DKA: resolved. Now on long-acting and mealtime insulin  - LAURIE: beginning to make urine. Nephrology following.  Plan for HD.  - Hyponatremia: Nephrology running HD to correct  - Intrapelvic fluid collection: followed by urology/gyn. IR drain placed. On ceftriaxone and metronidazole.    Tasks (specific, using if-then statements):    - Follow up nephrology recs   - Continued HD until making adequate urine   - Follow up drain recommendations/antibiotics    Contingency Plan (special circumstances anticipated and plan): Step back to ICU if hemodynamically compromised    Estimated Discharge Date: TBD    Discharge Disposition: TBD    Mentored By: Dr. Chetan Lang MD

## 2020-12-04 NOTE — NURSING
Ms Holden was turned for bath and quickly developed nausea. Dialysis pump stopped and alarmed. While trouble shooting machine, it was suspected that she had clotted. Unsuccessful in restarting treatment. Equipment to rinse patient back not available and noted clot in line. Large clot aspirated with syringe and dialysis tubing disconnected. Will call and notify team. Dialysis RN called.   The patient said that the zofran was effective. Still in pain. Immediately after stopping dialysis, she became hypertensive. In a matter of minutes, now that she is sitting up high in bed, the room is calm, and nausea decreased, it has trended down.   Calcium citrate and calcium gluconate gtts stopped at this time as well.

## 2020-12-04 NOTE — ASSESSMENT & PLAN NOTE
-hyponatremia, likely 2/2 combination of D5 administration with decreased UOP  -duloxetine discontinued  -improved to 128 with RRT

## 2020-12-04 NOTE — PHYSICIAN QUERY
PT Name: Navin Beck  MR #: [id number]     CDS: Herve CAMILO,RN        Contact information:[email or web address]  This form is a permanent document in the medical record.    Query Date: December 4, 2020    Neurological Condition Clarification    By submitting this query, we are merely seeking further clarification of documentation to reflect the severity of illness of your patient. Please utilize your independent clinical judgment when addressing the question(s) below.    The Medical record reflects the following:     Indicators   Supporting Clinical Findings Location in Medical Record   x AMS, Confusion,  LOC, etc.  Negative for confusion and hallucinations.The patient is not nervous/anxious.        Mental Status: She is lethargic and confused.       Asked by charge RN to assess pt for AMS and increasing lethargy. AMS.    Encephalopathic this AM   11/27/20 Critical Care Medicine Consult Note    12/1/20  Physical  Medicine and Rehabilitation Subjective & Objective    12/1/20 Code & Rapid Response Care Update    12/1/20  Nephrology Progress Notes/Dr. Hui stevenson Acute/Chronic Illness Postprocedural intraabdominal abscess,Acute kidney injury (LAURIE) with acute tubular necrosis (ATN),Diabetic ketoacidosis without coma associated with type 2 diabetes mellitus    Hyponatremia    ATN in setting of contrast administration and sepsis 2/2 UTI      12/1/20  Physical Medicine and Rehabilitation Consults/ LAINE Rowell    11/30/20 Nephrology Progress Notes    12/1/20 Nephrology Assessment & Plan Note    Radiology Findings     x Electrolyte Imbalance Sodium: 128--->134-->135-->138--->132-->129-->131-->123-->120-->124->128  Potassium: 5.3-->4.5-->5.5-->4.4--->4.7-->4.0-->4.8-->4.5-->5.1-->4.0--->3.9    11/26--->12/4 Labs   x Medication Cefepime injection 2 g Intravenous Every 24 hours  Cefepime injection 2 g Intravenous Every 8 hours  Ceftriaxone 2 g/50 ml 2 g Intravenous Every 24 hours   Ceftriaxone 2 g/50 ml D5W IVPB  Intravenous Every 24 hours  Ceftriaxone injection 1 g Intravenous ED 1 Time 1 dose(s) given  Insulin regular 1 Units/ml in sodium  chloride 0.9% 100 ml 8 Units/hr  infusion Intravenous Continuous  Insulin regular 1 Unit/ml in sodium chloride 0.9% 100 ml infusion 0.52 Units/hr  Insulin regular in 0.9% NaCl 100 unit/100 ml 1.8 Units/hr Intravenous Continuous  Insulin regular  In 0.9%% NaCl 100 unit/100 ml 2 units/hr Intravenous Continuous  Insulin regular injection 6 units Intravenous ED 1 Time 1 dose(s) given  Lactated ringers bolus 1,000 ml Intravenous Once 1 dose(s) given  Lactated ringers bolus 1,000 ml ED 1 Time 1 dose(s) given   Metronidazole IVPB 500 mg Intravenous Every 8 hours   Metronidazole  IVPB 500 mg Intravenous Once 1 dose(s) given   Metronidazole IVPB 500 mg Intravenous Once 1 dose(s) given  Sodium chloride 0.9% bolus 1,000 ml Intravenous Once 1 dose(s) given  Sodium chloride 0.9% bolus 1,000 ml  Intravenous ED 1 Time 1 dose(s) given  Sodium bicarbonate tablet 1,300 mg Oral 3 times daily   Vancomycin 2,500 mg in dextrose 5%  500 ml IVPB Intravenous ED 1 Time 1 dose(s) given  Vancomycin 500 mg in dextrose 5% 100 ml IVPB Intravenous Once 1 dose(s) given   Vancomycin 500 mg in dextrose 5% 100 ml IVPB Intravenous Once 1 dos(s) given   Vancomycin 1.75 g in 5% dextrose 500 ml Intravenous Once 1 dose(s) given   Vancomycin in dextrose 5% 1 gram/250 ml 1,00 mg Intravenous Every 12 hours  Potassium chloride 10 mEq in 100 ml IVPB Intravenous every hour 2 doses given   Potassium chloride 10 mEq in 100 ml IVPB  Intravenous every hour 3 doses given 11/30/20 MAR  11/28--->11/29 MAR  12/1--->present MAR  12/27--->12/28 MAR  11/26/20 MAR  11/27--->11/28 MAR    11/29---->12/1 MAR  12 /1-->12/2 MAR  11/28/20 MAR  11/26/20 MAR  11/29/20 MAR  11/27/20 MAR  11/27--->11/28 MAR  11/27/20 MAR  12/1---->12/2 MAR  11/30/20 MAR  11/26/20 MAR  11/30--->present MAR  11/27/20 MAR    12/2/20 MAR  12/3/20 MAR  11/28/20 MAR  11/28/20  MAR  11/27/20 MAR  11/27/20 MAR    Treatment             x Other Glucose: 626-->286-->222-->109-->158-->541-->480-->209-->326-->258  WBC: 17.85-->19.12-->19.63-->20.10-->16.52-->18.24-->16.65-->13.31-->11.56->12.39  Lactate, venous: 1.3, 1.4--->2.1--->1.0  Beta-hydroxybutyrate: 4.2  Hemoglobin A1C: 11.4    11/26------->12/4  Labs   11/26----->12/4 Labs  11/27/20, 11/30/20 Labs  11/26/20 lab  11/27/20 Lab     Encephalopathy- is a general term for any diffuse disease of the brain that alters brain function or structure. Treatment of the cognitive dysfunction varies but is ultimately dependent on the treatment of the underlying condition.    Major Symptoms of Encephalopathy - Decreased level of consciousness, fluctuating alertness/concentration, confusion, agitation, lethargy, somnolence, drowsiness, obtundation, stupor, or coma.  References: National Institutes of Healths (NIH) National [place] of Neurological Disorders and Strokes;  HCPro 2016; Advisory Board     The noted clinical guidelines are only system guidelines and do not replace the providers clinical judgment.  Doctor, please specify the type of encephalopathy associated with above clinical findings.  [ x  ] Metabolic Encephalopathy - Due to electrolyte imbalance, metabolic derangements, or infectious processes, includes Septic Encephalopathy, Uremic Encephalopathy   [   ] Encephalopathy, unspecified      [   ] Other Encephalopathy (please specify): ____________________   [   ] Other Neurological Condition- Includes Post-ictal altered mental status (please specify condition): _________   [   ]  Clinically Undetermined     Please document in your progress notes daily for the duration of treatment until resolved, and include in your discharge summary.

## 2020-12-04 NOTE — ASSESSMENT & PLAN NOTE
Brief Hx: 60 y.o. female with a diagnosis of HTN, T2DM on MDi insulin, hyperthyroidism who presented with weakness, poor appetite and abdominal pain associated with vaginal pain and rectal pressure and non-bloody diarrhea.   She was found to be in DKA and started on DKA protocol with IVF and IV insulin.   She is being treated with IV antibiotics for pelvic fluids collections suspected to be infectious.     History of poorly controlled T2DM (A1c 11.4%) on MDI insulin and metformin  Presented with intra-abdominal infection and DKA  DKA resolved with IV insulin, IVFs      Consulted for: dka  DM type: T2DM  A1C: 11.4*  Glucose Goals: 140-180 mg/dL    Home Regimen:    Metformin 500 bid  Lantus 50 units BID  Aspart 10-15 units AC and SSI      PLAN:      -  Increase to  Levemir 30 units BID   -  Moderate correction scale   -  NPO for procedure, advance to DM diet per primary   -  Accucheck Q4H while NPO

## 2020-12-04 NOTE — PT/OT/SLP PROGRESS
Occupational Therapy      Patient Name:  Navin Beck   MRN:  [id number]    Patient not seen today as pt on CRRT without pressor support, but impaired alertness this AM. OT unable to return today and will check status at later date. .    Shea Castro, TITAR  12/4/2020

## 2020-12-05 LAB
ALBUMIN SERPL BCP-MCNC: 1.7 G/DL (ref 3.5–5.2)
ALP SERPL-CCNC: 180 U/L (ref 55–135)
ALT SERPL W/O P-5'-P-CCNC: <5 U/L (ref 10–44)
ANION GAP SERPL CALC-SCNC: 7 MMOL/L (ref 8–16)
ANION GAP SERPL CALC-SCNC: 9 MMOL/L (ref 8–16)
AST SERPL-CCNC: 13 U/L (ref 10–40)
BASOPHILS # BLD AUTO: 0.04 K/UL (ref 0–0.2)
BASOPHILS NFR BLD: 0.3 % (ref 0–1.9)
BILIRUB SERPL-MCNC: <0.1 MG/DL (ref 0.1–1)
BODY FLUID SOURCE, CREATININE: NORMAL
BUN SERPL-MCNC: 14 MG/DL (ref 8–23)
BUN SERPL-MCNC: 16 MG/DL (ref 8–23)
CALCIUM SERPL-MCNC: 8.1 MG/DL (ref 8.7–10.5)
CALCIUM SERPL-MCNC: 8.4 MG/DL (ref 8.7–10.5)
CALCIUM SERPL-MCNC: 8.4 MG/DL (ref 8.7–10.5)
CALCIUM SERPL-MCNC: 8.5 MG/DL (ref 8.7–10.5)
CHLORIDE SERPL-SCNC: 95 MMOL/L (ref 95–110)
CHLORIDE SERPL-SCNC: 96 MMOL/L (ref 95–110)
CO2 SERPL-SCNC: 29 MMOL/L (ref 23–29)
CREAT FLD-MCNC: 1.7 MG/DL
CREAT SERPL-MCNC: 3.8 MG/DL (ref 0.5–1.4)
CREAT SERPL-MCNC: 4 MG/DL (ref 0.5–1.4)
DIFFERENTIAL METHOD: ABNORMAL
EOSINOPHIL # BLD AUTO: 0.1 K/UL (ref 0–0.5)
EOSINOPHIL NFR BLD: 1 % (ref 0–8)
ERYTHROCYTE [DISTWIDTH] IN BLOOD BY AUTOMATED COUNT: 14.1 % (ref 11.5–14.5)
EST. GFR  (AFRICAN AMERICAN): 13.1 ML/MIN/1.73 M^2
EST. GFR  (AFRICAN AMERICAN): 13.9 ML/MIN/1.73 M^2
EST. GFR  (NON AFRICAN AMERICAN): 11.3 ML/MIN/1.73 M^2
EST. GFR  (NON AFRICAN AMERICAN): 12 ML/MIN/1.73 M^2
GLUCOSE SERPL-MCNC: 203 MG/DL (ref 70–110)
GLUCOSE SERPL-MCNC: 288 MG/DL (ref 70–110)
GLUCOSE SERPL-MCNC: 291 MG/DL (ref 70–110)
GLUCOSE SERPL-MCNC: 291 MG/DL (ref 70–110)
HCT VFR BLD AUTO: 22.3 % (ref 37–48.5)
HGB BLD-MCNC: 7.2 G/DL (ref 12–16)
IMM GRANULOCYTES # BLD AUTO: 0.22 K/UL (ref 0–0.04)
IMM GRANULOCYTES NFR BLD AUTO: 1.7 % (ref 0–0.5)
LYMPHOCYTES # BLD AUTO: 2.5 K/UL (ref 1–4.8)
LYMPHOCYTES NFR BLD: 18.8 % (ref 18–48)
MAGNESIUM SERPL-MCNC: 1.8 MG/DL (ref 1.6–2.6)
MCH RBC QN AUTO: 28.6 PG (ref 27–31)
MCHC RBC AUTO-ENTMCNC: 32.3 G/DL (ref 32–36)
MCV RBC AUTO: 89 FL (ref 82–98)
MONOCYTES # BLD AUTO: 1 K/UL (ref 0.3–1)
MONOCYTES NFR BLD: 7.8 % (ref 4–15)
NEUTROPHILS # BLD AUTO: 9.3 K/UL (ref 1.8–7.7)
NEUTROPHILS NFR BLD: 70.4 % (ref 38–73)
NRBC BLD-RTO: 0 /100 WBC
PHOSPHATE SERPL-MCNC: 3.7 MG/DL (ref 2.7–4.5)
PHOSPHATE SERPL-MCNC: 3.8 MG/DL (ref 2.7–4.5)
PHOSPHATE SERPL-MCNC: 3.8 MG/DL (ref 2.7–4.5)
PLATELET # BLD AUTO: 440 K/UL (ref 150–350)
PMV BLD AUTO: 9.7 FL (ref 9.2–12.9)
POCT GLUCOSE: 187 MG/DL (ref 70–110)
POCT GLUCOSE: 229 MG/DL (ref 70–110)
POCT GLUCOSE: 245 MG/DL (ref 70–110)
POCT GLUCOSE: 258 MG/DL (ref 70–110)
POCT GLUCOSE: 258 MG/DL (ref 70–110)
POTASSIUM SERPL-SCNC: 3.9 MMOL/L (ref 3.5–5.1)
POTASSIUM SERPL-SCNC: 4.1 MMOL/L (ref 3.5–5.1)
POTASSIUM SERPL-SCNC: 4.2 MMOL/L (ref 3.5–5.1)
POTASSIUM SERPL-SCNC: 4.2 MMOL/L (ref 3.5–5.1)
PROT SERPL-MCNC: 5.9 G/DL (ref 6–8.4)
RBC # BLD AUTO: 2.52 M/UL (ref 4–5.4)
SODIUM SERPL-SCNC: 131 MMOL/L (ref 136–145)
SODIUM SERPL-SCNC: 133 MMOL/L (ref 136–145)
SODIUM SERPL-SCNC: 133 MMOL/L (ref 136–145)
SODIUM SERPL-SCNC: 134 MMOL/L (ref 136–145)
WBC # BLD AUTO: 13.25 K/UL (ref 3.9–12.7)

## 2020-12-05 PROCEDURE — 25000003 PHARM REV CODE 250: Mod: HCWC | Performed by: STUDENT IN AN ORGANIZED HEALTH CARE EDUCATION/TRAINING PROGRAM

## 2020-12-05 PROCEDURE — 94660 CPAP INITIATION&MGMT: CPT | Mod: HCWC

## 2020-12-05 PROCEDURE — 85025 COMPLETE CBC W/AUTO DIFF WBC: CPT | Mod: HCWC

## 2020-12-05 PROCEDURE — 99233 PR SUBSEQUENT HOSPITAL CARE,LEVL III: ICD-10-PCS | Mod: HCWC,GC,, | Performed by: STUDENT IN AN ORGANIZED HEALTH CARE EDUCATION/TRAINING PROGRAM

## 2020-12-05 PROCEDURE — 94761 N-INVAS EAR/PLS OXIMETRY MLT: CPT | Mod: HCWC

## 2020-12-05 PROCEDURE — 80053 COMPREHEN METABOLIC PANEL: CPT | Mod: HCWC

## 2020-12-05 PROCEDURE — 83735 ASSAY OF MAGNESIUM: CPT | Mod: HCWC

## 2020-12-05 PROCEDURE — 99499 UNLISTED E&M SERVICE: CPT | Mod: HCWC,,, | Performed by: NURSE PRACTITIONER

## 2020-12-05 PROCEDURE — 99232 SBSQ HOSP IP/OBS MODERATE 35: CPT | Mod: HCWC,,, | Performed by: NURSE PRACTITIONER

## 2020-12-05 PROCEDURE — 80069 RENAL FUNCTION PANEL: CPT | Mod: HCWC

## 2020-12-05 PROCEDURE — 25000003 PHARM REV CODE 250: Mod: HCWC | Performed by: INTERNAL MEDICINE

## 2020-12-05 PROCEDURE — 27000221 HC OXYGEN, UP TO 24 HOURS: Mod: HCWC

## 2020-12-05 PROCEDURE — 63600175 PHARM REV CODE 636 W HCPCS: Mod: HCWC | Performed by: INTERNAL MEDICINE

## 2020-12-05 PROCEDURE — 82570 ASSAY OF URINE CREATININE: CPT | Mod: HCWC

## 2020-12-05 PROCEDURE — 99233 SBSQ HOSP IP/OBS HIGH 50: CPT | Mod: HCWC,GC,, | Performed by: STUDENT IN AN ORGANIZED HEALTH CARE EDUCATION/TRAINING PROGRAM

## 2020-12-05 PROCEDURE — 80048 BASIC METABOLIC PNL TOTAL CA: CPT | Mod: 91,HCWC

## 2020-12-05 PROCEDURE — 99232 SBSQ HOSP IP/OBS MODERATE 35: CPT | Mod: HCWC,,, | Performed by: INTERNAL MEDICINE

## 2020-12-05 PROCEDURE — 99232 PR SUBSEQUENT HOSPITAL CARE,LEVL II: ICD-10-PCS | Mod: HCWC,,, | Performed by: NURSE PRACTITIONER

## 2020-12-05 PROCEDURE — 20600001 HC STEP DOWN PRIVATE ROOM: Mod: HCWC

## 2020-12-05 PROCEDURE — 99232 PR SUBSEQUENT HOSPITAL CARE,LEVL II: ICD-10-PCS | Mod: HCWC,,, | Performed by: INTERNAL MEDICINE

## 2020-12-05 PROCEDURE — 99900035 HC TECH TIME PER 15 MIN (STAT): Mod: HCWC

## 2020-12-05 PROCEDURE — 84100 ASSAY OF PHOSPHORUS: CPT | Mod: HCWC

## 2020-12-05 PROCEDURE — 99499 NO LOS: ICD-10-PCS | Mod: HCWC,,, | Performed by: NURSE PRACTITIONER

## 2020-12-05 PROCEDURE — 63600175 PHARM REV CODE 636 W HCPCS: Mod: HCWC | Performed by: STUDENT IN AN ORGANIZED HEALTH CARE EDUCATION/TRAINING PROGRAM

## 2020-12-05 PROCEDURE — 36415 COLL VENOUS BLD VENIPUNCTURE: CPT | Mod: HCWC

## 2020-12-05 RX ORDER — INSULIN ASPART 100 [IU]/ML
15 INJECTION, SOLUTION INTRAVENOUS; SUBCUTANEOUS
Status: DISCONTINUED | OUTPATIENT
Start: 2020-12-05 | End: 2020-12-06

## 2020-12-05 RX ORDER — FUROSEMIDE 10 MG/ML
80 INJECTION INTRAMUSCULAR; INTRAVENOUS 2 TIMES DAILY
Status: DISCONTINUED | OUTPATIENT
Start: 2020-12-05 | End: 2020-12-07

## 2020-12-05 RX ADMIN — INSULIN DETEMIR 36 UNITS: 100 INJECTION, SOLUTION SUBCUTANEOUS at 09:12

## 2020-12-05 RX ADMIN — OXYCODONE HYDROCHLORIDE 10 MG: 10 TABLET ORAL at 01:12

## 2020-12-05 RX ADMIN — INSULIN ASPART 15 UNITS: 100 INJECTION, SOLUTION INTRAVENOUS; SUBCUTANEOUS at 01:12

## 2020-12-05 RX ADMIN — HYDROMORPHONE HYDROCHLORIDE 0.5 MG: 1 INJECTION, SOLUTION INTRAMUSCULAR; INTRAVENOUS; SUBCUTANEOUS at 09:12

## 2020-12-05 RX ADMIN — CEFTRIAXONE 2 G: 2 INJECTION, SOLUTION INTRAVENOUS at 09:12

## 2020-12-05 RX ADMIN — GABAPENTIN 300 MG: 300 CAPSULE ORAL at 09:12

## 2020-12-05 RX ADMIN — ROSUVASTATIN CALCIUM 10 MG: 10 TABLET, FILM COATED ORAL at 09:12

## 2020-12-05 RX ADMIN — HEPARIN SODIUM 5000 UNITS: 5000 INJECTION INTRAVENOUS; SUBCUTANEOUS at 09:12

## 2020-12-05 RX ADMIN — METRONIDAZOLE 500 MG: 500 TABLET ORAL at 05:12

## 2020-12-05 RX ADMIN — POLYETHYLENE GLYCOL 3350 17 G: 17 POWDER, FOR SOLUTION ORAL at 09:12

## 2020-12-05 RX ADMIN — SODIUM BICARBONATE 650 MG TABLET 1300 MG: at 03:12

## 2020-12-05 RX ADMIN — HEPARIN SODIUM 5000 UNITS: 5000 INJECTION INTRAVENOUS; SUBCUTANEOUS at 05:12

## 2020-12-05 RX ADMIN — HEPARIN SODIUM 5000 UNITS: 5000 INJECTION INTRAVENOUS; SUBCUTANEOUS at 01:12

## 2020-12-05 RX ADMIN — SODIUM BICARBONATE 650 MG TABLET 1300 MG: at 09:12

## 2020-12-05 RX ADMIN — FUROSEMIDE 80 MG: 10 INJECTION, SOLUTION INTRAMUSCULAR; INTRAVENOUS at 09:12

## 2020-12-05 RX ADMIN — FUROSEMIDE 80 MG: 10 INJECTION, SOLUTION INTRAMUSCULAR; INTRAVENOUS at 11:12

## 2020-12-05 RX ADMIN — HYDROMORPHONE HYDROCHLORIDE 0.5 MG: 1 INJECTION, SOLUTION INTRAMUSCULAR; INTRAVENOUS; SUBCUTANEOUS at 11:12

## 2020-12-05 RX ADMIN — INSULIN ASPART 15 UNITS: 100 INJECTION, SOLUTION INTRAVENOUS; SUBCUTANEOUS at 06:12

## 2020-12-05 RX ADMIN — INSULIN ASPART 10 UNITS: 100 INJECTION, SOLUTION INTRAVENOUS; SUBCUTANEOUS at 09:12

## 2020-12-05 RX ADMIN — METRONIDAZOLE 500 MG: 500 TABLET ORAL at 01:12

## 2020-12-05 RX ADMIN — DOCUSATE SODIUM AND SENNOSIDES 1 TABLET: 8.6; 5 TABLET, FILM COATED ORAL at 09:12

## 2020-12-05 RX ADMIN — METHIMAZOLE 10 MG: 10 TABLET ORAL at 09:12

## 2020-12-05 RX ADMIN — METRONIDAZOLE 500 MG: 500 TABLET ORAL at 09:12

## 2020-12-05 NOTE — PLAN OF CARE
Problem: Fall Injury Risk  Goal: Absence of Fall and Fall-Related Injury  Outcome: Ongoing, Progressing  Intervention: Promote Injury-Free Environment  Flowsheets (Taken 12/5/2020 0502)  Safety Promotion/Fall Prevention:   assistive device/personal item within reach   bed alarm set   side rails raised x 3   instructed to call staff for mobility   lighting adjusted   medications reviewed   Fall Risk signage in place  Environmental Safety Modification:   assistive device/personal items within reach   clutter free environment maintained   lighting adjusted     Problem: Adult Inpatient Plan of Care  Goal: Optimal Comfort and Wellbeing  Outcome: Ongoing, Progressing  Intervention: Provide Person-Centered Care  Flowsheets (Taken 12/5/2020 0502)  Trust Relationship/Rapport:   care explained   choices provided   thoughts/feelings acknowledged   reassurance provided     Problem: Wound  Goal: Optimal Wound Healing  Outcome: Ongoing, Progressing  Intervention: Promote Effective Wound Healing  Flowsheets (Taken 12/5/2020 0502)  Oral Nutrition Promotion: rest periods promoted  Pain Management Interventions:   care clustered   position adjusted   premedicated for activity   quiet environment facilitated  Pt AAOx4, VSS, sats >90% on RA,[facility]  oxy10 given x1 for abd pain, LLQ ROSAURA drain site clean, dry, and intact, BiPAP @ hs, bed locked and lowered, bed alarm on, call light and personal belongings within reach, pt resting comfortably and is in no distress at this time

## 2020-12-05 NOTE — PLAN OF CARE
CMICU DAILY GOALS       A: Awake    RASS: Goal -    Actual - RASS (Duarte Agitation-Sedation Scale): -1-->drowsy   Restraint necessity:    B: Breath   SBT: Not intubated   C: Coordinate A & B, analgesics/sedatives   Pain: managed    SAT: Not intubated  D: Delirium   CAM-ICU: Overall CAM-ICU: Negative  E: Early(intubated/ Progressive (non-intubated) Mobility   MOVE Screen:  pt/ot ordered    Activity: Activity Management: rolling - L1  FAS: Feeding/Nutrition   Diet order: Diet/Nutrition Received: consistent carb/diabetic diet, Specialty Diet/Nutrition Received: (fluid rest. 500ml) Fluid restriction:    T: Thrombus   DVT prophylaxis: VTE Required Core Measure: Pharmacological prophylaxis initiated/maintained  H: HOB Elevation   Head of Bed (HOB): HOB at 60 degrees, HOB at 45 degrees  U: Ulcer Prophylaxis   GI: no  G: Glucose control   managed Glycemic Management: blood glucose monitoring  S: Skin   Bundle compliance: yes   Bathing/Skin Care: bath, chlorhexidine, bath, complete, incontinence care, linen changed Date: bath this shift 12/4/20    B: Bowel Function   diarrhea   I: Indwelling Catheters   Cifuentes necessity:      Urethral Catheter 11/27/20 0145 Latex 16 Fr.-Reason for Continuing Urinary Catheterization: Critically ill in ICU and requiring hourly monitoring of intake/output   CVC necessity: Yes   IPAD offered: no  D: De-escalation Antibx   Yes     Plan for the day   Pt. Has step down orders.   Family/Goals of care/Code Status   Code Status: Full Code     No acute events throughout day, VS and assessment per flow sheet, patient progressing towards goals as tolerated, plan of care reviewed with Navin Beck and family, all concerns addressed, will continue to monitor.

## 2020-12-05 NOTE — PROGRESS NOTES
"[facility]  Endocrinology  Progress Note    Admit Date: 2020     Reason for Consult: Management of T2DM, Hyperglycemia     Diabetes diagnosis year: > 10 years ago    Home Diabetes Medications:    Lantus 50 U daily  Novolog 15 U AC  Metformin 500 mg BID    How often checking glucose at home? 1-3 x day   BG readings on regimen: 200s-300s  Hypoglycemia on the regimen?  No  Missed doses on regimen?  Yes    Diabetes Complications include:     Diabetic nephropathy        HPI:   Patient is a 60 y.o. female with a diagnosis of HTN, T2DM on MDi insulin, hyperthyroidism who presented with weakness, poor appetite and abdominal pain associated with vaginal pain and rectal pressure and non-bloody diarrhea. She was found to be in DKA and started on DKA protocol with IVF and IV insulin. She is being treated with IV antibiotics for pelvic fluids collections suspected to be infectious.     On admission labs:   2020    Sodium 128 (L)   Potassium 5.3 (H)   Anion Gap 16   CO2 16 (L)   Creatinine 1.5 (H)   Glucose 626 (HH)   Beta-Hydroxybutyrate 4.2 (H)     Lab Results   Component Value Date    HGBA1C 11.4 (H) 2020     Lab Results   Component Value Date    TSH 0.519 2020       She also has a history of hyperthyroidism on methimazole 10 mg daily. Thyroid US shows MNG. No RAIU scan on record. No TRAb or TSI. TSH at goal.     Interval HPI:   Overnight events:  BG remains above goal on current SQ insulin regimen. Endo will continue to follow and make insulin adjustments as needed.   Diet diabetic [facility]; 1500 Calorie; Fluid - 1000mL       Eatin% - 100%  Nausea: No  Hypoglycemia and intervention: No  Fever: No  TPN and/or TF: No  If yes, type of TF/TPN and rate: None    /60 (BP Location: Left arm, Patient Position: Lying)   Pulse 75   Temp 97.6 °F (36.4 °C) (Oral)   Resp 18   Ht 5' 7" (1.702 m)   Wt 115.5 kg (254 lb 10.1 oz)   SpO2 95%   BMI 39.88 kg/m²     Labs " Reviewed and Include    Recent Labs   Lab 12/04/20  1430 12/04/20  2104   *  240* 244*   CALCIUM 10.5  10.2 9.9   ALBUMIN 1.6*  --    *  129* 130*   K 4.0  4.0 4.1   CO2 25  25 26   CL 95  96 96   BUN 7*  7* 8   CREATININE 2.2*  2.3* 2.6*     Lab Results   Component Value Date    WBC 12.39 12/04/2020    HGB 7.0 (L) 12/04/2020    HCT 22.0 (L) 12/04/2020    MCV 90 12/04/2020     (H) 12/04/2020     No results for input(s): TSH, FREET4 in the last 168 hours.  Lab Results   Component Value Date    HGBA1C 11.4 (H) 11/27/2020       Nutritional status:   Body mass index is 39.88 kg/m².  Lab Results   Component Value Date    ALBUMIN 1.6 (L) 12/04/2020    ALBUMIN 1.5 (L) 12/04/2020    ALBUMIN 1.5 (L) 12/04/2020     No results found for: PREALBUMIN    Estimated Creatinine Clearance: 29.5 mL/min (A) (based on SCr of 2.6 mg/dL (H)).    Accu-Checks  Recent Labs     12/03/20  1547 12/03/20  1549 12/03/20  2008 12/04/20  0752 12/04/20  0916 12/04/20  1232 12/04/20  1749 12/04/20  2101 12/05/20  0914 12/05/20  1123   POCTGLUCOSE 356* 349* 235* 267* 286* 251* 245* 245* 229* 258*       Current Medications and/or Treatments Impacting Glycemic Control  Immunotherapy:    Immunosuppressants     None        Steroids:   Hormones (From admission, onward)    Start     Stop Route Frequency Ordered    11/27/20 0959  melatonin tablet 6 mg      -- Oral Nightly PRN 11/27/20 0903        Pressors:    Autonomic Drugs (From admission, onward)    None        Hyperglycemia/Diabetes Medications:   Antihyperglycemics (From admission, onward)    Start     Stop Route Frequency Ordered    12/04/20 2100  insulin detemir U-100 pen 30 Units      -- SubQ 2 times daily 12/04/20 1711    12/04/20 1130  insulin aspart U-100 pen 10-15 Units      -- SubQ 3 times daily with meals 12/04/20 0947    12/03/20 1658  insulin aspart U-100 pen 0-5 Units      -- SubQ Before meals & nightly PRN 12/03/20 1558    11/28/20 1245  insulin regular in 0.9 %  NaCl 100 unit/100 mL (1 unit/mL) infusion     Question:  Enter initial dose (Units/hr):  Answer:  4    11/28 2300 IV Continuous 11/28/20 4647          ASSESSMENT and PLAN    * Encounter for continuous renal replacement therapy (CRRT) for acute renal failure    Titrate insulin slowly to avoid hypoglycemia as the risk of hypoglycemia increases with decreased creatinine clearance.    Estimated Creatinine Clearance: 29.5 mL/min (A) (based on SCr of 2.6 mg/dL (H)).      Type 2 diabetes mellitus with hyperglycemia, with long-term current use of insulin  BG goal 140 - 180     - Increase Levemir to 36 units BID. 20% dosage increase. Fasting BG above goal this AM.   - Increase Novolog to 15 units TIDWM. Patient eating full meals. Equivalent to home dosing.   - Low Dose SQ Insulin Correction Scale. Given kidney function.   - BG Monitoring AC/HS     ** Please call Endocrine for any BG related issues **  ** Please notify Endocrine for any change and/or advance in diet**    Discharge Planning:   TBD. Please notify endocrinology prior to discharge.       Essential hypertension  Managed per primary team  Condition may cause insulin resistance         Hyperthyroidism  Unclear etiology given no TRAb, TSI or RAIU scan  Thyroid US shows MNG so toxic MNG high in differential  She has a TSH at goal on methimazole 10 mg daily  Continue, follow up outpatient    Plan  - Continue methimazole 10 mg daily  - Outpatient followed with Endocrinology          Tomasz Pompa, NP  Endocrinology  [facility]

## 2020-12-05 NOTE — CONSULTS
[facility]  Infectious Disease  Consult Note    Patient Name: Navin Beck  MRN: [id number]  Admission Date: 11/26/2020  Hospital Length of Stay: 8 days  Attending Physician: Michael Jeffrey MD  Primary Care Provider: Elvira Quinones MD     Isolation Status: No active isolations        Inpatient consult to Infectious Diseases  Consult performed by: SIDRA Bueno, RENETTA  Consult ordered by: Ciera Maurer MD  Reason for consult: Intra-abdominal abscess s/p drain with IR, question of abx duration        ID Consult acknowledged.   Full consult and recommendations to follow today  In the interim, please call for any immediate concerns.     Thank you.   SIDRA Castillo, ANP-C  [phone] pager,  [phone]  SIDRA Noe, ANP  Infectious Disease  [facility]

## 2020-12-05 NOTE — ASSESSMENT & PLAN NOTE
Pt presented with lower abdominal/vaginal/rectal pain with dysuria and urinary retention.   UA: 2 RBC, >100 WBC, 2+ leukocytes, bacteria  Recent UTIs with pan-sensitive E coli.    PLAN  - Ceftriaxone course complete  - UCx with E coli sens to cefepime and ceftriaxone  - Cifuentes placed for urinary retention, maintain Cifuentes per Urology

## 2020-12-05 NOTE — ASSESSMENT & PLAN NOTE
Titrate insulin slowly to avoid hypoglycemia as the risk of hypoglycemia increases with decreased creatinine clearance.    Estimated Creatinine Clearance: 29.5 mL/min (A) (based on SCr of 2.6 mg/dL (H)).

## 2020-12-05 NOTE — PROGRESS NOTES
[facility] Hospital Medicine  Progress Note    Patient Name: Navin Beck  MRN: [id number]  Patient Class: IP- Inpatient   Admission Date: 11/26/2020  Length of Stay: 8 days  Attending Physician: Michael Jeffrey MD  Primary Care Provider: Elvira Quinones MD    [facility] Medicine Team: [facility] MED 2 Ciera Maurer MD    Subjective:     Principal Problem:Encounter for continuous renal replacement therapy (CRRT) for acute renal failure        HPI:  Ms. Beck is a 61yo F with PMH of HTN, HLD, T2DM, Hyperthyroidism, and Asthma who had recent GYN surgery on 10/26 due to erosion of bladder mesh / prosthetic material with Cifuentes removal on 11/19. She [place] [facility] due to 4-5 days of not feeling well, she has had loss of appetite and weakness with 10/10 throbbing lower abdominal pain associated with vaginal pain and rectal pressure and non-bloody diarrhea. She has had trouble urinating for the past 5 days as well. She takes oxycodone 5 at home for pain, but it did not help. She endorses nausea and vomiting (4 times, non-bloody) also associated with the pain. She denies fever, chills, chest pain, SOB, trouble breathing, leg swelling, or any other symptom at this time.    ED Course: VSS, afebrile but leukocytosis at 17.85. Pt found to have DKA - Gluc 626, AG16, BHB 4.2, Fluids, insulin gtt started and Glucose down-trended to 200s. UA showed 2RBC, >100 WBC, bacteria, given CTX. CT A/P showed multiple fluid collections adjacent to bladder concerning for bladder perf with multiple urinomas, with mild bilateral hydronephrosis. Urology consulted and placed a Cifuentes with 900 UOP drained. GYN consulted due to recent surgery.     Pt will be admitted to hospital medicine for further management of DKA and possible intra-abdominal infection.     Overview/Hospital Course:  Pt was admitted on 11/28 due to 10/10 abdominal pain and also found to be in DKA. Pt was started on IVF, insulin gtt, and  "kept NPO with glucose downtrending and AG closed. CT abdomen showed multiple fluid collections near bladder. Renal cystography with "hypoattenuating collections in the lower pelvis adjacent to the urinary bladder with no definite extravasated contrast material appreciated within the pelvic collections to suggest communication with the bladder".  Urology and GYN on board, agree with plan for IR to place drain in fluid collections for culture and source control. Pt was started on CTX and Flagyl to cover for UTI and intra-abdominal infection, broadened to Cefepime, and Flagyl on 11/26; vanc added later. Endocrine consulted and managed the patient while acutely ill on insulin drip then transitioned to SQ when BG stabilized. IR place low abdominal drain to abscess on 11/28 with 250ml purulent material immediately expressed. Drained remained in place after the procedure. Urology continued to assess patient and recommended CT RSS after identifying some left CVAT and increased SCr with reduced UOP on 11/29, no hydronephrosis does not believe LAURIE is post-renal in nature. 11/30 pt remains anuric, Cr up-trending to 4.2, with hyponatremia. Nephrology consulted. 12/1 pt remains anuric after Lasix + Diuril challenge. Nephrology decided for initiation of CRRT due to low pressures with unresponsive ARF. Will transfer to ICU for CRRT. Pt transferred back to floor as no more need for CRRT on 12/5.     Interval History/Significant Events: Patient stepped down to floor yesterday 12/4. NAEON and VSS. Used CPAP overnight. This am SpO2 >95% RA. Pt lethargic upon initial exam but alert and oriented x3. She complains of continued left sided lower abdominal pain. Drain in place with minimal yellow fluid. Denies fever, chills, sob, n/v, c/d.    Review of Systems   Constitutional: Negative for chills and fever.   HENT: Negative for rhinorrhea and sore throat.    Respiratory: Negative for cough and shortness of breath.    Cardiovascular: " Negative for chest pain and palpitations.   Gastrointestinal: Negative for abdominal pain, constipation, diarrhea and nausea.   Genitourinary: Negative for dysuria and flank pain.   Neurological: Negative for light-headedness, numbness and headaches.     Objective:     Vital Signs (Most Recent):  Temp: 97.6 °F (36.4 °C) (12/05/20 0750)  Pulse: 75 (12/05/20 0750)  Resp: 18 (12/05/20 1126)  BP: 125/60 (12/05/20 0750)  SpO2: 95 % (12/05/20 0750) Vital Signs (24h Range):  Temp:  [97.6 °F (36.4 °C)-98.2 °F (36.8 °C)] 97.6 °F (36.4 °C)  Pulse:  [72-86] 75  Resp:  [12-26] 18  SpO2:  [94 %-100 %] 95 %  BP: (125-189)/(60-84) 125/60   Weight: 115.5 kg (254 lb 10.1 oz)  Body mass index is 39.88 kg/m².      Intake/Output Summary (Last 24 hours) at 12/5/2020 1434  Last data filed at 12/5/2020 1157  Gross per 24 hour   Intake 942 ml   Output 795.18 ml   Net 146.82 ml       Physical Exam  Constitutional:       General: She is not in acute distress.     Appearance: She is obese. She is not ill-appearing or toxic-appearing.   HENT:      Head: Normocephalic and atraumatic.      Nose: No congestion or rhinorrhea.      Mouth/Throat:      Pharynx: No oropharyngeal exudate or posterior oropharyngeal erythema.   Eyes:      General: No scleral icterus.        Right eye: No discharge.         Left eye: No discharge.      Extraocular Movements: Extraocular movements intact.      Pupils: Pupils are equal, round, and reactive to light.   Neck:      Musculoskeletal: Normal range of motion and neck supple. No neck rigidity or muscular tenderness.   Cardiovascular:      Rate and Rhythm: Normal rate and regular rhythm.      Pulses: Normal pulses.      Heart sounds: No murmur.   Pulmonary:      Effort: Pulmonary effort is normal. No respiratory distress.      Breath sounds: Normal breath sounds. No stridor. No wheezing.   Abdominal:      General: Abdomen is flat. Bowel sounds are normal. There is no distension.      Palpations: Abdomen is soft.  There is no mass.      Tenderness: There is abdominal tenderness (LLQ, drain in place with minimal yellow fluid, dressings CDI).      Hernia: No hernia is present.   Musculoskeletal: Normal range of motion.         General: No swelling, tenderness or deformity.   Skin:     General: Skin is warm and dry.      Coloration: Skin is not jaundiced or pale.      Findings: No bruising.   Neurological:      General: No focal deficit present.      Mental Status: She is alert. Mental status is at baseline.         Vents:  Oxygen Concentration (%): 24 (12/05/20 0121)  Lines/Drains/Airways     Central Venous Catheter Line            Trialysis (Dialysis) Catheter 12/01/20 1547 right internal jugular 3 days          Drain                 Urethral Catheter 11/27/20 0145 Latex 16 Fr. 8 days         Closed/Suction Drain 11/28/20 1358 Right Abdomen Bulb 10 Fr. 7 days          Peripheral Intravenous Line                 Peripheral IV - Single Lumen 11/30/20 1142 20 G;1 3/4 in Left;Anterior Upper Arm 5 days         Peripheral IV - Single Lumen 11/30/20 1142 20 G;1 3/4 in Right;Anterior Upper Arm 5 days              Significant Labs:    CBC/Anemia Profile:  Recent Labs   Lab 12/04/20  0620   WBC 12.39   HGB 7.0*   HCT 22.0*   *   MCV 90   RDW 14.2        Chemistries:  Recent Labs   Lab 12/03/20  2328  12/04/20  0434 12/04/20  0620  12/04/20  1243 12/04/20  1430 12/04/20  2104   *   < > 127*  127* 125*   < > 127* 129*  129* 130*   K 4.0   < > 4.0  4.0 3.9   < > 3.8 4.0  4.0 4.1   CL 95   < > 96  96 95   < > 93* 95  96 96   CO2 20*   < > 23  23 22*   < > 25 25  25 26   BUN 13   < > 10  10 9   < > 7* 7*  7* 8   CREATININE 3.4*   < > 2.9*  2.9* 2.7*   < > 2.3* 2.2*  2.3* 2.6*   CALCIUM 9.0   < > 9.2  9.2 9.3   < > 10.3 10.5  10.2 9.9   ALBUMIN 1.6*  --  1.5* 1.5*  --   --  1.6*  --    PROT  --   --   --  5.5*  --   --   --   --    BILITOT  --   --   --  0.2  --   --   --   --    ALKPHOS  --   --   --  198*  --   --    --   --    ALT  --   --   --  <5*  --   --   --   --    AST  --   --   --  11  --   --   --   --    MG 2.1  --  2.0  --   --   --  1.9  --    PHOS 4.2  --  4.1  4.1  --   --   --  3.8  --     < > = values in this interval not displayed.       BMP:   Recent Labs   Lab 12/04/20  1430 12/04/20  2104   *  240* 244*   *  129* 130*   K 4.0  4.0 4.1   CL 95  96 96   CO2 25  25 26   BUN 7*  7* 8   CREATININE 2.2*  2.3* 2.6*   CALCIUM 10.5  10.2 9.9   MG 1.9  --      CMP:   Recent Labs   Lab 12/04/20  0434 12/04/20  0620  12/04/20  1243 12/04/20  1430 12/04/20  2104   *  127* 125*   < > 127* 129*  129* 130*   K 4.0  4.0 3.9   < > 3.8 4.0  4.0 4.1   CL 96  96 95   < > 93* 95  96 96   CO2 23  23 22*   < > 25 25  25 26   *  255* 258*   < > 231* 236*  240* 244*   BUN 10  10 9   < > 7* 7*  7* 8   CREATININE 2.9*  2.9* 2.7*   < > 2.3* 2.2*  2.3* 2.6*   CALCIUM 9.2  9.2 9.3   < > 10.3 10.5  10.2 9.9   PROT  --  5.5*  --   --   --   --    ALBUMIN 1.5* 1.5*  --   --  1.6*  --    BILITOT  --  0.2  --   --   --   --    ALKPHOS  --  198*  --   --   --   --    AST  --  11  --   --   --   --    ALT  --  <5*  --   --   --   --    ANIONGAP 8  8 8   < > 9 9  8 8   EGFRNONAA 16.7*  16.7* 18.2*   < > 22.1* 23.3*  22.1* 19.1*    < > = values in this interval not displayed.       Significant Imaging:  I have reviewed all pertinent imaging results/findings within the past 24 hours.      Assessment/Plan:      * Encounter for continuous renal replacement therapy (CRRT) for acute renal failure  Cr 1.5 on presentation.   Likely multi-factorial on presentation 2/2 pre-renal volume depletion in the context of DKA, UTI, and post-renal obstruction  Repeat LAURIE on 11/29 likely intrinsic ATN in etiology    PLAN  - Initial LAURIE on presentation resolved with IVF and placement of Cifuentes with resolution of hydronephrosis on imaging   - Maintain Cifuentes per Urology   - Treating UTI with Cefepime  - LAURIE due to  ATN began on 11/29 and continues to worsen   - Remains anuric after IVF bolus, Lasix + Diuril challenge   - Resp status stable but Cr uptrending, pt lethargic and waxing/waning alertness  - Nephrology consulted, appreciate recs   - Initiation of CRRT, transfer to ICU on 12/2   - Transferred to floor on 12/4   - Pt made ~1L urine after Lasix + Diuril challenge   - May be in recovery phase of LAURIE   - Lasix 80IV BID per nephro recs   - BMP q4   - Renally dose meds   - Avoid renal toxins (ACEi/ARB, NSAIDs, contrast, etc.)   - Strict I&Os    Hyponatremia  Pt hyponatremia to 120s with spontaneous jerking motions today.     - Nephrology on board, appreciate recs   - Possibly 2/2 D5 IVF with decreased UOP while in DKA   - BMP q4   - Latest Na 130   - 1L fluid restrict    Postprocedural intraabdominal abscess  Mesh removed by GYN 10/26; march placed  March removed 11/19 --> increased pain  Admitted 11/27 with worsening abd pain found to be related to intra-abd fluid collections.   UCx 11/26 with E.coli    PLAN  - Urology consulted; lesions adjacent to bladder per CT cysto 11/27  - Gyn consulted  - IR consulted drained abscess 11/28  - Cultures: Peptostreptococcus pending margy.  - Broad spectrum abx: CTX, Flagyl D10      UTI (urinary tract infection)  Pt presented with lower abdominal/vaginal/rectal pain with dysuria and urinary retention.   UA: 2 RBC, >100 WBC, 2+ leukocytes, bacteria  Recent UTIs with pan-sensitive E coli.    PLAN  - Ceftriaxone course complete  - UCx with E coli sens to cefepime and ceftriaxone  - March placed for urinary retention, maintain March per Urology      Urinary retention  Pt has recent h/o GYN surgery in 10/2020 for bladder mesh removal, March was removed on 11/19. For the past few days pt has had increased trouble urinating associated with pain.     PLAN  CT A/P 11/27: Multiple fluid collections adjacent to bladder. Mild bilateral hydronephrosis.   Retention likely 2/2 anatomic obstruction,  pelvic fluid collection, diabetic bladder  - Urology consulted in ED, appreciate recs   - Cifuentes placed 800cc drained, bladder irrigated and all fluid removed with ease   - Maintain Cifuentes   - Rec drainage of pelvic fluid collection by IR with cultures & creatinine   - CT RSS ordered by uro 11/29 for left CVAT, incr SCr, decr UOP   - No hydronephrosis seen on CT, most likely not post-renal in etiology  - GYN consulted, appreciate consult   - Agrees with Urology plan for drainage with IR  - IR consulted, appreciate recs   - IR placed drain 11/28 into pelvic abscess, draining minimal yellow fluid    Type 2 diabetes mellitus with hyperglycemia, with long-term current use of insulin  Last Hb A1c 10.3 on 9/25/20  Home regimen: Metformin 500 BID, Aspart 15U TID, Glargine 60 BID  Pt says her blood sugar has been running to the 300s for the past few days     PLAN  - Hold oral anti-glycemics while hospitalized  - Endocrine consulted, recs appreciated  - Accu-checks qhour  - See DKA notes      Asthma  Pt states she uses Albuterol inhaler TID every day.     PLAN  - Duonebs q6 PRN    Diabetic ketoacidosis without coma associated with type 2 diabetes mellitus  [place] [facility] [place] Gluc 626, HCO3 16, AG 16, BHB 4.2, ketonuria. 2L IVF given in ED with insulin gtt started. Glucose trended downward to 274. Pt was then admitted to hospital medicine for further management of DKA.    PLAN  - Endocrine consulted, appreciate recs   - NPO, SQ trial 11/28 not successful   - CLD; IVF boluses PRN   - Transitional Insulin gtt stopped 12/1   - Gluc regimen increased 2/2 gluc 230-240s    Detemir 36U BID    Aspart 15U TID WM   - Goal gluc 140-180   - Diabetic diet  - BMP q6  - Will replace K PRN  - Nausea: zofran PRN     Hyperthyroidism  Continue home Methimazole    PLAN  - TSH WNL  - Endocrine aware    Severe obesity (BMI 35.0-35.9 with comorbidity)   on exercise, diet, and weight management     Abnormal computed tomography of  bladder  Pt presented to [facility] [place] due to 5-6 days of 10/10 lower abdomen, vaginal, and rectal pain. Pt has recent h/o GYN surgery in 10/2020 for bladder mesh removal, Cifuentes was removed on 11/19.  CT A/P 11/27: Multiple fluid collections adjacent to bladder. Mild bilateral hydronephrosis.   UCx 11/26: E.coli sensitive to CTX, cefepime    PLAN  - GYN consulted, appreciate consult; Agrees with Urology plan for drainage with IR  - IR consulted, appreciate recs; Drain in place; cultures sent 11/28    - Gram stain, many GPC   - Cultures grew Peptostrptococcus on 12/5 pending margy.  - Pain management: PCA pump initially   - PCA pump d/c on 11/30   - PRN opiates  - ID: coverage for intra-abdominal infection    - CTX + Flagyl D10   - ID consulted   - Urology recs appreciated; left CVAT, ordered CT RSS 11/29 hydronephrosis resolved   - Maintain Cifuentes      Mixed hyperlipidemia  Continue home statin      Essential hypertension  Hold home Benazepril due to LAURIE, low BP    PLAN  - Will monitor BP and add anti-hypertensive as necessary       VTE Risk Mitigation (From admission, onward)         Ordered     heparin (porcine) injection 5,000 Units  Every 8 hours      12/01/20 1426     Place sequential compression device  Until discontinued      12/01/20 1409     IP VTE HIGH RISK PATIENT  Once      12/01/20 1409                Discharge Planning   RAFA: 12/4/2020     Code Status: Full Code   Is the patient medically ready for discharge?: No    Reason for patient still in hospital (select all that apply): Patient trending condition, Laboratory test, Treatment and Consult recommendations  Discharge Plan A: Rehab   Discharge Delays: None known at this time      Ciera Maurer MD  Department of Hospital Medicine   [facility]

## 2020-12-05 NOTE — SUBJECTIVE & OBJECTIVE
"Interval HPI:   Overnight events:  BG remains above goal on current SQ insulin regimen. Endo will continue to follow and make insulin adjustments as needed.   Diet diabetic [facility]; 1500 Calorie; Fluid - 1000mL       Eatin% - 100%  Nausea: No  Hypoglycemia and intervention: No  Fever: No  TPN and/or TF: No  If yes, type of TF/TPN and rate: None    /60 (BP Location: Left arm, Patient Position: Lying)   Pulse 75   Temp 97.6 °F (36.4 °C) (Oral)   Resp 18   Ht 5' 7" (1.702 m)   Wt 115.5 kg (254 lb 10.1 oz)   SpO2 95%   BMI 39.88 kg/m²     Labs Reviewed and Include    Recent Labs   Lab 20  1430 20  2104   *  240* 244*   CALCIUM 10.5  10.2 9.9   ALBUMIN 1.6*  --    *  129* 130*   K 4.0  4.0 4.1   CO2 25  25 26   CL 95  96 96   BUN 7*  7* 8   CREATININE 2.2*  2.3* 2.6*     Lab Results   Component Value Date    WBC 12.39 2020    HGB 7.0 (L) 2020    HCT 22.0 (L) 2020    MCV 90 2020     (H) 2020     No results for input(s): TSH, FREET4 in the last 168 hours.  Lab Results   Component Value Date    HGBA1C 11.4 (H) 2020       Nutritional status:   Body mass index is 39.88 kg/m².  Lab Results   Component Value Date    ALBUMIN 1.6 (L) 2020    ALBUMIN 1.5 (L) 2020    ALBUMIN 1.5 (L) 2020     No results found for: PREALBUMIN    Estimated Creatinine Clearance: 29.5 mL/min (A) (based on SCr of 2.6 mg/dL (H)).    Accu-Checks  Recent Labs     20  1547 20  1549 20  0752 20  0916 20  1232 20  1749 20  2101 20  0914 20  1123   POCTGLUCOSE 356* 349* 235* 267* 286* 251* 245* 245* 229* 258*       Current Medications and/or Treatments Impacting Glycemic Control  Immunotherapy:    Immunosuppressants     None        Steroids:   Hormones (From admission, onward)    Start     Stop Route Frequency Ordered    20 0959  melatonin tablet 6 mg      -- Oral " Nightly PRN 11/27/20 0903        Pressors:    Autonomic Drugs (From admission, onward)    None        Hyperglycemia/Diabetes Medications:   Antihyperglycemics (From admission, onward)    Start     Stop Route Frequency Ordered    12/04/20 2100  insulin detemir U-100 pen 30 Units      -- SubQ 2 times daily 12/04/20 1711    12/04/20 1130  insulin aspart U-100 pen 10-15 Units      -- SubQ 3 times daily with meals 12/04/20 0947    12/03/20 1658  insulin aspart U-100 pen 0-5 Units      -- SubQ Before meals & nightly PRN 12/03/20 1558    11/28/20 1245  insulin regular in 0.9 % NaCl 100 unit/100 mL (1 unit/mL) infusion     Question:  Enter initial dose (Units/hr):  Answer:  4    11/28 2300 IV Continuous 11/28/20 1136

## 2020-12-05 NOTE — SUBJECTIVE & OBJECTIVE
Past Medical History:   Diagnosis Date    Allergy     Arrhythmia     Arthritis     Bronchial asthma     Diabetes mellitus     Glaucoma     Hormone disorder     hysterectomy    Hypercholesteremia     Hypertension     Hyperthyroidism     Insomnia     Kidney stones     Thyroid disease     Urine, incontinence, stress female     UTI (urinary tract infection) 11/27/2020       Past Surgical History:   Procedure Laterality Date    APPENDECTOMY      BACK SURGERY      disc removal     CARPAL TUNNEL RELEASE Bilateral     CHOLECYSTECTOMY      CYSTOSCOPY N/A 10/26/2020    Procedure: CYSTOSCOPY;  Surgeon: Wilner Goel MD;  Location: [facility] OR;  Service: OB/GYN;  Laterality: N/A;    FRACTURE SURGERY Right     wrist    [facility]  2010    [facility]    JOINT REPLACEMENT      KNEE ARTHROSCOPY W/ DEBRIDEMENT      KNEE SURGERY Right     Knee replacement    LITHOTRIPSY      NC REMOVAL OF OVARY/TUBE(S)  9/9/13    benign    removal of round ligament mass      ROBOT-ASSISTED LAPAROSCOPIC LYSIS OF ADHESIONS USING DA JAMES XI N/A 10/26/2020    Procedure: XI ROBOTIC LYSIS, ADHESIONS;  Surgeon: Wilner Goel MD;  Location: [facility] OR;  Service: OB/GYN;  Laterality: N/A;  ROBOTIC MOBILIZATION OF BLADDER- DR BURNHAM    SPINE SURGERY      WRIST FRACTURE SURGERY Left        Review of patient's allergies indicates:   Allergen Reactions    Penicillins Shortness Of Breath, Itching and Swelling     Tolerates cefepime 11/30/2020       Medications:  Medications Prior to Admission   Medication Sig    HYDROcodone-acetaminophen (NORCO)  mg per tablet Take 1 tablet by mouth every 8 (eight) hours as needed for Pain.    acetaminophen (TYLENOL) 500 MG tablet Take 1,000 mg by mouth 3 (three) times daily as needed (fever and chills).    albuterol (PROVENTIL/VENTOLIN HFA) 90 mcg/actuation inhaler     benazepril (LOTENSIN) 10 MG tablet Take 10 mg by mouth once daily.     blood-glucose meter (TRUE  METRIX GLUCOSE METER MISC) True Metrix Glucose Meter    DULoxetine (CYMBALTA) 60 MG capsule duloxetine 60 mg capsule,delayed release    estradioL (ESTRACE) 0.01 % (0.1 mg/gram) vaginal cream Place 1 g vaginally 3 (three) times a week. Alternate days with Metrogel for 14 days    fluticasone propionate (FLOVENT HFA) 44 mcg/actuation inhaler Flovent HFA 44 mcg/actuation aerosol inhaler    gabapentin (NEURONTIN) 600 MG tablet Take 1 tablet (600 mg total) by mouth 3 (three) times daily.    ibuprofen (ADVIL,MOTRIN) 600 MG tablet Take 1 tablet (600 mg total) by mouth 3 (three) times daily.    insulin aspart (NOVOLOG) 100 unit/mL injection Inject 15 Units into the skin 3 (three) times daily with meals. Three times a day with meals according to sliding scale.    insulin glargine (LANTUS U-100 INSULIN) 100 unit/mL injection Inject 50 Units into the skin 2 (two) times daily.    METFORMIN HCL (METFORMIN ORAL) Take 500 mg by mouth 2 (two) times daily.    methimazole (TAPAZOLE) 10 MG Tab Take 10 mg by mouth once daily.     pantoprazole (PROTONIX) 40 MG tablet Take 1 tablet (40 mg total) by mouth once daily. Start after the twice daily Protonix is complete.Follow-up with doctor for further refills.    rosuvastatin (CRESTOR) 20 MG tablet rosuvastatin 20 mg tablet    tamsulosin (FLOMAX) 0.4 mg Cp24 Take 0.4 mg by mouth once daily.     tiZANidine (ZANAFLEX) 4 MG tablet tizanidine 4 mg tablet    TRUE METRIX GLUCOSE TEST STRIP Strp     zolpidem (AMBIEN) 10 mg Tab Take 10 mg by mouth every evening.    [DISCONTINUED] flurazepam (DALMANE) 30 MG capsule nightly as needed.     [DISCONTINUED] oxyCODONE-acetaminophen (PERCOCET) 7.5-325 mg per tablet Take 1 tablet by mouth every 8 (eight) hours as needed for Pain.     Antibiotics (From admission, onward)    Start     Stop Route Frequency Ordered    12/01/20 0900  cefTRIAXone (ROCEPHIN) 2 g/50 mL D5W IVPB      -- IV Every 24 hours (non-standard times) 11/30/20 0922    11/28/20  1400  metroNIDAZOLE tablet 500 mg      -- Oral Every 8 hours 11/28/20 1053    11/27/20 1000  mupirocin 2 % ointment      12/02 0859 Nasl 2 times daily 11/27/20 0849        Antifungals (From admission, onward)    None        Antivirals (From admission, onward)    None           Immunization History   Administered Date(s) Administered    Pneumococcal Polysaccharide - 23 Valent 07/26/2018       Family History     Problem Relation (Age of Onset)    Cancer Mother, Brother, Brother, Brother, Brother    Colon cancer Brother        Social History     Socioeconomic History    Marital status:      Spouse name: Not on file    Number of children: Not on file    Years of education: Not on file    Highest education level: Not on file   Occupational History    Not on file   Social Needs    Financial resource strain: Not on file    Food insecurity     Worry: Not on file     Inability: Not on file    Transportation needs     Medical: Not on file     Non-medical: Not on file   Tobacco Use    Smoking status: Current Every Day Smoker     Packs/day: 0.50     Years: 30.00     Pack years: 15.00    Smokeless tobacco: Never Used   Substance and Sexual Activity    Alcohol use: No    Drug use: No    Sexual activity: Not Currently     Partners: Male     Birth control/protection: Surgical   Lifestyle    Physical activity     Days per week: Not on file     Minutes per session: Not on file    Stress: Not on file   Relationships    Social connections     Talks on phone: Not on file     Gets together: Not on file     Attends Voodoo service: Not on file     Active member of club or organization: Not on file     Attends meetings of clubs or organizations: Not on file     Relationship status: Not on file   Other Topics Concern    Not on file   Social History Narrative    Not on file     Review of Systems   Constitutional: Positive for activity change, appetite change and fatigue. Negative for chills, diaphoresis and fever.    HENT: Negative.    Respiratory: Negative for cough, shortness of breath and wheezing.    Cardiovascular: Negative for chest pain, palpitations and leg swelling.   Gastrointestinal: Positive for abdominal pain. Negative for constipation, diarrhea and nausea.   Genitourinary:        Cifuentes  Decreased urine output   Musculoskeletal: Negative for arthralgias, back pain and neck pain.   Skin: Positive for wound (prior surgical wound). Negative for rash.   Neurological: Negative for dizziness, weakness and headaches.   Psychiatric/Behavioral: Negative for agitation. The patient is not nervous/anxious.      Objective:     Vital Signs (Most Recent):  Temp: 97.6 °F (36.4 °C) (12/05/20 0750)  Pulse: 75 (12/05/20 0750)  Resp: 18 (12/05/20 1126)  BP: 125/60 (12/05/20 0750)  SpO2: 95 % (12/05/20 0750) Vital Signs (24h Range):  Temp:  [97.6 °F (36.4 °C)-98.2 °F (36.8 °C)] 97.6 °F (36.4 °C)  Pulse:  [72-86] 75  Resp:  [12-26] 18  SpO2:  [94 %-100 %] 95 %  BP: (125-189)/(60-84) 125/60     Weight: 115.5 kg (254 lb 10.1 oz)  Body mass index is 39.88 kg/m².    Estimated Creatinine Clearance: 20.2 mL/min (A) (based on SCr of 3.8 mg/dL (H)).    Physical Exam  Vitals signs and nursing note reviewed.   Constitutional:       General: She is not in acute distress.     Appearance: She is obese. She is ill-appearing. She is not toxic-appearing or diaphoretic.   HENT:      Head: Normocephalic and atraumatic.   Eyes:      General: No scleral icterus.     Conjunctiva/sclera: Conjunctivae normal.   Neck:      Comments: RIJ trialysis catheter - c/d/i  Cardiovascular:      Rate and Rhythm: Normal rate and regular rhythm.      Heart sounds: Murmur present.   Pulmonary:      Effort: No respiratory distress.      Breath sounds: No wheezing or rales.      Comments: Room air  Abdominal:      Palpations: Abdomen is soft.      Tenderness: There is abdominal tenderness. There is no guarding.      Comments: LLQ abdominal drain.  Small amount purulent fluid    Genitourinary:     Comments: Cifuentes in place.  Clear yellow urine  Musculoskeletal:      Right lower leg: No edema.      Left lower leg: No edema.   Skin:     General: Skin is warm and dry.   Neurological:      Mental Status: She is alert and oriented to person, place, and time.   Psychiatric:         Mood and Affect: Mood normal.         Behavior: Behavior normal.         Significant Labs:   Blood Culture:   Recent Labs   Lab 11/27/20 1915 11/27/20 1919   LABBLOO No growth after 5 days. No growth after 5 days.     CBC:   Recent Labs   Lab 12/04/20  0620 12/05/20  1537   WBC 12.39 13.25*   HGB 7.0* 7.2*   HCT 22.0* 22.3*   * 440*     CMP:   Recent Labs   Lab 12/04/20  0620 12/04/20  1430 12/04/20  2104 12/05/20  1537   *   < > 129*  129* 130* 133*  133*  131*   K 3.9   < > 4.0  4.0 4.1 4.2  4.2  4.1   CL 95   < > 95  96 96 95  95  95   CO2 22*   < > 25  25 26 29  29  29   *   < > 236*  240* 244* 291*  291*  288*   BUN 9   < > 7*  7* 8 14  14  14   CREATININE 2.7*   < > 2.2*  2.3* 2.6* 3.8*  3.8*  3.8*   CALCIUM 9.3   < > 10.5  10.2 9.9 8.4*  8.4*  8.5*   PROT 5.5*  --   --   --  5.9*   ALBUMIN 1.5*  --  1.6*  --  1.7*  1.7*  1.7*   BILITOT 0.2  --   --   --  <0.1*   ALKPHOS 198*  --   --   --  180*   AST 11  --   --   --  13   ALT <5*  --   --   --  <5*   ANIONGAP 8   < > 9  8 8 9  9  7*   EGFRNONAA 18.2*   < > 23.3*  22.1* 19.1* 12.0*  12.0*  12.0*    < > = values in this interval not displayed.     Urine Culture:   Recent Labs   Lab 08/14/20  1239 10/14/20  1019 10/22/20  1012 11/09/20  1040 11/26/20  2206   LABURIN ESCHERICHIA COLI  >100,000 cfu/ml  No other significant isolate  * ESCHERICHIA COLI  10,000 - 49,999 cfu/ml  * No growth ESCHERICHIA COLI  >100,000 cfu/ml  * ESCHERICHIA COLI  10,000 - 49,999 cfu/ml  No other significant isolate  *     Urine Studies:   Recent Labs   Lab 08/14/20  1242 11/26/20 2206   COLORU Yellow Straw   APPEARANCEUA  --   Cloudy*   PHUR 5 5.0   SPECGRAV 1.020 1.020   PROTEINUA  --  Negative   GLUCUA  --  3+*   KETONESU +SMALL 1+*   BILIRUBINUA  --  Negative   OCCULTUA  --  2+*   NITRITE NEG Negative   UROBILINOGEN NORMAL  --    LEUKOCYTESUR  --  3+*   RBCUA  --  2   WBCUA  --  >100*   BACTERIA  --  Occasional   SQUAMEPITHEL  --  3     Wound Culture:   Recent Labs   Lab 07/17/20  1736 11/28/20  1405   LABAERO ESCHERICHIA COLI  Moderate  Genital calixto also present  * No growth       Significant Imaging: I have reviewed all pertinent imaging results/findings within the past 24 hours.

## 2020-12-05 NOTE — ASSESSMENT & PLAN NOTE
Cr 1.5 on presentation.   Likely multi-factorial on presentation 2/2 pre-renal volume depletion in the context of DKA, UTI, and post-renal obstruction  Repeat LAURIE on 11/29 likely intrinsic ATN in etiology    PLAN  - Initial LAURIE on presentation resolved with IVF and placement of Cifuentes with resolution of hydronephrosis on imaging   - Maintain Cifuentes per Urology   - Treating UTI with Cefepime  - LAURIE due to ATN began on 11/29 and continues to worsen   - Remains anuric after IVF bolus, Lasix + Diuril challenge   - Resp status stable but Cr uptrending, pt lethargic and waxing/waning alertness  - Nephrology consulted, appreciate recs   - Initiation of CRRT, transfer to ICU on 12/2   - Transferred to floor on 12/4   - Pt made ~1L urine after Lasix + Diuril challenge   - May be in recovery phase of LAURIE   - Lasix 80IV BID per nephro recs   - BMP q4   - Renally dose meds   - Avoid renal toxins (ACEi/ARB, NSAIDs, contrast, etc.)   - Strict I&Os

## 2020-12-05 NOTE — ASSESSMENT & PLAN NOTE
Pt presented to [facility] [place], [place] ED with Gluc 626, HCO3 16, AG 16, BHB 4.2, ketonuria. 2L IVF given in ED with insulin gtt started. Glucose trended downward to 274. Pt was then admitted to hospital medicine for further management of DKA.    PLAN  - Endocrine consulted, appreciate recs   - NPO, SQ trial 11/28 not successful   - CLD; IVF boluses PRN   - Transitional Insulin gtt stopped 12/1   - Gluc regimen increased 2/2 gluc 230-240s    Detemir 36U BID    Aspart 15U TID WM   - Goal gluc 140-180   - Diabetic diet  - BMP q6  - Will replace K PRN  - Nausea: zofran PRN

## 2020-12-05 NOTE — ASSESSMENT & PLAN NOTE
Mesh removed by GYN 10/26; march placed  March removed 11/19 --> increased pain  Admitted 11/27 with worsening abd pain found to be related to intra-abd fluid collections.   UCx 11/26 with E.coli    PLAN  - Urology consulted; lesions adjacent to bladder per CT cysto 11/27  - Gyn consulted  - IR consulted drained abscess 11/28  - Cultures: Peptostreptococcus pending margy.  - Broad spectrum abx: CTX, Flagyl D10

## 2020-12-05 NOTE — ASSESSMENT & PLAN NOTE
60 year old female with history of HTN, HLD, poorly controled DM with history of recent excision of vaginal mesh on 10/26.  Per UroGyn notes, there was significant adhesive disease and urology assisted with bladder dissection.  She presented to ED 11/26  with lower abdominal pain and urinary retention, leukocytosis, and in DKA.  A CT A/P showed a multilobular fluid collection in the lower pelvis adjacent to the urinary bladder and bilateral hydronephrosis. Some concern for bladder involvement.  A cystogram on 11/27 was negative for communication of pelvic collection with the bladder.  She had drain placed 11/28, with 250 cc of pus drained.  Subsequently developed anuria requiring CRRT and transferred to ICU.        A CTRSS on 11/29 showed resolution of multilobulated fluid collection but with small residual 3.4 cm fluid collection remaining and resolved hydronephrosis. Now stepped down to Hospital Medicine.   Currently on IV ceftriaxone and flagyl. Drain remains in place.  ID is consulted for antibiotic duration recommendations.      Abscess cultures + for Peptostreptococcus.  Gram stain with moderate GPC, few GNR, and rare GPR.  She had a urine culture + for E Coli.  Blood cultures negative.     Afebrile, leukocytosis resolved. Drain remains in place with small amount purulent fluid.  Complains of lower abdominal pain.          Plan/recommendations:  1.  Recommend 14 days of IV antibiotics from 11/29 (last imaging) - until 12/13, with repeat CT abdominal imaging prior to discontinuing antibiotics to ensure resolution of all fluid collections.  2.  Would continue IV ceftriaxone and flagyl while she remains inpatient.  If she is to be discharged prior to 12/13, may transition at d/c to oral Augmentin (renally dosed.  Check with PharmD for correct dosing at that time)  3.  Will defer timing of drain removal to IR and/or Uro/GYN  4.  Please call or reconsult ID if any further GYN/urologic/other procedures  planned/occur that result in new culture data or that might potentially change duration recommendations.   5.  ID clinic follow up on or about 12/13 after repeat abdominal imaging.  ID will make the appointment.   6.  Will sign off.  Please call with questions or re-consult as needed.     Data reviewed and plan discussed with ID staff  Discussed with Primary Team

## 2020-12-05 NOTE — PROGRESS NOTES
[facility]  Obstetrics & Gynecology  Progress Note    Patient Name: Navin Beck  MRN: [id number]  Admission Date: 11/26/2020  Primary Care Provider: Elvira Quinones MD  Principal Problem: Encounter for continuous renal replacement therapy (CRRT) for acute renal failure    Subjective:     HPI: Navin Beck is a 61 yo F w/ HTN, DM2, and POP who presented to [facility] ED with abdominal pain and urinary retention. She is s/p robotic excision of vaginal mesh with Dr. Goel in October 2020. There was significant adhesive disease per the op report and urology assisted with bladder dissection. She was discharged home on POD #1 with march in place. March was removed on 11/9/2020. Since then, she has had continued dysuria and vaginal pain/pressure. She admits to holding her urine to avoid the dysuria. No fever, chills, nausea, vomiting. She also has had poorly controlled blood sugars throughout this time.     March placed per nursing in the ED with 800 cc urine output. WBC count is 17. Cr 1.5, up from 1.0. UA with RBCs, >100 WBC, and bacteria with 3 squams. Urine cultures pending, s/p rocephin in the ED. CT A/P with multilobular fluid collection in the lower pelvis adjacent to the urinary bladder which likely causes in compression of the distal ureters bilaterally and resulting in mild bilateral hydronephrosis.      Of note, BG elevated in ED to > 500. Admitted to hospital medicine for management of DKA. Urology and gynecology consulted.    Interval History: Patient moved to 8th floor step down.  Feeling tired this AM but conversant and A/O x 3.   Still reports intermittent, L-sided pain (positioned on R side as is more comfortable) stable and not increased. Abd drain with decreased output. Continues IV ABX, CRRT, and glucose control.  No major ambulation yet. +tolerating some PO without N/V.  No SOB/CP.  No other major complaints.     Scheduled Meds:   cefTRIAXone (ROCEPHIN) IVPB  2 g  Intravenous Q24H    gabapentin  300 mg Oral Daily    heparin (porcine)  5,000 Units Subcutaneous Q8H    insulin aspart U-100  10-15 Units Subcutaneous TIDWM    insulin detemir U-100  30 Units Subcutaneous BID    methIMAzole  10 mg Oral Daily    metroNIDAZOLE  500 mg Oral Q8H    polyethylene glycol  17 g Oral Daily    rosuvastatin  10 mg Oral Daily    senna-docusate 8.6-50 mg  1 tablet Oral BID    sodium bicarbonate  1,300 mg Oral TID     Continuous Infusions:   calcium gluconate IVPB 30 g (12/03/20 1650)    dextrose-sod citrate-citric ac       PRN Meds:sodium chloride, acetaminophen, albuterol-ipratropium, dextrose 50%, dextrose 50%, glucagon (human recombinant), glucose, glucose, HYDROmorphone, insulin aspart U-100, melatonin, ondansetron, oxyCODONE, oxyCODONE, sodium chloride 0.9%    Review of patient's allergies indicates:   Allergen Reactions    Penicillins Shortness Of Breath, Itching and Swelling     Tolerates cefepime 11/30/2020       Objective:     Vital Signs (Most Recent):  Temp: 97.8 °F (36.6 °C) (12/05/20 0330)  Pulse: 78 (12/05/20 0704)  Resp: 16 (12/05/20 0426)  BP: 128/60 (12/05/20 0330)  SpO2: 100 % (12/05/20 0426) Vital Signs (24h Range):  Temp:  [97.6 °F (36.4 °C)-98.2 °F (36.8 °C)] 97.8 °F (36.6 °C)  Pulse:  [70-86] 78  Resp:  [12-26] 16  SpO2:  [94 %-100 %] 100 %  BP: (127-189)/(59-84) 128/60     Weight: 115.5 kg (254 lb 10.1 oz)  Body mass index is 39.88 kg/m².  No LMP recorded. Patient has had a hysterectomy.    I&O (Last 24H):    Intake/Output Summary (Last 24 hours) at 12/5/2020 0829  Last data filed at 12/5/2020 0500  Gross per 24 hour   Intake 3159.5 ml   Output 395.25 ml   Net 2764.25 ml   Drain output (24h): 10 mL recorded\    BG POCT: 245-286    Physical Exam:   Constitutional: She is oriented to person, place, and time. She appears well-developed and well-nourished.    HENT:   Head: Normocephalic and atraumatic.    Eyes: EOM are normal.    Neck: Normal range of motion.     Cardiovascular: Normal rate.     Pulmonary/Chest: Effort normal.         Abdominal: Soft. She exhibits no distension and no mass. There is abdominal tenderness (minimal tenderness with deep palpation over LLQ ) in the left upper quadrant. +voluntary guarding on palpation but no involuntary guarding. There is no rebound.    ROSAURA drain minimal yellowish fluid, mostly clear.   Genitourinary:    Genitourinary Comments: Cifuentes catheter in place with approximately 50 cc of yellow urine in the catheter bag              Musculoskeletal: Normal range of motion.       Neurological: She is alert and oriented to person, place, and time.    Skin: Skin is warm and dry.    Psychiatric: She has a normal mood and affect. Her behavior is normal. Judgment and thought content normal.     Laboratory:  AM CMP, Mg, RFP, Ca, Phos, CBC pending    12/3 drain Cr: pending (was sent as send out lab); results won't be back until 12/7 or 12/8 per     Micro:  11/26 urine C&S: e coli 10-50K  11/27 blood C&S: NEG  11/28 IR drain: mod WBC, mod gm +cocci, few gm neg rods, rare gm+ rods  11/28 IR anaerobic C&S: PEPTOSTREPTOCOCCUS ANAEROBIUS   11/28 aerobic C&S: NEG  11/28 fungal C&S: pending      Diagnostic Results:  12/4 CXR:  Right IJ CVC tip overlies the distal SVC.  Slight improved aeration of both lung bases noting some residual hazy attenuation obscuring portions of the diaphragm and costophrenic angles suggesting residual trace pleural effusions.  No large consolidation or pneumothorax.  Cardiomediastinal silhouette is midline and stable.  Pulmonary vasculature and hilar contours are within normal limits.  No acute osseous process seen.  Otherwise no change.    CT Urogram 11/26: Reviewed  Patient history of recent implanted pelvic mass revision with lysis of adhesions. Redemonstration of lobular hypoattenuating collections in the lower pelvis adjacent to the urinary bladder.  No definite extravasated contrast material appreciated within  the pelvic collections to suggest communication with the bladder.  Findings remain non-specific and may relate to urinomas or other nonspecific postoperative collection including seroma, hematoma, or abscess. Cystic pelvic mass not excluded.  Clinical correlation with any previous preoperative imaging advised.     Bilateral vesicoureteral reflux.     CT guided percutaneous  Pelvic abscess drainage catheter placement 11/28, reviewed.     Assessment/Plan:     Active Diagnoses:    Diagnosis Date Noted POA    PRINCIPAL PROBLEM:  Encounter for continuous renal replacement therapy (CRRT) for acute renal failure [N17.9] 11/27/2020 Not Applicable    Abnormal white blood cell (WBC) [D72.9]  Yes    Impaired mobility and endurance [Z74.09] 12/01/2020 No    Hyponatremia [E87.1] 11/30/2020 No    Postprocedural intraabdominal abscess [T81.43XA] 11/29/2020 Yes    Urinary retention [R33.9] 11/27/2020 Yes    UTI (urinary tract infection) [N39.0] 11/27/2020 Unknown    Asthma [J45.909] 07/17/2020 Yes    Type 2 diabetes mellitus with hyperglycemia, with long-term current use of insulin [E11.65, Z79.4] 07/17/2020 Not Applicable    Diabetic ketoacidosis without coma associated with type 2 diabetes mellitus [E11.10] 08/07/2017 Yes    Hyperthyroidism [E05.90] 08/07/2017 Unknown    Severe obesity (BMI 35.0-35.9 with comorbidity) [E66.01, Z68.35] 09/09/2013 Not Applicable    Essential hypertension [I10] 07/06/2012 Yes    Abnormal computed tomography of bladder [R93.41] 05/28/2012 Yes    Mixed hyperlipidemia [E78.2] 04/14/2012 Yes      Problems Resolved During this Admission:     * Acute kidney injury (LAURIE) with acute tubular necrosis (ATN)  - Cr 2.6 (decreased from max 5.0 on 12/2)   - Stepped down from ICU to step down unit  - continues CRRT  - UOP 1661 cc/last 24 hours (661 mL from catheter)  - Management per Nephrology/Critical Care (primary team)  - AM labs pending     Postprocedural intraabdominal abscess  - S/p IR pelvic  drain placement 11/28  - 250cc of pus immediately removed   - 10 cc drain output over last 24 hours   - 12/3 drain Cr was sent to send out lab and delaye   - spoke with , reordered drain Cr for in house eval today, relayed request for collection to nurse; tech states should be back today  - Symptomatic improvement as mentioned above   - Continue drain management per primary team      Urinary retention  - Cifuentes in place draining urine, output improving  - UA with findings as noted in HPI. UCx positive for E. Coli <50,000cfu/ml. S/p vanc x 1 and rocephin x 1 in ED.    -- continues daily rocephin  - CT A/P and CT urogram done as noted above.  - Cr: See LAURIE problem     Abnormal computed tomography of bladder  - History of gyn surgery 10/2020 for bladder mesh removal, CT abdomen pelvis showing fluid collections, IR drain placed 11/28 showing GPCs, anaerobic C&S: PEPTOSTREPTOCOCCUS ANAEROBIUS   - On Cefepime + metronidazole  - Urology following, appreciate recs  - ROSAURA drain Cr re-sent today     Diabetic ketoacidosis without coma associated with type 2 diabetes mellitus  - Continue management per primary team  - BG stable on insulin regimen  - endocrine following    Pulmonary  Asthma  - Stable on duonebs Q6h PRN  - Cont RT as per primary team  - CPAP  - smoking cessation counseling ordered     Cardiac/Vascular  Mixed hyperlipidemia  - Continue home rosuvastatin     Essential hypertension  - Home benzapril: Holding - normotensive in hosptial    Hyperthyroidism  - History of Grave's disease 2017  - TSH this admission nl  - Home methimazole: continue 10 mg daily inpatient  - Endocrine following    Pain/neuro:  - gabapentin 300 mg PO daily  - oxycodone IR PO 5, 10 mg QQ4h PRN  - dilaudid 0.5 mg IV  Q6 PRN     Severe obesity (BMI 35.0-35.9 with comorbidity)  - Will assess issue when stepped down    PPX:  - GI/constipation: polyethlene gycol + senna-docusate  - VTE: heparin 5000 u SQ TID, SCDs  - deconditioning: spoke  with primary team about consideration of PT/OT now that in step down--eval ordered 12/1      Urogynecology team to continue to follow and remain available to assist with any questions/concerns of primary or consulting services.  Appreciate assistance with patient care.     Miguel Angel Kenny MD   C: [phone]  Obstetrics & Gynecology  [facility]

## 2020-12-05 NOTE — SUBJECTIVE & OBJECTIVE
Interval History/Significant Events: Patient stepped down to floor yesterday. No acute events overnight.    Review of Systems   Constitutional: Negative for chills and fever.   HENT: Negative for rhinorrhea and sore throat.    Respiratory: Negative for cough and shortness of breath.    Cardiovascular: Negative for chest pain and palpitations.   Gastrointestinal: Negative for abdominal pain, constipation, diarrhea and nausea.   Genitourinary: Negative for dysuria and flank pain.   Neurological: Negative for light-headedness, numbness and headaches.     Objective:     Vital Signs (Most Recent):  Temp: 97.8 °F (36.6 °C) (12/05/20 0330)  Pulse: 86 (12/05/20 0426)  Resp: 16 (12/05/20 0426)  BP: 128/60 (12/05/20 0330)  SpO2: 100 % (12/05/20 0426) Vital Signs (24h Range):  Temp:  [97.6 °F (36.4 °C)-98.2 °F (36.8 °C)] 97.8 °F (36.6 °C)  Pulse:  [70-86] 86  Resp:  [12-26] 16  SpO2:  [94 %-100 %] 100 %  BP: (127-189)/(59-84) 128/60   Weight: 115.5 kg (254 lb 10.1 oz)  Body mass index is 39.88 kg/m².      Intake/Output Summary (Last 24 hours) at 12/5/2020 0502  Last data filed at 12/5/2020 0343  Gross per 24 hour   Intake 4449.5 ml   Output -212.85 ml   Net 4662.35 ml       Physical Exam  Constitutional:       General: She is not in acute distress.     Appearance: She is obese. She is not ill-appearing or toxic-appearing.   HENT:      Head: Normocephalic and atraumatic.      Nose: No congestion or rhinorrhea.      Mouth/Throat:      Pharynx: No oropharyngeal exudate or posterior oropharyngeal erythema.   Eyes:      General: No scleral icterus.        Right eye: No discharge.         Left eye: No discharge.      Extraocular Movements: Extraocular movements intact.      Pupils: Pupils are equal, round, and reactive to light.   Neck:      Musculoskeletal: Normal range of motion and neck supple. No neck rigidity or muscular tenderness.   Cardiovascular:      Rate and Rhythm: Normal rate and regular rhythm.      Pulses: Normal  pulses.      Heart sounds: No murmur.   Pulmonary:      Effort: Pulmonary effort is normal. No respiratory distress.      Breath sounds: Normal breath sounds. No stridor. No wheezing.   Abdominal:      General: Abdomen is flat. Bowel sounds are normal. There is no distension.      Palpations: Abdomen is soft. There is no mass.      Tenderness: There is abdominal tenderness (drain in place, dressings clean dry and intact).      Hernia: No hernia is present.      Comments: Drain with purulent fluid   Musculoskeletal: Normal range of motion.         General: No swelling, tenderness or deformity.   Skin:     General: Skin is warm and dry.      Coloration: Skin is not jaundiced or pale.      Findings: No bruising.   Neurological:      General: No focal deficit present.      Mental Status: She is alert. Mental status is at baseline.         Vents:  Oxygen Concentration (%): 24 (12/05/20 0121)  Lines/Drains/Airways     Central Venous Catheter Line            Trialysis (Dialysis) Catheter 12/01/20 1547 right internal jugular 3 days          Drain                 Urethral Catheter 11/27/20 0145 Latex 16 Fr. 8 days         Closed/Suction Drain 11/28/20 1358 Right Abdomen Bulb 10 Fr. 6 days          Peripheral Intravenous Line                 Peripheral IV - Single Lumen 11/30/20 1142 20 G;1 3/4 in Left;Anterior Upper Arm 4 days         Peripheral IV - Single Lumen 11/30/20 1142 20 G;1 3/4 in Right;Anterior Upper Arm 4 days              Significant Labs:    CBC/Anemia Profile:  Recent Labs   Lab 12/04/20  0620   WBC 12.39   HGB 7.0*   HCT 22.0*   *   MCV 90   RDW 14.2        Chemistries:  Recent Labs   Lab 12/03/20  2328  12/04/20  0434 12/04/20  0620  12/04/20  1243 12/04/20  1430 12/04/20  2104   *   < > 127*  127* 125*   < > 127* 129*  129* 130*   K 4.0   < > 4.0  4.0 3.9   < > 3.8 4.0  4.0 4.1   CL 95   < > 96  96 95   < > 93* 95  96 96   CO2 20*   < > 23  23 22*   < > 25 25  25 26   BUN 13   < > 10   10 9   < > 7* 7*  7* 8   CREATININE 3.4*   < > 2.9*  2.9* 2.7*   < > 2.3* 2.2*  2.3* 2.6*   CALCIUM 9.0   < > 9.2  9.2 9.3   < > 10.3 10.5  10.2 9.9   ALBUMIN 1.6*  --  1.5* 1.5*  --   --  1.6*  --    PROT  --   --   --  5.5*  --   --   --   --    BILITOT  --   --   --  0.2  --   --   --   --    ALKPHOS  --   --   --  198*  --   --   --   --    ALT  --   --   --  <5*  --   --   --   --    AST  --   --   --  11  --   --   --   --    MG 2.1  --  2.0  --   --   --  1.9  --    PHOS 4.2  --  4.1  4.1  --   --   --  3.8  --     < > = values in this interval not displayed.       BMP:   Recent Labs   Lab 12/04/20  1430 12/04/20  2104   *  240* 244*   *  129* 130*   K 4.0  4.0 4.1   CL 95  96 96   CO2 25  25 26   BUN 7*  7* 8   CREATININE 2.2*  2.3* 2.6*   CALCIUM 10.5  10.2 9.9   MG 1.9  --      CMP:   Recent Labs   Lab 12/04/20  0434 12/04/20  0620  12/04/20  1243 12/04/20  1430 12/04/20  2104   *  127* 125*   < > 127* 129*  129* 130*   K 4.0  4.0 3.9   < > 3.8 4.0  4.0 4.1   CL 96  96 95   < > 93* 95  96 96   CO2 23  23 22*   < > 25 25  25 26   *  255* 258*   < > 231* 236*  240* 244*   BUN 10  10 9   < > 7* 7*  7* 8   CREATININE 2.9*  2.9* 2.7*   < > 2.3* 2.2*  2.3* 2.6*   CALCIUM 9.2  9.2 9.3   < > 10.3 10.5  10.2 9.9   PROT  --  5.5*  --   --   --   --    ALBUMIN 1.5* 1.5*  --   --  1.6*  --    BILITOT  --  0.2  --   --   --   --    ALKPHOS  --  198*  --   --   --   --    AST  --  11  --   --   --   --    ALT  --  <5*  --   --   --   --    ANIONGAP 8  8 8   < > 9 9  8 8   EGFRNONAA 16.7*  16.7* 18.2*   < > 22.1* 23.3*  22.1* 19.1*    < > = values in this interval not displayed.       Significant Imaging:  I have reviewed all pertinent imaging results/findings within the past 24 hours.

## 2020-12-05 NOTE — PROGRESS NOTES
Nephrology Follow Up Note  Assessment/Plan:    ATN  Intra-abdominal abscess  Hyponatremia  Hypervolemia    Patient is non oliguric  Off CRRT  Sodium is 130, hypervolemic  recommend 1 liter fluid restriction  May give lasix 80 mg IV BID   No indication for dialysis, probably in recovery phase      Subjective:     She feels tired, low appetite, no n/v, no sob at rest.     Vitals:    12/05/20 0750   BP: 125/60   Pulse: 75   Resp: 18   Temp: 97.6 °F (36.4 °C)       Physical Exam:  General: Alert, not in acute distress   HEENT: pale   Heart: RRR  Abdomen: Soft, non distended  Extremities: dependent mild edema        Recent Labs   Lab 12/04/20  2104   CALCIUM 9.9   *   K 4.1   CO2 26   CL 96   BUN 8   CREATININE 2.6*           Medications:     cefTRIAXone (ROCEPHIN) IVPB  2 g Intravenous Q24H    gabapentin  300 mg Oral Daily    heparin (porcine)  5,000 Units Subcutaneous Q8H    insulin aspart U-100  10-15 Units Subcutaneous TIDWM    insulin detemir U-100  30 Units Subcutaneous BID    methIMAzole  10 mg Oral Daily    metroNIDAZOLE  500 mg Oral Q8H    polyethylene glycol  17 g Oral Daily    rosuvastatin  10 mg Oral Daily    senna-docusate 8.6-50 mg  1 tablet Oral BID    sodium bicarbonate  1,300 mg Oral TID             Vera Kemp MD  Nephrology

## 2020-12-05 NOTE — ASSESSMENT & PLAN NOTE
Pt hyponatremia to 120s with spontaneous jerking motions today.     - Nephrology on board, appreciate recs   - Possibly 2/2 D5 IVF with decreased UOP while in DKA   - BMP q4   - Latest Na 130   - 1L fluid restrict

## 2020-12-05 NOTE — ASSESSMENT & PLAN NOTE
Pt has recent h/o GYN surgery in 10/2020 for bladder mesh removal, Cifuentes was removed on 11/19. For the past few days pt has had increased trouble urinating associated with pain.     PLAN  CT A/P 11/27: Multiple fluid collections adjacent to bladder. Mild bilateral hydronephrosis.   Retention likely 2/2 anatomic obstruction, pelvic fluid collection, diabetic bladder  - Urology consulted in ED, appreciate recs   - Cifuentes placed 800cc drained, bladder irrigated and all fluid removed with ease   - Maintain Cifuentes   - Rec drainage of pelvic fluid collection by IR with cultures & creatinine   - CT RSS ordered by uro 11/29 for left CVAT, incr SCr, decr UOP   - No hydronephrosis seen on CT, most likely not post-renal in etiology  - GYN consulted, appreciate consult   - Agrees with Urology plan for drainage with IR  - IR consulted, appreciate recs   - IR placed drain 11/28 into pelvic abscess, draining minimal yellow fluid

## 2020-12-05 NOTE — ASSESSMENT & PLAN NOTE
Pt presented to [facility] due to 5-6 days of 10/10 lower abdomen, vaginal, and rectal pain. Pt has recent h/o GYN surgery in 10/2020 for bladder mesh removal, Cifuentes was removed on 11/19.  CT A/P 11/27: Multiple fluid collections adjacent to bladder. Mild bilateral hydronephrosis.   UCx 11/26: E.coli sensitive to CTX, cefepime    PLAN  - GYN consulted, appreciate consult; Agrees with Urology plan for drainage with IR  - IR consulted, appreciate recs; Drain in place; cultures sent 11/28    - Gram stain, many GPC   - Cultures grew Peptostrptococcus on 12/5 pending margy.  - Pain management: PCA pump initially   - PCA pump d/c on 11/30   - PRN opiates  - ID: coverage for intra-abdominal infection    - CTX + Flagyl D10   - ID consulted   - Urology recs appreciated; left CVAT, ordered CT RSS 11/29 hydronephrosis resolved   - Maintain Cifuentes

## 2020-12-05 NOTE — ASSESSMENT & PLAN NOTE
BG goal 140 - 180     - Increase Levemir to 36 units BID. 20% dosage increase. Fasting BG above goal this AM.   - Increase Novolog to 15 units TIDWM. Patient eating full meals. Equivalent to home dosing.   - Low Dose SQ Insulin Correction Scale. Given kidney function.   - BG Monitoring AC/HS     ** Please call Endocrine for any BG related issues **  ** Please notify Endocrine for any change and/or advance in diet**    Discharge Planning:   TBD. Please notify endocrinology prior to discharge.

## 2020-12-05 NOTE — SUBJECTIVE & OBJECTIVE
Interval History/Significant Events: Patient stepped down to floor yesterday 12/4. NAEON and VSS. Used CPAP overnight. This am SpO2 >95% RA. Pt lethargic upon initial exam but alert and oriented x3. She complains of continued left sided lower abdominal pain. Drain in place with minimal yellow fluid. Denies fever, chills, sob, n/v, c/d.    Review of Systems   Constitutional: Negative for chills and fever.   HENT: Negative for rhinorrhea and sore throat.    Respiratory: Negative for cough and shortness of breath.    Cardiovascular: Negative for chest pain and palpitations.   Gastrointestinal: Negative for abdominal pain, constipation, diarrhea and nausea.   Genitourinary: Negative for dysuria and flank pain.   Neurological: Negative for light-headedness, numbness and headaches.     Objective:     Vital Signs (Most Recent):  Temp: 97.6 °F (36.4 °C) (12/05/20 0750)  Pulse: 75 (12/05/20 0750)  Resp: 18 (12/05/20 1126)  BP: 125/60 (12/05/20 0750)  SpO2: 95 % (12/05/20 0750) Vital Signs (24h Range):  Temp:  [97.6 °F (36.4 °C)-98.2 °F (36.8 °C)] 97.6 °F (36.4 °C)  Pulse:  [72-86] 75  Resp:  [12-26] 18  SpO2:  [94 %-100 %] 95 %  BP: (125-189)/(60-84) 125/60   Weight: 115.5 kg (254 lb 10.1 oz)  Body mass index is 39.88 kg/m².      Intake/Output Summary (Last 24 hours) at 12/5/2020 1434  Last data filed at 12/5/2020 1157  Gross per 24 hour   Intake 942 ml   Output 795.18 ml   Net 146.82 ml       Physical Exam  Constitutional:       General: She is not in acute distress.     Appearance: She is obese. She is not ill-appearing or toxic-appearing.   HENT:      Head: Normocephalic and atraumatic.      Nose: No congestion or rhinorrhea.      Mouth/Throat:      Pharynx: No oropharyngeal exudate or posterior oropharyngeal erythema.   Eyes:      General: No scleral icterus.        Right eye: No discharge.         Left eye: No discharge.      Extraocular Movements: Extraocular movements intact.      Pupils: Pupils are equal, round, and  reactive to light.   Neck:      Musculoskeletal: Normal range of motion and neck supple. No neck rigidity or muscular tenderness.   Cardiovascular:      Rate and Rhythm: Normal rate and regular rhythm.      Pulses: Normal pulses.      Heart sounds: No murmur.   Pulmonary:      Effort: Pulmonary effort is normal. No respiratory distress.      Breath sounds: Normal breath sounds. No stridor. No wheezing.   Abdominal:      General: Abdomen is flat. Bowel sounds are normal. There is no distension.      Palpations: Abdomen is soft. There is no mass.      Tenderness: There is abdominal tenderness (LLQ, drain in place with minimal yellow fluid, dressings CDI).      Hernia: No hernia is present.   Musculoskeletal: Normal range of motion.         General: No swelling, tenderness or deformity.   Skin:     General: Skin is warm and dry.      Coloration: Skin is not jaundiced or pale.      Findings: No bruising.   Neurological:      General: No focal deficit present.      Mental Status: She is alert. Mental status is at baseline.         Vents:  Oxygen Concentration (%): 24 (12/05/20 0121)  Lines/Drains/Airways     Central Venous Catheter Line            Trialysis (Dialysis) Catheter 12/01/20 1547 right internal jugular 3 days          Drain                 Urethral Catheter 11/27/20 0145 Latex 16 Fr. 8 days         Closed/Suction Drain 11/28/20 1358 Right Abdomen Bulb 10 Fr. 7 days          Peripheral Intravenous Line                 Peripheral IV - Single Lumen 11/30/20 1142 20 G;1 3/4 in Left;Anterior Upper Arm 5 days         Peripheral IV - Single Lumen 11/30/20 1142 20 G;1 3/4 in Right;Anterior Upper Arm 5 days              Significant Labs:    CBC/Anemia Profile:  Recent Labs   Lab 12/04/20  0620   WBC 12.39   HGB 7.0*   HCT 22.0*   *   MCV 90   RDW 14.2        Chemistries:  Recent Labs   Lab 12/03/20  2328  12/04/20  0434 12/04/20  0620  12/04/20  1243 12/04/20  1430 12/04/20  2104   *   < > 127*  127* 125*    < > 127* 129*  129* 130*   K 4.0   < > 4.0  4.0 3.9   < > 3.8 4.0  4.0 4.1   CL 95   < > 96  96 95   < > 93* 95  96 96   CO2 20*   < > 23  23 22*   < > 25 25  25 26   BUN 13   < > 10  10 9   < > 7* 7*  7* 8   CREATININE 3.4*   < > 2.9*  2.9* 2.7*   < > 2.3* 2.2*  2.3* 2.6*   CALCIUM 9.0   < > 9.2  9.2 9.3   < > 10.3 10.5  10.2 9.9   ALBUMIN 1.6*  --  1.5* 1.5*  --   --  1.6*  --    PROT  --   --   --  5.5*  --   --   --   --    BILITOT  --   --   --  0.2  --   --   --   --    ALKPHOS  --   --   --  198*  --   --   --   --    ALT  --   --   --  <5*  --   --   --   --    AST  --   --   --  11  --   --   --   --    MG 2.1  --  2.0  --   --   --  1.9  --    PHOS 4.2  --  4.1  4.1  --   --   --  3.8  --     < > = values in this interval not displayed.       BMP:   Recent Labs   Lab 12/04/20  1430 12/04/20  2104   *  240* 244*   *  129* 130*   K 4.0  4.0 4.1   CL 95  96 96   CO2 25  25 26   BUN 7*  7* 8   CREATININE 2.2*  2.3* 2.6*   CALCIUM 10.5  10.2 9.9   MG 1.9  --      CMP:   Recent Labs   Lab 12/04/20  0434 12/04/20  0620  12/04/20  1243 12/04/20  1430 12/04/20  2104   *  127* 125*   < > 127* 129*  129* 130*   K 4.0  4.0 3.9   < > 3.8 4.0  4.0 4.1   CL 96  96 95   < > 93* 95  96 96   CO2 23  23 22*   < > 25 25  25 26   *  255* 258*   < > 231* 236*  240* 244*   BUN 10  10 9   < > 7* 7*  7* 8   CREATININE 2.9*  2.9* 2.7*   < > 2.3* 2.2*  2.3* 2.6*   CALCIUM 9.2  9.2 9.3   < > 10.3 10.5  10.2 9.9   PROT  --  5.5*  --   --   --   --    ALBUMIN 1.5* 1.5*  --   --  1.6*  --    BILITOT  --  0.2  --   --   --   --    ALKPHOS  --  198*  --   --   --   --    AST  --  11  --   --   --   --    ALT  --  <5*  --   --   --   --    ANIONGAP 8  8 8   < > 9 9  8 8   EGFRNONAA 16.7*  16.7* 18.2*   < > 22.1* 23.3*  22.1* 19.1*    < > = values in this interval not displayed.       Significant Imaging:  I have reviewed all pertinent imaging results/findings within the  past 24 hours.

## 2020-12-05 NOTE — HPI
Ms. Navin Beck is a 60 year old female with history of HTN, HLD, noncompliance with T2DM with recent excision of vaginal mesh on 10/26.  Per UroGyn, there was significant adhesive disease and urology assisted with bladder dissection. She was discharged home on POD #1 with march in place, which was removed on 11/9.   She presented to ED 11/26  with lower abdominal pain and urinary retention, leukocytosis, and in DKA.   CT A/P showed a multilobular fluid collection in the lower pelvis adjacent to the urinary bladder and bilateral hydronephrosis. Some concern for bladder involvement.  A cystogram on 11/27 was negative for communication of pelvic collection with the bladder.  She had drain placed 11/28, with 250 cc of pus drained.  Subsequently developed anuria requiring CRRT.  A CTRSS on 11/29 -showed resolution of multilobulated fluid collection but with small residual 3.4 cm fluid collection remaining and resolved hydronephrosis.  Initially in ICU and now stepped down to Hospital Medicine.   Currently on IV ceftriaxone and flagyl.  ID is consulted for antibiotic duration recommendations.     Abscess cultures showing Peptostreptococcus.  Gram stain with moderate GPC, few GNR, and rare GPR.  Blood cultures negative.

## 2020-12-06 ENCOUNTER — ANESTHESIA EVENT (OUTPATIENT)
Dept: SURGERY | Facility: HOSPITAL | Age: 60
DRG: 637 | End: 2020-12-06
Payer: MEDICARE

## 2020-12-06 PROBLEM — N18.30 ANEMIA DUE TO STAGE 3 CHRONIC KIDNEY DISEASE: Status: ACTIVE | Noted: 2020-12-06

## 2020-12-06 PROBLEM — D63.1 ANEMIA DUE TO STAGE 3 CHRONIC KIDNEY DISEASE: Status: ACTIVE | Noted: 2020-12-06

## 2020-12-06 PROBLEM — D63.1 ANEMIA DUE TO STAGE 3 CHRONIC KIDNEY DISEASE: Status: RESOLVED | Noted: 2020-12-06 | Resolved: 2020-12-06

## 2020-12-06 PROBLEM — N18.30 ANEMIA DUE TO STAGE 3 CHRONIC KIDNEY DISEASE: Status: RESOLVED | Noted: 2020-12-06 | Resolved: 2020-12-06

## 2020-12-06 PROBLEM — D64.9 ANEMIA: Status: ACTIVE | Noted: 2020-12-06

## 2020-12-06 LAB
ALBUMIN SERPL BCP-MCNC: 1.6 G/DL (ref 3.5–5.2)
ALBUMIN SERPL BCP-MCNC: 1.7 G/DL (ref 3.5–5.2)
ALP SERPL-CCNC: 153 U/L (ref 55–135)
ALT SERPL W/O P-5'-P-CCNC: 6 U/L (ref 10–44)
ANION GAP SERPL CALC-SCNC: 10 MMOL/L (ref 8–16)
ANION GAP SERPL CALC-SCNC: 7 MMOL/L (ref 8–16)
ANION GAP SERPL CALC-SCNC: 9 MMOL/L (ref 8–16)
AST SERPL-CCNC: 22 U/L (ref 10–40)
BASOPHILS # BLD AUTO: 0.06 K/UL (ref 0–0.2)
BASOPHILS NFR BLD: 0.5 % (ref 0–1.9)
BILIRUB SERPL-MCNC: <0.1 MG/DL (ref 0.1–1)
BLD PROD TYP BPU: NORMAL
BLOOD UNIT EXPIRATION DATE: NORMAL
BLOOD UNIT TYPE CODE: 6200
BLOOD UNIT TYPE: NORMAL
BUN SERPL-MCNC: 19 MG/DL (ref 8–23)
BUN SERPL-MCNC: 19 MG/DL (ref 8–23)
BUN SERPL-MCNC: 20 MG/DL (ref 8–23)
BUN SERPL-MCNC: 20 MG/DL (ref 8–23)
BUN SERPL-MCNC: 21 MG/DL (ref 8–23)
CA-I BLDV-SCNC: 1.13 MMOL/L (ref 1.06–1.42)
CALCIUM SERPL-MCNC: 7.7 MG/DL (ref 8.7–10.5)
CALCIUM SERPL-MCNC: 7.7 MG/DL (ref 8.7–10.5)
CALCIUM SERPL-MCNC: 7.8 MG/DL (ref 8.7–10.5)
CALCIUM SERPL-MCNC: 7.8 MG/DL (ref 8.7–10.5)
CALCIUM SERPL-MCNC: 7.9 MG/DL (ref 8.7–10.5)
CHLORIDE SERPL-SCNC: 96 MMOL/L (ref 95–110)
CO2 SERPL-SCNC: 28 MMOL/L (ref 23–29)
CO2 SERPL-SCNC: 29 MMOL/L (ref 23–29)
CO2 SERPL-SCNC: 30 MMOL/L (ref 23–29)
CO2 SERPL-SCNC: 31 MMOL/L (ref 23–29)
CO2 SERPL-SCNC: 31 MMOL/L (ref 23–29)
CODING SYSTEM: NORMAL
CREAT SERPL-MCNC: 4.4 MG/DL (ref 0.5–1.4)
CREAT SERPL-MCNC: 4.5 MG/DL (ref 0.5–1.4)
CREAT SERPL-MCNC: 4.5 MG/DL (ref 0.5–1.4)
DIFFERENTIAL METHOD: ABNORMAL
DISPENSE STATUS: NORMAL
EOSINOPHIL # BLD AUTO: 0.2 K/UL (ref 0–0.5)
EOSINOPHIL NFR BLD: 1.3 % (ref 0–8)
ERYTHROCYTE [DISTWIDTH] IN BLOOD BY AUTOMATED COUNT: 14.4 % (ref 11.5–14.5)
ERYTHROCYTE [DISTWIDTH] IN BLOOD BY AUTOMATED COUNT: 14.6 % (ref 11.5–14.5)
EST. GFR  (AFRICAN AMERICAN): 11.3 ML/MIN/1.73 M^2
EST. GFR  (AFRICAN AMERICAN): 11.3 ML/MIN/1.73 M^2
EST. GFR  (AFRICAN AMERICAN): 11.6 ML/MIN/1.73 M^2
EST. GFR  (NON AFRICAN AMERICAN): 10.1 ML/MIN/1.73 M^2
EST. GFR  (NON AFRICAN AMERICAN): 9.8 ML/MIN/1.73 M^2
EST. GFR  (NON AFRICAN AMERICAN): 9.8 ML/MIN/1.73 M^2
GLUCOSE SERPL-MCNC: 175 MG/DL (ref 70–110)
GLUCOSE SERPL-MCNC: 190 MG/DL (ref 70–110)
GLUCOSE SERPL-MCNC: 190 MG/DL (ref 70–110)
GLUCOSE SERPL-MCNC: 191 MG/DL (ref 70–110)
GLUCOSE SERPL-MCNC: 263 MG/DL (ref 70–110)
HCT VFR BLD AUTO: 21.2 % (ref 37–48.5)
HCT VFR BLD AUTO: 26.1 % (ref 37–48.5)
HGB BLD-MCNC: 6.6 G/DL (ref 12–16)
HGB BLD-MCNC: 8.3 G/DL (ref 12–16)
IMM GRANULOCYTES # BLD AUTO: 0.2 K/UL (ref 0–0.04)
IMM GRANULOCYTES NFR BLD AUTO: 1.7 % (ref 0–0.5)
LYMPHOCYTES # BLD AUTO: 2.8 K/UL (ref 1–4.8)
LYMPHOCYTES NFR BLD: 23.8 % (ref 18–48)
MCH RBC QN AUTO: 28.4 PG (ref 27–31)
MCH RBC QN AUTO: 29.1 PG (ref 27–31)
MCHC RBC AUTO-ENTMCNC: 31.1 G/DL (ref 32–36)
MCHC RBC AUTO-ENTMCNC: 31.8 G/DL (ref 32–36)
MCV RBC AUTO: 91 FL (ref 82–98)
MCV RBC AUTO: 92 FL (ref 82–98)
MONOCYTES # BLD AUTO: 1.2 K/UL (ref 0.3–1)
MONOCYTES NFR BLD: 10 % (ref 4–15)
NEUTROPHILS # BLD AUTO: 7.4 K/UL (ref 1.8–7.7)
NEUTROPHILS NFR BLD: 62.7 % (ref 38–73)
NRBC BLD-RTO: 0 /100 WBC
PHOSPHATE SERPL-MCNC: 4.2 MG/DL (ref 2.7–4.5)
PHOSPHATE SERPL-MCNC: 4.4 MG/DL (ref 2.7–4.5)
PLATELET # BLD AUTO: 385 K/UL (ref 150–350)
PLATELET # BLD AUTO: 399 K/UL (ref 150–350)
PMV BLD AUTO: 9.6 FL (ref 9.2–12.9)
PMV BLD AUTO: 9.9 FL (ref 9.2–12.9)
POCT GLUCOSE: 202 MG/DL (ref 70–110)
POCT GLUCOSE: 227 MG/DL (ref 70–110)
POCT GLUCOSE: 231 MG/DL (ref 70–110)
POTASSIUM SERPL-SCNC: 4 MMOL/L (ref 3.5–5.1)
POTASSIUM SERPL-SCNC: 4.1 MMOL/L (ref 3.5–5.1)
POTASSIUM SERPL-SCNC: 4.3 MMOL/L (ref 3.5–5.1)
PROT SERPL-MCNC: 5.7 G/DL (ref 6–8.4)
RBC # BLD AUTO: 2.32 M/UL (ref 4–5.4)
RBC # BLD AUTO: 2.85 M/UL (ref 4–5.4)
SARS-COV-2 RDRP RESP QL NAA+PROBE: NEGATIVE
SODIUM SERPL-SCNC: 133 MMOL/L (ref 136–145)
SODIUM SERPL-SCNC: 134 MMOL/L (ref 136–145)
TRANS ERYTHROCYTES VOL PATIENT: NORMAL ML
WBC # BLD AUTO: 11.75 K/UL (ref 3.9–12.7)
WBC # BLD AUTO: 14.42 K/UL (ref 3.9–12.7)

## 2020-12-06 PROCEDURE — 25000003 PHARM REV CODE 250: Mod: HCWC | Performed by: STUDENT IN AN ORGANIZED HEALTH CARE EDUCATION/TRAINING PROGRAM

## 2020-12-06 PROCEDURE — 63600175 PHARM REV CODE 636 W HCPCS: Mod: HCWC | Performed by: STUDENT IN AN ORGANIZED HEALTH CARE EDUCATION/TRAINING PROGRAM

## 2020-12-06 PROCEDURE — 99233 SBSQ HOSP IP/OBS HIGH 50: CPT | Mod: HCWC,GC,, | Performed by: STUDENT IN AN ORGANIZED HEALTH CARE EDUCATION/TRAINING PROGRAM

## 2020-12-06 PROCEDURE — 82330 ASSAY OF CALCIUM: CPT | Mod: HCWC

## 2020-12-06 PROCEDURE — 84100 ASSAY OF PHOSPHORUS: CPT | Mod: HCWC

## 2020-12-06 PROCEDURE — 99232 PR SUBSEQUENT HOSPITAL CARE,LEVL II: ICD-10-PCS | Mod: HCWC,,, | Performed by: INTERNAL MEDICINE

## 2020-12-06 PROCEDURE — 94761 N-INVAS EAR/PLS OXIMETRY MLT: CPT | Mod: HCWC

## 2020-12-06 PROCEDURE — 63600175 PHARM REV CODE 636 W HCPCS: Mod: HCWC | Performed by: INTERNAL MEDICINE

## 2020-12-06 PROCEDURE — 99232 SBSQ HOSP IP/OBS MODERATE 35: CPT | Mod: HCWC,,, | Performed by: INTERNAL MEDICINE

## 2020-12-06 PROCEDURE — 80048 BASIC METABOLIC PNL TOTAL CA: CPT | Mod: HCWC

## 2020-12-06 PROCEDURE — 27000221 HC OXYGEN, UP TO 24 HOURS: Mod: HCWC

## 2020-12-06 PROCEDURE — U0002 COVID-19 LAB TEST NON-CDC: HCPCS | Mod: HCWC

## 2020-12-06 PROCEDURE — 36430 TRANSFUSION BLD/BLD COMPNT: CPT

## 2020-12-06 PROCEDURE — P9021 RED BLOOD CELLS UNIT: HCPCS | Mod: HCWC

## 2020-12-06 PROCEDURE — 80053 COMPREHEN METABOLIC PANEL: CPT | Mod: HCWC

## 2020-12-06 PROCEDURE — 99233 PR SUBSEQUENT HOSPITAL CARE,LEVL III: ICD-10-PCS | Mod: HCWC,GC,, | Performed by: STUDENT IN AN ORGANIZED HEALTH CARE EDUCATION/TRAINING PROGRAM

## 2020-12-06 PROCEDURE — 25000003 PHARM REV CODE 250: Mod: HCWC | Performed by: INTERNAL MEDICINE

## 2020-12-06 PROCEDURE — 80069 RENAL FUNCTION PANEL: CPT | Mod: HCWC

## 2020-12-06 PROCEDURE — 94660 CPAP INITIATION&MGMT: CPT | Mod: HCWC

## 2020-12-06 PROCEDURE — 20600001 HC STEP DOWN PRIVATE ROOM: Mod: HCWC

## 2020-12-06 PROCEDURE — 85027 COMPLETE CBC AUTOMATED: CPT | Mod: HCWC

## 2020-12-06 PROCEDURE — 99900035 HC TECH TIME PER 15 MIN (STAT): Mod: HCWC

## 2020-12-06 PROCEDURE — 85025 COMPLETE CBC W/AUTO DIFF WBC: CPT | Mod: HCWC

## 2020-12-06 RX ORDER — INSULIN ASPART 100 [IU]/ML
17 INJECTION, SOLUTION INTRAVENOUS; SUBCUTANEOUS
Status: DISCONTINUED | OUTPATIENT
Start: 2020-12-06 | End: 2020-12-09

## 2020-12-06 RX ADMIN — HYDROMORPHONE HYDROCHLORIDE 0.5 MG: 1 INJECTION, SOLUTION INTRAMUSCULAR; INTRAVENOUS; SUBCUTANEOUS at 02:12

## 2020-12-06 RX ADMIN — SODIUM BICARBONATE 650 MG TABLET 1300 MG: at 02:12

## 2020-12-06 RX ADMIN — HYDROMORPHONE HYDROCHLORIDE 0.5 MG: 1 INJECTION, SOLUTION INTRAMUSCULAR; INTRAVENOUS; SUBCUTANEOUS at 07:12

## 2020-12-06 RX ADMIN — INSULIN ASPART 17 UNITS: 100 INJECTION, SOLUTION INTRAVENOUS; SUBCUTANEOUS at 05:12

## 2020-12-06 RX ADMIN — INSULIN DETEMIR 36 UNITS: 100 INJECTION, SOLUTION SUBCUTANEOUS at 09:12

## 2020-12-06 RX ADMIN — METHIMAZOLE 10 MG: 10 TABLET ORAL at 08:12

## 2020-12-06 RX ADMIN — GABAPENTIN 300 MG: 300 CAPSULE ORAL at 08:12

## 2020-12-06 RX ADMIN — INSULIN ASPART 15 UNITS: 100 INJECTION, SOLUTION INTRAVENOUS; SUBCUTANEOUS at 08:12

## 2020-12-06 RX ADMIN — INSULIN ASPART 15 UNITS: 100 INJECTION, SOLUTION INTRAVENOUS; SUBCUTANEOUS at 11:12

## 2020-12-06 RX ADMIN — FUROSEMIDE 80 MG: 10 INJECTION, SOLUTION INTRAMUSCULAR; INTRAVENOUS at 09:12

## 2020-12-06 RX ADMIN — INSULIN DETEMIR 36 UNITS: 100 INJECTION, SOLUTION SUBCUTANEOUS at 08:12

## 2020-12-06 RX ADMIN — CEFTRIAXONE 2 G: 2 INJECTION, SOLUTION INTRAVENOUS at 08:12

## 2020-12-06 RX ADMIN — SODIUM BICARBONATE 650 MG TABLET 1300 MG: at 08:12

## 2020-12-06 RX ADMIN — METRONIDAZOLE 500 MG: 500 TABLET ORAL at 09:12

## 2020-12-06 RX ADMIN — FUROSEMIDE 80 MG: 10 INJECTION, SOLUTION INTRAMUSCULAR; INTRAVENOUS at 08:12

## 2020-12-06 RX ADMIN — METRONIDAZOLE 500 MG: 500 TABLET ORAL at 02:12

## 2020-12-06 RX ADMIN — HEPARIN SODIUM 5000 UNITS: 5000 INJECTION INTRAVENOUS; SUBCUTANEOUS at 02:12

## 2020-12-06 RX ADMIN — SODIUM BICARBONATE 650 MG TABLET 1300 MG: at 09:12

## 2020-12-06 RX ADMIN — OXYCODONE HYDROCHLORIDE 10 MG: 10 TABLET ORAL at 05:12

## 2020-12-06 RX ADMIN — ROSUVASTATIN CALCIUM 10 MG: 10 TABLET, FILM COATED ORAL at 08:12

## 2020-12-06 RX ADMIN — HYDROMORPHONE HYDROCHLORIDE 0.5 MG: 1 INJECTION, SOLUTION INTRAMUSCULAR; INTRAVENOUS; SUBCUTANEOUS at 08:12

## 2020-12-06 RX ADMIN — OXYCODONE HYDROCHLORIDE 10 MG: 10 TABLET ORAL at 11:12

## 2020-12-06 NOTE — ASSESSMENT & PLAN NOTE
Pt hyponatremia to 120s with spontaneous jerking motions 11/29.    - Nephrology on board, appreciate recs   - Possibly 2/2 D5 IVF with decreased UOP while in DKA   - BMP daily   - Latest Na 134   - 1L fluid restrict

## 2020-12-06 NOTE — CARE UPDATE
BG goal 140-180    Remains in [facility], NAEON. BG at or slightly or goal with scheduled insulin and prn correction scale.     Plan:  Continue Levemir 36 units BID.   Increase novolog 17 units with meals.   BG monitoring ac/hs and low dose correction scale.     Discharge planning:tbd     Endocrine to continue to follow    ** Please call Endocrine for any BG related issues **

## 2020-12-06 NOTE — ASSESSMENT & PLAN NOTE
- CT cystography shows no evidence of bladder leak  - CT with multiple fluid collections adjacent to bladder s/p IR drain placement  - IR placed drain with output of pus. Maintain drain. Nursing to flush drain daily. Output decreasing   - drain creatinine negative   - cultures growing peptostreptococcus anarobius  - repeat CT shows no hydronephrosis or other signs of obstruction. LAURIE appears to be prerenal or intrinsic renal in origin    - it is encouraging that the cystogram was negative, drain creatinine was normal, and UOP is increasing. However to rule out injury to the bladder or ureters, plan for cystoscopy with left, possible right, retrograde pyelogram  - NPO MN  - Covid test ordered, needs to be rapid

## 2020-12-06 NOTE — ASSESSMENT & PLAN NOTE
Cr 1.5 on presentation.   Likely multi-factorial on presentation 2/2 pre-renal volume depletion in the context of DKA, UTI, and post-renal obstruction  Repeat LAURIE on 11/29 likely intrinsic ATN in etiology    PLAN  - Initial LAURIE on presentation resolved with IVF and placement of Cifuentes with resolution of hydronephrosis on imaging   - Maintain Cifuentes per Urology   - Treating UTI with Cefepime  - LAURIE due to ATN began on 11/29 and continues to worsen   - Remains anuric after IVF bolus, Lasix + Diuril challenge   - Resp status stable but Cr uptrending, pt lethargic and waxing/waning alertness  - Nephrology consulted, appreciate recs   - Initiation of CRRT, transfer to ICU on 12/2   - Transferred to floor on 12/4   - Pt made ~1L urine after Lasix + Diuril challenge   - May be in recovery phase of LAURIE   - Continue Lasix 80IV BID per nephro recs   - BMP q4   - Renally dose meds   - Avoid renal toxins (ACEi/ARB, NSAIDs, contrast, etc.)   - Strict I&Os

## 2020-12-06 NOTE — ASSESSMENT & PLAN NOTE
Pt Hb 9.8 on admit. BL ~10.   Most likely due to anemia of chronic disease, acute blood loss, and frequent phlebotomy while hospitalized.     - Required 1U PRBC on 12/2  - Required 1U PRBC on 12/6 2/2 Hb 6.6  - Will consult with Nephro to evaluate need for Epo

## 2020-12-06 NOTE — SUBJECTIVE & OBJECTIVE
Interval History: stepped down from ICU. CRRT on 12/3 and 12/4. UOP improving but poor renal function off CRRT, creatinine 4.5 today. Drain creatinine negative for leak on 12/5.    Review of Systems  Objective:     Temp:  [98.1 °F (36.7 °C)-99 °F (37.2 °C)] 98.8 °F (37.1 °C)  Pulse:  [81-86] 86  Resp:  [16-18] 18  SpO2:  [95 %-98 %] 96 %  BP: (114-143)/(52-63) 129/58     Body mass index is 39.61 kg/m².      Bladder Scan Volume (mL): 19 mL (11/29/20 0614)    Drains     Drain                 Urethral Catheter 11/27/20 0145 Latex 16 Fr. 9 days         Closed/Suction Drain 11/28/20 1358 Right Abdomen Bulb 10 Fr. 7 days    Trialysis (Dialysis) Catheter 12/01/20 1547 right internal jugular 4 days                Physical Exam  Constitutional:       General: She is not in acute distress.     Appearance: She is well-developed.   Cardiovascular:      Rate and Rhythm: Normal rate.   Pulmonary:      Effort: Pulmonary effort is normal. No respiratory distress.   Abdominal:      General: Bowel sounds are normal. There is no distension.      Palpations: Abdomen is soft.      Tenderness: There is abdominal tenderness (near drain insertion site). There is no guarding or rebound.      Comments: IR drain in place, LLQ   Genitourinary:     Comments: march in place with clear yellow urine        Significant Labs:    BMP:  Recent Labs   Lab 12/06/20  0558 12/06/20  0601 12/06/20  0752   *  134* 134* 134*   K 4.0  4.0 4.0 4.1   CL 96  96 96 96   CO2 31*  31* 29 28   BUN 20  20 19 21   CREATININE 4.4*  4.4* 4.5* 4.5*   CALCIUM 7.8*  7.8* 7.9* 7.7*       CBC:   Recent Labs   Lab 12/04/20  0620 12/05/20  1537 12/06/20  0642   WBC 12.39 13.25* 11.75   HGB 7.0* 7.2* 6.6*   HCT 22.0* 22.3* 21.2*   * 440* 399*       All pertinent labs results from the past 24 hours have been reviewed.    Significant Imaging:  All pertinent imaging results/findings from the past 24 hours have been reviewed.

## 2020-12-06 NOTE — ASSESSMENT & PLAN NOTE
- Cause of retention likely multifactorial: anatomic vs diabetic cystopathy vs pelvic fluid collection  - Bilateral hydronephrosis on admission CT due to urinary retention as this resolved on repeat CTRSS after march placement  - Maintain march for now given renal function

## 2020-12-06 NOTE — PROGRESS NOTES
[facility]  Obstetrics & Gynecology  Progress Note    Patient Name: Navin Beck  MRN: [id number]  Admission Date: 11/26/2020  Primary Care Provider: Elvira Quinones MD  Principal Problem: Encounter for continuous renal replacement therapy (CRRT) for acute renal failure    Subjective:     HPI: Navin Beck is a 61 yo F w/ HTN, DM2, and POP who presented [place] [facility] [place] with abdominal pain and urinary retention. She is s/p robotic excision of vaginal mesh with Dr. Goel in October 2020. There was significant adhesive disease per the op report and urology assisted with bladder dissection. She was discharged home on POD #1 with march in place. March was removed on 11/9/2020. Since then, she has had continued dysuria and vaginal pain/pressure. She admits to holding her urine to avoid the dysuria. No fever, chills, nausea, vomiting. She also has had poorly controlled blood sugars throughout this time.     March placed per nursing in the ED with 800 cc urine output. WBC count is 17. Cr 1.5, up from 1.0. UA with RBCs, >100 WBC, and bacteria with 3 squams. Urine cultures pending, s/p rocephin in the ED. CT A/P with multilobular fluid collection in the lower pelvis adjacent to the urinary bladder which likely causes in compression of the distal ureters bilaterally and resulting in mild bilateral hydronephrosis.      Of note, BG elevated in ED to > 500. Admitted to hospital medicine for management of DKA. Urology and gynecology consulted.    12/5/2020: moved to step down unit.      Interval History: Patient A/O x 3, more conversant this AM.   Still reports intermittent, L-sided pain, stable and not increased. Abd drain continues to have low output. Continues IV ABX, CRRT, and glucose control.  No major ambulation yet--PT/OT ordered.  +tolerating some PO without N/V.  No SOB/CP.  No other major complaints.     Scheduled Meds:   cefTRIAXone (ROCEPHIN) IVPB  2 g Intravenous Q24H     furosemide (LASIX) IV  80 mg Intravenous BID    gabapentin  300 mg Oral Daily    heparin (porcine)  5,000 Units Subcutaneous Q8H    insulin aspart U-100  15 Units Subcutaneous TIDWM    insulin detemir U-100  36 Units Subcutaneous BID    methIMAzole  10 mg Oral Daily    metroNIDAZOLE  500 mg Oral Q8H    polyethylene glycol  17 g Oral Daily    rosuvastatin  10 mg Oral Daily    senna-docusate 8.6-50 mg  1 tablet Oral BID    sodium bicarbonate  1,300 mg Oral TID     Continuous Infusions:  PRN Meds:sodium chloride, acetaminophen, albuterol-ipratropium, dextrose 50%, dextrose 50%, glucagon (human recombinant), glucose, glucose, HYDROmorphone, insulin aspart U-100, melatonin, ondansetron, oxyCODONE, oxyCODONE, sodium chloride 0.9%    Review of patient's allergies indicates:   Allergen Reactions    Penicillins Shortness Of Breath, Itching and Swelling     Tolerates cefepime 11/30/2020       Objective:     Vital Signs (Most Recent):  Temp: 98.4 °F (36.9 °C) (12/06/20 0400)  Pulse: 85 (12/06/20 0400)  Resp: 18 (12/06/20 0807)  BP: (!) 125/52 (12/06/20 0400)  SpO2: 98 % (12/06/20 0400) Vital Signs (24h Range):  Temp:  [97.6 °F (36.4 °C)-98.4 °F (36.9 °C)] 98.4 °F (36.9 °C)  Pulse:  [84-86] 85  Resp:  [16-18] 18  SpO2:  [95 %-98 %] 98 %  BP: (122-143)/(52-63) 125/52     Weight: 114.7 kg (252 lb 14.4 oz)  Body mass index is 39.61 kg/m².  No LMP recorded. Patient has had a hysterectomy.    I&O (Last 24H):    Intake/Output Summary (Last 24 hours) at 12/6/2020 0925  Last data filed at 12/6/2020 0600  Gross per 24 hour   Intake --   Output 1600 ml   Net -1600 ml    mL  ROSAURA drain: not measured but about 10-20 mL currently in reservoir    Physical Exam:   Constitutional: She is oriented to person, place, and time. She appears well-developed and well-nourished.    HENT:   Head: Normocephalic and atraumatic.    Eyes: EOM are normal.    Neck: Normal range of motion.    Cardiovascular: Normal rate.      Pulmonary/Chest: Effort normal.         Abdominal: Soft. She exhibits no distension and no mass. There is abdominal tenderness (minimal tenderness with deep palpation over LLQ ) in the left upper quadrant. +voluntary guarding on palpation but no involuntary guarding. There is no rebound.    ROSAURA drain minimal yellowish fluid, mostly clear with some particulate.   Genitourinary:    Genitourinary Comments: Cifuentes catheter in place with approximately 200 cc of yellow urine in the catheter bag              Musculoskeletal: Normal range of motion. 2+P B LE.  Tr pitting edema BLE.  Tr edema BUE.       Neurological: She is alert and oriented to person, place, and time.    Skin: Skin is warm and dry.    Psychiatric: She has a normal mood and affect. Her behavior is normal. Judgment and thought content normal.      Laboratory:  Lab Results   Component Value Date    WBC 11.75 12/06/2020    HGB 6.6 (L) 12/06/2020    HCT 21.2 (L) 12/06/2020    MCV 91 12/06/2020     (H) 12/06/2020     CMP  Sodium   Date Value Ref Range Status   12/06/2020 134 (L) 136 - 145 mmol/L Final     Potassium   Date Value Ref Range Status   12/06/2020 4.1 3.5 - 5.1 mmol/L Final     Chloride   Date Value Ref Range Status   12/06/2020 96 95 - 110 mmol/L Final     CO2   Date Value Ref Range Status   12/06/2020 28 23 - 29 mmol/L Final     Glucose   Date Value Ref Range Status   12/06/2020 175 (H) 70 - 110 mg/dL Final     BUN   Date Value Ref Range Status   12/06/2020 21 8 - 23 mg/dL Final     Creatinine   Date Value Ref Range Status   12/06/2020 4.5 (H) 0.5 - 1.4 mg/dL Final     Calcium   Date Value Ref Range Status   12/06/2020 7.7 (L) 8.7 - 10.5 mg/dL Final     Total Protein   Date Value Ref Range Status   12/06/2020 5.7 (L) 6.0 - 8.4 g/dL Final     Albumin   Date Value Ref Range Status   12/06/2020 1.7 (L) 3.5 - 5.2 g/dL Final     Total Bilirubin   Date Value Ref Range Status   12/06/2020 <0.1 (A) 0.1 - 1.0 mg/dL Final     Comment:     For infants  and newborns, interpretation of results should be based  on gestational age, weight and in agreement with clinical  observations.  Premature Infant recommended reference ranges:  Up to 24 hours.............<8.0 mg/dL  Up to 48 hours............<12.0 mg/dL  3-5 days..................<15.0 mg/dL  6-29 days.................<15.0 mg/dL       Alkaline Phosphatase   Date Value Ref Range Status   12/06/2020 153 (H) 55 - 135 U/L Final     AST   Date Value Ref Range Status   12/06/2020 22 10 - 40 U/L Final     ALT   Date Value Ref Range Status   12/06/2020 6 (L) 10 - 44 U/L Final     Anion Gap   Date Value Ref Range Status   12/06/2020 10 8 - 16 mmol/L Final     eGFR if    Date Value Ref Range Status   12/06/2020 11.3 (A) >60 mL/min/1.73 m^2 Final     eGFR if non    Date Value Ref Range Status   12/06/2020 9.8 (A) >60 mL/min/1.73 m^2 Final     Comment:     Calculation used to obtain the estimated glomerular filtration  rate (eGFR) is the CKD-EPI equation.        Lab Results   Component Value Date    CALCIUM 7.7 (L) 12/06/2020    PHOS 4.4 12/06/2020   CA++ionized: 1.13    12/3 drain Cr: pending (was sent as send out lab); results won't be back until 12/7 or 12/8 per   12/5 drain Cr: 1.7 (ref 0.5-1.4)     Micro:  11/26 urine C&S: e coli 10-50K  11/27 blood C&S: NEG  11/28 IR drain: mod WBC, mod gm +cocci, few gm neg rods, rare gm+ rods  11/28 IR anaerobic C&S: PEPTOSTREPTOCOCCUS ANAEROBIUS   11/28 aerobic C&S: NEG  11/28 fungal C&S: pending        Diagnostic Results:  12/4 CXR:  Right IJ CVC tip overlies the distal SVC.  Slight improved aeration of both lung bases noting some residual hazy attenuation obscuring portions of the diaphragm and costophrenic angles suggesting residual trace pleural effusions.  No large consolidation or pneumothorax.  Cardiomediastinal silhouette is midline and stable.  Pulmonary vasculature and hilar contours are within normal limits.  No acute osseous  process seen.  Otherwise no change.     CT Urogram 11/26: Reviewed  Patient history of recent implanted pelvic mass revision with lysis of adhesions. Redemonstration of lobular hypoattenuating collections in the lower pelvis adjacent to the urinary bladder.  No definite extravasated contrast material appreciated within the pelvic collections to suggest communication with the bladder.  Findings remain non-specific and may relate to urinomas or other nonspecific postoperative collection including seroma, hematoma, or abscess. Cystic pelvic mass not excluded.  Clinical correlation with any previous preoperative imaging advised.     Bilateral vesicoureteral reflux.     CT guided percutaneous  Pelvic abscess drainage catheter placement 11/28, reviewed.     Assessment/Plan:     Active Diagnoses:    Diagnosis Date Noted POA    PRINCIPAL PROBLEM:  Encounter for continuous renal replacement therapy (CRRT) for acute renal failure [N17.9] 11/27/2020 Not Applicable    Abnormal white blood cell (WBC) [D72.9]  Yes    Impaired mobility and endurance [Z74.09] 12/01/2020 No    Hyponatremia [E87.1] 11/30/2020 No    Postprocedural intraabdominal abscess [T81.43XA] 11/29/2020 Yes    Urinary retention [R33.9] 11/27/2020 Yes    UTI (urinary tract infection) [N39.0] 11/27/2020 Unknown    Asthma [J45.909] 07/17/2020 Yes    Type 2 diabetes mellitus with hyperglycemia, with long-term current use of insulin [E11.65, Z79.4] 07/17/2020 Not Applicable    Diabetic ketoacidosis without coma associated with type 2 diabetes mellitus [E11.10] 08/07/2017 Yes    Hyperthyroidism [E05.90] 08/07/2017 Unknown    Severe obesity (BMI 35.0-35.9 with comorbidity) [E66.01, Z68.35] 09/09/2013 Not Applicable    Essential hypertension [I10] 07/06/2012 Yes    Abnormal computed tomography of bladder [R93.41] 05/28/2012 Yes    Mixed hyperlipidemia [E78.2] 04/14/2012 Yes      Problems Resolved During this Admission:     Acute kidney injury (LAURIE) with  acute tubular necrosis (ATN)  - Cr 2.6->3.8->4.5 (stable over last few checks and decreased from max 5.0 on 12/2)   - Stepped down from ICU to step down unit  - continues CRRT  -  cc/last 24 hours  - Management per Nephrology/Critical Care (primary team)  - QAM labs as per primary team     Postprocedural intraabdominal abscess  - S/p IR pelvic drain placement 11/28  - 250cc of pus immediately removed   - 10-20 cc drain output over last 24 hours          - 12/3 drain Cr was sent to send out lab and delaye          - 12/5 recollection drain Cr: 1.7 (elevated per normal serum Cr but not vs patient's current elevated serum Cr)--appreciate nephrology/urology input on results and need for any further eval  - Symptomatic improvement as mentioned above   - Continue drain management per primary team/IR  - s/p ID consult: recc 14 d IV ABX (ceftriaxone/flagyl) from 11/29-12/13, then repeat abd imaging prior to stopping ABX; may transition to PO augmentin on d/c (renally dose)     Urinary retention  - Cifuentes in place draining urine, output improving  - UA with findings as noted in HPI. UCx positive for E. Coli <50,000cfu/ml. S/p vanc x 1 and rocephin x 1 in ED.           -- continues daily rocephin  - CT A/P and CT urogram done as noted above.  - Cr: See LAURIE problem     Anemia   - in setting of LAURIE, suspect as primary contributor  - VSS; no s/sx postop ABLA  - 11/30 H/H: 27.5/8.3-->12/2: 6.9/22-->1 U PRBC-->12/3: 24.1/7.6-->12/6 21.2/6.6  - to receive PRBC today  - CTM/managament per primary team    Abnormal computed tomography of bladder  - History of gyn surgery 10/2020 for bladder mesh removal, CT abdomen pelvis showing fluid collections, IR drain placed 11/28 showing GPCs, anaerobic C&S: PEPTOSTREPTOCOCCUS ANAEROBIUS   - On Cefepime + metronidazole  - Urology following, appreciate recs  - 12/5 recollection ROSAURA drain Cr: 1.7 (elevated per normal serum Cr but not vs patient's current elevated serum Cr)--appreciate  nephrology/urology input on results and need for any further eval     Diabetic ketoacidosis without coma associated with type 2 diabetes mellitus  - Continue management per primary team  - BG stable on insulin regimen  - endocrine following     Pulmonary  Asthma  - Stable on duonebs Q6h PRN  - Cont RT as per primary team  - CPAP  - smoking cessation counseling ordered--discussed importance with patient today     Cardiac/Vascular  Mixed hyperlipidemia  - Continue home rosuvastatin     Essential hypertension  - Home benzapril: Holding - normotensive in hosptial     Hyperthyroidism  - History of Grave's disease 2017  - TSH this admission nl  - Home methimazole: continue 10 mg daily inpatient  - Endocrine following     Pain/neuro:  - gabapentin 300 mg PO daily  - oxycodone IR PO 5, 10 mg QQ4h PRN  - dilaudid 0.5 mg IV  Q6 PRN     Severe obesity (BMI 35.0-35.9 with comorbidity)  - Will assess issue when stepped down     PPX:  - GI/constipation: polyethlene gycol + senna-docusate  - VTE: heparin 5000 u SQ TID, SCDs  - deconditioning: PT/OT ordered      Urogynecology team to continue to follow and remain available to assist with any questions/concerns of primary or consulting services.  Appreciate assistance with patient care.      Miguel Angel Kenny MD   C: [phone]  Obstetrics & Gynecology  [facility]

## 2020-12-06 NOTE — PROGRESS NOTES
[facility]  Urology  Progress Note    Patient Name: Navin Beck  MRN: [id number]  Admission Date: 11/26/2020  Hospital Length of Stay: 9 days  Code Status: Full Code   Attending Provider: Michael Jeffrey MD   Primary Care Physician: Elvira Quinones MD    Subjective:     HPI:  61 yo female with history of HTN, DM, and POP presenting to [facility] ED with abdominal pain, DKA, and urinary retention. Urology consulted for abnormal CT finding with concern for bladder perforation. Her urologic/gynecologic history includes an anterior colporrhapy with mesh in 2010 with Dr. Keene, mesh erosion and protrusion was noted and excised with Dr. Guzman in 2013 with a concomitant BSO with excision or round ligament tumor excision with Dr. Springer in 2013. She then represented to Dr. Goel in 2020 with vaginal pain and was noted to have additional mesh erosion on vaginal exam. She was most recently taken to the OR on 10/26 for robotic excision of mesh performed by Dr. Goel with intra-operative assistance from Dr. Trevino. Per the op note, there was significant adhesions in the pelvis, but the procedure was without complication. She was discharged with a march catheter that was removed on 11/9/20. Since then, the patient has had some complaints of continued vaginal and rectal discomfort as well as dysuria. She tries to hold her urine to avoid the pain. Today, she presented to the ED able to void some on her own, but in urinary retention. March placed per nursing in the ED with 800 cc clear yellow urine without debris or blood. UAmicroscopy shows 2 RBC, >100 WBC, and bacteria with 3 squams. She has urine cultures pending and has received rocephin in the ED. Leukocytosis to 17.8. Creatinine is 1.5 from 1.0.    Interval History: stepped down from ICU. CRRT on 12/3 and 12/4. UOP improving but poor renal function off CRRT, creatinine 4.5 today. Drain creatinine negative for leak on 12/5.    Review  of Systems  Objective:     Temp:  [98.1 °F (36.7 °C)-99 °F (37.2 °C)] 98.8 °F (37.1 °C)  Pulse:  [81-86] 86  Resp:  [16-18] 18  SpO2:  [95 %-98 %] 96 %  BP: (114-143)/(52-63) 129/58     Body mass index is 39.61 kg/m².      Bladder Scan Volume (mL): 19 mL (11/29/20 0614)    Drains     Drain                 Urethral Catheter 11/27/20 0145 Latex 16 Fr. 9 days         Closed/Suction Drain 11/28/20 1358 Right Abdomen Bulb 10 Fr. 7 days    Trialysis (Dialysis) Catheter 12/01/20 1547 right internal jugular 4 days                Physical Exam  Constitutional:       General: She is not in acute distress.     Appearance: She is well-developed.   Cardiovascular:      Rate and Rhythm: Normal rate.   Pulmonary:      Effort: Pulmonary effort is normal. No respiratory distress.   Abdominal:      General: Bowel sounds are normal. There is no distension.      Palpations: Abdomen is soft.      Tenderness: There is abdominal tenderness (near drain insertion site). There is no guarding or rebound.      Comments: IR drain in place, LLQ   Genitourinary:     Comments: march in place with clear yellow urine        Significant Labs:    BMP:  Recent Labs   Lab 12/06/20  0558 12/06/20  0601 12/06/20  0752   *  134* 134* 134*   K 4.0  4.0 4.0 4.1   CL 96  96 96 96   CO2 31*  31* 29 28   BUN 20  20 19 21   CREATININE 4.4*  4.4* 4.5* 4.5*   CALCIUM 7.8*  7.8* 7.9* 7.7*       CBC:   Recent Labs   Lab 12/04/20  0620 12/05/20  1537 12/06/20  0642   WBC 12.39 13.25* 11.75   HGB 7.0* 7.2* 6.6*   HCT 22.0* 22.3* 21.2*   * 440* 399*       All pertinent labs results from the past 24 hours have been reviewed.    Significant Imaging:  All pertinent imaging results/findings from the past 24 hours have been reviewed.                  Assessment/Plan:     * Encounter for continuous renal replacement therapy (CRRT) for acute renal failure  - CRRT per nephrology  - UOP improving    Urinary retention  - Cause of retention likely  multifactorial: anatomic vs diabetic cystopathy vs pelvic fluid collection  - Bilateral hydronephrosis on admission CT due to urinary retention as this resolved on repeat CTRSS after march placement  - Maintain march for now given renal function    Abnormal abdominal CT scan  - CT cystography shows no evidence of bladder leak  - CT with multiple fluid collections adjacent to bladder s/p IR drain placement  - IR placed drain with output of pus. Maintain drain. Nursing to flush drain daily. Output decreasing   - drain creatinine negative   - cultures growing peptostreptococcus anarobius  - repeat CT shows no hydronephrosis or other signs of obstruction. LAURIE appears to be prerenal or intrinsic renal in origin    - it is encouraging that the cystogram was negative, drain creatinine was normal, and UOP is increasing. However to rule out injury to the bladder or ureters, plan for cystoscopy with left, possible right, retrograde pyelogram  - NPO MN  - Covid test ordered, needs to be rapid        VTE Risk Mitigation (From admission, onward)         Ordered     heparin (porcine) injection 5,000 Units  Every 8 hours      12/01/20 1426     Place sequential compression device  Until discontinued      12/01/20 1409     IP VTE HIGH RISK PATIENT  Once      12/01/20 1409                Angel Bautista MD  Urology  [facility]

## 2020-12-06 NOTE — PROGRESS NOTES
Nephrology Follow Up Note      Assessment/Plan:    ATN  Intra-abdominal abscess  Hyponatremia  Hypervolemia     Non oliguric but poor clearance  Not uremic  No need for dialysis today  Continue IV lasix 80 mg BID      Subjective:     She feels better, tired, not oliguric, no dizziness, stable hemodynamics.     Vitals:    12/06/20 0850   BP: 136/61   Pulse: 86   Resp: 18   Temp: 98.1 °F (36.7 °C)       Physical Exam:  General: Alert, not in acute distress   HEENT: pale   Heart: Normal S1, Normal S2  Abdomen: Soft, drains in place   Extremities: +2 edema    Recent Labs   Lab 12/06/20  0601 12/06/20  0752   CALCIUM 7.9* 7.7*   PROT 5.7*  --    * 134*   K 4.0 4.1   CO2 29 28   CL 96 96   BUN 19 21   CREATININE 4.5* 4.5*   ALKPHOS 153*  --    ALT 6*  --    AST 22  --    BILITOT <0.1*  --        Medications:     cefTRIAXone (ROCEPHIN) IVPB  2 g Intravenous Q24H    furosemide (LASIX) IV  80 mg Intravenous BID    gabapentin  300 mg Oral Daily    heparin (porcine)  5,000 Units Subcutaneous Q8H    insulin aspart U-100  15 Units Subcutaneous TIDWM    insulin detemir U-100  36 Units Subcutaneous BID    methIMAzole  10 mg Oral Daily    metroNIDAZOLE  500 mg Oral Q8H    polyethylene glycol  17 g Oral Daily    rosuvastatin  10 mg Oral Daily    senna-docusate 8.6-50 mg  1 tablet Oral BID    sodium bicarbonate  1,300 mg Oral TID       Vera Kemp MD  Nephrology

## 2020-12-06 NOTE — ASSESSMENT & PLAN NOTE
Pt presented to [facility] due to 5-6 days of 10/10 lower abdomen, vaginal, and rectal pain. Pt has recent h/o GYN surgery in 10/2020 for bladder mesh removal, Cifuentes was removed on 11/19.  CT A/P 11/27: Multiple fluid collections adjacent to bladder. Mild bilateral hydronephrosis.   UCx 11/26: E.coli sensitive to CTX, cefepime    PLAN  - GYN consulted, appreciate consult; Agrees with Urology plan for drainage with IR  - IR consulted, appreciate recs; Drain in place; cultures sent 11/28    - Gram stain, many GPC   - Cultures grew Peptostrptococcus on 12/5 pending margy.  - Pain management: PCA pump initially   - PCA pump d/c on 11/30   - PRN opiates  - ID consulted for intra-abdominal infection, appreciate recs   - Continue CTX & Flagyl x14 days, repeat imaging to assess for cleared infection prior to d/c abx   - If d/c before 14d course, may switch to augmentin  - Urology recs appreciated; left CVAT, ordered CT RSS 11/29 hydronephrosis resolved   - Cystography 11/27 showed no bladder leak   - Maintain Cifuentes   - 12/6 spoke with urology concerning ROSARUA drain output concern for urinary contents. Per Urology, since ROSAURA fluid Cr 1.7 is less than serum Cr 4.5, the fluid is not urine.

## 2020-12-06 NOTE — ASSESSMENT & PLAN NOTE
Pt presented to [facility] ED with Gluc 626, HCO3 16, AG 16, BHB 4.2, ketonuria. 2L IVF given in ED with insulin gtt started. Glucose trended downward to 274. Pt was then admitted to hospital medicine for further management of DKA.    PLAN  - Endocrine consulted, appreciate recs   - Transitional Insulin gtt stopped 12/1   - Gluc regimen increased 2/2 gluc 230-240s    Detemir 36U BID    Aspart 15U TID WM   - Goal gluc 140-180   - Diabetic diet  - BMP daily  - Will replace K PRN  - Nausea: zofran PRN

## 2020-12-06 NOTE — ASSESSMENT & PLAN NOTE
Mesh removed by GYN 10/26; march placed  March removed 11/19 --> increased pain  Admitted 11/27 with worsening abd pain found to be related to intra-abd fluid collections.   UCx 11/26 with E.coli    PLAN  - Urology consulted; lesions adjacent to bladder per CT cysto 11/27  - Gyn consulted  - IR consulted drained abscess 11/28  - Cultures: Peptostreptococcus pending margy.  - Broad spectrum abx: CTX, Flagyl D11/14

## 2020-12-06 NOTE — SUBJECTIVE & OBJECTIVE
Interval History/Significant Events: Patient stepped down to floor yesterday 12/4. NAEON and VSS. Used CPAP overnight. This am SpO2 >95% RA. Pt lethargic upon initial exam but alert and oriented x3. She complains of continued left sided lower abdominal pain. Drain in place with minimal yellow fluid. Denies fever, chills, sob, n/v, c/d.    Review of Systems   Constitutional: Positive for activity change, appetite change and fatigue. Negative for chills and fever.   HENT: Negative for hearing loss, rhinorrhea, sneezing, sore throat and trouble swallowing.    Respiratory: Negative for cough, shortness of breath and wheezing.    Cardiovascular: Negative for chest pain, palpitations and leg swelling.   Gastrointestinal: Positive for rectal pain and vomiting. Negative for abdominal pain, blood in stool, constipation, diarrhea and nausea.   Genitourinary: Positive for difficulty urinating and pelvic pain. Negative for dysuria and flank pain.   Musculoskeletal: Negative for arthralgias, neck pain and neck stiffness.   Skin: Negative for color change, pallor and rash.   Neurological: Negative for syncope, weakness, light-headedness, numbness and headaches.   Psychiatric/Behavioral: Negative for confusion and hallucinations. The patient is not nervous/anxious.      Objective:     Vital Signs (Most Recent):  Temp: 98.8 °F (37.1 °C) (12/06/20 1203)  Pulse: 86 (12/06/20 1203)  Resp: 18 (12/06/20 1203)  BP: (!) 129/58 (12/06/20 1203)  SpO2: 96 % (12/06/20 1203) Vital Signs (24h Range):  Temp:  [98.1 °F (36.7 °C)-99 °F (37.2 °C)] 98.8 °F (37.1 °C)  Pulse:  [81-86] 86  Resp:  [16-18] 18  SpO2:  [95 %-98 %] 96 %  BP: (114-143)/(52-63) 129/58   Weight: 114.7 kg (252 lb 14.4 oz)  Body mass index is 39.61 kg/m².      Intake/Output Summary (Last 24 hours) at 12/6/2020 1315  Last data filed at 12/6/2020 0600  Gross per 24 hour   Intake --   Output 1500 ml   Net -1500 ml       Physical Exam  Constitutional:       General: She is not in  acute distress.     Appearance: She is obese. She is not ill-appearing or toxic-appearing.   HENT:      Head: Normocephalic and atraumatic.      Nose: No congestion or rhinorrhea.      Mouth/Throat:      Pharynx: No oropharyngeal exudate or posterior oropharyngeal erythema.   Eyes:      General: No scleral icterus.        Right eye: No discharge.         Left eye: No discharge.      Extraocular Movements: Extraocular movements intact.      Pupils: Pupils are equal, round, and reactive to light.   Neck:      Musculoskeletal: Normal range of motion and neck supple. No neck rigidity or muscular tenderness.   Cardiovascular:      Rate and Rhythm: Normal rate and regular rhythm.      Pulses: Normal pulses.      Heart sounds: No murmur.   Pulmonary:      Effort: Pulmonary effort is normal. No respiratory distress.      Breath sounds: Normal breath sounds. No stridor. No wheezing.   Abdominal:      General: Abdomen is flat. Bowel sounds are normal. There is no distension.      Palpations: Abdomen is soft. There is no mass.      Tenderness: There is abdominal tenderness (LLQ, drain in place with minimal pustular yellow fluid, dressings CDI).      Hernia: No hernia is present.   Musculoskeletal: Normal range of motion.         General: No swelling, tenderness or deformity.   Skin:     General: Skin is warm and dry.      Coloration: Skin is not jaundiced or pale.      Findings: No bruising.   Neurological:      General: No focal deficit present.      Mental Status: She is alert. Mental status is at baseline.         Vents:  Oxygen Concentration (%): 24 (12/06/20 0400)  Lines/Drains/Airways     Central Venous Catheter Line            Trialysis (Dialysis) Catheter 12/01/20 1547 right internal jugular 4 days          Drain                 Urethral Catheter 11/27/20 0145 Latex 16 Fr. 9 days         Closed/Suction Drain 11/28/20 1358 Right Abdomen Bulb 10 Fr. 7 days              Significant Labs:    CBC/Anemia Profile:  Recent  Labs   Lab 12/05/20  1537 12/06/20  0642   WBC 13.25* 11.75   HGB 7.2* 6.6*   HCT 22.3* 21.2*   * 399*   MCV 89 91   RDW 14.1 14.4        Chemistries:  Recent Labs   Lab 12/04/20  1430  12/05/20  1537  12/06/20  0558 12/06/20  0601 12/06/20  0752   *  129*   < > 133*  133*  131*   < > 134*  134* 134* 134*   K 4.0  4.0   < > 4.2  4.2  4.1   < > 4.0  4.0 4.0 4.1   CL 95  96   < > 95  95  95   < > 96  96 96 96   CO2 25  25   < > 29  29  29   < > 31*  31* 29 28   BUN 7*  7*   < > 14  14  14   < > 20  20 19 21   CREATININE 2.2*  2.3*   < > 3.8*  3.8*  3.8*   < > 4.4*  4.4* 4.5* 4.5*   CALCIUM 10.5  10.2   < > 8.4*  8.4*  8.5*   < > 7.8*  7.8* 7.9* 7.7*   ALBUMIN 1.6*  --  1.7*  1.7*  1.7*  --  1.6* 1.7*  --    PROT  --   --  5.9*  --   --  5.7*  --    BILITOT  --   --  <0.1*  --   --  <0.1*  --    ALKPHOS  --   --  180*  --   --  153*  --    ALT  --   --  <5*  --   --  6*  --    AST  --   --  13  --   --  22  --    MG 1.9  --  1.8  --   --   --   --    PHOS 3.8  --  3.8  3.8  3.7  --  4.2 4.4  --     < > = values in this interval not displayed.       BMP:   Recent Labs   Lab 12/05/20  1537  12/06/20  0752   *  291*  288*   < > 175*   *  133*  131*   < > 134*   K 4.2  4.2  4.1   < > 4.1   CL 95  95  95   < > 96   CO2 29  29  29   < > 28   BUN 14  14  14   < > 21   CREATININE 3.8*  3.8*  3.8*   < > 4.5*   CALCIUM 8.4*  8.4*  8.5*   < > 7.7*   MG 1.8  --   --     < > = values in this interval not displayed.     CMP:   Recent Labs   Lab 12/05/20  1537  12/06/20  0558 12/06/20  0601 12/06/20  0752   *  133*  131*   < > 134*  134* 134* 134*   K 4.2  4.2  4.1   < > 4.0  4.0 4.0 4.1   CL 95  95  95   < > 96  96 96 96   CO2 29  29  29   < > 31*  31* 29 28   *  291*  288*   < > 190*  190* 191* 175*   BUN 14  14  14   < > 20  20 19 21   CREATININE 3.8*  3.8*  3.8*   < > 4.4*  4.4* 4.5* 4.5*   CALCIUM 8.4*  8.4*  8.5*   < >  7.8*  7.8* 7.9* 7.7*   PROT 5.9*  --   --  5.7*  --    ALBUMIN 1.7*  1.7*  1.7*  --  1.6* 1.7*  --    BILITOT <0.1*  --   --  <0.1*  --    ALKPHOS 180*  --   --  153*  --    AST 13  --   --  22  --    ALT <5*  --   --  6*  --    ANIONGAP 9  9  7*   < > 7*  7* 9 10   EGFRNONAA 12.0*  12.0*  12.0*   < > 10.1*  10.1* 9.8* 9.8*    < > = values in this interval not displayed.       Significant Imaging:  I have reviewed all pertinent imaging results/findings within the past 24 hours.

## 2020-12-06 NOTE — CONSULTS
"  Nutrition consult received stating "Malnutrition".  Please see note from 11/30 for full RD assessment, RD following.    Thanks!  KAILASH Barrientos, LDN  "

## 2020-12-06 NOTE — PROGRESS NOTES
[facility] Hospital Medicine  Progress Note    Patient Name: Navin Beck  MRN: [id number]  Patient Class: IP- Inpatient   Admission Date: 11/26/2020  Length of Stay: 9 days  Attending Physician: Michael Jeffrey MD  Primary Care Provider: Elvira Quinones MD    [facility] Medicine Team: [facility] [place] Ciera Maurer MD    Subjective:     Principal Problem:Encounter for continuous renal replacement therapy (CRRT) for acute renal failure        HPI:  Ms. Beck is a 59yo F with PMH of HTN, HLD, T2DM, Hyperthyroidism, and Asthma who had recent GYN surgery on 10/26 due to erosion of bladder mesh / prosthetic material with Cifuentes removal on 11/19. [place] [facility] [place] 4-5 days of not feeling well, she has had loss of appetite and weakness with 10/10 throbbing lower abdominal pain associated with vaginal pain and rectal pressure and non-bloody diarrhea. She has had trouble urinating for the past 5 days as well. She takes oxycodone 5 at home for pain, but it did not help. She endorses nausea and vomiting (4 times, non-bloody) also associated with the pain. She denies fever, chills, chest pain, SOB, trouble breathing, leg swelling, or any other symptom at this time.    ED Course: VSS, afebrile but leukocytosis at 17.85. Pt found to have DKA - Gluc 626, AG16, BHB 4.2, Fluids, insulin gtt started and Glucose down-trended to 200s. UA showed 2RBC, >100 WBC, bacteria, given CTX. CT A/P showed multiple fluid collections adjacent to bladder concerning for bladder perf with multiple urinomas, with mild bilateral hydronephrosis. Urology consulted and placed a Cifuentes with 900 UOP drained. GYN consulted due to recent surgery.     Pt will be admitted to hospital medicine for further management of DKA and possible intra-abdominal infection.     Overview/Hospital Course:  Pt was admitted on 11/28 due to 10/10 abdominal pain and also found to be in DKA. Pt was started on IVF, insulin gtt, and  "kept NPO with glucose downtrending and AG closed. CT abdomen showed multiple fluid collections near bladder. Renal cystography with "hypoattenuating collections in the lower pelvis adjacent to the urinary bladder with no definite extravasated contrast material appreciated within the pelvic collections to suggest communication with the bladder".  Urology and GYN on board, agree with plan for IR to place drain in fluid collections for culture and source control. Pt was started on CTX and Flagyl to cover for UTI and intra-abdominal infection, broadened to Cefepime, and Flagyl on 11/26; vanc added later. Endocrine consulted and managed the patient while acutely ill on insulin drip then transitioned to SQ when BG stabilized. IR place low abdominal drain to abscess on 11/28 with 250ml purulent material immediately expressed. Drained remained in place after the procedure. Urology continued to assess patient and recommended CT RSS after identifying some left CVAT and increased SCr with reduced UOP on 11/29, no hydronephrosis does not believe LAURIE is post-renal in nature. 11/30 pt remains anuric, Cr up-trending to 4.2, with hyponatremia. Nephrology consulted. 12/1 pt remains anuric after Lasix + Diuril challenge. Nephrology decided for initiation of CRRT due to low pressures with unresponsive ARF. Will transfer to ICU for CRRT. Pt transferred back to floor as no more need for CRRT on 12/5.     Interval History/Significant Events: Patient stepped down to floor yesterday 12/4. NAEON and VSS. Used CPAP overnight. This am SpO2 >95% RA. Pt lethargic upon initial exam but alert and oriented x3. She complains of continued left sided lower abdominal pain. Drain in place with minimal yellow fluid. Denies fever, chills, sob, n/v, c/d.    Review of Systems   Constitutional: Positive for activity change, appetite change and fatigue. Negative for chills and fever.   HENT: Negative for hearing loss, rhinorrhea, sneezing, sore throat and " trouble swallowing.    Respiratory: Negative for cough, shortness of breath and wheezing.    Cardiovascular: Negative for chest pain, palpitations and leg swelling.   Gastrointestinal: Positive for rectal pain and vomiting. Negative for abdominal pain, blood in stool, constipation, diarrhea and nausea.   Genitourinary: Positive for difficulty urinating and pelvic pain. Negative for dysuria and flank pain.   Musculoskeletal: Negative for arthralgias, neck pain and neck stiffness.   Skin: Negative for color change, pallor and rash.   Neurological: Negative for syncope, weakness, light-headedness, numbness and headaches.   Psychiatric/Behavioral: Negative for confusion and hallucinations. The patient is not nervous/anxious.      Objective:     Vital Signs (Most Recent):  Temp: 98.8 °F (37.1 °C) (12/06/20 1203)  Pulse: 86 (12/06/20 1203)  Resp: 18 (12/06/20 1203)  BP: (!) 129/58 (12/06/20 1203)  SpO2: 96 % (12/06/20 1203) Vital Signs (24h Range):  Temp:  [98.1 °F (36.7 °C)-99 °F (37.2 °C)] 98.8 °F (37.1 °C)  Pulse:  [81-86] 86  Resp:  [16-18] 18  SpO2:  [95 %-98 %] 96 %  BP: (114-143)/(52-63) 129/58   Weight: 114.7 kg (252 lb 14.4 oz)  Body mass index is 39.61 kg/m².      Intake/Output Summary (Last 24 hours) at 12/6/2020 1315  Last data filed at 12/6/2020 0600  Gross per 24 hour   Intake --   Output 1500 ml   Net -1500 ml       Physical Exam  Constitutional:       General: She is not in acute distress.     Appearance: She is obese. She is not ill-appearing or toxic-appearing.   HENT:      Head: Normocephalic and atraumatic.      Nose: No congestion or rhinorrhea.      Mouth/Throat:      Pharynx: No oropharyngeal exudate or posterior oropharyngeal erythema.   Eyes:      General: No scleral icterus.        Right eye: No discharge.         Left eye: No discharge.      Extraocular Movements: Extraocular movements intact.      Pupils: Pupils are equal, round, and reactive to light.   Neck:      Musculoskeletal: Normal range  of motion and neck supple. No neck rigidity or muscular tenderness.   Cardiovascular:      Rate and Rhythm: Normal rate and regular rhythm.      Pulses: Normal pulses.      Heart sounds: No murmur.   Pulmonary:      Effort: Pulmonary effort is normal. No respiratory distress.      Breath sounds: Normal breath sounds. No stridor. No wheezing.   Abdominal:      General: Abdomen is flat. Bowel sounds are normal. There is no distension.      Palpations: Abdomen is soft. There is no mass.      Tenderness: There is abdominal tenderness (LLQ, drain in place with minimal pustular yellow fluid, dressings CDI).      Hernia: No hernia is present.   Musculoskeletal: Normal range of motion.         General: No swelling, tenderness or deformity.   Skin:     General: Skin is warm and dry.      Coloration: Skin is not jaundiced or pale.      Findings: No bruising.   Neurological:      General: No focal deficit present.      Mental Status: She is alert. Mental status is at baseline.         Vents:  Oxygen Concentration (%): 24 (12/06/20 0400)  Lines/Drains/Airways     Central Venous Catheter Line            Trialysis (Dialysis) Catheter 12/01/20 1547 right internal jugular 4 days          Drain                 Urethral Catheter 11/27/20 0145 Latex 16 Fr. 9 days         Closed/Suction Drain 11/28/20 1358 Right Abdomen Bulb 10 Fr. 7 days              Significant Labs:    CBC/Anemia Profile:  Recent Labs   Lab 12/05/20  1537 12/06/20  0642   WBC 13.25* 11.75   HGB 7.2* 6.6*   HCT 22.3* 21.2*   * 399*   MCV 89 91   RDW 14.1 14.4        Chemistries:  Recent Labs   Lab 12/04/20  1430  12/05/20  1537  12/06/20  0558 12/06/20  0601 12/06/20  0752   *  129*   < > 133*  133*  131*   < > 134*  134* 134* 134*   K 4.0  4.0   < > 4.2  4.2  4.1   < > 4.0  4.0 4.0 4.1   CL 95  96   < > 95  95  95   < > 96  96 96 96   CO2 25  25   < > 29  29  29   < > 31*  31* 29 28   BUN 7*  7*   < > 14  14  14   < > 20  20 19 21    CREATININE 2.2*  2.3*   < > 3.8*  3.8*  3.8*   < > 4.4*  4.4* 4.5* 4.5*   CALCIUM 10.5  10.2   < > 8.4*  8.4*  8.5*   < > 7.8*  7.8* 7.9* 7.7*   ALBUMIN 1.6*  --  1.7*  1.7*  1.7*  --  1.6* 1.7*  --    PROT  --   --  5.9*  --   --  5.7*  --    BILITOT  --   --  <0.1*  --   --  <0.1*  --    ALKPHOS  --   --  180*  --   --  153*  --    ALT  --   --  <5*  --   --  6*  --    AST  --   --  13  --   --  22  --    MG 1.9  --  1.8  --   --   --   --    PHOS 3.8  --  3.8  3.8  3.7  --  4.2 4.4  --     < > = values in this interval not displayed.       BMP:   Recent Labs   Lab 12/05/20  1537  12/06/20  0752   *  291*  288*   < > 175*   *  133*  131*   < > 134*   K 4.2  4.2  4.1   < > 4.1   CL 95  95  95   < > 96   CO2 29  29  29   < > 28   BUN 14  14  14   < > 21   CREATININE 3.8*  3.8*  3.8*   < > 4.5*   CALCIUM 8.4*  8.4*  8.5*   < > 7.7*   MG 1.8  --   --     < > = values in this interval not displayed.     CMP:   Recent Labs   Lab 12/05/20  1537  12/06/20  0558 12/06/20  0601 12/06/20  0752   *  133*  131*   < > 134*  134* 134* 134*   K 4.2  4.2  4.1   < > 4.0  4.0 4.0 4.1   CL 95  95  95   < > 96  96 96 96   CO2 29  29  29   < > 31*  31* 29 28   *  291*  288*   < > 190*  190* 191* 175*   BUN 14  14  14   < > 20  20 19 21   CREATININE 3.8*  3.8*  3.8*   < > 4.4*  4.4* 4.5* 4.5*   CALCIUM 8.4*  8.4*  8.5*   < > 7.8*  7.8* 7.9* 7.7*   PROT 5.9*  --   --  5.7*  --    ALBUMIN 1.7*  1.7*  1.7*  --  1.6* 1.7*  --    BILITOT <0.1*  --   --  <0.1*  --    ALKPHOS 180*  --   --  153*  --    AST 13  --   --  22  --    ALT <5*  --   --  6*  --    ANIONGAP 9  9  7*   < > 7*  7* 9 10   EGFRNONAA 12.0*  12.0*  12.0*   < > 10.1*  10.1* 9.8* 9.8*    < > = values in this interval not displayed.       Significant Imaging:  I have reviewed all pertinent imaging results/findings within the past 24 hours.      Assessment/Plan:      * Encounter for  continuous renal replacement therapy (CRRT) for acute renal failure  Cr 1.5 on presentation.   Likely multi-factorial on presentation 2/2 pre-renal volume depletion in the context of DKA, UTI, and post-renal obstruction  Repeat LAURIE on 11/29 likely intrinsic ATN in etiology    PLAN  - Initial LAURIE on presentation resolved with IVF and placement of March with resolution of hydronephrosis on imaging   - Maintain March per Urology   - Treating UTI with Cefepime  - LAURIE due to ATN began on 11/29 and continues to worsen   - Remains anuric after IVF bolus, Lasix + Diuril challenge   - Resp status stable but Cr uptrending, pt lethargic and waxing/waning alertness  - Nephrology consulted, appreciate recs   - Initiation of CRRT, transfer to ICU on 12/2   - Transferred to floor on 12/4   - Pt made ~1L urine after Lasix + Diuril challenge   - May be in recovery phase of LAURIE   - Continue Lasix 80IV BID per nephro recs   - BMP q4   - Renally dose meds   - Avoid renal toxins (ACEi/ARB, NSAIDs, contrast, etc.)   - Strict I&Os    Anemia  Pt Hb 9.8 on admit. BL ~10.   Most likely due to anemia of chronic disease, acute blood loss, and frequent phlebotomy while hospitalized.     - Required 1U PRBC on 12/2  - Required 1U PRBC on 12/6 2/2 Hb 6.6  - Will consult with Nephro to evaluate need for Epo    Hyponatremia  Pt hyponatremia to 120s with spontaneous jerking motions 11/29.    - Nephrology on board, appreciate recs   - Possibly 2/2 D5 IVF with decreased UOP while in DKA   - BMP daily   - Latest Na 134   - 1L fluid restrict    Postprocedural intraabdominal abscess  Mesh removed by GYN 10/26; march placed  March removed 11/19 --> increased pain  Admitted 11/27 with worsening abd pain found to be related to intra-abd fluid collections.   UCx 11/26 with E.coli    PLAN  - Urology consulted; lesions adjacent to bladder per CT cysto 11/27  - Gyn consulted  - IR consulted drained abscess 11/28  - Cultures: Peptostreptococcus pending margy.  -  Broad spectrum abx: CTX, Flagyl D11/14      UTI (urinary tract infection)  Pt presented with lower abdominal/vaginal/rectal pain with dysuria and urinary retention.   UA: 2 RBC, >100 WBC, 2+ leukocytes, bacteria  Recent UTIs with pan-sensitive E coli.    PLAN  - Ceftriaxone course complete  - UCx with E coli sens to cefepime and ceftriaxone  - Cfiuentes placed for urinary retention, maintain Cifuentes per Urology      Urinary retention  Pt has recent h/o GYN surgery in 10/2020 for bladder mesh removal, Cifuentes was removed on 11/19. For the past few days pt has had increased trouble urinating associated with pain.     PLAN  CT A/P 11/27: Multiple fluid collections adjacent to bladder. Mild bilateral hydronephrosis.   Retention likely 2/2 anatomic obstruction, pelvic fluid collection, diabetic bladder  - Urology consulted in ED, appreciate recs   - Cifuentes placed 800cc drained, bladder irrigated and all fluid removed with ease   - Maintain Cifuentes   - Rec drainage of pelvic fluid collection by IR with cultures & creatinine   - CT RSS ordered by uro 11/29 for left CVAT, incr SCr, decr UOP   - No hydronephrosis seen on CT, most likely not post-renal in etiology  - GYN consulted, appreciate consult   - Agrees with Urology plan for drainage with IR  - IR consulted, appreciate recs   - IR placed drain 11/28 into pelvic abscess, draining minimal yellow fluid    Type 2 diabetes mellitus with hyperglycemia, with long-term current use of insulin  Last Hb A1c 10.3 on 9/25/20  Home regimen: Metformin 500 BID, Aspart 15U TID, Glargine 60 BID  Pt says her blood sugar has been running to the 300s for the past few days     PLAN  - Hold oral anti-glycemics while hospitalized  - Endocrine consulted, recs appreciated  - Accu-checks qhour  - See DKA notes      Asthma  Pt states she uses Albuterol inhaler TID every day.     PLAN  - Duonebs q6 PRN    Diabetic ketoacidosis without coma associated with type 2 diabetes mellitus  Pt presented to [facility] [place]  with Gluc 626, HCO3 16, AG 16, BHB 4.2, ketonuria. 2L IVF given in ED with insulin gtt started. Glucose trended downward to 274. Pt was then admitted to hospital medicine for further management of DKA.    PLAN  - Endocrine consulted, appreciate recs   - Transitional Insulin gtt stopped 12/1   - Gluc regimen increased 2/2 gluc 230-240s    Detemir 36U BID    Aspart 17U TID WM   - Goal gluc 140-180   - Diabetic diet  - BMP daily  - Will replace K PRN  - Nausea: zofran PRN     Hyperthyroidism  Continue home Methimazole    PLAN  - TSH WNL  - Endocrine aware    Severe obesity (BMI 35.0-35.9 with comorbidity)   on exercise, diet, and weight management     Abnormal abdominal CT scan  Pt [place] [facility] [place] to 5-6 days of 10/10 lower abdomen, vaginal, and rectal pain. Pt has recent h/o GYN surgery in 10/2020 for bladder mesh removal, Cifuentes was removed on 11/19.  CT A/P 11/27: Multiple fluid collections adjacent to bladder. Mild bilateral hydronephrosis.   UCx 11/26: E.coli sensitive to CTX, cefepime    PLAN  - GYN consulted, appreciate consult; Agrees with Urology plan for drainage with IR  - IR consulted, appreciate recs; Drain in place; cultures sent 11/28    - Gram stain, many GPC   - Cultures grew Peptostreptococcus on 12/5 pending margy.  - Pain management: PCA pump initially   - PCA pump d/c on 11/30   - PRN opiates  - ID consulted for intra-abdominal infection, appreciate recs   - Continue CTX & Flagyl x14 days, repeat imaging to assess for cleared infection prior to d/c abx   - If d/c before 14d course, may switch to augmentin  - Urology recs appreciated; left CVAT, ordered CT RSS 11/29 hydronephrosis resolved   - Cystography 11/27 showed no bladder leak   - Maintain Cifuentes   - 12/6 spoke with urology concerning ROSAURA drain output concern for urinary contents. Per Urology, since ROSAURA fluid Cr 1.7 is less than serum Cr 4.5, the fluid is not suspected to be urine.    - Plan for cystoscopy with left, possible right,  retrograde pyelogram 12/7   - NPO Midnight      Mixed hyperlipidemia  Continue home statin      Essential hypertension  Hold home Benazepril due to LAURIE, low BP    PLAN  - Will monitor BP and add anti-hypertensive as necessary       VTE Risk Mitigation (From admission, onward)         Ordered     heparin (porcine) injection 5,000 Units  Every 8 hours      12/01/20 1426     Place sequential compression device  Until discontinued      12/01/20 1409     IP VTE HIGH RISK PATIENT  Once      12/01/20 1409                Discharge Planning   RAFA: 12/4/2020     Code Status: Full Code   Is the patient medically ready for discharge?: No    Reason for patient still in hospital (select all that apply): Patient trending condition, Laboratory test and Treatment  Discharge Plan A: Rehab   Discharge Delays: None known at this time    Ciera Maurer MD  Department of Hospital Medicine   [facility]

## 2020-12-06 NOTE — ANESTHESIA PREPROCEDURE EVALUATION
[facility]  Anesthesia Pre-Operative Evaluation         Patient Name: Navin Beck  YOB: 1960  MRN: [id number]    SUBJECTIVE:     Pre-operative evaluation for Procedure(s) (LRB):  CYSTOSCOPY, WITH RETROGRADE PYELOGRAM (Bilateral)     12/06/2020    Navin Beck is a 60 y.o. female w/ a significant PMHx of HTN, HLD, T2DM, Hyperthyroidism, and Asthma who had recent GYN surgery on 10/26 due to erosion of bladder mesh / prosthetic material with Cifuentes removal on 11/19.    Hospital course significant for DKA [now resolved], a brief ICU stay for acute renal failure requiring CRRT and fluid collections around the bladder s/p IR drain placements.    Urology following and would like to rule out bladder injury. Patient now presents for the above procedure(s).    LDA:   Trialysis (Dialysis) Catheter 12/01/20 1547 right internal jugular (Active)   Number of days: 4            Closed/Suction Drain 11/28/20 1358 Right Abdomen Bulb 10 Fr. (Active)   Number of days: 8            Urethral Catheter 11/27/20 0145 Latex 16 Fr. (Active)   Number of days: 9       Prev airway [10/2020]:     Induction:  Intravenous    Mask Ventilation:  Easy with oral airway    Attempts:  1    Method of Intubation:  Video laryngoscopy    Blade:  Joshua 3    Laryngeal View Grade: Grade I - full view of chords        Complications:  None    Complicating Factors:  Obesity    Patient Active Problem List   Diagnosis    Essential hypertension    Postmenopausal atrophic vaginitis    ASCVD (arteriosclerotic cardiovascular disease)    Mixed hyperlipidemia    Abnormal abdominal CT scan    Other benign neoplasm of connective and other soft tissue of pelvis    Thymoma    Severe obesity (BMI 35.0-35.9 with comorbidity)    Primary osteoarthritis of right knee    Tobacco abuse    Hyperthyroidism    Diabetic ketoacidosis without coma associated with type 2 diabetes mellitus    Graves disease    Status post total  right knee replacement    Calcification of aorta    Thrombocytosis    Hypertension associated with diabetes    Gastroesophageal reflux disease without esophagitis    Asthma    Type 2 diabetes mellitus with hyperglycemia, with long-term current use of insulin    Vaginal erosion due to surgical mesh    Erosion of implanted vaginal mesh and prosthetic materials    Eroded bladder suspension mesh    Urinary retention    UTI (urinary tract infection)    Encounter for continuous renal replacement therapy (CRRT) for acute renal failure    Postprocedural intraabdominal abscess    Hyponatremia    Impaired mobility and endurance    Abnormal white blood cell (WBC)    Anemia       Review of patient's allergies indicates:   Allergen Reactions    Penicillins Shortness Of Breath, Itching and Swelling     Tolerates cefepime 11/30/2020       Current Inpatient Medications:   cefTRIAXone (ROCEPHIN) IVPB  2 g Intravenous Q24H    furosemide (LASIX) IV  80 mg Intravenous BID    gabapentin  300 mg Oral Daily    heparin (porcine)  5,000 Units Subcutaneous Q8H    insulin aspart U-100  17 Units Subcutaneous TIDWM    insulin detemir U-100  36 Units Subcutaneous BID    methIMAzole  10 mg Oral Daily    metroNIDAZOLE  500 mg Oral Q8H    polyethylene glycol  17 g Oral Daily    rosuvastatin  10 mg Oral Daily    senna-docusate 8.6-50 mg  1 tablet Oral BID    sodium bicarbonate  1,300 mg Oral TID       No current facility-administered medications on file prior to encounter.      Current Outpatient Medications on File Prior to Encounter   Medication Sig Dispense Refill    HYDROcodone-acetaminophen (NORCO)  mg per tablet Take 1 tablet by mouth every 8 (eight) hours as needed for Pain.      acetaminophen (TYLENOL) 500 MG tablet Take 1,000 mg by mouth 3 (three) times daily as needed (fever and chills).      albuterol (PROVENTIL/VENTOLIN HFA) 90 mcg/actuation inhaler       benazepril (LOTENSIN) 10 MG tablet Take 10  mg by mouth once daily.       blood-glucose meter (TRUE METRIX GLUCOSE METER MISC) True Metrix Glucose Meter      DULoxetine (CYMBALTA) 60 MG capsule duloxetine 60 mg capsule,delayed release      estradioL (ESTRACE) 0.01 % (0.1 mg/gram) vaginal cream Place 1 g vaginally 3 (three) times a week. Alternate days with Metrogel for 14 days 42.5 g 1    fluticasone propionate (FLOVENT HFA) 44 mcg/actuation inhaler Flovent HFA 44 mcg/actuation aerosol inhaler      gabapentin (NEURONTIN) 600 MG tablet Take 1 tablet (600 mg total) by mouth 3 (three) times daily.      ibuprofen (ADVIL,MOTRIN) 600 MG tablet Take 1 tablet (600 mg total) by mouth 3 (three) times daily. 60 tablet 2    insulin aspart (NOVOLOG) 100 unit/mL injection Inject 15 Units into the skin 3 (three) times daily with meals. Three times a day with meals according to sliding scale.      insulin glargine (LANTUS U-100 INSULIN) 100 unit/mL injection Inject 50 Units into the skin 2 (two) times daily.      METFORMIN HCL (METFORMIN ORAL) Take 500 mg by mouth 2 (two) times daily.      methimazole (TAPAZOLE) 10 MG Tab Take 10 mg by mouth once daily.       pantoprazole (PROTONIX) 40 MG tablet Take 1 tablet (40 mg total) by mouth once daily. Start after the twice daily Protonix is complete.Follow-up with doctor for further refills. 30 tablet 1    rosuvastatin (CRESTOR) 20 MG tablet rosuvastatin 20 mg tablet      tamsulosin (FLOMAX) 0.4 mg Cp24 Take 0.4 mg by mouth once daily.       tiZANidine (ZANAFLEX) 4 MG tablet tizanidine 4 mg tablet      TRUE METRIX GLUCOSE TEST STRIP Strp       zolpidem (AMBIEN) 10 mg Tab Take 10 mg by mouth every evening.         Past Surgical History:   Procedure Laterality Date    APPENDECTOMY      BACK SURGERY      disc removal     CARPAL TUNNEL RELEASE Bilateral     CHOLECYSTECTOMY      CYSTOSCOPY N/A 10/26/2020    Procedure: CYSTOSCOPY;  Surgeon: Wilner Goel MD;  Location[facility];  Service: OB/GYN;  Laterality: N/A;     FRACTURE SURGERY Right     wrist    HY[facility]CT[facility]Y  2010    [facility]    JOINT REPLACEMENT      KNEE ARTHROSCOPY W/ DEBRIDEMENT      KNEE SURGERY Right     Knee replacement    LITHOTRIPSY      KS REMOVAL OF OVARY/TUBE(S)  9/9/13    benign    removal of round ligament mass      ROBOT-ASSISTED LAPAROSCOPIC LYSIS OF ADHESIONS USING DA JAMES XI N/A 10/26/2020    Procedure: XI ROBOTIC LYSIS, ADHESIONS;  Surgeon: Wilner Goel MD;  Location[facility];  Service: OB/GYN;  Laterality: N/A;  ROBOTIC MOBILIZATION OF BLADDER- DR BURNHAM    SPINE SURGERY      WRIST FRACTURE SURGERY Left        Social History     Socioeconomic History    Marital status:    Tobacco Use    Smoking status: Current Every Day Smoker     Packs/day: 0.50     Years: 30.00     Pack years: 15.00    Smokeless tobacco: Never Used   Substance and Sexual Activity    Alcohol use: No    Drug use: No       OBJECTIVE:     Vital Signs Range (Last 24H):  Temp:  [36.6 °C (97.8 °F)-37.2 °C (99 °F)]   Pulse:  [81-86]   Resp:  [16-18]   BP: (113-143)/(52-63)   SpO2:  [90 %-98 %]       Significant Labs:  Lab Results   Component Value Date    WBC 11.75 12/06/2020    HGB 6.6 (L) 12/06/2020    HCT 21.2 (L) 12/06/2020     (H) 12/06/2020    CHOL 285 (H) 09/25/2020    TRIG 184 (H) 09/25/2020    HDL 53 09/25/2020    ALT 6 (L) 12/06/2020    AST 22 12/06/2020     (L) 12/06/2020    K 4.1 12/06/2020    CL 96 12/06/2020    CREATININE 4.5 (H) 12/06/2020    BUN 21 12/06/2020    CO2 28 12/06/2020    TSH 0.519 11/27/2020    INR 1.0 11/27/2020    HGBA1C 11.4 (H) 11/27/2020       Diagnostic Studies: No relevant studies.    EKG:   Results for orders placed or performed during the hospital encounter of 11/26/20   EKG 12-lead    Collection Time: 11/29/20  5:43 AM    Narrative    Test Reason : R00.0,    Vent. Rate : 110 BPM     Atrial Rate : 110 BPM     P-R Int : 120 ms          QRS Dur : 082 ms      QT Int : 302 ms       P-R-T Axes :  051 012 033 degrees     QTc Int : 408 ms    Sinus tachycardia  Otherwise normal ECG  When compared with ECG of 12-APR-2019 21:24,  No significant change was found  Confirmed by Lisandro BELTRÁN MD ([id number]) on 11/29/2020 9:34:04 AM    Referred By: PRACHI   SELF           Confirmed By:Lisandro BELTRÁN MD       2D ECHO:  TTE:  No results found for this or any previous visit.      ASSESSMENT/PLAN:       Anesthesia Evaluation    I have reviewed the Patient Summary Reports.    I have reviewed the Nursing Notes. I have reviewed the NPO Status.   I have reviewed the Medications.     Review of Systems  Anesthesia Hx:  No problems with previous Anesthesia  History of prior surgery of interest to airway management or planning: Previous anesthesia: General Denies Family Hx of Anesthesia complications.   Denies Personal Hx of Anesthesia complications.   Social:  Non-Smoker    Hematology/Oncology:  Hematology Normal   Oncology Normal     EENT/Dental:EENT/Dental Normal   Cardiovascular:   Hypertension, well controlled hyperlipidemia    Pulmonary:   Asthma mild    Renal/:   renal calculi    Hepatic/GI:   PUD, GERD, well controlled    Musculoskeletal:   Arthritis     Neurological:  Neurology Normal    Endocrine:   Diabetes, using insulin Hyperthyroidism    Dermatological:  Skin Normal    Psych:  Psychiatric Normal           Physical Exam  General:  Obesity    Airway/Jaw/Neck:  Airway Findings: Mouth Opening: Normal Tongue: Normal  General Airway Assessment: Adult  Mallampati: II      Dental:  Dental Findings: Upper Dentures   Chest/Lungs:  Chest/Lungs Findings: Normal Respiratory Rate     Heart/Vascular:  Heart Findings: Rate: Normal        Mental Status:  Mental Status Findings:  Cooperative, Alert and Oriented         Anesthesia Plan  Type of Anesthesia, risks & benefits discussed:  Anesthesia Type:  general, MAC  Patient's Preference:   Intra-op Monitoring Plan: standard ASA monitors  Intra-op Monitoring Plan Comments:   Post Op Pain  Control Plan: multimodal analgesia, IV/PO Opioids PRN and per primary service following discharge from PACU  Post Op Pain Control Plan Comments:   Induction:   IV  Beta Blocker:  Patient is not currently on a Beta-Blocker (No further documentation required).       Informed Consent: Patient understands risks and agrees with Anesthesia plan.  Questions answered. Anesthesia consent signed with patient.  ASA Score: 3     Day of Surgery Review of History & Physical:    H&P update referred to the surgeon.         Ready For Surgery From Anesthesia Perspective.

## 2020-12-07 ENCOUNTER — ANESTHESIA (OUTPATIENT)
Dept: SURGERY | Facility: HOSPITAL | Age: 60
DRG: 637 | End: 2020-12-07
Payer: MEDICARE

## 2020-12-07 LAB
ABO + RH BLD: NORMAL
ALBUMIN SERPL BCP-MCNC: 1.8 G/DL (ref 3.5–5.2)
ALP SERPL-CCNC: 201 U/L (ref 55–135)
ALT SERPL W/O P-5'-P-CCNC: 5 U/L (ref 10–44)
ANION GAP SERPL CALC-SCNC: 10 MMOL/L (ref 8–16)
AST SERPL-CCNC: 16 U/L (ref 10–40)
BASOPHILS # BLD AUTO: 0.06 K/UL (ref 0–0.2)
BASOPHILS NFR BLD: 0.5 % (ref 0–1.9)
BILIRUB SERPL-MCNC: 0.1 MG/DL (ref 0.1–1)
BLD GP AB SCN CELLS X3 SERPL QL: NORMAL
BUN SERPL-MCNC: 24 MG/DL (ref 8–23)
CALCIUM SERPL-MCNC: 7.7 MG/DL (ref 8.7–10.5)
CHLORIDE SERPL-SCNC: 95 MMOL/L (ref 95–110)
CO2 SERPL-SCNC: 31 MMOL/L (ref 23–29)
CREAT FLD-MCNC: 2 MG/DL
CREAT SERPL-MCNC: 5.1 MG/DL (ref 0.5–1.4)
DIFFERENTIAL METHOD: ABNORMAL
EOSINOPHIL # BLD AUTO: 0.2 K/UL (ref 0–0.5)
EOSINOPHIL NFR BLD: 1.4 % (ref 0–8)
ERYTHROCYTE [DISTWIDTH] IN BLOOD BY AUTOMATED COUNT: 14.7 % (ref 11.5–14.5)
EST. GFR  (AFRICAN AMERICAN): 9.7 ML/MIN/1.73 M^2
EST. GFR  (NON AFRICAN AMERICAN): 8.4 ML/MIN/1.73 M^2
GLUCOSE SERPL-MCNC: 156 MG/DL (ref 70–110)
HCT VFR BLD AUTO: 25.4 % (ref 37–48.5)
HGB BLD-MCNC: 7.9 G/DL (ref 12–16)
IMM GRANULOCYTES # BLD AUTO: 0.22 K/UL (ref 0–0.04)
IMM GRANULOCYTES NFR BLD AUTO: 1.8 % (ref 0–0.5)
LYMPHOCYTES # BLD AUTO: 2.9 K/UL (ref 1–4.8)
LYMPHOCYTES NFR BLD: 23.4 % (ref 18–48)
MAGNESIUM SERPL-MCNC: 1.6 MG/DL (ref 1.6–2.6)
MCH RBC QN AUTO: 28.9 PG (ref 27–31)
MCHC RBC AUTO-ENTMCNC: 31.1 G/DL (ref 32–36)
MCV RBC AUTO: 93 FL (ref 82–98)
MONOCYTES # BLD AUTO: 1.3 K/UL (ref 0.3–1)
MONOCYTES NFR BLD: 10.7 % (ref 4–15)
NEUTROPHILS # BLD AUTO: 7.6 K/UL (ref 1.8–7.7)
NEUTROPHILS NFR BLD: 62.2 % (ref 38–73)
NRBC BLD-RTO: 0 /100 WBC
PHOSPHATE SERPL-MCNC: 4.8 MG/DL (ref 2.7–4.5)
PLATELET # BLD AUTO: 391 K/UL (ref 150–350)
PMV BLD AUTO: 9.5 FL (ref 9.2–12.9)
POCT GLUCOSE: 102 MG/DL (ref 70–110)
POCT GLUCOSE: 147 MG/DL (ref 70–110)
POCT GLUCOSE: 152 MG/DL (ref 70–110)
POCT GLUCOSE: 169 MG/DL (ref 70–110)
POCT GLUCOSE: 215 MG/DL (ref 70–110)
POTASSIUM SERPL-SCNC: 3.8 MMOL/L (ref 3.5–5.1)
PROT SERPL-MCNC: 6 G/DL (ref 6–8.4)
RBC # BLD AUTO: 2.73 M/UL (ref 4–5.4)
SODIUM SERPL-SCNC: 136 MMOL/L (ref 136–145)
SPECIMEN SOURCE: NORMAL
WBC # BLD AUTO: 12.29 K/UL (ref 3.9–12.7)

## 2020-12-07 PROCEDURE — 27000221 HC OXYGEN, UP TO 24 HOURS: Mod: HCWC

## 2020-12-07 PROCEDURE — C1758 CATHETER, URETERAL: HCPCS | Mod: HCWC | Performed by: UROLOGY

## 2020-12-07 PROCEDURE — 63600175 PHARM REV CODE 636 W HCPCS: Mod: HCWC | Performed by: STUDENT IN AN ORGANIZED HEALTH CARE EDUCATION/TRAINING PROGRAM

## 2020-12-07 PROCEDURE — 74420 PR  X-RAY RETROGRADE PYELOGRAM: ICD-10-PCS | Mod: 26,HCWC,ICN, | Performed by: UROLOGY

## 2020-12-07 PROCEDURE — 25000003 PHARM REV CODE 250: Mod: HCWC | Performed by: STUDENT IN AN ORGANIZED HEALTH CARE EDUCATION/TRAINING PROGRAM

## 2020-12-07 PROCEDURE — 63600175 PHARM REV CODE 636 W HCPCS: Mod: HCWC | Performed by: NURSE PRACTITIONER

## 2020-12-07 PROCEDURE — 99233 PR SUBSEQUENT HOSPITAL CARE,LEVL III: ICD-10-PCS | Mod: HCWC,,, | Performed by: HOSPITALIST

## 2020-12-07 PROCEDURE — 36000706: Mod: HCWC | Performed by: UROLOGY

## 2020-12-07 PROCEDURE — D9220A PRA ANESTHESIA: Mod: HCWC,,, | Performed by: ANESTHESIOLOGY

## 2020-12-07 PROCEDURE — 84100 ASSAY OF PHOSPHORUS: CPT | Mod: HCWC

## 2020-12-07 PROCEDURE — 52005 CYSTO W/URTRL CATHJ: CPT | Mod: HCWC,78,ICN, | Performed by: UROLOGY

## 2020-12-07 PROCEDURE — 37000008 HC ANESTHESIA 1ST 15 MINUTES: Mod: HCWC | Performed by: UROLOGY

## 2020-12-07 PROCEDURE — 20600001 HC STEP DOWN PRIVATE ROOM: Mod: HCWC

## 2020-12-07 PROCEDURE — 82962 GLUCOSE BLOOD TEST: CPT | Mod: HCWC | Performed by: UROLOGY

## 2020-12-07 PROCEDURE — 71000015 HC POSTOP RECOV 1ST HR: Mod: HCWC | Performed by: UROLOGY

## 2020-12-07 PROCEDURE — 97530 THERAPEUTIC ACTIVITIES: CPT | Mod: HCWC

## 2020-12-07 PROCEDURE — 86850 RBC ANTIBODY SCREEN: CPT | Mod: HCWC

## 2020-12-07 PROCEDURE — C1769 GUIDE WIRE: HCPCS | Mod: HCWC | Performed by: UROLOGY

## 2020-12-07 PROCEDURE — 97535 SELF CARE MNGMENT TRAINING: CPT | Mod: HCWC

## 2020-12-07 PROCEDURE — 99900035 HC TECH TIME PER 15 MIN (STAT): Mod: HCWC

## 2020-12-07 PROCEDURE — C9399 UNCLASSIFIED DRUGS OR BIOLOG: HCPCS | Mod: HCWC | Performed by: STUDENT IN AN ORGANIZED HEALTH CARE EDUCATION/TRAINING PROGRAM

## 2020-12-07 PROCEDURE — 94761 N-INVAS EAR/PLS OXIMETRY MLT: CPT | Mod: HCWC

## 2020-12-07 PROCEDURE — 85025 COMPLETE CBC W/AUTO DIFF WBC: CPT | Mod: HCWC

## 2020-12-07 PROCEDURE — 99233 SBSQ HOSP IP/OBS HIGH 50: CPT | Mod: HCWC,GC,, | Performed by: STUDENT IN AN ORGANIZED HEALTH CARE EDUCATION/TRAINING PROGRAM

## 2020-12-07 PROCEDURE — 63600175 PHARM REV CODE 636 W HCPCS: Mod: HCWC | Performed by: INTERNAL MEDICINE

## 2020-12-07 PROCEDURE — 71000033 HC RECOVERY, INTIAL HOUR: Mod: HCWC | Performed by: UROLOGY

## 2020-12-07 PROCEDURE — 74420 UROGRAPHY RTRGR +-KUB: CPT | Mod: 26,HCWC,ICN, | Performed by: UROLOGY

## 2020-12-07 PROCEDURE — 36000707: Mod: HCWC | Performed by: UROLOGY

## 2020-12-07 PROCEDURE — 80053 COMPREHEN METABOLIC PANEL: CPT | Mod: HCWC

## 2020-12-07 PROCEDURE — 99233 SBSQ HOSP IP/OBS HIGH 50: CPT | Mod: HCWC,,, | Performed by: HOSPITALIST

## 2020-12-07 PROCEDURE — 94660 CPAP INITIATION&MGMT: CPT | Mod: HCWC

## 2020-12-07 PROCEDURE — 25500020 PHARM REV CODE 255: Mod: HCWC | Performed by: UROLOGY

## 2020-12-07 PROCEDURE — 52005 PR CYSTOURETHROSCOPY,URETER CATHETER: ICD-10-PCS | Mod: HCWC,78,ICN, | Performed by: UROLOGY

## 2020-12-07 PROCEDURE — 83735 ASSAY OF MAGNESIUM: CPT | Mod: HCWC

## 2020-12-07 PROCEDURE — D9220A PRA ANESTHESIA: ICD-10-PCS | Mod: HCWC,,, | Performed by: ANESTHESIOLOGY

## 2020-12-07 PROCEDURE — 37000009 HC ANESTHESIA EA ADD 15 MINS: Mod: HCWC | Performed by: UROLOGY

## 2020-12-07 PROCEDURE — 25000003 PHARM REV CODE 250: Mod: HCWC | Performed by: INTERNAL MEDICINE

## 2020-12-07 PROCEDURE — 99233 PR SUBSEQUENT HOSPITAL CARE,LEVL III: ICD-10-PCS | Mod: HCWC,GC,, | Performed by: STUDENT IN AN ORGANIZED HEALTH CARE EDUCATION/TRAINING PROGRAM

## 2020-12-07 PROCEDURE — 97116 GAIT TRAINING THERAPY: CPT | Mod: HCWC

## 2020-12-07 RX ORDER — DIPHENHYDRAMINE HYDROCHLORIDE 50 MG/ML
25 INJECTION INTRAMUSCULAR; INTRAVENOUS EVERY 6 HOURS PRN
Status: DISCONTINUED | OUTPATIENT
Start: 2020-12-07 | End: 2020-12-07 | Stop reason: HOSPADM

## 2020-12-07 RX ORDER — KETAMINE HCL IN 0.9 % NACL 50 MG/5 ML
SYRINGE (ML) INTRAVENOUS
Status: DISCONTINUED | OUTPATIENT
Start: 2020-12-07 | End: 2020-12-07

## 2020-12-07 RX ORDER — IPRATROPIUM BROMIDE AND ALBUTEROL SULFATE 2.5; .5 MG/3ML; MG/3ML
3 SOLUTION RESPIRATORY (INHALATION) EVERY 4 HOURS PRN
Status: DISCONTINUED | OUTPATIENT
Start: 2020-12-07 | End: 2020-12-07 | Stop reason: HOSPADM

## 2020-12-07 RX ORDER — HYDROMORPHONE HYDROCHLORIDE 1 MG/ML
0.2 INJECTION, SOLUTION INTRAMUSCULAR; INTRAVENOUS; SUBCUTANEOUS EVERY 5 MIN PRN
Status: DISCONTINUED | OUTPATIENT
Start: 2020-12-07 | End: 2020-12-07 | Stop reason: HOSPADM

## 2020-12-07 RX ORDER — ONDANSETRON 2 MG/ML
INJECTION INTRAMUSCULAR; INTRAVENOUS
Status: DISCONTINUED | OUTPATIENT
Start: 2020-12-07 | End: 2020-12-07

## 2020-12-07 RX ORDER — ONDANSETRON 2 MG/ML
4 INJECTION INTRAMUSCULAR; INTRAVENOUS DAILY PRN
Status: DISCONTINUED | OUTPATIENT
Start: 2020-12-07 | End: 2020-12-07 | Stop reason: HOSPADM

## 2020-12-07 RX ORDER — FENTANYL CITRATE 50 UG/ML
INJECTION, SOLUTION INTRAMUSCULAR; INTRAVENOUS
Status: DISCONTINUED | OUTPATIENT
Start: 2020-12-07 | End: 2020-12-07

## 2020-12-07 RX ORDER — PROPOFOL 10 MG/ML
VIAL (ML) INTRAVENOUS CONTINUOUS PRN
Status: DISCONTINUED | OUTPATIENT
Start: 2020-12-07 | End: 2020-12-07

## 2020-12-07 RX ADMIN — METHIMAZOLE 10 MG: 10 TABLET ORAL at 08:12

## 2020-12-07 RX ADMIN — HEPARIN SODIUM 5000 UNITS: 5000 INJECTION INTRAVENOUS; SUBCUTANEOUS at 09:12

## 2020-12-07 RX ADMIN — FUROSEMIDE 80 MG: 10 INJECTION, SOLUTION INTRAMUSCULAR; INTRAVENOUS at 08:12

## 2020-12-07 RX ADMIN — METRONIDAZOLE 500 MG: 500 TABLET ORAL at 02:12

## 2020-12-07 RX ADMIN — FENTANYL CITRATE 25 MCG: 50 INJECTION, SOLUTION INTRAMUSCULAR; INTRAVENOUS at 01:12

## 2020-12-07 RX ADMIN — CEFTRIAXONE 2 G: 2 INJECTION, SOLUTION INTRAVENOUS at 08:12

## 2020-12-07 RX ADMIN — HYDROMORPHONE HYDROCHLORIDE 0.5 MG: 1 INJECTION, SOLUTION INTRAMUSCULAR; INTRAVENOUS; SUBCUTANEOUS at 01:12

## 2020-12-07 RX ADMIN — OXYCODONE HYDROCHLORIDE 10 MG: 10 TABLET ORAL at 06:12

## 2020-12-07 RX ADMIN — METRONIDAZOLE 500 MG: 500 TABLET ORAL at 09:12

## 2020-12-07 RX ADMIN — INSULIN DETEMIR 36 UNITS: 100 INJECTION, SOLUTION SUBCUTANEOUS at 09:12

## 2020-12-07 RX ADMIN — MELATONIN TAB 3 MG 6 MG: 3 TAB at 09:12

## 2020-12-07 RX ADMIN — HYDROMORPHONE HYDROCHLORIDE 0.2 MG: 1 INJECTION, SOLUTION INTRAMUSCULAR; INTRAVENOUS; SUBCUTANEOUS at 02:12

## 2020-12-07 RX ADMIN — INSULIN ASPART 17 UNITS: 100 INJECTION, SOLUTION INTRAVENOUS; SUBCUTANEOUS at 05:12

## 2020-12-07 RX ADMIN — METRONIDAZOLE 500 MG: 500 TABLET ORAL at 06:12

## 2020-12-07 RX ADMIN — Medication 10 MG: at 02:12

## 2020-12-07 RX ADMIN — OXYCODONE 5 MG: 5 TABLET ORAL at 08:12

## 2020-12-07 RX ADMIN — OXYCODONE HYDROCHLORIDE 10 MG: 10 TABLET ORAL at 11:12

## 2020-12-07 RX ADMIN — GABAPENTIN 300 MG: 300 CAPSULE ORAL at 08:12

## 2020-12-07 RX ADMIN — OXYCODONE 5 MG: 5 TABLET ORAL at 09:12

## 2020-12-07 RX ADMIN — HYDROMORPHONE HYDROCHLORIDE 0.5 MG: 1 INJECTION, SOLUTION INTRAMUSCULAR; INTRAVENOUS; SUBCUTANEOUS at 08:12

## 2020-12-07 RX ADMIN — DOCUSATE SODIUM AND SENNOSIDES 1 TABLET: 8.6; 5 TABLET, FILM COATED ORAL at 09:12

## 2020-12-07 RX ADMIN — PROPOFOL 100 MCG/KG/MIN: 10 INJECTION, EMULSION INTRAVENOUS at 01:12

## 2020-12-07 RX ADMIN — ROSUVASTATIN CALCIUM 10 MG: 10 TABLET, FILM COATED ORAL at 08:12

## 2020-12-07 RX ADMIN — INSULIN ASPART 2 UNITS: 100 INJECTION, SOLUTION INTRAVENOUS; SUBCUTANEOUS at 12:12

## 2020-12-07 NOTE — PT/OT/SLP PROGRESS
Occupational Therapy   Treatment    Name: Navin Beck  MRN: [id number]  Admitting Diagnosis:  Encounter for continuous renal replacement therapy (CRRT) for acute renal failure  Day of Surgery    Recommendations:     Discharge Recommendations: nursing facility, skilled  Discharge Equipment Recommendations:  (TBD)  Barriers to discharge:  None    Assessment:     Navin Beck is a 60 y.o. female with a medical diagnosis of Encounter for continuous renal replacement therapy (CRRT) for acute renal failure.  She remains limited in performance of self-care and ADLs at PLOF 2*  . Performance deficits affecting function are weakness, impaired functional mobilty, impaired endurance, impaired balance, impaired self care skills, decreased lower extremity function, decreased upper extremity function, pain, decreased safety awareness.     Rehab Prognosis:  Good; patient would benefit from acute skilled OT services to address these deficits and reach maximum level of function.       Plan:     Patient to be seen 3 x/week to address the above listed problems via self-care/home management, therapeutic activities, therapeutic exercises  · Plan of Care Expires: 12/28/20  · Plan of Care Reviewed with: patient    Subjective     Pain/Comfort:  · Pain Rating 1: 6/10  · Location - Side 1: Bilateral  · Location - Orientation 1: generalized  · Location 1: back(and stomach)  · Pain Addressed 1: Reposition, Distraction, Pre-medicate for activity  · Pain Rating Post-Intervention 1: 6/10    Objective:     Communicated with: Nurse prior to session.  Patient found supine with telemetry, pulse ox (continuous), blood pressure cuff, march catheter, central line, peripheral IV upon OT entry to room.    General Precautions: Standard, fall   Orthopedic Precautions:N/A   Braces: N/A     Occupational Performance:     Bed Mobility:    · Patient completed Rolling/Turning to Left with  stand by assistance  · Patient completed Rolling/Turning to  Right with stand by assistance  · Patient completed Scooting/Bridging with minimum assistance  · Patient completed Supine to Sit with stand by assistance     Functional Mobility/Transfers:  · Patient completed Sit <> Stand Transfer with moderate assistance  with  rolling walker   · Patient completed Bed <> Chair Transfer using Stand Pivot technique with moderate assistance with rolling walker    Activities of Daily Living:  · Grooming: stand by assistance with Pt performing grooming seated EOB.  · Upper Body Dressing: minimum assistance with (A) to pass gown behind her back when donning as a robe.    Pt sat EOB for up to 10 minutes while performing self care tasks.  [facility] 6 Click ADL: 15    Treatment & Education:  Pt edu on role of OT, POC, safety when performing self care tasks , benefit of performing OOB activity, and safety when performing functional transfers and mobility.  - White board updated  - Self care tasks completed-- as noted above       Patient left up in chair with call button in reachEducation:      GOALS:   Multidisciplinary Problems     Occupational Therapy Goals        Problem: Occupational Therapy Goal    Goal Priority Disciplines Outcome Interventions   Occupational Therapy Goal     OT, PT/OT Ongoing, Progressing    Description: Goals to be met by: 12/16/20     Patient will increase functional independence with ADLs by performing:    Feeding with [place].  UE Dressing with MIN A   LE Dressing with MIN A .  Grooming while seated with Supervision.  Toileting from toilet with Moderate Assistance for hygiene and clothing management.   Toilet transfer to toilet with SBA.                     Time Tracking:     OT Date of Treatment: 12/07/20  OT Start Time: 0932  OT Stop Time: 0956  OT Total Time (min): 24 min    Billable Minutes:Self Care/Home Management 14  Therapeutic Activity 10    ZACH Jay/VERONICA  12/7/2020

## 2020-12-07 NOTE — ASSESSMENT & PLAN NOTE
ATN in setting of contrast administration and sepsis 2/2 UTI, baseline cr 1.0-1.2 that trended up to 4.6 at time of consult.     -received contrast on 11/27/2020  -Urine [id number]/30/2020 many muddy brown granular casts, massive WBC's  -purulent drainage from abdominal ROSAURA drain similar in appearance to her urine; concerning for communication between bladder and fluid collection   -Initiated prismax 12/1, last received prismax 12/4    Recomendations    -Now with good UO over the weekend on lasix 80 IV BID  -No significant electrolyte disturbances or acute volume issues requiring further RRT at this time  -Pt with significant UO on lasix, and serum bicarbonate trending up. Discontinued bicarb tabs. Consider trending serum Na and bicarb off lasix to avoid over diuresis/ post-ATN diuresis   -Maximum gabapentin dose 300mg in CKD patients  -Maintain Hgb >7.0, Keep MAP >65  -Renally dose meds and avoid nephrotoxic meds  -Will continue to follow closely

## 2020-12-07 NOTE — PROGRESS NOTES
[facility] Hospital Medicine  Progress Note    Patient Name: Navin Beck  MRN: [id number]  Patient Class: IP- Inpatient   Admission Date: 11/26/2020  Length of Stay: 10 days  Attending Physician: Michael Jeffrey MD  Primary Care Provider: Elvira Quinones MD    [facility] Medicine Team: [facility] MED 2 Stefano Dimas MD    Subjective:     Principal Problem:Encounter for continuous renal replacement therapy (CRRT) for acute renal failure        HPI:  Ms. Beck is a 59yo F with PMH of HTN, HLD, T2DM, Hyperthyroidism, and Asthma who had recent GYN surgery on 10/26 due to erosion of bladder mesh / prosthetic material with Cifuentes removal on 11/19. She presents to [facility] due to 4-5 days of not feeling well, she has had loss of appetite and weakness with 10/10 throbbing lower abdominal pain associated with vaginal pain and rectal pressure and non-bloody diarrhea. She has had trouble urinating for the past 5 days as well. She takes oxycodone 5 at home for pain, but it did not help. She endorses nausea and vomiting (4 times, non-bloody) also associated with the pain. She denies fever, chills, chest pain, SOB, trouble breathing, leg swelling, or any other symptom at this time.    ED Course: VSS, afebrile but leukocytosis at 17.85. Pt found to have DKA - Gluc 626, AG16, BHB 4.2, Fluids, insulin gtt started and Glucose down-trended to 200s. UA showed 2RBC, >100 WBC, bacteria, given CTX. CT A/P showed multiple fluid collections adjacent to bladder concerning for bladder perf with multiple urinomas, with mild bilateral hydronephrosis. Urology consulted and placed a Cifuentes with 900 UOP drained. GYN consulted due to recent surgery.     Pt will be admitted to hospital medicine for further management of DKA and possible intra-abdominal infection.     Overview/Hospital Course:  Pt was admitted on 11/28 due to 10/10 abdominal pain and also found to be in DKA. Pt was started on IVF, insulin gtt, and  "kept NPO with glucose downtrending and AG closed. CT abdomen showed multiple fluid collections near bladder. Renal cystography with "hypoattenuating collections in the lower pelvis adjacent to the urinary bladder with no definite extravasated contrast material appreciated within the pelvic collections to suggest communication with the bladder".  Urology and GYN on board, agree with plan for IR to place drain in fluid collections for culture and source control. Pt was started on CTX and Flagyl to cover for UTI and intra-abdominal infection, broadened to Cefepime, and Flagyl on 11/26; vanc added later. Endocrine consulted and managed the patient while acutely ill on insulin drip then transitioned to SQ when BG stabilized. IR place low abdominal drain to abscess on 11/28 with 250ml purulent material immediately expressed. Drained remained in place after the procedure. Urology continued to assess patient and recommended CT RSS after identifying some left CVAT and increased SCr with reduced UOP on 11/29, no hydronephrosis does not believe LAURIE is post-renal in nature. 11/30 pt remains anuric, Cr up-trending to 4.2, with hyponatremia. Nephrology consulted. 12/1 pt remains anuric after Lasix + Diuril challenge. Nephrology decided for initiation of CRRT due to low pressures with unresponsive ARF. Will transfer to ICU for CRRT. Pt transferred back to floor as no more need for CRRT on 12/5. Cystoscopy with retrograde pyleogram for 12/7 by Urology    Interval History/Significant Events: Patient on 3L NC satting >9%. Reports abdominal pain this morning. Pyelogram scheduled for today.     Review of Systems   Constitutional: Positive for activity change, appetite change and fatigue. Negative for chills and fever.   HENT: Negative for hearing loss, rhinorrhea, sneezing, sore throat and trouble swallowing.    Respiratory: Negative for cough, shortness of breath and wheezing.    Cardiovascular: Negative for chest pain, palpitations " and leg swelling.   Gastrointestinal: Positive for rectal pain and vomiting. Negative for abdominal pain, blood in stool, constipation, diarrhea and nausea.   Genitourinary: Positive for difficulty urinating and pelvic pain. Negative for dysuria and flank pain.   Musculoskeletal: Negative for arthralgias, neck pain and neck stiffness.   Skin: Negative for color change, pallor and rash.   Neurological: Negative for syncope, weakness, light-headedness, numbness and headaches.   Psychiatric/Behavioral: Negative for confusion and hallucinations. The patient is not nervous/anxious.      Objective:     Vital Signs (Most Recent):  Temp: 98.1 °F (36.7 °C) (12/07/20 1140)  Pulse: 69 (12/07/20 1140)  Resp: 18 (12/07/20 1149)  BP: 126/60 (12/07/20 1140)  SpO2: 99 % (12/07/20 1140) Vital Signs (24h Range):  Temp:  [97.8 °F (36.6 °C)-99 °F (37.2 °C)] 98.1 °F (36.7 °C)  Pulse:  [69-88] 69  Resp:  [17-20] 18  SpO2:  [89 %-99 %] 99 %  BP: (113-129)/(50-60) 126/60   Weight: 112.9 kg (249 lb)  Body mass index is 39 kg/m².      Intake/Output Summary (Last 24 hours) at 12/7/2020 1153  Last data filed at 12/7/2020 1100  Gross per 24 hour   Intake 450 ml   Output 3410 ml   Net -2960 ml       Physical Exam  Constitutional:       General: She is not in acute distress.     Appearance: She is obese. She is not ill-appearing or toxic-appearing.   HENT:      Head: Normocephalic and atraumatic.      Nose: No congestion or rhinorrhea.      Mouth/Throat:      Pharynx: No oropharyngeal exudate or posterior oropharyngeal erythema.   Eyes:      General: No scleral icterus.        Right eye: No discharge.         Left eye: No discharge.      Extraocular Movements: Extraocular movements intact.      Pupils: Pupils are equal, round, and reactive to light.   Neck:      Musculoskeletal: Normal range of motion and neck supple. No neck rigidity or muscular tenderness.   Cardiovascular:      Rate and Rhythm: Normal rate and regular rhythm.      Pulses:  Normal pulses.      Heart sounds: No murmur.   Pulmonary:      Effort: Pulmonary effort is normal. No respiratory distress.      Breath sounds: Normal breath sounds. No stridor. No wheezing.   Abdominal:      General: Abdomen is flat. Bowel sounds are normal. There is no distension.      Palpations: Abdomen is soft. There is no mass.      Tenderness: There is abdominal tenderness (LLQ, drain in place with minimal pustular yellow fluid, dressings CDI).      Hernia: No hernia is present.   Musculoskeletal: Normal range of motion.         General: No swelling, tenderness or deformity.   Skin:     General: Skin is warm and dry.      Coloration: Skin is not jaundiced or pale.      Findings: No bruising.   Neurological:      General: No focal deficit present.      Mental Status: She is alert. Mental status is at baseline.         Vents:  Oxygen Concentration (%): 24 (12/06/20 0400)  Lines/Drains/Airways     Central Venous Catheter Line            Trialysis (Dialysis) Catheter 12/01/20 1547 right internal jugular 5 days          Drain                 Urethral Catheter 11/27/20 0145 Latex 16 Fr. 10 days         Closed/Suction Drain 11/28/20 1358 Right Abdomen Bulb 10 Fr. 8 days          Peripheral Intravenous Line                 Peripheral IV - Single Lumen 12/06/20 2023 20 G Anterior;Left Forearm less than 1 day              Significant Labs:    CBC/Anemia Profile:  Recent Labs   Lab 12/06/20  0642 12/06/20  1513 12/07/20  0511   WBC 11.75 14.42* 12.29   HGB 6.6* 8.3* 7.9*   HCT 21.2* 26.1* 25.4*   * 385* 391*   MCV 91 92 93   RDW 14.4 14.6* 14.7*        Chemistries:  Recent Labs   Lab 12/05/20  1537  12/06/20  0558 12/06/20  0601 12/06/20  0752 12/07/20  0511   *  133*  131*   < > 134*  134* 134* 134* 136   K 4.2  4.2  4.1   < > 4.0  4.0 4.0 4.1 3.8   CL 95  95  95   < > 96  96 96 96 95   CO2 29  29  29   < > 31*  31* 29 28 31*   BUN 14  14  14   < > 20  20 19 21 24*   CREATININE 3.8*  3.8*   3.8*   < > 4.4*  4.4* 4.5* 4.5* 5.1*   CALCIUM 8.4*  8.4*  8.5*   < > 7.8*  7.8* 7.9* 7.7* 7.7*   ALBUMIN 1.7*  1.7*  1.7*  --  1.6* 1.7*  --  1.8*   PROT 5.9*  --   --  5.7*  --  6.0   BILITOT <0.1*  --   --  <0.1*  --  0.1   ALKPHOS 180*  --   --  153*  --  201*   ALT <5*  --   --  6*  --  5*   AST 13  --   --  22  --  16   MG 1.8  --   --   --   --  1.6   PHOS 3.8  3.8  3.7  --  4.2 4.4  --  4.8*    < > = values in this interval not displayed.       BMP:   Recent Labs   Lab 12/07/20  0511   *      K 3.8   CL 95   CO2 31*   BUN 24*   CREATININE 5.1*   CALCIUM 7.7*   MG 1.6     CMP:   Recent Labs   Lab 12/05/20  1537  12/06/20  0558 12/06/20  0601 12/06/20  0752 12/07/20  0511   *  133*  131*   < > 134*  134* 134* 134* 136   K 4.2  4.2  4.1   < > 4.0  4.0 4.0 4.1 3.8   CL 95  95  95   < > 96  96 96 96 95   CO2 29  29  29   < > 31*  31* 29 28 31*   *  291*  288*   < > 190*  190* 191* 175* 156*   BUN 14  14  14   < > 20  20 19 21 24*   CREATININE 3.8*  3.8*  3.8*   < > 4.4*  4.4* 4.5* 4.5* 5.1*   CALCIUM 8.4*  8.4*  8.5*   < > 7.8*  7.8* 7.9* 7.7* 7.7*   PROT 5.9*  --   --  5.7*  --  6.0   ALBUMIN 1.7*  1.7*  1.7*  --  1.6* 1.7*  --  1.8*   BILITOT <0.1*  --   --  <0.1*  --  0.1   ALKPHOS 180*  --   --  153*  --  201*   AST 13  --   --  22  --  16   ALT <5*  --   --  6*  --  5*   ANIONGAP 9  9  7*   < > 7*  7* 9 10 10   EGFRNONAA 12.0*  12.0*  12.0*   < > 10.1*  10.1* 9.8* 9.8* 8.4*    < > = values in this interval not displayed.       Significant Imaging:  I have reviewed all pertinent imaging results/findings within the past 24 hours.      Assessment/Plan:      * LAURIE  Cr 1.5 on presentation.   Likely multi-factorial on presentation 2/2 pre-renal volume depletion in the context of DKA, UTI, and post-renal obstruction  Repeat LAURIE on 11/29 likely intrinsic ATN in etiology    Recent Labs   Lab 12/05/20  2359 12/06/20  0558 12/06/20  0601 12/06/20  0752  12/07/20  0511   CREATININE 4.4* 4.4*  4.4* 4.5* 4.5* 5.1*     PLAN  - Initial LAURIE on presentation resolved with IVF and placement of March with resolution of hydronephrosis on imaging   - Maintain March per Urology   - Treating UTI with Cefepime  - LAURIE due to ATN began on 11/29 and continues to worsen   - Remains anuric after IVF bolus, Lasix + Diuril challenge   - Resp status stable but Cr uptrending, pt lethargic and waxing/waning alertness  - Nephrology consulted, appreciate recs   - Initiation of CRRT, transfer to ICU on 12/2   - Transferred to floor on 12/4   - Pt made ~1L urine after Lasix + Diuril challenge   - May be in recovery phase of LAURIE   - Continue Lasix 80IV BID per nephro recs   - BMP q4   - Renally dose meds   - Avoid renal toxins (ACEi/ARB, NSAIDs, contrast, etc.)   - Strict I&Os    Anemia  Pt Hb 9.8 on admit. BL ~10.   Most likely due to anemia of chronic disease, acute blood loss, and frequent phlebotomy while hospitalized.   Recent Labs   Lab 12/05/20  1537 12/06/20  0642 12/06/20  1513 12/07/20  0511   HGB 7.2* 6.6* 8.3* 7.9*         - Required 1U PRBC on 12/2  - Required 1U PRBC on 12/6 2/2 Hb 6.6  - Will consult with Nephro to evaluate need for Epo    Hyponatremia  Pt hyponatremia to 120s with spontaneous jerking motions 11/29.    - Nephrology on board, appreciate recs   - Possibly 2/2 D5 IVF with decreased UOP while in DKA   - BMP daily   - Latest Na 134   - 1L fluid restrict    Postprocedural intraabdominal abscess  Mesh removed by GYN 10/26; march placed  March removed 11/19 --> increased pain  Admitted 11/27 with worsening abd pain found to be related to intra-abd fluid collections.   UCx 11/26 with E.coli    PLAN  - Urology consulted; lesions adjacent to bladder per CT cysto 11/27  - Gyn consulted  - IR consulted drained abscess 11/28  - Cultures: Peptostreptococcus pending margy.  - Broad spectrum abx: CTX, Flagyl D11/14      UTI (urinary tract infection)  Pt presented with lower  abdominal/vaginal/rectal pain with dysuria and urinary retention.   UA: 2 RBC, >100 WBC, 2+ leukocytes, bacteria  Recent UTIs with pan-sensitive E coli.    PLAN  - Ceftriaxone course complete  - UCx with E coli sens to cefepime and ceftriaxone  - Cifuentes placed for urinary retention, maintain Cifuentes per Urology      Urinary retention  Pt has recent h/o GYN surgery in 10/2020 for bladder mesh removal, Cifuentes was removed on 11/19. For the past few days pt has had increased trouble urinating associated with pain.     PLAN  CT A/P 11/27: Multiple fluid collections adjacent to bladder. Mild bilateral hydronephrosis.   Retention likely 2/2 anatomic obstruction, pelvic fluid collection, diabetic bladder  - Urology consulted in ED, appreciate recs   - Cifuentes placed 800cc drained, bladder irrigated and all fluid removed with ease   - Maintain Cifuentes   - Rec drainage of pelvic fluid collection by IR with cultures & creatinine   - CT RSS ordered by uro 11/29 for left CVAT, incr SCr, decr UOP   - No hydronephrosis seen on CT, most likely not post-renal in etiology  - GYN consulted, appreciate consult   - Agrees with Urology plan for drainage with IR  - IR consulted, appreciate recs   - IR placed drain 11/28 into pelvic abscess, draining minimal yellow fluid    Type 2 diabetes mellitus with hyperglycemia, with long-term current use of insulin  Last Hb A1c 10.3 on 9/25/20  Home regimen: Metformin 500 BID, Aspart 15U TID, Glargine 60 BID  Pt says her blood sugar has been running to the 300s for the past few days     PLAN  - Hold oral anti-glycemics while hospitalized  - Endocrine consulted, recs appreciated  - Accu-checks qhour  - See DKA notes      Asthma  Pt states she uses Albuterol inhaler TID every day.     PLAN  - Duonebs q6 PRN    Diabetic ketoacidosis without coma associated with type 2 diabetes mellitus  Pt presented to [facility] ED with Gluc 626, HCO3 16, AG 16, BHB 4.2, ketonuria. 2L IVF given in ED with insulin gtt started.  Glucose trended downward to 274. Pt was then admitted to hospital medicine for further management of DKA.    PLAN  - Endocrine consulted, appreciate recs   - Transitional Insulin gtt stopped 12/1   - Gluc regimen increased 2/2 gluc 230-240s    Detemir 36U BID    Aspart 15U TID WM   - Goal gluc 140-180   - Diabetic diet  - BMP daily  - Will replace K PRN  - Nausea: zofran PRN     Hyperthyroidism  Continue home Methimazole    PLAN  - TSH WNL  - Endocrine aware    Severe obesity (BMI 35.0-35.9 with comorbidity)   on exercise, diet, and weight management     Abnormal abdominal CT scan  Pt presented to [facility] due to 5-6 days of 10/10 lower abdomen, vaginal, and rectal pain. Pt has recent h/o GYN surgery in 10/2020 for bladder mesh removal, Cifuentes was removed on 11/19.  CT A/P 11/27: Multiple fluid collections adjacent to bladder. Mild bilateral hydronephrosis.   UCx 11/26: E.coli sensitive to CTX, cefepime    PLAN  - GYN consulted, appreciate consult; Agrees with Urology plan for drainage with IR  - IR consulted, appreciate recs; Drain in place; cultures sent 11/28    - Gram stain, many GPC   - Cultures grew Peptostrptococcus on 12/5 pending margy.  - Pain management: PCA pump initially   - PCA pump d/c on 11/30   - PRN opiates  - ID consulted for intra-abdominal infection, appreciate recs   - Continue CTX & Flagyl x14 days, repeat imaging to assess for cleared infection prior to d/c abx   - If d/c before 14d course, may switch to augmentin  - Urology recs appreciated; left CVAT, ordered CT RSS 11/29 hydronephrosis resolved   - Cystography 11/27 showed no bladder leak   - Maintain Cifuentes   - 12/6 spoke with urology concerning ROSAURA drain output concern for urinary contents. Per Urology, since ROSAURA fluid Cr 1.7 is less than serum Cr 4.5, the fluid is not urine.    - Cytoscopy/pyelogram today  - Repeat KUB for abdominal pain      Mixed hyperlipidemia  Continue home statin      Essential hypertension  Hold home Benazepril due to  LAURIE, low BP    PLAN  - Will monitor BP and add anti-hypertensive as necessary     VTE Risk Mitigation (From admission, onward)         Ordered     heparin (porcine) injection 5,000 Units  Every 8 hours      12/01/20 1426     Place sequential compression device  Until discontinued      12/01/20 1409     IP VTE HIGH RISK PATIENT  Once      12/01/20 1409                Discharge Planning   RAFA: 12/9/2020     Code Status: Full Code   Is the patient medically ready for discharge?: No    Reason for patient still in hospital (select all that apply): Patient trending condition  Discharge Plan A: Rehab, Skilled Nursing Facility   Discharge Delays: None known at this time              Stefano Dimas MD  Department of Hospital Medicine   [facility]

## 2020-12-07 NOTE — TRANSFER OF CARE
"Anesthesia Transfer of Care Note    Patient: Navin Beck    Procedure(s) Performed: Procedure(s) (LRB):  CYSTOSCOPY, WITH RETROGRADE PYELOGRAM (Bilateral)    Patient location: PACU    Anesthesia Type: general    Transport from OR: Transported from OR on 6-10 L/min O2 by face mask with adequate spontaneous ventilation    Post pain: adequate analgesia    Post assessment: no apparent anesthetic complications and tolerated procedure well    Post vital signs: stable    Level of consciousness: awake and alert    Nausea/Vomiting: no nausea/vomiting    Complications: none    Transfer of care protocol was followed      Last vitals:   Visit Vitals  /80 (BP Location: Left arm, Patient Position: Lying)   Pulse 64   Temp 35.8 °C (96.4 °F) (Temporal)   Resp 12   Ht 5' 7" (1.702 m)   Wt 112.9 kg (249 lb)   SpO2 100%   BMI 39.00 kg/m²     "

## 2020-12-07 NOTE — PROGRESS NOTES
Report obtained from nurse Quiana. Transport request placed in Epic. Patient to arrive via bed to preOp room 43.

## 2020-12-07 NOTE — ASSESSMENT & PLAN NOTE
Cr 1.5 on presentation.   Likely multi-factorial on presentation 2/2 pre-renal volume depletion in the context of DKA, UTI, and post-renal obstruction  Repeat LAURIE on 11/29 likely intrinsic ATN in etiology    Recent Labs   Lab 12/05/20  2359 12/06/20  0558 12/06/20  0601 12/06/20  0752 12/07/20  0511   CREATININE 4.4* 4.4*  4.4* 4.5* 4.5* 5.1*     PLAN  - Initial LAURIE on presentation resolved with IVF and placement of Cifuentes with resolution of hydronephrosis on imaging   - Maintain Cifuentes per Urology   - Treating UTI with Cefepime  - LAURIE due to ATN began on 11/29 and continues to worsen   - Remains anuric after IVF bolus, Lasix + Diuril challenge   - Resp status stable but Cr uptrending, pt lethargic and waxing/waning alertness  - Nephrology consulted, appreciate recs   - Initiation of CRRT, transfer to ICU on 12/2   - Transferred to floor on 12/4   - Pt made ~1L urine after Lasix + Diuril challenge   - May be in recovery phase of LAURIE   - Continue Lasix 80IV BID per nephro recs   - BMP q4   - Renally dose meds   - Avoid renal toxins (ACEi/ARB, NSAIDs, contrast, etc.)   - Strict I&Os

## 2020-12-07 NOTE — OP NOTE
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 10/26/2020    Eroded bladder suspension mesh 10/26/2020    Vaginal erosion due to surgical mesh 08/14/2020    Gastroesophageal reflux disease without esophagitis 07/17/2020    Asthma 07/17/2020    Type 2 diabetes mellitus with hyperglycemia, with long-term current use of insulin 07/17/2020    Calcification of aorta 07/26/2018    Thrombocytosis 07/26/2018    Hypertension associated with diabetes 07/26/2018    Status post total right knee replacement 09/06/2017    Graves disease 08/09/2017    Tobacco abuse 08/07/2017    Hyperthyroidism 08/07/2017    Diabetic ketoacidosis without coma associated with type 2 diabetes mellitus 08/07/2017    Primary osteoarthritis of right knee 03/27/2017    Severe obesity (BMI 35.0-35.9 with comorbidity) 09/09/2013    Thymoma 07/24/2012    Essential hypertension 07/06/2012    Postmenopausal atrophic vaginitis 07/06/2012    ASCVD (arteriosclerotic cardiovascular disease) 07/06/2012    Other benign neoplasm of connective and other soft tissue of pelvis 06/17/2012    Abnormal abdominal CT scan 05/28/2012    Mixed hyperlipidemia 04/14/2012     Post-Op Diagnosis: same    Procedure(s) Performed:   1.  Cystoscopy with bilateral retrograde pyelogram  2.  Fluoroscopy < 1 hour  3.  Insertion of march catheter, simple  4.  Intra-operative interpretation of radiographic images    Specimen(s): none    Staff Surgeon: Daniel Jalloh MD      Assistant Surgeon: Angel Bautista MD    Anesthesia: Monitored Local Anesthesia with Sedation    Indications: Navin Beck is a 60 y.o. female with a history of exposed vaginal mesh status post excision.  At the time of her previous excision urology was asked to assist with the bladder drop.  She then presented with a prevesical abscess requiring drainage by IR.  She has had acute renal failure requiring CRRT.  A cystogram was negative for leak, drain fluid creatinine was negative for week, and there is no hydronephrosis on recent imaging.  She presents for cysto retrograde to rule out ureteral injury.    Findings:   - bilateral retrograde pyelogram showed delicate calices with no evidence of hydronephrosis, no filling defects, and ureters normal in course and caliber   - edematous changes along the posterior wall likely consistent with indwelling March catheter    Estimated Blood Loss: min    Drains: 18 Fr march catheter, IR drain from previous    Procedure in Detail:  After risks, benefits and possible complications of cystoscopy were explained, the patient elected to undergo the procedure and informed consent was obtained. All questions were answered in the pooja-operative area. The patient was transferred to the cystoscopy suite and placed in the supine position.  SCDs were applied and working.  MAC anesthesia was administered.  Once adequately sedated, the patient was placed in the dorsal lithotomy position and prepped and draped in the usual sterile fashion.  Time out was performed, pooja-procedural antibiotics were confirmed.     A rigid cystoscope in a 22 Fr sheath was introduced into the bladder per urethra. This passed easily. The entire urethra was visualized which showed no masses or strictures.  The right and left ureteral orifices were identified in the normal anatomic position.  Formal cystoscopy was performed which revealed no masses or lesions suspicious for malignancy, no trabeculations,  no bladder stones and no bladder diverticula.  There were edematous changes along the posterior wall likely consistent with an indwelling Cifuentes catheter.    The left UO was identified and cannulated with a 5 Fr open-ended ureteral catheter. Using fluoroscopy, a RPG was performed which showed delicate calices with no evidence of hydronephrosis, no filling defects, and ureters normal in course and caliber. This was then repeated on the right side, revealing delicate calices with no evidence of hydronephrosis, no filling defects, and ureters normal in course and caliber.  Specifically there was no evidence of contrast extravasation bilaterally.  An 18 Sami Cifuentes catheter was placed in standard fashion. The patient was removed from lithotomy.     The patient tolerated the procedure well and was transferred to recovery in stable condition.    Disposition:  The patient will return to the hospital medicine service.    Angel Bautista MD

## 2020-12-07 NOTE — ANESTHESIA POSTPROCEDURE EVALUATION
Anesthesia Post Evaluation    Patient: Navin Beck    Procedure(s) Performed: Procedure(s) (LRB):  CYSTOSCOPY, WITH RETROGRADE PYELOGRAM (Bilateral)    Final Anesthesia Type: general    Patient location during evaluation: PACU  Patient participation: Yes- Able to Participate  Level of consciousness: awake  Post-procedure vital signs: reviewed and stable  Pain management: adequate  Airway patency: patent    PONV status at discharge: No PONV  Anesthetic complications: no      Cardiovascular status: blood pressure returned to baseline  Respiratory status: unassisted  Hydration status: euvolemic  Follow-up not needed.          Vitals Value Taken Time   /56 12/07/20 1532   Temp 35.8 °C (96.4 °F) 12/07/20 1530   Pulse 69 12/07/20 1545   Resp 18 12/07/20 1530   SpO2 100 % 12/07/20 1545   Vitals shown include unvalidated device data.      Event Time   Out of Recovery 12/07/2020 15:00:00         Pain/Lexus Score: Pain Rating Prior to Med Admin: 10 (12/7/2020  2:45 PM)  Pain Rating Post Med Admin: 4 (12/7/2020 12:49 PM)  Lexus Score: 9 (12/7/2020  3:30 PM)

## 2020-12-07 NOTE — NURSING TRANSFER
Nursing Transfer Note      12/7/2020     Transfer To: [id number] from PACU    Transfer via Bed    Transfer with cardiac monitoring and 3L O2 nasal canula    Transported by Transport    Medicines sent: NA     Chart send with patient: Yes    Notified: son    Patient reassessed at: 12/7/20 @ 7538

## 2020-12-07 NOTE — PLAN OF CARE
Problem: Diabetes Comorbidity  Goal: Blood Glucose Level Within Desired Range  Outcome: Ongoing, Progressing  Intervention: Maintain Glycemic Control  Flowsheets (Taken 12/6/2020 1812)  Glycemic Management:   blood glucose monitoring   supplemental insulin given     Problem: Hematologic Alteration (Acute Kidney Injury/Impairment)  Goal: Hemoglobin, Hematocrit and Platelets Within Normal Range  Outcome: Ongoing, Not Progressing  Intervention: Monitor and Manage Signs of Anemia and Bleeding  Flowsheets (Taken 12/6/2020 1812)  Bleeding Precautions:   blood pressure closely monitored   coagulation study results reviewed   monitored for signs of bleeding  Bleeding Management:   blood products administered   dressing monitored   POC reviewed with pt, pt A&Ox4, VSS, pt received 1 unit PRBC- tolerated well, removed pt old PIV to both BUE, pt still needs new IV- attempted, pt ACHS, dialysis, pt to go for cystoscopy tomorrow, no acute changes this shift.

## 2020-12-07 NOTE — PT/OT/SLP PROGRESS
Physical Therapy Treatment    Patient Name:  Navin Beck   MRN:  [id number]    Recommendations:     Discharge Recommendations:  nursing facility, skilled   Discharge Equipment Recommendations: (TBD)   Barriers to discharge: Decreased caregiver support (due to current LOF)    Assessment:     Navin Beck is a 60 y.o. female admitted with a medical diagnosis of Encounter for continuous renal replacement therapy (CRRT) for acute renal failure.  She presents with the following impairments/functional limitations:  weakness, impaired endurance, impaired self care skills, impaired functional mobilty, gait instability, impaired balance .     Pt macey session well w/ good participation. She was able to show improvement w/ bed mobility, transfers, and short distance gait today all w/ less assistance compared to previous session. She is now at a Mod/Roberto level for transfers and short distance gait. Pt will continue PT POC.    Rehab Prognosis: Good; patient would benefit from acute skilled PT services to address these deficits and reach maximum level of function.    Recent Surgery: Procedure(s) (LRB):  CYSTOSCOPY, WITH RETROGRADE PYELOGRAM (Bilateral) Day of Surgery    Plan:     During this hospitalization, patient to be seen 3 x/week to address the identified rehab impairments via gait training, therapeutic activities, therapeutic exercises, neuromuscular re-education and progress toward the following goals:    · Plan of Care Expires:  12/31/20    Subjective     Chief Complaint: weakness  Patient/Family Comments/goals: to get stronger  Pain/Comfort:  · Pain Rating 1: 6/10  · Location - Orientation 1: generalized  · Location 1: (back and abdomen)  · Pain Addressed 1: Pre-medicate for activity, Reposition  · Pain Rating Post-Intervention 1: 6/10      Objective:     Communicated with nursing prior to session.  Patient found supine with central line, march catheter, telemetry, oxygen upon PT entry to room.     General  Precautions: Standard, fall   Orthopedic Precautions:N/A   Braces: N/A     Functional Mobility:  · Bed Mobility:    · Supine>sit on bed w/ SBA  · HOB elevated and w/ side rail  · inc'd time and cues required  · Transfers:   · Sit>stand from EOB w/ RW and ModA(x1) to rise  · Cues for hand/foot placement and forward lean  · Stand pivot EOB>bedside chair w/ RW and Mod/Roberto(x1) to rise and pivot  · Cues to step back close to chair prior to sitting  · Gait:   · ~4 side steps towards bedside chair w/ RW and Roberto(x1) for stability  · Cues for RW management and posture  · Balance:   · Static sit at EOB w/ SBA/SPV  · Static stand w/ RW and Min/CGA for stability      AM-PAC 6 CLICK MOBILITY  Turning over in bed (including adjusting bedclothes, sheets and blankets)?: 3  Sitting down on and standing up from a chair with arms (e.g., wheelchair, bedside commode, etc.): 2  Moving from lying on back to sitting on the side of the bed?: 3  Moving to and from a bed to a chair (including a wheelchair)?: 2  Need to walk in hospital room?: 2  Climbing 3-5 steps with a railing?: 1  Basic Mobility Total Score: 13       Therapeutic Activities and Exercises:  Pt was instructed to complete seated/supine therex including GS and APs x10 reps 2-3x/day. Pt was also re-educated on PT role, POC, and D/C recs. Pt verb understanding.    Patient left up in chair with all lines intact and call button in reach..    GOALS:   Multidisciplinary Problems     Physical Therapy Goals        Problem: Physical Therapy Goal    Goal Priority Disciplines Outcome Goal Variances Interventions   Physical Therapy Goal     PT, PT/OT Ongoing, Progressing     Description: Goals to be met by: 2020     Patient will increase functional independence with mobility by performin. Pt supine to sit with minimum assistance- Met 2020  3. Sit to stand transfer with Minimum assistance and RW if needed. - not met  4. Bed to chair transfer with Minimum assistance  using  Rolling Walker or no AD.-not met  5. Gait  x 50 feet with Minimum assistanc using Rolling Walker or no AD. -not met  6. Lower extremity exercise program x20 reps, with independence -not met                     Time Tracking:     PT Received On: 12/07/20  PT Start Time: 0932     PT Stop Time: 0955  PT Total Time (min): 23 min     Billable Minutes: Gait Training 8, Therapeutic Activity 15 and Total Time 23    Treatment Type: Treatment  PT/PTA: PT     PTA Visit Number: 0     Alexa Burgess, PT  12/07/2020

## 2020-12-07 NOTE — PROGRESS NOTES
[facility]  Urology  Progress Note    Patient Name: Navin Beck  MRN: [id number]  Admission Date: 11/26/2020  Hospital Length of Stay: 10 days  Code Status: Full Code   Attending Provider: Michael Jeffrey MD   Primary Care Physician: Elvira Quinones MD    Subjective:     HPI:  61 yo female with history of HTN, DM, and POP presenting to [facility] ED with abdominal pain, DKA, and urinary retention. Urology consulted for abnormal CT finding with concern for bladder perforation. Her urologic/gynecologic history includes an anterior colporrhapy with mesh in 2010 with Dr. Keene, mesh erosion and protrusion was noted and excised with Dr. Guzman in 2013 with a concomitant BSO with excision or round ligament tumor excision with Dr. Springer in 2013. She then represented to Dr. Goel in 2020 with vaginal pain and was noted to have additional mesh erosion on vaginal exam. She was most recently taken to the OR on 10/26 for robotic excision of mesh performed by Dr. Goel with intra-operative assistance from Dr. Trevino. Per the op note, there was significant adhesions in the pelvis, but the procedure was without complication. She was discharged with a march catheter that was removed on 11/9/20. Since then, the patient has had some complaints of continued vaginal and rectal discomfort as well as dysuria. She tries to hold her urine to avoid the pain. Today, she presented to the ED able to void some on her own, but in urinary retention. March placed per nursing in the ED with 800 cc clear yellow urine without debris or blood. UAmicroscopy shows 2 RBC, >100 WBC, and bacteria with 3 squams. She has urine cultures pending and has received rocephin in the ED. Leukocytosis to 17.8. Creatinine is 1.5 from 1.0.    Interval History: No acute events overnight. UOP good. WBC down. Afebrile, vital signs stable.      Objective:     Temp:  [97.8 °F (36.6 °C)-99 °F (37.2 °C)] 98.2 °F (36.8 °C)  Pulse:   [72-88] 72  Resp:  [17-20] 18  SpO2:  [89 %-97 %] 95 %  BP: (113-136)/(50-61) 119/50     Body mass index is 39 kg/m².      Bladder Scan Volume (mL): 19 mL (11/29/20 0614)    Drains     Drain                 Urethral Catheter 11/27/20 0145 Latex 16 Fr. 9 days         Closed/Suction Drain 11/28/20 1358 Right Abdomen Bulb 10 Fr. 7 days    Trialysis (Dialysis) Catheter 12/01/20 1547 right internal jugular 4 days                Physical Exam  Constitutional:       General: She is not in acute distress.     Appearance: She is well-developed.   Cardiovascular:      Rate and Rhythm: Normal rate.   Pulmonary:      Effort: Pulmonary effort is normal. No respiratory distress.   Abdominal:      General: Bowel sounds are normal. There is no distension.      Palpations: Abdomen is soft.      Tenderness: There is abdominal tenderness (near drain insertion site). There is no guarding or rebound.      Comments: IR drain in place, LLQ   Genitourinary:     Comments: march in place with clear yellow urine        Significant Labs:    BMP:  Recent Labs   Lab 12/06/20  0558 12/06/20  0601 12/06/20  0752   *  134* 134* 134*   K 4.0  4.0 4.0 4.1   CL 96  96 96 96   CO2 31*  31* 29 28   BUN 20  20 19 21   CREATININE 4.4*  4.4* 4.5* 4.5*   CALCIUM 7.8*  7.8* 7.9* 7.7*       CBC:   Recent Labs   Lab 12/06/20  0642 12/06/20  1513 12/07/20  0511   WBC 11.75 14.42* 12.29   HGB 6.6* 8.3* 7.9*   HCT 21.2* 26.1* 25.4*   * 385* 391*       All pertinent labs results from the past 24 hours have been reviewed.    Significant Imaging:  All pertinent imaging results/findings from the past 24 hours have been reviewed.      Assessment/Plan:     * Encounter for continuous renal replacement therapy (CRRT) for acute renal failure  - CRRT per nephrology  - UOP improving    Urinary retention  - Cause of retention likely multifactorial: anatomic vs diabetic cystopathy vs pelvic fluid collection  - Bilateral hydronephrosis on admission CT due  to urinary retention as this resolved on repeat CTRSS after march placement  - Maintain march for now given renal function    Abnormal abdominal CT scan  - CT cystography shows no evidence of bladder leak  - CT with multiple fluid collections adjacent to bladder s/p IR drain placement  - IR placed drain with output of pus. Maintain drain. Nursing to flush drain daily. Output decreasing   - drain creatinine negative   - cultures growing peptostreptococcus anarobius  - repeat CT shows no hydronephrosis or other signs of obstruction. LAURIE appears to be prerenal or intrinsic renal in origin    - it is encouraging that the cystogram was negative, drain creatinine was normal, and UOP is increasing. However to rule out injury to the bladder or ureters, plan for cystoscopy with left, possible right, retrograde pyelogram today.  - Consent obtained. I have explained the indications, risks, benefits, and alternatives of the procedure in detail. The patient voices understanding and all questions have been answered. The patient agrees to proceed as planned.  - NPO  - Covid test 12/6 negative.        VTE Risk Mitigation (From admission, onward)         Ordered     heparin (porcine) injection 5,000 Units  Every 8 hours      12/01/20 1426     Place sequential compression device  Until discontinued      12/01/20 1409     IP VTE HIGH RISK PATIENT  Once      12/01/20 1409                Siddharth Calderón MD  Urology  [facility]

## 2020-12-07 NOTE — ASSESSMENT & PLAN NOTE
Pt presented [place] [facility] due to 5-6 days of 10/10 lower abdomen, vaginal, and rectal pain. Pt has recent h/o GYN surgery in 10/2020 for bladder mesh removal, Cifuentes was removed on 11/19.  CT A/P 11/27: Multiple fluid collections adjacent to bladder. Mild bilateral hydronephrosis.   UCx 11/26: E.coli sensitive to CTX, cefepime    PLAN  - GYN consulted, appreciate consult; Agrees with Urology plan for drainage with IR  - IR consulted, appreciate recs; Drain in place; cultures sent 11/28    - Gram stain, many GPC   - Cultures grew Peptostrptococcus on 12/5 pending margy.  - Pain management: PCA pump initially   - PCA pump d/c on 11/30   - PRN opiates  - ID consulted for intra-abdominal infection, appreciate recs   - Continue CTX & Flagyl x14 days, repeat imaging to assess for cleared infection prior to d/c abx   - If d/c before 14d course, may switch to augmentin  - Urology recs appreciated; left CVAT, ordered CT RSS 11/29 hydronephrosis resolved   - Cystography 11/27 showed no bladder leak   - Maintain Cifuentes   - 12/6 spoke with urology concerning ROSAURA drain output concern for urinary contents. Per Urology, since ROSAURA fluid Cr 1.7 is less than serum Cr 4.5, the fluid is not urine.    - Cytoscopy/pyelogram today  - Repeat KUB for abdominal pain

## 2020-12-07 NOTE — SUBJECTIVE & OBJECTIVE
Interval History: No acute events overnight. UOP good. WBC down. Afebrile, vital signs stable.      Objective:     Temp:  [97.8 °F (36.6 °C)-99 °F (37.2 °C)] 98.2 °F (36.8 °C)  Pulse:  [72-88] 72  Resp:  [17-20] 18  SpO2:  [89 %-97 %] 95 %  BP: (113-136)/(50-61) 119/50     Body mass index is 39 kg/m².      Bladder Scan Volume (mL): 19 mL (11/29/20 0614)    Drains     Drain                 Urethral Catheter 11/27/20 0145 Latex 16 Fr. 9 days         Closed/Suction Drain 11/28/20 1358 Right Abdomen Bulb 10 Fr. 7 days    Trialysis (Dialysis) Catheter 12/01/20 1547 right internal jugular 4 days                Physical Exam  Constitutional:       General: She is not in acute distress.     Appearance: She is well-developed.   Cardiovascular:      Rate and Rhythm: Normal rate.   Pulmonary:      Effort: Pulmonary effort is normal. No respiratory distress.   Abdominal:      General: Bowel sounds are normal. There is no distension.      Palpations: Abdomen is soft.      Tenderness: There is abdominal tenderness (near drain insertion site). There is no guarding or rebound.      Comments: IR drain in place, LLQ   Genitourinary:     Comments: march in place with clear yellow urine        Significant Labs:    BMP:  Recent Labs   Lab 12/06/20  0558 12/06/20  0601 12/06/20  0752   *  134* 134* 134*   K 4.0  4.0 4.0 4.1   CL 96  96 96 96   CO2 31*  31* 29 28   BUN 20  20 19 21   CREATININE 4.4*  4.4* 4.5* 4.5*   CALCIUM 7.8*  7.8* 7.9* 7.7*       CBC:   Recent Labs   Lab 12/06/20  0642 12/06/20  1513 12/07/20  0511   WBC 11.75 14.42* 12.29   HGB 6.6* 8.3* 7.9*   HCT 21.2* 26.1* 25.4*   * 385* 391*       All pertinent labs results from the past 24 hours have been reviewed.    Significant Imaging:  All pertinent imaging results/findings from the past 24 hours have been reviewed.

## 2020-12-07 NOTE — ASSESSMENT & PLAN NOTE
- CT cystography shows no evidence of bladder leak  - CT with multiple fluid collections adjacent to bladder s/p IR drain placement  - IR placed drain with output of pus. Maintain drain. Nursing to flush drain daily. Output decreasing   - drain creatinine negative   - cultures growing peptostreptococcus anarobius  - repeat CT shows no hydronephrosis or other signs of obstruction. LAURIE appears to be prerenal or intrinsic renal in origin    - it is encouraging that the cystogram was negative, drain creatinine was normal, and UOP is increasing. However to rule out injury to the bladder or ureters, plan for cystoscopy with left, possible right, retrograde pyelogram today.  - Consent obtained. I have explained the indications, risks, benefits, and alternatives of the procedure in detail. The patient voices understanding and all questions have been answered. The patient agrees to proceed as planned.  - NPO  - Covid test 12/6 negative.

## 2020-12-07 NOTE — PLAN OF CARE
12/07/20 1028   Discharge Reassessment   Assessment Type Discharge Planning Reassessment   Provided patient/caregiver education on the expected discharge date and the discharge plan Yes   Do you have any problems affording any of your prescribed medications? No   Discharge Plan A Rehab;Skilled Nursing Facility   Discharge Plan B Home Health   DME Needed Upon Discharge  other (see comments)  (TBD)       Janice Irvin MPH, RN, CM  Ext. [phone]

## 2020-12-07 NOTE — PROGRESS NOTES
Urology Progress Note    Given the overall findings of negative cystogram, normal cysto, normal drain creatinine, negative upper tract imaging, and normal retrograde pyelogram, suspect that her LAURIE is not obstructive in nature, and is not due to leak or peritoneal absorption.     - no further urologic diagnostics or intervention at this time  - management of LAURIE per nephrology  - management of post-op infection per gynecology and ID  - will need voiding trial in the near future after resolution of pelvic infection  - urology will sign off at this time    Please call with further questions or concerns.    Angel Bautista MD  Urology, PGY-4  [facility]

## 2020-12-07 NOTE — SUBJECTIVE & OBJECTIVE
Interval History: Pt seen at bedside. Stepped down from ICU over the weekend. Last received prismax on 12/4. Good UO, 2.4 L past 24 hours on lasix 80 BID.     Objective:     Vital Signs (Most Recent):  Temp: 98.2 °F (36.8 °C) (12/07/20 0752)  Pulse: 71 (12/07/20 0752)  Resp: 18 (12/07/20 0803)  BP: 129/60 (12/07/20 0752)  SpO2: 97 % (12/07/20 0752)  O2 Device (Oxygen Therapy): nasal cannula (12/07/20 0400) Vital Signs (24h Range):  Temp:  [97.8 °F (36.6 °C)-99 °F (37.2 °C)] 98.2 °F (36.8 °C)  Pulse:  [71-88] 71  Resp:  [17-20] 18  SpO2:  [89 %-97 %] 97 %  BP: (113-129)/(50-60) 129/60     Weight: 112.9 kg (249 lb) (12/07/20 0521)  Body mass index is 39 kg/m².  Body surface area is 2.31 meters squared.    I/O last 3 completed shifts:  In: 500 [P.O.:450; IV Piggyback:50]  Out: 3660 [Urine:3650; Drains:10]    Physical Exam  Constitutional:       Appearance: She is obese.   HENT:      Head: Normocephalic and atraumatic.      Nose: Nose normal.      Mouth/Throat:      Mouth: Mucous membranes are moist.      Pharynx: Oropharynx is clear.   Eyes:      Conjunctiva/sclera: Conjunctivae normal.      Pupils: Pupils are equal, round, and reactive to light.   Neck:      Musculoskeletal: Normal range of motion and neck supple.   Cardiovascular:      Rate and Rhythm: Regular rhythm.   Pulmonary:      Effort: Pulmonary effort is normal.   Abdominal:      General: Abdomen is flat.      Palpations: Abdomen is soft.      Tenderness: There is no abdominal tenderness.   Musculoskeletal:         General: Swelling present. No deformity.   Skin:     General: Skin is warm and dry.   Neurological:      General: No focal deficit present.      Mental Status: She is alert and oriented to person, place, and time.         Significant Labs:  CBC:   Recent Labs   Lab 12/07/20  0511   WBC 12.29   RBC 2.73*   HGB 7.9*   HCT 25.4*   *   MCV 93   MCH 28.9   MCHC 31.1*     CMP:   Recent Labs   Lab 12/07/20  0511   *   CALCIUM 7.7*   ALBUMIN  1.8*   PROT 6.0      K 3.8   CO2 31*   CL 95   BUN 24*   CREATININE 5.1*   ALKPHOS 201*   ALT 5*   AST 16   BILITOT 0.1     All labs within the past 24 hours have been reviewed.

## 2020-12-07 NOTE — SUBJECTIVE & OBJECTIVE
Interval History/Significant Events: Patient on 3L NC satting >9%. Reports abdominal pain this morning. Pyelogram scheduled for today.     Review of Systems   Constitutional: Positive for activity change, appetite change and fatigue. Negative for chills and fever.   HENT: Negative for hearing loss, rhinorrhea, sneezing, sore throat and trouble swallowing.    Respiratory: Negative for cough, shortness of breath and wheezing.    Cardiovascular: Negative for chest pain, palpitations and leg swelling.   Gastrointestinal: Positive for rectal pain and vomiting. Negative for abdominal pain, blood in stool, constipation, diarrhea and nausea.   Genitourinary: Positive for difficulty urinating and pelvic pain. Negative for dysuria and flank pain.   Musculoskeletal: Negative for arthralgias, neck pain and neck stiffness.   Skin: Negative for color change, pallor and rash.   Neurological: Negative for syncope, weakness, light-headedness, numbness and headaches.   Psychiatric/Behavioral: Negative for confusion and hallucinations. The patient is not nervous/anxious.      Objective:     Vital Signs (Most Recent):  Temp: 98.1 °F (36.7 °C) (12/07/20 1140)  Pulse: 69 (12/07/20 1140)  Resp: 18 (12/07/20 1149)  BP: 126/60 (12/07/20 1140)  SpO2: 99 % (12/07/20 1140) Vital Signs (24h Range):  Temp:  [97.8 °F (36.6 °C)-99 °F (37.2 °C)] 98.1 °F (36.7 °C)  Pulse:  [69-88] 69  Resp:  [17-20] 18  SpO2:  [89 %-99 %] 99 %  BP: (113-129)/(50-60) 126/60   Weight: 112.9 kg (249 lb)  Body mass index is 39 kg/m².      Intake/Output Summary (Last 24 hours) at 12/7/2020 1153  Last data filed at 12/7/2020 1100  Gross per 24 hour   Intake 450 ml   Output 3410 ml   Net -2960 ml       Physical Exam  Constitutional:       General: She is not in acute distress.     Appearance: She is obese. She is not ill-appearing or toxic-appearing.   HENT:      Head: Normocephalic and atraumatic.      Nose: No congestion or rhinorrhea.      Mouth/Throat:      Pharynx: No  oropharyngeal exudate or posterior oropharyngeal erythema.   Eyes:      General: No scleral icterus.        Right eye: No discharge.         Left eye: No discharge.      Extraocular Movements: Extraocular movements intact.      Pupils: Pupils are equal, round, and reactive to light.   Neck:      Musculoskeletal: Normal range of motion and neck supple. No neck rigidity or muscular tenderness.   Cardiovascular:      Rate and Rhythm: Normal rate and regular rhythm.      Pulses: Normal pulses.      Heart sounds: No murmur.   Pulmonary:      Effort: Pulmonary effort is normal. No respiratory distress.      Breath sounds: Normal breath sounds. No stridor. No wheezing.   Abdominal:      General: Abdomen is flat. Bowel sounds are normal. There is no distension.      Palpations: Abdomen is soft. There is no mass.      Tenderness: There is abdominal tenderness (LLQ, drain in place with minimal pustular yellow fluid, dressings CDI).      Hernia: No hernia is present.   Musculoskeletal: Normal range of motion.         General: No swelling, tenderness or deformity.   Skin:     General: Skin is warm and dry.      Coloration: Skin is not jaundiced or pale.      Findings: No bruising.   Neurological:      General: No focal deficit present.      Mental Status: She is alert. Mental status is at baseline.         Vents:  Oxygen Concentration (%): 24 (12/06/20 0400)  Lines/Drains/Airways     Central Venous Catheter Line            Trialysis (Dialysis) Catheter 12/01/20 1547 right internal jugular 5 days          Drain                 Urethral Catheter 11/27/20 0145 Latex 16 Fr. 10 days         Closed/Suction Drain 11/28/20 1358 Right Abdomen Bulb 10 Fr. 8 days          Peripheral Intravenous Line                 Peripheral IV - Single Lumen 12/06/20 2023 20 G Anterior;Left Forearm less than 1 day              Significant Labs:    CBC/Anemia Profile:  Recent Labs   Lab 12/06/20  0642 12/06/20  1513 12/07/20  0511   WBC 11.75 14.42*  12.29   HGB 6.6* 8.3* 7.9*   HCT 21.2* 26.1* 25.4*   * 385* 391*   MCV 91 92 93   RDW 14.4 14.6* 14.7*        Chemistries:  Recent Labs   Lab 12/05/20 1537 12/06/20  0558 12/06/20  0601 12/06/20 0752 12/07/20  0511   *  133*  131*   < > 134*  134* 134* 134* 136   K 4.2  4.2  4.1   < > 4.0  4.0 4.0 4.1 3.8   CL 95  95  95   < > 96  96 96 96 95   CO2 29  29  29   < > 31*  31* 29 28 31*   BUN 14  14  14   < > 20  20 19 21 24*   CREATININE 3.8*  3.8*  3.8*   < > 4.4*  4.4* 4.5* 4.5* 5.1*   CALCIUM 8.4*  8.4*  8.5*   < > 7.8*  7.8* 7.9* 7.7* 7.7*   ALBUMIN 1.7*  1.7*  1.7*  --  1.6* 1.7*  --  1.8*   PROT 5.9*  --   --  5.7*  --  6.0   BILITOT <0.1*  --   --  <0.1*  --  0.1   ALKPHOS 180*  --   --  153*  --  201*   ALT <5*  --   --  6*  --  5*   AST 13  --   --  22  --  16   MG 1.8  --   --   --   --  1.6   PHOS 3.8  3.8  3.7  --  4.2 4.4  --  4.8*    < > = values in this interval not displayed.       BMP:   Recent Labs   Lab 12/07/20  0511   *      K 3.8   CL 95   CO2 31*   BUN 24*   CREATININE 5.1*   CALCIUM 7.7*   MG 1.6     CMP:   Recent Labs   Lab 12/05/20  1537 12/06/20  0558 12/06/20  0601 12/06/20 0752 12/07/20  0511   *  133*  131*   < > 134*  134* 134* 134* 136   K 4.2  4.2  4.1   < > 4.0  4.0 4.0 4.1 3.8   CL 95  95  95   < > 96  96 96 96 95   CO2 29  29  29   < > 31*  31* 29 28 31*   *  291*  288*   < > 190*  190* 191* 175* 156*   BUN 14  14  14   < > 20  20 19 21 24*   CREATININE 3.8*  3.8*  3.8*   < > 4.4*  4.4* 4.5* 4.5* 5.1*   CALCIUM 8.4*  8.4*  8.5*   < > 7.8*  7.8* 7.9* 7.7* 7.7*   PROT 5.9*  --   --  5.7*  --  6.0   ALBUMIN 1.7*  1.7*  1.7*  --  1.6* 1.7*  --  1.8*   BILITOT <0.1*  --   --  <0.1*  --  0.1   ALKPHOS 180*  --   --  153*  --  201*   AST 13  --   --  22  --  16   ALT <5*  --   --  6*  --  5*   ANIONGAP 9  9  7*   < > 7*  7* 9 10 10   EGFRNONAA 12.0*  12.0*  12.0*   < > 10.1*  10.1* 9.8*  9.8* 8.4*    < > = values in this interval not displayed.       Significant Imaging:  I have reviewed all pertinent imaging results/findings within the past 24 hours.

## 2020-12-07 NOTE — PLAN OF CARE
Problem: Occupational Therapy Goal  Goal: Occupational Therapy Goal  Description: Goals to be met by: 12/16/20     Patient will increase functional independence with ADLs by performing:    Feeding with [place].  UE Dressing with MIN A   LE Dressing with MIN A .  Grooming while seated with Supervision.  Toileting from toilet with Moderate Assistance for hygiene and clothing management.   Toilet transfer to toilet with SBA.    Outcome: Ongoing, Progressing

## 2020-12-07 NOTE — PROGRESS NOTES
[facility]  Nephrology  Progress Note    Patient Name: Navin Beck  MRN: [id number]  Admission Date: 11/26/2020  Hospital Length of Stay: 10 days  Attending Provider: Michael Jeffrey MD   Primary Care Physician: Elvira Quinones MD  Principal Problem:Encounter for continuous renal replacement therapy (CRRT) for acute renal failure    Subjective:     HPI: Ms Beck is a 60 year old female with a past medical history of HTN, LD, T2DM, hyperparathyroidism, and asthma who presented to [facility] with c/o abdominal pain s/p bladder mesh surgery. Pt noted to be in DKA on presentation and is currently on insulin gtt and being followed by endocrine. CT abdomen obtained revealed multiple fluid collections near bladder; IR was consulted and pt is s/p drain placement. Initial coverage provided with vanc, now on cefepime and flagyl. Baseline sCr roughly 1.0-1.2, now elevated to 4.6 at time of consult (11/30/2020). Pt now anuric with 20mL UOP in the past 24hrs at time of consult (11/30/2020). Pt denies endorses lower abdominal tenderness. Pt denies SOB. Nephrology has been consulted for LAURIE.     -HPI was obtained from patient interview, and from review of the patient's EMR.         Interval History: Pt seen at bedside. Stepped down from ICU over the weekend. Last received prismax on 12/4. Good UO, 2.4 L past 24 hours on lasix 80 BID.     Objective:     Vital Signs (Most Recent):  Temp: 98.2 °F (36.8 °C) (12/07/20 0752)  Pulse: 71 (12/07/20 0752)  Resp: 18 (12/07/20 0803)  BP: 129/60 (12/07/20 0752)  SpO2: 97 % (12/07/20 0752)  O2 Device (Oxygen Therapy): nasal cannula (12/07/20 0400) Vital Signs (24h Range):  Temp:  [97.8 °F (36.6 °C)-99 °F (37.2 °C)] 98.2 °F (36.8 °C)  Pulse:  [71-88] 71  Resp:  [17-20] 18  SpO2:  [89 %-97 %] 97 %  BP: (113-129)/(50-60) 129/60     Weight: 112.9 kg (249 lb) (12/07/20 0521)  Body mass index is 39 kg/m².  Body surface area is 2.31 meters squared.    I/O last 3  completed shifts:  In: 500 [P.O.:450; IV Piggyback:50]  Out: 3660 [Urine:3650; Drains:10]    Physical Exam  Constitutional:       Appearance: She is obese.   HENT:      Head: Normocephalic and atraumatic.      Nose: Nose normal.      Mouth/Throat:      Mouth: Mucous membranes are moist.      Pharynx: Oropharynx is clear.   Eyes:      Conjunctiva/sclera: Conjunctivae normal.      Pupils: Pupils are equal, round, and reactive to light.   Neck:      Musculoskeletal: Normal range of motion and neck supple.   Cardiovascular:      Rate and Rhythm: Regular rhythm.   Pulmonary:      Effort: Pulmonary effort is normal.   Abdominal:      General: Abdomen is flat.      Palpations: Abdomen is soft.      Tenderness: There is no abdominal tenderness.   Musculoskeletal:         General: Swelling present. No deformity.   Skin:     General: Skin is warm and dry.   Neurological:      General: No focal deficit present.      Mental Status: She is alert and oriented to person, place, and time.         Significant Labs:  CBC:   Recent Labs   Lab 12/07/20  0511   WBC 12.29   RBC 2.73*   HGB 7.9*   HCT 25.4*   *   MCV 93   MCH 28.9   MCHC 31.1*     CMP:   Recent Labs   Lab 12/07/20  0511   *   CALCIUM 7.7*   ALBUMIN 1.8*   PROT 6.0      K 3.8   CO2 31*   CL 95   BUN 24*   CREATININE 5.1*   ALKPHOS 201*   ALT 5*   AST 16   BILITOT 0.1     All labs within the past 24 hours have been reviewed.        Assessment/Plan:     * LAURIE  ATN in setting of contrast administration and sepsis 2/2 UTI, baseline cr 1.0-1.2 that trended up to 4.6 at time of consult.     -received contrast on 11/27/2020  -Urine rhmpypeehd63/30/2020 many muddy brown granular casts, massive WBC's  -purulent drainage from abdominal ROSAURA drain similar in appearance to her urine; concerning for communication between bladder and fluid collection   -Initiated prismax 12/1, last received prismax 12/4    Recomendations    -Now with good UO over the weekend on lasix  80 IV BID  -No significant electrolyte disturbances or acute volume issues requiring further RRT at this time  -Pt with significant UO on lasix, and serum bicarbonate trending up. Discontinued bicarb tabs. Consider trending serum Na and bicarb off lasix to avoid over diuresis/ post-ATN diuresis   -Maximum gabapentin dose 300mg in CKD patients  -Maintain Hgb >7.0, Keep MAP >65  -Renally dose meds and avoid nephrotoxic meds  -Will continue to follow closely     Hyponatremia  -Initially hyponatremic, likely 2/2 combination of D5 administration with decreased UOP  -duloxetine discontinued  -improved after RRT        Diabetic ketoacidosis without coma associated with type 2 diabetes mellitus  -Management per primary team         Thank you for your consult. I will follow-up with patient. Please contact us if you have any additional questions.    Usha Ames MD  Nephrology  [facility]

## 2020-12-07 NOTE — ASSESSMENT & PLAN NOTE
Pt Hb 9.8 on admit. BL ~10.   Most likely due to anemia of chronic disease, acute blood loss, and frequent phlebotomy while hospitalized.   Recent Labs   Lab 12/05/20  1537 12/06/20  0642 12/06/20  1513 12/07/20  0511   HGB 7.2* 6.6* 8.3* 7.9*         - Required 1U PRBC on 12/2  - Required 1U PRBC on 12/6 2/2 Hb 6.6  - Will consult with Nephro to evaluate need for Epo

## 2020-12-07 NOTE — ASSESSMENT & PLAN NOTE
-Initially hyponatremic, likely 2/2 combination of D5 administration with decreased UOP  -duloxetine discontinued  -improved after RRT

## 2020-12-07 NOTE — CARE UPDATE
BG goal 140-180    Remains in MTSU, NAEON. BG reasonably well controlled with scheduled insulin and prn correction scale. cystoscopy today.     Plan:  BG monitoring ac/hs and low dose correction scale.   Continue Levemir 36 units BID.   Resume novolog 17 units with meals when diet resumed.     Discharge planning:tbd     Endocrine to continue to follow    ** Please call Endocrine for any BG related issues **

## 2020-12-08 PROBLEM — R09.02 HYPOXIA: Status: ACTIVE | Noted: 2020-12-08

## 2020-12-08 LAB
ALBUMIN SERPL BCP-MCNC: 2 G/DL (ref 3.5–5.2)
ALP SERPL-CCNC: 474 U/L (ref 55–135)
ALT SERPL W/O P-5'-P-CCNC: 10 U/L (ref 10–44)
ANION GAP SERPL CALC-SCNC: 11 MMOL/L (ref 8–16)
AST SERPL-CCNC: 45 U/L (ref 10–40)
BASOPHILS # BLD AUTO: 0.06 K/UL (ref 0–0.2)
BASOPHILS NFR BLD: 0.5 % (ref 0–1.9)
BILIRUB SERPL-MCNC: 0.1 MG/DL (ref 0.1–1)
BUN SERPL-MCNC: 26 MG/DL (ref 8–23)
CALCIUM SERPL-MCNC: 7.8 MG/DL (ref 8.7–10.5)
CHLORIDE SERPL-SCNC: 92 MMOL/L (ref 95–110)
CO2 SERPL-SCNC: 31 MMOL/L (ref 23–29)
CREAT SERPL-MCNC: 5.3 MG/DL (ref 0.5–1.4)
DIFFERENTIAL METHOD: ABNORMAL
EOSINOPHIL # BLD AUTO: 0.1 K/UL (ref 0–0.5)
EOSINOPHIL NFR BLD: 1 % (ref 0–8)
ERYTHROCYTE [DISTWIDTH] IN BLOOD BY AUTOMATED COUNT: 14.7 % (ref 11.5–14.5)
EST. GFR  (AFRICAN AMERICAN): 9.3 ML/MIN/1.73 M^2
EST. GFR  (NON AFRICAN AMERICAN): 8.1 ML/MIN/1.73 M^2
GLUCOSE SERPL-MCNC: 123 MG/DL (ref 70–110)
HCT VFR BLD AUTO: 25.8 % (ref 37–48.5)
HGB BLD-MCNC: 8 G/DL (ref 12–16)
IMM GRANULOCYTES # BLD AUTO: 0.14 K/UL (ref 0–0.04)
IMM GRANULOCYTES NFR BLD AUTO: 1.1 % (ref 0–0.5)
LYMPHOCYTES # BLD AUTO: 2.7 K/UL (ref 1–4.8)
LYMPHOCYTES NFR BLD: 20.8 % (ref 18–48)
MAGNESIUM SERPL-MCNC: 1.4 MG/DL (ref 1.6–2.6)
MCH RBC QN AUTO: 28.8 PG (ref 27–31)
MCHC RBC AUTO-ENTMCNC: 31 G/DL (ref 32–36)
MCV RBC AUTO: 93 FL (ref 82–98)
MONOCYTES # BLD AUTO: 1.1 K/UL (ref 0.3–1)
MONOCYTES NFR BLD: 8.5 % (ref 4–15)
NEUTROPHILS # BLD AUTO: 8.7 K/UL (ref 1.8–7.7)
NEUTROPHILS NFR BLD: 68.1 % (ref 38–73)
NRBC BLD-RTO: 0 /100 WBC
PHOSPHATE SERPL-MCNC: 5.3 MG/DL (ref 2.7–4.5)
PHOSPHATE SERPL-MCNC: 5.3 MG/DL (ref 2.7–4.5)
PLATELET # BLD AUTO: 342 K/UL (ref 150–350)
PMV BLD AUTO: 9.4 FL (ref 9.2–12.9)
POCT GLUCOSE: 104 MG/DL (ref 70–110)
POCT GLUCOSE: 166 MG/DL (ref 70–110)
POCT GLUCOSE: 167 MG/DL (ref 70–110)
POCT GLUCOSE: 184 MG/DL (ref 70–110)
POCT GLUCOSE: 81 MG/DL (ref 70–110)
POCT GLUCOSE: 86 MG/DL (ref 70–110)
POTASSIUM SERPL-SCNC: 3.9 MMOL/L (ref 3.5–5.1)
PROT SERPL-MCNC: 6.5 G/DL (ref 6–8.4)
RBC # BLD AUTO: 2.78 M/UL (ref 4–5.4)
SODIUM SERPL-SCNC: 134 MMOL/L (ref 136–145)
WBC # BLD AUTO: 12.77 K/UL (ref 3.9–12.7)

## 2020-12-08 PROCEDURE — 99232 SBSQ HOSP IP/OBS MODERATE 35: CPT | Mod: HCWC,,, | Performed by: NURSE PRACTITIONER

## 2020-12-08 PROCEDURE — 25000003 PHARM REV CODE 250: Mod: HCWC | Performed by: STUDENT IN AN ORGANIZED HEALTH CARE EDUCATION/TRAINING PROGRAM

## 2020-12-08 PROCEDURE — 83735 ASSAY OF MAGNESIUM: CPT | Mod: HCWC

## 2020-12-08 PROCEDURE — 63600175 PHARM REV CODE 636 W HCPCS: Mod: HCWC | Performed by: STUDENT IN AN ORGANIZED HEALTH CARE EDUCATION/TRAINING PROGRAM

## 2020-12-08 PROCEDURE — 99232 PR SUBSEQUENT HOSPITAL CARE,LEVL II: ICD-10-PCS | Mod: HCWC,,, | Performed by: HOSPITALIST

## 2020-12-08 PROCEDURE — 99232 SBSQ HOSP IP/OBS MODERATE 35: CPT | Mod: HCWC,,, | Performed by: HOSPITALIST

## 2020-12-08 PROCEDURE — C9399 UNCLASSIFIED DRUGS OR BIOLOG: HCPCS | Mod: HCWC | Performed by: STUDENT IN AN ORGANIZED HEALTH CARE EDUCATION/TRAINING PROGRAM

## 2020-12-08 PROCEDURE — 94761 N-INVAS EAR/PLS OXIMETRY MLT: CPT | Mod: HCWC

## 2020-12-08 PROCEDURE — 27000221 HC OXYGEN, UP TO 24 HOURS: Mod: HCWC

## 2020-12-08 PROCEDURE — 80053 COMPREHEN METABOLIC PANEL: CPT | Mod: HCWC

## 2020-12-08 PROCEDURE — 99233 PR SUBSEQUENT HOSPITAL CARE,LEVL III: ICD-10-PCS | Mod: HCWC,GC,, | Performed by: STUDENT IN AN ORGANIZED HEALTH CARE EDUCATION/TRAINING PROGRAM

## 2020-12-08 PROCEDURE — 97530 THERAPEUTIC ACTIVITIES: CPT | Mod: HCWC,CQ

## 2020-12-08 PROCEDURE — 94660 CPAP INITIATION&MGMT: CPT | Mod: HCWC

## 2020-12-08 PROCEDURE — 97116 GAIT TRAINING THERAPY: CPT | Mod: HCWC,CQ

## 2020-12-08 PROCEDURE — 99232 PR SUBSEQUENT HOSPITAL CARE,LEVL II: ICD-10-PCS | Mod: HCWC,,, | Performed by: NURSE PRACTITIONER

## 2020-12-08 PROCEDURE — 99233 SBSQ HOSP IP/OBS HIGH 50: CPT | Mod: HCWC,GC,, | Performed by: STUDENT IN AN ORGANIZED HEALTH CARE EDUCATION/TRAINING PROGRAM

## 2020-12-08 PROCEDURE — 85025 COMPLETE CBC W/AUTO DIFF WBC: CPT | Mod: HCWC

## 2020-12-08 PROCEDURE — 99900035 HC TECH TIME PER 15 MIN (STAT): Mod: HCWC

## 2020-12-08 PROCEDURE — 20600001 HC STEP DOWN PRIVATE ROOM: Mod: HCWC

## 2020-12-08 PROCEDURE — 84100 ASSAY OF PHOSPHORUS: CPT | Mod: HCWC

## 2020-12-08 RX ORDER — ATORVASTATIN CALCIUM 20 MG/1
20 TABLET, FILM COATED ORAL NIGHTLY
Status: DISCONTINUED | OUTPATIENT
Start: 2020-12-09 | End: 2020-12-12 | Stop reason: HOSPADM

## 2020-12-08 RX ORDER — ATORVASTATIN CALCIUM 20 MG/1
20 TABLET, FILM COATED ORAL DAILY
COMMUNITY

## 2020-12-08 RX ORDER — BENAZEPRIL HYDROCHLORIDE 10 MG/1
10 TABLET ORAL DAILY
Status: ON HOLD | COMMUNITY
End: 2020-12-08 | Stop reason: HOSPADM

## 2020-12-08 RX ORDER — FLURAZEPAM HYDROCHLORIDE 30 MG/1
30 CAPSULE ORAL NIGHTLY PRN
COMMUNITY

## 2020-12-08 RX ADMIN — CEFTRIAXONE 2 G: 2 INJECTION, SOLUTION INTRAVENOUS at 09:12

## 2020-12-08 RX ADMIN — GABAPENTIN 300 MG: 300 CAPSULE ORAL at 08:12

## 2020-12-08 RX ADMIN — INSULIN ASPART 17 UNITS: 100 INJECTION, SOLUTION INTRAVENOUS; SUBCUTANEOUS at 12:12

## 2020-12-08 RX ADMIN — METRONIDAZOLE 500 MG: 500 TABLET ORAL at 09:12

## 2020-12-08 RX ADMIN — INSULIN ASPART 17 UNITS: 100 INJECTION, SOLUTION INTRAVENOUS; SUBCUTANEOUS at 05:12

## 2020-12-08 RX ADMIN — DOCUSATE SODIUM AND SENNOSIDES 1 TABLET: 8.6; 5 TABLET, FILM COATED ORAL at 09:12

## 2020-12-08 RX ADMIN — HYDROMORPHONE HYDROCHLORIDE 0.5 MG: 1 INJECTION, SOLUTION INTRAMUSCULAR; INTRAVENOUS; SUBCUTANEOUS at 11:12

## 2020-12-08 RX ADMIN — HEPARIN SODIUM 5000 UNITS: 5000 INJECTION INTRAVENOUS; SUBCUTANEOUS at 05:12

## 2020-12-08 RX ADMIN — HEPARIN SODIUM 5000 UNITS: 5000 INJECTION INTRAVENOUS; SUBCUTANEOUS at 02:12

## 2020-12-08 RX ADMIN — METHIMAZOLE 10 MG: 10 TABLET ORAL at 08:12

## 2020-12-08 RX ADMIN — OXYCODONE HYDROCHLORIDE 10 MG: 10 TABLET ORAL at 09:12

## 2020-12-08 RX ADMIN — HYDROMORPHONE HYDROCHLORIDE 0.5 MG: 1 INJECTION, SOLUTION INTRAMUSCULAR; INTRAVENOUS; SUBCUTANEOUS at 05:12

## 2020-12-08 RX ADMIN — ROSUVASTATIN CALCIUM 10 MG: 10 TABLET, FILM COATED ORAL at 08:12

## 2020-12-08 RX ADMIN — METRONIDAZOLE 500 MG: 500 TABLET ORAL at 02:12

## 2020-12-08 RX ADMIN — INSULIN DETEMIR 36 UNITS: 100 INJECTION, SOLUTION SUBCUTANEOUS at 08:12

## 2020-12-08 RX ADMIN — METRONIDAZOLE 500 MG: 500 TABLET ORAL at 05:12

## 2020-12-08 RX ADMIN — HEPARIN SODIUM 5000 UNITS: 5000 INJECTION INTRAVENOUS; SUBCUTANEOUS at 09:12

## 2020-12-08 RX ADMIN — OXYCODONE HYDROCHLORIDE 10 MG: 10 TABLET ORAL at 08:12

## 2020-12-08 RX ADMIN — MELATONIN TAB 3 MG 6 MG: 3 TAB at 09:12

## 2020-12-08 RX ADMIN — INSULIN ASPART 2 UNITS: 100 INJECTION, SOLUTION INTRAVENOUS; SUBCUTANEOUS at 12:12

## 2020-12-08 RX ADMIN — DOCUSATE SODIUM AND SENNOSIDES 1 TABLET: 8.6; 5 TABLET, FILM COATED ORAL at 08:12

## 2020-12-08 RX ADMIN — OXYCODONE HYDROCHLORIDE 10 MG: 10 TABLET ORAL at 03:12

## 2020-12-08 RX ADMIN — OXYCODONE HYDROCHLORIDE 10 MG: 10 TABLET ORAL at 02:12

## 2020-12-08 NOTE — ASSESSMENT & PLAN NOTE
-likely 2/2 anatomic obstruction, pelvic fluid collection, diabetic bladder  -march in place  -urology following--> will need voiding trial in near future

## 2020-12-08 NOTE — PLAN OF CARE
Problem: Fall Injury Risk  Goal: Absence of Fall and Fall-Related Injury  Outcome: Ongoing, Progressing  Intervention: Identify and Manage Contributors to Fall Injury Risk  Flowsheets (Taken 12/8/2020 1607)  Self-Care Promotion:   independence encouraged   BADL personal objects within reach   BADL personal routines maintained   safe use of adaptive equipment encouraged  Medication Review/Management: medications reviewed     Problem: Adult Inpatient Plan of Care  Goal: Plan of Care Review  Outcome: Ongoing, Progressing  Flowsheets (Taken 12/8/2020 1607)  Plan of Care Reviewed With: patient  Goal: Patient-Specific Goal (Individualization)  Outcome: Ongoing, Progressing  Goal: Absence of Hospital-Acquired Illness or Injury  Outcome: Ongoing, Progressing  Goal: Optimal Comfort and Wellbeing  Outcome: Ongoing, Progressing  Goal: Readiness for Transition of Care  Outcome: Ongoing, Progressing  Goal: Rounds/Family Conference  Outcome: Ongoing, Progressing     Problem: Diabetes Comorbidity  Goal: Blood Glucose Level Within Desired Range  Outcome: Ongoing, Progressing  Intervention: Maintain Glycemic Control  Flowsheets (Taken 12/8/2020 1607)  Glycemic Management:   blood glucose monitoring   supplemental insulin given     Problem: Adjustment to Illness (Sepsis/Septic Shock)  Goal: Optimal Coping  Outcome: Ongoing, Progressing     Problem: Bleeding (Sepsis/Septic Shock)  Goal: Absence of Bleeding  Outcome: Ongoing, Progressing     Problem: Glycemic Control Impaired (Sepsis/Septic Shock)  Goal: Blood Glucose Level Within Desired Range  Outcome: Ongoing, Progressing     Problem: Hemodynamic Instability (Sepsis/Septic Shock)  Goal: Effective Tissue Perfusion  Outcome: Ongoing, Progressing     Problem: Infection (Sepsis/Septic Shock)  Goal: Absence of Infection Signs/Symptoms  Outcome: Ongoing, Progressing     Problem: Nutrition Impaired (Sepsis/Septic Shock)  Goal: Optimal Nutrition Intake  Outcome: Ongoing,  Progressing     Problem: Respiratory Compromise (Sepsis/Septic Shock)  Goal: Effective Oxygenation and Ventilation  Outcome: Ongoing, Progressing  Intervention: Optimize Oxygenation and Ventilation  Flowsheets (Taken 12/8/2020 1607)  Airway/Ventilation Management:   airway patency maintained   calming measures promoted  Head of Bed (HOB): HOB elevated     Problem: Fluid Imbalance (Pneumonia)  Goal: Fluid Balance  Outcome: Ongoing, Progressing     Problem: Infection (Pneumonia)  Goal: Resolution of Infection Signs/Symptoms  Outcome: Ongoing, Progressing     Problem: Respiratory Compromise (Pneumonia)  Goal: Effective Oxygenation and Ventilation  Outcome: Ongoing, Progressing     Problem: Electrolyte Imbalance (Acute Kidney Injury/Impairment)  Goal: Serum Electrolyte Balance  Outcome: Ongoing, Progressing     Problem: Fluid Imbalance (Acute Kidney Injury/Impairment)  Goal: Optimal Fluid Balance  Outcome: Ongoing, Progressing  Intervention: Monitor and Manage Fluid Balance  Flowsheets (Taken 12/8/2020 1607)  Fluid/Electrolyte Management: fluids restricted     Problem: Hematologic Alteration (Acute Kidney Injury/Impairment)  Goal: Hemoglobin, Hematocrit and Platelets Within Normal Range  Outcome: Ongoing, Progressing     Problem: Oral Intake Inadequate (Acute Kidney Injury/Impairment)  Goal: Optimal Nutrition Intake  Outcome: Ongoing, Progressing     Problem: Renal Function Impairment (Acute Kidney Injury/Impairment)  Goal: Effective Renal Function  Outcome: Ongoing, Progressing     Problem: Infection  Goal: Infection Symptom Resolution  Outcome: Ongoing, Progressing  Intervention: Prevent or Manage Infection  Flowsheets (Taken 12/8/2020 1607)  Fever Reduction/Comfort Measures:   lightweight bedding   lightweight clothing  Infection Management: aseptic technique maintained  Isolation Precautions: protective environment maintained     Problem: Skin Injury Risk Increased  Goal: Skin Health and Integrity  Outcome:  Ongoing, Progressing  Intervention: Optimize Skin Protection  Flowsheets (Taken 12/8/2020 2570)  Pressure Reduction Techniques:   frequent weight shift encouraged   weight shift assistance provided  Pressure Reduction Devices: pressure-redistributing mattress utilized  Skin Protection:   adhesive use limited   incontinence pads utilized   tubing/devices free from skin contact  Head of Bed (HOB): HOB elevated     Problem: Wound  Goal: Optimal Wound Healing  Outcome: Ongoing, Progressing     Problem: Device-Related Complication Risk (CRRT (Continuous Renal Replacement Therapy))  Goal: Safe, Effective Therapy Delivery  Outcome: Ongoing, Progressing     Problem: Infection (CRRT (Continuous Renal Replacement Therapy))  Goal: Absence of Infection Signs/Symptoms  Outcome: Ongoing, Progressing     Problem: Hypothermia (CRRT (Continuous Renal Replacement Therapy))  Goal: Body Temperature Maintained in Desired Range  Outcome: Ongoing, Progressing    POC reviewed with patient. VSS on 3L O2 via NC. Up in chair during shift, tolerating well. Accuchecks AC/HS, AM scheduled insulin held d/t CBG of 81. CT of Abd today, awaiting results. CT of Chest ordered, patient would like to wait until after dinner and another dose of pain medication before test, to be done on PM shift. PRN pain medication utilized ATC for abd pain 8-10/10. Cifuentes in place. No evidence of distress, safety maintained. WCTM

## 2020-12-08 NOTE — PT/OT/SLP PROGRESS
Physical Therapy Treatment    Patient Name:  Navin Beck   MRN:  [id number]    Recommendations:     Discharge Recommendations:  nursing facility, skilled   Discharge Equipment Recommendations: (TBD)   Barriers to discharge: Decreased caregiver support    Assessment:     Navin Beck is a 60 y.o. female admitted with a medical diagnosis of Encounter for continuous renal replacement therapy (CRRT) for acute renal failure.  She presents with the following impairments/functional limitations:  weakness, impaired endurance, impaired sensation, impaired self care skills, impaired functional mobilty, gait instability, impaired balance Pt tolerated fairly well, appears to fatigue easily and quickly, pt would continue to benefit from skilled PT services to improve overall functional mobility, strength and endurance.  .    Rehab Prognosis: Good; patient would benefit from acute skilled PT services to address these deficits and reach maximum level of function.    Recent Surgery: Procedure(s) (LRB):  CYSTOSCOPY, WITH RETROGRADE PYELOGRAM (Bilateral) 1 Day Post-Op    Plan:     During this hospitalization, patient to be seen 3 x/week to address the identified rehab impairments via gait training, therapeutic activities, therapeutic exercises, neuromuscular re-education and progress toward the following goals:    · Plan of Care Expires:  12/31/20    Subjective     Chief Complaint: pain  Patient/Family Comments/goals: get stronger  Pain/Comfort:  · Pain Rating 1: 8/10  · Location - Orientation 1: generalized  · Location 1: abdomen  · Pain Addressed 1: Pre-medicate for activity, Reposition, Nurse notified, Other (see comments)(nsg present)  · Pain Rating Post-Intervention 1: 8/10(inc with mobility)      Objective:     Communicated with Marco A prior to session.  Patient found supine with central line, march catheter, telemetry, oxygen upon PT entry to room.     General Precautions: Standard, fall   Orthopedic  Precautions:N/A   Braces: N/A     Functional Mobility:  · Bed Mobility:     · Supine to Sit: stand by assistance and HOB elev and rail, inc time  · Transfers:     · Sit to Stand:  minimum assistance with rolling walker and from elev EOB  · Bed to Chair: minimum assistance with  rolling walker  using  Stand Pivot  · Gait: amb with RW min A ~ 3-4 ft from EOB to BSC  · Balance: pt sat EOB ~ 5 minutes SBA/S      AM-PAC 6 CLICK MOBILITY  Turning over in bed (including adjusting bedclothes, sheets and blankets)?: 3  Sitting down on and standing up from a chair with arms (e.g., wheelchair, bedside commode, etc.): 2  Moving from lying on back to sitting on the side of the bed?: 3  Moving to and from a bed to a chair (including a wheelchair)?: 2  Need to walk in hospital room?: 2  Climbing 3-5 steps with a railing?: 1  Basic Mobility Total Score: 13       Therapeutic Activities and Exercises:   x10 reps AP, SAQ x 5 reps, GS    Patient left up in chair with all lines intact, call button in reach, belongings in reach nsg notified  GOALS:   Multidisciplinary Problems     Physical Therapy Goals        Problem: Physical Therapy Goal    Goal Priority Disciplines Outcome Goal Variances Interventions   Physical Therapy Goal     PT, PT/OT Ongoing, Progressing     Description: Goals to be met by: 2020     Patient will increase functional independence with mobility by performin. Pt supine to sit with minimum assistance- Met 2020  3. Sit to stand transfer with Minimum assistance and RW if needed. - not met  4. Bed to chair transfer with Minimum assistance  using Rolling Walker or no AD.-not met  5. Gait  x 50 feet with Minimum assistanc using Rolling Walker or no AD. -not met  6. Lower extremity exercise program x20 reps, with independence -not met                     Time Tracking:     PT Received On: 20  PT Start Time: 846     PT Stop Time: 911  PT Total Time (min): 25 min     Billable Minutes: Gait Training 8  and Therapeutic Activity 17    Treatment Type: Treatment  PT/PTA: PTA     PTA Visit Number: 1     Deidre Valente, PTA  12/08/2020

## 2020-12-08 NOTE — PROGRESS NOTES
[facility] Hospital Medicine  Progress Note    Patient Name: Navin Beck  MRN: [id number]  Patient Class: IP- Inpatient   Admission Date: 11/26/2020  Length of Stay: 11 days  Attending Physician: Michael Jeffrey MD  Primary Care Provider: Elvira Quinones MD    [facility] Medicine Team: [facility]P MED 2 Stefano Dimas MD    Subjective:     Principal Problem:Encounter for continuous renal replacement therapy (CRRT) for acute renal failure        HPI:  Ms. Bcek is a 59yo F with PMH of HTN, HLD, T2DM, Hyperthyroidism, and Asthma who had recent GYN surgery on 10/26 due to erosion of bladder mesh / prosthetic material with Cifuentes removal on 11/19. She presents to [facility] due to 4-5 days of not feeling well, she has had loss of appetite and weakness with 10/10 throbbing lower abdominal pain associated with vaginal pain and rectal pressure and non-bloody diarrhea. She has had trouble urinating for the past 5 days as well. She takes oxycodone 5 at home for pain, but it did not help. She endorses nausea and vomiting (4 times, non-bloody) also associated with the pain. She denies fever, chills, chest pain, SOB, trouble breathing, leg swelling, or any other symptom at this time.    ED Course: VSS, afebrile but leukocytosis at 17.85. Pt found to have DKA - Gluc 626, AG16, BHB 4.2, Fluids, insulin gtt started and Glucose down-trended to 200s. UA showed 2RBC, >100 WBC, bacteria, given CTX. CT A/P showed multiple fluid collections adjacent to bladder concerning for bladder perf with multiple urinomas, with mild bilateral hydronephrosis. Urology consulted and placed a Cifuentes with 900 UOP drained. GYN consulted due to recent surgery.     Pt will be admitted to hospital medicine for further management of DKA and possible intra-abdominal infection.     Overview/Hospital Course:  Pt was admitted on 11/28 due to 10/10 abdominal pain and also found to be in DKA. Pt was started on IVF, insulin gtt, and  "kept NPO with glucose downtrending and AG closed. CT abdomen showed multiple fluid collections near bladder. Renal cystography with "hypoattenuating collections in the lower pelvis adjacent to the urinary bladder with no definite extravasated contrast material appreciated within the pelvic collections to suggest communication with the bladder".  Urology and GYN on board, agree with plan for IR to place drain in fluid collections for culture and source control. Pt was started on CTX and Flagyl to cover for UTI and intra-abdominal infection, broadened to Cefepime, and Flagyl on 11/26; vanc added later. Endocrine consulted and managed the patient while acutely ill on insulin drip then transitioned to SQ when BG stabilized. IR place low abdominal drain to abscess on 11/28 with 250ml purulent material immediately expressed. Drained remained in place after the procedure. Urology continued to assess patient and recommended CT RSS after identifying some left CVAT and increased SCr with reduced UOP on 11/29, no hydronephrosis does not believe LAURIE is post-renal in nature. 11/30 pt remains anuric, Cr up-trending to 4.2, with hyponatremia. Nephrology consulted. 12/1 pt remains anuric after Lasix + Diuril challenge. Nephrology decided for initiation of CRRT due to low pressures with unresponsive ARF. Will transfer to ICU for CRRT. Pt transferred back to floor as no more need for CRRT on 12/5. Cystoscopy with retrograde pyleogram for 12/7 by Urology    Interval History/Significant Events: Patient on 3L NC satting >90%. CTAP showed improvement of multilobular fluid collection. It also showed bibasilar consolidative changes R>L with small right sided pleural effusions. Patient afebrile. She reports that her abdominal pain is better but is still very tender on physical exam.     Review of Systems   Constitutional: Positive for activity change, appetite change and fatigue. Negative for chills and fever.   HENT: Negative for hearing " loss, rhinorrhea, sneezing, sore throat and trouble swallowing.    Respiratory: Negative for cough, shortness of breath and wheezing.    Cardiovascular: Negative for chest pain, palpitations and leg swelling.   Gastrointestinal: Positive for rectal pain and vomiting. Negative for abdominal pain, blood in stool, constipation, diarrhea and nausea.   Genitourinary: Positive for difficulty urinating and pelvic pain. Negative for dysuria and flank pain.   Musculoskeletal: Negative for arthralgias, neck pain and neck stiffness.   Skin: Negative for color change, pallor and rash.   Neurological: Negative for syncope, weakness, light-headedness, numbness and headaches.   Psychiatric/Behavioral: Negative for confusion and hallucinations. The patient is not nervous/anxious.      Objective:     Vital Signs (Most Recent):  Temp: 98.5 °F (36.9 °C) (12/08/20 1203)  Pulse: 71 (12/08/20 1133)  Resp: 18 (12/08/20 1437)  BP: (!) 115/54 (12/08/20 1203)  SpO2: 96 % (12/08/20 1203) Vital Signs (24h Range):  Temp:  [96.4 °F (35.8 °C)-99.6 °F (37.6 °C)] 98.5 °F (36.9 °C)  Pulse:  [66-96] 71  Resp:  [13-20] 18  SpO2:  [93 %-100 %] 96 %  BP: (111-134)/(52-61) 115/54   Weight: 112.9 kg (249 lb)  Body mass index is 39 kg/m².      Intake/Output Summary (Last 24 hours) at 12/8/2020 1451  Last data filed at 12/8/2020 0600  Gross per 24 hour   Intake 240 ml   Output 1330 ml   Net -1090 ml       Physical Exam  Constitutional:       General: She is not in acute distress.     Appearance: She is obese. She is not ill-appearing or toxic-appearing.   HENT:      Head: Normocephalic and atraumatic.      Nose: No congestion or rhinorrhea.      Mouth/Throat:      Pharynx: No oropharyngeal exudate or posterior oropharyngeal erythema.   Eyes:      General: No scleral icterus.        Right eye: No discharge.         Left eye: No discharge.      Extraocular Movements: Extraocular movements intact.      Pupils: Pupils are equal, round, and reactive to light.    Neck:      Musculoskeletal: Normal range of motion and neck supple. No neck rigidity or muscular tenderness.   Cardiovascular:      Rate and Rhythm: Normal rate and regular rhythm.      Pulses: Normal pulses.      Heart sounds: No murmur.   Pulmonary:      Effort: Pulmonary effort is normal. No respiratory distress.      Breath sounds: Normal breath sounds. No stridor. No wheezing.   Abdominal:      General: Abdomen is flat. Bowel sounds are normal. There is no distension.      Palpations: Abdomen is soft. There is no mass.      Tenderness: There is abdominal tenderness (LLQ, drain in place with minimal pustular yellow fluid, dressings CDI).      Hernia: No hernia is present.   Musculoskeletal: Normal range of motion.         General: No swelling, tenderness or deformity.   Skin:     General: Skin is warm and dry.      Coloration: Skin is not jaundiced or pale.      Findings: No bruising.   Neurological:      General: No focal deficit present.      Mental Status: She is alert. Mental status is at baseline.         Vents:  Oxygen Concentration (%): 28 (12/08/20 0211)  Lines/Drains/Airways     Central Venous Catheter Line            Trialysis (Dialysis) Catheter 12/01/20 1547 right internal jugular 6 days          Drain                 Closed/Suction Drain 11/28/20 1358 Right Abdomen Bulb 10 Fr. 10 days         Urethral Catheter 12/07/20 1413 Latex;Straight-tip 18 Fr. 1 day          Peripheral Intravenous Line                 Peripheral IV - Single Lumen 12/07/20 20 G Right;Lateral Upper Arm 1 day              Significant Labs:    CBC/Anemia Profile:  Recent Labs   Lab 12/06/20  1513 12/07/20  0511 12/08/20  0354   WBC 14.42* 12.29 12.77*   HGB 8.3* 7.9* 8.0*   HCT 26.1* 25.4* 25.8*   * 391* 342   MCV 92 93 93   RDW 14.6* 14.7* 14.7*        Chemistries:  Recent Labs   Lab 12/07/20  0511 12/08/20  0354    134*   K 3.8 3.9   CL 95 92*   CO2 31* 31*   BUN 24* 26*   CREATININE 5.1* 5.3*   CALCIUM 7.7* 7.8*    ALBUMIN 1.8* 2.0*   PROT 6.0 6.5   BILITOT 0.1 0.1   ALKPHOS 201* 474*   ALT 5* 10   AST 16 45*   MG 1.6 1.4*   PHOS 4.8* 5.3*  5.3*       BMP:   Recent Labs   Lab 12/08/20  0354   *   *   K 3.9   CL 92*   CO2 31*   BUN 26*   CREATININE 5.3*   CALCIUM 7.8*   MG 1.4*     CMP:   Recent Labs   Lab 12/07/20  0511 12/08/20  0354    134*   K 3.8 3.9   CL 95 92*   CO2 31* 31*   * 123*   BUN 24* 26*   CREATININE 5.1* 5.3*   CALCIUM 7.7* 7.8*   PROT 6.0 6.5   ALBUMIN 1.8* 2.0*   BILITOT 0.1 0.1   ALKPHOS 201* 474*   AST 16 45*   ALT 5* 10   ANIONGAP 10 11   EGFRNONAA 8.4* 8.1*       Significant Imaging:  I have reviewed all pertinent imaging results/findings within the past 24 hours.      Assessment/Plan:      * LAURIE  Cr 1.5 on presentation.   Likely multi-factorial on presentation 2/2 pre-renal volume depletion in the context of DKA, UTI, and post-renal obstruction  Repeat LAURIE on 11/29 likely intrinsic ATN in etiology    Recent Labs   Lab 12/06/20  0558 12/06/20  0601 12/06/20  0752 12/07/20  0511 12/08/20  0354   CREATININE 4.4*  4.4* 4.5* 4.5* 5.1* 5.3*     PLAN  - Initial LAURIE on presentation resolved with IVF and placement of Cifuentes with resolution of hydronephrosis on imaging   - Maintain Cifuentes per Urology   - Treating UTI with Cefepime  - LAURIE due to ATN began on 11/29 and continues to worsen   - Remains anuric after IVF bolus, Lasix + Diuril challenge   - Resp status stable but Cr uptrending, pt lethargic and waxing/waning alertness  - Nephrology consulted, appreciate recs   - Initiation of CRRT, transfer to ICU on 12/2   - Transferred to floor on 12/4   - Pt made ~1L urine after Lasix + Diuril challenge   - May be in recovery phase of LAURIE   - BMP q4   - Renally dose meds   - Avoid renal toxins (ACEi/ARB, NSAIDs, contrast, etc.)   - Strict I&Os    Abnormal abdominal CT scan  Pt presented to [facility] due to 5-6 days of 10/10 lower abdomen, vaginal, and rectal pain. Pt has recent h/o GYN surgery in  10/2020 for bladder mesh removal, Cifuentes was removed on 11/19.  CT A/P 11/27: Multiple fluid collections adjacent to bladder. Mild bilateral hydronephrosis.   CT A/P 12/8: Interval resolution of previously identified multilobular fluid collection in the lower pelvis with drainage catheter in position.  Residual scattered small volume of air is noted.   UCx 11/26: E.coli sensitive to CTX, cefepime    PLAN  - GYN consulted, appreciate consult; Agrees with Urology plan for drainage with IR  - IR consulted, appreciate recs; Drain in place; cultures sent 11/28    - Gram stain, many GPC   - Cultures grew Peptostrptococcus on 12/5 pending margy.  - Pain management: PCA pump initially   - PCA pump d/c on 11/30   - PRN opiates  - ID consulted for intra-abdominal infection, appreciate recs   - Continue CTX & Flagyl x14 days, repeat imaging to assess for cleared infection prior to d/c abx   - If d/c before 14d course, may switch to augmentin  - Urology recs appreciated; left CVAT, ordered CT RSS 11/29 hydronephrosis resolved   - Cystography 11/27 showed no bladder leak   - Maintain Cifuentes   - 12/6 spoke with urology concerning ROSAURA drain output concern for urinary contents.    - Per Urology, since ROSAURA fluid Cr 1.7 is less than serum Cr 4.5, the fluid is not urine.    - Negative cystogram and normal retrograde pyelogram        Hypoxia  Patient on 3L NC.   12/7 CXR: Interval progression in the extent of right perihilar and right lower lung zone infiltrate and or atelectasis.  New and or mildly increased small right pleural effusion.   12/8 CTAP: Bibasilar consolidative changes, right more than left with small volume right-sided pleural effusion.  Findings likely related to pneumonia versus inflammatory process.    - follow up CT Chest w/o Contrast    Anemia  Pt Hb 9.8 on admit. BL ~10.   Most likely due to anemia of chronic disease, acute blood loss, and frequent phlebotomy while hospitalized.   Recent Labs   Lab 12/06/20  0642  12/06/20  1513 12/07/20  0511 12/08/20  0354   HGB 6.6* 8.3* 7.9* 8.0*         - Required 1U PRBC on 12/2  - Required 1U PRBC on 12/6 2/2     Hyponatremia  Pt hyponatremia to 120s with spontaneous jerking motions 11/29.    - Nephrology on board, appreciate recs   - Possibly 2/2 D5 IVF with decreased UOP while in DKA   - BMP daily   - Latest Na 134   - 1L fluid restrict    Postprocedural intraabdominal abscess  Mesh removed by GYN 10/26; march placed  March removed 11/19 --> increased pain  Admitted 11/27 with worsening abd pain found to be related to intra-abd fluid collections.   UCx 11/26 with E.coli    PLAN  - Urology consulted; lesions adjacent to bladder per CT cysto 11/27  - Gyn consulted  - IR consulted drained abscess 11/28  - Cultures: Peptostreptococcus pending margy.  - Broad spectrum abx: CTX, Flagyl D11/14      UTI (urinary tract infection)  Pt presented with lower abdominal/vaginal/rectal pain with dysuria and urinary retention.   UA: 2 RBC, >100 WBC, 2+ leukocytes, bacteria  Recent UTIs with pan-sensitive E coli.    PLAN  - Ceftriaxone course complete  - UCx with E coli sens to cefepime and ceftriaxone  - March placed for urinary retention, maintain March per Urology      Urinary retention  Pt has recent h/o GYN surgery in 10/2020 for bladder mesh removal, March was removed on 11/19. For the past few days pt has had increased trouble urinating associated with pain.     PLAN  CT A/P 11/27: Multiple fluid collections adjacent to bladder. Mild bilateral hydronephrosis.   Retention likely 2/2 anatomic obstruction, pelvic fluid collection, diabetic bladder  - Urology consulted in ED, appreciate recs   - March placed 800cc drained, bladder irrigated and all fluid removed with ease   - Maintain March   - Rec drainage of pelvic fluid collection by IR with cultures & creatinine   - CT RSS ordered by uro 11/29 for left CVAT, incr SCr, decr UOP   - No hydronephrosis seen on CT, most likely not post-renal in  etiology  - GYN consulted, appreciate consult   - Agrees with Urology plan for drainage with IR  - IR consulted, appreciate recs   - IR placed drain 11/28 into pelvic abscess, draining minimal yellow fluid    Type 2 diabetes mellitus with hyperglycemia, with long-term current use of insulin  Last Hb A1c 10.3 on 9/25/20  Home regimen: Metformin 500 BID, Aspart 15U TID, Glargine 60 BID  Pt says her blood sugar has been running to the 300s for the past few days     PLAN  - Hold oral anti-glycemics while hospitalized  - Endocrine consulted, recs appreciated  - Accu-checks qhour  - See DKA notes      Asthma  Pt states she uses Albuterol inhaler TID every day.     PLAN  - Duonebs q6 PRN    Diabetic ketoacidosis without coma associated with type 2 diabetes mellitus  Pt presented to [facility] ED with Gluc 626, HCO3 16, AG 16, BHB 4.2, ketonuria. 2L IVF given in ED with insulin gtt started. Glucose trended downward to 274. Pt was then admitted to hospital medicine for further management of DKA.    PLAN  - Endocrine consulted, appreciate recs   - Transitional Insulin gtt stopped 12/1   - Gluc regimen increased 2/2 gluc 230-240s    Detemir 34U BID    Aspart 17U TID WM   - Goal gluc 140-180   - Diabetic diet  - BMP daily  - Will replace K PRN  - Nausea: zofran PRN     Hyperthyroidism  Continue home Methimazole    PLAN  - TSH WNL  - Endocrine aware    Severe obesity (BMI 35.0-35.9 with comorbidity)   on exercise, diet, and weight management     Mixed hyperlipidemia  Continue home statin      Essential hypertension  Hold home Benazepril due to LAURIE, low BP    PLAN  - Will monitor BP and add anti-hypertensive as necessary       VTE Risk Mitigation (From admission, onward)         Ordered     heparin (porcine) injection 5,000 Units  Every 8 hours      12/01/20 1426     Place sequential compression device  Until discontinued      12/01/20 1409     IP VTE HIGH RISK PATIENT  Once      12/01/20 1409                Discharge Planning    RAFA: 12/11/2020     Code Status: Full Code   Is the patient medically ready for discharge?: No    Reason for patient still in hospital (select all that apply): Patient trending condition  Discharge Plan A: Rehab, Skilled Nursing Facility   Discharge Delays: None known at this time              Stefano Dimas MD  Department of Hospital Medicine   [facility]

## 2020-12-08 NOTE — ASSESSMENT & PLAN NOTE
ATN in setting of contrast administration and sepsis 2/2 UTI, baseline cr 1.0-1.2 that trended up to 4.6 at time of consult.     -received contrast on 11/27/2020  -Urine microscopy 11/30/2020 many muddy brown granular casts, massive WBC's  -purulent drainage from abdominal ROSAURA drain similar in appearance to her urine; concerning for communication between bladder and fluid collection   -Initiated prismax 12/1, last received prismax 12/4    Recomendations    -Now with good UO for several days. Possibly with contraction alkalosis, bicarb tabs discontinued   -UO 2.4 L, net negative 2.2 L past 24 hours  -No significant electrolyte disturbances or acute volume issues requiring further RRT at this time  -Recommend continue to hold diuretics   -Maximum gabapentin dose 300 mg in CKD or LAURIE patients  -Maintain Hgb >7.0, Keep MAP >65  -Renally dose meds and avoid nephrotoxic meds  -Will continue to follow closely

## 2020-12-08 NOTE — ASSESSMENT & PLAN NOTE
Pt presented to [facility] ED with Gluc 626, HCO3 16, AG 16, BHB 4.2, ketonuria. 2L IVF given in ED with insulin gtt started. Glucose trended downward to 274. Pt was then admitted to hospital medicine for further management of DKA.    PLAN  - Endocrine consulted, appreciate recs   - Transitional Insulin gtt stopped 12/1   - Gluc regimen increased 2/2 gluc 230-240s    Detemir 34U BID    Aspart 17U TID WM   - Goal gluc 140-180   - Diabetic diet  - BMP daily  - Will replace K PRN  - Nausea: zofran PRN

## 2020-12-08 NOTE — PLAN OF CARE
61 YO Female w/ Dx of DM2, HTN, CKD3 and hyperthyroidism that was admitted for hydronephrosis after bladder surgery and was found to be in DKA.  Pt was consulted for management of DKA.         Type 2 DM:      DKA is now resolved and patient doing well on MDI and PO diet.     A1C 11.4  (Above goal)       HOME REGIMEN:  Lantus 50 units and Novolog 15 units TID w/ meals, Metformin 500mg BID     INPATIENT REGIMEN:   Levemir 36 units BID, Novolog 17 TID and Sliding scale     GLUCOSE TREND FOR THE PAST 24HRS:  102 - 215  (Mainly at goal w, FASTIG GLUCOSE BELOW GOAL)     -Glucose goal inpatient 140 - 180mg/dL     NO HYPOGYCEMIAS NOTED     -PT WAS NPO yesterday for Cytoscopy     TOLERATING 100 % OF PO DIET     Plan:   -Decrease Levemir to 34 units BID   -C/w Novolog 17 units TID w/ meals   -Moderate dose correction insulin   -FS qAC/HS   -Will continue to monitor       Hyperthyroidism  Unclear etiology given no TRAb, TSI or RAIU scan  Thyroid US shows MNG so toxic MNG high in differential  She has a TSH at goal on methimazole 10 mg daily  Continue, follow up outpatient     Plan  - Continue methimazole 10 mg daily  - Outpatient followed with Endocrinology

## 2020-12-08 NOTE — ASSESSMENT & PLAN NOTE
-likely multifactorial on presentation 2/2 pre-renal volume depletion in the context of DKA, UTI, and post-renal obstruction  -nephrology following, etiology ATN in setting of contrast administration and sepsis 2/2 UTI  -no longer requiring CRRT

## 2020-12-08 NOTE — PROGRESS NOTES
[facility]  Nephrology  Progress Note    Patient Name: Navin Beck  MRN: [id number]  Admission Date: 11/26/2020  Hospital Length of Stay: 11 days  Attending Provider: Michael Jeffrey MD   Primary Care Physician: Elvira Quinones MD  Principal Problem:Encounter for continuous renal replacement therapy (CRRT) for acute renal failure    Subjective:     HPI: Ms Beck is a 60 year old female with a past medical history of HTN, LD, T2DM, hyperparathyroidism, and asthma who presented to [facility] with c/o abdominal pain s/p bladder mesh surgery. Pt noted to be in DKA on presentation and is currently on insulin gtt and being followed by endocrine. CT abdomen obtained revealed multiple fluid collections near bladder; IR was consulted and pt is s/p drain placement. Initial coverage provided with vanc, now on cefepime and flagyl. Baseline sCr roughly 1.0-1.2, now elevated to 4.6 at time of consult (11/30/2020). Pt now anuric with 20mL UOP in the past 24hrs at time of consult (11/30/2020). Pt denies endorses lower abdominal tenderness. Pt denies SOB. Nephrology has been consulted for LAURIE.     -HPI was obtained from patient interview, and from review of the patient's EMR.         Interval History: Pt seen at bedside. Last received prismax on 12/4. Good UO over past 4 days. 2.4 L past 24 hrs, net negative 2.2 L. Patient reports feeling generally well.      Objective:     Vital Signs (Most Recent):  Temp: 98.5 °F (36.9 °C) (12/08/20 0803)  Pulse: 72 (12/08/20 0803)  Resp: 18 (12/08/20 0846)  BP: (!) 111/52 (12/08/20 0803)  SpO2: 98 % (12/08/20 0814)  O2 Device (Oxygen Therapy): nasal cannula (12/08/20 0814) Vital Signs (24h Range):  Temp:  [96.4 °F (35.8 °C)-99.6 °F (37.6 °C)] 98.5 °F (36.9 °C)  Pulse:  [64-96] 72  Resp:  [12-20] 18  SpO2:  [93 %-100 %] 98 %  BP: (111-138)/(52-80) 111/52     Weight: 112.9 kg (249 lb) (12/07/20 0521)  Body mass index is 39 kg/m².  Body surface area is 2.31 meters  squared.    I/O last 3 completed shifts:  In: 240 [P.O.:240]  Out: 4210 [Urine:4150; Drains:60]    Physical Exam  Constitutional:       Appearance: She is obese.   HENT:      Head: Normocephalic and atraumatic.      Nose: Nose normal.      Mouth/Throat:      Mouth: Mucous membranes are moist.      Pharynx: Oropharynx is clear.   Eyes:      Conjunctiva/sclera: Conjunctivae normal.      Pupils: Pupils are equal, round, and reactive to light.   Neck:      Musculoskeletal: Normal range of motion and neck supple.   Cardiovascular:      Rate and Rhythm: Regular rhythm.   Pulmonary:      Effort: Pulmonary effort is normal.   Abdominal:      General: Abdomen is flat.      Palpations: Abdomen is soft.      Tenderness: There is no abdominal tenderness.   Musculoskeletal:         General: Swelling present. No deformity.   Skin:     General: Skin is warm and dry.   Neurological:      General: No focal deficit present.      Mental Status: She is alert and oriented to person, place, and time.         Significant Labs:  CBC:   Recent Labs   Lab 12/08/20  0354   WBC 12.77*   RBC 2.78*   HGB 8.0*   HCT 25.8*      MCV 93   MCH 28.8   MCHC 31.0*     CMP:   Recent Labs   Lab 12/08/20  0354   *   CALCIUM 7.8*   ALBUMIN 2.0*   PROT 6.5   *   K 3.9   CO2 31*   CL 92*   BUN 26*   CREATININE 5.3*   ALKPHOS 474*   ALT 10   AST 45*   BILITOT 0.1     All labs within the past 24 hours have been reviewed.        Assessment/Plan:     * LAURIE  ATN in setting of contrast administration and sepsis 2/2 UTI, baseline cr 1.0-1.2 that trended up to 4.6 at time of consult.     -received contrast on 11/27/2020  -Urine microscopy 11/30/2020 many muddy brown granular casts, massive WBC's  -purulent drainage from abdominal ROSAURA drain similar in appearance to her urine; concerning for communication between bladder and fluid collection   -Initiated prismax 12/1, last received prismax 12/4    Recomendations    -Now with good UO for several days.         -Recommend continue to hold diuretics.  -UO 2.4 L, net negative 2.2 L past 24 hours  -No significant electrolyte disturbances or acute volume issues requiring further RRT at this time  -Recommend fluid restrict 1500 cc    -Maximum gabapentin dose 300 mg in CKD or LAURIE patients  -Maintain Hgb >7.0, Keep MAP >65  -Renally dose meds and avoid nephrotoxic meds  -Will continue to follow closely     Diabetic ketoacidosis without coma associated with type 2 diabetes mellitus  -Management per primary team   -resolved        Thank you for your consult. I will follow-up with patient. Please contact us if you have any additional questions.    Usha Ames MD  Nephrology  [facility]

## 2020-12-08 NOTE — ASSESSMENT & PLAN NOTE
Cr 1.5 on presentation.   Likely multi-factorial on presentation 2/2 pre-renal volume depletion in the context of DKA, UTI, and post-renal obstruction  Repeat LAURIE on 11/29 likely intrinsic ATN in etiology    Recent Labs   Lab 12/06/20  0558 12/06/20  0601 12/06/20  0752 12/07/20  0511 12/08/20  0354   CREATININE 4.4*  4.4* 4.5* 4.5* 5.1* 5.3*     PLAN  - Initial LAURIE on presentation resolved with IVF and placement of Cifuentes with resolution of hydronephrosis on imaging   - Maintain Cifuentes per Urology   - Treating UTI with Cefepime  - LAURIE due to ATN began on 11/29 and continues to worsen   - Remains anuric after IVF bolus, Lasix + Diuril challenge   - Resp status stable but Cr uptrending, pt lethargic and waxing/waning alertness  - Nephrology consulted, appreciate recs   - Initiation of CRRT, transfer to ICU on 12/2   - Transferred to floor on 12/4   - Pt made ~1L urine after Lasix + Diuril challenge   - May be in recovery phase of LAURIE   - BMP q4   - Renally dose meds   - Avoid renal toxins (ACEi/ARB, NSAIDs, contrast, etc.)   - Strict I&Os

## 2020-12-08 NOTE — PLAN OF CARE
Problem: Fall Injury Risk  Goal: Absence of Fall and Fall-Related Injury  Outcome: Ongoing, Progressing  Intervention: Identify and Manage Contributors to Fall Injury Risk  Flowsheets (Taken 12/8/2020 0433)  Self-Care Promotion:   independence encouraged   BADL personal objects within reach   BADL personal routines maintained  Medication Review/Management: medications reviewed  Intervention: Promote Injury-Free Environment  Flowsheets (Taken 12/8/2020 0433)  Safety Promotion/Fall Prevention:   assistive device/personal item within reach   bed alarm set   nonskid shoes/socks when out of bed   side rails raised x 2  Environmental Safety Modification:   assistive device/personal items within reach   clutter free environment maintained     Problem: Diabetes Comorbidity  Goal: Blood Glucose Level Within Desired Range  Outcome: Ongoing, Progressing  Intervention: Maintain Glycemic Control  Flowsheets (Taken 12/8/2020 0433)  Glycemic Management: blood glucose monitoring    Pt AAO*4, calm, cooperative. Pt on 3L oxygen via Nasal cannula. Pt tolerated CPAP overnight until 4am. Pain managed by prn regimen. March care performed, march to gravity. No acute changes overnight. Will continue to monitor.

## 2020-12-08 NOTE — PROGRESS NOTES
[facility]  Physical Medicine & Rehab  Progress Note    Patient Name: Navin Beck  MRN: [id number]  Admission Date: 11/26/2020  Length of Stay: 11 days  Attending Physician: Michael Jeffrey MD    Subjective:     Principal Problem:Encounter for continuous renal replacement therapy (CRRT) for acute renal failure    Hospital Course:   11/28/20:  Evaluated by PT and OT.  BM CGA-modA.  Sit to stand CGA and transfer CGA-Bro.  Ambulated ~12 ft + 12 ft CGA with RW.  Toileting totalA.  12/7/20:  Participated with PT and OT.  BM SBA.  Sit to stand modA and transfers min-modA with RW.  Ambulated ~4 steps Bro.  Grooming SBA.  UBD Bro.   12/8/20:  Participated with PT.  BM SBA.  Sit to stand and transfers Bro with RW.  Ambulated ~3-4 ft Bro with RW.     Interval History 12/8/2020:  Patient is seen for follow-up PM&R evaluation and recommendations: Out of ICU, no longer requiring CRRT.  Doing much better.  Alert and oriented.  Participating with therapy.     HPI, Past Medical, Family, and Social History remains the same as documented in the initial encounter.    Scheduled Medications:    cefTRIAXone (ROCEPHIN) IVPB  2 g Intravenous Q24H    gabapentin  300 mg Oral Daily    heparin (porcine)  5,000 Units Subcutaneous Q8H    insulin aspart U-100  17 Units Subcutaneous TIDWM    insulin detemir U-100  34 Units Subcutaneous BID    methIMAzole  10 mg Oral Daily    metroNIDAZOLE  500 mg Oral Q8H    polyethylene glycol  17 g Oral Daily    rosuvastatin  10 mg Oral Daily    senna-docusate 8.6-50 mg  1 tablet Oral BID       Diagnostic Results:   Labs: Reviewed  X-Ray: Reviewed  CT: Reviewed    PRN Medications: acetaminophen, albuterol-ipratropium, dextrose 50%, dextrose 50%, glucagon (human recombinant), glucose, glucose, HYDROmorphone, insulin aspart U-100, melatonin, ondansetron, oxyCODONE, oxyCODONE, sodium chloride 0.9%    Review of Systems   Constitutional: Positive for activity change.  Negative for chills and fatigue.   HENT: Negative for trouble swallowing and voice change.    Eyes: Negative for pain and visual disturbance.   Respiratory: Negative for cough and shortness of breath.    Cardiovascular: Negative for chest pain and palpitations.   Gastrointestinal: Positive for abdominal pain. Negative for nausea and vomiting.   Genitourinary: Positive for difficulty urinating. Negative for flank pain.   Musculoskeletal: Positive for gait problem. Negative for back pain and neck pain.   Skin: Positive for wound. Negative for rash.   Neurological: Positive for weakness. Negative for dizziness, numbness and headaches.   Psychiatric/Behavioral: Negative for agitation. The patient is not nervous/anxious.      Objective:     Vital Signs (Most Recent):  Temp: 98.5 °F (36.9 °C) (12/08/20 1203)  Pulse: 72 (12/08/20 0803)  Resp: 18 (12/08/20 1203)  BP: (!) 115/54 (12/08/20 1203)  SpO2: 96 % (12/08/20 1203)    Vital Signs (24h Range):  Temp:  [96.4 °F (35.8 °C)-99.6 °F (37.6 °C)] 98.5 °F (36.9 °C)  Pulse:  [64-96] 72  Resp:  [12-20] 18  SpO2:  [93 %-100 %] 96 %  BP: (111-138)/(52-80) 115/54     Physical Exam  Vitals signs reviewed.   Constitutional:       General: She is not in acute distress.     Appearance: She is well-developed.   HENT:      Head: Normocephalic and atraumatic.      Right Ear: External ear normal.      Left Ear: External ear normal.      Nose: Nose normal.   Eyes:      General: No scleral icterus.        Right eye: No discharge.         Left eye: No discharge.   Neck:      Musculoskeletal: Normal range of motion.   Cardiovascular:      Rate and Rhythm: Normal rate.   Pulmonary:      Effort: Pulmonary effort is normal. No respiratory distress.   Abdominal:      General: There is no distension.      Palpations: Abdomen is soft.      Tenderness: There is abdominal tenderness in the left lower quadrant.      Comments: LLQ drain in place   Genitourinary:     Comments: Cifuentes in  place  Musculoskeletal:         General: No tenderness.      Comments: General weakness and deconditioning, pain limited    Skin:     General: Skin is warm and dry.      Findings: No rash.   Neurological:      Mental Status: She is alert and oriented to person, place, and time.      Sensory: Sensation is intact.      Motor: Weakness present.   Psychiatric:         Mood and Affect: Mood normal.         Behavior: Behavior normal. Behavior is cooperative.     Assessment/Plan:      * LAURIE  -likely multifactorial on presentation 2/2 pre-renal volume depletion in the context of DKA, UTI, and post-renal obstruction  -nephrology following, etiology ATN in setting of contrast administration and sepsis 2/2 UTI  -no longer requiring CRRT    Postprocedural intraabdominal abscess  -mesh removed by GYN 10/26, march placed and removed 11/19  -presented with worsening abd pain found to be related to intra-abd fluid collections  -urine culture 11/26 +E.coli  -s/p IR drainage on 11/28, cultures +Peptostreptococcus   -ID consulted--> recommended ceftriaxone and flagyl x 14 days (11/29-12/13)  -GYN and Urology following     Urinary retention  -likely 2/2 anatomic obstruction, pelvic fluid collection, diabetic bladder  -march in place  -urology following--> will need voiding trial in near future     Diabetic ketoacidosis without coma associated with type 2 diabetes mellitus  -previously required insulin gtt  -endocrine following    Severe obesity (BMI 35.0-35.9 with comorbidity)  -Body mass index is 37.22 kg/m².  -bariatric equipment as needed  -encourage lifestyle modifications    Impaired mobility and endurance  -related to prolonged/acute hospital course  -(I) with ADLs and mobility at baseline  See hospital course for functional status.    Recommendations  -  Encourage mobility, OOB in chair, and early ambulation as appropriate  -  PT/OT evaluate and treat  -  Pain management  -  DVT prophylaxis if appropriate   -  Monitor for and  prevent skin breakdown and pressure ulcers  · Early mobility, repositioning/weight shifting every 20-30 minutes when sitting, turn patient every 2 hours, proper mattress/overlay and chair cushioning, pressure relief/heel protector boots    Likely candidate for inpatient rehab.  Will discuss with PM&R staff for final recommendation.      LAINE Domínguez  Department of Physical Medicine & Rehab   [facility]

## 2020-12-08 NOTE — PHARMACY MED REC
Admission Medication Reconciliation - Pharmacy Consult Note    The home medication history was taken by Jesika Sánchez, Pharmacy Tech.     Potential issues to be addressed PRIOR TO DISCHARGE  o Holding home benazepril, duloxetine, and tamsulosin for LAURIE; Gabapentin renally adjusted    Please address this information as you see fit.  Feel free to contact us if you have any questions or require assistance.    Angel Adams, Pharm.D., BCPS  [id number]                    .    .

## 2020-12-08 NOTE — ASSESSMENT & PLAN NOTE
-mesh removed by GYN 10/26, march placed and removed 11/19  -presented with worsening abd pain found to be related to intra-abd fluid collections  -urine culture 11/26 +E.coli  -s/p IR drainage on 11/28, cultures +Peptostreptococcus   -ID consulted--> recommended ceftriaxone and flagyl x 14 days (11/29-12/13)  -GYN and Urology following

## 2020-12-08 NOTE — ASSESSMENT & PLAN NOTE
Pt Hb 9.8 on admit. BL ~10.   Most likely due to anemia of chronic disease, acute blood loss, and frequent phlebotomy while hospitalized.   Recent Labs   Lab 12/06/20  0642 12/06/20  1513 12/07/20  0511 12/08/20  0354   HGB 6.6* 8.3* 7.9* 8.0*         - Required 1U PRBC on 12/2  - Required 1U PRBC on 12/6 2/2

## 2020-12-08 NOTE — PLAN OF CARE
Problem: Physical Therapy Goal  Goal: Physical Therapy Goal  Description: Goals to be met by: 2020     Patient will increase functional independence with mobility by performin. Pt supine to sit with minimum assistance- Met 2020  3. Sit to stand transfer with Minimum assistance and RW if needed. - not met  4. Bed to chair transfer with Minimum assistance  using Rolling Walker or no AD.-not met  5. Gait  x 50 feet with Minimum assistanc using Rolling Walker or no AD. -not met  6. Lower extremity exercise program x20 reps, with independence -not met    Outcome: Ongoing, Progressing

## 2020-12-08 NOTE — SUBJECTIVE & OBJECTIVE
Interval History/Significant Events: Patient on 3L NC satting >90%. CTAP showed improvement of multilobular fluid collection. It also showed bibasilar consolidative changes R>L with small right sided pleural effusions. Patient afebrile. She reports that her abdominal pain is better but is still very tender on physical exam.     Review of Systems   Constitutional: Positive for activity change, appetite change and fatigue. Negative for chills and fever.   HENT: Negative for hearing loss, rhinorrhea, sneezing, sore throat and trouble swallowing.    Respiratory: Negative for cough, shortness of breath and wheezing.    Cardiovascular: Negative for chest pain, palpitations and leg swelling.   Gastrointestinal: Positive for rectal pain and vomiting. Negative for abdominal pain, blood in stool, constipation, diarrhea and nausea.   Genitourinary: Positive for difficulty urinating and pelvic pain. Negative for dysuria and flank pain.   Musculoskeletal: Negative for arthralgias, neck pain and neck stiffness.   Skin: Negative for color change, pallor and rash.   Neurological: Negative for syncope, weakness, light-headedness, numbness and headaches.   Psychiatric/Behavioral: Negative for confusion and hallucinations. The patient is not nervous/anxious.      Objective:     Vital Signs (Most Recent):  Temp: 98.5 °F (36.9 °C) (12/08/20 1203)  Pulse: 71 (12/08/20 1133)  Resp: 18 (12/08/20 1437)  BP: (!) 115/54 (12/08/20 1203)  SpO2: 96 % (12/08/20 1203) Vital Signs (24h Range):  Temp:  [96.4 °F (35.8 °C)-99.6 °F (37.6 °C)] 98.5 °F (36.9 °C)  Pulse:  [66-96] 71  Resp:  [13-20] 18  SpO2:  [93 %-100 %] 96 %  BP: (111-134)/(52-61) 115/54   Weight: 112.9 kg (249 lb)  Body mass index is 39 kg/m².      Intake/Output Summary (Last 24 hours) at 12/8/2020 1451  Last data filed at 12/8/2020 0600  Gross per 24 hour   Intake 240 ml   Output 1330 ml   Net -1090 ml       Physical Exam  Constitutional:       General: She is not in acute distress.      Appearance: She is obese. She is not ill-appearing or toxic-appearing.   HENT:      Head: Normocephalic and atraumatic.      Nose: No congestion or rhinorrhea.      Mouth/Throat:      Pharynx: No oropharyngeal exudate or posterior oropharyngeal erythema.   Eyes:      General: No scleral icterus.        Right eye: No discharge.         Left eye: No discharge.      Extraocular Movements: Extraocular movements intact.      Pupils: Pupils are equal, round, and reactive to light.   Neck:      Musculoskeletal: Normal range of motion and neck supple. No neck rigidity or muscular tenderness.   Cardiovascular:      Rate and Rhythm: Normal rate and regular rhythm.      Pulses: Normal pulses.      Heart sounds: No murmur.   Pulmonary:      Effort: Pulmonary effort is normal. No respiratory distress.      Breath sounds: Normal breath sounds. No stridor. No wheezing.   Abdominal:      General: Abdomen is flat. Bowel sounds are normal. There is no distension.      Palpations: Abdomen is soft. There is no mass.      Tenderness: There is abdominal tenderness (LLQ, drain in place with minimal pustular yellow fluid, dressings CDI).      Hernia: No hernia is present.   Musculoskeletal: Normal range of motion.         General: No swelling, tenderness or deformity.   Skin:     General: Skin is warm and dry.      Coloration: Skin is not jaundiced or pale.      Findings: No bruising.   Neurological:      General: No focal deficit present.      Mental Status: She is alert. Mental status is at baseline.         Vents:  Oxygen Concentration (%): 28 (12/08/20 0211)  Lines/Drains/Airways     Central Venous Catheter Line            Trialysis (Dialysis) Catheter 12/01/20 1547 right internal jugular 6 days          Drain                 Closed/Suction Drain 11/28/20 1358 Right Abdomen Bulb 10 Fr. 10 days         Urethral Catheter 12/07/20 1413 Latex;Straight-tip 18 Fr. 1 day          Peripheral Intravenous Line                 Peripheral IV -  Single Lumen 12/07/20 20 G Right;Lateral Upper Arm 1 day              Significant Labs:    CBC/Anemia Profile:  Recent Labs   Lab 12/06/20  1513 12/07/20  0511 12/08/20  0354   WBC 14.42* 12.29 12.77*   HGB 8.3* 7.9* 8.0*   HCT 26.1* 25.4* 25.8*   * 391* 342   MCV 92 93 93   RDW 14.6* 14.7* 14.7*        Chemistries:  Recent Labs   Lab 12/07/20  0511 12/08/20  0354    134*   K 3.8 3.9   CL 95 92*   CO2 31* 31*   BUN 24* 26*   CREATININE 5.1* 5.3*   CALCIUM 7.7* 7.8*   ALBUMIN 1.8* 2.0*   PROT 6.0 6.5   BILITOT 0.1 0.1   ALKPHOS 201* 474*   ALT 5* 10   AST 16 45*   MG 1.6 1.4*   PHOS 4.8* 5.3*  5.3*       BMP:   Recent Labs   Lab 12/08/20  0354   *   *   K 3.9   CL 92*   CO2 31*   BUN 26*   CREATININE 5.3*   CALCIUM 7.8*   MG 1.4*     CMP:   Recent Labs   Lab 12/07/20  0511 12/08/20  0354    134*   K 3.8 3.9   CL 95 92*   CO2 31* 31*   * 123*   BUN 24* 26*   CREATININE 5.1* 5.3*   CALCIUM 7.7* 7.8*   PROT 6.0 6.5   ALBUMIN 1.8* 2.0*   BILITOT 0.1 0.1   ALKPHOS 201* 474*   AST 16 45*   ALT 5* 10   ANIONGAP 10 11   EGFRNONAA 8.4* 8.1*       Significant Imaging:  I have reviewed all pertinent imaging results/findings within the past 24 hours.

## 2020-12-08 NOTE — SUBJECTIVE & OBJECTIVE
Interval History 12/8/2020:  Patient is seen for follow-up PM&R evaluation and recommendations: Out of ICU, no longer requiring CRRT.  Doing much better.  Alert and oriented.  Participating with therapy.     HPI, Past Medical, Family, and Social History remains the same as documented in the initial encounter.    Scheduled Medications:    cefTRIAXone (ROCEPHIN) IVPB  2 g Intravenous Q24H    gabapentin  300 mg Oral Daily    heparin (porcine)  5,000 Units Subcutaneous Q8H    insulin aspart U-100  17 Units Subcutaneous TIDWM    insulin detemir U-100  34 Units Subcutaneous BID    methIMAzole  10 mg Oral Daily    metroNIDAZOLE  500 mg Oral Q8H    polyethylene glycol  17 g Oral Daily    rosuvastatin  10 mg Oral Daily    senna-docusate 8.6-50 mg  1 tablet Oral BID       Diagnostic Results:   Labs: Reviewed  X-Ray: Reviewed  CT: Reviewed    PRN Medications: acetaminophen, albuterol-ipratropium, dextrose 50%, dextrose 50%, glucagon (human recombinant), glucose, glucose, HYDROmorphone, insulin aspart U-100, melatonin, ondansetron, oxyCODONE, oxyCODONE, sodium chloride 0.9%    Review of Systems   Constitutional: Positive for activity change. Negative for chills and fatigue.   HENT: Negative for trouble swallowing and voice change.    Eyes: Negative for pain and visual disturbance.   Respiratory: Negative for cough and shortness of breath.    Cardiovascular: Negative for chest pain and palpitations.   Gastrointestinal: Positive for abdominal pain. Negative for nausea and vomiting.   Genitourinary: Positive for difficulty urinating. Negative for flank pain.   Musculoskeletal: Positive for gait problem. Negative for back pain and neck pain.   Skin: Positive for wound. Negative for rash.   Neurological: Positive for weakness. Negative for dizziness, numbness and headaches.   Psychiatric/Behavioral: Negative for agitation. The patient is not nervous/anxious.      Objective:     Vital Signs (Most Recent):  Temp: 98.5 °F  (36.9 °C) (12/08/20 1203)  Pulse: 72 (12/08/20 0803)  Resp: 18 (12/08/20 1203)  BP: (!) 115/54 (12/08/20 1203)  SpO2: 96 % (12/08/20 1203)    Vital Signs (24h Range):  Temp:  [96.4 °F (35.8 °C)-99.6 °F (37.6 °C)] 98.5 °F (36.9 °C)  Pulse:  [64-96] 72  Resp:  [12-20] 18  SpO2:  [93 %-100 %] 96 %  BP: (111-138)/(52-80) 115/54     Physical Exam  Vitals signs reviewed.   Constitutional:       General: She is not in acute distress.     Appearance: She is well-developed.   HENT:      Head: Normocephalic and atraumatic.      Right Ear: External ear normal.      Left Ear: External ear normal.      Nose: Nose normal.   Eyes:      General: No scleral icterus.        Right eye: No discharge.         Left eye: No discharge.   Neck:      Musculoskeletal: Normal range of motion.   Cardiovascular:      Rate and Rhythm: Normal rate.   Pulmonary:      Effort: Pulmonary effort is normal. No respiratory distress.   Abdominal:      General: There is no distension.      Palpations: Abdomen is soft.      Tenderness: There is abdominal tenderness in the left lower quadrant.      Comments: LLQ drain in place   Genitourinary:     Comments: Cifuentes in place  Musculoskeletal:         General: No tenderness.      Comments: General weakness and deconditioning, pain limited    Skin:     General: Skin is warm and dry.      Findings: No rash.   Neurological:      Mental Status: She is alert and oriented to person, place, and time.      Sensory: Sensation is intact.      Motor: Weakness present.   Psychiatric:         Mood and Affect: Mood normal.         Behavior: Behavior normal. Behavior is cooperative.       NEUROLOGICAL EXAMINATION:     MENTAL STATUS   Oriented to person, place, and time.

## 2020-12-08 NOTE — ASSESSMENT & PLAN NOTE
Pt presented to [facility] due to 5-6 days of 10/10 lower abdomen, vaginal, and rectal pain. Pt has recent h/o GYN surgery in 10/2020 for bladder mesh removal, Cifuentes was removed on 11/19.  CT A/P 11/27: Multiple fluid collections adjacent to bladder. Mild bilateral hydronephrosis.   CT A/P 12/8: Interval resolution of previously identified multilobular fluid collection in the lower pelvis with drainage catheter in position.  Residual scattered small volume of air is noted.   UCx 11/26: E.coli sensitive to CTX, cefepime    PLAN  - GYN consulted, appreciate consult; Agrees with Urology plan for drainage with IR  - IR consulted, appreciate recs; Drain in place; cultures sent 11/28    - Gram stain, many GPC   - Cultures grew Peptostrptococcus on 12/5 pending margy.  - Pain management: PCA pump initially   - PCA pump d/c on 11/30   - PRN opiates  - ID consulted for intra-abdominal infection, appreciate recs   - Continue CTX & Flagyl x14 days, repeat imaging to assess for cleared infection prior to d/c abx   - If d/c before 14d course, may switch to augmentin  - Urology recs appreciated; left CVAT, ordered CT RSS 11/29 hydronephrosis resolved   - Cystography 11/27 showed no bladder leak   - Maintain Cifuentes   - 12/6 spoke with urology concerning ROSAURA drain output concern for urinary contents.    - Per Urology, since ROSAURA fluid Cr 1.7 is less than serum Cr 4.5, the fluid is not urine.    - Negative cystogram and normal retrograde pyelogram

## 2020-12-08 NOTE — ASSESSMENT & PLAN NOTE
Patient on 3L NC.   12/7 CXR: Interval progression in the extent of right perihilar and right lower lung zone infiltrate and or atelectasis.  New and or mildly increased small right pleural effusion.   12/8 CTAP: Bibasilar consolidative changes, right more than left with small volume right-sided pleural effusion.  Findings likely related to pneumonia versus inflammatory process.    - follow up CT Chest w/o Contrast

## 2020-12-08 NOTE — SUBJECTIVE & OBJECTIVE
Interval History: Pt seen at bedside. Last received prismax on 12/4. Good UO over past 4 days. 2.4 L past 24 hrs, net negative 2.2 L. Patient reports feeling generally well.      Objective:     Vital Signs (Most Recent):  Temp: 98.5 °F (36.9 °C) (12/08/20 0803)  Pulse: 72 (12/08/20 0803)  Resp: 18 (12/08/20 0846)  BP: (!) 111/52 (12/08/20 0803)  SpO2: 98 % (12/08/20 0814)  O2 Device (Oxygen Therapy): nasal cannula (12/08/20 0814) Vital Signs (24h Range):  Temp:  [96.4 °F (35.8 °C)-99.6 °F (37.6 °C)] 98.5 °F (36.9 °C)  Pulse:  [64-96] 72  Resp:  [12-20] 18  SpO2:  [93 %-100 %] 98 %  BP: (111-138)/(52-80) 111/52     Weight: 112.9 kg (249 lb) (12/07/20 0521)  Body mass index is 39 kg/m².  Body surface area is 2.31 meters squared.    I/O last 3 completed shifts:  In: 240 [P.O.:240]  Out: 4210 [Urine:4150; Drains:60]    Physical Exam  Constitutional:       Appearance: She is obese.   HENT:      Head: Normocephalic and atraumatic.      Nose: Nose normal.      Mouth/Throat:      Mouth: Mucous membranes are moist.      Pharynx: Oropharynx is clear.   Eyes:      Conjunctiva/sclera: Conjunctivae normal.      Pupils: Pupils are equal, round, and reactive to light.   Neck:      Musculoskeletal: Normal range of motion and neck supple.   Cardiovascular:      Rate and Rhythm: Regular rhythm.   Pulmonary:      Effort: Pulmonary effort is normal.   Abdominal:      General: Abdomen is flat.      Palpations: Abdomen is soft.      Tenderness: There is no abdominal tenderness.   Musculoskeletal:         General: Swelling present. No deformity.   Skin:     General: Skin is warm and dry.   Neurological:      General: No focal deficit present.      Mental Status: She is alert and oriented to person, place, and time.         Significant Labs:  CBC:   Recent Labs   Lab 12/08/20  0354   WBC 12.77*   RBC 2.78*   HGB 8.0*   HCT 25.8*      MCV 93   MCH 28.8   MCHC 31.0*     CMP:   Recent Labs   Lab 12/08/20  0354   *   CALCIUM 7.8*    ALBUMIN 2.0*   PROT 6.5   *   K 3.9   CO2 31*   CL 92*   BUN 26*   CREATININE 5.3*   ALKPHOS 474*   ALT 10   AST 45*   BILITOT 0.1     All labs within the past 24 hours have been reviewed.

## 2020-12-08 NOTE — PROGRESS NOTES
[facility]  Obstetrics & Gynecology  Progress Note    Patient Name: Navin Beck  MRN: [id number]  Admission Date: 11/26/2020  Primary Care Provider: Elvira Quinones MD  Principal Problem: Encounter for continuous renal replacement therapy (CRRT) for acute renal failure    Subjective:     HPI: Navin Beck is a 61 yo F w/ HTN, DM2, and POP who presented [place] [facility] [place] with abdominal pain and urinary retention. She is s/p robotic excision of vaginal mesh with Dr. Goel in October 2020. There was significant adhesive disease per the op report and urology assisted with bladder dissection. She was discharged home on POD #1 with march in place. March was removed on 11/9/2020. Since then, she has had continued dysuria and vaginal pain/pressure. She admits to holding her urine to avoid the dysuria. No fever, chills, nausea, vomiting. She also has had poorly controlled blood sugars throughout this time.     March placed per nursing in the ED with 800 cc urine output. WBC count is 17. Cr 1.5, up from 1.0. UA with RBCs, >100 WBC, and bacteria with 3 squams. Urine cultures pending, s/p rocephin in the ED. CT A/P with multilobular fluid collection in the lower pelvis adjacent to the urinary bladder which likely causes in compression of the distal ureters bilaterally and resulting in mild bilateral hydronephrosis.      Of note, BG elevated in ED to > 500. Admitted to hospital medicine for management of DKA. Urology and gynecology consulted.    12/5/2020: moved to step down unit.      Interval History: Patient feeling better overall. Abdominal pain improving. Continues IV abx, glucose control per primary team. No longer on CRRT. Being seen by PT/OT. Extensive workup for urinary tract leak completed and all negative - ROSAURA drain in place, putting out approx 30 cc/shift.     Scheduled Meds:   cefTRIAXone (ROCEPHIN) IVPB  2 g Intravenous Q24H    gabapentin  300 mg Oral Daily    heparin  (porcine)  5,000 Units Subcutaneous Q8H    insulin aspart U-100  17 Units Subcutaneous TIDWM    insulin detemir U-100  36 Units Subcutaneous BID    methIMAzole  10 mg Oral Daily    metroNIDAZOLE  500 mg Oral Q8H    polyethylene glycol  17 g Oral Daily    rosuvastatin  10 mg Oral Daily    senna-docusate 8.6-50 mg  1 tablet Oral BID     Continuous Infusions:  PRN Meds:acetaminophen, albuterol-ipratropium, dextrose 50%, dextrose 50%, glucagon (human recombinant), glucose, glucose, HYDROmorphone, insulin aspart U-100, melatonin, ondansetron, oxyCODONE, oxyCODONE, sodium chloride 0.9%    Review of patient's allergies indicates:   Allergen Reactions    Penicillins Shortness Of Breath, Itching and Swelling     Tolerates cefepime 11/30/2020       Objective:     Vital Signs (Most Recent):  Temp: 98.5 °F (36.9 °C) (12/08/20 0803)  Pulse: 72 (12/08/20 0803)  Resp: 18 (12/08/20 0846)  BP: (!) 111/52 (12/08/20 0803)  SpO2: 98 % (12/08/20 0814) Vital Signs (24h Range):  Temp:  [96.4 °F (35.8 °C)-99.6 °F (37.6 °C)] 98.5 °F (36.9 °C)  Pulse:  [64-96] 72  Resp:  [12-20] 18  SpO2:  [93 %-100 %] 98 %  BP: (111-138)/(52-80) 111/52     Weight: 112.9 kg (249 lb)  Body mass index is 39 kg/m².  No LMP recorded. Patient has had a hysterectomy.    I&O (Last 24H):    Intake/Output Summary (Last 24 hours) at 12/8/2020 1003  Last data filed at 12/8/2020 0600  Gross per 24 hour   Intake 240 ml   Output 2510 ml   Net -2270 ml   UOP approx 100 cc/hr  ROSAURA drain: 30 cc/shift    Physical Exam:   Constitutional: She is oriented to person, place, and time. She appears well-developed and well-nourished.    HENT:   Head: Normocephalic and atraumatic.    Eyes: EOM are normal.    Neck: Normal range of motion.    Cardiovascular: Normal rate.     Pulmonary/Chest: Effort normal.      Abdominal: Soft. She exhibits no distension and no mass. There is abdominal tenderness (minimal tenderness with deep palpation over LLQ )  +voluntary guarding on palpation  but no involuntary guarding. There is no rebound.  Improving from prior exams, per Dr. Kenny.  ROSAURA drain minimal yellowish fluid, mostly clear with some particulate.   Genitourinary:    Genitourinary Comments: Cifuentes catheter in place          Musculoskeletal: Moves all extremities      Neurological: She is alert and oriented to person, place, and time.    Skin: Skin is warm and dry.    Psychiatric: She has a normal mood and affect. Her behavior is normal. Judgment and thought content normal.      Laboratory:  Lab Results   Component Value Date    WBC 12.77 (H) 12/08/2020    HGB 8.0 (L) 12/08/2020    HCT 25.8 (L) 12/08/2020    MCV 93 12/08/2020     12/08/2020     CMP  Sodium   Date Value Ref Range Status   12/08/2020 134 (L) 136 - 145 mmol/L Final     Potassium   Date Value Ref Range Status   12/08/2020 3.9 3.5 - 5.1 mmol/L Final     Chloride   Date Value Ref Range Status   12/08/2020 92 (L) 95 - 110 mmol/L Final     CO2   Date Value Ref Range Status   12/08/2020 31 (H) 23 - 29 mmol/L Final     Glucose   Date Value Ref Range Status   12/08/2020 123 (H) 70 - 110 mg/dL Final     BUN   Date Value Ref Range Status   12/08/2020 26 (H) 8 - 23 mg/dL Final     Creatinine   Date Value Ref Range Status   12/08/2020 5.3 (H) 0.5 - 1.4 mg/dL Final     Calcium   Date Value Ref Range Status   12/08/2020 7.8 (L) 8.7 - 10.5 mg/dL Final     Total Protein   Date Value Ref Range Status   12/08/2020 6.5 6.0 - 8.4 g/dL Final     Albumin   Date Value Ref Range Status   12/08/2020 2.0 (L) 3.5 - 5.2 g/dL Final     Total Bilirubin   Date Value Ref Range Status   12/08/2020 0.1 0.1 - 1.0 mg/dL Final     Comment:     For infants and newborns, interpretation of results should be based  on gestational age, weight and in agreement with clinical  observations.  Premature Infant recommended reference ranges:  Up to 24 hours.............<8.0 mg/dL  Up to 48 hours............<12.0 mg/dL  3-5 days..................<15.0 mg/dL  6-29  days.................<15.0 mg/dL       Alkaline Phosphatase   Date Value Ref Range Status   12/08/2020 474 (H) 55 - 135 U/L Final     AST   Date Value Ref Range Status   12/08/2020 45 (H) 10 - 40 U/L Final     ALT   Date Value Ref Range Status   12/08/2020 10 10 - 44 U/L Final     Anion Gap   Date Value Ref Range Status   12/08/2020 11 8 - 16 mmol/L Final     eGFR if    Date Value Ref Range Status   12/08/2020 9.3 (A) >60 mL/min/1.73 m^2 Final     eGFR if non    Date Value Ref Range Status   12/08/2020 8.1 (A) >60 mL/min/1.73 m^2 Final     Comment:     Calculation used to obtain the estimated glomerular filtration  rate (eGFR) is the CKD-EPI equation.        Lab Results   Component Value Date    CALCIUM 7.8 (L) 12/08/2020    PHOS 5.3 (H) 12/08/2020    PHOS 5.3 (H) 12/08/2020 12/5 drain Cr: 1.7 (ref 0.5-1.4).     Micro:  11/26 urine C&S: e coli 10-50K  11/27 blood C&S: NEG  11/28 IR anaerobic C&S: PEPTOSTREPTOCOCCUS ANAEROBIUS   11/28 aerobic C&S: NEG  11/28 fungal C&S: pending        Diagnostic Results:  12/7 cysto with bilateral retrograde pyelogram: no evidence of hydronephrosis, no filling defects, ureters normal in course and caliber; edemetous changes along the posterior wall likely consistent with indwelling march catheter    Assessment/Plan:     Active Diagnoses:    Diagnosis Date Noted POA    PRINCIPAL PROBLEM:  LAURIE [N17.9] 11/27/2020 Not Applicable    Anemia [D64.9] 12/06/2020 Unknown    Abnormal white blood cell (WBC) [D72.9]  Yes    Impaired mobility and endurance [Z74.09] 12/01/2020 No    Hyponatremia [E87.1] 11/30/2020 No    Postprocedural intraabdominal abscess [T81.43XA] 11/29/2020 Yes    Urinary retention [R33.9] 11/27/2020 Yes    UTI (urinary tract infection) [N39.0] 11/27/2020 Unknown    Asthma [J45.909] 07/17/2020 Yes    Type 2 diabetes mellitus with hyperglycemia, with long-term current use of insulin [E11.65, Z79.4] 07/17/2020 Not Applicable     Diabetic ketoacidosis without coma associated with type 2 diabetes mellitus [E11.10] 08/07/2017 Yes    Hyperthyroidism [E05.90] 08/07/2017 Unknown    Severe obesity (BMI 35.0-35.9 with comorbidity) [E66.01, Z68.35] 09/09/2013 Not Applicable    Essential hypertension [I10] 07/06/2012 Yes    Abnormal abdominal CT scan [R93.5] 05/28/2012 Yes    Mixed hyperlipidemia [E78.2] 04/14/2012 Yes      Problems Resolved During this Admission:    Diagnosis Date Noted Date Resolved POA    Anemia due to stage 3 chronic kidney disease [N18.30, D63.1] 12/06/2020 12/06/2020 Unknown     Acute kidney injury (LAURIE) with acute tubular necrosis (ATN)  - Cr 2.6>>5.3  - Stepped down from ICU to step down unit  - no longer on CRRT  -  cc/hr  - Management per Nephrology/Critical Care (primary team)  - QAM labs as per primary team     Postprocedural intraabdominal abscess  - S/p IR pelvic drain placement 11/28  - 250cc of pus immediately removed   - 10-20 cc drain output over last 24 hours          - 12/3 drain Cr was sent to send out lab and delaye          - 12/5 recollection drain Cr: 1.7 (elevated per normal serum Cr but not vs patient's current elevated serum Cr, do not suspect urinary tract leak)  - Symptomatic improvement as mentioned above   - Continue drain management per primary team/IR  - s/p ID consult: recc 14 d IV ABX (ceftriaxone/flagyl) from 11/29-12/13, then repeat abd imaging prior to stopping ABX; may transition to PO augmentin on d/c (renally dose)     Urinary retention  - Cifuentes in place draining urine, output improving  - UA with findings as noted in HPI. UCx positive for E. Coli <50,000cfu/ml. S/p vanc x 1 and rocephin x 1 in ED.           -- continues daily rocephin  - CT A/P and CT urogram done as noted above.  - Cr: See LAURIE problem     Anemia   - in setting of LAURIE, suspect as primary contributor  - VSS; no s/sx postop ABLA  - s/p multiple units pRBCs since admission. H&H 8/25.8 today.  - CTM/managament per  primary team    Abnormal computed tomography of bladder  - History of gyn surgery 10/2020 for bladder mesh removal, CT abdomen pelvis showing fluid collections, IR drain placed 11/28 showing GPCs, anaerobic C&S: PEPTOSTREPTOCOCCUS ANAEROBIUS   - On rocephin + metronidazole  - Retrograde pyelogram normal. Urology signed off  - 12/5 recollection ROSAURA drain Cr: 1.7, not indicative of urinary tract leak.      Diabetic ketoacidosis without coma associated with type 2 diabetes mellitus  - Continue management per primary team  - BG stable on insulin regimen  - endocrine following     Asthma  - Stable on duonebs Q6h PRN  - Cont RT as per primary team  - CPAP  - smoking cessation counseling ordered     Mixed hyperlipidemia  - Continue home rosuvastatin     Essential hypertension  - Home benzapril: Holding - normotensive in hosptial     Hyperthyroidism  - History of Grave's disease 2017  - TSH this admission nl  - Home methimazole: continue 10 mg daily inpatient  - Endocrine following     Pain/neuro:  - gabapentin 300 mg PO daily  - oxycodone IR PO 5, 10 mg QQ4h PRN  - dilaudid 0.5 mg IV  Q6 PRN    PPX:  - GI/constipation: polyethlene gycol + senna-docusate  - VTE: heparin 5000 u SQ TID, SCDs  - deconditioning: PT/OT on board     Urogynecology team to continue to follow and remain available to assist with any questions/concerns of primary or consulting services. We will follow along in chart and see patient on Friday 12/11/20.    Ferdinand David MD  PGY4, OBGYN  [facility]

## 2020-12-08 NOTE — PLAN OF CARE
Problem: Adult Inpatient Plan of Care  Goal: Plan of Care Review  Outcome: Ongoing, Progressing  Goal: Optimal Comfort and Wellbeing  Outcome: Ongoing, Progressing  Intervention: Provide Person-Centered Care  Flowsheets (Taken 12/7/2020 1824)  Trust Relationship/Rapport:   care explained   choices provided   questions answered     Problem: Respiratory Compromise (Sepsis/Septic Shock)  Goal: Effective Oxygenation and Ventilation  Intervention: Optimize Oxygenation and Ventilation  Flowsheets (Taken 12/7/2020 1824)  Airway/Ventilation Management: calming measures promoted  Head of Bed (HOB): HOB elevated     POC reviewed with pt. Pt is on 3 liters of oxygen via NC. Pt educated on incentive spirometer. Pt required multiple doses of PRN pain medications for   Abdominal pain. Pt's vital signs are stable. Safety checks in place. Managed march and ROSAURA drain per order. All questions answered.

## 2020-12-08 NOTE — PHYSICIAN QUERY
PT Name: Navin Beck  MR #: [id number]     ELECTROLYTE CLARIFICATION      CDS: Herve CAMILO,RN        Contact information: [email or web address]  This form is a permanent document in the medical record.    Query Date: December 8, 2020    By submitting this query, we are merely seeking further clarification of documentation to reflect the severity of illness of your patient. Please utilize your independent clinical judgment when addressing the question(s) below.    The Medical Record reflects the following:     Indicators   Supporting Clinical Findings Location in Medical Record   x Lab Value(s) Magnesium:1.6-->1.3-->2.7-->2.6-->2.4-->2.3-->2.2--->2.1--->1.9-->1.8-->1.4  Calcium: 8.9-->8.6-->8.5-->8.6-->8.3--->8.5--->8.0-->8.1-->7.0--->10.3-->7.8   11/28---> 12/8 Labs   x Treatment/Medication Magnesium sulfate 2 g in water 50 ml  IVPB Intravenous Every 2 hours 2 dose given  Magnesium sulfate 2 g in water 50 ml IVPB Intravenous Once 1 does (s) given     Calcium gluconate 30 g in dextrose 5% 1,000 ml IVPB  Intravenous Continuous 11/29/20 MAR      11/29/20 MAR    12/3---->12/6 MAR    Other     Doctor, please specify the diagnosis or diagnoses that correspond(s) to the above indicators. Craig all that apply:    [   ] Hypocalcemia   [ x  ] Hypomagnesemia   [   ] Other electrolyte disturbance (please specify): __________   [   ]  Clinically Undetermined       Please document in your progress notes daily for the duration of treatment until resolved, and include in your discharge summary.

## 2020-12-09 PROBLEM — R74.8 ELEVATED ALKALINE PHOSPHATASE LEVEL: Status: ACTIVE | Noted: 2020-12-09
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
## 2020-12-09 PROBLEM — J18.9 HAP (HOSPITAL-ACQUIRED PNEUMONIA): Status: ACTIVE | Noted: 2020-12-08

## 2020-12-09 LAB
ALBUMIN SERPL BCP-MCNC: 2.2 G/DL (ref 3.5–5.2)
ALP SERPL-CCNC: 566 U/L (ref 55–135)
ALT SERPL W/O P-5'-P-CCNC: 9 U/L (ref 10–44)
ANION GAP SERPL CALC-SCNC: 11 MMOL/L (ref 8–16)
AST SERPL-CCNC: 29 U/L (ref 10–40)
BASOPHILS # BLD AUTO: 0.08 K/UL (ref 0–0.2)
BASOPHILS NFR BLD: 0.7 % (ref 0–1.9)
BILIRUB SERPL-MCNC: 0.1 MG/DL (ref 0.1–1)
BUN SERPL-MCNC: 27 MG/DL (ref 8–23)
CALCIUM SERPL-MCNC: 7.9 MG/DL (ref 8.7–10.5)
CHLORIDE SERPL-SCNC: 93 MMOL/L (ref 95–110)
CO2 SERPL-SCNC: 31 MMOL/L (ref 23–29)
CREAT SERPL-MCNC: 4.7 MG/DL (ref 0.5–1.4)
DIFFERENTIAL METHOD: ABNORMAL
EOSINOPHIL # BLD AUTO: 0.1 K/UL (ref 0–0.5)
EOSINOPHIL NFR BLD: 1.1 % (ref 0–8)
ERYTHROCYTE [DISTWIDTH] IN BLOOD BY AUTOMATED COUNT: 14.8 % (ref 11.5–14.5)
EST. GFR  (AFRICAN AMERICAN): 10.7 ML/MIN/1.73 M^2
EST. GFR  (NON AFRICAN AMERICAN): 9.3 ML/MIN/1.73 M^2
GGT SERPL-CCNC: 784 U/L (ref 8–55)
GLUCOSE SERPL-MCNC: 156 MG/DL (ref 70–110)
HCT VFR BLD AUTO: 29.8 % (ref 37–48.5)
HGB BLD-MCNC: 9 G/DL (ref 12–16)
IMM GRANULOCYTES # BLD AUTO: 0.16 K/UL (ref 0–0.04)
IMM GRANULOCYTES NFR BLD AUTO: 1.4 % (ref 0–0.5)
LYMPHOCYTES # BLD AUTO: 3.1 K/UL (ref 1–4.8)
LYMPHOCYTES NFR BLD: 27.1 % (ref 18–48)
MAGNESIUM SERPL-MCNC: 1.4 MG/DL (ref 1.6–2.6)
MCH RBC QN AUTO: 28.6 PG (ref 27–31)
MCHC RBC AUTO-ENTMCNC: 30.2 G/DL (ref 32–36)
MCV RBC AUTO: 95 FL (ref 82–98)
MONOCYTES # BLD AUTO: 0.8 K/UL (ref 0.3–1)
MONOCYTES NFR BLD: 6.9 % (ref 4–15)
NEUTROPHILS # BLD AUTO: 7.2 K/UL (ref 1.8–7.7)
NEUTROPHILS NFR BLD: 62.8 % (ref 38–73)
NRBC BLD-RTO: 0 /100 WBC
PHOSPHATE SERPL-MCNC: 5.3 MG/DL (ref 2.7–4.5)
PLATELET # BLD AUTO: 369 K/UL (ref 150–350)
PMV BLD AUTO: 9.7 FL (ref 9.2–12.9)
POCT GLUCOSE: 146 MG/DL (ref 70–110)
POCT GLUCOSE: 152 MG/DL (ref 70–110)
POCT GLUCOSE: 203 MG/DL (ref 70–110)
POCT GLUCOSE: 442 MG/DL (ref 70–110)
POCT GLUCOSE: 62 MG/DL (ref 70–110)
POTASSIUM SERPL-SCNC: 3.6 MMOL/L (ref 3.5–5.1)
PROT SERPL-MCNC: 7.1 G/DL (ref 6–8.4)
RBC # BLD AUTO: 3.15 M/UL (ref 4–5.4)
SODIUM SERPL-SCNC: 135 MMOL/L (ref 136–145)
WBC # BLD AUTO: 11.5 K/UL (ref 3.9–12.7)

## 2020-12-09 PROCEDURE — 85025 COMPLETE CBC W/AUTO DIFF WBC: CPT | Mod: HCWC

## 2020-12-09 PROCEDURE — 63600175 PHARM REV CODE 636 W HCPCS: Mod: HCWC | Performed by: STUDENT IN AN ORGANIZED HEALTH CARE EDUCATION/TRAINING PROGRAM

## 2020-12-09 PROCEDURE — 94664 DEMO&/EVAL PT USE INHALER: CPT | Mod: HCWC

## 2020-12-09 PROCEDURE — 25000003 PHARM REV CODE 250: Mod: HCWC | Performed by: STUDENT IN AN ORGANIZED HEALTH CARE EDUCATION/TRAINING PROGRAM

## 2020-12-09 PROCEDURE — 80053 COMPREHEN METABOLIC PANEL: CPT | Mod: HCWC

## 2020-12-09 PROCEDURE — 99233 SBSQ HOSP IP/OBS HIGH 50: CPT | Mod: HCWC,GC,, | Performed by: STUDENT IN AN ORGANIZED HEALTH CARE EDUCATION/TRAINING PROGRAM

## 2020-12-09 PROCEDURE — 27000221 HC OXYGEN, UP TO 24 HOURS: Mod: HCWC

## 2020-12-09 PROCEDURE — 84100 ASSAY OF PHOSPHORUS: CPT | Mod: HCWC

## 2020-12-09 PROCEDURE — 99233 PR SUBSEQUENT HOSPITAL CARE,LEVL III: ICD-10-PCS | Mod: HCWC,GC,, | Performed by: STUDENT IN AN ORGANIZED HEALTH CARE EDUCATION/TRAINING PROGRAM

## 2020-12-09 PROCEDURE — 82977 ASSAY OF GGT: CPT | Mod: HCWC

## 2020-12-09 PROCEDURE — 94761 N-INVAS EAR/PLS OXIMETRY MLT: CPT | Mod: HCWC

## 2020-12-09 PROCEDURE — 83735 ASSAY OF MAGNESIUM: CPT | Mod: HCWC

## 2020-12-09 PROCEDURE — 99232 PR SUBSEQUENT HOSPITAL CARE,LEVL II: ICD-10-PCS | Mod: HCWC,,, | Performed by: INTERNAL MEDICINE

## 2020-12-09 PROCEDURE — 99232 SBSQ HOSP IP/OBS MODERATE 35: CPT | Mod: HCWC,,, | Performed by: INTERNAL MEDICINE

## 2020-12-09 PROCEDURE — 20600001 HC STEP DOWN PRIVATE ROOM: Mod: HCWC

## 2020-12-09 PROCEDURE — 94799 UNLISTED PULMONARY SVC/PX: CPT | Mod: HCWC

## 2020-12-09 PROCEDURE — 36415 COLL VENOUS BLD VENIPUNCTURE: CPT | Mod: HCWC

## 2020-12-09 PROCEDURE — 99900035 HC TECH TIME PER 15 MIN (STAT): Mod: HCWC

## 2020-12-09 RX ORDER — MAGNESIUM SULFATE HEPTAHYDRATE 40 MG/ML
2 INJECTION, SOLUTION INTRAVENOUS ONCE
Status: COMPLETED | OUTPATIENT
Start: 2020-12-09 | End: 2020-12-09

## 2020-12-09 RX ORDER — INSULIN ASPART 100 [IU]/ML
7 INJECTION, SOLUTION INTRAVENOUS; SUBCUTANEOUS
Status: DISCONTINUED | OUTPATIENT
Start: 2020-12-10 | End: 2020-12-09

## 2020-12-09 RX ORDER — CEFEPIME HYDROCHLORIDE 2 G/1
2 INJECTION, POWDER, FOR SOLUTION INTRAVENOUS
Status: DISCONTINUED | OUTPATIENT
Start: 2020-12-09 | End: 2020-12-10

## 2020-12-09 RX ORDER — INSULIN ASPART 100 [IU]/ML
17 INJECTION, SOLUTION INTRAVENOUS; SUBCUTANEOUS
Status: DISCONTINUED | OUTPATIENT
Start: 2020-12-10 | End: 2020-12-11

## 2020-12-09 RX ADMIN — METRONIDAZOLE 500 MG: 500 TABLET ORAL at 05:12

## 2020-12-09 RX ADMIN — DOCUSATE SODIUM AND SENNOSIDES 1 TABLET: 8.6; 5 TABLET, FILM COATED ORAL at 08:12

## 2020-12-09 RX ADMIN — HYDROMORPHONE HYDROCHLORIDE 0.5 MG: 1 INJECTION, SOLUTION INTRAMUSCULAR; INTRAVENOUS; SUBCUTANEOUS at 06:12

## 2020-12-09 RX ADMIN — HEPARIN SODIUM 5000 UNITS: 5000 INJECTION INTRAVENOUS; SUBCUTANEOUS at 05:12

## 2020-12-09 RX ADMIN — OXYCODONE HYDROCHLORIDE 10 MG: 10 TABLET ORAL at 05:12

## 2020-12-09 RX ADMIN — INSULIN ASPART 17 UNITS: 100 INJECTION, SOLUTION INTRAVENOUS; SUBCUTANEOUS at 12:12

## 2020-12-09 RX ADMIN — GABAPENTIN 300 MG: 300 CAPSULE ORAL at 08:12

## 2020-12-09 RX ADMIN — HYDROMORPHONE HYDROCHLORIDE 0.5 MG: 1 INJECTION, SOLUTION INTRAMUSCULAR; INTRAVENOUS; SUBCUTANEOUS at 08:12

## 2020-12-09 RX ADMIN — INSULIN ASPART 3 UNITS: 100 INJECTION, SOLUTION INTRAVENOUS; SUBCUTANEOUS at 11:12

## 2020-12-09 RX ADMIN — VANCOMYCIN HYDROCHLORIDE 1500 MG: 1.5 INJECTION, POWDER, LYOPHILIZED, FOR SOLUTION INTRAVENOUS at 06:12

## 2020-12-09 RX ADMIN — CEFEPIME 2 G: 2 INJECTION, POWDER, FOR SOLUTION INTRAVENOUS at 03:12

## 2020-12-09 RX ADMIN — CEFTRIAXONE 2 G: 2 INJECTION, SOLUTION INTRAVENOUS at 08:12

## 2020-12-09 RX ADMIN — OXYCODONE HYDROCHLORIDE 10 MG: 10 TABLET ORAL at 12:12

## 2020-12-09 RX ADMIN — MAGNESIUM SULFATE IN WATER 2 G: 40 INJECTION, SOLUTION INTRAVENOUS at 06:12

## 2020-12-09 RX ADMIN — METRONIDAZOLE 500 MG: 500 TABLET ORAL at 09:12

## 2020-12-09 RX ADMIN — HEPARIN SODIUM 5000 UNITS: 5000 INJECTION INTRAVENOUS; SUBCUTANEOUS at 09:12

## 2020-12-09 RX ADMIN — OXYCODONE HYDROCHLORIDE 10 MG: 10 TABLET ORAL at 09:12

## 2020-12-09 RX ADMIN — INSULIN ASPART 17 UNITS: 100 INJECTION, SOLUTION INTRAVENOUS; SUBCUTANEOUS at 08:12

## 2020-12-09 RX ADMIN — METHIMAZOLE 10 MG: 10 TABLET ORAL at 08:12

## 2020-12-09 RX ADMIN — HEPARIN SODIUM 5000 UNITS: 5000 INJECTION INTRAVENOUS; SUBCUTANEOUS at 03:12

## 2020-12-09 RX ADMIN — DOCUSATE SODIUM AND SENNOSIDES 1 TABLET: 8.6; 5 TABLET, FILM COATED ORAL at 09:12

## 2020-12-09 RX ADMIN — ATORVASTATIN CALCIUM 20 MG: 20 TABLET, FILM COATED ORAL at 09:12

## 2020-12-09 RX ADMIN — INSULIN DETEMIR 34 UNITS: 100 INJECTION, SOLUTION SUBCUTANEOUS at 11:12

## 2020-12-09 RX ADMIN — METRONIDAZOLE 500 MG: 500 TABLET ORAL at 03:12

## 2020-12-09 NOTE — ASSESSMENT & PLAN NOTE
Pt presented with lower abdominal/vaginal/rectal pain with dysuria and urinary retention.   UA: 2 RBC, >100 WBC, 2+ leukocytes, bacteria  Recent UTIs with pan-sensitive E coli.    - UCx with E coli sens to cefepime and ceftriaxone  - Cifuentes placed for urinary retention, maintain Cifuentes per Urology  - Remains on ABX for abscess and now HAP -- UTI has been adequately treated at this point

## 2020-12-09 NOTE — ASSESSMENT & PLAN NOTE
Pt Hb 9.8 on admit. BL ~10.   Most likely due to anemia of chronic disease, acute blood loss, and frequent phlebotomy while hospitalized.   Recent Labs   Lab 12/06/20  1513 12/07/20  0511 12/08/20  0354 12/09/20  0909   HGB 8.3* 7.9* 8.0* 9.0*         - Required 1U PRBC on 12/2  - Required 1U PRBC on 12/6 2/2

## 2020-12-09 NOTE — SUBJECTIVE & OBJECTIVE
Interval History: NAEON. Pt sitting up eating breakfast this morning. She reports slightly improved pain today although still TTP. Denies n/v/d, f/c.    Review of Systems   Constitutional: Positive for activity change and fatigue. Negative for chills and fever.   HENT: Negative for hearing loss, rhinorrhea, sneezing, sore throat and trouble swallowing.    Respiratory: Negative for cough, shortness of breath (subjectively denies SOB but on 3L NC) and wheezing.    Cardiovascular: Negative for chest pain, palpitations and leg swelling.   Gastrointestinal: Positive for abdominal pain. Negative for abdominal distention, blood in stool, constipation, diarrhea, nausea and vomiting.   Genitourinary: Positive for pelvic pain. Negative for dysuria and flank pain.   Musculoskeletal: Negative for arthralgias, neck pain and neck stiffness.   Skin: Negative for color change, pallor and rash.   Neurological: Negative for syncope, weakness, light-headedness, numbness and headaches.   Psychiatric/Behavioral: Negative for confusion and hallucinations. The patient is not nervous/anxious.      Objective:     Vital Signs (Most Recent):  Temp: 98.1 °F (36.7 °C) (12/09/20 1123)  Pulse: 64 (12/09/20 1436)  Resp: 18 (12/09/20 1234)  BP: (!) 151/64 (12/09/20 1123)  SpO2: 97 % (12/09/20 1234) Vital Signs (24h Range):  Temp:  [97.6 °F (36.4 °C)-98.6 °F (37 °C)] 98.1 °F (36.7 °C)  Pulse:  [60-66] 64  Resp:  [14-20] 18  SpO2:  [96 %-98 %] 97 %  BP: (115-151)/(54-64) 151/64     Weight: 109.5 kg (241 lb 4.8 oz)  Body mass index is 37.79 kg/m².    Intake/Output Summary (Last 24 hours) at 12/9/2020 1518  Last data filed at 12/9/2020 1000  Gross per 24 hour   Intake 560 ml   Output 1865 ml   Net -1305 ml      Physical Exam  Constitutional:       General: She is not in acute distress.     Appearance: She is obese. She is not ill-appearing or toxic-appearing.   HENT:      Head: Normocephalic and atraumatic.      Nose: No congestion or rhinorrhea.       Mouth/Throat:      Mouth: Mucous membranes are moist.   Eyes:      General: No scleral icterus.        Right eye: No discharge.         Left eye: No discharge.   Neck:      Musculoskeletal: Normal range of motion and neck supple. No neck rigidity or muscular tenderness.   Cardiovascular:      Rate and Rhythm: Normal rate and regular rhythm.   Pulmonary:      Effort: Pulmonary effort is normal. No respiratory distress.      Breath sounds: Normal breath sounds. No stridor. No wheezing.   Abdominal:      General: There is no distension.      Palpations: Abdomen is soft. There is no mass.      Tenderness: There is abdominal tenderness (LLQ, drain in place with SS output, dressings CDI).   Musculoskeletal:         General: Swelling (mild) present. No tenderness.   Skin:     General: Skin is warm and dry.      Coloration: Skin is not jaundiced or pale.      Findings: No bruising.   Neurological:      Mental Status: She is alert and oriented to person, place, and time. Mental status is at baseline.         Significant Labs: All pertinent labs within the past 24 hours have been reviewed.    Significant Imaging: I have reviewed all pertinent imaging results/findings within the past 24 hours.

## 2020-12-09 NOTE — ASSESSMENT & PLAN NOTE
Mesh removed by GYN 10/26; march placed  March removed 11/19 --> increased pain  Admitted 11/27 with worsening abd pain found to be related to intra-abd fluid collections.   UCx 11/26 with E.coli      - Urology consulted; lesions adjacent to bladder per CT cysto 11/27  - Gyn consulted  - IR consulted drained abscess 11/28  - Cultures: Peptostreptococcus pending margy.  - Broad spectrum abx: CTX, Flagyl D11/14 --> broadened to vanc/cefepime/flagyl 12/9 to cover HAP

## 2020-12-09 NOTE — ASSESSMENT & PLAN NOTE
Alk Phos has been trending up this hospitalization, on 12/9 . Unclear etiology. Other LFTs wnl - T bili, AST/ALT.    - Will order GGT to help localize etiology  - Will review medication list to evaluate for possible drug-induced elevation

## 2020-12-09 NOTE — ASSESSMENT & PLAN NOTE
Hold home Benazepril due to LAURIE, low BP    - Will monitor BP and add anti-hypertensive as necessary

## 2020-12-09 NOTE — PLAN OF CARE
61 YO Female w/ Dx of DM2, HTN, CKD3 and hyperthyroidism that was admitted for hydronephrosis after bladder surgery and was found to be in DKA.  Pt was consulted for management of DKA.         Type 2 DM:      DKA is now resolved and patient doing well on MDI and PO diet.     A1C 11.4  (Above goal)       HOME REGIMEN:  Lantus 50 units and Novolog 15 units TID w/ meals, Metformin 500mg BID     INPATIENT REGIMEN:   Levemir 36 units BID, Novolog 17 TID and Sliding scale     GLUCOSE TREND FOR THE PAST 24HRS:  146 - 203   AT GOAL     -Glucose goal inpatient 140 - 180mg/dL     NO HYPOGYCEMIAS NOTED     TOLERATING 100 % OF PO DIET     Plan:   -C/w Levemir to 34 units BID   -C/w Novolog 17 units TID w/ meals   -Moderate dose correction insulin   -FS qAC/HS   -Will continue to monitor     Discharge: Likely current MDI     Hyperthyroidism  Unclear etiology given no TRAb, TSI or RAIU scan  Thyroid US shows MNG so toxic MNG high in differential  She has a TSH at goal on methimazole 10 mg daily  Continue, follow up outpatient     Plan  - Continue methimazole 10 mg daily  - Outpatient followed with Endocrinology

## 2020-12-09 NOTE — PLAN OF CARE
Problem: Fall Injury Risk  Goal: Absence of Fall and Fall-Related Injury  Outcome: Ongoing, Progressing  Intervention: Identify and Manage Contributors to Fall Injury Risk  Flowsheets (Taken 12/9/2020 0457)  Self-Care Promotion:   independence encouraged   BADL personal objects within reach   BADL personal routines maintained  Medication Review/Management: medications reviewed  Intervention: Promote Injury-Free Environment  Flowsheets (Taken 12/9/2020 0457)  Safety Promotion/Fall Prevention:   assistive device/personal item within reach   bed alarm set   side rails raised x 3   nonskid shoes/socks when out of bed   medications reviewed   Fall Risk reviewed with patient/family  Environmental Safety Modification:   assistive device/personal items within reach   clutter free environment maintained     Problem: Adult Inpatient Plan of Care  Goal: Plan of Care Review  Outcome: Ongoing, Progressing  Flowsheets (Taken 12/9/2020 0457)  Plan of Care Reviewed With: patient     Problem: Diabetes Comorbidity  Goal: Blood Glucose Level Within Desired Range  Outcome: Ongoing, Progressing  Intervention: Maintain Glycemic Control  Flowsheets (Taken 12/9/2020 0457)  Glycemic Management: blood glucose monitoring   Pt AAO*4,calm, cooperative. Pt on 3L oxygen via nasal cannula. BG monitoring, insulin administered per orders. Pain managed by prn regimen. Cifuentes to gravity, ROSAURA drain care done. No falls or injuries reported overnight, safely maintained. Call light within reach. Questions answered at the bedside, verbalized understanding. Pt scheduled for CT of chest today. Will continue to monitor.

## 2020-12-09 NOTE — PROGRESS NOTES
[facility] Hospital Medicine  Progress Note    Patient Name: Navin Beck  MRN: [id number]  Patient Class: IP- Inpatient   Admission Date: 11/26/2020  Length of Stay: 12 days  Attending Physician: Michael Jeffrey MD  Primary Care Provider: Elvira Quinones MD    [facility] Medicine Team: [facility]OSP MED 2 Rosario Michele MD    Subjective:     Principal Problem:Acute kidney injury (LAURIE) with acute tubular necrosis (ATN)        HPI:  Ms. Beck is a 59yo F with PMH of HTN, HLD, T2DM, Hyperthyroidism, and Asthma who had recent GYN surgery on 10/26 due to erosion of bladder mesh / prosthetic material with Cifuentes removal on 11/19. She presents to [facility] due to 4-5 days of not feeling well, she has had loss of appetite and weakness with 10/10 throbbing lower abdominal pain associated with vaginal pain and rectal pressure and non-bloody diarrhea. She has had trouble urinating for the past 5 days as well. She takes oxycodone 5 at home for pain, but it did not help. She endorses nausea and vomiting (4 times, non-bloody) also associated with the pain. She denies fever, chills, chest pain, SOB, trouble breathing, leg swelling, or any other symptom at this time.    ED Course: VSS, afebrile but leukocytosis at 17.85. Pt found to have DKA - Gluc 626, AG16, BHB 4.2, Fluids, insulin gtt started and Glucose down-trended to 200s. UA showed 2RBC, >100 WBC, bacteria, given CTX. CT A/P showed multiple fluid collections adjacent to bladder concerning for bladder perf with multiple urinomas, with mild bilateral hydronephrosis. Urology consulted and placed a Cifuentes with 900 UOP drained. GYN consulted due to recent surgery.     Pt will be admitted to hospital medicine for further management of DKA and possible intra-abdominal infection.     Overview/Hospital Course:  Pt was admitted on 11/28 due to 10/10 abdominal pain and also found to be in DKA. Pt was started on IVF, insulin gtt, and kept NPO with glucose  "downtrending and AG closed. CT abdomen showed multiple fluid collections near bladder. Renal cystography with "hypoattenuating collections in the lower pelvis adjacent to the urinary bladder with no definite extravasated contrast material appreciated within the pelvic collections to suggest communication with the bladder".  Urology and GYN on board, agree with plan for IR to place drain in fluid collections for culture and source control. Pt was started on CTX and Flagyl to cover for UTI and intra-abdominal infection, broadened to Cefepime, and Flagyl on 11/26; vanc added later. Endocrine consulted and managed the patient while acutely ill on insulin drip then transitioned to SQ when BG stabilized. IR place low abdominal drain to abscess on 11/28 with 250ml purulent material immediately expressed. Drained remained in place after the procedure. Urology continued to assess patient and recommended CT RSS after identifying some left CVAT and increased SCr with reduced UOP on 11/29, no hydronephrosis does not believe LAURIE is post-renal in nature. 11/30 pt remains anuric, Cr up-trending to 4.2, with hyponatremia. Nephrology consulted. 12/1 pt remains anuric after Lasix + Diuril challenge. Nephrology decided for initiation of CRRT due to low pressures with unresponsive ARF. Will transfer to ICU for CRRT. Pt transferred back to floor as no more need for CRRT on 12/5. Cystoscopy with retrograde pyleogram for 12/7 by Urology    Interval History: NAEON. Pt sitting up eating breakfast this morning. She reports slightly improved pain today although still TTP. Denies n/v/d, f/c.    Review of Systems   Constitutional: Positive for activity change and fatigue. Negative for chills and fever.   HENT: Negative for hearing loss, rhinorrhea, sneezing, sore throat and trouble swallowing.    Respiratory: Negative for cough, shortness of breath (subjectively denies SOB but on 3L NC) and wheezing.    Cardiovascular: Negative for chest pain, " palpitations and leg swelling.   Gastrointestinal: Positive for abdominal pain. Negative for abdominal distention, blood in stool, constipation, diarrhea, nausea and vomiting.   Genitourinary: Positive for pelvic pain. Negative for dysuria and flank pain.   Musculoskeletal: Negative for arthralgias, neck pain and neck stiffness.   Skin: Negative for color change, pallor and rash.   Neurological: Negative for syncope, weakness, light-headedness, numbness and headaches.   Psychiatric/Behavioral: Negative for confusion and hallucinations. The patient is not nervous/anxious.      Objective:     Vital Signs (Most Recent):  Temp: 98.1 °F (36.7 °C) (12/09/20 1123)  Pulse: 64 (12/09/20 1436)  Resp: 18 (12/09/20 1234)  BP: (!) 151/64 (12/09/20 1123)  SpO2: 97 % (12/09/20 1234) Vital Signs (24h Range):  Temp:  [97.6 °F (36.4 °C)-98.6 °F (37 °C)] 98.1 °F (36.7 °C)  Pulse:  [60-66] 64  Resp:  [14-20] 18  SpO2:  [96 %-98 %] 97 %  BP: (115-151)/(54-64) 151/64     Weight: 109.5 kg (241 lb 4.8 oz)  Body mass index is 37.79 kg/m².    Intake/Output Summary (Last 24 hours) at 12/9/2020 1518  Last data filed at 12/9/2020 1000  Gross per 24 hour   Intake 560 ml   Output 1865 ml   Net -1305 ml      Physical Exam  Constitutional:       General: She is not in acute distress.     Appearance: She is obese. She is not ill-appearing or toxic-appearing.   HENT:      Head: Normocephalic and atraumatic.      Nose: No congestion or rhinorrhea.      Mouth/Throat:      Mouth: Mucous membranes are moist.   Eyes:      General: No scleral icterus.        Right eye: No discharge.         Left eye: No discharge.   Neck:      Musculoskeletal: Normal range of motion and neck supple. No neck rigidity or muscular tenderness.   Cardiovascular:      Rate and Rhythm: Normal rate and regular rhythm.   Pulmonary:      Effort: Pulmonary effort is normal. No respiratory distress.      Breath sounds: Normal breath sounds. No stridor. No wheezing.   Abdominal:       General: There is no distension.      Palpations: Abdomen is soft. There is no mass.      Tenderness: There is abdominal tenderness (LLQ, drain in place with SS output, dressings CDI).   Musculoskeletal:         General: Swelling (mild) present. No tenderness.   Skin:     General: Skin is warm and dry.      Coloration: Skin is not jaundiced or pale.      Findings: No bruising.   Neurological:      Mental Status: She is alert and oriented to person, place, and time. Mental status is at baseline.         Significant Labs: All pertinent labs within the past 24 hours have been reviewed.    Significant Imaging: I have reviewed all pertinent imaging results/findings within the past 24 hours.      Assessment/Plan:      * Acute kidney injury (LAURIE) with acute tubular necrosis (ATN)  Cr 1.5 on presentation.   Likely multi-factorial on presentation 2/2 pre-renal volume depletion in the context of DKA, UTI, and post-renal obstruction  Repeat LAURIE on 11/29 likely intrinsic ATN in etiology    Recent Labs   Lab 12/06/20  0601 12/06/20  0752 12/07/20  0511 12/08/20  0354 12/09/20  0909   CREATININE 4.5* 4.5* 5.1* 5.3* 4.7*     PLAN  - Initial LAURIE on presentation resolved with IVF and placement of Cifuentes with resolution of hydronephrosis on imaging   - Maintain Cifuentes per Urology   - UTI adequately treated as she has been on continued ABX for other indications  - LAURIE due to ATN began on 11/29 and continues to worsen   - Remains anuric after IVF bolus, Lasix + Diuril challenge   - Resp status stable but Cr uptrending, pt lethargic and waxing/waning alertness  - Nephrology consulted, appreciate recs   - Initiation of CRRT, transfer to ICU on 12/2   - Transferred to floor on 12/4   - Pt made ~1L urine after Lasix + Diuril challenge   - May be in recovery phase of LAURIE   - BMP q4   - Renally dose meds   - Avoid renal toxins (ACEi/ARB, NSAIDs, contrast, etc.)   - Strict I&Os   - Will remove Trialysis catheter to RIJ on 12/9    Elevated  alkaline phosphatase level  Alk Phos has been trending up this hospitalization, on 12/9 . Unclear etiology. Other LFTs wnl - T bili, AST/ALT.    - Will order GGT to help localize etiology  - Will review medication list to evaluate for possible drug-induced elevation    HAP (hospital-acquired pneumonia)  Hospital acquired pneumonia (HAP)    Patient on 3L NC, although sat 97-98%, likely can wean significantly. Patient with longstanding obstructive lung disease (asthma) -- goal sat >88%.    12/7 CXR: Interval progression in the extent of right perihilar and right lower lung zone infiltrate and or atelectasis.  New and or mildly increased small right pleural effusion.   12/8 CTAP: Bibasilar consolidative changes, right more than left with small volume right-sided pleural effusion.  Findings likely related to pneumonia versus inflammatory process.    12/9 CT Chest w/o Contrast:   · Bilateral lower lobe consolidative opacities right greater than left and bandlike atelectasis bilaterally with trace pleural effusions similar to exam 12/08/2020.  Differential diagnosis includes aspiration, pneumonia, atelectasis, and less likely noninfectious inflammation.  · Slight ground-glass opacities predominantly right apex which may represent infectious or noninfectious inflammatory etiology.   · Partially aerated retained secretions within the trachea.   · Scattered calcified mediastinal lymph nodes and soft tissue density in the anterior mediastinum relatively unchanged dating back to 07/27/2012.     - Broaden antibiotics to cover HAP due to new CT findings and prolonged hospitalization, start Vanc for MRSA coverage, change CTX to Cefepime f or pseudomonal coverage. Flagyl is for pelvic abscess, but will also cover anaerobes if aspiration   - Tracheal secretions in airway noted on CT -- will add BID cough assists for help with expectoration  - wean supplemental O2 for goal sat >88% (pt with chronic obstructive lung disease -  asthma)    Anemia  Pt Hb 9.8 on admit. BL ~10.   Most likely due to anemia of chronic disease, acute blood loss, and frequent phlebotomy while hospitalized.   Recent Labs   Lab 12/06/20  1513 12/07/20  0511 12/08/20  0354 12/09/20  0909   HGB 8.3* 7.9* 8.0* 9.0*         - Required 1U PRBC on 12/2  - Required 1U PRBC on 12/6 2/2     Impaired mobility and endurance    - PT/OT    Hyponatremia  Earlier this howpitalizarion hyponatremia to 120s with spontaneous jerking motions 11/29. Na now normalized. Nephrology continuing to follow for LAURIE.    - Nephrology on board, appreciate recs   - Possibly 2/2 D5 IVF with decreased UOP while in DKA   - BMP daily   - Latest Na 135   - 1L fluid restrict    Postprocedural intraabdominal abscess  Mesh removed by GYN 10/26; march placed  March removed 11/19 --> increased pain  Admitted 11/27 with worsening abd pain found to be related to intra-abd fluid collections.   UCx 11/26 with E.coli      - Urology consulted; lesions adjacent to bladder per CT cysto 11/27  - Gyn consulted  - IR consulted drained abscess 11/28  - Cultures: Peptostreptococcus pending margy.  - Broad spectrum abx: CTX, Flagyl D11/14 --> broadened to vanc/cefepime/flagyl 12/9 to cover HAP      UTI (urinary tract infection)  Pt presented with lower abdominal/vaginal/rectal pain with dysuria and urinary retention.   UA: 2 RBC, >100 WBC, 2+ leukocytes, bacteria  Recent UTIs with pan-sensitive E coli.    - UCx with E coli sens to cefepime and ceftriaxone  - March placed for urinary retention, maintain March per Urology  - Remains on ABX for abscess and now HAP -- UTI has been adequately treated at this point      Urinary retention  Pt has recent h/o GYN surgery in 10/2020 for bladder mesh removal, March was removed on 11/19. For the past few days pt has had increased trouble urinating associated with pain.     CT A/P 11/27: Multiple fluid collections adjacent to bladder. Mild bilateral hydronephrosis.   Retention likely  2/2 anatomic obstruction, pelvic fluid collection, diabetic bladder  - Urology consulted in ED, appreciate recs   - Cifuentes placed 800cc drained, bladder irrigated and all fluid removed with ease   - Maintain Cifuentes   - Rec drainage of pelvic fluid collection by IR with cultures & creatinine   - CT RSS ordered by uro 11/29 for left CVAT, incr SCr, decr UOP   - No hydronephrosis seen on CT, most likely not post-renal in etiology  - GYN consulted, appreciate consult   - Agrees with Urology plan for drainage with IR  - IR consulted, appreciate recs   - IR placed drain 11/28 into pelvic abscess, draining minimal yellow fluid    Type 2 diabetes mellitus with hyperglycemia, with long-term current use of insulin  Last Hb A1c 10.3 on 9/25/20  Home regimen: Metformin 500 BID, Aspart 15U TID, Glargine 60 BID  Pt says her blood sugar has been running to the 300s for the past few days       - Hold oral anti-glycemics while hospitalized  - Endocrine consulted, recs appreciated  - Accu-checks TIDAC + qhs  - See DKA notes      Asthma  Pt states she uses Albuterol inhaler TID every day.       - Duonebs q6 PRN    Diabetic ketoacidosis without coma associated with type 2 diabetes mellitus  Pt presented to [facility] ED with Gluc 626, HCO3 16, AG 16, BHB 4.2, ketonuria. 2L IVF given in ED with insulin gtt started. Glucose trended downward to 274. Pt was then admitted to hospital medicine for further management of DKA.      - Endocrine consulted, appreciate recs   - Transitional Insulin gtt stopped 12/1   - Gluc regimen increased 2/2 gluc 230-240s    Detemir 34U BID    Aspart 17U TID WM   - Goal gluc 140-180   - Diabetic diet  - BMP daily  - Will replace K PRN  - Nausea: zofran PRN     Hyperthyroidism  Continue home Methimazole      - TSH WNL  - Endocrine aware    Severe obesity (BMI 35.0-35.9 with comorbidity)  -  on exercise, diet, and weight management     Abnormal computed tomography of bladder  Pt presented to [facility] due to 5-6 days of  10/10 lower abdomen, vaginal, and rectal pain. Pt has recent h/o GYN surgery in 10/2020 for bladder mesh removal, Cifuentes was removed on 11/19.  CT A/P 11/27: Multiple fluid collections adjacent to bladder. Mild bilateral hydronephrosis.   CT A/P 12/8: Interval resolution of previously identified multilobular fluid collection in the lower pelvis with drainage catheter in position.  Residual scattered small volume of air is noted.   UCx 11/26: E.coli sensitive to CTX, cefepime     PLAN  - GYN consulted, appreciate consult; Agrees with Urology plan for drainage with IR  - IR consulted, appreciate recs; Drain in place; cultures sent 11/28    - Gram stain, many GPC   - Cultures grew Peptostrptococcus on 12/5 pending margy.  - Pain management: PCA pump initially   - PCA pump d/c on 11/30   - PRN opiates  - ID consulted for intra-abdominal infection, appreciate recs   - Continue CTX & Flagyl x14 days, repeat imaging to assess for cleared infection prior to d/c abx   - If d/c before 14d course, may switch to augmentin   - 12/9: broadened ABX to cover HAP, switched CTX-->Cefepime, added vanc, and continued flagyl   - Urology recs appreciated; left CVAT, ordered CT RSS 11/29 hydronephrosis resolved   - Cystography 11/27 showed no bladder leak   - Maintain Cifuentes   - 12/6 spoke with urology concerning ROSAURA drain output concern for urinary contents.    - Per Urology, since ROSAURA fluid Cr 1.7 is less than serum Cr 4.5, the fluid is not urine.    - Negative cystogram and normal retrograde pyelogram        Mixed hyperlipidemia  - Continue home statin      Essential hypertension  Hold home Benazepril due to LAURIE, low BP    - Will monitor BP and add anti-hypertensive as necessary       VTE Risk Mitigation (From admission, onward)         Ordered     heparin (porcine) injection 5,000 Units  Every 8 hours      12/01/20 1426     Place sequential compression device  Until discontinued      12/01/20 1409     IP VTE HIGH RISK PATIENT  Once       12/01/20 1409                Discharge Planning   RAFA: 12/11/2020     Code Status: Full Code   Is the patient medically ready for discharge?: No    Reason for patient still in hospital (select all that apply): Patient new problem, Patient trending condition, Laboratory test, Treatment, Consult recommendations and Pending disposition  Discharge Plan A: Rehab, Skilled Nursing Facility   Discharge Delays: None known at this time              Rosario Michele MD  Department of Hospital Medicine   [facility]

## 2020-12-09 NOTE — ASSESSMENT & PLAN NOTE
Pt presented to [facility] due to 5-6 days of 10/10 lower abdomen, vaginal, and rectal pain. Pt has recent h/o GYN surgery in 10/2020 for bladder mesh removal, Cifuentes was removed on 11/19.  CT A/P 11/27: Multiple fluid collections adjacent to bladder. Mild bilateral hydronephrosis.   CT A/P 12/8: Interval resolution of previously identified multilobular fluid collection in the lower pelvis with drainage catheter in position.  Residual scattered small volume of air is noted.   UCx 11/26: E.coli sensitive to CTX, cefepime     PLAN  - GYN consulted, appreciate consult; Agrees with Urology plan for drainage with IR  - IR consulted, appreciate recs; Drain in place; cultures sent 11/28    - Gram stain, many GPC   - Cultures grew Peptostrptococcus on 12/5 pending margy.  - Pain management: PCA pump initially   - PCA pump d/c on 11/30   - PRN opiates  - ID consulted for intra-abdominal infection, appreciate recs   - Continue CTX & Flagyl x14 days, repeat imaging to assess for cleared infection prior to d/c abx   - If d/c before 14d course, may switch to augmentin   - 12/9: broadened ABX to cover HAP, switched CTX-->Cefepime, added vanc, and continued flagyl   - Urology recs appreciated; left CVAT, ordered CT RSS 11/29 hydronephrosis resolved   - Cystography 11/27 showed no bladder leak   - Maintain Cifuentes   - 12/6 spoke with urology concerning ROSAURA drain output concern for urinary contents.    - Per Urology, since ROSAURA fluid Cr 1.7 is less than serum Cr 4.5, the fluid is not urine.    - Negative cystogram and normal retrograde pyelogram

## 2020-12-09 NOTE — ASSESSMENT & PLAN NOTE
ATN in setting of contrast administration and sepsis 2/2 UTI, baseline cr 1.0-1.2 that trended up to 4.6 at time of consult.     -received contrast on 11/27/2020  -Urine microscopy 11/30/2020 many muddy brown granular casts, massive WBC's  -purulent drainage from abdominal ROSAURA drain similar in appearance to her urine; concerning for communication between bladder and fluid collection   -Initiated prismax 12/1, last received prismax 12/4    Recomendations    -Now with good UO for several days.  -UO 1.4 L, net negative 900cc past 24 hours  -Cr trending down today. Recommend remove trialysis catheter  -No significant electrolyte disturbances or acute volume issues requiring further RRT at this time  -Recommend continue to hold diuretics   -Maximum gabapentin dose 300 mg in CKD or LAURIE patients  -Maintain Hgb >7.0, Keep MAP >65  -Renally dose meds and avoid nephrotoxic meds  -Will continue to follow closely

## 2020-12-09 NOTE — PLAN OF CARE
Problem: Fall Injury Risk  Goal: Absence of Fall and Fall-Related Injury  Outcome: Ongoing, Progressing  Intervention: Identify and Manage Contributors to Fall Injury Risk  Flowsheets (Taken 12/9/2020 1728)  Self-Care Promotion:   independence encouraged   BADL personal objects within reach   BADL personal routines maintained   safe use of adaptive equipment encouraged   meal setup provided  Medication Review/Management: medications reviewed     Problem: Adult Inpatient Plan of Care  Goal: Plan of Care Review  Outcome: Ongoing, Progressing  Flowsheets (Taken 12/9/2020 1728)  Plan of Care Reviewed With: patient  Goal: Patient-Specific Goal (Individualization)  Outcome: Ongoing, Progressing  Goal: Absence of Hospital-Acquired Illness or Injury  Outcome: Ongoing, Progressing  Goal: Optimal Comfort and Wellbeing  Outcome: Ongoing, Progressing  Goal: Readiness for Transition of Care  Outcome: Ongoing, Progressing  Goal: Rounds/Family Conference  Outcome: Ongoing, Progressing     Problem: Diabetes Comorbidity  Goal: Blood Glucose Level Within Desired Range  Outcome: Ongoing, Progressing  Intervention: Maintain Glycemic Control  Flowsheets (Taken 12/9/2020 1728)  Glycemic Management:   blood glucose monitoring   supplemental insulin given     Problem: Adjustment to Illness (Sepsis/Septic Shock)  Goal: Optimal Coping  Outcome: Ongoing, Progressing     Problem: Bleeding (Sepsis/Septic Shock)  Goal: Absence of Bleeding  Outcome: Ongoing, Progressing     Problem: Glycemic Control Impaired (Sepsis/Septic Shock)  Goal: Blood Glucose Level Within Desired Range  Outcome: Ongoing, Progressing     Problem: Hemodynamic Instability (Sepsis/Septic Shock)  Goal: Effective Tissue Perfusion  Outcome: Ongoing, Progressing     Problem: Infection (Sepsis/Septic Shock)  Goal: Absence of Infection Signs/Symptoms  Outcome: Ongoing, Progressing     Problem: Nutrition Impaired (Sepsis/Septic Shock)  Goal: Optimal Nutrition Intake  Outcome:  Ongoing, Progressing     Problem: Respiratory Compromise (Sepsis/Septic Shock)  Goal: Effective Oxygenation and Ventilation  Outcome: Ongoing, Progressing     Problem: Fluid Imbalance (Pneumonia)  Goal: Fluid Balance  Outcome: Ongoing, Progressing  Intervention: Monitor and Manage Fluid Balance  Flowsheets (Taken 12/9/2020 1728)  Fluid/Electrolyte Management: fluids restricted     Problem: Infection (Pneumonia)  Goal: Resolution of Infection Signs/Symptoms  Outcome: Ongoing, Progressing  Intervention: Prevent Infection Progression  Flowsheets (Taken 12/9/2020 1728)  Fever Reduction/Comfort Measures:   lightweight bedding   lightweight clothing  Infection Management: aseptic technique maintained  Isolation Precautions: protective environment maintained     Problem: Respiratory Compromise (Pneumonia)  Goal: Effective Oxygenation and Ventilation  Outcome: Ongoing, Progressing  Intervention: Optimize Oxygenation and Ventilation  Flowsheets (Taken 12/9/2020 1728)  Airway/Ventilation Management:   airway patency maintained   calming measures promoted   pulmonary hygiene promoted  Head of Bed (HOB): HOB elevated     Problem: Electrolyte Imbalance (Acute Kidney Injury/Impairment)  Goal: Serum Electrolyte Balance  Outcome: Ongoing, Progressing     Problem: Fluid Imbalance (Acute Kidney Injury/Impairment)  Goal: Optimal Fluid Balance  Outcome: Ongoing, Progressing     Problem: Hematologic Alteration (Acute Kidney Injury/Impairment)  Goal: Hemoglobin, Hematocrit and Platelets Within Normal Range  Outcome: Ongoing, Progressing     Problem: Oral Intake Inadequate (Acute Kidney Injury/Impairment)  Goal: Optimal Nutrition Intake  Outcome: Ongoing, Progressing     Problem: Renal Function Impairment (Acute Kidney Injury/Impairment)  Goal: Effective Renal Function  Outcome: Ongoing, Progressing     Problem: Infection  Goal: Infection Symptom Resolution  Outcome: Ongoing, Progressing     Problem: Skin Injury Risk Increased  Goal: Skin  Health and Integrity  Outcome: Ongoing, Progressing  Intervention: Optimize Skin Protection  Flowsheets (Taken 12/9/2020 7156)  Pressure Reduction Techniques:   frequent weight shift encouraged   weight shift assistance provided  Pressure Reduction Devices: pressure-redistributing mattress utilized  Skin Protection:   adhesive use limited   incontinence pads utilized   tubing/devices free from skin contact  Head of Bed (HOB): HOB elevated     Problem: Wound  Goal: Optimal Wound Healing  Outcome: Ongoing, Progressing    POC reviewed with patient. AAOx4, VSS. Titrating O2, currently on 2L. CT of Chest done, concerns for pneumonia present, IV abx ordered. Mg Sulfate given x 1. R IJ removed per order, tolerated well. Accuchecks AC/HS, dinner insulin held d/t low CBG, pt asymptomatic. Cifuentes maintained for retention. Up in chair during shift, tolerated well, remains free of falls. No evidence of distress, safety maintained. WCTM

## 2020-12-09 NOTE — PROGRESS NOTES
PM&R consult follow up.  Please see original consult for detailed note.      Recommend inpatient rehab at discharge.      SIDRA Domínguez, FNP-C  Physical Medicine & Rehabilitation   12/09/2020

## 2020-12-09 NOTE — ASSESSMENT & PLAN NOTE
Cr 1.5 on presentation.   Likely multi-factorial on presentation 2/2 pre-renal volume depletion in the context of DKA, UTI, and post-renal obstruction  Repeat LAURIE on 11/29 likely intrinsic ATN in etiology    Recent Labs   Lab 12/06/20  0601 12/06/20  0752 12/07/20  0511 12/08/20  0354 12/09/20  0909   CREATININE 4.5* 4.5* 5.1* 5.3* 4.7*     PLAN  - Initial LAURIE on presentation resolved with IVF and placement of Cifuentes with resolution of hydronephrosis on imaging   - Maintain Cifuentes per Urology   - UTI adequately treated as she has been on continued ABX for other indications  - LAURIE due to ATN began on 11/29 and continues to worsen   - Remains anuric after IVF bolus, Lasix + Diuril challenge   - Resp status stable but Cr uptrending, pt lethargic and waxing/waning alertness  - Nephrology consulted, appreciate recs   - Initiation of CRRT, transfer to ICU on 12/2   - Transferred to floor on 12/4   - Pt made ~1L urine after Lasix + Diuril challenge   - May be in recovery phase of LAURIE   - BMP q4   - Renally dose meds   - Avoid renal toxins (ACEi/ARB, NSAIDs, contrast, etc.)   - Strict I&Os   - Will remove Trialysis catheter to RIJ on 12/9

## 2020-12-09 NOTE — ASSESSMENT & PLAN NOTE
Pt presented to [facility] ED with Gluc 626, HCO3 16, AG 16, BHB 4.2, ketonuria. 2L IVF given in ED with insulin gtt started. Glucose trended downward to 274. Pt was then admitted to hospital medicine for further management of DKA.      - Endocrine consulted, appreciate recs   - Transitional Insulin gtt stopped 12/1   - Gluc regimen increased 2/2 gluc 230-240s    Detemir 34U BID    Aspart 17U TID WM   - Goal gluc 140-180   - Diabetic diet  - BMP daily  - Will replace K PRN  - Nausea: zofran PRN

## 2020-12-09 NOTE — ASSESSMENT & PLAN NOTE
-Initially hyponatremic, likely 2/2 combination of D5 administration with decreased UOP  -duloxetine discontinued  -improved after RRT  -continuing to trend

## 2020-12-09 NOTE — ASSESSMENT & PLAN NOTE
Last Hb A1c 10.3 on 9/25/20  Home regimen: Metformin 500 BID, Aspart 15U TID, Glargine 60 BID  Pt says her blood sugar has been running to the 300s for the past few days       - Hold oral anti-glycemics while hospitalized  - Endocrine consulted, recs appreciated  - Accu-checks TIDAC + qhs  - See DKA notes

## 2020-12-09 NOTE — ASSESSMENT & PLAN NOTE
Earlier this howpitalizarion hyponatremia to 120s with spontaneous jerking motions 11/29. Na now normalized. Nephrology continuing to follow for LAURIE.    - Nephrology on board, appreciate recs   - Possibly 2/2 D5 IVF with decreased UOP while in DKA   - BMP daily   - Latest Na 135   - 1L fluid restrict

## 2020-12-09 NOTE — ASSESSMENT & PLAN NOTE
Pt has recent h/o GYN surgery in 10/2020 for bladder mesh removal, Cifuentes was removed on 11/19. For the past few days pt has had increased trouble urinating associated with pain.     CT A/P 11/27: Multiple fluid collections adjacent to bladder. Mild bilateral hydronephrosis.   Retention likely 2/2 anatomic obstruction, pelvic fluid collection, diabetic bladder  - Urology consulted in ED, appreciate recs   - Cifuentes placed 800cc drained, bladder irrigated and all fluid removed with ease   - Maintain Cifuentes   - Rec drainage of pelvic fluid collection by IR with cultures & creatinine   - CT RSS ordered by uro 11/29 for left CVAT, incr SCr, decr UOP   - No hydronephrosis seen on CT, most likely not post-renal in etiology  - GYN consulted, appreciate consult   - Agrees with Urology plan for drainage with IR  - IR consulted, appreciate recs   - IR placed drain 11/28 into pelvic abscess, draining minimal yellow fluid

## 2020-12-09 NOTE — SUBJECTIVE & OBJECTIVE
Interval History: Pt seen at bedside. Last received prismax on 12/4. Good UO since; 1.4 L past 24 hours.  AM labs pending    Objective:     Vital Signs (Most Recent):  Temp: 97.9 °F (36.6 °C) (12/09/20 0733)  Pulse: 60 (12/09/20 0736)  Resp: 18 (12/09/20 0733)  BP: (!) 118/57 (12/09/20 0733)  SpO2: 98 % (12/09/20 0733)  O2 Device (Oxygen Therapy): nasal cannula (12/09/20 0432) Vital Signs (24h Range):  Temp:  [97.6 °F (36.4 °C)-98.6 °F (37 °C)] 97.9 °F (36.6 °C)  Pulse:  [60-71] 60  Resp:  [14-20] 18  SpO2:  [96 %-98 %] 98 %  BP: (115-126)/(54-61) 118/57     Weight: 109.5 kg (241 lb 4.8 oz) (12/09/20 0546)  Body mass index is 37.79 kg/m².  Body surface area is 2.28 meters squared.    I/O last 3 completed shifts:  In: 800 [P.O.:750; IV Piggyback:50]  Out: 2520 [Urine:2460; Drains:60]    Physical Exam  Constitutional:       Appearance: She is obese.   HENT:      Head: Normocephalic and atraumatic.      Nose: Nose normal.      Mouth/Throat:      Mouth: Mucous membranes are moist.      Pharynx: Oropharynx is clear.   Eyes:      Conjunctiva/sclera: Conjunctivae normal.      Pupils: Pupils are equal, round, and reactive to light.   Neck:      Musculoskeletal: Normal range of motion and neck supple.   Cardiovascular:      Rate and Rhythm: Regular rhythm.   Pulmonary:      Effort: Pulmonary effort is normal.   Abdominal:      General: Abdomen is flat.      Palpations: Abdomen is soft.      Tenderness: There is no abdominal tenderness.   Musculoskeletal:         General: Swelling present. No deformity.   Skin:     General: Skin is warm and dry.   Neurological:      General: No focal deficit present.      Mental Status: She is alert and oriented to person, place, and time.         Significant Labs:  CBC:   Recent Labs   Lab 12/08/20  0354   WBC 12.77*   RBC 2.78*   HGB 8.0*   HCT 25.8*      MCV 93   MCH 28.8   MCHC 31.0*     CMP:   Recent Labs   Lab 12/08/20  0354   *   CALCIUM 7.8*   ALBUMIN 2.0*   PROT 6.5    *   K 3.9   CO2 31*   CL 92*   BUN 26*   CREATININE 5.3*   ALKPHOS 474*   ALT 10   AST 45*   BILITOT 0.1     All labs within the past 24 hours have been reviewed.

## 2020-12-09 NOTE — PROGRESS NOTES
[facility]  Nephrology  Progress Note    Patient Name: Navin Beck  MRN: [id number]  Admission Date: 11/26/2020  Hospital Length of Stay: 12 days  Attending Provider: Michael Jeffrey MD   Primary Care Physician: Elvira Quinones MD  Principal Problem:Acute kidney injury (LAURIE) with acute tubular necrosis (ATN)    Subjective:     HPI: Ms Beck is a 60 year old female with a past medical history of HTN, LD, T2DM, hyperparathyroidism, and asthma who [place] [facility] [place]/o abdominal pain s/p bladder mesh surgery. Pt noted to be in DKA on presentation and is currently on insulin gtt and being followed by endocrine. CT abdomen obtained revealed multiple fluid collections near bladder; IR was consulted and pt is s/p drain placement. Initial coverage provided with vanc, now on cefepime and flagyl. Baseline sCr roughly 1.0-1.2, now elevated to 4.6 at time of consult (11/30/2020). Pt now anuric with 20mL UOP in the past 24hrs at time of consult (11/30/2020). Pt denies endorses lower abdominal tenderness. Pt denies SOB. Nephrology has been consulted for LAURIE.     -HPI was obtained from patient interview, and from review of the patient's EMR.         Interval History: Pt seen at bedside. Last received prismax on 12/4. Good UO since; 1.4 L past 24 hours.  AM labs pending    Objective:     Vital Signs (Most Recent):  Temp: 97.9 °F (36.6 °C) (12/09/20 0733)  Pulse: 60 (12/09/20 0736)  Resp: 18 (12/09/20 0733)  BP: (!) 118/57 (12/09/20 0733)  SpO2: 98 % (12/09/20 0733)  O2 Device (Oxygen Therapy): nasal cannula (12/09/20 0432) Vital Signs (24h Range):  Temp:  [97.6 °F (36.4 °C)-98.6 °F (37 °C)] 97.9 °F (36.6 °C)  Pulse:  [60-71] 60  Resp:  [14-20] 18  SpO2:  [96 %-98 %] 98 %  BP: (115-126)/(54-61) 118/57     Weight: 109.5 kg (241 lb 4.8 oz) (12/09/20 0146)  Body mass index is 37.79 kg/m².  Body surface area is 2.28 meters squared.    I/O last 3 completed shifts:  In: 800 [P.O.:750; IV  Piggyback:50]  Out: 2520 [Urine:2460; Drains:60]    Physical Exam  Constitutional:       Appearance: She is obese.   HENT:      Head: Normocephalic and atraumatic.      Nose: Nose normal.      Mouth/Throat:      Mouth: Mucous membranes are moist.      Pharynx: Oropharynx is clear.   Eyes:      Conjunctiva/sclera: Conjunctivae normal.      Pupils: Pupils are equal, round, and reactive to light.   Neck:      Musculoskeletal: Normal range of motion and neck supple.   Cardiovascular:      Rate and Rhythm: Regular rhythm.   Pulmonary:      Effort: Pulmonary effort is normal.   Abdominal:      General: Abdomen is flat.      Palpations: Abdomen is soft.      Tenderness: There is no abdominal tenderness.   Musculoskeletal:         General: Swelling present. No deformity.   Skin:     General: Skin is warm and dry.   Neurological:      General: No focal deficit present.      Mental Status: She is alert and oriented to person, place, and time.         Significant Labs:  CBC:   Recent Labs   Lab 12/08/20  0354   WBC 12.77*   RBC 2.78*   HGB 8.0*   HCT 25.8*      MCV 93   MCH 28.8   MCHC 31.0*     CMP:   Recent Labs   Lab 12/08/20  0354   *   CALCIUM 7.8*   ALBUMIN 2.0*   PROT 6.5   *   K 3.9   CO2 31*   CL 92*   BUN 26*   CREATININE 5.3*   ALKPHOS 474*   ALT 10   AST 45*   BILITOT 0.1     All labs within the past 24 hours have been reviewed.        Assessment/Plan:     * Acute kidney injury (LAURIE) with acute tubular necrosis (ATN)  ATN in setting of contrast administration and sepsis 2/2 UTI, baseline cr 1.0-1.2 that trended up to 4.6 at time of consult.     -received contrast on 11/27/2020  -Urine microscopy 11/30/2020 many muddy brown granular casts, massive WBC's  -purulent drainage from abdominal ROSAURA drain similar in appearance to her urine; concerning for communication between bladder and fluid collection   -Initiated prismax 12/1, last received prismax 12/4    Recomendations    -Now with good UO for  several days.  -UO 1.4 L, net negative 900cc past 24 hours  -Cr trending down today. Recommend remove trialysis catheter  -No significant electrolyte disturbances or acute volume issues requiring further RRT at this time  -Recommend continue to hold diuretics   -Maximum gabapentin dose 300 mg in CKD or LAURIE patients  -Maintain Hgb >7.0, Keep MAP >65  -Renally dose meds and avoid nephrotoxic meds  -Will continue to follow closely     Hyponatremia  -Initially hyponatremic, likely 2/2 combination of D5 administration with decreased UOP  -duloxetine discontinued  -improved after RRT  -continuing to trend        Diabetic ketoacidosis without coma associated with type 2 diabetes mellitus  -Management per primary team   -resolved        Thank you for your consult. I will follow-up with patient. Please contact us if you have any additional questions.    Usha Ames MD  Nephrology  [facility]

## 2020-12-09 NOTE — ASSESSMENT & PLAN NOTE
Hospital acquired pneumonia (HAP)    Patient on 3L NC, although sat 97-98%, likely can wean significantly. Patient with longstanding obstructive lung disease (asthma) -- goal sat >88%.    12/7 CXR: Interval progression in the extent of right perihilar and right lower lung zone infiltrate and or atelectasis.  New and or mildly increased small right pleural effusion.   12/8 CTAP: Bibasilar consolidative changes, right more than left with small volume right-sided pleural effusion.  Findings likely related to pneumonia versus inflammatory process.    12/9 CT Chest w/o Contrast:   · Bilateral lower lobe consolidative opacities right greater than left and bandlike atelectasis bilaterally with trace pleural effusions similar to exam 12/08/2020.  Differential diagnosis includes aspiration, pneumonia, atelectasis, and less likely noninfectious inflammation.  · Slight ground-glass opacities predominantly right apex which may represent infectious or noninfectious inflammatory etiology.   · Partially aerated retained secretions within the trachea.   · Scattered calcified mediastinal lymph nodes and soft tissue density in the anterior mediastinum relatively unchanged dating back to 07/27/2012.     - Broaden antibiotics to cover HAP due to new CT findings and prolonged hospitalization, start Vanc for MRSA coverage, change CTX to Cefepime f or pseudomonal coverage. Flagyl is for pelvic abscess, but will also cover anaerobes if aspiration   - Tracheal secretions in airway noted on CT -- will add BID cough assists for help with expectoration  - wean supplemental O2 for goal sat >88% (pt with chronic obstructive lung disease - asthma)

## 2020-12-10 PROBLEM — B37.9 YEAST INFECTION: Status: ACTIVE | Noted: 2020-12-10

## 2020-12-10 LAB
ALBUMIN SERPL BCP-MCNC: 1.9 G/DL (ref 3.5–5.2)
ALP SERPL-CCNC: 410 U/L (ref 55–135)
ALT SERPL W/O P-5'-P-CCNC: 6 U/L (ref 10–44)
ANION GAP SERPL CALC-SCNC: 10 MMOL/L (ref 8–16)
AST SERPL-CCNC: 13 U/L (ref 10–40)
BASOPHILS # BLD AUTO: 0.08 K/UL (ref 0–0.2)
BASOPHILS NFR BLD: 0.8 % (ref 0–1.9)
BILIRUB SERPL-MCNC: 0.1 MG/DL (ref 0.1–1)
BUN SERPL-MCNC: 30 MG/DL (ref 8–23)
CALCIUM SERPL-MCNC: 7.6 MG/DL (ref 8.7–10.5)
CHLORIDE SERPL-SCNC: 97 MMOL/L (ref 95–110)
CO2 SERPL-SCNC: 28 MMOL/L (ref 23–29)
CREAT SERPL-MCNC: 4.4 MG/DL (ref 0.5–1.4)
DIFFERENTIAL METHOD: ABNORMAL
EOSINOPHIL # BLD AUTO: 0.1 K/UL (ref 0–0.5)
EOSINOPHIL NFR BLD: 1 % (ref 0–8)
ERYTHROCYTE [DISTWIDTH] IN BLOOD BY AUTOMATED COUNT: 14.8 % (ref 11.5–14.5)
EST. GFR  (AFRICAN AMERICAN): 11.6 ML/MIN/1.73 M^2
EST. GFR  (NON AFRICAN AMERICAN): 10.1 ML/MIN/1.73 M^2
GLUCOSE SERPL-MCNC: 311 MG/DL (ref 70–110)
HCT VFR BLD AUTO: 26.9 % (ref 37–48.5)
HGB BLD-MCNC: 8.2 G/DL (ref 12–16)
IMM GRANULOCYTES # BLD AUTO: 0.1 K/UL (ref 0–0.04)
IMM GRANULOCYTES NFR BLD AUTO: 1 % (ref 0–0.5)
LYMPHOCYTES # BLD AUTO: 3 K/UL (ref 1–4.8)
LYMPHOCYTES NFR BLD: 30.7 % (ref 18–48)
MAGNESIUM SERPL-MCNC: 1.7 MG/DL (ref 1.6–2.6)
MCH RBC QN AUTO: 28.8 PG (ref 27–31)
MCHC RBC AUTO-ENTMCNC: 30.5 G/DL (ref 32–36)
MCV RBC AUTO: 94 FL (ref 82–98)
MONOCYTES # BLD AUTO: 0.7 K/UL (ref 0.3–1)
MONOCYTES NFR BLD: 7.1 % (ref 4–15)
NEUTROPHILS # BLD AUTO: 5.7 K/UL (ref 1.8–7.7)
NEUTROPHILS NFR BLD: 59.4 % (ref 38–73)
NRBC BLD-RTO: 0 /100 WBC
PHOSPHATE SERPL-MCNC: 4.7 MG/DL (ref 2.7–4.5)
PLATELET # BLD AUTO: 354 K/UL (ref 150–350)
PLATELET BLD QL SMEAR: ABNORMAL
PMV BLD AUTO: 9.8 FL (ref 9.2–12.9)
POCT GLUCOSE: 145 MG/DL (ref 70–110)
POCT GLUCOSE: 203 MG/DL (ref 70–110)
POCT GLUCOSE: 266 MG/DL (ref 70–110)
POCT GLUCOSE: 266 MG/DL (ref 70–110)
POCT GLUCOSE: 360 MG/DL (ref 70–110)
POTASSIUM SERPL-SCNC: 4.2 MMOL/L (ref 3.5–5.1)
PROT SERPL-MCNC: 6.3 G/DL (ref 6–8.4)
RBC # BLD AUTO: 2.85 M/UL (ref 4–5.4)
SODIUM SERPL-SCNC: 135 MMOL/L (ref 136–145)
VANCOMYCIN SERPL-MCNC: 24.6 UG/ML
WBC # BLD AUTO: 9.67 K/UL (ref 3.9–12.7)

## 2020-12-10 PROCEDURE — 25000003 PHARM REV CODE 250: Mod: HCWC | Performed by: STUDENT IN AN ORGANIZED HEALTH CARE EDUCATION/TRAINING PROGRAM

## 2020-12-10 PROCEDURE — 99900035 HC TECH TIME PER 15 MIN (STAT): Mod: HCWC

## 2020-12-10 PROCEDURE — 20600001 HC STEP DOWN PRIVATE ROOM: Mod: HCWC

## 2020-12-10 PROCEDURE — 99233 PR SUBSEQUENT HOSPITAL CARE,LEVL III: ICD-10-PCS | Mod: HCWC,GC,, | Performed by: INTERNAL MEDICINE

## 2020-12-10 PROCEDURE — 94761 N-INVAS EAR/PLS OXIMETRY MLT: CPT | Mod: HCWC

## 2020-12-10 PROCEDURE — 83735 ASSAY OF MAGNESIUM: CPT | Mod: HCWC

## 2020-12-10 PROCEDURE — 99232 SBSQ HOSP IP/OBS MODERATE 35: CPT | Mod: HCWC,,, | Performed by: NURSE PRACTITIONER

## 2020-12-10 PROCEDURE — 80202 ASSAY OF VANCOMYCIN: CPT | Mod: HCWC

## 2020-12-10 PROCEDURE — 99232 SBSQ HOSP IP/OBS MODERATE 35: CPT | Mod: HCWC,,, | Performed by: INTERNAL MEDICINE

## 2020-12-10 PROCEDURE — 27000221 HC OXYGEN, UP TO 24 HOURS: Mod: HCWC

## 2020-12-10 PROCEDURE — 97530 THERAPEUTIC ACTIVITIES: CPT | Mod: HCWC

## 2020-12-10 PROCEDURE — 36415 COLL VENOUS BLD VENIPUNCTURE: CPT | Mod: HCWC

## 2020-12-10 PROCEDURE — 63600175 PHARM REV CODE 636 W HCPCS: Mod: HCWC | Performed by: STUDENT IN AN ORGANIZED HEALTH CARE EDUCATION/TRAINING PROGRAM

## 2020-12-10 PROCEDURE — 99232 PR SUBSEQUENT HOSPITAL CARE,LEVL II: ICD-10-PCS | Mod: HCWC,,, | Performed by: INTERNAL MEDICINE

## 2020-12-10 PROCEDURE — 27000646 HC AEROBIKA DEVICE: Mod: HCWC

## 2020-12-10 PROCEDURE — 85025 COMPLETE CBC W/AUTO DIFF WBC: CPT | Mod: HCWC

## 2020-12-10 PROCEDURE — 80053 COMPREHEN METABOLIC PANEL: CPT | Mod: HCWC

## 2020-12-10 PROCEDURE — 84100 ASSAY OF PHOSPHORUS: CPT | Mod: HCWC

## 2020-12-10 PROCEDURE — 97803 MED NUTRITION INDIV SUBSEQ: CPT | Mod: HCWC

## 2020-12-10 PROCEDURE — 99232 PR SUBSEQUENT HOSPITAL CARE,LEVL II: ICD-10-PCS | Mod: HCWC,,, | Performed by: NURSE PRACTITIONER

## 2020-12-10 PROCEDURE — 99233 SBSQ HOSP IP/OBS HIGH 50: CPT | Mod: HCWC,GC,, | Performed by: INTERNAL MEDICINE

## 2020-12-10 PROCEDURE — 94664 DEMO&/EVAL PT USE INHALER: CPT | Mod: HCWC

## 2020-12-10 PROCEDURE — 97535 SELF CARE MNGMENT TRAINING: CPT | Mod: HCWC

## 2020-12-10 PROCEDURE — 97116 GAIT TRAINING THERAPY: CPT | Mod: HCWC

## 2020-12-10 RX ORDER — LEVOFLOXACIN 750 MG/1
750 TABLET ORAL ONCE
Status: COMPLETED | OUTPATIENT
Start: 2020-12-10 | End: 2020-12-10

## 2020-12-10 RX ORDER — FLUCONAZOLE 150 MG/1
150 TABLET ORAL ONCE
Status: COMPLETED | OUTPATIENT
Start: 2020-12-10 | End: 2020-12-10

## 2020-12-10 RX ORDER — LEVOFLOXACIN 500 MG/1
500 TABLET, FILM COATED ORAL EVERY OTHER DAY
Status: DISCONTINUED | OUTPATIENT
Start: 2020-12-12 | End: 2020-12-11

## 2020-12-10 RX ADMIN — INSULIN ASPART 17 UNITS: 100 INJECTION, SOLUTION INTRAVENOUS; SUBCUTANEOUS at 04:12

## 2020-12-10 RX ADMIN — HEPARIN SODIUM 5000 UNITS: 5000 INJECTION INTRAVENOUS; SUBCUTANEOUS at 02:12

## 2020-12-10 RX ADMIN — DOCUSATE SODIUM AND SENNOSIDES 1 TABLET: 8.6; 5 TABLET, FILM COATED ORAL at 08:12

## 2020-12-10 RX ADMIN — METRONIDAZOLE 500 MG: 500 TABLET ORAL at 09:12

## 2020-12-10 RX ADMIN — HYDROMORPHONE HYDROCHLORIDE 0.5 MG: 1 INJECTION, SOLUTION INTRAMUSCULAR; INTRAVENOUS; SUBCUTANEOUS at 08:12

## 2020-12-10 RX ADMIN — METHIMAZOLE 10 MG: 10 TABLET ORAL at 08:12

## 2020-12-10 RX ADMIN — INSULIN ASPART 17 UNITS: 100 INJECTION, SOLUTION INTRAVENOUS; SUBCUTANEOUS at 08:12

## 2020-12-10 RX ADMIN — OXYCODONE HYDROCHLORIDE 10 MG: 10 TABLET ORAL at 11:12

## 2020-12-10 RX ADMIN — FLUCONAZOLE 150 MG: 150 TABLET ORAL at 04:12

## 2020-12-10 RX ADMIN — INSULIN DETEMIR 34 UNITS: 100 INJECTION, SOLUTION SUBCUTANEOUS at 08:12

## 2020-12-10 RX ADMIN — INSULIN ASPART 3 UNITS: 100 INJECTION, SOLUTION INTRAVENOUS; SUBCUTANEOUS at 12:12

## 2020-12-10 RX ADMIN — HYDROMORPHONE HYDROCHLORIDE 0.5 MG: 1 INJECTION, SOLUTION INTRAMUSCULAR; INTRAVENOUS; SUBCUTANEOUS at 02:12

## 2020-12-10 RX ADMIN — INSULIN ASPART 3 UNITS: 100 INJECTION, SOLUTION INTRAVENOUS; SUBCUTANEOUS at 08:12

## 2020-12-10 RX ADMIN — LEVOFLOXACIN 750 MG: 750 TABLET, FILM COATED ORAL at 12:12

## 2020-12-10 RX ADMIN — OXYCODONE HYDROCHLORIDE 10 MG: 10 TABLET ORAL at 05:12

## 2020-12-10 RX ADMIN — METRONIDAZOLE 500 MG: 500 TABLET ORAL at 02:12

## 2020-12-10 RX ADMIN — HEPARIN SODIUM 5000 UNITS: 5000 INJECTION INTRAVENOUS; SUBCUTANEOUS at 09:12

## 2020-12-10 RX ADMIN — HYDROMORPHONE HYDROCHLORIDE 0.5 MG: 1 INJECTION, SOLUTION INTRAMUSCULAR; INTRAVENOUS; SUBCUTANEOUS at 05:12

## 2020-12-10 RX ADMIN — OXYCODONE HYDROCHLORIDE 10 MG: 10 TABLET ORAL at 07:12

## 2020-12-10 RX ADMIN — POLYETHYLENE GLYCOL 3350 17 G: 17 POWDER, FOR SOLUTION ORAL at 08:12

## 2020-12-10 RX ADMIN — ATORVASTATIN CALCIUM 20 MG: 20 TABLET, FILM COATED ORAL at 08:12

## 2020-12-10 RX ADMIN — METRONIDAZOLE 500 MG: 500 TABLET ORAL at 05:12

## 2020-12-10 RX ADMIN — INSULIN ASPART 17 UNITS: 100 INJECTION, SOLUTION INTRAVENOUS; SUBCUTANEOUS at 12:12

## 2020-12-10 RX ADMIN — INSULIN ASPART 2 UNITS: 100 INJECTION, SOLUTION INTRAVENOUS; SUBCUTANEOUS at 04:12

## 2020-12-10 RX ADMIN — HEPARIN SODIUM 5000 UNITS: 5000 INJECTION INTRAVENOUS; SUBCUTANEOUS at 05:12

## 2020-12-10 RX ADMIN — GABAPENTIN 300 MG: 300 CAPSULE ORAL at 08:12

## 2020-12-10 NOTE — PLAN OF CARE
Problem: Fall Injury Risk  Goal: Absence of Fall and Fall-Related Injury  Outcome: Ongoing, Progressing     Problem: Adult Inpatient Plan of Care  Goal: Optimal Comfort and Wellbeing  Outcome: Ongoing, Progressing  Intervention: Provide Person-Centered Care  Flowsheets (Taken 12/10/2020 0557)  Trust Relationship/Rapport:   care explained   choices provided   emotional support provided     Pt AAOx4, VSS, no acute changes overnight, pt given oxy10 x1 and dilaudid 0.5 IV x1 for management of abdominal pain with moderate to full relief achieved, ROSAURA drain care performed per orders, pt safety maintained, bed locked and lowered, bed alarm on, call light within reach, pt resting comfortably and in no distress at this time

## 2020-12-10 NOTE — ASSESSMENT & PLAN NOTE
Mesh removed by GYN 10/26; march placed  March removed 11/19 --> increased pain  Admitted 11/27 with worsening abd pain found to be related to intra-abd fluid collections.   UCx 11/26 with E.coli    - Urology consulted; lesions adjacent to bladder per CT cysto 11/27  - Gyn consulted  - IR consulted drained abscess 11/28  - Cultures: Peptostreptococcus pending margy.  - Broad spectrum abx: CTX, Flagyl D11/14 --> broadened to vanc/cefepime/flagyl 12/9 to cover HAP  --> Now on levaquin/flagyl

## 2020-12-10 NOTE — ASSESSMENT & PLAN NOTE
Pt has recent h/o GYN surgery in 10/2020 for bladder mesh removal, Cifuentes was removed on 11/19. For the past few days pt has had increased trouble urinating associated with pain.   CT A/P 11/27: Multiple fluid collections adjacent to bladder. Mild bilateral hydronephrosis.   Retention likely 2/2 anatomic obstruction, pelvic fluid collection, diabetic bladder  - Urology consulted in ED, appreciate recs   - Cifuentes placed 800cc drained, bladder irrigated and all fluid removed with ease   - Maintain Cifuentes   - No hydronephrosis seen on CT, most likely not post-renal in etiology

## 2020-12-10 NOTE — PLAN OF CARE
Zahradante Debbie Lynnin wants to discharge home when medically stable.         12/10/20 1417   Discharge Reassessment   Assessment Type Discharge Planning Reassessment   Provided patient/caregiver education on the expected discharge date and the discharge plan Yes   Do you have any problems affording any of your prescribed medications? No   Discharge Plan A Home with family   DME Needed Upon Discharge  other (see comments)  (TBD)   Anticipated Discharge Disposition Home       Janice Irvin MPH, RN, CM  Ext. [phone]

## 2020-12-10 NOTE — SUBJECTIVE & OBJECTIVE
Interval History: Pt seen at bedside. Last received prismax on 12/4. Good UO since. Cr continues to trend down. Pt reports feeling well.    Objective:     Vital Signs (Most Recent):  Temp: 97.9 °F (36.6 °C) (12/10/20 0733)  Pulse: 75 (12/10/20 0837)  Resp: 18 (12/10/20 0837)  BP: (!) 140/65 (12/10/20 0733)  SpO2: 96 % (12/10/20 0837)  O2 Device (Oxygen Therapy): nasal cannula (12/10/20 0837) Vital Signs (24h Range):  Temp:  [97.9 °F (36.6 °C)-99.2 °F (37.3 °C)] 97.9 °F (36.6 °C)  Pulse:  [64-78] 75  Resp:  [16-20] 18  SpO2:  [95 %-99 %] 96 %  BP: (122-151)/(56-65) 140/65     Weight: 109.5 kg (241 lb 4.8 oz) (12/09/20 0546)  Body mass index is 37.79 kg/m².  Body surface area is 2.28 meters squared.    I/O last 3 completed shifts:  In: 860 [P.O.:510; I.V.:50; IV Piggyback:300]  Out: 3470 [Urine:3410; Drains:60]    Physical Exam  Constitutional:       Appearance: She is obese.   HENT:      Head: Normocephalic and atraumatic.      Nose: Nose normal.      Mouth/Throat:      Mouth: Mucous membranes are moist.      Pharynx: Oropharynx is clear.   Eyes:      Conjunctiva/sclera: Conjunctivae normal.      Pupils: Pupils are equal, round, and reactive to light.   Neck:      Musculoskeletal: Normal range of motion and neck supple.   Cardiovascular:      Rate and Rhythm: Regular rhythm.   Pulmonary:      Effort: Pulmonary effort is normal.   Abdominal:      General: Abdomen is flat.      Palpations: Abdomen is soft.      Tenderness: There is no abdominal tenderness.   Musculoskeletal:         General: Swelling present. No deformity.   Skin:     General: Skin is warm and dry.   Neurological:      General: No focal deficit present.      Mental Status: She is alert and oriented to person, place, and time.         Significant Labs:  CBC:   Recent Labs   Lab 12/10/20  0301   WBC 9.67   RBC 2.85*   HGB 8.2*   HCT 26.9*   *   MCV 94   MCH 28.8   MCHC 30.5*     CMP:   Recent Labs   Lab 12/10/20  0300   *   CALCIUM 7.6*    ALBUMIN 1.9*   PROT 6.3   *   K 4.2   CO2 28   CL 97   BUN 30*   CREATININE 4.4*   ALKPHOS 410*   ALT 6*   AST 13   BILITOT 0.1     All labs within the past 24 hours have been reviewed.

## 2020-12-10 NOTE — PLAN OF CARE
Problem: Adult Inpatient Plan of Care  Goal: Plan of Care Review  Outcome: Ongoing, Progressing     Problem: Diabetes Comorbidity  Goal: Blood Glucose Level Within Desired Range  Outcome: Ongoing, Progressing  Intervention: Maintain Glycemic Control  Flowsheets (Taken 12/10/2020 1654)  Glycemic Management:   blood glucose monitoring   carbohydrate replacement provided   oral hydration promoted   supplemental insulin given     Problem: Infection (Sepsis/Septic Shock)  Goal: Absence of Infection Signs/Symptoms  Outcome: Ongoing, Progressing     Problem: Fluid Imbalance (Pneumonia)  Goal: Fluid Balance  Outcome: Ongoing, Progressing     Problem: Respiratory Compromise (Pneumonia)  Goal: Effective Oxygenation and Ventilation  Outcome: Ongoing, Progressing  Intervention: Promote Airway Secretion Clearance  Flowsheets (Taken 12/10/2020 1654)  Breathing Techniques/Airway Clearance: deep/controlled cough encouraged  Cough And Deep Breathing: done with encouragement  Intervention: Optimize Oxygenation and Ventilation  Flowsheets (Taken 12/10/2020 1654)  Airway/Ventilation Management: airway patency maintained  Head of Bed (HOB): HOB at 30 degrees     Problem: Infection  Goal: Infection Symptom Resolution  Outcome: Ongoing, Progressing       POC reviewed with pt. Pt's V/S stable. No distress noted. Pt started on diflucan for vaginal irritation. Pt continues to use incentive spirometer. ROSAURA drain in place. No s/s of infection at site.

## 2020-12-10 NOTE — PROGRESS NOTES
"  [facility]  Adult Nutrition  Consult Note    SUMMARY     Recommendations    1. Continue current Diabetic diet, fluid restriction per MD.   2. RD to monitor & follow-up.    Goals: Meet % EEN, EPN by RD f/u date  Nutrition Goal Status: new  Communication of RD Recs: reviewed with RN    Reason for Assessment    Reason For Assessment: RD follow-up  Diagnosis: other (see comments)(LAURIE)  Interdisciplinary Rounds: did not attend    General Information Comments: Pt w/ good appetite, tolerating diet. Diabetic diet education complete 11/30 - pt w/ no further questions. PTA, pt reports fair appetite; UBW: 230#. Pt w/ no indicators of malnutrition.  Nutrition Discharge Planning: Adequate PO intake    Nutrition/Diet History    Spiritual, Cultural Beliefs, Adventism Practices, Values that Affect Care: no  Factors Affecting Nutritional Intake: None identified at this time    Anthropometrics    Temp: 97.9 °F (36.6 °C)  Height Method: Measured  Height: 5' 7" (170.2 cm)  Height (inches): 67 in  Weight Method: Bed Scale  Weight: 109.5 kg (241 lb 6.5 oz)  Weight (lb): 241.41 lb  Ideal Body Weight (IBW), Female: 135 lb  % Ideal Body Weight, Female (lb): 178.82 %  BMI (Calculated): 37.8  BMI Grade: 35 - 39.9 - obesity - grade II    Lab/Procedures/Meds    Pertinent Labs Reviewed: reviewed  Pertinent Labs Comments: BUN 30, Creat 4.4, GFR 11.6, P 4.7, A1C 11.4  Pertinent Medications Reviewed: reviewed    Estimated/Assessed Needs    Weight Used For Calorie Calculations: 109.5 kg (241 lb 6.5 oz)     Energy Calorie Requirements (kcal): 1688 kcal/d  Energy Need Method: [place]-St Jeor(1.0 PAL)     Protein Requirements: 110 g/d (1 g/kg)  Weight Used For Protein Calculations: 109.5 kg (241 lb 6.5 oz)     Estimated Fluid Requirement Method: other (see comments)(Per MD or 1 mL/kcal)  RDA Method (mL): 1688    Nutrition Prescription Ordered    Current Diet Order: 2000 kcal ADA  Nutrition Order Comments: 1500 mL " FR    Evaluation of Received Nutrient/Fluid Intake    Comments: LBM: 12/8    Tolerance: tolerating    Nutrition Risk    Level of Risk/Frequency of Follow-up: (1x/week)     Assessment and Plan    Nutrition Problem  Excessive CHO intake    Related to (etiology):   Food and nutrition related knowledge deficit     Signs and Symptoms (as evidenced by):   A1C 11.4    Interventions(treatment strategy):  Collaboration of nutrition care w/ other providers     Nutrition Diagnosis Status:   New     Monitor and Evaluation    Food and Nutrient Intake: energy intake, food and beverage intake  Food and Nutrient Adminstration: diet order  Physical Activity and Function: nutrition-related ADLs and IADLs  Anthropometric Measurements: weight, weight change  Biochemical Data, Medical Tests and Procedures: glucose/endocrine profile, inflammatory profile, lipid profile, gastrointestinal profile, electrolyte and renal panel  Nutrition-Focused Physical Findings: overall appearance     Nutrition Follow-Up    RD Follow-up?: Yes

## 2020-12-10 NOTE — PROGRESS NOTES
[facility]  Nephrology  Progress Note    Patient Name: Navin Beck  MRN: [id number]  Admission Date: 11/26/2020  Hospital Length of Stay: 13 days  Attending Provider: Alessia Johnston MD   Primary Care Physician: Elvira Quinones MD  Principal Problem:Acute kidney injury (LAURIE) with acute tubular necrosis (ATN)    Subjective:     HPI: Ms Beck is a 60 year old female with a past medical history of HTN, LD, T2DM, hyperparathyroidism, and asthma who presented to [facility] with c/o abdominal pain s/p bladder mesh surgery. Pt noted to be in DKA on presentation and is currently on insulin gtt and being followed by endocrine. CT abdomen obtained revealed multiple fluid collections near bladder; IR was consulted and pt is s/p drain placement. Initial coverage provided with vanc, now on cefepime and flagyl. Baseline sCr roughly 1.0-1.2, now elevated to 4.6 at time of consult (11/30/2020). Pt now anuric with 20mL UOP in the past 24hrs at time of consult (11/30/2020). Pt denies endorses lower abdominal tenderness. Pt denies SOB. Nephrology has been consulted for LAURIE.     -HPI was obtained from patient interview, and from review of the patient's EMR.         Interval History: Pt seen at bedside. Last received prismax on 12/4. Good UO since. Cr continues to trend down. Pt reports feeling well.    Objective:     Vital Signs (Most Recent):  Temp: 97.9 °F (36.6 °C) (12/10/20 0733)  Pulse: 75 (12/10/20 0837)  Resp: 18 (12/10/20 0837)  BP: (!) 140/65 (12/10/20 0733)  SpO2: 96 % (12/10/20 0837)  O2 Device (Oxygen Therapy): nasal cannula (12/10/20 0837) Vital Signs (24h Range):  Temp:  [97.9 °F (36.6 °C)-99.2 °F (37.3 °C)] 97.9 °F (36.6 °C)  Pulse:  [64-78] 75  Resp:  [16-20] 18  SpO2:  [95 %-99 %] 96 %  BP: (122-151)/(56-65) 140/65     Weight: 109.5 kg (241 lb 4.8 oz) (12/09/20 2046)  Body mass index is 37.79 kg/m².  Body surface area is 2.28 meters squared.    I/O last 3 completed shifts:  In: 860  [P.O.:510; I.V.:50; IV Piggyback:300]  Out: 3470 [Urine:3410; Drains:60]    Physical Exam  Constitutional:       Appearance: She is obese.   HENT:      Head: Normocephalic and atraumatic.      Nose: Nose normal.      Mouth/Throat:      Mouth: Mucous membranes are moist.      Pharynx: Oropharynx is clear.   Eyes:      Conjunctiva/sclera: Conjunctivae normal.      Pupils: Pupils are equal, round, and reactive to light.   Neck:      Musculoskeletal: Normal range of motion and neck supple.   Cardiovascular:      Rate and Rhythm: Regular rhythm.   Pulmonary:      Effort: Pulmonary effort is normal.   Abdominal:      General: Abdomen is flat.      Palpations: Abdomen is soft.      Tenderness: There is no abdominal tenderness.   Musculoskeletal:         General: Swelling present. No deformity.   Skin:     General: Skin is warm and dry.   Neurological:      General: No focal deficit present.      Mental Status: She is alert and oriented to person, place, and time.         Significant Labs:  CBC:   Recent Labs   Lab 12/10/20  0301   WBC 9.67   RBC 2.85*   HGB 8.2*   HCT 26.9*   *   MCV 94   MCH 28.8   MCHC 30.5*     CMP:   Recent Labs   Lab 12/10/20  0300   *   CALCIUM 7.6*   ALBUMIN 1.9*   PROT 6.3   *   K 4.2   CO2 28   CL 97   BUN 30*   CREATININE 4.4*   ALKPHOS 410*   ALT 6*   AST 13   BILITOT 0.1     All labs within the past 24 hours have been reviewed.        Assessment/Plan:     * Acute kidney injury (LAURIE) with acute tubular necrosis (ATN)  ATN in setting of contrast administration and sepsis 2/2 UTI, baseline cr 1.0-1.2 that trended up to 4.6 at time of consult.     -received contrast on 11/27/2020  -Urine microscopy 11/30/2020 many muddy brown granular casts, massive WBC's  -purulent drainage from abdominal ROSAURA drain similar in appearance to her urine; concerning for communication between bladder and fluid collection   -Initiated prismax 12/1, last received prismax 12/4    Recomendations    -Now  with good UO for past week  -UO 2.3 L, net negative 2L past 24 hours  -Cr continues to trend down today  -No significant electrolyte disturbances or acute volume issues requiring further RRT at this time  -Good UO, Recommend continue to hold diuretics   -Maximum gabapentin dose 300 mg in CKD or LAURIE patients  -Maintain Hgb >7.0, Keep MAP >65  -Renally dose meds and avoid nephrotoxic meds  -Will continue to follow closely         Thank you for your consult. I will follow-up with patient. Please contact us if you have any additional questions.    Usha Ames MD  Nephrology  [facility]

## 2020-12-10 NOTE — PROGRESS NOTES
[facility]  Physical Medicine & Rehab  Progress Note    Patient Name: Navin Beck  MRN: [id number]  Admission Date: 11/26/2020  Length of Stay: 13 days  Attending Physician: Alessia Johnston MD    Subjective:     Principal Problem:Acute kidney injury (LAURIE) with acute tubular necrosis (ATN)    Hospital Course:   11/28/20:  Evaluated by PT and OT.  BM CGA-modA.  Sit to stand CGA and transfer CGA-Bro.  Ambulated ~12 ft + 12 ft CGA with RW.  Toileting totalA.  12/7/20:  Participated with PT and OT.  BM SBA.  Sit to stand modA and transfers min-modA with RW.  Ambulated ~4 steps Bro.  Grooming SBA.  UBD Bro.   12/8/20:  Participated with PT.  BM SBA.  Sit to stand and transfers Bro with RW.  Ambulated ~3-4 ft Bro with RW.     Interval History 12/10/2020:  Patient is seen for follow-up PM&R evaluation and recommendations: Doing well, pain improving.  Participating with therapy.      HPI, Past Medical, Family, and Social History remains the same as documented in the initial encounter.    Scheduled Medications:    atorvastatin  20 mg Oral QHS    gabapentin  300 mg Oral Daily    heparin (porcine)  5,000 Units Subcutaneous Q8H    insulin aspart U-100  17 Units Subcutaneous TIDWM    insulin detemir U-100  34 Units Subcutaneous BID    [START ON 12/12/2020] levoFLOXacin  500 mg Oral Every other day    methIMAzole  10 mg Oral Daily    metroNIDAZOLE  500 mg Oral Q8H    polyethylene glycol  17 g Oral Daily    senna-docusate 8.6-50 mg  1 tablet Oral BID       Diagnostic Results:   Labs: Reviewed  CT: Reviewed    PRN Medications: acetaminophen, albuterol-ipratropium, dextrose 50%, dextrose 50%, glucagon (human recombinant), glucose, glucose, HYDROmorphone, insulin aspart U-100, melatonin, ondansetron, oxyCODONE, oxyCODONE, sodium chloride 0.9%    Review of Systems   Constitutional: Positive for activity change. Negative for chills and fatigue.   HENT: Negative for trouble swallowing and voice  change.    Eyes: Negative for pain and visual disturbance.   Respiratory: Negative for cough and shortness of breath.    Cardiovascular: Negative for chest pain and palpitations.   Gastrointestinal: Positive for abdominal pain. Negative for nausea and vomiting.   Genitourinary: Positive for difficulty urinating. Negative for flank pain.   Musculoskeletal: Positive for gait problem. Negative for back pain and neck pain.   Skin: Positive for wound. Negative for rash.   Neurological: Positive for weakness. Negative for dizziness, numbness and headaches.   Psychiatric/Behavioral: Negative for agitation. The patient is not nervous/anxious.      Objective:     Vital Signs (Most Recent):  Temp: 97.9 °F (36.6 °C) (12/10/20 0733)  Pulse: (!) 114 (12/10/20 0900)  Resp: 18 (12/10/20 0837)  BP: (!) 140/65 (12/10/20 0733)  SpO2: 96 % (12/10/20 0837)    Vital Signs (24h Range):  Temp:  [97.9 °F (36.6 °C)-99.2 °F (37.3 °C)] 97.9 °F (36.6 °C)  Pulse:  [] 114  Resp:  [16-20] 18  SpO2:  [95 %-99 %] 96 %  BP: (122-140)/(56-65) 140/65     Physical Exam  Vitals signs reviewed.   Constitutional:       General: She is not in acute distress.     Appearance: She is well-developed.   HENT:      Head: Normocephalic and atraumatic.      Right Ear: External ear normal.      Left Ear: External ear normal.      Nose: Nose normal.   Eyes:      General: No scleral icterus.        Right eye: No discharge.         Left eye: No discharge.   Neck:      Musculoskeletal: Normal range of motion.   Cardiovascular:      Rate and Rhythm: Normal rate.   Pulmonary:      Effort: Pulmonary effort is normal. No respiratory distress.   Abdominal:      General: There is no distension.      Palpations: Abdomen is soft.      Tenderness: There is abdominal tenderness in the left lower quadrant.      Comments: LLQ drain in place   Genitourinary:     Comments: Cifuentes in place  Musculoskeletal:         General: No tenderness.      Comments: General weakness and  deconditioning   Skin:     General: Skin is warm and dry.      Findings: No rash.   Neurological:      Mental Status: She is alert and oriented to person, place, and time.      Sensory: Sensation is intact.      Motor: Weakness present.   Psychiatric:         Mood and Affect: Mood normal.         Behavior: Behavior normal. Behavior is cooperative.     Assessment/Plan:      * Acute kidney injury (LAURIE) with acute tubular necrosis (ATN)  -likely multifactorial on presentation 2/2 pre-renal volume depletion in the context of DKA, UTI, and post-renal obstruction  -nephrology following, etiology ATN in setting of contrast administration and sepsis 2/2 UTI  -no longer requiring CRRT    Postprocedural intraabdominal abscess  -mesh removed by GYN 10/26, march placed and removed 11/19  -presented with worsening abd pain found to be related to intra-abd fluid collections  -urine culture 11/26 +E.coli  -s/p IR drainage on 11/28, cultures +Peptostreptococcus   -ID consulted--> recommended ceftriaxone and flagyl x 14 days (11/29-12/13), changed to PO  -GYN and Urology following     Urinary retention  -likely 2/2 anatomic obstruction, pelvic fluid collection, diabetic bladder  -march in place  -urology following--> will need voiding trial in near future       Diabetic ketoacidosis without coma associated with type 2 diabetes mellitus  -previously required insulin gtt  -endocrine following    Severe obesity (BMI 35.0-35.9 with comorbidity)  -Body mass index is 37.22 kg/m².  -bariatric equipment as needed  -encourage lifestyle modifications    Impaired mobility and endurance  -related to prolonged/acute hospital course  -(I) with ADLs and mobility at baseline  See hospital course for functional status.    Recommendations  -  Encourage mobility, OOB in chair, and early ambulation as appropriate  -  PT/OT evaluate and treat  -  Pain management  -  DVT prophylaxis if appropriate   -  Monitor for and prevent skin breakdown and pressure  ulcers  · Early mobility, repositioning/weight shifting every 20-30 minutes when sitting, turn patient every 2 hours, proper mattress/overlay and chair cushioning, pressure relief/heel protector boots    Recommend inpatient rehab.     LAINE Domínguez  Department of Physical Medicine & Rehab   [facility]

## 2020-12-10 NOTE — ASSESSMENT & PLAN NOTE
Alk Phos has been trending up this hospitalization, on 12/9 . Unclear etiology. Other LFTs wnl - T bili, AST/ALT.  Recent Labs   Lab 12/10/20  0300   ALT 6*   AST 13   ALKPHOS 410*   BILITOT 0.1   PROT 6.3   ALBUMIN 1.9*     Recent Labs   Lab 12/09/20  0909   *     - Will review medication list to evaluate for possible drug-induced elevation

## 2020-12-10 NOTE — PLAN OF CARE
Problem: Physical Therapy Goal  Goal: Physical Therapy Goal  Description: Goals to be met by: 2020     Patient will increase functional independence with mobility by performin. Pt supine to sit with minimum assistance- Met 2020  3. Sit to stand transfer with Minimum assistance and RW if needed. - met 12/10  4. Bed to chair transfer with Minimum assistance  using Rolling Walker or no AD-not met  5. Gait  x 50 feet with supervision using Rolling Walker or no AD. -not met  6. Lower extremity exercise program x20 reps, with independence -not met    Outcome: Ongoing, Progressing     Goals remain appropriate. Continue current POC, progressing as tolerated.     Cleo Mcneill PT, DPT   12/10/2020  Pager: [phone]

## 2020-12-10 NOTE — ASSESSMENT & PLAN NOTE
Pt Hb 9.8 on admit. BL ~10.   Most likely due to anemia of chronic disease, acute blood loss, and frequent phlebotomy while hospitalized.   Recent Labs   Lab 12/07/20  0511 12/08/20  0354 12/09/20  0909 12/10/20  0301   HGB 7.9* 8.0* 9.0* 8.2*     - Required 1U PRBC on 12/2  - Required 1U PRBC on 12/6 2/2

## 2020-12-10 NOTE — SUBJECTIVE & OBJECTIVE
"Interval HPI:     Blood glucose trend .  Insulin was held last night due to concerns of hypoglycemia.  Patient consuming variable diet 50% diet today with notable a.m. glucose over 400 likely secondary to missing doses yesterday.  Otherwise hemodynamically stable, heart rate stable    Overnight events: LOGAN   Eatin%  Nausea: No  Hypoglycemia and intervention: Yes  Fever: No  TPN and/or TF: No    BP (!) 140/65 (BP Location: Left arm, Patient Position: Lying)   Pulse (!) 114   Temp 97.9 °F (36.6 °C) (Oral)   Resp 18   Ht 5' 7" (1.702 m)   Wt 109.5 kg (241 lb 4.8 oz)   SpO2 96%   BMI 37.79 kg/m²     Labs Reviewed and Include    Recent Labs   Lab 12/10/20  0300   *   CALCIUM 7.6*   ALBUMIN 1.9*   PROT 6.3   *   K 4.2   CO2 28   CL 97   BUN 30*   CREATININE 4.4*   ALKPHOS 410*   ALT 6*   AST 13   BILITOT 0.1     Lab Results   Component Value Date    WBC 9.67 12/10/2020    HGB 8.2 (L) 12/10/2020    HCT 26.9 (L) 12/10/2020    MCV 94 12/10/2020     (H) 12/10/2020     No results for input(s): TSH, FREET4 in the last 168 hours.  Lab Results   Component Value Date    HGBA1C 11.4 (H) 2020       Nutritional status:   Body mass index is 37.79 kg/m².  Lab Results   Component Value Date    ALBUMIN 1.9 (L) 12/10/2020    ALBUMIN 2.2 (L) 2020    ALBUMIN 2.0 (L) 2020     No results found for: PREALBUMIN    Estimated Creatinine Clearance: 16.9 mL/min (A) (based on SCr of 4.4 mg/dL (H)).    Accu-Checks  Recent Labs     20  0835 20  1022 20  1224 20  1648 20  2120 20  0733 20  1206 20  1644 20  2301 12/10/20  0036   POCTGLUCOSE 81 184* 203* 166* 167* 146* 152* 62* 442* 360*       Current Medications and/or Treatments Impacting Glycemic Control  Immunotherapy:    Immunosuppressants     None        Steroids:   Hormones (From admission, onward)    Start     Stop Route Frequency Ordered    20 0959  melatonin tablet 6 mg      -- " Oral Nightly PRN 11/27/20 0903        Pressors:    Autonomic Drugs (From admission, onward)    None        Hyperglycemia/Diabetes Medications:   Antihyperglycemics (From admission, onward)    Start     Stop Route Frequency Ordered    12/10/20 0715  insulin aspart U-100 pen 17 Units      -- SubQ 3 times daily with meals 12/09/20 2023 12/09/20 2100  insulin detemir U-100 pen 34 Units      -- SubQ 2 times daily 12/09/20 2023 12/03/20 1658  insulin aspart U-100 pen 0-5 Units      -- SubQ Before meals & nightly PRN 12/03/20 1558    11/28/20 1245  insulin regular in 0.9 % NaCl 100 unit/100 mL (1 unit/mL) infusion     Question:  Enter initial dose (Units/hr):  Answer:  4    11/28 2300 IV Continuous 11/28/20 1137

## 2020-12-10 NOTE — ASSESSMENT & PLAN NOTE
Cr 1.5 on presentation.   Likely multi-factorial on presentation 2/2 pre-renal volume depletion in the context of DKA, UTI, and post-renal obstruction  Repeat LAURIE on 11/29 likely intrinsic ATN in etiology    Recent Labs   Lab 12/06/20  0752 12/07/20  0511 12/08/20  0354 12/09/20  0909 12/10/20  0300   CREATININE 4.5* 5.1* 5.3* 4.7* 4.4*     PLAN  - Initial LAURIE on presentation resolved with IVF and placement of Cifuentes with resolution of hydronephrosis on imaging  - UTI adequately treated as she has been on continued ABX for other indications  - Nephrology consulted, appreciate recs   - Initiation of CRRT, transfer to ICU on 12/2   - Transferred to floor on 12/4   - Pt made ~1L urine after Lasix + Diuril challenge   - May be in recovery phase of LAURIE   - Renally dose meds   - Avoid renal toxins (ACEi/ARB, NSAIDs, contrast, etc.)   - Strict I&Os   - Trialysis catheter to RIJ removed on 12/9

## 2020-12-10 NOTE — PROGRESS NOTES
Pharmacokinetic Assessment Follow Up: IV Vancomycin    Vancomycin serum concentration assessment(s):    Vancomycin concentration = 24.6 mcg/mL. Represents a ~9 hour level.  Patient with LAURIE 2/2 ATN, Scr 4.4 from 5.3.    Vancomycin Regimen Plan:  1. Hold vancomycin today  2. Obtain concentration in AM on 12/11  3. Goal trough = 15-20 mcg/mL    Thank you for the consult, will continue to follow  Angel Adams Pharm.D., BCPS  [id number]        Drug levels (last 3 results):  Recent Labs   Lab Result Units 12/10/20  0301   Vancomycin, Random ug/mL 24.6     Patient brief summary:  Navin Beck is a 60 y.o. female initiated on antimicrobial therapy with IV Vancomycin for treatment of HAP    Drug Allergies:   Review of patient's allergies indicates:   Allergen Reactions    Penicillins Shortness Of Breath, Itching and Swelling     Tolerates cefepime 11/30/2020       Actual Body Weight:   109.5 kg    Renal Function:   Estimated Creatinine Clearance: 16.9 mL/min (A) (based on SCr of 4.4 mg/dL (H)).,     CBC (last 72 hours):  Recent Labs   Lab Result Units 12/08/20  0354 12/09/20  0909 12/10/20  0301   WBC K/uL 12.77* 11.50 9.67   Hemoglobin g/dL 8.0* 9.0* 8.2*   Hematocrit % 25.8* 29.8* 26.9*   Platelets K/uL 342 369* 354*   Gran % % 68.1 62.8 59.4   Lymph % % 20.8 27.1 30.7   Mono % % 8.5 6.9 7.1   Eosinophil % % 1.0 1.1 1.0   Basophil % % 0.5 0.7 0.8   Differential Method  Automated Automated Automated       Metabolic Panel (last 72 hours):  Recent Labs   Lab Result Units 12/08/20  0354 12/09/20  0909 12/10/20  0300   Sodium mmol/L 134* 135* 135*   Potassium mmol/L 3.9 3.6 4.2   Chloride mmol/L 92* 93* 97   CO2 mmol/L 31* 31* 28   Glucose mg/dL 123* 156* 311*   BUN mg/dL 26* 27* 30*   Creatinine mg/dL 5.3* 4.7* 4.4*   Albumin g/dL 2.0* 2.2* 1.9*   Total Bilirubin mg/dL 0.1 0.1 0.1   Alkaline Phosphatase U/L 474* 566* 410*   AST U/L 45* 29 13   ALT U/L 10 9* 6*   Magnesium mg/dL 1.4* 1.4* 1.7   Phosphorus mg/dL 5.3*  5.3*  5.3* 4.7*       Vancomycin Administrations:  vancomycin given in the last 96 hours                   vancomycin 1.5 g in dextrose 5 % 250 mL IVPB (ready to mix) (mg) 1,500 mg New Bag 12/09/20 2483                Microbiologic Results:  Microbiology Results (last 7 days)     Procedure Component Value Units Date/Time    Culture, Anaerobe [[id number]]  (Abnormal) Collected: 11/28/20 1405    Order Status: Completed Specimen: Abscess from Abdomen Updated: 12/04/20 1350     Anaerobic Culture PEPTOSTREPTOCOCCUS ANAEROBIUS  Moderate      Narrative:      Right pelvis

## 2020-12-10 NOTE — PT/OT/SLP PROGRESS
"Physical Therapy   Progress Note    Patient Name:  Navin Beck  MRN: [id number]  Recent Surgery: Procedure(s) (LRB):  CYSTOSCOPY, WITH RETROGRADE PYELOGRAM (Bilateral) 3 Days Post-Op    Recommendations:     Discharge Recommendations: Home Health PT  Equipment recommendations: shower chair  Barriers to discharge: None Identified     Assessment:     Navin Beck is a 60 y.o. female admitted to [facility] on 11/26/2020 with medical diagnosis of Acute kidney injury (LAURIE) with acute tubular necrosis (ATN). Pt presents with impaired balance, decreased endurance, impaired functional mobility  and gait instability. Pt tolerated mobilization well, with no adverse effects.  Navin Beck would benefit from continued acute PT intervention to address listed functional deficits, provide patient/caregiver education, reduce fall risk, and maximize (I) and safety with functional mobility. Due to pt progress, pt discharge recommendations modified to home with HH. Once medically stable, recommending pt discharge home with HHPT.     Rehab Prognosis: Good; patient would benefit from acute skilled PT services to address these deficits and reach maximum level of function.      Plan:     During this hospitalization, patient to be seen 3 x/week to address the identified rehab impairments via gait training, therapeutic activities, therapeutic exercises, neuromuscular re-education and progress towards stated goals.     Plan of Care Expires:  12/31/20  Plan of Care reviewed with: patient    This plan of care has been discussed with the patient/caregiver, who was included in its development and is in agreement with the identified goals and treatment plan.     Subjective     Communicated with RN prior to session.  Patient agreeable to participate. No visitor present at bedside upon PT entrance into room.    Patient comments/goals: "I've been getting up to the bathroom by myself"     Pain/Comfort:  · Location: abdomen  · Did not " rate on numeric scale; RN notified.     Patients cultural, spiritual, [employer] conflicts given the current situation: No cultural, spiritual, or educational needs identified.    Objective:     Patient found sitting on toilet with: march catheter, telemetry, ROSAURA drain    General Precautions: Standard, fall   Orthopedic Precautions:N/A   Braces: N/A   Body mass index is 37.81 kg/m².  Oxygen Device: room air    Cognition:   Pt is alert and cooperative during session.    Functional Mobility:    Bed Mobility:  · N/T 2/2 pt sitting up in chair upon PT arrival     Transfers:   · Sit <> Stand Transfer: Supervision from bedside chair x 1 trial, from toilet x 1 trial with RW                  Gait:  · Distance: 10 ft. + 30 ft.   · Assistance level: Stand-by Assistance  · Assistive Device: rolling walker  · Gait Assessment: step through gait pattern with decreased step length  and decreased gait speed    Balance:  · Static Stand: Supervision with Rolling walker    Outcome Measure: AM-PAC 6 CLICK MOBILITY  Total Score:20     Patient/Caregiver Education and Therapeutic Activities/Exercises     Therapist facilitated progression of gait training to improve gait stability, endurance, and independence with functional ambulation.     Therapeutic activities aimed to increase pt's independence, safety, and efficiency with bed mobility and functional transfers. See above for assistance levels.     Therapist educated pt/caregiver regarding:    PT POC and goals for therapy    Safety with mobility and fall risk    Seated therex recommendations    Instruction on use of call button and importance of calling nursing staff for assistance with mobility     Patient/caregiver able to verbalize understanding; will follow-up with pt/caregiver during current admit for additional questions/concerns within scope of practice.     White board updated.     Patient left up in chair with all lines intact, call button in reach and RN  notified.    Goals:     Multidisciplinary Problems     Physical Therapy Goals        Problem: Physical Therapy Goal    Goal Priority Disciplines Outcome Goal Variances Interventions   Physical Therapy Goal     PT, PT/OT Ongoing, Progressing     Description: Goals to be met by: 2020     Patient will increase functional independence with mobility by performin. Pt supine to sit with minimum assistance- Met 2020  3. Sit to stand transfer with Minimum assistance and RW if needed. - met 12/10  4. Bed to chair transfer with Minimum assistance  using Rolling Walker or no AD-not met  5. Gait  x 50 feet with supervision using Rolling Walker or no AD. -not met  6. Lower extremity exercise program x20 reps, with independence -not met                     Time Tracking:       PT Received On: 12/10/20  PT Start Time: 1410     PT Stop Time: 1434  PT Total Time (min): 24 min     Billable Minutes: Gait Training 10 min and Therapeutic Activity 14 min    Cleo Mcneill, JAEL   12/10/2020

## 2020-12-10 NOTE — SUBJECTIVE & OBJECTIVE
Interval History 12/10/2020:  Patient is seen for follow-up PM&R evaluation and recommendations: Doing well, pain improving.  Participating with therapy.      HPI, Past Medical, Family, and Social History remains the same as documented in the initial encounter.    Scheduled Medications:    atorvastatin  20 mg Oral QHS    gabapentin  300 mg Oral Daily    heparin (porcine)  5,000 Units Subcutaneous Q8H    insulin aspart U-100  17 Units Subcutaneous TIDWM    insulin detemir U-100  34 Units Subcutaneous BID    [START ON 12/12/2020] levoFLOXacin  500 mg Oral Every other day    methIMAzole  10 mg Oral Daily    metroNIDAZOLE  500 mg Oral Q8H    polyethylene glycol  17 g Oral Daily    senna-docusate 8.6-50 mg  1 tablet Oral BID       Diagnostic Results:   Labs: Reviewed  CT: Reviewed    PRN Medications: acetaminophen, albuterol-ipratropium, dextrose 50%, dextrose 50%, glucagon (human recombinant), glucose, glucose, HYDROmorphone, insulin aspart U-100, melatonin, ondansetron, oxyCODONE, oxyCODONE, sodium chloride 0.9%    Review of Systems   Constitutional: Positive for activity change. Negative for chills and fatigue.   HENT: Negative for trouble swallowing and voice change.    Eyes: Negative for pain and visual disturbance.   Respiratory: Negative for cough and shortness of breath.    Cardiovascular: Negative for chest pain and palpitations.   Gastrointestinal: Positive for abdominal pain. Negative for nausea and vomiting.   Genitourinary: Positive for difficulty urinating. Negative for flank pain.   Musculoskeletal: Positive for gait problem. Negative for back pain and neck pain.   Skin: Positive for wound. Negative for rash.   Neurological: Positive for weakness. Negative for dizziness, numbness and headaches.   Psychiatric/Behavioral: Negative for agitation. The patient is not nervous/anxious.      Objective:     Vital Signs (Most Recent):  Temp: 97.9 °F (36.6 °C) (12/10/20 0733)  Pulse: (!) 114 (12/10/20  0900)  Resp: 18 (12/10/20 0837)  BP: (!) 140/65 (12/10/20 0733)  SpO2: 96 % (12/10/20 0837)    Vital Signs (24h Range):  Temp:  [97.9 °F (36.6 °C)-99.2 °F (37.3 °C)] 97.9 °F (36.6 °C)  Pulse:  [] 114  Resp:  [16-20] 18  SpO2:  [95 %-99 %] 96 %  BP: (122-140)/(56-65) 140/65     Physical Exam  Vitals signs reviewed.   Constitutional:       General: She is not in acute distress.     Appearance: She is well-developed.   HENT:      Head: Normocephalic and atraumatic.      Right Ear: External ear normal.      Left Ear: External ear normal.      Nose: Nose normal.   Eyes:      General: No scleral icterus.        Right eye: No discharge.         Left eye: No discharge.   Neck:      Musculoskeletal: Normal range of motion.   Cardiovascular:      Rate and Rhythm: Normal rate.   Pulmonary:      Effort: Pulmonary effort is normal. No respiratory distress.   Abdominal:      General: There is no distension.      Palpations: Abdomen is soft.      Tenderness: There is abdominal tenderness in the left lower quadrant.      Comments: LLQ drain in place   Genitourinary:     Comments: Cifuentes in place  Musculoskeletal:         General: No tenderness.      Comments: General weakness and deconditioning   Skin:     General: Skin is warm and dry.      Findings: No rash.   Neurological:      Mental Status: She is alert and oriented to person, place, and time.      Sensory: Sensation is intact.      Motor: Weakness present.   Psychiatric:         Mood and Affect: Mood normal.         Behavior: Behavior normal. Behavior is cooperative.       NEUROLOGICAL EXAMINATION:     MENTAL STATUS   Oriented to person, place, and time.

## 2020-12-10 NOTE — PLAN OF CARE
Recommendations     1. Continue current Diabetic diet, fluid restriction per MD.   2. RD to monitor & follow-up.     Goals: Meet % EEN, EPN by RD f/u date  Nutrition Goal Status: new  Communication of RD Recs: reviewed with RN

## 2020-12-10 NOTE — PT/OT/SLP PROGRESS
"Occupational Therapy   Treatment    Name: Navin Beck  MRN: [id number]  Admitting Diagnosis:  Acute kidney injury (LAURIE) with acute tubular necrosis (ATN)  3 Days Post-Op    Recommendations:     Discharge Recommendations: home health OT  Discharge Equipment Recommendations:  none  Barriers to discharge:  None    Assessment:     Navin Beck is a 60 y.o. female with a medical diagnosis of Acute kidney injury (LAURIE) with acute tubular necrosis (ATN).  Pt with good participation this date.  She performed ADL tasks while standing at the sink with Supervision with RW and performed functional transfers with SBA-Supervision with RW.  Pt had a BM and was able to perform perineal care without assistance.  Due to pt's increased level of function, HHOT now recommended at d/c for maximal pt gains in functional independence and to ensure pt safety in her home environment.  She presents with the following deficits. Performance deficits affecting function are pain, weakness, impaired endurance, impaired functional mobilty, gait instability, impaired balance, impaired skin.     Rehab Prognosis:  Good; patient would benefit from acute skilled OT services to address these deficits and reach maximum level of function.       Plan:     Patient to be seen 3 x/week to address the above listed problems via self-care/home management, therapeutic activities, therapeutic exercises  · Plan of Care Expires: 12/28/20  · Plan of Care Reviewed with: patient    Subjective   Pt was pleasant and cooperative.  "I can do it.  Could you leave the restroom please?"  Pain/Comfort:  · Pain Rating 1: other (see comments)(Pt stated pain was "8.5-9/10" but has decreased since receiving pain medicine.)  · Location - Orientation 1: lower  · Location 1: abdomen  · Pain Addressed 1: Pre-medicate for activity, Cessation of Activity, Reposition, Distraction  · Pain Rating Post-Intervention 1: other (see comments)(Pt did not rate.)    Objective: "     Communicated with: RN and PT prior to session.  Patient found up in chair with march catheter, ROSAURA drain, telemetry upon OT entry to room.    General Precautions: Standard, fall   Orthopedic Precautions:N/A   Braces: N/A     Occupational Performance:     Bed Mobility:    · N/A due to pt being UIC at beginning and at end of session    Functional Mobility/Transfers:  · Patient completed Sit <> Stand Transfer from bedside chair x 1 trial with stand by assistance  with  rolling walker and from toilet x 1 trial with Supervision with RW and bathroom sink  · Patient completed Toilet Transfer Step Transfer technique with stand by assistance with  rolling walker and bathroom sink  · Functional Mobility: Pt ambulated a functional household distance in her room from bedside chair to the bathroom and back to chair with SBA with RW.  Pt with no LOB.    Activities of Daily Living:  · Grooming: supervision while standing at bathroom sink to wash her hands, wash her face, and brush her teeth  · Toileting: supervision while sitting on the toilet for pt safety.  Pt had a BM and was able to perform perineal care without assistance.      [facility] 6 Click ADL: 21    Treatment & Education:  -Pt performed the following therapeutic exercises while sitting UIC: BUE AROM shoulder flexion/extension and BUE arm punches with scapular retraction x 1 set of 10 reps each.  Activity ceased due to pt reporting increase in pain.    - Pt edu on role of OT, POC, safety when performing self care tasks, benefit of performing OOB activity, and safety when performing functional transfers and mobility.    - Self care tasks completed-- as noted above       Patient left up in chair with all lines intact, call button in reach and nursing notifiedEducation:      GOALS:   Multidisciplinary Problems     Occupational Therapy Goals        Problem: Occupational Therapy Goal    Goal Priority Disciplines Outcome Interventions   Occupational Therapy Goal     OT, PT/OT  Ongoing, Progressing    Description: Goals to be met by: 12/16/20     Patient will increase functional independence with ADLs by performing:    Feeding with [place].  UE Dressing with MIN A   LE Dressing with MIN A .  Grooming while seated with Supervision. -Met 12/10  Toileting from toilet with Moderate Assistance for hygiene and clothing management. -Met 12/10  Toilet transfer to toilet with SBA. -Met 12/10                     Time Tracking:     OT Date of Treatment: 12/10/20  OT Start Time: 1507  OT Stop Time: 1527  OT Total Time (min): 20 min    Billable Minutes:Self Care/Home Management 20 min.    Hakan Ochoa, OT  12/10/2020

## 2020-12-10 NOTE — ASSESSMENT & PLAN NOTE
Earlier this howpitalizarion hyponatremia to 120s with spontaneous jerking motions 11/29. Na now normalized. Nephrology continuing to follow for LAURIE. Possibly 2/2 D5 IVF with decreased UOP while in DKA. Fluid restrict 1.5L  Recent Labs   Lab 12/06/20  0752 12/07/20  0511 12/08/20  0354 12/09/20  0909 12/10/20  0300   * 136 134* 135* 135*       - Nephrology on board, appreciate recs

## 2020-12-10 NOTE — CONSULTS
Vancomycin d/c. Pharmacy will sign off.    Thanks for the consult!  Angel Adams, Pharm.D., BCPS  [phone]

## 2020-12-10 NOTE — SUBJECTIVE & OBJECTIVE
Interval History/Significant Events:   Weaning O2 with goal of 88%-90% due to history of lung disease. Patient afebrile and reports improvement in abdominal pain.     Review of Systems   Constitutional: Positive for activity change, appetite change and fatigue. Negative for chills and fever.   HENT: Negative for hearing loss, rhinorrhea, sneezing, sore throat and trouble swallowing.    Respiratory: Negative for cough, shortness of breath and wheezing.    Cardiovascular: Negative for chest pain, palpitations and leg swelling.   Gastrointestinal: Negative for abdominal pain, blood in stool, constipation, diarrhea, nausea, rectal pain and vomiting.   Genitourinary: Negative for difficulty urinating, dysuria, flank pain and pelvic pain.   Musculoskeletal: Negative for arthralgias, neck pain and neck stiffness.   Skin: Negative for color change, pallor and rash.   Neurological: Negative for syncope, weakness, light-headedness, numbness and headaches.   Psychiatric/Behavioral: Negative for confusion and hallucinations. The patient is not nervous/anxious.      Objective:     Vital Signs (Most Recent):  Temp: 97.9 °F (36.6 °C) (12/10/20 0733)  Pulse: 76 (12/10/20 1500)  Resp: 16 (12/10/20 1437)  BP: (!) 140/65 (12/10/20 0733)  SpO2: 96 % (12/10/20 0837) Vital Signs (24h Range):  Temp:  [97.9 °F (36.6 °C)-99.2 °F (37.3 °C)] 97.9 °F (36.6 °C)  Pulse:  [] 76  Resp:  [16-20] 16  SpO2:  [95 %-99 %] 96 %  BP: (122-140)/(56-65) 140/65   Weight: 109.5 kg (241 lb 6.5 oz)  Body mass index is 37.81 kg/m².      Intake/Output Summary (Last 24 hours) at 12/10/2020 1529  Last data filed at 12/10/2020 1100  Gross per 24 hour   Intake 300 ml   Output 2680 ml   Net -2380 ml       Physical Exam  Constitutional:       General: She is not in acute distress.     Appearance: She is obese. She is not ill-appearing or toxic-appearing.   HENT:      Head: Normocephalic and atraumatic.      Nose: No congestion or rhinorrhea.      Mouth/Throat:       Pharynx: No oropharyngeal exudate or posterior oropharyngeal erythema.   Eyes:      General: No scleral icterus.        Right eye: No discharge.         Left eye: No discharge.      Extraocular Movements: Extraocular movements intact.      Pupils: Pupils are equal, round, and reactive to light.   Neck:      Musculoskeletal: Normal range of motion and neck supple. No neck rigidity or muscular tenderness.   Cardiovascular:      Rate and Rhythm: Normal rate and regular rhythm.      Pulses: Normal pulses.      Heart sounds: No murmur.   Pulmonary:      Effort: Pulmonary effort is normal. No respiratory distress.      Breath sounds: Normal breath sounds. No stridor. No wheezing.   Abdominal:      General: Abdomen is flat. Bowel sounds are normal. There is no distension.      Palpations: Abdomen is soft. There is no mass.      Tenderness: There is abdominal tenderness (LLQ, drain in place with minimal pustular yellow fluid, dressings CDI).      Hernia: No hernia is present.   Musculoskeletal: Normal range of motion.         General: No swelling, tenderness or deformity.   Skin:     General: Skin is warm and dry.      Coloration: Skin is not jaundiced or pale.      Findings: No bruising.   Neurological:      General: No focal deficit present.      Mental Status: She is alert. Mental status is at baseline.         Vents:  Oxygen Concentration (%): 28 (12/08/20 0211)  Lines/Drains/Airways     Drain                 Closed/Suction Drain 11/28/20 1358 Right Abdomen Bulb 10 Fr. 12 days         Urethral Catheter 12/07/20 1413 Latex;Straight-tip 18 Fr. 3 days          Peripheral Intravenous Line                 Peripheral IV - Single Lumen 12/07/20 20 G Right;Lateral Upper Arm 3 days              Significant Labs:    CBC/Anemia Profile:  Recent Labs   Lab 12/09/20  0909 12/10/20  0301   WBC 11.50 9.67   HGB 9.0* 8.2*   HCT 29.8* 26.9*   * 354*   MCV 95 94   RDW 14.8* 14.8*        Chemistries:  Recent Labs   Lab  12/09/20  0909 12/10/20  0300   * 135*   K 3.6 4.2   CL 93* 97   CO2 31* 28   BUN 27* 30*   CREATININE 4.7* 4.4*   CALCIUM 7.9* 7.6*   ALBUMIN 2.2* 1.9*   PROT 7.1 6.3   BILITOT 0.1 0.1   ALKPHOS 566* 410*   ALT 9* 6*   AST 29 13   MG 1.4* 1.7   PHOS 5.3* 4.7*       BMP:   Recent Labs   Lab 12/10/20  0300   *   *   K 4.2   CL 97   CO2 28   BUN 30*   CREATININE 4.4*   CALCIUM 7.6*   MG 1.7     CMP:   Recent Labs   Lab 12/09/20  0909 12/10/20  0300   * 135*   K 3.6 4.2   CL 93* 97   CO2 31* 28   * 311*   BUN 27* 30*   CREATININE 4.7* 4.4*   CALCIUM 7.9* 7.6*   PROT 7.1 6.3   ALBUMIN 2.2* 1.9*   BILITOT 0.1 0.1   ALKPHOS 566* 410*   AST 29 13   ALT 9* 6*   ANIONGAP 11 10   EGFRNONAA 9.3* 10.1*       Significant Imaging:  I have reviewed all pertinent imaging results/findings within the past 24 hours.

## 2020-12-10 NOTE — PLAN OF CARE
Problem: Occupational Therapy Goal  Goal: Occupational Therapy Goal  Description: Goals to be met by: 12/16/20     Patient will increase functional independence with ADLs by performing:    Feeding with [place].  UE Dressing with MIN A   LE Dressing with MIN A .  Grooming while seated with Supervision. -Met 12/10  Toileting from toilet with Moderate Assistance for hygiene and clothing management. -Met 12/10  Toilet transfer to toilet with SBA. -Met 12/10    Outcome: Ongoing, Progressing    OT goals remain appropriate.    Hakan Ochoa, OT   12/10/2020

## 2020-12-10 NOTE — ASSESSMENT & PLAN NOTE
ATN in setting of contrast administration and sepsis 2/2 UTI, baseline cr 1.0-1.2 that trended up to 4.6 at time of consult.     -received contrast on 11/27/2020  -Urine microscopy 11/30/2020 many muddy brown granular casts, massive WBC's  -purulent drainage from abdominal ROSAURA drain similar in appearance to her urine; concerning for communication between bladder and fluid collection   -Initiated prismax 12/1, last received prismax 12/4    Recomendations    -Now with good UO for past week  -UO 2.3 L, net negative 2L past 24 hours  -Cr continues to trend down today  -No significant electrolyte disturbances or acute volume issues requiring further RRT at this time  -Good UO, Recommend continue to hold diuretics   -Maximum gabapentin dose 300 mg in CKD or LAURIE patients  -Maintain Hgb >7.0, Keep MAP >65  -Renally dose meds and avoid nephrotoxic meds  -Will continue to follow closely

## 2020-12-10 NOTE — PLAN OF CARE
61 YO Female w/ Dx of DM2, HTN, CKD3 and hyperthyroidism that was admitted for hydronephrosis after bladder surgery and was found to be in DKA.  Pt was consulted for management of DKA.         Type 2 DM:      DKA is now resolved and patient doing well on MDI and PO diet.     A1C 11.4  (Above goal)       HOME REGIMEN:  Lantus 50 units and Novolog 15 units TID w/ meals, Metformin 500mg BID     INPATIENT REGIMEN:   Levemir 36 units BID, Novolog 17 TID and Sliding scale     GLUCOSE TREND FOR THE PAST 24HRS:     Glucose trend ( 67 - 300s)  Pt had hypoglycemic episode after lunch due to eating < 50% of meal , followed by overcorrection of hypoglycemia resulting in glucose > 300 due to holding evening Novolog.     -Glucose goal inpatient 140 - 180mg/dL     NO HYPOGYCEMIAS NOTED     TOLERATING 50 % OF PO DIET     Plan:   -C/w Levemir to 34 units BID   -C/w Novolog 17 units TID w/ meals       (If not eating do not administer scheduled Novolog, Just cover with correction insulin)   If patient eating, needs to receive schedule Novolog)   -Moderate dose correction insulin   -FS qAC/HS   -Will continue to monitor     Discharge: Likely current MDI     Hyperthyroidism  Unclear etiology given no TRAb, TSI or RAIU scan  Thyroid US shows MNG so toxic MNG high in differential  She has a TSH at goal on methimazole 10 mg daily  Continue, follow up outpatient     Plan  - Continue methimazole 10 mg daily  - Outpatient followed with Endocrinology

## 2020-12-10 NOTE — ASSESSMENT & PLAN NOTE
Hospital acquired pneumonia (HAP)    Patient on 3L NC, although sat 97-98%, likely can wean significantly. Patient with longstanding obstructive lung disease (asthma) -- goal sat >88%.    12/7 CXR: Interval progression in the extent of right perihilar and right lower lung zone infiltrate and or atelectasis.  New and or mildly increased small right pleural effusion.   12/8 CTAP: Bibasilar consolidative changes, right more than left with small volume right-sided pleural effusion.  Findings likely related to pneumonia versus inflammatory process.    12/9 CT Chest w/o Contrast:   · Bilateral lower lobe consolidative opacities right greater than left and bandlike atelectasis bilaterally with trace pleural effusions similar to exam 12/08/2020.  Differential diagnosis includes aspiration, pneumonia, atelectasis, and less likely noninfectious inflammation.  · Slight ground-glass opacities predominantly right apex which may represent infectious or noninfectious inflammatory etiology.   · Partially aerated retained secretions within the trachea.   · Scattered calcified mediastinal lymph nodes and soft tissue density in the anterior mediastinum relatively unchanged dating back to 07/27/2012.     - Initially Abx broadened due to concern for HCAP given oxygen sats, however given that patient is afebrile, history of Asthma, and bilateral nature of consolidative changes, this may be atelectasis. Will start patient on Levaquin and monitor overnight. If afebrile will discharge patient on Levaquin.  - Flagyl is for pelvic abscess, but will also cover anaerobes if aspiration   - Tracheal secretions in airway noted on CT -- will add BID cough assists for help with expectoration  - wean supplemental O2 for goal sat >88% (pt with chronic obstructive lung disease - asthma)

## 2020-12-11 PROBLEM — J98.11 ATELECTASIS OF BOTH LUNGS: Status: ACTIVE | Noted: 2020-12-08

## 2020-12-11 PROBLEM — T81.43XA POSTPROCEDURAL INTRAABDOMINAL ABSCESS: Status: RESOLVED | Noted: 2020-11-29 | Resolved: 2020-12-11

## 2020-12-11 PROBLEM — K65.1 POSTPROCEDURAL INTRAABDOMINAL ABSCESS: Status: RESOLVED | Noted: 2020-11-29 | Resolved: 2020-12-11

## 2020-12-11 PROBLEM — N39.0 UTI (URINARY TRACT INFECTION): Status: RESOLVED | Noted: 2020-11-27 | Resolved: 2020-12-11

## 2020-12-11 LAB
ALBUMIN SERPL BCP-MCNC: 2.2 G/DL (ref 3.5–5.2)
ALP SERPL-CCNC: 364 U/L (ref 55–135)
ALT SERPL W/O P-5'-P-CCNC: 6 U/L (ref 10–44)
ANION GAP SERPL CALC-SCNC: 10 MMOL/L (ref 8–16)
AST SERPL-CCNC: 13 U/L (ref 10–40)
BASOPHILS # BLD AUTO: 0.08 K/UL (ref 0–0.2)
BASOPHILS NFR BLD: 0.8 % (ref 0–1.9)
BILIRUB SERPL-MCNC: 0.2 MG/DL (ref 0.1–1)
BUN SERPL-MCNC: 25 MG/DL (ref 8–23)
CALCIUM SERPL-MCNC: 8.3 MG/DL (ref 8.7–10.5)
CHLORIDE SERPL-SCNC: 97 MMOL/L (ref 95–110)
CO2 SERPL-SCNC: 30 MMOL/L (ref 23–29)
CREAT SERPL-MCNC: 3.7 MG/DL (ref 0.5–1.4)
DIFFERENTIAL METHOD: ABNORMAL
EOSINOPHIL # BLD AUTO: 0.1 K/UL (ref 0–0.5)
EOSINOPHIL NFR BLD: 1.3 % (ref 0–8)
ERYTHROCYTE [DISTWIDTH] IN BLOOD BY AUTOMATED COUNT: 14.9 % (ref 11.5–14.5)
EST. GFR  (AFRICAN AMERICAN): 14.3 ML/MIN/1.73 M^2
EST. GFR  (NON AFRICAN AMERICAN): 12.4 ML/MIN/1.73 M^2
GLUCOSE SERPL-MCNC: 180 MG/DL (ref 70–110)
HCT VFR BLD AUTO: 30.3 % (ref 37–48.5)
HGB BLD-MCNC: 9.3 G/DL (ref 12–16)
IMM GRANULOCYTES # BLD AUTO: 0.07 K/UL (ref 0–0.04)
IMM GRANULOCYTES NFR BLD AUTO: 0.7 % (ref 0–0.5)
LYMPHOCYTES # BLD AUTO: 3.4 K/UL (ref 1–4.8)
LYMPHOCYTES NFR BLD: 34.9 % (ref 18–48)
MAGNESIUM SERPL-MCNC: 1.5 MG/DL (ref 1.6–2.6)
MCH RBC QN AUTO: 29.2 PG (ref 27–31)
MCHC RBC AUTO-ENTMCNC: 30.7 G/DL (ref 32–36)
MCV RBC AUTO: 95 FL (ref 82–98)
MONOCYTES # BLD AUTO: 0.9 K/UL (ref 0.3–1)
MONOCYTES NFR BLD: 8.7 % (ref 4–15)
NEUTROPHILS # BLD AUTO: 5.3 K/UL (ref 1.8–7.7)
NEUTROPHILS NFR BLD: 53.6 % (ref 38–73)
NRBC BLD-RTO: 0 /100 WBC
PHOSPHATE SERPL-MCNC: 4.1 MG/DL (ref 2.7–4.5)
PLATELET # BLD AUTO: 398 K/UL (ref 150–350)
PLATELET BLD QL SMEAR: ABNORMAL
PMV BLD AUTO: 9.6 FL (ref 9.2–12.9)
POCT GLUCOSE: 113 MG/DL (ref 70–110)
POCT GLUCOSE: 174 MG/DL (ref 70–110)
POCT GLUCOSE: 206 MG/DL (ref 70–110)
POCT GLUCOSE: 225 MG/DL (ref 70–110)
POCT GLUCOSE: 58 MG/DL (ref 70–110)
POTASSIUM SERPL-SCNC: 4.1 MMOL/L (ref 3.5–5.1)
PROT SERPL-MCNC: 7.6 G/DL (ref 6–8.4)
RBC # BLD AUTO: 3.19 M/UL (ref 4–5.4)
SODIUM SERPL-SCNC: 137 MMOL/L (ref 136–145)
WBC # BLD AUTO: 9.86 K/UL (ref 3.9–12.7)

## 2020-12-11 PROCEDURE — 20600001 HC STEP DOWN PRIVATE ROOM: Mod: HCWC

## 2020-12-11 PROCEDURE — 25000003 PHARM REV CODE 250: Mod: HCWC | Performed by: STUDENT IN AN ORGANIZED HEALTH CARE EDUCATION/TRAINING PROGRAM

## 2020-12-11 PROCEDURE — C9399 UNCLASSIFIED DRUGS OR BIOLOG: HCPCS | Mod: HCWC | Performed by: STUDENT IN AN ORGANIZED HEALTH CARE EDUCATION/TRAINING PROGRAM

## 2020-12-11 PROCEDURE — 94799 UNLISTED PULMONARY SVC/PX: CPT | Mod: HCWC

## 2020-12-11 PROCEDURE — 84100 ASSAY OF PHOSPHORUS: CPT | Mod: HCWC

## 2020-12-11 PROCEDURE — 99232 SBSQ HOSP IP/OBS MODERATE 35: CPT | Mod: HCWC,GC,, | Performed by: INTERNAL MEDICINE

## 2020-12-11 PROCEDURE — 94660 CPAP INITIATION&MGMT: CPT | Mod: HCWC

## 2020-12-11 PROCEDURE — 99900035 HC TECH TIME PER 15 MIN (STAT): Mod: HCWC

## 2020-12-11 PROCEDURE — 63600175 PHARM REV CODE 636 W HCPCS: Mod: HCWC | Performed by: STUDENT IN AN ORGANIZED HEALTH CARE EDUCATION/TRAINING PROGRAM

## 2020-12-11 PROCEDURE — 85025 COMPLETE CBC W/AUTO DIFF WBC: CPT | Mod: HCWC

## 2020-12-11 PROCEDURE — 99232 PR SUBSEQUENT HOSPITAL CARE,LEVL II: ICD-10-PCS | Mod: HCWC,,, | Performed by: INTERNAL MEDICINE

## 2020-12-11 PROCEDURE — 94664 DEMO&/EVAL PT USE INHALER: CPT | Mod: HCWC

## 2020-12-11 PROCEDURE — 99232 SBSQ HOSP IP/OBS MODERATE 35: CPT | Mod: HCWC,,, | Performed by: INTERNAL MEDICINE

## 2020-12-11 PROCEDURE — 83735 ASSAY OF MAGNESIUM: CPT | Mod: HCWC

## 2020-12-11 PROCEDURE — 36415 COLL VENOUS BLD VENIPUNCTURE: CPT | Mod: HCWC

## 2020-12-11 PROCEDURE — 80053 COMPREHEN METABOLIC PANEL: CPT | Mod: HCWC

## 2020-12-11 PROCEDURE — 94761 N-INVAS EAR/PLS OXIMETRY MLT: CPT | Mod: HCWC

## 2020-12-11 PROCEDURE — 27000221 HC OXYGEN, UP TO 24 HOURS: Mod: HCWC

## 2020-12-11 PROCEDURE — 99232 PR SUBSEQUENT HOSPITAL CARE,LEVL II: ICD-10-PCS | Mod: HCWC,GC,, | Performed by: INTERNAL MEDICINE

## 2020-12-11 RX ORDER — CEFPODOXIME PROXETIL 200 MG/1
200 TABLET, FILM COATED ORAL DAILY
Status: DISCONTINUED | OUTPATIENT
Start: 2020-12-11 | End: 2020-12-12 | Stop reason: HOSPADM

## 2020-12-11 RX ORDER — MAGNESIUM SULFATE HEPTAHYDRATE 40 MG/ML
2 INJECTION, SOLUTION INTRAVENOUS ONCE
Status: COMPLETED | OUTPATIENT
Start: 2020-12-11 | End: 2020-12-11

## 2020-12-11 RX ORDER — INSULIN ASPART 100 [IU]/ML
18 INJECTION, SOLUTION INTRAVENOUS; SUBCUTANEOUS
Status: DISCONTINUED | OUTPATIENT
Start: 2020-12-11 | End: 2020-12-12 | Stop reason: HOSPADM

## 2020-12-11 RX ORDER — DOXYLAMINE SUCCINATE 25 MG
TABLET ORAL 2 TIMES DAILY
Status: DISCONTINUED | OUTPATIENT
Start: 2020-12-11 | End: 2020-12-12 | Stop reason: HOSPADM

## 2020-12-11 RX ADMIN — MELATONIN TAB 3 MG 6 MG: 3 TAB at 09:12

## 2020-12-11 RX ADMIN — DOCUSATE SODIUM AND SENNOSIDES 1 TABLET: 8.6; 5 TABLET, FILM COATED ORAL at 09:12

## 2020-12-11 RX ADMIN — INSULIN ASPART 18 UNITS: 100 INJECTION, SOLUTION INTRAVENOUS; SUBCUTANEOUS at 12:12

## 2020-12-11 RX ADMIN — INSULIN ASPART 2 UNITS: 100 INJECTION, SOLUTION INTRAVENOUS; SUBCUTANEOUS at 08:12

## 2020-12-11 RX ADMIN — ATORVASTATIN CALCIUM 20 MG: 20 TABLET, FILM COATED ORAL at 09:12

## 2020-12-11 RX ADMIN — METRONIDAZOLE 500 MG: 500 TABLET ORAL at 01:12

## 2020-12-11 RX ADMIN — MICONAZOLE NITRATE: 20 CREAM TOPICAL at 12:12

## 2020-12-11 RX ADMIN — OXYCODONE HYDROCHLORIDE 15 MG: 10 TABLET ORAL at 05:12

## 2020-12-11 RX ADMIN — HEPARIN SODIUM 5000 UNITS: 5000 INJECTION INTRAVENOUS; SUBCUTANEOUS at 09:12

## 2020-12-11 RX ADMIN — INSULIN ASPART 17 UNITS: 100 INJECTION, SOLUTION INTRAVENOUS; SUBCUTANEOUS at 08:12

## 2020-12-11 RX ADMIN — OXYCODONE HYDROCHLORIDE 15 MG: 10 TABLET ORAL at 01:12

## 2020-12-11 RX ADMIN — INSULIN DETEMIR 34 UNITS: 100 INJECTION, SOLUTION SUBCUTANEOUS at 08:12

## 2020-12-11 RX ADMIN — HEPARIN SODIUM 5000 UNITS: 5000 INJECTION INTRAVENOUS; SUBCUTANEOUS at 01:12

## 2020-12-11 RX ADMIN — OXYCODONE HYDROCHLORIDE 10 MG: 10 TABLET ORAL at 05:12

## 2020-12-11 RX ADMIN — METHIMAZOLE 10 MG: 10 TABLET ORAL at 08:12

## 2020-12-11 RX ADMIN — OXYCODONE HYDROCHLORIDE 10 MG: 10 TABLET ORAL at 09:12

## 2020-12-11 RX ADMIN — DOCUSATE SODIUM AND SENNOSIDES 1 TABLET: 8.6; 5 TABLET, FILM COATED ORAL at 08:12

## 2020-12-11 RX ADMIN — MICONAZOLE NITRATE: 20 CREAM TOPICAL at 09:12

## 2020-12-11 RX ADMIN — GABAPENTIN 300 MG: 300 CAPSULE ORAL at 08:12

## 2020-12-11 RX ADMIN — METRONIDAZOLE 500 MG: 500 TABLET ORAL at 05:12

## 2020-12-11 RX ADMIN — Medication 16 G: at 04:12

## 2020-12-11 RX ADMIN — POLYETHYLENE GLYCOL 3350 17 G: 17 POWDER, FOR SOLUTION ORAL at 08:12

## 2020-12-11 RX ADMIN — METRONIDAZOLE 500 MG: 500 TABLET ORAL at 09:12

## 2020-12-11 RX ADMIN — MAGNESIUM SULFATE IN WATER 2 G: 40 INJECTION, SOLUTION INTRAVENOUS at 08:12

## 2020-12-11 RX ADMIN — CEFPODOXIME PROXETIL 200 MG: 200 TABLET, FILM COATED ORAL at 05:12

## 2020-12-11 RX ADMIN — HEPARIN SODIUM 5000 UNITS: 5000 INJECTION INTRAVENOUS; SUBCUTANEOUS at 05:12

## 2020-12-11 NOTE — SUBJECTIVE & OBJECTIVE
Interval History/Significant Events:   Patient doing well this morning, off of oxygen. ROSAURA Drain and march removed. Will monitor urine output today. Dilaudid transitioned to PO Oxycodone. Expected discharge tomorrow to home on Cefpodoxime/Metronidazole with Nephrology/UroGyn f/u.    Review of Systems   Constitutional: Positive for activity change, appetite change and fatigue. Negative for chills and fever.   HENT: Negative for hearing loss, rhinorrhea, sneezing, sore throat and trouble swallowing.    Respiratory: Negative for cough, shortness of breath and wheezing.    Cardiovascular: Negative for chest pain, palpitations and leg swelling.   Gastrointestinal: Negative for abdominal pain, blood in stool, constipation, diarrhea, nausea, rectal pain and vomiting.   Genitourinary: Negative for difficulty urinating, dysuria, flank pain and pelvic pain.   Musculoskeletal: Negative for arthralgias, neck pain and neck stiffness.   Skin: Negative for color change, pallor and rash.   Neurological: Negative for syncope, weakness, light-headedness, numbness and headaches.   Psychiatric/Behavioral: Negative for confusion and hallucinations. The patient is not nervous/anxious.      Objective:     Vital Signs (Most Recent):  Temp: 97.8 °F (36.6 °C) (12/11/20 1537)  Pulse: 62 (12/11/20 1537)  Resp: 17 (12/11/20 1537)  BP: (!) 131/58 (12/11/20 1537)  SpO2: 98 % (12/11/20 1537) Vital Signs (24h Range):  Temp:  [97.8 °F (36.6 °C)-98.8 °F (37.1 °C)] 97.8 °F (36.6 °C)  Pulse:  [60-70] 62  Resp:  [12-19] 17  SpO2:  [93 %-99 %] 98 %  BP: (120-136)/(56-62) 131/58   Weight: 109.5 kg (241 lb 6.5 oz)  Body mass index is 37.81 kg/m².      Intake/Output Summary (Last 24 hours) at 12/11/2020 1552  Last data filed at 12/11/2020 1400  Gross per 24 hour   Intake 1460 ml   Output 2975 ml   Net -1515 ml       Physical Exam  Constitutional:       General: She is not in acute distress.     Appearance: She is obese. She is not ill-appearing or  toxic-appearing.   HENT:      Head: Normocephalic and atraumatic.      Nose: No congestion or rhinorrhea.      Mouth/Throat:      Pharynx: No oropharyngeal exudate or posterior oropharyngeal erythema.   Eyes:      General: No scleral icterus.        Right eye: No discharge.         Left eye: No discharge.      Extraocular Movements: Extraocular movements intact.      Pupils: Pupils are equal, round, and reactive to light.   Neck:      Musculoskeletal: Normal range of motion and neck supple. No neck rigidity or muscular tenderness.   Cardiovascular:      Rate and Rhythm: Normal rate and regular rhythm.      Pulses: Normal pulses.      Heart sounds: No murmur.   Pulmonary:      Effort: Pulmonary effort is normal. No respiratory distress.      Breath sounds: Normal breath sounds. No stridor. No wheezing.   Abdominal:      General: Abdomen is flat. Bowel sounds are normal. There is no distension.      Palpations: Abdomen is soft. There is no mass.      Tenderness: There is abdominal tenderness (LLQ, drain in place with minimal pustular yellow fluid, dressings CDI).      Hernia: No hernia is present.   Musculoskeletal: Normal range of motion.         General: No swelling, tenderness or deformity.   Skin:     General: Skin is warm and dry.      Coloration: Skin is not jaundiced or pale.      Findings: No bruising.   Neurological:      General: No focal deficit present.      Mental Status: She is alert. Mental status is at baseline.         Vents:  Oxygen Concentration (%): 28 (12/08/20 0211)  Lines/Drains/Airways     Peripheral Intravenous Line                 Peripheral IV - Single Lumen 12/11/20 1300 22 G Left Antecubital less than 1 day              Significant Labs:    CBC/Anemia Profile:  Recent Labs   Lab 12/10/20  0301 12/11/20  0616   WBC 9.67 9.86   HGB 8.2* 9.3*   HCT 26.9* 30.3*   * 398*   MCV 94 95   RDW 14.8* 14.9*        Chemistries:  Recent Labs   Lab 12/10/20  0300 12/11/20  0616   * 137   K  4.2 4.1   CL 97 97   CO2 28 30*   BUN 30* 25*   CREATININE 4.4* 3.7*   CALCIUM 7.6* 8.3*   ALBUMIN 1.9* 2.2*   PROT 6.3 7.6   BILITOT 0.1 0.2   ALKPHOS 410* 364*   ALT 6* 6*   AST 13 13   MG 1.7 1.5*   PHOS 4.7* 4.1       BMP:   Recent Labs   Lab 12/11/20  0616   *      K 4.1   CL 97   CO2 30*   BUN 25*   CREATININE 3.7*   CALCIUM 8.3*   MG 1.5*     CMP:   Recent Labs   Lab 12/10/20  0300 12/11/20  0616   * 137   K 4.2 4.1   CL 97 97   CO2 28 30*   * 180*   BUN 30* 25*   CREATININE 4.4* 3.7*   CALCIUM 7.6* 8.3*   PROT 6.3 7.6   ALBUMIN 1.9* 2.2*   BILITOT 0.1 0.2   ALKPHOS 410* 364*   AST 13 13   ALT 6* 6*   ANIONGAP 10 10   EGFRNONAA 10.1* 12.4*       Significant Imaging:  I have reviewed all pertinent imaging results/findings within the past 24 hours.

## 2020-12-11 NOTE — ASSESSMENT & PLAN NOTE
Patient on 3L NC, although sat 97-98%, weaned down to RA. Patient with longstanding obstructive lung disease (asthma) -- goal sat >88%.    12/7 CXR: Interval progression in the extent of right perihilar and right lower lung zone infiltrate and or atelectasis.  New and or mildly increased small right pleural effusion.   12/8 CTAP: Bibasilar consolidative changes, right more than left with small volume right-sided pleural effusion.  Findings likely related to pneumonia versus inflammatory process.    12/9 CT Chest w/o Contrast:   · Bilateral lower lobe consolidative opacities right greater than left and bandlike atelectasis bilaterally with trace pleural effusions similar to exam 12/08/2020.  Differential diagnosis includes aspiration, pneumonia, atelectasis, and less likely noninfectious inflammation.  · Slight ground-glass opacities predominantly right apex which may represent infectious or noninfectious inflammatory etiology.   · Partially aerated retained secretions within the trachea.   · Scattered calcified mediastinal lymph nodes and soft tissue density in the anterior mediastinum relatively unchanged dating back to 07/27/2012.     - Initially Abx broadened due to concern for HCAP given oxygen sats, however given that patient is afebrile, history of Asthma, and bilateral nature of consolidative changes, this may be atelectasis.  - Tracheal secretions in airway noted on CT -- will add BID cough assists for help with expectoration  - wean supplemental O2 for goal sat >88% (pt with chronic obstructive lung disease - asthma)

## 2020-12-11 NOTE — PROGRESS NOTES
[facility] Hospital Medicine  Progress Note    Patient Name: Navin Beck  MRN: [id number]  Patient Class: IP- Inpatient   Admission Date: 11/26/2020  Length of Stay: 14 days  Attending Physician: Alessia Johnston MD  Primary Care Provider: Elvira Quinones MD    [facility] Medicine Team: [facility] 2 Stefano Dimas MD    Subjective:     Principal Problem:Postprocedural intraabdominal abscess        HPI:  Ms. Beck is a 59yo F with PMH of HTN, HLD, T2DM, Hyperthyroidism, and Asthma who had recent GYN surgery on 10/26 due to erosion of bladder mesh / prosthetic material with Cifuentes removal on 11/19. She [place] [facility] [place] to 4-5 days of not feeling well, she has had loss of appetite and weakness with 10/10 throbbing lower abdominal pain associated with vaginal pain and rectal pressure and non-bloody diarrhea. She has had trouble urinating for the past 5 days as well. She takes oxycodone 5 at home for pain, but it did not help. She endorses nausea and vomiting (4 times, non-bloody) also associated with the pain. She denies fever, chills, chest pain, SOB, trouble breathing, leg swelling, or any other symptom at this time.    ED Course: VSS, afebrile but leukocytosis at 17.85. Pt found to have DKA - Gluc 626, AG16, BHB 4.2, Fluids, insulin gtt started and Glucose down-trended to 200s. UA showed 2RBC, >100 WBC, bacteria, given CTX. CT A/P showed multiple fluid collections adjacent to bladder concerning for bladder perf with multiple urinomas, with mild bilateral hydronephrosis. Urology consulted and placed a Cifuentes with 900 UOP drained. GYN consulted due to recent surgery.     Pt will be admitted to hospital medicine for further management of DKA and possible intra-abdominal infection.     Overview/Hospital Course:  Pt was admitted on 11/28 due to 10/10 abdominal pain and also found to be in DKA. Pt was started on IVF, insulin gtt, and kept NPO with glucose downtrending and AG closed.  "CT abdomen showed multiple fluid collections near bladder. Renal cystography with "hypoattenuating collections in the lower pelvis adjacent to the urinary bladder with no definite extravasated contrast material appreciated within the pelvic collections to suggest communication with the bladder".  Urology and GYN on board, agree with plan for IR to place drain in fluid collections for culture and source control. Pt was started on CTX and Flagyl to cover for UTI and intra-abdominal infection, broadened to Cefepime, and Flagyl on 11/26; vanc added later. Endocrine consulted and managed the patient while acutely ill on insulin drip then transitioned to SQ when BG stabilized. IR place low abdominal drain to abscess on 11/28 with 250ml purulent material immediately expressed. Drained remained in place after the procedure. Urology continued to assess patient and recommended CT RSS after identifying some left CVAT and increased SCr with reduced UOP on 11/29, no hydronephrosis does not believe LAURIE is post-renal in nature. 11/30 pt remains anuric, Cr up-trending to 4.2, with hyponatremia. Nephrology consulted. 12/1 pt remains anuric after Lasix + Diuril challenge. Nephrology decided for initiation of CRRT due to low pressures with unresponsive ARF. Will transfer to ICU for CRRT. Pt transferred back to floor as no more need for CRRT on 12/5. Cystoscopy with retrograde pyleogram for 12/7 by Urology    Interval History/Significant Events:   Patient doing well this morning, off of oxygen. ROSAURA Drain and march removed. Will monitor urine output today. Dilaudid transitioned to PO Oxycodone. Expected discharge tomorrow to home on Cefpodoxime/Metronidazole with Nephrology/UroGyn f/u.    Review of Systems   Constitutional: Positive for activity change, appetite change and fatigue. Negative for chills and fever.   HENT: Negative for hearing loss, rhinorrhea, sneezing, sore throat and trouble swallowing.    Respiratory: Negative for " cough, shortness of breath and wheezing.    Cardiovascular: Negative for chest pain, palpitations and leg swelling.   Gastrointestinal: Negative for abdominal pain, blood in stool, constipation, diarrhea, nausea, rectal pain and vomiting.   Genitourinary: Negative for difficulty urinating, dysuria, flank pain and pelvic pain.   Musculoskeletal: Negative for arthralgias, neck pain and neck stiffness.   Skin: Negative for color change, pallor and rash.   Neurological: Negative for syncope, weakness, light-headedness, numbness and headaches.   Psychiatric/Behavioral: Negative for confusion and hallucinations. The patient is not nervous/anxious.      Objective:     Vital Signs (Most Recent):  Temp: 97.8 °F (36.6 °C) (12/11/20 1537)  Pulse: 62 (12/11/20 1537)  Resp: 17 (12/11/20 1537)  BP: (!) 131/58 (12/11/20 1537)  SpO2: 98 % (12/11/20 1537) Vital Signs (24h Range):  Temp:  [97.8 °F (36.6 °C)-98.8 °F (37.1 °C)] 97.8 °F (36.6 °C)  Pulse:  [60-70] 62  Resp:  [12-19] 17  SpO2:  [93 %-99 %] 98 %  BP: (120-136)/(56-62) 131/58   Weight: 109.5 kg (241 lb 6.5 oz)  Body mass index is 37.81 kg/m².      Intake/Output Summary (Last 24 hours) at 12/11/2020 1552  Last data filed at 12/11/2020 1400  Gross per 24 hour   Intake 1460 ml   Output 2975 ml   Net -1515 ml       Physical Exam  Constitutional:       General: She is not in acute distress.     Appearance: She is obese. She is not ill-appearing or toxic-appearing.   HENT:      Head: Normocephalic and atraumatic.      Nose: No congestion or rhinorrhea.      Mouth/Throat:      Pharynx: No oropharyngeal exudate or posterior oropharyngeal erythema.   Eyes:      General: No scleral icterus.        Right eye: No discharge.         Left eye: No discharge.      Extraocular Movements: Extraocular movements intact.      Pupils: Pupils are equal, round, and reactive to light.   Neck:      Musculoskeletal: Normal range of motion and neck supple. No neck rigidity or muscular tenderness.    Cardiovascular:      Rate and Rhythm: Normal rate and regular rhythm.      Pulses: Normal pulses.      Heart sounds: No murmur.   Pulmonary:      Effort: Pulmonary effort is normal. No respiratory distress.      Breath sounds: Normal breath sounds. No stridor. No wheezing.   Abdominal:      General: Abdomen is flat. Bowel sounds are normal. There is no distension.      Palpations: Abdomen is soft. There is no mass.      Tenderness: There is abdominal tenderness (LLQ, drain in place with minimal pustular yellow fluid, dressings CDI).      Hernia: No hernia is present.   Musculoskeletal: Normal range of motion.         General: No swelling, tenderness or deformity.   Skin:     General: Skin is warm and dry.      Coloration: Skin is not jaundiced or pale.      Findings: No bruising.   Neurological:      General: No focal deficit present.      Mental Status: She is alert. Mental status is at baseline.         Vents:  Oxygen Concentration (%): 28 (12/08/20 0211)  Lines/Drains/Airways     Peripheral Intravenous Line                 Peripheral IV - Single Lumen 12/11/20 1300 22 G Left Antecubital less than 1 day              Significant Labs:    CBC/Anemia Profile:  Recent Labs   Lab 12/10/20  0301 12/11/20  0616   WBC 9.67 9.86   HGB 8.2* 9.3*   HCT 26.9* 30.3*   * 398*   MCV 94 95   RDW 14.8* 14.9*        Chemistries:  Recent Labs   Lab 12/10/20  0300 12/11/20  0616   * 137   K 4.2 4.1   CL 97 97   CO2 28 30*   BUN 30* 25*   CREATININE 4.4* 3.7*   CALCIUM 7.6* 8.3*   ALBUMIN 1.9* 2.2*   PROT 6.3 7.6   BILITOT 0.1 0.2   ALKPHOS 410* 364*   ALT 6* 6*   AST 13 13   MG 1.7 1.5*   PHOS 4.7* 4.1       BMP:   Recent Labs   Lab 12/11/20  0616   *      K 4.1   CL 97   CO2 30*   BUN 25*   CREATININE 3.7*   CALCIUM 8.3*   MG 1.5*     CMP:   Recent Labs   Lab 12/10/20  0300 12/11/20  0616   * 137   K 4.2 4.1   CL 97 97   CO2 28 30*   * 180*   BUN 30* 25*   CREATININE 4.4* 3.7*   CALCIUM 7.6*  8.3*   PROT 6.3 7.6   ALBUMIN 1.9* 2.2*   BILITOT 0.1 0.2   ALKPHOS 410* 364*   AST 13 13   ALT 6* 6*   ANIONGAP 10 10   EGFRNONAA 10.1* 12.4*       Significant Imaging:  I have reviewed all pertinent imaging results/findings within the past 24 hours.      Assessment/Plan:      * Postprocedural intraabdominal abscess  Pt presented to [facility] due to 5-6 days of 10/10 lower abdomen, vaginal, and rectal pain. Pt has recent h/o GYN surgery in 10/2020 for bladder mesh removal, Cifuentes was removed on 11/19.  CT A/P 11/27: Multiple fluid collections adjacent to bladder. Mild bilateral hydronephrosis.   CT A/P 12/8: Interval resolution of previously identified multilobular fluid collection in the lower pelvis with drainage catheter in position.  Residual scattered small volume of air is noted.   UCx 11/26: E.coli sensitive to CTX, cefepime   BCx 12/5: Peptostrptococcus   Cystography 12/6: No bladder leak    Pain management: PCA pump initially. PCA pump d/c on 11/30, now on PRN opiates.    PLAN  - Urology consulted, appreciate recs  - GYN consulted, appreciate consult.  - IR consulted, appreciate recs; Drain in places. Removed 12/11.  - ID consulted for intra-abdominal infection, appreciate recs   - CTX & Flagyl x14 days, repeat imaging to assess for cleared infection prior to d/c abx   - If d/c before 14d course, may switch to augmentin   - 12/9: broadened ABX to cover HAP, switched CTX-->Cefepime, added vanc, and continued flagyl              - 12/10: Transitioned patient levaquin + metronidazole.    - 12/11: Discussed patient with ID, cefpodoxime + MALIK a better option given Peptostreptococcus        Atelectasis of both lungs  Patient on 3L NC, although sat 97-98%, weaned down to RA. Patient with longstanding obstructive lung disease (asthma) -- goal sat >88%.    12/7 CXR: Interval progression in the extent of right perihilar and right lower lung zone infiltrate and or atelectasis.  New and or mildly increased small right  pleural effusion.   12/8 CTAP: Bibasilar consolidative changes, right more than left with small volume right-sided pleural effusion.  Findings likely related to pneumonia versus inflammatory process.    12/9 CT Chest w/o Contrast:   · Bilateral lower lobe consolidative opacities right greater than left and bandlike atelectasis bilaterally with trace pleural effusions similar to exam 12/08/2020.  Differential diagnosis includes aspiration, pneumonia, atelectasis, and less likely noninfectious inflammation.  · Slight ground-glass opacities predominantly right apex which may represent infectious or noninfectious inflammatory etiology.   · Partially aerated retained secretions within the trachea.   · Scattered calcified mediastinal lymph nodes and soft tissue density in the anterior mediastinum relatively unchanged dating back to 07/27/2012.     - Initially Abx broadened due to concern for HCAP given oxygen sats, however given that patient is afebrile, history of Asthma, and bilateral nature of consolidative changes, this may be atelectasis.  - Tracheal secretions in airway noted on CT -- will add BID cough assists for help with expectoration  - wean supplemental O2 for goal sat >88% (pt with chronic obstructive lung disease - asthma)    Yeast infection  - Fluconazole 150mg PO  - Miconazole cream    Elevated alkaline phosphatase level  Alk Phos has been trending up this hospitalization, on 12/9 . Unclear etiology. Other LFTs wnl - T bili, AST/ALT.  Recent Labs   Lab 12/11/20  0616   ALT 6*   AST 13   ALKPHOS 364*   BILITOT 0.2   PROT 7.6   ALBUMIN 2.2*     Recent Labs   Lab 12/09/20  0909   *     -  Improving  - Outpatient workup    Anemia  Pt Hb 9.8 on admit. BL ~10.   Most likely due to anemia of chronic disease, acute blood loss, and frequent phlebotomy while hospitalized.   Recent Labs   Lab 12/07/20  0511 12/08/20  0354 12/09/20  0909 12/10/20  0301   HGB 7.9* 8.0* 9.0* 8.2*     - Required 1U PRBC on  12/2  - Required 1U PRBC on 12/6 2/2     Impaired mobility and endurance    - PT/OT    Hyponatremia  Earlier this howpitalizarion hyponatremia to 120s with spontaneous jerking motions 11/29. Na now normalized. Nephrology continuing to follow for LAURIE. Possibly 2/2 D5 IVF with decreased UOP while in DKA. Fluid restrict 1.5L  Recent Labs   Lab 12/06/20  0752 12/07/20  0511 12/08/20  0354 12/09/20  0909 12/10/20  0300   * 136 134* 135* 135*       - Nephrology on board, appreciate recs      Acute kidney injury (LAURIE) with acute tubular necrosis (ATN)  Cr 1.5 on presentation. Likely multi-factorial on presentation 2/2 pre-renal volume depletion in the context of DKA, UTI, and post-renal obstruction. Repeat LAURIE on 11/29 likely intrinsic ATN in etiology.  Initiation of CRRT, transfer to ICU on 12/2. Transferred to floor on 12/4. Trialysis catheter to RIJ removed on 12/9  Recent Labs   Lab 12/07/20  0511 12/08/20  0354 12/09/20  0909 12/10/20  0300 12/11/20  0616   CREATININE 5.1* 5.3* 4.7* 4.4* 3.7*     - Nephrology consulted, appreciate recs  - Watch for post-ATN diuresis, consider IVF replacement if continued significant diuresis  - Out-pt nephro clinic follow up  - Renally dose meds  - Avoid renal toxins (ACEi/ARB, NSAIDs, contrast, etc.)  - Strict I&Os      Urinary retention  Pt has recent h/o GYN surgery in 10/2020 for bladder mesh removal, Cifuentes was removed on 11/19. For the past few days pt has had increased trouble urinating associated with pain.   CT A/P 11/27: Multiple fluid collections adjacent to bladder. Mild bilateral hydronephrosis.   CT RSS 11/29 hydronephrosis resolved. 12/6 spoke with urology concerning ROSAURA drain output concern for urinary contents. Per Urology, since ROSAURA fluid Cr 1.7 is less than serum Cr 4.5, the fluid is not urine. Negative cystogram and normal retrograde pyelogram    - Urology consulted, recs appreciated;  - Cifuentes removed 12/11, will monitor output  - Will f/u UroGyn  outpatient    Type 2 diabetes mellitus with hyperglycemia, with long-term current use of insulin  Last Hb A1c 10.3 on 9/25/20  Home regimen: Metformin 500 BID, Aspart 15U TID, Glargine 60 BID  Pt says her blood sugar has been running to the 300s for the past few days  - Hold oral anti-glycemics while hospitalized  - Endocrine consulted, recs appreciated  - Accu-checks TIDAC + qhs  - See DKA notes      Asthma  Pt states she uses Albuterol inhaler TID every day.   - Duonebs q6 PRN    Diabetic ketoacidosis without coma associated with type 2 diabetes mellitus  [place] [facility] [place] Gluc 626, HCO3 16, AG 16, BHB 4.2, ketonuria. 2L IVF given in ED with insulin gtt started. Glucose trended downward to 274. Pt was then admitted to hospital medicine for further management of DKA.    - Endocrine consulted, appreciate recs  - Detemir 35U BID, Aspart 18U TID WM  - Goal gluc 140-180  - Diabetic diet  - BMP daily  - Will replace K PRN  - Nausea: zofran PRN     Hyperthyroidism  Continue home Methimazole    - TSH WNL  - Endocrine aware    Severe obesity (BMI 35.0-35.9 with comorbidity)  -  on exercise, diet, and weight management     Mixed hyperlipidemia  - Continue home statin      Essential hypertension  Hold home Benazepril due to LAURIE, low BP    - Will monitor BP and add anti-hypertensive as necessary       VTE Risk Mitigation (From admission, onward)         Ordered     heparin (porcine) injection 5,000 Units  Every 8 hours      12/01/20 1426     Place sequential compression device  Until discontinued      12/01/20 1409     IP VTE HIGH RISK PATIENT  Once      12/01/20 1409                Discharge Planning   RAFA: 12/14/2020     Code Status: Full Code   Is the patient medically ready for discharge?: Yes    Reason for patient still in hospital (select all that apply): Patient trending condition  Discharge Plan A: Home with family   Discharge Delays: None known at this time              Stefano Dimas,  MD  Department of Hospital Medicine   [facility]

## 2020-12-11 NOTE — NURSING
Pt noted symptomatic with cbg 53, given glucose tabs per orders, pt no longer symptomatic, pt ate dinner and juice, repeat cbg 113

## 2020-12-11 NOTE — ASSESSMENT & PLAN NOTE
ATN in setting of contrast administration and sepsis 2/2 UTI, baseline cr 1.0-1.2 that trended up to 4.6 at time of consult.     -received contrast on 11/27/2020  -Urine microscopy 11/30/2020 many muddy brown granular casts, massive WBC's  -purulent drainage from abdominal ROSAURA drain similar in appearance to her urine; concerning for communication between bladder and fluid collection   -Initiated prismax 12/1, last received prismax 12/4    Recomendations    -Now with good UO for past week  -Cr continues to trend down today  -No significant electrolyte disturbances or acute volume issues requiring further RRT  -Good UO, Recommend continue to hold diuretics. Watch for post-ATN diuresis, consider IVF replacement if continued significant diuresis  -Recommend Out-pt nephro clinic follow up  -Maximum gabapentin dose 300 mg in CKD or LAURIE patients  -Maintain Hgb >7.0, Keep MAP >65  -Renally dose meds and avoid nephrotoxic meds  -Will continue to follow closely

## 2020-12-11 NOTE — ASSESSMENT & PLAN NOTE
Pt presented with lower abdominal/vaginal/rectal pain with dysuria and urinary retention.   UA: 2 RBC, >100 WBC, 2+ leukocytes, bacteria  Recent UTIs with pan-sensitive E coli.    - UCx with E coli sens to cefepime and ceftriaxone  - Remains on ABX for abscess and now HAP -- UTI has been adequately treated at this point

## 2020-12-11 NOTE — PROGRESS NOTES
[facility]  Obstetrics & Gynecology  Progress Note    Patient Name: Navin Beck  MRN: [id number]  Admission Date: 11/26/2020  Primary Care Provider: Elvira Quinones MD  Principal Problem: Acute kidney injury (LAURIE) with acute tubular necrosis (ATN)    Subjective:     HPI: Navin Beck is a 59 yo F w/ HTN, DM2, and POP who presented to [facility] ED with abdominal pain and urinary retention. She is s/p robotic excision of vaginal mesh with Dr. Goel in October 2020. There was significant adhesive disease per the op report and urology assisted with bladder dissection. She was discharged home on POD #1 with march in place. March was removed on 11/9/2020. Since then, she has had continued dysuria and vaginal pain/pressure. She admits to holding her urine to avoid the dysuria. No fever, chills, nausea, vomiting. She also has had poorly controlled blood sugars throughout this time.     March placed per nursing in the ED with 800 cc urine output. WBC count is 17. Cr 1.5, up from 1.0. UA with RBCs, >100 WBC, and bacteria with 3 squams. Urine cultures pending, s/p rocephin in the ED. CT A/P with multilobular fluid collection in the lower pelvis adjacent to the urinary bladder which likely causes in compression of the distal ureters bilaterally and resulting in mild bilateral hydronephrosis.      Of note, BG elevated in ED to > 500. Admitted to hospital medicine for management of DKA. Urology and gynecology consulted.    12/5/2020: moved to step down unit.      Interval History: Patient feeling better overall. Abdominal pain improving. Since last time pt was seen by urogyn service, CT was performed which was concerning for pneumonia. Was transitioned to PO abx to cover both intraabdominal infection and HAP. Pt remains off CRRT; Cr continuing to trend downward. Per chart review, appears that plan is to discharge patient to home with home health. ROSAURA drain and march still in  place.    Scheduled Meds:   atorvastatin  20 mg Oral QHS    gabapentin  300 mg Oral Daily    heparin (porcine)  5,000 Units Subcutaneous Q8H    insulin aspart U-100  17 Units Subcutaneous TIDWM    insulin detemir U-100  34 Units Subcutaneous BID    [START ON 12/12/2020] levoFLOXacin  500 mg Oral Every other day    methIMAzole  10 mg Oral Daily    metroNIDAZOLE  500 mg Oral Q8H    polyethylene glycol  17 g Oral Daily    senna-docusate 8.6-50 mg  1 tablet Oral BID     Continuous Infusions:  PRN Meds:acetaminophen, albuterol-ipratropium, dextrose 50%, dextrose 50%, glucagon (human recombinant), glucose, glucose, HYDROmorphone, insulin aspart U-100, melatonin, ondansetron, oxyCODONE, oxyCODONE, sodium chloride 0.9%    Review of patient's allergies indicates:   Allergen Reactions    Penicillins Shortness Of Breath, Itching and Swelling     Tolerates cefepime 11/30/2020       Objective:     Vital Signs (Most Recent):  Temp: 98 °F (36.7 °C) (12/11/20 0300)  Pulse: 66 (12/11/20 0300)  Resp: 14 (12/11/20 0504)  BP: 136/62 (12/11/20 0300)  SpO2: 95 % (12/11/20 0434) Vital Signs (24h Range):  Temp:  [97.9 °F (36.6 °C)-98.8 °F (37.1 °C)] 98 °F (36.7 °C)  Pulse:  [] 66  Resp:  [12-18] 14  SpO2:  [95 %-99 %] 95 %  BP: (120-140)/(56-65) 136/62     Weight: 109.5 kg (241 lb 6.5 oz)  Body mass index is 37.81 kg/m².  No LMP recorded. Patient has had a hysterectomy.    I&O (Last 24H):    Intake/Output Summary (Last 24 hours) at 12/11/2020 0647  Last data filed at 12/11/2020 0500  Gross per 24 hour   Intake 1360 ml   Output 3775 ml   Net -2415 ml   UOP > 100 cc/hr  ROSAURA drain: 10 cc/shift    Physical Exam:   A&Ox3, NAD  NCAT  Nonlabored breathing  Abd soft, nondistended, + ttp around drain site - improved from last exam. Some voluntary guarding, no rebound.  Abdominal drain with cloudy output  Cifuentes draining yellow urine  Moves all extremities  Normal mood & affect       Laboratory:  Lab Results   Component Value Date     WBC 9.67 12/10/2020    HGB 8.2 (L) 12/10/2020    HCT 26.9 (L) 12/10/2020    MCV 94 12/10/2020     (H) 12/10/2020     CMP  Sodium   Date Value Ref Range Status   12/10/2020 135 (L) 136 - 145 mmol/L Final     Potassium   Date Value Ref Range Status   12/10/2020 4.2 3.5 - 5.1 mmol/L Final     Chloride   Date Value Ref Range Status   12/10/2020 97 95 - 110 mmol/L Final     CO2   Date Value Ref Range Status   12/10/2020 28 23 - 29 mmol/L Final     Glucose   Date Value Ref Range Status   12/10/2020 311 (H) 70 - 110 mg/dL Final     BUN   Date Value Ref Range Status   12/10/2020 30 (H) 8 - 23 mg/dL Final     Creatinine   Date Value Ref Range Status   12/10/2020 4.4 (H) 0.5 - 1.4 mg/dL Final     Calcium   Date Value Ref Range Status   12/10/2020 7.6 (L) 8.7 - 10.5 mg/dL Final     Total Protein   Date Value Ref Range Status   12/10/2020 6.3 6.0 - 8.4 g/dL Final     Albumin   Date Value Ref Range Status   12/10/2020 1.9 (L) 3.5 - 5.2 g/dL Final     Total Bilirubin   Date Value Ref Range Status   12/10/2020 0.1 0.1 - 1.0 mg/dL Final     Comment:     For infants and newborns, interpretation of results should be based  on gestational age, weight and in agreement with clinical  observations.  Premature Infant recommended reference ranges:  Up to 24 hours.............<8.0 mg/dL  Up to 48 hours............<12.0 mg/dL  3-5 days..................<15.0 mg/dL  6-29 days.................<15.0 mg/dL       Alkaline Phosphatase   Date Value Ref Range Status   12/10/2020 410 (H) 55 - 135 U/L Final     AST   Date Value Ref Range Status   12/10/2020 13 10 - 40 U/L Final     ALT   Date Value Ref Range Status   12/10/2020 6 (L) 10 - 44 U/L Final     Anion Gap   Date Value Ref Range Status   12/10/2020 10 8 - 16 mmol/L Final     eGFR if    Date Value Ref Range Status   12/10/2020 11.6 (A) >60 mL/min/1.73 m^2 Final     eGFR if non    Date Value Ref Range Status   12/10/2020 10.1 (A) >60 mL/min/1.73 m^2 Final      Comment:     Calculation used to obtain the estimated glomerular filtration  rate (eGFR) is the CKD-EPI equation.        Lab Results   Component Value Date    CALCIUM 7.6 (L) 12/10/2020    PHOS 4.7 (H) 12/10/2020       Micro:  11/26 urine C&S: e coli 10-50K  11/28 IR anaerobic C&S: PEPTOSTREPTOCOCCUS ANAEROBIUS           Diagnostic Results:    12/8 CTAbdPelv: Interval resolution of previously identified multilobular fluid collection in the lower pelvis with drainage catheter in position.  Residual scattered small volume of air is noted. Bibasilar consolidative changes, right more than left with small volume right-sided pleural effusion.  Findings likely related to pneumonia versus inflammatory process.    12/9 CTChest: Bilateral lower lobe consolidative opacities right greater than left and bandlike atelectasis bilaterally with trace pleural effusions similar to exam 12/08/2020.  Differential diagnosis includes aspiration, pneumonia, atelectasis, and less likely noninfectious inflammation. Slight ground-glass opacities predominantly right apex which may represent infectious or noninfectious inflammatory etiology.       Assessment/Plan:     Active Diagnoses:    Diagnosis Date Noted POA    PRINCIPAL PROBLEM:  Acute kidney injury (LAURIE) with acute tubular necrosis (ATN) [N17.0] 11/27/2020 Yes    Yeast infection [B37.9] 12/10/2020 Unknown    LAURIE (acute kidney injury) [N17.9]  No    Elevated alkaline phosphatase level [R74.8] 12/09/2020 Unknown    HAP (hospital-acquired pneumonia) [J18.9, Y95] 12/08/2020 Unknown    Anemia [D64.9] 12/06/2020 Unknown    Impaired mobility and endurance [Z74.09] 12/01/2020 No    Hyponatremia [E87.1] 11/30/2020 No    Postprocedural intraabdominal abscess [T81.43XA] 11/29/2020 Yes    Urinary retention [R33.9] 11/27/2020 Yes    UTI (urinary tract infection) [N39.0] 11/27/2020 Unknown    Asthma [J45.909] 07/17/2020 Yes    Type 2 diabetes mellitus with hyperglycemia, with  long-term current use of insulin [E11.65, Z79.4] 07/17/2020 Not Applicable    Diabetic ketoacidosis without coma associated with type 2 diabetes mellitus [E11.10] 08/07/2017 Yes    Hyperthyroidism [E05.90] 08/07/2017 Unknown    Severe obesity (BMI 35.0-35.9 with comorbidity) [E66.01, Z68.35] 09/09/2013 Not Applicable    Essential hypertension [I10] 07/06/2012 Yes    Abnormal computed tomography of bladder [R93.41] 05/28/2012 Yes    Mixed hyperlipidemia [E78.2] 04/14/2012 Yes      Problems Resolved During this Admission:    Diagnosis Date Noted Date Resolved POA    Anemia due to stage 3 chronic kidney disease [N18.30, D63.1] 12/06/2020 12/06/2020 Unknown    Abnormal white blood cell (WBC) [D72.9]  12/09/2020 Yes     Postprocedural intraabdominal abscess  --s/p IR drain placement 11/28  --no evidence of urinary tract injury (Cr of fluid collection lower than patient's serum creatinine, normal cysto/retrograde pyelogram)  --culture: peptostreptococcus  --s/p IV abx with rocephin & flagyl per ID recs. Now on PO abx.   --okay to pull ROSAURA drain today    Acute kidney injury (LAURIE) with acute tubular necrosis (ATN)  --mgmt per nephro and primary team  --Cr downtrending     Urinary retention  --march in place, good UOP  --okay to remove march catheter and perform voiding trial, if deemed appropriate by primary team. Pt should be able to void within 4-6 hours of march removal. Please measure amount voided and bladder scan after void for a post-void residual. Typically, patient must void 2/3 of total bladder volume to pass void trial, with a post void residual < 100cc. Can page gynecology with any questions or concerns regarding void trial.    Debility  --PT on board. Recommend continued PT with home health.    DISPO: Agree that patient is stable for discharge. Pt should follow up with Dr. Goel next week.    Ferdinand David MD  PGY4, OBN  [facility]

## 2020-12-11 NOTE — SUBJECTIVE & OBJECTIVE
Interval History: Pt seen at bedside. SIDRA Last received prismax on 12/4. Good UO since. Cr continues to trend down. Pt reports feeling well.    Objective:     Vital Signs (Most Recent):  Temp: 98.3 °F (36.8 °C) (12/11/20 1145)  Pulse: 60 (12/11/20 1145)  Resp: 18 (12/11/20 1345)  BP: (!) 121/58 (12/11/20 1145)  SpO2: (!) 93 % (12/11/20 1145)  O2 Device (Oxygen Therapy): room air (12/11/20 1010) Vital Signs (24h Range):  Temp:  [98 °F (36.7 °C)-98.8 °F (37.1 °C)] 98.3 °F (36.8 °C)  Pulse:  [60-76] 60  Resp:  [12-19] 18  SpO2:  [93 %-99 %] 93 %  BP: (120-136)/(56-62) 121/58     Weight: 109.5 kg (241 lb 6.5 oz) (12/10/20 1300)  Body mass index is 37.81 kg/m².  Body surface area is 2.28 meters squared.    I/O last 3 completed shifts:  In: 1360 [P.O.:1360]  Out: 4955 [Urine:4905; Drains:50]    Physical Exam  Constitutional:       Appearance: She is obese.   HENT:      Head: Normocephalic and atraumatic.      Nose: Nose normal.      Mouth/Throat:      Mouth: Mucous membranes are moist.      Pharynx: Oropharynx is clear.   Eyes:      Conjunctiva/sclera: Conjunctivae normal.      Pupils: Pupils are equal, round, and reactive to light.   Neck:      Musculoskeletal: Normal range of motion and neck supple.   Cardiovascular:      Rate and Rhythm: Regular rhythm.   Pulmonary:      Effort: Pulmonary effort is normal.   Abdominal:      General: Abdomen is flat.      Palpations: Abdomen is soft.      Tenderness: There is no abdominal tenderness.   Musculoskeletal:         General: Swelling present. No deformity.   Skin:     General: Skin is warm and dry.   Neurological:      General: No focal deficit present.      Mental Status: She is alert and oriented to person, place, and time.         Significant Labs:  CBC:   Recent Labs   Lab 12/11/20  0616   WBC 9.86   RBC 3.19*   HGB 9.3*   HCT 30.3*   *   MCV 95   MCH 29.2   MCHC 30.7*     CMP:   Recent Labs   Lab 12/11/20  0616   *   CALCIUM 8.3*   ALBUMIN 2.2*   PROT  7.6      K 4.1   CO2 30*   CL 97   BUN 25*   CREATININE 3.7*   ALKPHOS 364*   ALT 6*   AST 13   BILITOT 0.2     All labs within the past 24 hours have been reviewed.

## 2020-12-11 NOTE — PROGRESS NOTES
[facility]  Nephrology  Progress Note    Patient Name: Navin Beck  MRN: [id number]  Admission Date: 11/26/2020  Hospital Length of Stay: 14 days  Attending Provider: Alessia Johnston MD   Primary Care Physician: Elvira Quinones MD  Principal Problem:Acute kidney injury (LAURIE) with acute tubular necrosis (ATN)    Subjective:     HPI: Ms Beck is a 60 year old female with a past medical history of HTN, LD, T2DM, hyperparathyroidism, and asthma who presented to [facility] with c/o abdominal pain s/p bladder mesh surgery. Pt noted to be in DKA on presentation and is currently on insulin gtt and being followed by endocrine. CT abdomen obtained revealed multiple fluid collections near bladder; IR was consulted and pt is s/p drain placement. Initial coverage provided with vanc, now on cefepime and flagyl. Baseline sCr roughly 1.0-1.2, now elevated to 4.6 at time of consult (11/30/2020). Pt now anuric with 20mL UOP in the past 24hrs at time of consult (11/30/2020). Pt denies endorses lower abdominal tenderness. Pt denies SOB. Nephrology has been consulted for LAURIE.     -HPI was obtained from patient interview, and from review of the patient's EMR.         Interval History: Pt seen at bedside. DAWN. Last received prismax on 12/4. Good UO since. Cr continues to trend down. Pt reports feeling well.    Objective:     Vital Signs (Most Recent):  Temp: 98.3 °F (36.8 °C) (12/11/20 1145)  Pulse: 60 (12/11/20 1145)  Resp: 18 (12/11/20 1345)  BP: (!) 121/58 (12/11/20 1145)  SpO2: (!) 93 % (12/11/20 1145)  O2 Device (Oxygen Therapy): room air (12/11/20 1010) Vital Signs (24h Range):  Temp:  [98 °F (36.7 °C)-98.8 °F (37.1 °C)] 98.3 °F (36.8 °C)  Pulse:  [60-76] 60  Resp:  [12-19] 18  SpO2:  [93 %-99 %] 93 %  BP: (120-136)/(56-62) 121/58     Weight: 109.5 kg (241 lb 6.5 oz) (12/10/20 1300)  Body mass index is 37.81 kg/m².  Body surface area is 2.28 meters squared.    I/O last 3 completed shifts:  In: 1360  [P.O.:1360]  Out: 4955 [Urine:4905; Drains:50]    Physical Exam  Constitutional:       Appearance: She is obese.   HENT:      Head: Normocephalic and atraumatic.      Nose: Nose normal.      Mouth/Throat:      Mouth: Mucous membranes are moist.      Pharynx: Oropharynx is clear.   Eyes:      Conjunctiva/sclera: Conjunctivae normal.      Pupils: Pupils are equal, round, and reactive to light.   Neck:      Musculoskeletal: Normal range of motion and neck supple.   Cardiovascular:      Rate and Rhythm: Regular rhythm.   Pulmonary:      Effort: Pulmonary effort is normal.   Abdominal:      General: Abdomen is flat.      Palpations: Abdomen is soft.      Tenderness: There is no abdominal tenderness.   Musculoskeletal:         General: Swelling present. No deformity.   Skin:     General: Skin is warm and dry.   Neurological:      General: No focal deficit present.      Mental Status: She is alert and oriented to person, place, and time.         Significant Labs:  CBC:   Recent Labs   Lab 12/11/20  0616   WBC 9.86   RBC 3.19*   HGB 9.3*   HCT 30.3*   *   MCV 95   MCH 29.2   MCHC 30.7*     CMP:   Recent Labs   Lab 12/11/20  0616   *   CALCIUM 8.3*   ALBUMIN 2.2*   PROT 7.6      K 4.1   CO2 30*   CL 97   BUN 25*   CREATININE 3.7*   ALKPHOS 364*   ALT 6*   AST 13   BILITOT 0.2     All labs within the past 24 hours have been reviewed.        Assessment/Plan:     * Acute kidney injury (LAURIE) with acute tubular necrosis (ATN)  ATN in setting of contrast administration and sepsis 2/2 UTI, baseline cr 1.0-1.2 that trended up to 4.6 at time of consult.     -received contrast on 11/27/2020  -Urine microscopy 11/30/2020 many muddy brown granular casts, massive WBC's  -purulent drainage from abdominal ROSAURA drain similar in appearance to her urine; concerning for communication between bladder and fluid collection   -Initiated prismax 12/1, last received prismax 12/4    Recomendations    -Now with good UO for past  week  -Cr continues to trend down today  -No significant electrolyte disturbances or acute volume issues requiring further RRT  -Good UO, Recommend continue to hold diuretics. Watch for post-ATN diuresis, consider IVF replacement if continued significant diuresis  -Recommend Out-pt nephro clinic follow up  -Maximum gabapentin dose 300 mg in CKD or LAURIE patients  -Maintain Hgb >7.0, Keep MAP >65  -Renally dose meds and avoid nephrotoxic meds        Thank you for your consult. I will sign off. Please contact us if you have any additional questions.    Usha Ames MD  Nephrology  [facility]

## 2020-12-11 NOTE — ASSESSMENT & PLAN NOTE
Pt has recent h/o GYN surgery in 10/2020 for bladder mesh removal, Cifuentes was removed on 11/19. For the past few days pt has had increased trouble urinating associated with pain.   CT A/P 11/27: Multiple fluid collections adjacent to bladder. Mild bilateral hydronephrosis.   CT RSS 11/29 hydronephrosis resolved. 12/6 spoke with urology concerning ROSAURA drain output concern for urinary contents. Per Urology, since ROSAURA fluid Cr 1.7 is less than serum Cr 4.5, the fluid is not urine. Negative cystogram and normal retrograde pyelogram    - Urology consulted, recs appreciated;  - Cifuentes removed 12/11, will monitor output  - Will f/u UroGyn outpatient

## 2020-12-11 NOTE — ASSESSMENT & PLAN NOTE
Cr 1.5 on presentation. Likely multi-factorial on presentation 2/2 pre-renal volume depletion in the context of DKA, UTI, and post-renal obstruction. Repeat LAURIE on 11/29 likely intrinsic ATN in etiology.  Initiation of CRRT, transfer to ICU on 12/2. Transferred to floor on 12/4. Trialysis catheter to RIJ removed on 12/9  Recent Labs   Lab 12/07/20  0511 12/08/20  0354 12/09/20  0909 12/10/20  0300 12/11/20  0616   CREATININE 5.1* 5.3* 4.7* 4.4* 3.7*     - Nephrology consulted, appreciate recs  - Watch for post-ATN diuresis, consider IVF replacement if continued significant diuresis  - Out-pt nephro clinic follow up  - Renally dose meds  - Avoid renal toxins (ACEi/ARB, NSAIDs, contrast, etc.)  - Strict I&Os

## 2020-12-11 NOTE — ASSESSMENT & PLAN NOTE
Pt presented to [facility] ED with Gluc 626, HCO3 16, AG 16, BHB 4.2, ketonuria. 2L IVF given in ED with insulin gtt started. Glucose trended downward to 274. Pt was then admitted to hospital medicine for further management of DKA.    - Endocrine consulted, appreciate recs  - Detemir 35U BID, Aspart 18U TID WM  - Goal gluc 140-180  - Diabetic diet  - BMP daily  - Will replace K PRN  - Nausea: zofran PRN

## 2020-12-11 NOTE — PROGRESS NOTES
PM&R consult follow up.  Please see original consult for detailed note.      Patient improving with therapy and prefers to go home at discharge.  Agree with therapy recommendation for home health.     SIDRA Domínguez, FNP-C  Physical Medicine & Rehabilitation   12/11/2020

## 2020-12-11 NOTE — ASSESSMENT & PLAN NOTE
Alk Phos has been trending up this hospitalization, on 12/9 . Unclear etiology. Other LFTs wnl - T bili, AST/ALT.  Recent Labs   Lab 12/11/20  0616   ALT 6*   AST 13   ALKPHOS 364*   BILITOT 0.2   PROT 7.6   ALBUMIN 2.2*     Recent Labs   Lab 12/09/20  0909   *     -  Improving  - Outpatient workup

## 2020-12-11 NOTE — ASSESSMENT & PLAN NOTE
Pt presented to [facility] due to 5-6 days of 10/10 lower abdomen, vaginal, and rectal pain. Pt has recent h/o GYN surgery in 10/2020 for bladder mesh removal, Cifuentes was removed on 11/19.  CT A/P 11/27: Multiple fluid collections adjacent to bladder. Mild bilateral hydronephrosis.   CT A/P 12/8: Interval resolution of previously identified multilobular fluid collection in the lower pelvis with drainage catheter in position.  Residual scattered small volume of air is noted.   UCx 11/26: E.coli sensitive to CTX, cefepime   BCx 12/5: Peptostrptococcus   Cystography 12/6: No bladder leak    Pain management: PCA pump initially. PCA pump d/c on 11/30, now on PRN opiates.    PLAN  - Urology consulted, appreciate recs  - GYN consulted, appreciate consult.  - IR consulted, appreciate recs; Drain in places. Removed 12/11.  - ID consulted for intra-abdominal infection, appreciate recs   - CTX & Flagyl x14 days, repeat imaging to assess for cleared infection prior to d/c abx   - If d/c before 14d course, may switch to augmentin   - 12/9: broadened ABX to cover HAP, switched CTX-->Cefepime, added vanc, and continued flagyl              - 12/10: Transitioned patient levaquin + metronidazole.    - 12/11: Discussed patient with ID, cefpodoxime + MALIK a better option given Peptostreptococcus

## 2020-12-11 NOTE — PLAN OF CARE
59 YO Female w/ Dx of DM2, HTN, CKD3 and hyperthyroidism that was admitted for hydronephrosis after bladder surgery and was found to be in DKA.  Pt was consulted for management of DKA.         Type 2 DM:      DKA is now resolved and patient doing well on MDI and PO diet.     A1C 11.4  (Above goal)       HOME REGIMEN:  Lantus 50 units and Novolog 15 units TID w/ meals, Metformin 500mg BID     INPATIENT REGIMEN:   Levemir 36 units BID, Novolog 17 TID and Sliding scale     GLUCOSE TREND FOR THE PAST 24HRS:     Glucose trend (206 - 266) Slightly above goal   -Glucose goal inpatient 140 - 180mg/dL     NO HYPOGYCEMIAS NOTED     TOLERATING 50 % OF PO DIET     Plan:   -Increase Levemir to 35 units BID   -Increase  Novolog to 18  units TID w/ meals       (If not eating do not administer scheduled Novolog, Just cover with correction insulin)   If patient eating, needs to receive schedule Novolog)   -Moderate dose correction insulin   -FS qAC/HS   -Will continue to monitor     Discharge: Likely current MDI     Hyperthyroidism  Unclear etiology given no TRAb, TSI or RAIU scan  Thyroid US shows MNG so toxic MNG high in differential  She has a TSH at goal on methimazole 10 mg daily  Continue, follow up outpatient     Plan  - Continue methimazole 10 mg daily  - Outpatient followed with Endocrinology

## 2020-12-12 VITALS
BODY MASS INDEX: 37.89 KG/M2 | SYSTOLIC BLOOD PRESSURE: 136 MMHG | HEIGHT: 67 IN | RESPIRATION RATE: 18 BRPM | OXYGEN SATURATION: 98 % | HEART RATE: 66 BPM | TEMPERATURE: 98 F | WEIGHT: 241.38 LBS | DIASTOLIC BLOOD PRESSURE: 62 MMHG

## 2020-12-12 PROBLEM — E11.10 DIABETIC KETOACIDOSIS WITHOUT COMA ASSOCIATED WITH TYPE 2 DIABETES MELLITUS: Status: RESOLVED | Noted: 2017-08-07 | Resolved: 2020-12-12

## 2020-12-12 LAB
ALBUMIN SERPL BCP-MCNC: 2.4 G/DL (ref 3.5–5.2)
ALP SERPL-CCNC: 342 U/L (ref 55–135)
ALT SERPL W/O P-5'-P-CCNC: 8 U/L (ref 10–44)
ANION GAP SERPL CALC-SCNC: 10 MMOL/L (ref 8–16)
AST SERPL-CCNC: 17 U/L (ref 10–40)
BASOPHILS # BLD AUTO: 0.09 K/UL (ref 0–0.2)
BASOPHILS NFR BLD: 1 % (ref 0–1.9)
BILIRUB SERPL-MCNC: 0.2 MG/DL (ref 0.1–1)
BUN SERPL-MCNC: 25 MG/DL (ref 8–23)
CALCIUM SERPL-MCNC: 8.2 MG/DL (ref 8.7–10.5)
CHLORIDE SERPL-SCNC: 98 MMOL/L (ref 95–110)
CO2 SERPL-SCNC: 25 MMOL/L (ref 23–29)
CREAT SERPL-MCNC: 3.5 MG/DL (ref 0.5–1.4)
DIFFERENTIAL METHOD: ABNORMAL
EOSINOPHIL # BLD AUTO: 0.1 K/UL (ref 0–0.5)
EOSINOPHIL NFR BLD: 1.1 % (ref 0–8)
ERYTHROCYTE [DISTWIDTH] IN BLOOD BY AUTOMATED COUNT: 15.2 % (ref 11.5–14.5)
EST. GFR  (AFRICAN AMERICAN): 15.3 ML/MIN/1.73 M^2
EST. GFR  (NON AFRICAN AMERICAN): 13.3 ML/MIN/1.73 M^2
GLUCOSE SERPL-MCNC: 434 MG/DL (ref 70–110)
HCT VFR BLD AUTO: 27.7 % (ref 37–48.5)
HGB BLD-MCNC: 8.5 G/DL (ref 12–16)
IMM GRANULOCYTES # BLD AUTO: 0.04 K/UL (ref 0–0.04)
IMM GRANULOCYTES NFR BLD AUTO: 0.4 % (ref 0–0.5)
LYMPHOCYTES # BLD AUTO: 1.9 K/UL (ref 1–4.8)
LYMPHOCYTES NFR BLD: 20.7 % (ref 18–48)
MAGNESIUM SERPL-MCNC: 1.6 MG/DL (ref 1.6–2.6)
MCH RBC QN AUTO: 29 PG (ref 27–31)
MCHC RBC AUTO-ENTMCNC: 30.7 G/DL (ref 32–36)
MCV RBC AUTO: 95 FL (ref 82–98)
MONOCYTES # BLD AUTO: 0.8 K/UL (ref 0.3–1)
MONOCYTES NFR BLD: 8.2 % (ref 4–15)
NEUTROPHILS # BLD AUTO: 6.2 K/UL (ref 1.8–7.7)
NEUTROPHILS NFR BLD: 68.6 % (ref 38–73)
NRBC BLD-RTO: 0 /100 WBC
PHOSPHATE SERPL-MCNC: 3.7 MG/DL (ref 2.7–4.5)
PLATELET # BLD AUTO: 394 K/UL (ref 150–350)
PMV BLD AUTO: 10.2 FL (ref 9.2–12.9)
POCT GLUCOSE: 429 MG/DL (ref 70–110)
POCT GLUCOSE: 431 MG/DL (ref 70–110)
POCT GLUCOSE: 490 MG/DL (ref 70–110)
POTASSIUM SERPL-SCNC: 4.9 MMOL/L (ref 3.5–5.1)
PROT SERPL-MCNC: 7.4 G/DL (ref 6–8.4)
RBC # BLD AUTO: 2.93 M/UL (ref 4–5.4)
SODIUM SERPL-SCNC: 133 MMOL/L (ref 136–145)
WBC # BLD AUTO: 9.1 K/UL (ref 3.9–12.7)

## 2020-12-12 PROCEDURE — 94761 N-INVAS EAR/PLS OXIMETRY MLT: CPT | Mod: HCWC

## 2020-12-12 PROCEDURE — 83735 ASSAY OF MAGNESIUM: CPT | Mod: HCWC

## 2020-12-12 PROCEDURE — 27000646 HC AEROBIKA DEVICE: Mod: HCWC

## 2020-12-12 PROCEDURE — 25000003 PHARM REV CODE 250: Mod: HCWC | Performed by: STUDENT IN AN ORGANIZED HEALTH CARE EDUCATION/TRAINING PROGRAM

## 2020-12-12 PROCEDURE — 99900035 HC TECH TIME PER 15 MIN (STAT): Mod: HCWC

## 2020-12-12 PROCEDURE — 94799 UNLISTED PULMONARY SVC/PX: CPT | Mod: HCWC

## 2020-12-12 PROCEDURE — 63600175 PHARM REV CODE 636 W HCPCS: Mod: HCWC | Performed by: STUDENT IN AN ORGANIZED HEALTH CARE EDUCATION/TRAINING PROGRAM

## 2020-12-12 PROCEDURE — 36415 COLL VENOUS BLD VENIPUNCTURE: CPT | Mod: HCWC

## 2020-12-12 PROCEDURE — 99239 PR HOSPITAL DISCHARGE DAY,>30 MIN: ICD-10-PCS | Mod: HCWC,GC,, | Performed by: INTERNAL MEDICINE

## 2020-12-12 PROCEDURE — 99232 PR SUBSEQUENT HOSPITAL CARE,LEVL II: ICD-10-PCS | Mod: HCWC,GC,, | Performed by: INTERNAL MEDICINE

## 2020-12-12 PROCEDURE — 80053 COMPREHEN METABOLIC PANEL: CPT | Mod: HCWC

## 2020-12-12 PROCEDURE — 99239 HOSP IP/OBS DSCHRG MGMT >30: CPT | Mod: HCWC,GC,, | Performed by: INTERNAL MEDICINE

## 2020-12-12 PROCEDURE — 94664 DEMO&/EVAL PT USE INHALER: CPT | Mod: HCWC

## 2020-12-12 PROCEDURE — 99232 SBSQ HOSP IP/OBS MODERATE 35: CPT | Mod: HCWC,GC,, | Performed by: INTERNAL MEDICINE

## 2020-12-12 PROCEDURE — 85025 COMPLETE CBC W/AUTO DIFF WBC: CPT | Mod: HCWC

## 2020-12-12 PROCEDURE — 84100 ASSAY OF PHOSPHORUS: CPT | Mod: HCWC

## 2020-12-12 RX ORDER — TIZANIDINE 4 MG/1
8-12 TABLET ORAL NIGHTLY PRN
Qty: 1 TABLET | Refills: 1 | Status: SHIPPED | OUTPATIENT
Start: 2020-12-12

## 2020-12-12 RX ORDER — TAMSULOSIN HYDROCHLORIDE 0.4 MG/1
0.4 CAPSULE ORAL DAILY
Qty: 1 CAPSULE | Refills: 0 | Status: SHIPPED | OUTPATIENT
Start: 2020-12-12

## 2020-12-12 RX ORDER — CEFPODOXIME PROXETIL 200 MG/1
200 TABLET, FILM COATED ORAL DAILY
Qty: 3 TABLET | Refills: 0 | Status: SHIPPED | OUTPATIENT
Start: 2020-12-13 | End: 2020-12-16

## 2020-12-12 RX ORDER — INSULIN ASPART 100 [IU]/ML
18 INJECTION, SOLUTION INTRAVENOUS; SUBCUTANEOUS
Qty: 1 VIAL | Refills: 2 | OUTPATIENT
Start: 2020-12-12

## 2020-12-12 RX ORDER — INSULIN ASPART 100 [IU]/ML
0-5 INJECTION, SOLUTION INTRAVENOUS; SUBCUTANEOUS
Qty: 1 BOX | Refills: 2 | OUTPATIENT
Start: 2020-12-12 | End: 2021-12-12

## 2020-12-12 RX ORDER — OXYCODONE HYDROCHLORIDE 15 MG/1
15 TABLET ORAL EVERY 6 HOURS PRN
Qty: 28 TABLET | Refills: 0
Start: 2020-12-12 | End: 2020-12-12

## 2020-12-12 RX ORDER — METRONIDAZOLE 500 MG/1
500 TABLET ORAL EVERY 8 HOURS
Qty: 9 TABLET | Refills: 0 | Status: SHIPPED | OUTPATIENT
Start: 2020-12-13 | End: 2020-12-16

## 2020-12-12 RX ORDER — POLYETHYLENE GLYCOL 3350 17 G/17G
17 POWDER, FOR SOLUTION ORAL DAILY
Refills: 0 | COMMUNITY
Start: 2020-12-13

## 2020-12-12 RX ORDER — OXYCODONE HYDROCHLORIDE 15 MG/1
15 TABLET ORAL EVERY 6 HOURS PRN
Qty: 28 TABLET | Refills: 0 | Status: SHIPPED | OUTPATIENT
Start: 2020-12-12 | End: 2020-12-12

## 2020-12-12 RX ORDER — OXYCODONE HYDROCHLORIDE 15 MG/1
15 TABLET ORAL EVERY 6 HOURS PRN
Qty: 28 TABLET | Refills: 0 | Status: SHIPPED | OUTPATIENT
Start: 2020-12-12 | End: 2020-12-19

## 2020-12-12 RX ORDER — INSULIN ASPART 100 [IU]/ML
0-5 INJECTION, SOLUTION INTRAVENOUS; SUBCUTANEOUS
Qty: 30 ML | Refills: 0 | Status: SHIPPED | OUTPATIENT
Start: 2020-12-12 | End: 2020-12-12 | Stop reason: HOSPADM

## 2020-12-12 RX ORDER — INSULIN ASPART 100 [IU]/ML
18 INJECTION, SOLUTION INTRAVENOUS; SUBCUTANEOUS 3 TIMES DAILY
Qty: 194.4 ML | Refills: 0 | Status: SHIPPED | OUTPATIENT
Start: 2020-12-12 | End: 2021-12-12

## 2020-12-12 RX ORDER — GABAPENTIN 300 MG/1
300 CAPSULE ORAL DAILY
Qty: 30 CAPSULE | Refills: 11 | Status: SHIPPED | OUTPATIENT
Start: 2020-12-12 | End: 2022-12-22

## 2020-12-12 RX ORDER — DOXYLAMINE SUCCINATE 25 MG
TABLET ORAL 2 TIMES DAILY
Qty: 28 G | Refills: 0 | Status: SHIPPED | OUTPATIENT
Start: 2020-12-12

## 2020-12-12 RX ORDER — AMOXICILLIN 250 MG
1 CAPSULE ORAL 2 TIMES DAILY PRN
COMMUNITY
Start: 2020-12-12

## 2020-12-12 RX ORDER — INSULIN GLARGINE 100 [IU]/ML
30 INJECTION, SOLUTION SUBCUTANEOUS 2 TIMES DAILY
Qty: 1 VIAL | Refills: 2 | OUTPATIENT
Start: 2020-12-12

## 2020-12-12 RX ORDER — INSULIN ASPART 100 [IU]/ML
18 INJECTION, SOLUTION INTRAVENOUS; SUBCUTANEOUS 3 TIMES DAILY
Qty: 194.4 ML | Refills: 0 | Status: SHIPPED | OUTPATIENT
Start: 2020-12-12 | End: 2020-12-12

## 2020-12-12 RX ORDER — DULOXETIN HYDROCHLORIDE 60 MG/1
60 CAPSULE, DELAYED RELEASE ORAL DAILY
Qty: 1 CAPSULE | Refills: 1 | Status: SHIPPED | OUTPATIENT
Start: 2020-12-12 | End: 2023-11-09 | Stop reason: SDUPTHER

## 2020-12-12 RX ADMIN — INSULIN ASPART 18 UNITS: 100 INJECTION, SOLUTION INTRAVENOUS; SUBCUTANEOUS at 07:12

## 2020-12-12 RX ADMIN — HEPARIN SODIUM 5000 UNITS: 5000 INJECTION INTRAVENOUS; SUBCUTANEOUS at 06:12

## 2020-12-12 RX ADMIN — INSULIN ASPART 5 UNITS: 100 INJECTION, SOLUTION INTRAVENOUS; SUBCUTANEOUS at 08:12

## 2020-12-12 RX ADMIN — DOCUSATE SODIUM AND SENNOSIDES 1 TABLET: 8.6; 5 TABLET, FILM COATED ORAL at 09:12

## 2020-12-12 RX ADMIN — OXYCODONE HYDROCHLORIDE 15 MG: 10 TABLET ORAL at 07:12

## 2020-12-12 RX ADMIN — GABAPENTIN 300 MG: 300 CAPSULE ORAL at 09:12

## 2020-12-12 RX ADMIN — INSULIN ASPART 5 UNITS: 100 INJECTION, SOLUTION INTRAVENOUS; SUBCUTANEOUS at 12:12

## 2020-12-12 RX ADMIN — METHIMAZOLE 10 MG: 10 TABLET ORAL at 09:12

## 2020-12-12 RX ADMIN — MICONAZOLE NITRATE: 20 CREAM TOPICAL at 09:12

## 2020-12-12 RX ADMIN — CEFPODOXIME PROXETIL 200 MG: 200 TABLET, FILM COATED ORAL at 09:12

## 2020-12-12 RX ADMIN — INSULIN ASPART 18 UNITS: 100 INJECTION, SOLUTION INTRAVENOUS; SUBCUTANEOUS at 12:12

## 2020-12-12 RX ADMIN — METRONIDAZOLE 500 MG: 500 TABLET ORAL at 06:12

## 2020-12-12 RX ADMIN — POLYETHYLENE GLYCOL 3350 17 G: 17 POWDER, FOR SOLUTION ORAL at 09:12

## 2020-12-12 NOTE — PLAN OF CARE
12/12/20 1621   Post-Acute Status   Post-Acute Authorization Home Health   Post-Acute Placement Status Set-up Complete       Pt to be serviced by [facility]. For admit on Monday.     Mimi Sarmiento LMSW  Case Management Social Worker   [facility]

## 2020-12-12 NOTE — ASSESSMENT & PLAN NOTE
Brief Hx:  60-year-old female with type 2 diabetes, toxic multinodular goiter admitted for DKA secondary to UTI which is now resolved.  Consulted for:  DKA hyperthyroidism  DM type:  Type 2  A1C: 11.4*  Hypoglycemia: none, borderline lows 58  Steroids: none  Diet/Tfs:  Variable diet ranging between %  Glucose Goals: 140-180 mg/dL    Glucose Trend x 24hr:       Home Regimen:  Metformin, long-acting insulin 50 units daily, short-acting 15 units a.c.    Missing insulin doses yesterday resulting in hyperglycemia today    PLAN:     -  Detemir 35 units BID   -  Aspart 18 units TIDWM Nearly 100%   -  Aspart 12 units TIDWM 25-74%   -  Aspart  0 units < 25 % with Correction only   -  Low dose correction insulin   -  Accucheck

## 2020-12-12 NOTE — PLAN OF CARE
12/12/20 0945   Post-Acute Status   Post-Acute Authorization Home Health   Post-Acute Placement Status Referrals Sent       SW sent referral via  for pt placement. Awaiting facility decision.    Mimi Sarmiento LMSW  Case Management Social Worker   [facility] [address]

## 2020-12-12 NOTE — PLAN OF CARE
ID Note:    Called by Primary Team for clarification regarding antibiotics at d/c as patient ready for discharge.  Last seen by ID on 12/5 for intraabdominal abscess after bladder mesh excision.  CT on admit of 11/26 had shown multilobular fluid collection, s/p drain placement 11/28.  Cultures + for peptostreptococcus (gram stain with few GNR, rare GPR).      Repeat CT 11/29 showed resolution of multilobulated fluid collection with small residual 3.4 cm fluid collection.  Has been on IV ceftriaxone and flagyl.  ID recs were for 14 days of antibiotics from 11/29,  with repeat imaging prior to planned end of therapy on 12/13. Recommended IV CTX and flagyl while inpatient, and transition to oral augmentin if discharged.     Patient has remained inpatient since last seen.   She had a retrograde pyelogram by Urology on 12/7 which showed no injury to ureters or bladder.  She had repeat CT abdomen 12/8 which showed resolution of all fluid collections.  A CT chest was done 12/9 which showed bilateral lower lobe opacities and bandlike atelectasis bilaterally - differential includes aspiration, pneumonia, atelectasis, and less likely noninfectious inflammation. Ceftriaxone was discontinued and levofloxacin yesterday started by Primary given concern for lung involvement.       Patient is afebrile.  No leukocytosis.  All fluid collections resolved. Drains and march removed.   She is on room air, O2 sats 93-99%.  VSS.     Plan/recommendations:  Had recommended Augmentin on d/c but noted to have PCN allergy (SOB, swelling).  Has tolerated CTX and cefepime.  At discharge recommend cefpodoxime 200 mg (renally dosed) and flagyl 500 mg orally q 8 hours X 5 more days.  This will provide intra-abdominal coverage for peptostreptococcus, GNR, and anaerobes as well as empiric pulmonary coverage.  Give dose of cefpodoxime prior to discharge to ensure no adverse reaction.  Follow up with GYN and urology.  ID follow up as needed.       Discussed with ID staff and Primary Team     Thank you.   Please call for any questions or concerns.  SIDRA Castillo, ANP-C  [phone] pager, [place] [phone]

## 2020-12-12 NOTE — PROGRESS NOTES
"[facility]  Endocrinology  Progress Note    Admit Date: 2020     Reason for Consult: Management of T2DM, Hyperglycemia     Diabetes diagnosis year: > 10 years ago    Home Diabetes Medications:    Lantus 50 U daily  Novolog 15 U AC  Metformin 500 mg BID    How often checking glucose at home? 1-3 x day   BG readings on regimen: 200s-300s  Hypoglycemia on the regimen?  No  Missed doses on regimen?  Yes    Diabetes Complications include:     Diabetic nephropathy        HPI:   Patient is a 60 y.o. female with a diagnosis of HTN, T2DM on MDi insulin, hyperthyroidism who presented with weakness, poor appetite and abdominal pain associated with vaginal pain and rectal pressure and non-bloody diarrhea. She was found to be in DKA and started on DKA protocol with IVF and IV insulin. She is being treated with IV antibiotics for pelvic fluids collections suspected to be infectious.     On admission labs:   2020    Sodium 128 (L)   Potassium 5.3 (H)   Anion Gap 16   CO2 16 (L)   Creatinine 1.5 (H)   Glucose 626 (HH)   Beta-Hydroxybutyrate 4.2 (H)     Lab Results   Component Value Date    HGBA1C 11.4 (H) 2020     Lab Results   Component Value Date    TSH 0.519 2020       She also has a history of hyperthyroidism on methimazole 10 mg daily. Thyroid US shows MNG. No RAIU scan on record. No TRAb or TSI. TSH at goal.     Interval HPI:     Blood glucose trend .  Insulin was held last night due to concerns of hypoglycemia.  Patient consuming variable diet 50% diet today with notable a.m. glucose over 400 likely secondary to missing doses yesterday.  Otherwise hemodynamically stable, heart rate stable    Overnight events: LOGAN   Eatin%  Nausea: No  Hypoglycemia and intervention: Yes  Fever: No  TPN and/or TF: No    BP (!) 140/65 (BP Location: Left arm, Patient Position: Lying)   Pulse (!) 114   Temp 97.9 °F (36.6 °C) (Oral)   Resp 18   Ht 5' 7" (1.702 m)   Wt 109.5 kg (241 lb " 4.8 oz)   SpO2 96%   BMI 37.79 kg/m²     Labs Reviewed and Include    Recent Labs   Lab 12/10/20  0300   *   CALCIUM 7.6*   ALBUMIN 1.9*   PROT 6.3   *   K 4.2   CO2 28   CL 97   BUN 30*   CREATININE 4.4*   ALKPHOS 410*   ALT 6*   AST 13   BILITOT 0.1     Lab Results   Component Value Date    WBC 9.67 12/10/2020    HGB 8.2 (L) 12/10/2020    HCT 26.9 (L) 12/10/2020    MCV 94 12/10/2020     (H) 12/10/2020     No results for input(s): TSH, FREET4 in the last 168 hours.  Lab Results   Component Value Date    HGBA1C 11.4 (H) 11/27/2020       Nutritional status:   Body mass index is 37.79 kg/m².  Lab Results   Component Value Date    ALBUMIN 1.9 (L) 12/10/2020    ALBUMIN 2.2 (L) 12/09/2020    ALBUMIN 2.0 (L) 12/08/2020     No results found for: PREALBUMIN    Estimated Creatinine Clearance: 16.9 mL/min (A) (based on SCr of 4.4 mg/dL (H)).    Accu-Checks  Recent Labs     12/08/20  0835 12/08/20  1022 12/08/20  1224 12/08/20  1648 12/08/20  2120 12/09/20  0733 12/09/20  1206 12/09/20  1644 12/09/20  2301 12/10/20  0036   POCTGLUCOSE 81 184* 203* 166* 167* 146* 152* 62* 442* 360*       Current Medications and/or Treatments Impacting Glycemic Control  Immunotherapy:    Immunosuppressants     None        Steroids:   Hormones (From admission, onward)    Start     Stop Route Frequency Ordered    11/27/20 0959  melatonin tablet 6 mg      -- Oral Nightly PRN 11/27/20 0903        Pressors:    Autonomic Drugs (From admission, onward)    None        Hyperglycemia/Diabetes Medications:   Antihyperglycemics (From admission, onward)    Start     Stop Route Frequency Ordered    12/10/20 0715  insulin aspart U-100 pen 17 Units      -- SubQ 3 times daily with meals 12/09/20 2023 12/09/20 2100  insulin detemir U-100 pen 34 Units      -- SubQ 2 times daily 12/09/20 2023 12/03/20 1658  insulin aspart U-100 pen 0-5 Units      -- SubQ Before meals & nightly PRN 12/03/20 1558    11/28/20 1245  insulin regular in 0.9 %  NaCl 100 unit/100 mL (1 unit/mL) infusion     Question:  Enter initial dose (Units/hr):  Answer:  4    11/28 2300 IV Continuous 11/28/20 1133          ASSESSMENT and PLAN    * Type 2 diabetes mellitus with hyperglycemia, with long-term current use of insulin  Brief Hx:  60-year-old female with type 2 diabetes, toxic multinodular goiter admitted for DKA secondary to UTI which is now resolved.  Consulted for:  DKA hyperthyroidism  DM type:  Type 2  A1C: 11.4*  Hypoglycemia: none, borderline lows 58  Steroids: none  Diet/Tfs:  Variable diet ranging between %  Glucose Goals: 140-180 mg/dL    Glucose Trend x 24hr:       Home Regimen:  Metformin, long-acting insulin 50 units daily, short-acting 15 units a.c.    Missing insulin doses yesterday resulting in hyperglycemia today    PLAN:     -  Detemir 35 units BID   -  Aspart 18 units TIDWM Nearly 100%   -  Aspart 12 units TIDWM 25-74%   -  Aspart  0 units < 25 % with Correction only   -  Low dose correction insulin   -  Accucheck      Diabetic ketoacidosis without coma associated with type 2 diabetes mellitus  Resolved see DM plan       Hyperthyroidism  Unclear etiology given no TRAb, TSI or RAIU scan  Thyroid US shows MNG so toxic MNG high in differential  She has a TSH at goal on methimazole 10 mg daily  Continue, follow up outpatient    Plan  - Continue methimazole 10 mg daily  - Outpatient followed with Endocrinology      Severe obesity (BMI 35.0-35.9 with comorbidity)  Benefits from addition of GLP-1 RA as outpatient      Essential hypertension  Managed per primary team  Condition may cause insulin resistance             Jordan Hurtado MD  Endocrinology  [facility]

## 2020-12-12 NOTE — PLAN OF CARE
Problem: Fall Injury Risk  Goal: Absence of Fall and Fall-Related Injury  Outcome: Ongoing, Progressing  Intervention: Promote Injury-Free Environment  Flowsheets (Taken 12/11/2020 1933)  Safety Promotion/Fall Prevention:   bed alarm set   medications reviewed   side rails raised x 2   toileting scheduled   supervised activity  Environmental Safety Modification:   assistive device/personal items within reach   clutter free environment maintained   lighting adjusted   mobility aid in reach     Problem: Adult Inpatient Plan of Care  Goal: Plan of Care Review  Outcome: Ongoing, Progressing  Flowsheets (Taken 12/11/2020 1933)  Plan of Care Reviewed With: patient  Goal: Patient-Specific Goal (Individualization)  Outcome: Ongoing, Progressing  Flowsheets (Taken 12/11/2020 1933)  Individualized Care Needs: March and ROSAURA removed, assess ROSAURA site/dressing, monitor UOP  Goal: Optimal Comfort and Wellbeing  Outcome: Ongoing, Progressing  Intervention: Provide Person-Centered Care  Flowsheets (Taken 12/11/2020 1933)  Trust Relationship/Rapport:   care explained   questions encouraged   choices provided   reassurance provided   emotional support provided   thoughts/feelings acknowledged   empathic listening provided   questions answered   ROSAURA removed per MD today. Site with clean, dry, intact dressing no drainage noted. PRN oxycodone dose increased today to enc use of po pain meds and discourage use of IV pain meds in preparation for discharge to home.  Pt voided x 4 this shift since march removal.  8/10 pain to lower abd which pt verbalizes as acceptable.  Low CBG treated with po glucose and meals with recovery to normal CBG.  Pt awake and alert.  Participating in care plan.

## 2020-12-12 NOTE — DISCHARGE SUMMARY
[facility] Hospital Medicine  Discharge Summary      Patient Name: Navin Beck  MRN: [id number]  Admission Date: 11/26/2020  Hospital Length of Stay: 15 days  Discharge Date and Time:  12/12/2020 3:30 PM  Attending Physician: Lou att. providers found   Discharging Provider: Stefano Dimas MD  Primary Care Provider: Elvira Quinones MD  Hospital Medicine Team: [facility] HOSP MED 2 Stefano Dimas MD    HPI:   Ms. Beck is a 59yo F with PMH of HTN, HLD, T2DM, Hyperthyroidism, and Asthma who had recent GYN surgery on 10/26 due to erosion of bladder mesh / prosthetic material with Cifuentes removal on 11/19. She presents to [facility] due to 4-5 days of not feeling well, she has had loss of appetite and weakness with 10/10 throbbing lower abdominal pain associated with vaginal pain and rectal pressure and non-bloody diarrhea. She has had trouble urinating for the past 5 days as well. She takes oxycodone 5 at home for pain, but it did not help. She endorses nausea and vomiting (4 times, non-bloody) also associated with the pain. She denies fever, chills, chest pain, SOB, trouble breathing, leg swelling, or any other symptom at this time.    ED Course: VSS, afebrile but leukocytosis at 17.85. Pt found to have DKA - Gluc 626, AG16, BHB 4.2, Fluids, insulin gtt started and Glucose down-trended to 200s. UA showed 2RBC, >100 WBC, bacteria, given CTX. CT A/P showed multiple fluid collections adjacent to bladder concerning for bladder perf with multiple urinomas, with mild bilateral hydronephrosis. Urology consulted and placed a Cifuentes with 900 UOP drained. GYN consulted due to recent surgery.     Pt will be admitted to hospital medicine for further management of DKA and possible intra-abdominal infection.     Procedure(s) (LRB):  CYSTOSCOPY, WITH RETROGRADE PYELOGRAM (Bilateral)      Hospital Course:   Pt was admitted on 11/28 due to 10/10 abdominal pain and also found to be in DKA. Pt was started on IVF,  "insulin gtt, and kept NPO with glucose downtrending and AG closed. CT abdomen showed multiple fluid collections near bladder. Renal cystography with "hypoattenuating collections in the lower pelvis adjacent to the urinary bladder with no definite extravasated contrast material appreciated within the pelvic collections to suggest communication with the bladder".  Urology and GYN on board, agree with plan for IR to place drain in fluid collections for culture and source control. Pt was started on CTX and Flagyl to cover for UTI and intra-abdominal infection, broadened to Cefepime, and Flagyl on 11/26; vanc added later. Endocrine consulted and managed the patient while acutely ill on insulin drip then transitioned to SQ when BG stabilized. IR place low abdominal drain to abscess on 11/28 with 250ml purulent material immediately expressed. Drained remained in place after the procedure. Urology continued to assess patient and recommended CT RSS after identifying some left CVAT and increased SCr with reduced UOP on 11/29, no hydronephrosis does not believe LAURIE is post-renal in nature. 11/30 pt remains anuric, Cr up-trending to 4.2, with hyponatremia. Nephrology consulted. 12/1 pt remains anuric after Lasix + Diuril challenge. Nephrology decided for initiation of CRRT due to low pressures with unresponsive ARF. Patient transferred to ICU for CRRT. Pt transferred back to floor as no more need for CRRT on 12/5. Cystoscopy with retrograde pyleogram for 12/7 by Urology. Repeat CT showed Interval resolution of multilobular fluid collection in the lower pelvis. There were some concerns of pneumonia at this time and patient briefly on different regimens (zosyn > ceftriaxone > levaquin) but this was determined to likely be atelectasis. Patient had ROSAURA drain and march removed on 12/11 after evaluation by Urogyn and Nephrology. Patient passed voiding trial, discussed with ID who recommended patient be discharged on cefpodoxine 200 " QD, metronidazole 500 TID with no follow up required. Patient will follow up with Urogyn, Nephrology, and PCP. Urogyn and Nephrology stated they will make the patient's appointments.       * Postprocedural intraabdominal abscess  Pt presented to [facility] due to 5-6 days of 10/10 lower abdomen, vaginal, and rectal pain. Pt has recent h/o GYN surgery in 10/2020 for bladder mesh removal, Cifuentes was removed on 11/19.  CT A/P 11/27: Multiple fluid collections adjacent to bladder. Mild bilateral hydronephrosis.   CT A/P 12/8: Interval resolution of previously identified multilobular fluid collection in the lower pelvis with drainage catheter in position.  Residual scattered small volume of air is noted.   UCx 11/26: E.coli sensitive to CTX, cefepime   BCx 12/5: Peptostrptococcus   Cystography 12/6: No bladder leak     Pain management: PCA pump initially. PCA pump d/c on 11/30, now on PRN opiates.     PLAN  - Urology consulted, appreciate recs  - GYN consulted, appreciate consult.  - IR consulted, appreciate recs; Drain in places. Removed 12/11.  - ID consulted for intra-abdominal infection, appreciate recs          - CTX & Flagyl x14 days, repeat imaging to assess for cleared infection prior to d/c abx          - If d/c before 14d course, may switch to augmentin          - 12/9: broadened ABX to cover HAP, switched CTX-->Cefepime, added vanc, and continued flagyl              - 12/10: Transitioned patient levaquin + metronidazole.           - 12/11: Discussed patient with ID, cefpodoxime + MALIK a better option given Peptostreptococcus           Atelectasis of both lungs  Patient on 3L NC, although sat 97-98%, weaned down to RA. Patient with longstanding obstructive lung disease (asthma) -- goal sat >88%.     12/7 CXR: Interval progression in the extent of right perihilar and right lower lung zone infiltrate and or atelectasis.  New and or mildly increased small right pleural effusion.   12/8 CTAP: Bibasilar consolidative  changes, right more than left with small volume right-sided pleural effusion.  Findings likely related to pneumonia versus inflammatory process.     12/9 CT Chest w/o Contrast:   · Bilateral lower lobe consolidative opacities right greater than left and bandlike atelectasis bilaterally with trace pleural effusions similar to exam 12/08/2020.  Differential diagnosis includes aspiration, pneumonia, atelectasis, and less likely noninfectious inflammation.  · Slight ground-glass opacities predominantly right apex which may represent infectious or noninfectious inflammatory etiology.   · Partially aerated retained secretions within the trachea.   · Scattered calcified mediastinal lymph nodes and soft tissue density in the anterior mediastinum relatively unchanged dating back to 07/27/2012.      - Initially Abx broadened due to concern for HCAP given oxygen sats, however given that patient is afebrile, history of Asthma, and bilateral nature of consolidative changes, this may be atelectasis.  - Tracheal secretions in airway noted on CT -- will add BID cough assists for help with expectoration  - wean supplemental O2 for goal sat >88% (pt with chronic obstructive lung disease - asthma)     Yeast infection  - Fluconazole 150mg PO  - Miconazole cream     Elevated alkaline phosphatase level  Alk Phos has been trending up this hospitalization, on 12/9 . Unclear etiology. Other LFTs wnl - T bili, AST/ALT.      Recent Labs   Lab 12/11/20  0616   ALT 6*   AST 13   ALKPHOS 364*   BILITOT 0.2   PROT 7.6   ALBUMIN 2.2*          Recent Labs   Lab 12/09/20  0909   *      -  Improving  - Outpatient workup     Anemia  Pt Hb 9.8 on admit. BL ~10.   Most likely due to anemia of chronic disease, acute blood loss, and frequent phlebotomy while hospitalized.          Recent Labs   Lab 12/07/20  0511 12/08/20  0354 12/09/20  0909 12/10/20  0301   HGB 7.9* 8.0* 9.0* 8.2*      - Required 1U PRBC on 12/2  - Required 1U PRBC on 12/6  2/2      Impaired mobility and endurance     - PT/OT     Hyponatremia  Earlier this howpitalizarion hyponatremia to 120s with spontaneous jerking motions 11/29. Na now normalized. Nephrology continuing to follow for LAURIE. Possibly 2/2 D5 IVF with decreased UOP while in DKA. Fluid restrict 1.5L          Recent Labs   Lab 12/06/20  0752 12/07/20  0511 12/08/20  0354 12/09/20  0909 12/10/20  0300   * 136 134* 135* 135*         - Nephrology on board, appreciate recs        Acute kidney injury (LAURIE) with acute tubular necrosis (ATN)  Cr 1.5 on presentation. Likely multi-factorial on presentation 2/2 pre-renal volume depletion in the context of DKA, UTI, and post-renal obstruction. Repeat LAURIE on 11/29 likely intrinsic ATN in etiology.  Initiation of CRRT, transfer to ICU on 12/2. Transferred to floor on 12/4. Trialysis catheter to RIJ removed on 12/9          Recent Labs   Lab 12/07/20  0511 12/08/20  0354 12/09/20  0909 12/10/20  0300 12/11/20  0616   CREATININE 5.1* 5.3* 4.7* 4.4* 3.7*      - Nephrology consulted, appreciate recs  - Watch for post-ATN diuresis, consider IVF replacement if continued significant diuresis  - Out-pt nephro clinic follow up  - Renally dose meds  - Avoid renal toxins (ACEi/ARB, NSAIDs, contrast, etc.)  - Strict I&Os        Urinary retention  Pt has recent h/o GYN surgery in 10/2020 for bladder mesh removal, Cifuentes was removed on 11/19. For the past few days pt has had increased trouble urinating associated with pain.   CT A/P 11/27: Multiple fluid collections adjacent to bladder. Mild bilateral hydronephrosis.   CT RSS 11/29 hydronephrosis resolved. 12/6 spoke with urology concerning ROSAURA drain output concern for urinary contents. Per Urology, since ROSAURA fluid Cr 1.7 is less than serum Cr 4.5, the fluid is not urine. Negative cystogram and normal retrograde pyelogram     - Urology consulted, recs appreciated;  - Cifuentes removed 12/11, will monitor output  - Will f/u UroGyn outpatient     Type 2  diabetes mellitus with hyperglycemia, with long-term current use of insulin  Last Hb A1c 10.3 on 9/25/20  Home regimen: Metformin 500 BID, Aspart 15U TID, Glargine 60 BID  Pt says her blood sugar has been running to the 300s for the past few days  - Hold oral anti-glycemics while hospitalized  - Endocrine consulted, recs appreciated  - Accu-checks TIDAC + qhs  - See DKA notes        Asthma  Pt states she uses Albuterol inhaler TID every day.   - Duonebs q6 PRN     Diabetic ketoacidosis without coma associated with type 2 diabetes mellitus  [place] [facility] with Gluc 626, HCO3 16, AG 16, BHB 4.2, ketonuria. 2L IVF given in ED with insulin gtt started. Glucose trended downward to 274. Pt was then admitted to hospital medicine for further management of DKA.     - Endocrine consulted, appreciate recs  - Detemir 35U BID, Aspart 18U TID WM  - Goal gluc 140-180  - Diabetic diet  - BMP daily  - Will replace K PRN  - Nausea: zofran PRN      Hyperthyroidism  Continue home Methimazole     - TSH WNL  - Endocrine aware     Severe obesity (BMI 35.0-35.9 with comorbidity)  -  on exercise, diet, and weight management      Mixed hyperlipidemia  - Continue home statin        Essential hypertension  Hold home Benazepril due to LAURIE, low BP     - Will monitor BP and add anti-hypertensive as necessary           Consults:   Consults (From admission, onward)        Status Ordering Provider     Inpatient consult to Critical Care Medicine  Once     Provider:  (Not yet assigned)    Completed BETTY MEADE JR     Inpatient consult to Critical Care Medicine  Once     Provider:  (Not yet assigned)    Completed MELA PRITCHETT     Inpatient consult to Endocrinology  Once     Provider:  (Not yet assigned)    Completed MELA PRITCHETT     Inpatient consult to Gynecology  Once     Provider:  (Not yet assigned)    Completed VIMAL CHANCE     Inpatient consult to Infectious Diseases  Once     Provider:  (Not yet assigned)     Completed MELA PRITCHETT     Inpatient consult to Interventional Radiology  Once     Provider:  (Not yet assigned)    Completed VIMAL CHANCE     Inpatient consult to Midline team  Once     Provider:  (Not yet assigned)    Completed MELA PRITCHETT     Inpatient consult to Midline team  Once     Provider:  (Not yet assigned)    Completed DANITZA POON     Inpatient consult to Nephrology  Once     Provider:  (Not yet assigned)    Completed MELA PRITCHETT     Inpatient consult to Physical Medicine Rehab  Once     Provider:  (Not yet assigned)    Completed DANITZA POON     Inpatient consult to Registered Dietitian/Nutritionist  Once     Provider:  (Not yet assigned)    Completed ROGERIO JARA     Inpatient consult to Urology  Once     Provider:  (Not yet assigned)    Completed BETTY MEADE JR     Pharmacy to dose Vancomycin consult  Once     Provider:  (Not yet assigned)    Completed ROSI BUNDY          No new Assessment & Plan notes have been filed under this hospital service since the last note was generated.  Service: Hospital Medicine    Final Active Diagnoses:    Diagnosis Date Noted POA    PRINCIPAL PROBLEM:  Type 2 diabetes mellitus with hyperglycemia, with long-term current use of insulin [E11.65, Z79.4] 07/17/2020 Not Applicable    Postprocedural intraabdominal abscess [T81.43XA] 05/28/2012 Yes    Atelectasis of both lungs [J98.11] 12/08/2020 Unknown    Yeast infection [B37.9] 12/10/2020 Unknown    LAURIE (acute kidney injury) [N17.9]  No    Elevated alkaline phosphatase level [R74.8] 12/09/2020 Unknown    Anemia [D64.9] 12/06/2020 Unknown    Impaired mobility and endurance [Z74.09] 12/01/2020 No    Hyponatremia [E87.1] 11/30/2020 No    Urinary retention [R33.9] 11/27/2020 Yes    Acute kidney injury (LAURIE) with acute tubular necrosis (ATN) [N17.0] 11/27/2020 Yes    Asthma [J45.909] 07/17/2020 Yes    Diabetic ketoacidosis without coma associated with type 2  "diabetes mellitus [E11.10] 08/07/2017 Yes    Hyperthyroidism [E05.90] 08/07/2017 Unknown    Severe obesity (BMI 35.0-35.9 with comorbidity) [E66.01, Z68.35] 09/09/2013 Not Applicable    Essential hypertension [I10] 07/06/2012 Yes    Mixed hyperlipidemia [E78.2] 04/14/2012 Yes      Problems Resolved During this Admission:    Diagnosis Date Noted Date Resolved POA    Anemia due to stage 3 chronic kidney disease [N18.30, D63.1] 12/06/2020 12/06/2020 Unknown    Abnormal white blood cell (WBC) [D72.9]  12/09/2020 Yes    Postprocedural intraabdominal abscess [T81.43XA] 11/29/2020 12/11/2020 Yes    UTI (urinary tract infection) [N39.0] 11/27/2020 12/11/2020 Unknown       Discharged Condition: good    Disposition: Home or Self Care    Follow Up:  Follow-up Information     Wilner Goel MD. Schedule an appointment as soon as possible for a visit in 1 week.    Specialties: Gynecology, Urology  Why: follow up  Contact information:  [address] [place] [id number]  [phone]                 Patient Instructions:      BATH/SHOWER CHAIR FOR HOME USE     Order Specific Question Answer Comments   Height: 5' 7" (1.702 m)    Weight: 109.5 kg (241 lb 6.5 oz)    Does patient have medical equipment at home? walker, rolling    Length of need (1-99 months): 99    Type: With back      Lifting restrictions     Notify your health care provider if you experience any of the following:  temperature >100.4     Notify your health care provider if you experience any of the following:  persistent nausea and vomiting or diarrhea     Notify your health care provider if you experience any of the following:  severe uncontrolled pain     Notify your health care provider if you experience any of the following:  redness, tenderness, or signs of infection (pain, swelling, redness, odor or green/yellow discharge around incision site)     Notify your health care provider if you experience any of the following:  persistent " dizziness, light-headedness, or visual disturbances     Notify your health care provider if you experience any of the following:  increased confusion or weakness     Activity as tolerated       Significant Diagnostic Studies: Labs:   CMP   Recent Labs   Lab 12/11/20 0616 12/12/20 0518    133*   K 4.1 4.9   CL 97 98   CO2 30* 25   * 434*   BUN 25* 25*   CREATININE 3.7* 3.5*   CALCIUM 8.3* 8.2*   PROT 7.6 7.4   ALBUMIN 2.2* 2.4*   BILITOT 0.2 0.2   ALKPHOS 364* 342*   AST 13 17   ALT 6* 8*   ANIONGAP 10 10   ESTGFRAFRICA 14.3* 15.3*   EGFRNONAA 12.4* 13.3*    and CBC   Recent Labs   Lab 12/11/20 0616 12/12/20 0518   WBC 9.86 9.10   HGB 9.3* 8.5*   HCT 30.3* 27.7*   * 394*     Microbiology:   Blood Culture   Lab Results   Component Value Date    LABBLOO No growth after 5 days. 11/27/2020       Pending Diagnostic Studies:     Procedure Component Value Units Date/Time    Basic metabolic panel [[id number]] Collected: 12/05/20 0523    Order Status: Sent Lab Status: In process Updated: 12/05/20 0524    Specimen: Blood     Basic metabolic panel [[id number]] Collected: 12/03/20 1539    Order Status: Sent Lab Status: In process Updated: 12/03/20 1540    Specimen: Blood     Basic metabolic panel [[id number]] Collected: 11/30/20 0532    Order Status: Sent Lab Status: In process Updated: 11/30/20 0532    Specimen: Blood     CBC auto differential [[id number]] Collected: 12/05/20 0523    Order Status: Sent Lab Status: In process Updated: 12/05/20 0524    Specimen: Blood     Calcium, ionized [[id number]] Collected: 12/05/20 0523    Order Status: Sent Lab Status: In process Updated: 12/05/20 0524    Specimen: Blood     Calcium, ionized [[id number]] Collected: 12/03/20 1539    Order Status: Sent Lab Status: In process Updated: 12/03/20 1540    Specimen: Blood     Comprehensive metabolic panel [[id number]] Collected: 12/05/20 0523    Order Status: Sent Lab Status: In process Updated: 12/05/20 0524    Specimen: Blood      Magnesium [[id number]] Collected: 12/05/20 0523    Order Status: Sent Lab Status: In process Updated: 12/05/20 0524    Specimen: Blood     Magnesium [[id number]] Collected: 12/03/20 1539    Order Status: Sent Lab Status: In process Updated: 12/03/20 1540    Specimen: Blood     Phosphorus [[id number]] Collected: 12/05/20 0523    Order Status: Sent Lab Status: In process Updated: 12/05/20 0524    Specimen: Blood     Renal function panel [[id number]] Collected: 12/05/20 0523    Order Status: Sent Lab Status: In process Updated: 12/05/20 0524    Specimen: Blood     Renal function panel [[id number]] Collected: 12/03/20 1539    Order Status: Sent Lab Status: In process Updated: 12/03/20 1540    Specimen: Blood     Urea nitrogen, urine [[id number]] Collected: 11/30/20 2045    Order Status: Sent Lab Status: In process Updated: 11/30/20 2046    Specimen: Urine, Catheterized          Medications:  Reconciled Home Medications:      Medication List      START taking these medications    AntifungaL Cream (miconazole) 2 % cream  Generic drug: miconazole  Apply topically 2 (two) times daily.     cefpodoxime 200 MG tablet  Commonly known as: VANTIN  Take 1 tablet (200 mg total) by mouth once daily. for 3 doses  Start taking on: December 13, 2020     gabapentin 300 MG capsule  Commonly known as: NEURONTIN  Take 1 capsule (300 mg total) by mouth once daily.  Replaces: gabapentin 600 MG tablet     LEVEMIR FLEXTOUCH U-100 INSULN 100 unit/mL (3 mL) Inpn pen  Generic drug: insulin detemir U-100  Inject 30 Units into the skin 2 (two) times daily.     metroNIDAZOLE 500 MG tablet  Commonly known as: FLAGYL  Take 1 tablet (500 mg total) by mouth every 8 (eight) hours. for 3 days  Start taking on: December 13, 2020     NovoLOG Flexpen U-100 Insulin 100 unit/mL (3 mL) Inpn pen  Generic drug: insulin aspart U-100  Inject 18 Units into the skin 3 (three) times daily.   Sliding scale: Inject 0-5 Units into the skin before meals and at bedtime as  needed (Hyperglycemia). Max daily dose of 69 units.  Replaces: NovoLOG U-100 Insulin aspart 100 unit/mL injection     oxyCODONE 15 MG Tab  Commonly known as: ROXICODONE  Take 1 tablet (15 mg total) by mouth every 6 (six) hours as needed (pain).        CHANGE how you take these medications    estradioL 0.01 % (0.1 mg/gram) vaginal cream  Commonly known as: ESTRACE  Place 1 g vaginally 3 (three) times a week. Alternate days with Metrogel for 14 days  What changed: additional instructions     flurazepam 30 MG capsule  Commonly known as: DALMANE  Take 30 mg by mouth nightly as needed for Insomnia.  What changed: Another medication with the same name was removed. Continue taking this medication, and follow the directions you see here.     pantoprazole 40 MG tablet  Commonly known as: PROTONIX  Take 1 tablet (40 mg total) by mouth once daily. Start after the twice daily Protonix is complete.Follow-up with doctor for further refills.  What changed:   · when to take this  · reasons to take this  · additional instructions        CONTINUE taking these medications    albuterol 90 mcg/actuation inhaler  Commonly known as: PROVENTIL/VENTOLIN HFA  Inhale 1-2 puffs into the lungs every 6 (six) hours as needed for Wheezing or Shortness of Breath.     atorvastatin 20 MG tablet  Commonly known as: LIPITOR  Take 20 mg by mouth once daily.     DULoxetine 60 MG capsule  Commonly known as: CYMBALTA  Take 1 capsule (60 mg total) by mouth once daily.     FLOVENT HFA 44 mcg/actuation inhaler  Generic drug: fluticasone propionate  Inhale 1 puff into the lungs once daily.     HYDROcodone-acetaminophen  mg per tablet  Commonly known as: NORCO  Take 1 tablet by mouth every 8 (eight) hours as needed for Pain.     methIMAzole 10 MG Tab  Commonly known as: TAPAZOLE  Take 10 mg by mouth once daily.     tamsulosin 0.4 mg Cap  Commonly known as: FLOMAX  Take 1 capsule (0.4 mg total) by mouth once daily.     tiZANidine 4 MG tablet  Commonly  known as: ZANAFLEX  Take 2-3 tablets (8-12 mg total) by mouth nightly as needed (muscle spasms).     TRUE METRIX GLUCOSE METER MISC  True Metrix Glucose Meter     TRUE METRIX GLUCOSE TEST STRIP Strp  Generic drug: blood sugar diagnostic        STOP taking these medications    benazepriL 10 MG tablet  Commonly known as: LOTENSIN     gabapentin 600 MG tablet  Commonly known as: NEURONTIN  Replaced by: gabapentin 300 MG capsule     ibuprofen 600 MG tablet  Commonly known as: ADVIL,MOTRIN     insulin glargine 100 unit/mL injection  Commonly known as: LANTUS U-100 INSULIN     METFORMIN ORAL     NovoLOG U-100 Insulin aspart 100 unit/mL injection  Generic drug: insulin aspart U-100  Replaced by: NovoLOG Flexpen U-100 Insulin 100 unit/mL (3 mL) Inpn pen     oxyCODONE-acetaminophen 7.5-325 mg per tablet  Commonly known as: PERCOCET            Indwelling Lines/Drains at time of discharge:   Lines/Drains/Airways     None                 Time spent on the discharge of patient: 50 minutes  Patient was seen and examined on the date of discharge and determined to be suitable for discharge.         Stefano Dimas MD  Department of Hospital Medicine  [facility]

## 2020-12-14 ENCOUNTER — TELEPHONE (OUTPATIENT)
Dept: ADMINISTRATIVE | Facility: CLINIC | Age: 60
End: 2020-12-14

## 2020-12-14 PROCEDURE — G0180 PR HOME HEALTH MD CERTIFICATION: ICD-10-PCS | Mod: ,,, | Performed by: INTERNAL MEDICINE

## 2020-12-14 PROCEDURE — G0180 MD CERTIFICATION HHA PATIENT: HCPCS | Mod: ,,, | Performed by: INTERNAL MEDICINE

## 2020-12-14 NOTE — PLAN OF CARE
[facility]    HOME HEALTH ORDERS  FACE TO FACE ENCOUNTER    Patient Name: Navin Beck  YOB: 1960    PCP: Elvira Quinones MD   PCP Address: [address] / [place] [address]  PCP Phone Number: [phone]  PCP Fax: [phone]    Encounter Date: 12/14/2020    Admit to Home Health    Diagnoses:  Active Hospital Problems    Diagnosis  POA    *Type 2 diabetes mellitus with hyperglycemia, with long-term current use of insulin [E11.65, Z79.4]  Not Applicable    Postprocedural intraabdominal abscess [T81.43XA]  Yes     Priority: 1 - High    Atelectasis of both lungs [J98.11]  No     Priority: 2     Yeast infection [B37.9]  Yes    LAURIE (acute kidney injury) [N17.9]  No    Elevated alkaline phosphatase level [R74.8]  No    Anemia [D64.9]  Yes    Impaired mobility and endurance [Z74.09]  No    Hyponatremia [E87.1]  No    Urinary retention [R33.9]  Yes    Acute kidney injury (LAURIE) with acute tubular necrosis (ATN) [N17.0]  Yes    Asthma [J45.909]  Yes    Hyperthyroidism [E05.90]  Yes    Severe obesity (BMI 35.0-35.9 with comorbidity) [E66.01, Z68.35]  Not Applicable    Essential hypertension [I10]  Yes    Mixed hyperlipidemia [E78.2]  Yes      Resolved Hospital Problems    Diagnosis Date Resolved POA    Anemia due to stage 3 chronic kidney disease [N18.30, D63.1] 12/06/2020 Unknown    Abnormal white blood cell (WBC) [D72.9] 12/09/2020 Yes    Postprocedural intraabdominal abscess [T81.43XA] 12/11/2020 Yes    UTI (urinary tract infection) [N39.0] 12/11/2020 Unknown    Diabetic ketoacidosis without coma associated with type 2 diabetes mellitus [E11.10] 12/12/2020 Yes       No future appointments.  Follow-up Information     Wilner Goel MD. Schedule an appointment as soon as possible for a visit in 1 week.    Specialties: Gynecology, Urology  Why: follow up  Contact information:  [address] [place] [id number] [phone]                      I have seen and examined this patient face to face today. My clinical findings that support the need for the home health skilled services and home bound status are the following:  Weakness/numbness causing balance and gait disturbance due to Surgery making it taxing to leave home.    Allergies:  Review of patient's allergies indicates:   Allergen Reactions    Penicillins Shortness Of Breath, Itching and Swelling     Tolerates cefepime 11/30/2020       Diet: renal diet    Activities: activity as tolerated    Nursing:   SN to complete comprehensive assessment including routine vital signs. Instruct on disease process and s/s of complications to report to MD. Review/verify medication list sent home with the patient at time of discharge  and instruct patient/caregiver as needed. Frequency may be adjusted depending on start of care date.    Notify MD if SBP > 160 or < 90; DBP > 90 or < 50; HR > 120 or < 50; Temp > 101; Other:         CONSULTS:    Physical Therapy to evaluate and treat. Evaluate for home safety and equipment needs; Establish/upgrade home exercise program. Perform / instruct on therapeutic exercises, gait training, transfer training, and Range of Motion.  Occupational Therapy to evaluate and treat. Evaluate home environment for safety and equipment needs. Perform/Instruct on transfers, ADL training, ROM, and therapeutic exercises.  Aide to provide assistance with personal care, ADLs, and vital signs.    MISCELLANEOUS CARE:  N/A    WOUND CARE ORDERS  n/a      Medications: Review discharge medications with patient and family and provide education.      Discharge Medication List as of 12/12/2020 12:08 PM      START taking these medications    Details   cefpodoxime (VANTIN) 200 MG tablet Take 1 tablet (200 mg total) by mouth once daily. for 3 doses, Starting Sun 12/13/2020, Until Wed 12/16/2020, Normal      gabapentin (NEURONTIN) 300 MG capsule Take 1 capsule (300 mg total) by mouth once daily.,  Starting Sat 12/12/2020, Until Sun 12/12/2021, Normal      insulin detemir U-100 (LEVEMIR FLEXTOUCH) 100 unit/mL (3 mL) SubQ InPn pen Inject 30 Units into the skin 2 (two) times daily., Starting Sat 12/12/2020, Until Sun 12/12/2021, Normal      metroNIDAZOLE (FLAGYL) 500 MG tablet Take 1 tablet (500 mg total) by mouth every 8 (eight) hours. for 3 days, Starting Sun 12/13/2020, Until Wed 12/16/2020, Normal      miconazole (MICOTIN) 2 % cream Apply topically 2 (two) times daily., Starting Sat 12/12/2020, Normal      !! insulin aspart U-100 (NOVOLOG) 100 unit/mL (3 mL) InPn pen Inject 18 Units into the skin 3 (three) times daily., Starting Sat 12/12/2020, Until Sun 12/12/2021, Normal      !! insulin aspart U-100 (NOVOLOG) 100 unit/mL (3 mL) InPn pen Inject 0-5 Units into the skin before meals and at bedtime as needed (Hyperglycemia)., Starting Sat 12/12/2020, Until Sun 12/12/2021, Normal       !! - Potential duplicate medications found. Please discuss with provider.      CONTINUE these medications which have CHANGED    Details   DULoxetine (CYMBALTA) 60 MG capsule Take 1 capsule (60 mg total) by mouth once daily., Starting Sat 12/12/2020, Normal      oxyCODONE (ROXICODONE) 15 MG Tab Take 1 tablet (15 mg total) by mouth every 6 (six) hours as needed (pain)., Starting Sat 12/12/2020, Until Sat 12/19/2020, Print      tamsulosin (FLOMAX) 0.4 mg Cap Take 1 capsule (0.4 mg total) by mouth once daily., Starting Sat 12/12/2020, Normal      tiZANidine (ZANAFLEX) 4 MG tablet Take 2-3 tablets (8-12 mg total) by mouth nightly as needed (muscle spasms)., Starting Sat 12/12/2020, Normal         CONTINUE these medications which have NOT CHANGED    Details   albuterol (PROVENTIL/VENTOLIN HFA) 90 mcg/actuation inhaler Inhale 1-2 puffs into the lungs every 6 (six) hours as needed for Wheezing or Shortness of Breath. , Starting Fri 4/17/2020, Historical Med      atorvastatin (LIPITOR) 20 MG tablet Take 20 mg by mouth once daily.,  Historical Med      estradioL (ESTRACE) 0.01 % (0.1 mg/gram) vaginal cream Place 1 g vaginally 3 (three) times a week. Alternate days with Metrogel for 14 days, Starting Wed 11/11/2020, Until Tue 12/8/2020, Normal      flurazepam (DALMANE) 30 MG capsule Take 30 mg by mouth nightly as needed for Insomnia., Historical Med      fluticasone propionate (FLOVENT HFA) 44 mcg/actuation inhaler Inhale 1 puff into the lungs once daily. , Historical Med      HYDROcodone-acetaminophen (NORCO)  mg per tablet Take 1 tablet by mouth every 8 (eight) hours as needed for Pain., Historical Med      methimazole (TAPAZOLE) 10 MG Tab Take 10 mg by mouth once daily. , Historical Med      pantoprazole (PROTONIX) 40 MG tablet Take 1 tablet (40 mg total) by mouth once daily. Start after the twice daily Protonix is complete.Follow-up with doctor for further refills., Starting Fri 8/11/2017, Print      blood-glucose meter (TRUE METRIX GLUCOSE METER MISC) True Metrix Glucose Meter, Historical Med      TRUE METRIX GLUCOSE TEST STRIP Strp Starting Fri 7/3/2020, Historical Med         STOP taking these medications       benazepriL (LOTENSIN) 10 MG tablet Comments:   Reason for Stopping:         gabapentin (NEURONTIN) 600 MG tablet Comments:   Reason for Stopping:         insulin aspart (NOVOLOG) 100 unit/mL injection Comments:   Reason for Stopping:         insulin glargine (LANTUS U-100 INSULIN) 100 unit/mL injection Comments:   Reason for Stopping:         benazepril (LOTENSIN) 10 MG tablet Comments:   Reason for Stopping:         ibuprofen (ADVIL,MOTRIN) 600 MG tablet Comments:   Reason for Stopping:         METFORMIN HCL (METFORMIN ORAL) Comments:   Reason for Stopping:         oxyCODONE-acetaminophen (PERCOCET) 7.5-325 mg per tablet Comments:   Reason for Stopping:               I certify that this patient is confined to her home and needs intermittent skilled nursing care, physical therapy and occupational therapy.

## 2020-12-15 ENCOUNTER — PATIENT OUTREACH (OUTPATIENT)
Dept: ADMINISTRATIVE | Facility: CLINIC | Age: 60
End: 2020-12-15

## 2020-12-15 NOTE — PHYSICIAN QUERY
PT Name: Navin Beck  MR #: [id number]     Documentation Clarification      CDS: Herve CAMILO,RN        Contact information:[email or web address]    This form is a permanent document in the medical record.     Query Date: December 15, 2020    By submitting this query, we are merely seeking further clarification of documentation. Please utilize your independent clinical judgment when addressing the question(s) below.    The Medical Record reflects the following:    Supporting Clinical Findings Location in Medical Record   Ms. 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 She presents to the emergency department due to vaginal pain, rectal pain and abdominal pain.She also states that for the past couple days she has had severe abdominal pain which is worse in her lower quadrants and pelvic region.   Genitourinary Comments: Tenderness between inguinal folds        11/26/20 ED Provider Notes/Dr. Chester   Abnormal computed tomography of bladder  Patient in retention. Cifuentes placed with 800 cc urine. Gentle manual irrigation: bladder easily irrigated with 100% of fluid placed into the bladder also removed without difficulty. Clear upon return  - CT with multiple fluid collections adjacent to bladder  Recommend placement of drain into pelvic fluid collection by IR    Pre Op Diagnosis: Complex multi-lobulated pelvic fluid collection  Post Op Diagnosis: Complex multi-lobulated pelvic abscess  Procedure: CT-guided placement of a percutaneous anterior midline pelvic approach 10-Fr drainage catheter into the pelvic abscess.   Findings:   Successful CT-guided placement of a percutaneous anterior midline pelvic approach 10-Fr drainage catheter into the pelvic abscess under minimal sedation. Patient tolerated the procedure well. No immediate post-procedural complications noted.     Specimen  Information: Abdomen; Abscess  Component 2wk ago  Fungus (Mycology) Culture Culture in progress    Narrative  Performed by: [facility]  Right pelvis    Yeast infection  - Fluconazole 150mg PO    Yeast infection  - Fluconazole 150mg PO  - Miconazole cream    Fluconazole tablet 150 mg Oral Once 1 dose given   Miconazole 2% cream Topical 2 times daily       11/27/20  Urology Assessment & Plan Note/ Dr. Lua                         11/28/20 Brief Op Note Interventional Radiology/Dr. Kauffman                            11/28/20  Fungus Culture           12/10/20 Hospital Medicine Progress Notes/         12/11/20  Hospital Medicine Progress/ Dr. Dimas      12/10/20  MAR  12/11----->12/12 MAR                                                                            Doctor, please clarify the location of the yeast infection.     [  X ] Pelvic abscess   [   ] Abdominal abscess   [   ] Other (please specify): __________________________   [  ] Clinically undetermined

## 2020-12-15 NOTE — PATIENT INSTRUCTIONS
Managing Type 2 Diabetes    Type 2 diabetes is a long-term (chronic) condition. Managing your diabetes means making some changes that may be hard. Your healthcare provider, nurse, diabetes educator, and others can help you.  Managing type 2 diabetes means balancing your medicine with diet and activity. Managing your diabetes may also include checking your blood sugar. And, working with your healthcare provider to prevent complications.  Take your medicine as prescribed  You may take pills (oral medicine) or give yourself shots (usually insulin injections) for diabetes. Or you may use both. Taking your medicines or giving yourself insulin at the right times will help you control your blood sugar. Think about ways that will help you remember to take your medicines the right way every day. Ask your healthcare provider, nurse, or diabetes educator for ideas.  Although you may only take pills for your diabetes, this may change. Over time, most people with type 2 diabetes also use insulin.  Eat healthy  A healthy, well-planned diet helps control the amount of sugar in your blood. It also helps you stay at a healthy weight or lose weight, if you are overweight. Extra weight makes it harder to control diabetes.  Your healthcare provider, nurse, a dietitian, or diabetes educator will help you create a plan that works for you. You don't have to give up all the foods you like. Having meals and snacks with vegetables, fruits, lean meats, or other healthy proteins, whole grains, and low-fat or nonfat dairy products will help control your blood sugar.  Be physically active  Being active helps lower your blood sugar. It does this by helping your body use insulin to turn food into energy. Activity also helps you manage your weight:  Ask your health care provider to work with you to create an activity program that's right for you. Your activity program is based on your age, general health, and types of activity you enjoy. You  should start slowly, but aim for at least 150 minutes of exercise or activity. Dont let more than 2 days go by without being active.  Check your blood sugar  Checking your own blood sugar may be a regular part of your care. Or you may only check your blood sugar from time to time. Your healthcare provider will give you instructions about checking your blood sugar at home. Checking it tells you if your blood sugar is in your target range. Having your blood sugars within the target range means that you are managing your diabetes well.  If your blood sugar levels are too high or too low, your healthcare provider may suggest changes to your diet or activity level. He or she may also adjust your medicine.  Your healthcare provider may also tell you to check your blood sugar more often when you are sick. At certain times, for example, when you have a cold or the flu, you may need to check it more often.  Take care of yourself  When you have diabetes, you may be more likely to develop other health problems. They include foot, eye, heart, and kidney problems. By controlling your blood sugar, and taking good care of yourself, you can help prevent these problems. Your health care provider, nurse, diabetes educator, and others can assist you:  · Checkups. You should have regular checkups with your healthcare provider. At those visits, you will have a physical exam that includes checking your feet. Your healthcare provider will also check your blood pressure and weight.  · Other exams. You should also have complete eye, foot, and dental exams at least once every year.  · Lab tests. You will have blood and urine tests:  ¨ At least two times a year, your healthcare provider will check your hemoglobin A1C. This blood test shows how well you have been controlling your blood sugar over 2 to 3 months. The results help your healthcare provider manage your diabetes.    ¨ You will also have other lab tests. For example, to check for  kidney problems and abnormal cholesterol levels.  · Smoking. If you smoke, you will need to quit. Smoking increases the chance that you will develop complications from diabetes. Ask your healthcare provider about ways to quit.  · Vaccines. Get a yearly flu shot. And, ask your healthcare provider about vaccines to prevent pneumonia, shingles, and hepatitis B.  Stress and depression  Most people have challenges throughout their lives. Living with diabetes, or any serious condition, can increase your stress and make you feel a lot of different emotions. In diabetes, feeling stressed or depressed can actually affect your blood sugar levels.  If you are having trouble dealing with diabetes, tell your healthcare provider. He or she can help or refer you to other healthcare providers or programs.  Support and resources  Know where you can get help. You can try the following:  · Support. Ask family and friends to support your effects to take care of yourself. Or, look for a diabetes support group locally or on the internet. (Check the Connect with Others link on www.diabetes.org)  · Counseling. Talk with a , psychologist, psychiatrist, or other counselor.  · Information. Contact the American Diabetes Association at 866-049-5935 or www.diabetes.org    © 4548-5776 [employer]. [address] [place] [address], [place] [address]. All rights reserved. This information is not intended as a substitute for professional medical care. Always follow your healthcare professional's instructions.        Understanding Sepsis  Sepsis is a severe response the body has to an infection. It is most often caused by bacteria. It is also known as septicemia, or systemic inflammatory response syndrome (SIRS). Sepsis is a medical emergency. It needs to be treated right away.  What is sepsis?  Sepsis is when the body reacts to an infection with a severe inflammatory response. It can be caused by bacteria, fungus, or a virus. Sepsis  can cause many kinds of problems around the body. It can lead severe low blood pressure (shock) and organ failure. This can lead to death if not treated.  Sepsis is most common in:  · Infants and older adults  · People with an infection such as pneumonia, meningitis, or a urinary tract infection  · People who have an illness such as cancer, AIDS, or diabetes  · People being treated with chemotherapy medications or radiation  · People who have had a transplant  Symptoms of sepsis  Symptoms of sepsis can include:  · Chills and shaking  · Rapid heartbeat  · Rapid breathing  · Shortness of breath  · Severe nausea or uncontrolled vomiting  · Confusion  · Dizziness  · Decreased urination  · Severe pain, including in the back or joints   Diagnosing sepsis  If your health care provider thinks you may have sepsis, you will be given tests. You may have blood and urine tests. These are done to look for bacteria, viruses, or fungus. You may also have X-rays or other imaging tests. These may be done to look at your organs to locate the source of infection.  Treating sepsis  If you have sepsis, your health care provider will give you antibiotics through a thin, flexible tube put into a vein in your arm (IV). You will also be given fluids through the IV. You may also be given nutrition or other medications through your IV. Your health care provider will talk with you about other treatments you may need. These may include using an oxygen mask or a ventilator to help with breathing. Treatment may last at least 7 to 10 days. Sepsis must be treated in the hospital.    © 3376-7721 [employer]. [address] [place] [address], [place] [address]. All rights reserved. This information is not intended as a substitute for professional medical care. Always follow your healthcare professional's instructions.        Diabetic Ketoacidosis  Diabetic ketoacidosis (DKA) is a serious problem that can happen in people with diabetes. DKA should  be treated as a medical emergency. This is because it can lead to coma or death. If you have the symptoms of DKA, get medical help right away.  DKA happens more often in people with type 1 diabetes. But it can happen in people with type 2 diabetes. It can also happen in women with diabetes during pregnancy. This is often known as gestational diabetes.  DKA happens when insulin levels are too low. Without enough insulin, sugar (glucose) cant get to the cells of your body. The glucose stays in the blood. This causes high blood glucose (hyperglycemia). Without glucose, your body breaks down stored fat for energy. When this happens, acids called ketones are released into the blood. This is known as ketosis. High levels of ketones (ketoacidosis) can be harmful to you. Hyperglycemia and ketoacidosis can also cause serious problems in the blood and your body, such as:  · Low levels of potassium (hypokalemia)  · Swelling inside the brain (cerebral edema)  · Fluid in the lungs (pulmonary edema)  · Damage to kidneys or other organs  What causes diabetic ketoacidosis?  In people with diabetes, DKA is most often caused by too little insulin in the body. It is also caused by:  · Poor management of diabetes  · Infections like a urinary tract infection or pneumonia  · Serious health problems, such as a heart attack  · Reactions to certain prescribed medicines and illicit drugs  Symptoms of diabetic ketoacidosis  DKA most often happens slowly over time. But it can worsen in a few hours if you are vomiting. The first symptoms are:  · Thirst and dry mouth  · Urinating a lot  · Belly pain  · Nausea or vomiting  · Breath that smells fruity (from the ketones)  Over time, these symptoms may happen:  · Dry or flushed skin  · Nausea and vomiting  · Loss of appetite  · Weight loss  · Belly pain  · Trouble breathing  · Trouble thinking or confusion  · Feeling very tired or weak. This can lead to coma.  How is diabetic ketoacidosis  diagnosed?  Your healthcare provider will ask about your medical history. He or she will give you a physical exam. You may also have these tests:  · Blood tests to check your glucose levels  · Blood tests to check your electrolytes, such as potassium and sodium  · Urine test to check for ketones  These tests are done to check for DKA, and monitor it over time.  How is diabetic ketoacidosis treated?  DKA needs treatment right away in the hospital. Treatment includes:  · Insulin. This is the main type of treatment. Insulin allows the cells to use the glucose in the blood. This lowers the levels of both blood glucose and ketones.  · Fluids and electrolytes. These are given through a vein (IV). Fluids are replaced and abnormal electrolyte levels are corrected.  · Other medicines. These may be given to treat an illness that caused DKA. For example, antibiotics may be given to treat a urinary tract infection that caused DKA.  Preventing diabetic ketoacidosis  To help prevent DKA, make sure you:  · Take all of your medicines for diabetes exactly as prescribed. This includes insulin.  · Check your blood glucose levels exactly as instructed.  · Be especially careful when you are sick with an illness or an infection. Take extra care to follow diabetes care instructions. Check your blood glucose more often.  · Do not exercise when your blood sugar is high and you have ketones in your urine.   · Check your urine ketone levels if told to do so. This is done with a urine test strip. Ask your healthcare provider how often to check your urine.     When to call your healthcare provider  Call your healthcare provider right away if you:  · Have symptoms of DKA  · Have very high blood glucose levels  · Are getting sick with another illness     © 4808-6830 [employer]. [address] [place] [address], [place] [address]. All rights reserved. This information is not intended as a substitute for professional medical care. Always  follow your healthcare professional's instructions.

## 2020-12-17 NOTE — PHYSICIAN QUERY
PT Name: Navin Beck  MR #: [id number]     Documentation Clarification      CDS: Herve CAMILO,RN        Contact information:[email or web address]    This form is a permanent document in the medical record.     Query Date: December 17, 2020    By submitting this query, we are merely seeking further clarification of documentation. Please utilize your independent clinical judgment when addressing the question(s) below.    The Medical Record reflects the following:    Clinical Findings Location in Medical Records   Effort: Pulmonary effort is normal. No respiratory distress.   Breath sounds: Normal breath sounds.    Effort: Pulmonary effort is normal. No respiratory distress.     Breathing is regular equal and unlabored bilaterally on room air.     Hypoxia overnight to 75% on RA, only increased to 80s on NC, SpO2 >90 on CPAP, CXR showed R sided atelectasis.     PH: 7.272                 7.231            7.297  PCO2: 39.0               45.0              30.7  PO2:45                      40                 98  HCO3: 18.0               18.9              15.0  Saturated O2: 75      65                  97  Sample:  Venous     Pulmonary effort is normal. No respiratory distress.       11/27/20 Critical Care Medicine Consults/SOTO Ivory         11/28/20  Urology Progress Notes/Dr. Mena      11/29/20  Nursing Plan of Care/KRYSTA Nunez      11/30 [facility] Medicine Subjective & Objective/Dr. Maurer           11/27/20, 11/29/20, 12/1/20    Venous blood gases                        12/8/20  [facility]  Medicine Progress Notes/Dr. Dimas   Acute hypoxic resp failure  Unclear timeline, initially attributed to volume now with some concern for PNA  CT chest, and consider broadening abx  Respiratory: Negative for cough, shortness of breath and wheezing.  Hypoxia  Patient on 3L NC.   12/7 CXR: Interval progression in the extent of right perihilar and right lower lung zone infiltrate and or atelectasis.  New and or mildly  increased small right pleural effusion.   12/8 CTAP: Bibasilar consolidative changes, right more than left with small volume right-sided pleural effusion.  Findings likely related to pneumonia versus inflammatory process.             12/8/20 Hospital Medicine Progress Notes/ Dr. Dimas                                                                            Doctor, please clarify the diagnosis of _acute hypoxic respiratory failure________________:      [  x ] Acute hypoxic respiratory failure is confirmed and additional clinical support/decision-making indicators for the diagnosis include (please specify):_Hypoxia requiring CPAP 2/2 volume overload, did not respond to NC____   [   ] Above stated diagnosis is not confirmed and/or it has been ruled out   [   ] Above stated diagnosis is not confirmed and/or it has been ruled out, other diagnosis ruled in (please specify):_______________   [   ] Other clarification (please specify): ___________________   [  ] Clinically undetermined       Present on admission (POA) status:   [   ] Yes (Y)                          [  ] Clinically Undetermined (W)  [   x] No (N)                            [   ] Documentation insufficient to determine if condition is POA (U)

## 2020-12-23 ENCOUNTER — EXTERNAL HOME HEALTH (OUTPATIENT)
Dept: HOME HEALTH SERVICES | Facility: HOSPITAL | Age: 60
End: 2020-12-23
Payer: MEDICARE

## 2020-12-23 NOTE — PHYSICIAN QUERY
PT Name: Navin Beck  MR #: [id number]     PRESENT ON ADMISSION (POA) DIAGNOSIS CLARIFICATION     CDS: Herve CAMILO,RN        Contact information: [email or web address]  This form is a permanent document in the medical record.     Query Date: December 23, 2020    By submitting this query, we are merely seeking further clarification of documentation.  Please utilize your independent clinical judgment when addressing the question(s) below.     The Medical Record contains following diagnoses documented:    Clinical Information Location in Medical Records   WBC: 17.85--->19.12-->20.10-->11.56-->14.42-->12.29-->12.77-->9.67--->9.86-->9.10  Lactate, venous: 1.3, 1.4--->2.1--->1.0  SARS.CoV.2RNA, Amplification: Negative    Urine Culture  ESCHERICHIA COLI   10,000 - 49,999 cfu/ml   No other significant isolate        LAURIE likely from ATN in setting of sepsis.        ATN in setting of contrast administration and sepsis 2/2 UTI  LAURIE likely 2/2 spesis/ATN as well as contrast associated nephropathy.       ATN in setting of contrast administration and sepsis 2/2 UTI    nephrology following, etiology ATN in setting of contrast administration and sepsis 2/2 UTI      Cefepime injection  2 g Intravenous Every 8 hours  Cefepime injection  2 g Intravenous Every 24 hours  Cefepime injection 2 g Intravenous Every 24 hours   Ceftriaxone 2 g/50 ml D5W IVPB  Intravenous Every 24 hours   Ceftriaxone 2 g/50 ml  D5W IVPB Intravenous Every 24 hours 11/26----->12/12 Labs  11/27/20, 11/30/20 Labs  11/27/20, 12/6/20 Labs       11/26/20  Urine Culture             11/30/20 Endocrinology Progress Notes/Dr. Hurtado       11/30/20 Nephrology Consults/Dr. Swain      12/1/20 Nephrology Progress Notes/    12/11/20 Physical Medicine and Rehabilitation Progress Notes        11/28---->11/29 MAR  11/30/20 MAR  12/9---->12/10 MAR  11/27--->11/28 MAR  12/1---->12/9 MAR         Present on admission (POA) is defined as present at the  time inpatient admission occurs. Conditions that develop during an outpatient encounter, including emergency department, observation or outpatient surgery, are considered as present on admission. [facility] 4th Quarter 2008      Please clarify the Present on Admission (POA) status of the diagnosis: _sepsis 2/2 UTI_____    Present on admission (POA) status:   [    ] Yes (Y)               [ x ] Clinically Undetermined (W)        [   ] No (N)                 [   ] Documentation insufficient to determine if condition is POA (U)

## 2020-12-28 LAB — FUNGUS SPEC CULT: NORMAL

## 2021-01-21 ENCOUNTER — TELEPHONE (OUTPATIENT)
Dept: ADMINISTRATIVE | Facility: HOSPITAL | Age: 61
End: 2021-01-21

## 2021-01-22 ENCOUNTER — DOCUMENT SCAN (OUTPATIENT)
Dept: HOME HEALTH SERVICES | Facility: HOSPITAL | Age: 61
End: 2021-01-22
Payer: MEDICARE

## 2022-05-16 NOTE — PT/OT/SLP PROGRESS
Physical Therapy  PM Treatment    Navin Beck   MRN: [id number]   Admitting Diagnosis: Primary osteoarthritis of right knee    PT Received On: 06/28/17  PT Start Time: 1350     PT Stop Time: 1435    PT Total Time (min): 45 min       Billable Minutes:  Gait [id number], Therapeutic Activity 14 and Therapeutic Exercise 15    Treatment Type: Treatment  PT/PTA: PTA     PTA Visit Number: 1       General Precautions: Standard, fall  Orthopedic Precautions: RLE weight bearing as tolerated   Braces: N/A         Subjective:  Communicated with nurse Coleman prior to session.  Pt agreeable to therapy.     Pain/Comfort  Pain Rating 1: 5/10  Location - Side 1: Right  Location 1: knee  Pain Addressed 1: Pre-medicate for activity, Cessation of Activity, Nurse notified  Pain Rating Post-Intervention 1: 5/10    Objective:   Patient found with: Polar ice, telemetry    Functional Mobility:  Bed Mobility:   Scooting/Bridging: Stand by Assistance  Supine to Sit: Minimum Assistance, With leg lift    Transfers: VC's for safety technique and walker management.   Sit <> Stand Assistance: Stand By Assistance, Contact Guard Assistance  Sit <> Stand Assistive Device: Rolling Walker  Toilet seat  <> Chair Technique: Stand Pivot ( pt ambulated to chair )   Toilet seat  <> Chair Assistance: Stand By Assistance, Contact Guard Assistance  Toilet seat  <> Chair Assistive Device: Rolling Walker  Toilet Transfer Technique: Stand Pivot ( pt ambulated to the bathroom)   Toilet Transfer Assistance: Stand By Assistance, Contact Guard Assistance  Toilet Transfer Assistive Device: Rolling Walker    Gait:   Gait Distance: 160 ft with slow ravi. VC's for safety technique and walker management. VC's for trunk extension during gait training.   Assistance 1: Stand by Assistance  Gait Assistive Device: Rolling walker  Gait Pattern: swing-through gait  Gait Deviation(s): decreased ravi, increased time in double stance, decreased velocity of limb motion,  decreased step length, decreased weight-shifting ability, decreased swing-to-stance ratio      Balance:   Static Sit: GOOD+: Takes MAXIMAL challenges from all directions.    Dynamic Sit: GOOD: Maintains balance through MODERATE excursions of active trunk movement  Static Stand: FAIR+: Takes MINIMAL challenges from all directions  Dynamic stand: FAIR+: Needs CLOSE SUPERVISION during gait and is able to right self with minor LOB     Therapeutic Activities and Exercises:  Pt performed seated BLE x 15 reps : heel/toes raises, LAQ, HS, hip ABD/ADD, GS. VC's for proper technique and sequence.    Discussed with pt about the safety technique with curb step and car transfer. Pt verbalize understand.   HEP given.   AM-PAC 6 CLICK MOBILITY  How much help from another person does this patient currently need?   1 = Unable, Total/Dependent Assistance  2 = A lot, Maximum/Moderate Assistance  3 = A little, Minimum/Contact Guard/Supervision  4 = None, Modified [place]/Independent    Turning over in bed (including adjusting bedclothes, sheets and blankets)?: 4  Sitting down on and standing up from a chair with arms (e.g., wheelchair, bedside commode, etc.): 4  Moving from lying on back to sitting on the side of the bed?: 3  Moving to and from a bed to a chair (including a wheelchair)?: 3  Need to walk in hospital room?: 3  Climbing 3-5 steps with a railing?: 3  Total Score: 20    AM-PAC Raw Score CMS G-Code Modifier Level of Impairment Assistance   6 % Total / Unable   7 - 9 CM 80 - 100% Maximal Assist   10 - 14 CL 60 - 80% Moderate Assist   15 - 19 CK 40 - 60% Moderate Assist   20 - 22 CJ 20 - 40% Minimal Assist   23 CI 1-20% SBA / CGA   24 CH 0% Independent/ Mod I     Patient left up in reclined chair with all lines intact, call button in reach and nurse Virginia notified.    Assessment:  Navin Beck is a 56 y.o. female with a medical diagnosis of Primary osteoarthritis of right knee and presents with weakness,  decreased functional mobility. Pt progressing well toward treatment goals. Pt will benefit from further skilled therapy in order to get back to PLOF.    Rehab identified problem list/impairments: Rehab identified problem list/impairments: weakness, decreased lower extremity function, decreased upper extremity function, pain, decreased safety awareness, decreased coordination, decreased ROM, edema, orthopedic precautions    Rehab potential is good.    Activity tolerance: Good    Discharge recommendations: Discharge Facility/Level Of Care Needs: home health PT ( with family assistance )     Barriers to discharge: Barriers to Discharge: None    Equipment recommendations: Equipment Needed After Discharge: bedside commode, bath bench, walker, rolling     GOALS:    Physical Therapy Goals        Problem: Physical Therapy Goal    Goal Priority Disciplines Outcome Goal Variances Interventions   Physical Therapy Goal     PT/OT, PT      Description:  Goals to be met by: 17     Patient will increase functional independence with mobility by performin. Supine to sit with Modified [place]  2. Rolling to Left and Right with Modified Ashley  3. Sit to stand transfer with Modified [place] using RW  4. Bed to chair transfer with Modified [place] using Rolling Walker  5. Gait  X 200 feet with Modified Ashley using Rolling Walker   6. Lower extremity exercise program x 20 reps per handout, with independence  7. R knee ROM 0-90*                      PLAN:    Patient to be seen BID (Mon-Fri; daily Sat+Sun)  to address the above listed problems via gait training, therapeutic activities, therapeutic exercises  Plan of Care expires: 17  Plan of Care reviewed with: patient         Adri Doe, PTA  2017     Patient would like a respite from community living and enroll in substance use rehab. SW unable to assess level of motivation due to acute depression.

## 2022-10-13 ENCOUNTER — OFFICE VISIT (OUTPATIENT)
Dept: PAIN MEDICINE | Facility: CLINIC | Age: 62
End: 2022-10-13
Payer: MEDICARE

## 2022-10-13 VITALS
WEIGHT: 220.44 LBS | SYSTOLIC BLOOD PRESSURE: 152 MMHG | BODY MASS INDEX: 34.6 KG/M2 | DIASTOLIC BLOOD PRESSURE: 76 MMHG | HEIGHT: 67 IN

## 2022-10-13 DIAGNOSIS — M46.1 SACROILIITIS: Primary | ICD-10-CM

## 2022-10-13 DIAGNOSIS — Z98.890 HISTORY OF LUMBOSACRAL SPINE SURGERY: ICD-10-CM

## 2022-10-13 PROCEDURE — 3078F PR MOST RECENT DIASTOLIC BLOOD PRESSURE < 80 MM HG: ICD-10-PCS | Mod: CPTII,S$GLB,, | Performed by: ANESTHESIOLOGY

## 2022-10-13 PROCEDURE — 1159F PR MEDICATION LIST DOCUMENTED IN MEDICAL RECORD: ICD-10-PCS | Mod: CPTII,S$GLB,, | Performed by: ANESTHESIOLOGY

## 2022-10-13 PROCEDURE — 3060F PR POS MICROALBUMINURIA RESULT DOCUMENTED/REVIEW: ICD-10-PCS | Mod: CPTII,S$GLB,, | Performed by: ANESTHESIOLOGY

## 2022-10-13 PROCEDURE — 3046F PR MOST RECENT HEMOGLOBIN A1C LEVEL > 9.0%: ICD-10-PCS | Mod: CPTII,S$GLB,, | Performed by: ANESTHESIOLOGY

## 2022-10-13 PROCEDURE — 99499 RISK ADDL DX/OHS AUDIT: ICD-10-PCS | Mod: S$GLB,,, | Performed by: ANESTHESIOLOGY

## 2022-10-13 PROCEDURE — 3046F HEMOGLOBIN A1C LEVEL >9.0%: CPT | Mod: CPTII,S$GLB,, | Performed by: ANESTHESIOLOGY

## 2022-10-13 PROCEDURE — 3060F POS MICROALBUMINURIA REV: CPT | Mod: CPTII,S$GLB,, | Performed by: ANESTHESIOLOGY

## 2022-10-13 PROCEDURE — 99999 PR PBB SHADOW E&M-EST. PATIENT-LVL III: CPT | Mod: PBBFAC,,, | Performed by: ANESTHESIOLOGY

## 2022-10-13 PROCEDURE — 99499 UNLISTED E&M SERVICE: CPT | Mod: S$GLB,,, | Performed by: ANESTHESIOLOGY

## 2022-10-13 PROCEDURE — 3077F PR MOST RECENT SYSTOLIC BLOOD PRESSURE >= 140 MM HG: ICD-10-PCS | Mod: CPTII,S$GLB,, | Performed by: ANESTHESIOLOGY

## 2022-10-13 PROCEDURE — 3078F DIAST BP <80 MM HG: CPT | Mod: CPTII,S$GLB,, | Performed by: ANESTHESIOLOGY

## 2022-10-13 PROCEDURE — 4010F PR ACE/ARB THEARPY RXD/TAKEN: ICD-10-PCS | Mod: CPTII,S$GLB,, | Performed by: ANESTHESIOLOGY

## 2022-10-13 PROCEDURE — 3077F SYST BP >= 140 MM HG: CPT | Mod: CPTII,S$GLB,, | Performed by: ANESTHESIOLOGY

## 2022-10-13 PROCEDURE — 3066F NEPHROPATHY DOC TX: CPT | Mod: CPTII,S$GLB,, | Performed by: ANESTHESIOLOGY

## 2022-10-13 PROCEDURE — 1159F MED LIST DOCD IN RCRD: CPT | Mod: CPTII,S$GLB,, | Performed by: ANESTHESIOLOGY

## 2022-10-13 PROCEDURE — 99204 OFFICE O/P NEW MOD 45 MIN: CPT | Mod: S$GLB,,, | Performed by: ANESTHESIOLOGY

## 2022-10-13 PROCEDURE — 99204 PR OFFICE/OUTPT VISIT, NEW, LEVL IV, 45-59 MIN: ICD-10-PCS | Mod: S$GLB,,, | Performed by: ANESTHESIOLOGY

## 2022-10-13 PROCEDURE — 99999 PR PBB SHADOW E&M-EST. PATIENT-LVL III: ICD-10-PCS | Mod: PBBFAC,,, | Performed by: ANESTHESIOLOGY

## 2022-10-13 PROCEDURE — 3066F PR DOCUMENTATION OF TREATMENT FOR NEPHROPATHY: ICD-10-PCS | Mod: CPTII,S$GLB,, | Performed by: ANESTHESIOLOGY

## 2022-10-13 PROCEDURE — 4010F ACE/ARB THERAPY RXD/TAKEN: CPT | Mod: CPTII,S$GLB,, | Performed by: ANESTHESIOLOGY

## 2022-10-13 RX ORDER — HYDROCODONE BITARTRATE AND ACETAMINOPHEN 10; 325 MG/1; MG/1
1 TABLET ORAL
Qty: 20 TABLET | Refills: 0 | Status: SHIPPED | OUTPATIENT
Start: 2022-10-13 | End: 2022-11-22 | Stop reason: SDUPTHER

## 2022-10-13 NOTE — PROGRESS NOTES
PCP: Elvira Quinones MD    REFERRING PHYSICIAN: Angel Quinones,*    CHIEF COMPLAINT: Low Back Pain and left leg pain     Original HISTORY OF PRESENT ILLNESS: Navin Beck presents to the clinic for the evaluation of the above pain. The pain started 15-20 years ago. However, the pain worsened over the past 6 months, she thinks due to stress ([place]/housing).     Original Pain Description:  The pain is located in the left low back and radiates down the back of the left leg to the foot. The pain is described as throbbing. Exacerbating factors: Standing and Walking. Mitigating factors rest and sitting. Symptoms interfere with daily activity. The patient feels like symptoms have been worsening. Patient denies urinary incontinence, bowel incontinence, significant weight loss, and significant motor weakness.    Original PAIN SCORES:  Best: Pain is 4  Worst: Pain is 9  Usually: Pain is 7  Current: Pain is 9    INTERVAL HISTORY:     6 weeks of Conservative therapy (PT/Chiro/Home Exercises with Dates)  PT: Done previously  Chiro: No  HEP: No    Treatments / Medications: (Ice/Heat/NSAIDS/APAP/etc):  APAP  Ibuprofen  Aleve  Excedrine  Heat     Report:    Interventional Pain Procedures: (Previous injections)  Rudy Sharmau - She was discharged for not doing Utox X 2    Past Medical History:   Diagnosis Date    Allergy     Anemia due to stage 3 chronic kidney disease 12/6/2020    Arrhythmia     Arthritis     Bronchial asthma     Diabetes mellitus     Glaucoma     Hormone disorder     hysterectomy    Hypercholesteremia     Hypertension     Hyperthyroidism     Insomnia     Kidney stones     Thyroid disease     Urine, incontinence, stress female     UTI (urinary tract infection) 11/27/2020     Past Surgical History:   Procedure Laterality Date    APPENDECTOMY      BACK SURGERY      disc removal     CARPAL TUNNEL RELEASE Bilateral     CHOLECYSTECTOMY      CYSTOSCOPY N/A 10/26/2020    Procedure: CYSTOSCOPY;   Surgeon: Wilner Goel MD;  Location: [facility];  Service: OB/GYN;  Laterality: N/A;    CYSTOSCOPY W/ RETROGRADES Bilateral 12/7/2020    Procedure: CYSTOSCOPY, WITH RETROGRADE PYELOGRAM;  Surgeon: Daniel Jalloh MD;  Location: [address];  Service: Urology;  Laterality: Bilateral;    FRACTURE SURGERY Right     wrist    HYSTERECTOMY  2010    [facility]    JOINT REPLACEMENT      KNEE ARTHROSCOPY W/ DEBRIDEMENT      KNEE SURGERY Right     Knee replacement    LITHOTRIPSY      ID REMOVAL OF OVARY/TUBE(S)  9/9/13    benign    removal of round ligament mass      ROBOT-ASSISTED LAPAROSCOPIC LYSIS OF ADHESIONS USING DA JAMES XI N/A 10/26/2020    Procedure: XI ROBOTIC LYSIS, ADHESIONS;  Surgeon: Wilner Goel MD;  Location: [facility];  Service: OB/GYN;  Laterality: N/A;  ROBOTIC MOBILIZATION OF BLADDER- DR BURNHAM    SPINE SURGERY      WRIST FRACTURE SURGERY Left      Social History     Socioeconomic History    Marital status:    Tobacco Use    Smoking status: Every Day     Packs/day: 0.50     Years: 30.00     Pack years: 15.00     Types: Cigarettes    Smokeless tobacco: Never   Substance and Sexual Activity    Alcohol use: No    Drug use: No    Sexual activity: Not Currently     Partners: Male     Birth control/protection: Surgical     Family History   Problem Relation Age of Onset    Cancer Mother     Cancer Brother     Colon cancer Brother     Cancer Brother     Cancer Brother     Cancer Brother     Ovarian cancer Neg Hx     Uterine cancer Neg Hx     Breast cancer Neg Hx        Review of patient's allergies indicates:   Allergen Reactions    Penicillins Shortness Of Breath, Itching and Swelling     Tolerates cefepime 11/30/2020       Current Outpatient Medications   Medication Sig    albuterol (PROVENTIL/VENTOLIN HFA) 90 mcg/actuation inhaler Inhale 1-2 puffs into the lungs every 6 (six) hours as needed for Wheezing or Shortness of Breath.     atorvastatin (LIPITOR) 20 MG tablet Take 20 mg  by mouth once daily.    blood-glucose meter (TRUE METRIX GLUCOSE METER MISC) True Metrix Glucose Meter    fluticasone propionate (FLOVENT HFA) 44 mcg/actuation inhaler Inhale 1 puff into the lungs once daily.     gabapentin (NEURONTIN) 300 MG capsule Take 1 capsule (300 mg total) by mouth once daily.    methimazole (TAPAZOLE) 10 MG Tab Take 10 mg by mouth once daily.     pantoprazole (PROTONIX) 40 MG tablet Take 1 tablet (40 mg total) by mouth once daily. Start after the twice daily Protonix is complete.Follow-up with doctor for further refills. (Patient taking differently: Take 40 mg by mouth daily as needed.)    tamsulosin (FLOMAX) 0.4 mg Cap Take 1 capsule (0.4 mg total) by mouth once daily.    tiZANidine (ZANAFLEX) 4 MG tablet Take 2-3 tablets (8-12 mg total) by mouth nightly as needed (muscle spasms).    diclofenac (VOLTAREN) 75 MG EC tablet Take 1 tablet (75 mg total) by mouth 2 (two) times daily. (Patient not taking: Reported on 10/13/2022)    DULoxetine (CYMBALTA) 60 MG capsule Take 1 capsule (60 mg total) by mouth once daily. (Patient not taking: Reported on 10/13/2022)    empagliflozin (JARDIANCE) 10 mg tablet Jardiance 10 mg tablet   Take 1 tablet every day by oral route.    estradioL (ESTRACE) 0.01 % (0.1 mg/gram) vaginal cream Place 1 g vaginally 3 (three) times a week. Alternate days with Metrogel for 14 days (Patient taking differently: Place 1 g vaginally 3 (three) times a week. )    flurazepam (DALMANE) 30 MG capsule Take 30 mg by mouth nightly as needed for Insomnia.    HYDROcodone-acetaminophen (NORCO)  mg per tablet Take 1 tablet by mouth every 8 (eight) hours as needed for Pain.    insulin aspart U-100 (NOVOLOG) 100 unit/mL (3 mL) InPn pen Inject 18 Units into the skin 3 (three) times daily.   Sliding scale: Inject 0-5 Units into the skin before meals and at bedtime as needed (Hyperglycemia). Max daily dose of 69 units.    insulin detemir U-100 (LEVEMIR FLEXTOUCH) 100 unit/mL (3 mL) SubQ  "InPn pen Inject 30 Units into the skin 2 (two) times daily.    miconazole (MICOTIN) 2 % cream Apply topically 2 (two) times daily. (Patient not taking: Reported on 10/13/2022)    TRUE METRIX GLUCOSE TEST STRIP Strp      No current facility-administered medications for this visit.       ROS:  GENERAL: No fever. No chills. No fatigue. Denies weight loss. Denies weight gain.  HEENT: Denies headaches. Denies vision change. Denies eye pain. Denies double vision. Denies ear pain.   CV: Denies chest pain.   PULM: Denies of shortness of breath.  GI: Denies constipation. No diarrhea. No abdominal pain. Denies nausea. Denies vomiting. No blood in stool.  HEME: Denies bleeding problems.  : Denies urgency. No painful urination. No blood in urine.  MS: Denies joint stiffness. Denies joint swelling.  + back pain.  SKIN: Denies rash.   NEURO: Denies seizures. No weakness.  PSYCH:  Denies difficulty sleeping. No anxiety. Denies depression. No suicidal thoughts.       VITALS:   Vitals:    10/13/22 1318   BP: (!) 152/76   Weight: 100 kg (220 lb 7.4 oz)   Height: 5' 7" (1.702 m)   PainSc: 10-Worst pain ever   PainLoc: Back         PHYSICAL EXAM:   GENERAL: Well appearing, in no acute distress, alert and oriented x3.  PSYCH:  Mood and affect appropriate.  SKIN: Skin color, texture, turgor normal, no rashes or lesions.  HEENT:  Normocephalic, atraumatic. Cranial nerves grossly intact.  NECK: No pain to palpation over the cervical paraspinous muscles. No pain to palpation over facets. No pain with neck flexion, extension, or lateral flexion.   PULM: No evidence of respiratory difficulty, symmetric chest rise.  GI:  Non-distended  BACK: Stiff range of motion. Well healed midline incision. No pain to palpation over the spinous processes. No pain to palpation over facet joints. Most pain is to palpation of the left SIJ. +EUNICE. +Compression. Straight leg raising in the supine position is negative to radicular pain.   EXTREMITIES: No " deformities, edema, or skin discoloration.   MUSCULOSKELETAL: Shoulder, hip, and knee provocative maneuvers are negative. Bilateral upper and lower extremity strength is normal and symmetric. No atrophy is noted.  NEURO: Sensation is altered in left posterior leg. Bilateral upper and lower extremity coordination and muscle stretch reflexes are physiologic and symmetric. Plantar response are downgoing.   GAIT: Antalgic.      LABS:      IMAGING:  None available      ASSESSMENT: 62 y.o. year old female with pain, consistent with sacroiliitis.      DISCUSSION: Ms. Beck is a woman with a history of lumbar surgery who comes to us with low back pain. We do not have imaging at this time. She identifies her pain as coming from the area of the SIJ. SIJ testing is also positive.     OPIOID MANAGEMENT: 4-5 year history of opioids with Dr. Kraft (discharged for missed Utox)  MME: 10  Risk: Low (Family History)  : Reviewed today  Naloxone:   Utox: 10/13/22  Violations: None  Contract: Short course only planned    PLAN:  Prescribed [facility] Spine conditioning program  Continued Hydrocodone tPRN o assist with her home exercises  Utox today  Follow up in 4 weeks to schedule SIJ if necessary    I would like to thank Angel Quinones,* for the opportunity to assist in the care of this patient. We had a very nice visit and I look forward to continuing their care. Please let me know if I can be of further assistance.     Sapphire Villalobos  10/13/2022

## 2022-11-07 NOTE — CONSULTS
[facility]  Infectious Disease  Consult Note    Patient Name: Navin Beck  MRN: [id number]  Admission Date: 11/26/2020  Hospital Length of Stay: 8 days  Attending Physician: Michael Jeffrey MD  Primary Care Provider: Elvira Quinones MD     Isolation Status: No active isolations    Patient information was obtained from patient and past medical records.      Consults  Assessment/Plan:     Postprocedural intraabdominal abscess     60 year old female with history of HTN, HLD, poorly controled DM with history of recent excision of vaginal mesh on 10/26.  Per UroGyn notes, there was significant adhesive disease and urology assisted with bladder dissection.  She presented to ED 11/26  with lower abdominal pain and urinary retention, leukocytosis, and in DKA.  A CT A/P showed a multilobular fluid collection in the lower pelvis adjacent to the urinary bladder and bilateral hydronephrosis. Some concern for bladder involvement.  A cystogram on 11/27 was negative for communication of pelvic collection with the bladder.  She had drain placed 11/28, with 250 cc of pus drained.  Subsequently developed anuria requiring CRRT and transferred to ICU.        A CTRSS on 11/29 showed resolution of multilobulated fluid collection but with small residual 3.4 cm fluid collection remaining and resolved hydronephrosis. Now stepped down to Hospital Medicine.   Currently on IV ceftriaxone and flagyl. Drain remains in place.  ID is consulted for antibiotic duration recommendations.      Abscess cultures + for Peptostreptococcus.  Gram stain with moderate GPC, few GNR, and rare GPR.  She had a urine culture + for E Coli.  Blood cultures negative.     Afebrile, leukocytosis resolved. Drain remains in place with small amount purulent fluid.  Complains of lower abdominal pain.          Plan/recommendations:  1.  Recommend 14 days of IV antibiotics from 11/29 (last imaging) - until 12/13, with repeat CT  abdominal imaging prior to discontinuing antibiotics to ensure resolution of all fluid collections.  2.  Would continue IV ceftriaxone and flagyl while she remains inpatient.  If she is to be discharged prior to 12/13, may transition at d/c to oral Augmentin (renally dosed.  Check with PharmD for correct dosing at that time)  3.  Will defer timing of drain removal to IR and/or Uro/GYN  4.  Please call or reconsult ID if any further GYN/urologic/other procedures planned/occur that result in new culture data or that might potentially change duration recommendations.   5.  ID clinic follow up on or about 12/13 after repeat abdominal imaging.  ID will make the appointment.   6.  Will sign off.  Please call with questions or re-consult as needed.     Data reviewed and plan discussed with ID staff  Discussed with Primary Team         Thank you.   Please call for any questions or concerns.  SIDRA Castillo, ANP-C  [phone] pager, Rdmnlpa [phone]    Subjective:     Principal Problem: Encounter for continuous renal replacement therapy (CRRT) for acute renal failure    HPI: Ms. Navin Beck is a 60 year old female with history of HTN, HLD, noncompliance with T2DM with recent excision of vaginal mesh on 10/26.  Per UroGyn, there was significant adhesive disease and urology assisted with bladder dissection. She was discharged home on POD #1 with march in place, which was removed on 11/9.   She presented to ED 11/26  with lower abdominal pain and urinary retention, leukocytosis, and in DKA.   CT A/P showed a multilobular fluid collection in the lower pelvis adjacent to the urinary bladder and bilateral hydronephrosis. Some concern for bladder involvement.  A cystogram on 11/27 was negative for communication of pelvic collection with the bladder.  She had drain placed 11/28, with 250 cc of pus drained.  Subsequently developed anuria requiring CRRT.  A CTRSS on 11/29 -showed resolution of multilobulated fluid collection but with  small residual 3.4 cm fluid collection remaining and resolved hydronephrosis.  Initially in ICU and now stepped down to Hospital Medicine.   Currently on IV ceftriaxone and flagyl.  ID is consulted for antibiotic duration recommendations.     Abscess cultures showing Peptostreptococcus.  Gram stain with moderate GPC, few GNR, and rare GPR.  Blood cultures negative.             Past Medical History:   Diagnosis Date    Allergy     Arrhythmia     Arthritis     Bronchial asthma     Diabetes mellitus     Glaucoma     Hormone disorder     hysterectomy    Hypercholesteremia     Hypertension     Hyperthyroidism     Insomnia     Kidney stones     Thyroid disease     Urine, incontinence, stress female     UTI (urinary tract infection) 11/27/2020       Past Surgical History:   Procedure Laterality Date    APPENDECTOMY      BACK SURGERY      disc removal     CARPAL TUNNEL RELEASE Bilateral     CHOLECYSTECTOMY      CYSTOSCOPY N/A 10/26/2020    Procedure: CYSTOSCOPY;  Surgeon: Wilner Goel MD;  Location: [facility] OR;  Service: OB/GYN;  Laterality: N/A;    FRACTURE SURGERY Right     wrist    HY[facility]Y  2010    [facility]    JOINT REPLACEMENT      KNEE ARTHROSCOPY W/ DEBRIDEMENT      KNEE SURGERY Right     Knee replacement    LITHOTRIPSY      TX REMOVAL OF OVARY/TUBE(S)  9/9/13    benign    removal of round ligament mass      ROBOT-ASSISTED LAPAROSCOPIC LYSIS OF ADHESIONS USING DA JAMES XI N/A 10/26/2020    Procedure: XI ROBOTIC LYSIS, ADHESIONS;  Surgeon: Wilner Goel MD;  Location: [facility] OR;  Service: OB/GYN;  Laterality: N/A;  ROBOTIC MOBILIZATION OF BLADDER- DR BURNHAM    SPINE SURGERY      WRIST FRACTURE SURGERY Left        Review of patient's allergies indicates:   Allergen Reactions    Penicillins Shortness Of Breath, Itching and Swelling     Tolerates cefepime 11/30/2020       Medications:  Medications Prior to Admission   Medication Sig    HYDROcodone-acetaminophen  (NORCO)  mg per tablet Take 1 tablet by mouth every 8 (eight) hours as needed for Pain.    acetaminophen (TYLENOL) 500 MG tablet Take 1,000 mg by mouth 3 (three) times daily as needed (fever and chills).    albuterol (PROVENTIL/VENTOLIN HFA) 90 mcg/actuation inhaler     benazepril (LOTENSIN) 10 MG tablet Take 10 mg by mouth once daily.     blood-glucose meter (TRUE METRIX GLUCOSE METER MISC) True Metrix Glucose Meter    DULoxetine (CYMBALTA) 60 MG capsule duloxetine 60 mg capsule,delayed release    estradioL (ESTRACE) 0.01 % (0.1 mg/gram) vaginal cream Place 1 g vaginally 3 (three) times a week. Alternate days with Metrogel for 14 days    fluticasone propionate (FLOVENT HFA) 44 mcg/actuation inhaler Flovent HFA 44 mcg/actuation aerosol inhaler    gabapentin (NEURONTIN) 600 MG tablet Take 1 tablet (600 mg total) by mouth 3 (three) times daily.    ibuprofen (ADVIL,MOTRIN) 600 MG tablet Take 1 tablet (600 mg total) by mouth 3 (three) times daily.    insulin aspart (NOVOLOG) 100 unit/mL injection Inject 15 Units into the skin 3 (three) times daily with meals. Three times a day with meals according to sliding scale.    insulin glargine (LANTUS U-100 INSULIN) 100 unit/mL injection Inject 50 Units into the skin 2 (two) times daily.    METFORMIN HCL (METFORMIN ORAL) Take 500 mg by mouth 2 (two) times daily.    methimazole (TAPAZOLE) 10 MG Tab Take 10 mg by mouth once daily.     pantoprazole (PROTONIX) 40 MG tablet Take 1 tablet (40 mg total) by mouth once daily. Start after the twice daily Protonix is complete.Follow-up with doctor for further refills.    rosuvastatin (CRESTOR) 20 MG tablet rosuvastatin 20 mg tablet    tamsulosin (FLOMAX) 0.4 mg Cp24 Take 0.4 mg by mouth once daily.     tiZANidine (ZANAFLEX) 4 MG tablet tizanidine 4 mg tablet    TRUE METRIX GLUCOSE TEST STRIP Strp     zolpidem (AMBIEN) 10 mg Tab Take 10 mg by mouth every evening.    [DISCONTINUED] flurazepam (DALMANE) 30 MG capsule  nightly as needed.     [DISCONTINUED] oxyCODONE-acetaminophen (PERCOCET) 7.5-325 mg per tablet Take 1 tablet by mouth every 8 (eight) hours as needed for Pain.     Antibiotics (From admission, onward)    Start     Stop Route Frequency Ordered    12/01/20 0900  cefTRIAXone (ROCEPHIN) 2 g/50 mL D5W IVPB      -- IV Every 24 hours (non-standard times) 11/30/20 0922    11/28/20 1400  metroNIDAZOLE tablet 500 mg      -- Oral Every 8 hours 11/28/20 1053    11/27/20 1000  mupirocin 2 % ointment      12/02 0859 Nasl 2 times daily 11/27/20 0849        Antifungals (From admission, onward)    None        Antivirals (From admission, onward)    None           Immunization History   Administered Date(s) Administered    Pneumococcal Polysaccharide - 23 Valent 07/26/2018       Family History     Problem Relation (Age of Onset)    Cancer Mother, Brother, Brother, Brother, Brother    Colon cancer Brother        Social History     Socioeconomic History    Marital status:      Spouse name: Not on file    Number of children: Not on file    Years of education: Not on file    Highest education level: Not on file   Occupational History    Not on file   Social Needs    Financial resource strain: Not on file    Food insecurity     Worry: Not on file     Inability: Not on file    Transportation needs     Medical: Not on file     Non-medical: Not on file   Tobacco Use    Smoking status: Current Every Day Smoker     Packs/day: 0.50     Years: 30.00     Pack years: 15.00    Smokeless tobacco: Never Used   Substance and Sexual Activity    Alcohol use: No    Drug use: No    Sexual activity: Not Currently     Partners: Male     Birth control/protection: Surgical   Lifestyle    Physical activity     Days per week: Not on file     Minutes per session: Not on file    Stress: Not on file   Relationships    Social connections     Talks on phone: Not on file     Gets together: Not on file     Attends Protestant service: Not on file      Active member of club or organization: Not on file     Attends meetings of clubs or organizations: Not on file     Relationship status: Not on file   Other Topics Concern    Not on file   Social History Narrative    Not on file     Review of Systems   Constitutional: Positive for activity change, appetite change and fatigue. Negative for chills, diaphoresis and fever.   HENT: Negative.    Respiratory: Negative for cough, shortness of breath and wheezing.    Cardiovascular: Negative for chest pain, palpitations and leg swelling.   Gastrointestinal: Positive for abdominal pain. Negative for constipation, diarrhea and nausea.   Genitourinary:        Cifuentes  Decreased urine output   Musculoskeletal: Negative for arthralgias, back pain and neck pain.   Skin: Positive for wound (prior surgical wound). Negative for rash.   Neurological: Negative for dizziness, weakness and headaches.   Psychiatric/Behavioral: Negative for agitation. The patient is not nervous/anxious.      Objective:     Vital Signs (Most Recent):  Temp: 97.6 °F (36.4 °C) (12/05/20 0750)  Pulse: 75 (12/05/20 0750)  Resp: 18 (12/05/20 1126)  BP: 125/60 (12/05/20 0750)  SpO2: 95 % (12/05/20 0750) Vital Signs (24h Range):  Temp:  [97.6 °F (36.4 °C)-98.2 °F (36.8 °C)] 97.6 °F (36.4 °C)  Pulse:  [72-86] 75  Resp:  [12-26] 18  SpO2:  [94 %-100 %] 95 %  BP: (125-189)/(60-84) 125/60     Weight: 115.5 kg (254 lb 10.1 oz)  Body mass index is 39.88 kg/m².    Estimated Creatinine Clearance: 20.2 mL/min (A) (based on SCr of 3.8 mg/dL (H)).    Physical Exam  Vitals signs and nursing note reviewed.   Constitutional:       General: She is not in acute distress.     Appearance: She is obese. She is ill-appearing. She is not toxic-appearing or diaphoretic.   HENT:      Head: Normocephalic and atraumatic.   Eyes:      General: No scleral icterus.     Conjunctiva/sclera: Conjunctivae normal.   Neck:      Comments: RIJ trialysis catheter - c/d/i  Cardiovascular:      Rate  and Rhythm: Normal rate and regular rhythm.      Heart sounds: Murmur present.   Pulmonary:      Effort: No respiratory distress.      Breath sounds: No wheezing or rales.      Comments: Room air  Abdominal:      Palpations: Abdomen is soft.      Tenderness: There is abdominal tenderness. There is no guarding.      Comments: LLQ abdominal drain.  Small amount purulent fluid   Genitourinary:     Comments: Cifuentes in place.  Clear yellow urine  Musculoskeletal:      Right lower leg: No edema.      Left lower leg: No edema.   Skin:     General: Skin is warm and dry.   Neurological:      Mental Status: She is alert and oriented to person, place, and time.   Psychiatric:         Mood and Affect: Mood normal.         Behavior: Behavior normal.         Significant Labs:   Blood Culture:   Recent Labs   Lab 11/27/20 1915 11/27/20 1919   LABBLOO No growth after 5 days. No growth after 5 days.     CBC:   Recent Labs   Lab 12/04/20  0620 12/05/20  1537   WBC 12.39 13.25*   HGB 7.0* 7.2*   HCT 22.0* 22.3*   * 440*     CMP:   Recent Labs   Lab 12/04/20  0620  12/04/20  1430 12/04/20  2104 12/05/20  1537   *   < > 129*  129* 130* 133*  133*  131*   K 3.9   < > 4.0  4.0 4.1 4.2  4.2  4.1   CL 95   < > 95  96 96 95  95  95   CO2 22*   < > 25  25 26 29  29  29   *   < > 236*  240* 244* 291*  291*  288*   BUN 9   < > 7*  7* 8 14  14  14   CREATININE 2.7*   < > 2.2*  2.3* 2.6* 3.8*  3.8*  3.8*   CALCIUM 9.3   < > 10.5  10.2 9.9 8.4*  8.4*  8.5*   PROT 5.5*  --   --   --  5.9*   ALBUMIN 1.5*  --  1.6*  --  1.7*  1.7*  1.7*   BILITOT 0.2  --   --   --  <0.1*   ALKPHOS 198*  --   --   --  180*   AST 11  --   --   --  13   ALT <5*  --   --   --  <5*   ANIONGAP 8   < > 9  8 8 9  9  7*   EGFRNONAA 18.2*   < > 23.3*  22.1* 19.1* 12.0*  12.0*  12.0*    < > = values in this interval not displayed.     Urine Culture:   Recent Labs   Lab 08/14/20  1239 10/14/20  1019 10/22/20  1012  11/09/20  1040 11/26/20  2206   LABURIN ESCHERICHIA COLI  >100,000 cfu/ml  No other significant isolate  * ESCHERICHIA COLI  10,000 - 49,999 cfu/ml  * No growth ESCHERICHIA COLI  >100,000 cfu/ml  * ESCHERICHIA COLI  10,000 - 49,999 cfu/ml  No other significant isolate  *     Urine Studies:   Recent Labs   Lab 08/14/20  1242 11/26/20  2206   COLORU Yellow Straw   APPEARANCEUA  --  Cloudy*   PHUR 5 5.0   SPECGRAV 1.020 1.020   PROTEINUA  --  Negative   GLUCUA  --  3+*   KETONESU +SMALL 1+*   BILIRUBINUA  --  Negative   OCCULTUA  --  2+*   NITRITE NEG Negative   UROBILINOGEN NORMAL  --    LEUKOCYTESUR  --  3+*   RBCUA  --  2   WBCUA  --  >100*   BACTERIA  --  Occasional   SQUAMEPITHEL  --  3     Wound Culture:   Recent Labs   Lab 07/17/20  1736 11/28/20  1405   LABAERO ESCHERICHIA COLI  Moderate  Genital calixto also present  * No growth       Significant Imaging: I have reviewed all pertinent imaging results/findings within the past 24 hours.               Ears: no ear pain and no hearing problems. Nose: no nasal congestion and no nasal drainage. Mouth/Throat: no dysphagia, no hoarseness and no throat pain. Neck: no lumps, no pain, no stiffness and no swollen glands.

## 2022-11-16 ENCOUNTER — TELEPHONE (OUTPATIENT)
Dept: PAIN MEDICINE | Facility: CLINIC | Age: 62
End: 2022-11-16
Payer: MEDICARE

## 2022-11-16 NOTE — TELEPHONE ENCOUNTER
Patient was contact and rescheduled with Doctor Villalobos for follow up.    Staff sent a message over to Doctor Villalobos, for the patient requesting more tablets for Hydrocodone  mg    Patient says that she cannot deal with her pain and needs some medication to hold her until her visit.    Verbalized understanding

## 2022-11-22 DIAGNOSIS — M46.1 SACROILIITIS: ICD-10-CM

## 2022-11-22 RX ORDER — HYDROCODONE BITARTRATE AND ACETAMINOPHEN 10; 325 MG/1; MG/1
1 TABLET ORAL
Qty: 20 TABLET | Refills: 0 | Status: SHIPPED | OUTPATIENT
Start: 2022-11-22 | End: 2022-12-15 | Stop reason: SDUPTHER

## 2022-11-22 NOTE — TELEPHONE ENCOUNTER
----- Message from Sapphire Villalobos MD sent at 11/17/2022 10:07 PM CST -----  Regarding: RE: hydrocodone 10-325mg  Please send me a refill for 20 tablets. Let's also get her an appt with an SINDY as soon as possible.   Thank you  ----- Message -----  From: Amaris Miguel MA  Sent: 11/16/2022   3:59 PM CST  To: Sapphire Villalobos MD  Subject: hydrocodone 10-325mg                             Patient above is requesting more Hydrocodone 10-325mg due to her appointment being canceled on tomorrow. Patient states that she cannot take the pain. Also would like more tablets. 20 isn't enough.    How would you like to proceed?      Thanks,

## 2022-11-22 NOTE — TELEPHONE ENCOUNTER
Patient requesting refill on Hydrocodone 10-325mg  Last office visit 10/13/22   shows last refill on 10/13/22  Patient does have a pain contract on file with [facility]  Patient last UDS 10/13/22 was consistent with current therapy   CODEINE  Not Detected         MORPHINE  Not Detected         6-ACETYLMORPHINE  Not Detected         OXYCODONE  Not Detected         NOROYXCODONE  Not Detected         OXYMORPHONE  Not Detected         NOROXYMORPHONE  Not Detected         HYDROCODONE  Not Detected         NORHYDROCODONE  Not Detected         HYDROMORPHONE  Not Detected         BUPRENORPHINE  Not Detected         NORUBPRENORPHINE  Not Detected         FENTANYL  Not Detected         NORFENTANYL  Not Detected         MEPERIDINE METABOLITE  Not Detected         TAPENTADOL  Not Detected         TAPENTADOL-O-SULF  Not Detected         METHADONE  Not Detected         TRAMADOL  Not Detected         AMPHETAMINE  Not Detected         METHAMPHETAMINE  Not Detected         MDMA- ECSTASY  Not Detected         MDA  Not Detected         MDEA- Mary Ellen  Not Detected         METHYLPHENIDATE  Not Detected         PHENTERMINE  Not Detected         BENZOYLECGONINE  Not Detected         ALPRAZOLAM  Not Detected         ALPHA-OH-ALPRAZOLAM  Not Detected         CLONAZEPAM  Not Detected         7-AMINOCLONAZEPAM  Not Detected         DIAZEPAM  Not Detected         NORDIAZEPAM  Not Detected         OXAZEPAM  Not Detected         TEMAZEPAM  Not Detected         Lorazepam  Not Detected         MIDAZOLAM  Not Detected         ZOLPIDEM  Not Detected         BARBITURATES  Not Detected         Creatinine, Urine 20.0 - 400.0 mg/dL 70.3  88.0 R  85.0 R, CM  103.0 R, CM  109.0 R, CM  139.0 R, CM    ETHYL GLUCURONIDE  Not Detected         MARIJUANA METABOLITE  Not Detected         PCP  Not Detected         CARISOPRODOL  Not Detected         Comment: The carisoprodol immunoassay has cross-reactivity to   carisoprodol and  meprobamate.    Naloxone  Not Detected         Gabapentin  Present         Pregabalin  Not Detected         Alpha-OH-Midazolam  Not Detected         Zolpidem Metabolite  Not Detected

## 2022-12-08 ENCOUNTER — TELEPHONE (OUTPATIENT)
Dept: PAIN MEDICINE | Facility: CLINIC | Age: 62
End: 2022-12-08
Payer: MEDICARE

## 2022-12-08 DIAGNOSIS — M46.1 SACROILIITIS: Primary | ICD-10-CM

## 2022-12-08 RX ORDER — HYDROCODONE BITARTRATE AND ACETAMINOPHEN 10; 325 MG/1; MG/1
1 TABLET ORAL
Qty: 20 TABLET | Refills: 0 | Status: CANCELLED | OUTPATIENT
Start: 2022-12-08

## 2022-12-08 NOTE — TELEPHONE ENCOUNTER
----- Message from Kenna Vallejo sent at 12/8/2022  1:58 PM CST -----  Regarding: refill med  Contact: pt  Pt calling in regards to medication    Needs refill: HYDROcodone-acetaminophen (NORCO)  mg per tablet    Pharmacy: Preferred : [facility] [id number], [phone]     Please call pt     Phone [phone]

## 2022-12-15 DIAGNOSIS — M46.1 SACROILIITIS: Primary | ICD-10-CM

## 2022-12-15 RX ORDER — HYDROCODONE BITARTRATE AND ACETAMINOPHEN 10; 325 MG/1; MG/1
1 TABLET ORAL
Qty: 20 TABLET | Refills: 0 | Status: SHIPPED | OUTPATIENT
Start: 2022-12-21 | End: 2022-12-22

## 2022-12-15 NOTE — TELEPHONE ENCOUNTER
Patient requesting refill on Norco 10-325mg  Last office visit 10/13/22   shows last refill on 11/22/22  Patient does not have a pain contract on file with [facility]  Patient last UDS 10/13/22 was consistent with current therapy     CODEINE  Not Detected         MORPHINE  Not Detected         6-ACETYLMORPHINE  Not Detected         OXYCODONE  Not Detected         NOROYXCODONE  Not Detected         OXYMORPHONE  Not Detected         NOROXYMORPHONE  Not Detected         HYDROCODONE  Not Detected         NORHYDROCODONE  Not Detected         HYDROMORPHONE  Not Detected         BUPRENORPHINE  Not Detected         NORUBPRENORPHINE  Not Detected         FENTANYL  Not Detected         NORFENTANYL  Not Detected         MEPERIDINE METABOLITE  Not Detected         TAPENTADOL  Not Detected         TAPENTADOL-O-SULF  Not Detected         METHADONE  Not Detected         TRAMADOL  Not Detected         AMPHETAMINE  Not Detected         METHAMPHETAMINE  Not Detected         MDMA- ECSTASY  Not Detected         MDA  Not Detected         MDEA- Mary Ellen  Not Detected         METHYLPHENIDATE  Not Detected         PHENTERMINE  Not Detected         BENZOYLECGONINE  Not Detected         ALPRAZOLAM  Not Detected         ALPHA-OH-ALPRAZOLAM  Not Detected         CLONAZEPAM  Not Detected         7-AMINOCLONAZEPAM  Not Detected         DIAZEPAM  Not Detected         NORDIAZEPAM  Not Detected         OXAZEPAM  Not Detected         TEMAZEPAM  Not Detected         Lorazepam  Not Detected         MIDAZOLAM  Not Detected         ZOLPIDEM  Not Detected         BARBITURATES  Not Detected         Creatinine, Urine 20.0 - 400.0 mg/dL 70.3  88.0 R  85.0 R, CM  103.0 R, CM  109.0 R, CM  139.0 R, CM    ETHYL GLUCURONIDE  Not Detected         MARIJUANA METABOLITE  Not Detected         PCP  Not Detected         CARISOPRODOL  Not Detected         Comment: The carisoprodol immunoassay has cross-reactivity to   carisoprodol and  meprobamate.    Naloxone  Not Detected         Gabapentin  Present         Pregabalin  Not Detected         Alpha-OH-Midazolam  Not Detected         Zolpidem Metabolite  Not Detected         PAIN MANAGEMENT DRUG PANEL  See Below

## 2022-12-22 ENCOUNTER — OFFICE VISIT (OUTPATIENT)
Dept: PAIN MEDICINE | Facility: CLINIC | Age: 62
End: 2022-12-22
Payer: MEDICARE

## 2022-12-22 VITALS
HEIGHT: 67 IN | RESPIRATION RATE: 20 BRPM | WEIGHT: 220 LBS | TEMPERATURE: 98 F | DIASTOLIC BLOOD PRESSURE: 86 MMHG | SYSTOLIC BLOOD PRESSURE: 142 MMHG | HEART RATE: 88 BPM | BODY MASS INDEX: 34.53 KG/M2

## 2022-12-22 DIAGNOSIS — M79.7 FIBROMYALGIA: Primary | ICD-10-CM

## 2022-12-22 DIAGNOSIS — Z98.890 HISTORY OF LUMBOSACRAL SPINE SURGERY: ICD-10-CM

## 2022-12-22 DIAGNOSIS — G47.00 INSOMNIA, UNSPECIFIED TYPE: ICD-10-CM

## 2022-12-22 PROCEDURE — 1159F MED LIST DOCD IN RCRD: CPT | Mod: HCNC,CPTII,S$GLB, | Performed by: ANESTHESIOLOGY

## 2022-12-22 PROCEDURE — 99999 PR PBB SHADOW E&M-EST. PATIENT-LVL IV: ICD-10-PCS | Mod: PBBFAC,HCNC,, | Performed by: ANESTHESIOLOGY

## 2022-12-22 PROCEDURE — 3060F POS MICROALBUMINURIA REV: CPT | Mod: HCNC,CPTII,S$GLB, | Performed by: ANESTHESIOLOGY

## 2022-12-22 PROCEDURE — 99214 OFFICE O/P EST MOD 30 MIN: CPT | Mod: HCNC,S$GLB,, | Performed by: ANESTHESIOLOGY

## 2022-12-22 PROCEDURE — 3046F PR MOST RECENT HEMOGLOBIN A1C LEVEL > 9.0%: ICD-10-PCS | Mod: HCNC,CPTII,S$GLB, | Performed by: ANESTHESIOLOGY

## 2022-12-22 PROCEDURE — 1159F PR MEDICATION LIST DOCUMENTED IN MEDICAL RECORD: ICD-10-PCS | Mod: HCNC,CPTII,S$GLB, | Performed by: ANESTHESIOLOGY

## 2022-12-22 PROCEDURE — 99214 PR OFFICE/OUTPT VISIT, EST, LEVL IV, 30-39 MIN: ICD-10-PCS | Mod: HCNC,S$GLB,, | Performed by: ANESTHESIOLOGY

## 2022-12-22 PROCEDURE — 3008F BODY MASS INDEX DOCD: CPT | Mod: HCNC,CPTII,S$GLB, | Performed by: ANESTHESIOLOGY

## 2022-12-22 PROCEDURE — 3008F PR BODY MASS INDEX (BMI) DOCUMENTED: ICD-10-PCS | Mod: HCNC,CPTII,S$GLB, | Performed by: ANESTHESIOLOGY

## 2022-12-22 PROCEDURE — 3077F SYST BP >= 140 MM HG: CPT | Mod: HCNC,CPTII,S$GLB, | Performed by: ANESTHESIOLOGY

## 2022-12-22 PROCEDURE — 3046F HEMOGLOBIN A1C LEVEL >9.0%: CPT | Mod: HCNC,CPTII,S$GLB, | Performed by: ANESTHESIOLOGY

## 2022-12-22 PROCEDURE — 3066F PR DOCUMENTATION OF TREATMENT FOR NEPHROPATHY: ICD-10-PCS | Mod: HCNC,CPTII,S$GLB, | Performed by: ANESTHESIOLOGY

## 2022-12-22 PROCEDURE — 3079F PR MOST RECENT DIASTOLIC BLOOD PRESSURE 80-89 MM HG: ICD-10-PCS | Mod: HCNC,CPTII,S$GLB, | Performed by: ANESTHESIOLOGY

## 2022-12-22 PROCEDURE — 3079F DIAST BP 80-89 MM HG: CPT | Mod: HCNC,CPTII,S$GLB, | Performed by: ANESTHESIOLOGY

## 2022-12-22 PROCEDURE — 4010F ACE/ARB THERAPY RXD/TAKEN: CPT | Mod: HCNC,CPTII,S$GLB, | Performed by: ANESTHESIOLOGY

## 2022-12-22 PROCEDURE — 4010F PR ACE/ARB THEARPY RXD/TAKEN: ICD-10-PCS | Mod: HCNC,CPTII,S$GLB, | Performed by: ANESTHESIOLOGY

## 2022-12-22 PROCEDURE — 3077F PR MOST RECENT SYSTOLIC BLOOD PRESSURE >= 140 MM HG: ICD-10-PCS | Mod: HCNC,CPTII,S$GLB, | Performed by: ANESTHESIOLOGY

## 2022-12-22 PROCEDURE — 3066F NEPHROPATHY DOC TX: CPT | Mod: HCNC,CPTII,S$GLB, | Performed by: ANESTHESIOLOGY

## 2022-12-22 PROCEDURE — 3060F PR POS MICROALBUMINURIA RESULT DOCUMENTED/REVIEW: ICD-10-PCS | Mod: HCNC,CPTII,S$GLB, | Performed by: ANESTHESIOLOGY

## 2022-12-22 PROCEDURE — 99999 PR PBB SHADOW E&M-EST. PATIENT-LVL IV: CPT | Mod: PBBFAC,HCNC,, | Performed by: ANESTHESIOLOGY

## 2022-12-22 RX ORDER — GABAPENTIN 300 MG/1
600 CAPSULE ORAL 3 TIMES DAILY
Qty: 180 CAPSULE | Refills: 1 | Status: SHIPPED | OUTPATIENT
Start: 2022-12-22 | End: 2023-01-19

## 2022-12-22 RX ORDER — HYDROCODONE BITARTRATE AND ACETAMINOPHEN 10; 325 MG/1; MG/1
1 TABLET ORAL
Qty: 30 TABLET | Refills: 0 | Status: SHIPPED | OUTPATIENT
Start: 2022-12-22 | End: 2023-01-19 | Stop reason: SDUPTHER

## 2022-12-22 NOTE — PROGRESS NOTES
PCP: Elvira Quinones MD    REFERRING PHYSICIAN: No ref. provider found    CHIEF COMPLAINT: Low Back Pain and left leg pain     Original HISTORY OF PRESENT ILLNESS: Navin Beck presents to the clinic for the evaluation of the above pain. The pain started 15-20 years ago. However, the pain worsened over the past 6 months, she thinks due to stress (Gissell/housing).     Original Pain Description:  The pain is located in the left low back and radiates down the back of the left leg to the foot. The pain is described as throbbing. Exacerbating factors: Standing and Walking. Mitigating factors rest and sitting. Symptoms interfere with daily activity. The patient feels like symptoms have been worsening. Patient denies urinary incontinence, bowel incontinence, significant weight loss, and significant motor weakness.    Original PAIN SCORES:  Best: Pain is 4  Worst: Pain is 9  Usually: Pain is 7  Current: Pain is 9    INTERVAL HISTORY:   Interval History 12/22/22: Navin Beck returns for follow up. At our last visit we prescribed home PT and a short course of opioids for what appears to be sacroiliac joint pain. Today, she returns saying the pain is all over her low back now, not just on the left as previously. On exam however, she has pain to even light touch in throughout her low back. I therefore tested many more muscle groups and found pain out of proportion to exam throughout her musculature, especially traps, low back, and legs. She does appear to have neuropathy as well, but only below the ankles bilaterally.     6 weeks of Conservative therapy (PT/Chiro/Home Exercises with Dates)  PT: Done previously  Chiro: No  HEP: No    Treatments / Medications: (Ice/Heat/NSAIDS/APAP/etc):  APAP  Ibuprofen  Aleve  Excedrine  Heat     Report:    Interventional Pain Procedures: (Previous injections)  Rudy Kraft - She was discharged for not doing Utox X 2    Past Medical History:   Diagnosis Date    Allergy      Anemia due to stage 3 chronic kidney disease 12/6/2020    Arrhythmia     Arthritis     Bronchial asthma     Diabetes mellitus     Glaucoma     Hormone disorder     hysterectomy    Hypercholesteremia     Hypertension     Hyperthyroidism     Insomnia     Kidney stones     Thyroid disease     Urine, incontinence, stress female     UTI (urinary tract infection) 11/27/2020     Past Surgical History:   Procedure Laterality Date    APPENDECTOMY      BACK SURGERY      disc removal     CARPAL TUNNEL RELEASE Bilateral     CHOLECYSTECTOMY      CYSTOSCOPY N/A 10/26/2020    Procedure: CYSTOSCOPY;  Surgeon: Wilner Goel MD;  Location: [facility];  Service: OB/GYN;  Laterality: N/A;    CYSTOSCOPY W/ RETROGRADES Bilateral 12/7/2020    Procedure: CYSTOSCOPY, WITH RETROGRADE PYELOGRAM;  Surgeon: Daniel Jalloh MD;  Location: [address];  Service: Urology;  Laterality: Bilateral;    FRACTURE SURGERY Right     wrist    HYSTERECTOMY  2010    [facility]    JOINT REPLACEMENT      KNEE ARTHROSCOPY W/ DEBRIDEMENT      KNEE SURGERY Right     Knee replacement    LITHOTRIPSY      ND REMOVAL OF OVARY/TUBE(S)  9/9/13    benign    removal of round ligament mass      ROBOT-ASSISTED LAPAROSCOPIC LYSIS OF ADHESIONS USING DA JAMES XI N/A 10/26/2020    Procedure: XI ROBOTIC LYSIS, ADHESIONS;  Surgeon: Wilner Goel MD;  Location: [facility];  Service: OB/GYN;  Laterality: N/A;  ROBOTIC MOBILIZATION OF BLADDER- DR BURNHAM    SPINE SURGERY      WRIST FRACTURE SURGERY Left      Social History     Socioeconomic History    Marital status:    Tobacco Use    Smoking status: Every Day     Packs/day: 0.50     Years: 30.00     Pack years: 15.00     Types: Cigarettes    Smokeless tobacco: Never   Substance and Sexual Activity    Alcohol use: No    Drug use: No    Sexual activity: Not Currently     Partners: Male     Birth control/protection: Surgical     Family History   Problem Relation Age of Onset    Cancer Mother     Cancer  Brother     Colon cancer Brother     Cancer Brother     Cancer Brother     Cancer Brother     Ovarian cancer Neg Hx     Uterine cancer Neg Hx     Breast cancer Neg Hx        Review of patient's allergies indicates:   Allergen Reactions    Penicillins Shortness Of Breath, Itching and Swelling     Tolerates cefepime 11/30/2020       Current Outpatient Medications   Medication Sig    albuterol (PROVENTIL/VENTOLIN HFA) 90 mcg/actuation inhaler Inhale 1-2 puffs into the lungs every 6 (six) hours as needed for Wheezing or Shortness of Breath.     atorvastatin (LIPITOR) 20 MG tablet Take 20 mg by mouth once daily.    blood-glucose meter (TRUE METRIX GLUCOSE METER MISC) True Metrix Glucose Meter    diclofenac (VOLTAREN) 75 MG EC tablet Take 1 tablet (75 mg total) by mouth 2 (two) times daily.    DULoxetine (CYMBALTA) 60 MG capsule Take 1 capsule (60 mg total) by mouth once daily.    empagliflozin (JARDIANCE) 10 mg tablet Jardiance 10 mg tablet   Take 1 tablet every day by oral route.    flurazepam (DALMANE) 30 MG capsule Take 30 mg by mouth nightly as needed for Insomnia.    fluticasone propionate (FLOVENT HFA) 44 mcg/actuation inhaler Inhale 1 puff into the lungs once daily.     HYDROcodone-acetaminophen (NORCO)  mg per tablet Take 1 tablet by mouth every 24 hours as needed for Pain.    methimazole (TAPAZOLE) 10 MG Tab Take 10 mg by mouth once daily.     miconazole (MICOTIN) 2 % cream Apply topically 2 (two) times daily.    pantoprazole (PROTONIX) 40 MG tablet Take 1 tablet (40 mg total) by mouth once daily. Start after the twice daily Protonix is complete.Follow-up with doctor for further refills. (Patient taking differently: Take 40 mg by mouth daily as needed.)    tamsulosin (FLOMAX) 0.4 mg Cap Take 1 capsule (0.4 mg total) by mouth once daily.    tiZANidine (ZANAFLEX) 4 MG tablet Take 2-3 tablets (8-12 mg total) by mouth nightly as needed (muscle spasms).    TRUE METRIX GLUCOSE TEST STRIP Strp     estradioL  "(ESTRACE) 0.01 % (0.1 mg/gram) vaginal cream Place 1 g vaginally 3 (three) times a week. Alternate days with Metrogel for 14 days (Patient taking differently: Place 1 g vaginally 3 (three) times a week. )    gabapentin (NEURONTIN) 300 MG capsule Take 1 capsule (300 mg total) by mouth once daily.    insulin aspart U-100 (NOVOLOG) 100 unit/mL (3 mL) InPn pen Inject 18 Units into the skin 3 (three) times daily.   Sliding scale: Inject 0-5 Units into the skin before meals and at bedtime as needed (Hyperglycemia). Max daily dose of 69 units.    insulin detemir U-100 (LEVEMIR FLEXTOUCH) 100 unit/mL (3 mL) SubQ InPn pen Inject 30 Units into the skin 2 (two) times daily.     No current facility-administered medications for this visit.       ROS:  GENERAL: No fever. No chills. No fatigue. Denies weight loss. Denies weight gain.  HEENT: Denies headaches. Denies vision change. Denies eye pain. Denies double vision. Denies ear pain.   CV: Denies chest pain.   PULM: Denies of shortness of breath.  GI: Denies constipation. No diarrhea. No abdominal pain. Denies nausea. Denies vomiting. No blood in stool.  HEME: Denies bleeding problems.  : Denies urgency. No painful urination. No blood in urine.  MS: Denies joint stiffness. Denies joint swelling.  + back pain.  SKIN: Denies rash.   NEURO: Denies seizures. No weakness.  PSYCH:  Denies difficulty sleeping. No anxiety. Denies depression. No suicidal thoughts.       VITALS:   Vitals:    12/22/22 1522   BP: (!) 142/86   Pulse: 88   Resp: 20   Temp: 98.4 °F (36.9 °C)   TempSrc: Oral   Weight: 99.8 kg (220 lb)   Height: 5' 7" (1.702 m)   PainSc:   9   PainLoc: Back         PHYSICAL EXAM:   GENERAL: Well appearing, in no acute distress, alert and oriented x3.  PSYCH:  Mood and affect appropriate.  SKIN: Skin color, texture, turgor normal, no rashes or lesions.  HEENT:  Normocephalic, atraumatic. Cranial nerves grossly intact.  NECK: No pain to palpation over the cervical paraspinous " muscles. No pain to palpation over facets. No pain with neck flexion, extension, or lateral flexion.   PULM: No evidence of respiratory difficulty, symmetric chest rise.  GI:  Non-distended  BACK: Stiff range of motion. Well healed midline incision. No pain to palpation over the spinous processes. No pain to palpation over facet joints. Straight leg raising in the supine position is negative to radicular pain.   EXTREMITIES: No deformities, edema, or skin discoloration.   MUSCULOSKELETAL: Shoulder, hip, and knee provocative maneuvers are negative. Bilateral upper and lower extremity strength is normal and symmetric. No atrophy is noted. Pain to light touch throughout musculature.   NEURO: Sensation is altered below the ankles bilaterally. Bilateral upper and lower extremity coordination and muscle stretch reflexes are physiologic and symmetric. Plantar response are downgoing.   GAIT: Antalgic.      LABS:      IMAGING:  None available      ASSESSMENT: 62 y.o. year old female with pain, consistent with fibromyalgia.      DISCUSSION: Ms. Beck is a woman with a history of lumbar surgery who comes to us with low back pain. We do not have imaging at this time. She identifies her pain as coming from the area of the SIJ. On follow up however, she presented with pain throughout her musculature. She notes no trauma but lots of stress from being displaced by Gissell and she is only sleeping 4 hours/night. She was previously on Cymbalta and Gabapentin before being discharged from a prior pain clinic.     OPIOID MANAGEMENT: 4-5 year history of opioids with Dr. Kraft (discharged for missed Utox)  MME: 10  Risk: Low (Family History)  : Reviewed today  Naloxone:   Utox Appropriate: 10/13/22  Violations: None  Contract: 12/22/22    PLAN:  Reviewed Utox. Contract signed  Increase to Gabapentin 600 TID -  she reports that this helps  Add Flexeril 7.5 QHS PRN - she cannot fall asleep - Did not due to Graves disease.   Turn off screen  time at 11. She needs 7 hours of sleep.   Continue Hydrocodone 10/325 QD PRN for worst pain only  Continue HEP  Referral to PT   Follow up in 4 weeks    Sapphire Villalobos  12/22/2022

## 2023-01-19 ENCOUNTER — OFFICE VISIT (OUTPATIENT)
Dept: PAIN MEDICINE | Facility: CLINIC | Age: 63
End: 2023-01-19
Payer: MEDICARE

## 2023-01-19 VITALS — BODY MASS INDEX: 33.2 KG/M2 | WEIGHT: 212 LBS

## 2023-01-19 DIAGNOSIS — M79.7 FIBROMYALGIA: Primary | ICD-10-CM

## 2023-01-19 DIAGNOSIS — G47.00 INSOMNIA, UNSPECIFIED TYPE: ICD-10-CM

## 2023-01-19 DIAGNOSIS — Z98.890 HISTORY OF LUMBOSACRAL SPINE SURGERY: ICD-10-CM

## 2023-01-19 DIAGNOSIS — F11.90 CHRONIC, CONTINUOUS USE OF OPIOIDS: ICD-10-CM

## 2023-01-19 PROCEDURE — 99999 PR PBB SHADOW E&M-EST. PATIENT-LVL III: CPT | Mod: PBBFAC,HCNC,, | Performed by: ANESTHESIOLOGY

## 2023-01-19 PROCEDURE — 99214 OFFICE O/P EST MOD 30 MIN: CPT | Mod: HCNC,S$GLB,, | Performed by: ANESTHESIOLOGY

## 2023-01-19 PROCEDURE — 1159F MED LIST DOCD IN RCRD: CPT | Mod: HCNC,CPTII,S$GLB, | Performed by: ANESTHESIOLOGY

## 2023-01-19 PROCEDURE — 3008F PR BODY MASS INDEX (BMI) DOCUMENTED: ICD-10-PCS | Mod: HCNC,CPTII,S$GLB, | Performed by: ANESTHESIOLOGY

## 2023-01-19 PROCEDURE — 99999 PR PBB SHADOW E&M-EST. PATIENT-LVL III: ICD-10-PCS | Mod: PBBFAC,HCNC,, | Performed by: ANESTHESIOLOGY

## 2023-01-19 PROCEDURE — 3008F BODY MASS INDEX DOCD: CPT | Mod: HCNC,CPTII,S$GLB, | Performed by: ANESTHESIOLOGY

## 2023-01-19 PROCEDURE — 1160F PR REVIEW ALL MEDS BY PRESCRIBER/CLIN PHARMACIST DOCUMENTED: ICD-10-PCS | Mod: HCNC,CPTII,S$GLB, | Performed by: ANESTHESIOLOGY

## 2023-01-19 PROCEDURE — 1160F RVW MEDS BY RX/DR IN RCRD: CPT | Mod: HCNC,CPTII,S$GLB, | Performed by: ANESTHESIOLOGY

## 2023-01-19 PROCEDURE — 1159F PR MEDICATION LIST DOCUMENTED IN MEDICAL RECORD: ICD-10-PCS | Mod: HCNC,CPTII,S$GLB, | Performed by: ANESTHESIOLOGY

## 2023-01-19 PROCEDURE — 99214 PR OFFICE/OUTPT VISIT, EST, LEVL IV, 30-39 MIN: ICD-10-PCS | Mod: HCNC,S$GLB,, | Performed by: ANESTHESIOLOGY

## 2023-01-19 RX ORDER — HYDROCODONE BITARTRATE AND ACETAMINOPHEN 10; 325 MG/1; MG/1
1 TABLET ORAL
Qty: 30 TABLET | Refills: 0 | Status: SHIPPED | OUTPATIENT
Start: 2023-01-19 | End: 2023-02-17 | Stop reason: SDUPTHER

## 2023-01-19 RX ORDER — GABAPENTIN 300 MG/1
600 CAPSULE ORAL 3 TIMES DAILY
Qty: 180 CAPSULE | Refills: 2 | Status: SHIPPED | OUTPATIENT
Start: 2023-01-19 | End: 2024-01-10 | Stop reason: SDUPTHER

## 2023-01-19 NOTE — PROGRESS NOTES
PCP: Elvira Quinones MD    REFERRING PHYSICIAN: No ref. provider found    CHIEF COMPLAINT: Low Back Pain and left leg pain     Original HISTORY OF PRESENT ILLNESS: Navin Beck presents to the clinic for the evaluation of the above pain. The pain started 15-20 years ago. However, the pain worsened over the past 6 months, she thinks due to stress (Gissell/housing).     Original Pain Description:  The pain is located in the left low back and radiates down the back of the left leg to the foot. The pain is described as throbbing. Exacerbating factors: Standing and Walking. Mitigating factors rest and sitting. Symptoms interfere with daily activity. The patient feels like symptoms have been worsening. Patient denies urinary incontinence, bowel incontinence, significant weight loss, and significant motor weakness.    Original PAIN SCORES:  Best: Pain is 4  Worst: Pain is 9  Usually: Pain is 7  Current: Pain is 9    INTERVAL HISTORY:   Interval History 12/22/22: Navin Beck returns for follow up. At our last visit we prescribed home PT and a short course of opioids for what appears to be sacroiliac joint pain. Today, she returns saying the pain is all over her low back now, not just on the left as previously. On exam however, she has pain to even light touch in throughout her low back. I therefore tested many more muscle groups and found pain out of proportion to exam throughout her musculature, especially traps, low back, and legs. She does appear to have neuropathy as well, but only below the ankles bilaterally.     Interval History 1/19/23: Navin Beck returns for follow up. At our last visit we discussed fibromyalgia and some strategies to help her get more sleep and more exercise. Today, she presents back noting that she is doing rather well. The increased Gabapentin has helped her, and limiting screen time has allowed her to get sufficient sleep. She is falling asleep around 12:30 and  getting up around 9:30. She has been using about 1 Hydrocodone/day.     6 weeks of Conservative therapy (PT/Chiro/Home Exercises with Dates)  PT: Done previously  Chiro: No  HEP: No    Treatments / Medications: (Ice/Heat/NSAIDS/APAP/etc):  APAP  Ibuprofen  Aleve  Excedrine  Heat     Report:    Interventional Pain Procedures: (Previous injections)  Rudy Kraft - She was discharged for not doing Utox X 2    Past Medical History:   Diagnosis Date    Allergy     Anemia due to stage 3 chronic kidney disease 12/6/2020    Arrhythmia     Arthritis     Bronchial asthma     Diabetes mellitus     Glaucoma     Hormone disorder     hysterectomy    Hypercholesteremia     Hypertension     Hyperthyroidism     Insomnia     Kidney stones     Thyroid disease     Urine, incontinence, stress female     UTI (urinary tract infection) 11/27/2020     Past Surgical History:   Procedure Laterality Date    APPENDECTOMY      BACK SURGERY      disc removal     CARPAL TUNNEL RELEASE Bilateral     CHOLECYSTECTOMY      CYSTOSCOPY N/A 10/26/2020    Procedure: CYSTOSCOPY;  Surgeon: Wilner Goel MD;  Location: [facility];  Service: OB/GYN;  Laterality: N/A;    CYSTOSCOPY W/ RETROGRADES Bilateral 12/7/2020    Procedure: CYSTOSCOPY, WITH RETROGRADE PYELOGRAM;  Surgeon: Daniel Jalloh MD;  Location: [address];  Service: Urology;  Laterality: Bilateral;    FRACTURE SURGERY Right     wrist    [facility]STERECTOMY  2010    [facility]    JOINT REPLACEMENT      KNEE ARTHROSCOPY W/ DEBRIDEMENT      KNEE SURGERY Right     Knee replacement    LITHOTRIPSY      WI REMOVAL OF OVARY/TUBE(S)  9/9/13    benign    removal of round ligament mass      ROBOT-ASSISTED LAPAROSCOPIC LYSIS OF ADHESIONS USING DA JAMES XI N/A 10/26/2020    Procedure: XI ROBOTIC LYSIS, ADHESIONS;  Surgeon: Wilner Goel MD;  Location: [facility];  Service: OB/GYN;  Laterality: N/A;  ROBOTIC MOBILIZATION OF BLADDER- DR BURNHAM    SPINE SURGERY      WRIST FRACTURE SURGERY Left       Social History     Socioeconomic History    Marital status:    Tobacco Use    Smoking status: Every Day     Packs/day: 0.50     Years: 30.00     Pack years: 15.00     Types: Cigarettes    Smokeless tobacco: Never   Substance and Sexual Activity    Alcohol use: No    Drug use: No    Sexual activity: Not Currently     Partners: Male     Birth control/protection: Surgical     Family History   Problem Relation Age of Onset    Cancer Mother     Cancer Brother     Colon cancer Brother     Cancer Brother     Cancer Brother     Cancer Brother     Ovarian cancer Neg Hx     Uterine cancer Neg Hx     Breast cancer Neg Hx        Review of patient's allergies indicates:   Allergen Reactions    Penicillins Shortness Of Breath, Itching and Swelling     Tolerates cefepime 11/30/2020       Current Outpatient Medications   Medication Sig    albuterol (PROVENTIL/VENTOLIN HFA) 90 mcg/actuation inhaler Inhale 1-2 puffs into the lungs every 6 (six) hours as needed for Wheezing or Shortness of Breath.     atorvastatin (LIPITOR) 20 MG tablet Take 20 mg by mouth once daily.    blood-glucose meter (TRUE METRIX GLUCOSE METER MISC) True Metrix Glucose Meter    diclofenac (VOLTAREN) 75 MG EC tablet Take 1 tablet (75 mg total) by mouth 2 (two) times daily.    DULoxetine (CYMBALTA) 60 MG capsule Take 1 capsule (60 mg total) by mouth once daily.    empagliflozin (JARDIANCE) 10 mg tablet Jardiance 10 mg tablet   Take 1 tablet every day by oral route.    flurazepam (DALMANE) 30 MG capsule Take 30 mg by mouth nightly as needed for Insomnia.    fluticasone propionate (FLOVENT HFA) 44 mcg/actuation inhaler Inhale 1 puff into the lungs once daily.     gabapentin (NEURONTIN) 300 MG capsule Take 2 capsules (600 mg total) by mouth 3 (three) times daily.    HYDROcodone-acetaminophen (NORCO)  mg per tablet Take 1 tablet by mouth every 24 hours as needed for Pain.    methimazole (TAPAZOLE) 10 MG Tab Take 10 mg by mouth once daily.      miconazole (MICOTIN) 2 % cream Apply topically 2 (two) times daily.    pantoprazole (PROTONIX) 40 MG tablet Take 1 tablet (40 mg total) by mouth once daily. Start after the twice daily Protonix is complete.Follow-up with doctor for further refills. (Patient taking differently: Take 40 mg by mouth daily as needed.)    tamsulosin (FLOMAX) 0.4 mg Cap Take 1 capsule (0.4 mg total) by mouth once daily.    tiZANidine (ZANAFLEX) 4 MG tablet Take 2-3 tablets (8-12 mg total) by mouth nightly as needed (muscle spasms).    TRUE METRIX GLUCOSE TEST STRIP Strp     estradioL (ESTRACE) 0.01 % (0.1 mg/gram) vaginal cream Place 1 g vaginally 3 (three) times a week. Alternate days with Metrogel for 14 days (Patient taking differently: Place 1 g vaginally 3 (three) times a week. )    insulin aspart U-100 (NOVOLOG) 100 unit/mL (3 mL) InPn pen Inject 18 Units into the skin 3 (three) times daily.   Sliding scale: Inject 0-5 Units into the skin before meals and at bedtime as needed (Hyperglycemia). Max daily dose of 69 units.    insulin detemir U-100 (LEVEMIR FLEXTOUCH) 100 unit/mL (3 mL) SubQ InPn pen Inject 30 Units into the skin 2 (two) times daily.     No current facility-administered medications for this visit.       ROS:  GENERAL: No fever. No chills. No fatigue. Denies weight loss. Denies weight gain.  HEENT: Denies headaches. Denies vision change. Denies eye pain. Denies double vision. Denies ear pain.   CV: Denies chest pain.   PULM: Denies of shortness of breath.  GI: Denies constipation. No diarrhea. No abdominal pain. Denies nausea. Denies vomiting. No blood in stool.  HEME: Denies bleeding problems.  : Denies urgency. No painful urination. No blood in urine.  MS: Denies joint stiffness. Denies joint swelling.  + back pain.  SKIN: Denies rash.   NEURO: Denies seizures. No weakness.  PSYCH:  Denies difficulty sleeping. No anxiety. Denies depression. No suicidal thoughts.       VITALS:   Vitals:    01/19/23 1527   Weight: 96.2  kg (211 lb 15.6 oz)   PainSc:   7         PHYSICAL EXAM:   GENERAL: Well appearing, in no acute distress, alert and oriented x3.  PSYCH:  Mood and affect appropriate.  SKIN: Skin color, texture, turgor normal, no rashes or lesions.  HEENT:  Normocephalic, atraumatic. Cranial nerves grossly intact.  NECK: No pain to palpation over the cervical paraspinous muscles. No pain to palpation over facets. No pain with neck flexion, extension, or lateral flexion.   PULM: No evidence of respiratory difficulty, symmetric chest rise.  GI:  Non-distended  BACK: Stiff range of motion. Well healed midline incision. No pain to palpation over the spinous processes. No pain to palpation over facet joints. Straight leg raising in the supine position is negative to radicular pain.   EXTREMITIES: No deformities, edema, or skin discoloration.   MUSCULOSKELETAL: Shoulder, hip, and knee provocative maneuvers are negative. Bilateral upper and lower extremity strength is normal and symmetric. No atrophy is noted. Pain to light touch throughout musculature.   NEURO: Sensation is altered below the ankles bilaterally. Bilateral upper and lower extremity coordination and muscle stretch reflexes are physiologic and symmetric. Plantar response are downgoing.   GAIT: Antalgic.      LABS:      IMAGING:  None available      ASSESSMENT: 62 y.o. year old female with pain, consistent with fibromyalgia.      DISCUSSION: Ms. Beck is a woman with a history of lumbar surgery who comes to us with low back pain. We do not have imaging at this time. She identifies her pain as coming from the area of the SIJ. On follow up however, she presented with pain throughout her musculature. She notes no trauma but lots of stress from being displaced by Gissell and she is only sleeping 4 hours/night. She was previously on Cymbalta and Gabapentin before being discharged from a prior pain clinic.     OPIOID MANAGEMENT: 4-5 year history of opioids with Dr. Kraft (discharged  for missed Utox)  MME: 10  Risk: Low (Family History)  : Reviewed today  Naloxone:   Utox Appropriate: 10/13/22  Violations: None  Contract: 12/22/22    PLAN:  Continue Gabapentin 600 TID -  she reports that this helps  Continue limiting screen time to help her fall asleep  Continue Hydrocodone 10/325 QD PRN for worst pain only  Continue HEP  Re-Referral to PT   Follow up in 3 months    Sapphire Villalobos  01/19/2023

## 2023-02-17 DIAGNOSIS — M79.7 FIBROMYALGIA: Primary | ICD-10-CM

## 2023-02-17 DIAGNOSIS — Z98.890 HISTORY OF LUMBOSACRAL SPINE SURGERY: ICD-10-CM

## 2023-02-17 RX ORDER — HYDROCODONE BITARTRATE AND ACETAMINOPHEN 10; 325 MG/1; MG/1
1 TABLET ORAL
Qty: 30 TABLET | Refills: 0 | Status: SHIPPED | OUTPATIENT
Start: 2023-02-17 | End: 2023-03-14 | Stop reason: SDUPTHER

## 2023-02-17 NOTE — TELEPHONE ENCOUNTER
Patient requesting refill on (NORCO)  mg  Last office visit 1/19/23   shows last refill on 1/19/23  Patient does have a pain contract on file with [facility]  Patient last UDS 10/13/22 was consistent with current therapy     CODEINE  Not Detected         MORPHINE  Not Detected         6-ACETYLMORPHINE  Not Detected         OXYCODONE  Not Detected         NOROYXCODONE  Not Detected         OXYMORPHONE  Not Detected         NOROXYMORPHONE  Not Detected         HYDROCODONE  Not Detected         NORHYDROCODONE  Not Detected         HYDROMORPHONE  Not Detected         BUPRENORPHINE  Not Detected         NORUBPRENORPHINE  Not Detected         FENTANYL  Not Detected         NORFENTANYL  Not Detected         MEPERIDINE METABOLITE  Not Detected         TAPENTADOL  Not Detected         TAPENTADOL-O-SULF  Not Detected         METHADONE  Not Detected         TRAMADOL  Not Detected         AMPHETAMINE  Not Detected         METHAMPHETAMINE  Not Detected         MDMA- ECSTASY  Not Detected         MDA  Not Detected         MDEA- [place]  Not Detected         METHYLPHENIDATE  Not Detected         PHENTERMINE  Not Detected         BENZOYLECGONINE  Not Detected         ALPRAZOLAM  Not Detected         ALPHA-OH-ALPRAZOLAM  Not Detected         CLONAZEPAM  Not Detected         7-AMINOCLONAZEPAM  Not Detected         DIAZEPAM  Not Detected         NORDIAZEPAM  Not Detected         OXAZEPAM  Not Detected         TEMAZEPAM  Not Detected         Lorazepam  Not Detected         MIDAZOLAM  Not Detected         ZOLPIDEM  Not Detected         BARBITURATES  Not Detected         Creatinine, Urine 20.0 - 400.0 mg/dL 70.3  88.0 R  85.0 R, CM  103.0 R, CM  109.0 R, CM  139.0 R, CM    ETHYL GLUCURONIDE  Not Detected         MARIJUANA METABOLITE  Not Detected         PCP  Not Detected         CARISOPRODOL  Not Detected         Comment: The carisoprodol immunoassay has cross-reactivity to   carisoprodol and  meprobamate.    Naloxone  Not Detected         Gabapentin  Present         Pregabalin  Not Detected         Alpha-OH-Midazolam  Not Detected         Zolpidem Metabolite  Not Detected

## 2023-02-17 NOTE — TELEPHONE ENCOUNTER
----- Message from Ria Rosenberg sent at 2/17/2023  1:19 PM CST -----  Type:  Needs Medical Advice    Who Called: self  Reason:refill on HYDROcodone-acetaminophen (NORCO)  mg per tablet  Would the patient rather a call back or a response via [facility]? call  Best Call Back Number: [id number] - [place] [address] AT [facility] OF [address]   Phone:  [phone]  Fax:  [phone]        Additional Information:

## 2023-03-14 DIAGNOSIS — Z98.890 HISTORY OF LUMBOSACRAL SPINE SURGERY: ICD-10-CM

## 2023-03-14 DIAGNOSIS — M79.7 FIBROMYALGIA: Primary | ICD-10-CM

## 2023-03-14 NOTE — TELEPHONE ENCOUNTER
Patient requesting refill on Patient requesting refill on (NORCO)  mg  Last office visit 1/19/23   shows last refill on 2/17/23  Patient does have a pain contract on file with [facility] department  Patient last UDS 10/13/22 was consistent with current therapy     CODEINE   Not Detected              MORPHINE   Not Detected              6-ACETYLMORPHINE   Not Detected              OXYCODONE   Not Detected              NOROYXCODONE   Not Detected              OXYMORPHONE   Not Detected              NOROXYMORPHONE   Not Detected              HYDROCODONE   Not Detected              NORHYDROCODONE   Not Detected              HYDROMORPHONE   Not Detected              BUPRENORPHINE   Not Detected              NORUBPRENORPHINE   Not Detected              FENTANYL   Not Detected              NORFENTANYL   Not Detected              MEPERIDINE METABOLITE   Not Detected              TAPENTADOL   Not Detected              TAPENTADOL-O-SULF   Not Detected              METHADONE   Not Detected              TRAMADOL   Not Detected              AMPHETAMINE   Not Detected              METHAMPHETAMINE   Not Detected              MDMA- ECSTASY   Not Detected              MDA   Not Detected              MDEA- Mary Ellen   Not Detected              METHYLPHENIDATE   Not Detected              PHENTERMINE   Not Detected              BENZOYLECGONINE   Not Detected              ALPRAZOLAM   Not Detected              ALPHA-OH-ALPRAZOLAM   Not Detected              CLONAZEPAM   Not Detected              7-AMINOCLONAZEPAM   Not Detected              DIAZEPAM   Not Detected              NORDIAZEPAM   Not Detected              OXAZEPAM   Not Detected              TEMAZEPAM   Not Detected              Lorazepam   Not Detected              MIDAZOLAM   Not Detected              ZOLPIDEM   Not Detected              BARBITURATES   Not Detected              Creatinine, Urine 20.0 - 400.0 mg/dL 70.3  88.0 R  85.0 R, CM  103.0 R,  CM  109.0 R, CM  139.0 R, CM    ETHYL GLUCURONIDE   Not Detected              MARIJUANA METABOLITE   Not Detected              PCP   Not Detected              CARISOPRODOL   Not Detected              Comment: The carisoprodol immunoassay has cross-reactivity to   carisoprodol and meprobamate.    Naloxone   Not Detected              Gabapentin   Present              Pregabalin   Not Detected              Alpha-OH-Midazolam   Not Detected              Zolpidem Metabolite   Not Detected

## 2023-03-15 RX ORDER — HYDROCODONE BITARTRATE AND ACETAMINOPHEN 10; 325 MG/1; MG/1
1 TABLET ORAL
Qty: 30 TABLET | Refills: 0 | Status: SHIPPED | OUTPATIENT
Start: 2023-03-18 | End: 2023-04-20 | Stop reason: SDUPTHER

## 2023-03-16 ENCOUNTER — TELEPHONE (OUTPATIENT)
Dept: PAIN MEDICINE | Facility: CLINIC | Age: 63
End: 2023-03-16
Payer: MEDICARE

## 2023-03-16 NOTE — TELEPHONE ENCOUNTER
----- Message from Renny Anthony sent at 3/16/2023  1:46 PM CDT -----  Contact: pt  Type: Requesting refill     Who Called: PT    Regarding: HYDROcodone-acetaminophen (NORCO)  mg per tablet 30 tablet   Sig - Route: Take 1 tablet by mouth every 24 hours as needed for Pain. - Oral      Would the patient rather a call back or a response via [facility] [place]? Call back    Best Call Back Number: [phone]    Pharmacy Information:  [facility] #[id number] [address] AT [facility] [address]   Phone: [phone]  Fax:  [phone]

## 2023-03-17 ENCOUNTER — TELEPHONE (OUTPATIENT)
Dept: PAIN MEDICINE | Facility: CLINIC | Age: 63
End: 2023-03-17
Payer: MEDICARE

## 2023-03-17 NOTE — TELEPHONE ENCOUNTER
Staff reached out to pt in regards to pt requesting a callback regarding her prescription pt informed staff to disregard her message

## 2023-03-17 NOTE — TELEPHONE ENCOUNTER
----- Message from Josette Rome sent at 3/17/2023 10:31 AM CDT -----  Type:  Needs Medical Advice    Who Called: pt  Symptoms (please be specific): pt is having some issues getting a refill on  HYDROcodone-acetaminophen (NORCO)  mg per tablet  How long has patient had these symptoms:    Pharmacy name and phone #:  [facility] #[id number] - [place], [place] - [address] [address] AT [facility] [address] [address] [address] [address]  Phone: [phone] Fax: [phone]      Would the patient rather a call back or a response via [facility]? call  Best Call Back Number: [phone]  Additional Information:

## 2023-04-20 ENCOUNTER — OFFICE VISIT (OUTPATIENT)
Dept: PAIN MEDICINE | Facility: CLINIC | Age: 63
End: 2023-04-20
Payer: MEDICARE

## 2023-04-20 VITALS
SYSTOLIC BLOOD PRESSURE: 142 MMHG | BODY MASS INDEX: 33.28 KG/M2 | HEART RATE: 93 BPM | DIASTOLIC BLOOD PRESSURE: 75 MMHG | WEIGHT: 212.06 LBS | HEIGHT: 67 IN

## 2023-04-20 DIAGNOSIS — Z98.890 HISTORY OF LUMBOSACRAL SPINE SURGERY: ICD-10-CM

## 2023-04-20 DIAGNOSIS — F11.90 CHRONIC, CONTINUOUS USE OF OPIOIDS: ICD-10-CM

## 2023-04-20 DIAGNOSIS — M79.7 FIBROMYALGIA: Primary | ICD-10-CM

## 2023-04-20 PROCEDURE — 99214 PR OFFICE/OUTPT VISIT, EST, LEVL IV, 30-39 MIN: ICD-10-PCS | Mod: S$GLB,,, | Performed by: ANESTHESIOLOGY

## 2023-04-20 PROCEDURE — 1159F PR MEDICATION LIST DOCUMENTED IN MEDICAL RECORD: ICD-10-PCS | Mod: CPTII,S$GLB,, | Performed by: ANESTHESIOLOGY

## 2023-04-20 PROCEDURE — 99214 OFFICE O/P EST MOD 30 MIN: CPT | Mod: S$GLB,,, | Performed by: ANESTHESIOLOGY

## 2023-04-20 PROCEDURE — 3077F SYST BP >= 140 MM HG: CPT | Mod: CPTII,S$GLB,, | Performed by: ANESTHESIOLOGY

## 2023-04-20 PROCEDURE — 4010F PR ACE/ARB THEARPY RXD/TAKEN: ICD-10-PCS | Mod: CPTII,S$GLB,, | Performed by: ANESTHESIOLOGY

## 2023-04-20 PROCEDURE — 4010F ACE/ARB THERAPY RXD/TAKEN: CPT | Mod: CPTII,S$GLB,, | Performed by: ANESTHESIOLOGY

## 2023-04-20 PROCEDURE — 1160F RVW MEDS BY RX/DR IN RCRD: CPT | Mod: CPTII,S$GLB,, | Performed by: ANESTHESIOLOGY

## 2023-04-20 PROCEDURE — 3008F BODY MASS INDEX DOCD: CPT | Mod: CPTII,S$GLB,, | Performed by: ANESTHESIOLOGY

## 2023-04-20 PROCEDURE — 3078F DIAST BP <80 MM HG: CPT | Mod: CPTII,S$GLB,, | Performed by: ANESTHESIOLOGY

## 2023-04-20 PROCEDURE — 3078F PR MOST RECENT DIASTOLIC BLOOD PRESSURE < 80 MM HG: ICD-10-PCS | Mod: CPTII,S$GLB,, | Performed by: ANESTHESIOLOGY

## 2023-04-20 PROCEDURE — 99999 PR PBB SHADOW E&M-EST. PATIENT-LVL III: ICD-10-PCS | Mod: PBBFAC,,, | Performed by: ANESTHESIOLOGY

## 2023-04-20 PROCEDURE — 1160F PR REVIEW ALL MEDS BY PRESCRIBER/CLIN PHARMACIST DOCUMENTED: ICD-10-PCS | Mod: CPTII,S$GLB,, | Performed by: ANESTHESIOLOGY

## 2023-04-20 PROCEDURE — 1159F MED LIST DOCD IN RCRD: CPT | Mod: CPTII,S$GLB,, | Performed by: ANESTHESIOLOGY

## 2023-04-20 PROCEDURE — 3077F PR MOST RECENT SYSTOLIC BLOOD PRESSURE >= 140 MM HG: ICD-10-PCS | Mod: CPTII,S$GLB,, | Performed by: ANESTHESIOLOGY

## 2023-04-20 PROCEDURE — 3008F PR BODY MASS INDEX (BMI) DOCUMENTED: ICD-10-PCS | Mod: CPTII,S$GLB,, | Performed by: ANESTHESIOLOGY

## 2023-04-20 PROCEDURE — 99999 PR PBB SHADOW E&M-EST. PATIENT-LVL III: CPT | Mod: PBBFAC,,, | Performed by: ANESTHESIOLOGY

## 2023-04-20 RX ORDER — HYDROCODONE BITARTRATE AND ACETAMINOPHEN 10; 325 MG/1; MG/1
1 TABLET ORAL
Qty: 30 TABLET | Refills: 0 | Status: SHIPPED | OUTPATIENT
Start: 2023-04-20 | End: 2023-05-19 | Stop reason: SDUPTHER

## 2023-04-20 NOTE — PROGRESS NOTES
PCP: Elvira Quinones MD    REFERRING PHYSICIAN: No ref. provider found    CHIEF COMPLAINT: Low Back Pain and left leg pain     Original HISTORY OF PRESENT ILLNESS: Navin Beck presents to the clinic for the evaluation of the above pain. The pain started 15-20 years ago. However, the pain worsened over the past 6 months, she thinks due to stress (Gissell/housing).     Original Pain Description:  The pain is located in the left low back and radiates down the back of the left leg to the foot. The pain is described as throbbing. Exacerbating factors: Standing and Walking. Mitigating factors rest and sitting. Symptoms interfere with daily activity. The patient feels like symptoms have been worsening. Patient denies urinary incontinence, bowel incontinence, significant weight loss, and significant motor weakness.    Original PAIN SCORES:  Best: Pain is 4  Worst: Pain is 9  Usually: Pain is 7  Current: Pain is 9    INTERVAL HISTORY:   Interval History 12/22/22: Navin Beck returns for follow up. At our last visit we prescribed home PT and a short course of opioids for what appears to be sacroiliac joint pain. Today, she returns saying the pain is all over her low back now, not just on the left as previously. On exam however, she has pain to even light touch in throughout her low back. I therefore tested many more muscle groups and found pain out of proportion to exam throughout her musculature, especially traps, low back, and legs. She does appear to have neuropathy as well, but only below the ankles bilaterally.     Interval History 1/19/23: Navin Beck returns for follow up. At our last visit we discussed fibromyalgia and some strategies to help her get more sleep and more exercise. Today, she presents back noting that she is doing rather well. The increased Gabapentin has helped her, and limiting screen time has allowed her to get sufficient sleep. She is falling asleep around 12:30 and  getting up around 9:30. She has been using about 1 Hydrocodone/day.     Interval History 4/20/23: Navin Beck returns for follow up. At our last visit she was doing well with increased Gabapentin and limiting screen time. Today, she continues taking the gabapentin and hydrocodone but continues to note fibromyalgia pain, but mainly in her low back. She rates it at 6-7/10. She has been having trouble being active. She notes she has a lot of pain even with taking a shower and taking care of her house. She does admit that she had less pain when she was sleeping better.     6 weeks of Conservative therapy (PT/Chiro/Home Exercises with Dates)  PT: Done previously  Chiro: No  HEP: No    Treatments / Medications: (Ice/Heat/NSAIDS/APAP/etc):  APAP  Ibuprofen  Aleve  Excedrine  Heat     Report:    Interventional Pain Procedures: (Previous injections)  Rudy Kraft - She was discharged for not doing Utox X 2    Past Medical History:   Diagnosis Date    Allergy     Anemia due to stage 3 chronic kidney disease 12/6/2020    Arrhythmia     Arthritis     Bronchial asthma     Diabetes mellitus     Glaucoma     Hormone disorder     hysterectomy    Hypercholesteremia     Hypertension     Hyperthyroidism     Insomnia     Kidney stones     Thyroid disease     Urine, incontinence, stress female     UTI (urinary tract infection) 11/27/2020     Past Surgical History:   Procedure Laterality Date    APPENDECTOMY      BACK SURGERY      disc removal     CARPAL TUNNEL RELEASE Bilateral     CHOLECYSTECTOMY      CYSTOSCOPY N/A 10/26/2020    Procedure: CYSTOSCOPY;  Surgeon: Wilner Goel MD;  Location: [facility];  Service: OB/GYN;  Laterality: N/A;    CYSTOSCOPY W/ RETROGRADES Bilateral 12/7/2020    Procedure: CYSTOSCOPY, WITH RETROGRADE PYELOGRAM;  Surgeon: Daniel Jalloh MD;  Location: [address];  Service: Urology;  Laterality: Bilateral;    FRACTURE SURGERY Right     wrist    HYSTERECTOMY  2010    Dr Gordon wilburn [facility]     JOINT REPLACEMENT      KNEE ARTHROSCOPY W/ DEBRIDEMENT      KNEE SURGERY Right     Knee replacement    LITHOTRIPSY      DC REMOVAL OF OVARY/TUBE(S)  9/9/13    benign    removal of round ligament mass      ROBOT-ASSISTED LAPAROSCOPIC LYSIS OF ADHESIONS USING DA JAMES XI N/A 10/26/2020    Procedure: XI ROBOTIC LYSIS, ADHESIONS;  Surgeon: Wilner Goel MD;  Location: [facility];  Service: OB/GYN;  Laterality: N/A;  ROBOTIC MOBILIZATION OF BLADDER- DR BURNHAM    SPINE SURGERY      WRIST FRACTURE SURGERY Left      Social History     Socioeconomic History    Marital status:    Tobacco Use    Smoking status: Every Day     Packs/day: 0.50     Years: 30.00     Pack years: 15.00     Types: Cigarettes    Smokeless tobacco: Never   Substance and Sexual Activity    Alcohol use: No    Drug use: No    Sexual activity: Not Currently     Partners: Male     Birth control/protection: Surgical     Family History   Problem Relation Age of Onset    Cancer Mother     Cancer Brother     Colon cancer Brother     Cancer Brother     Cancer Brother     Cancer Brother     Ovarian cancer Neg Hx     Uterine cancer Neg Hx     Breast cancer Neg Hx        Review of patient's allergies indicates:   Allergen Reactions    Penicillins Shortness Of Breath, Itching and Swelling     Tolerates cefepime 11/30/2020       Current Outpatient Medications   Medication Sig    albuterol (PROVENTIL/VENTOLIN HFA) 90 mcg/actuation inhaler Inhale 1-2 puffs into the lungs every 6 (six) hours as needed for Wheezing or Shortness of Breath.     atorvastatin (LIPITOR) 20 MG tablet Take 20 mg by mouth once daily.    blood-glucose meter (TRUE METRIX GLUCOSE METER MISC) True Metrix Glucose Meter    diclofenac (VOLTAREN) 75 MG EC tablet Take 1 tablet (75 mg total) by mouth 2 (two) times daily.    DULoxetine (CYMBALTA) 60 MG capsule Take 1 capsule (60 mg total) by mouth once daily.    empagliflozin (JARDIANCE) 10 mg tablet Jardiance 10 mg tablet   Take 1 tablet every  day by oral route.    flurazepam (DALMANE) 30 MG capsule Take 30 mg by mouth nightly as needed for Insomnia.    fluticasone propionate (FLOVENT HFA) 44 mcg/actuation inhaler Inhale 1 puff into the lungs once daily.     gabapentin (NEURONTIN) 300 MG capsule Take 2 capsules (600 mg total) by mouth 3 (three) times daily.    HYDROcodone-acetaminophen (NORCO)  mg per tablet Take 1 tablet by mouth every 24 hours as needed for Pain.    methimazole (TAPAZOLE) 10 MG Tab Take 10 mg by mouth once daily.     miconazole (MICOTIN) 2 % cream Apply topically 2 (two) times daily.    pantoprazole (PROTONIX) 40 MG tablet Take 1 tablet (40 mg total) by mouth once daily. Start after the twice daily Protonix is complete.Follow-up with doctor for further refills. (Patient taking differently: Take 40 mg by mouth daily as needed.)    tamsulosin (FLOMAX) 0.4 mg Cap Take 1 capsule (0.4 mg total) by mouth once daily.    tiZANidine (ZANAFLEX) 4 MG tablet Take 2-3 tablets (8-12 mg total) by mouth nightly as needed (muscle spasms).    TRUE METRIX GLUCOSE TEST STRIP Strp     estradioL (ESTRACE) 0.01 % (0.1 mg/gram) vaginal cream Place 1 g vaginally 3 (three) times a week. Alternate days with Metrogel for 14 days (Patient taking differently: Place 1 g vaginally 3 (three) times a week. )    insulin aspart U-100 (NOVOLOG) 100 unit/mL (3 mL) InPn pen Inject 18 Units into the skin 3 (three) times daily.   Sliding scale: Inject 0-5 Units into the skin before meals and at bedtime as needed (Hyperglycemia). Max daily dose of 69 units.    insulin detemir U-100 (LEVEMIR FLEXTOUCH) 100 unit/mL (3 mL) SubQ InPn pen Inject 30 Units into the skin 2 (two) times daily.     No current facility-administered medications for this visit.       ROS:  GENERAL: No fever. No chills. No fatigue. Denies weight loss. Denies weight gain.  HEENT: Denies headaches. Denies vision change. Denies eye pain. Denies double vision. Denies ear pain.   CV: Denies chest pain.  "  PULM: Denies of shortness of breath.  GI: Denies constipation. No diarrhea. No abdominal pain. Denies nausea. Denies vomiting. No blood in stool.  HEME: Denies bleeding problems.  : Denies urgency. No painful urination. No blood in urine.  MS: Denies joint stiffness. Denies joint swelling.  + back pain.  SKIN: Denies rash.   NEURO: Denies seizures. No weakness.  PSYCH:  Denies difficulty sleeping. No anxiety. Denies depression. No suicidal thoughts.       VITALS:   Vitals:    04/20/23 1404   BP: (!) 142/75   Pulse: 93   Weight: 96.2 kg (212 lb 1.3 oz)   Height: 5' 7" (1.702 m)   PainSc:   4         PHYSICAL EXAM:   GENERAL: Well appearing, in no acute distress, alert and oriented x3.  PSYCH:  Mood and affect appropriate.  SKIN: Skin color, texture, turgor normal, no rashes or lesions.  HEENT:  Normocephalic, atraumatic. Cranial nerves grossly intact.  NECK: No pain to palpation over the cervical paraspinous muscles. No pain to palpation over facets. No pain with neck flexion, extension, or lateral flexion.   PULM: No evidence of respiratory difficulty, symmetric chest rise.  GI:  Non-distended  BACK: Stiff range of motion. Well healed midline incision. No pain to palpation over the spinous processes. No pain to palpation over facet joints. Straight leg raising in the supine position is negative to radicular pain.   EXTREMITIES: No deformities, edema, or skin discoloration.   MUSCULOSKELETAL: Shoulder, hip, and knee provocative maneuvers are negative. Bilateral upper and lower extremity strength is normal and symmetric. No atrophy is noted. Pain to light touch throughout musculature.   NEURO: Sensation is altered below the ankles bilaterally. Bilateral upper and lower extremity coordination and muscle stretch reflexes are physiologic and symmetric. Plantar response are downgoing.   GAIT: Antalgic.      LABS:      IMAGING:  None available      ASSESSMENT: 62 y.o. year old female with pain, consistent with " fibromyalgia.      DISCUSSION: Ms. Beck is a woman with a history of lumbar surgery who comes to us with low back pain. We do not have imaging at this time. She identifies her pain as coming from the area of the SIJ. On follow up however, she presented with pain throughout her musculature. She notes no trauma but lots of stress from being displaced by Gissell and she is only sleeping 4 hours/night. She was previously on Cymbalta and Gabapentin before being discharged from a prior pain clinic.     OPIOID MANAGEMENT: 4-5 year history of opioids with Dr. Kraft (discharged for missed Utox)  MME: 10  Risk: Low (Family History)  : Reviewed today  Naloxone:   Utox Appropriate: 10/13/22  Violations: None  Contract: 12/22/22    PLAN:  Continue Gabapentin 600 TID -  she reports that this helps  Continue Cymbalta   Continue Hydrocodone 10/325 QD PRN for worst pain only  Discussed the importance of exercise. She plans to start walking the dog.   Limit screen time prior to bedtime   Repeat Utox (though it has been over 1 month)  Follow up in 3 months to shiv Villalobos  04/20/2023

## 2023-05-19 DIAGNOSIS — Z98.890 HISTORY OF LUMBOSACRAL SPINE SURGERY: ICD-10-CM

## 2023-05-19 DIAGNOSIS — M79.7 FIBROMYALGIA: Primary | ICD-10-CM

## 2023-05-19 RX ORDER — HYDROCODONE BITARTRATE AND ACETAMINOPHEN 10; 325 MG/1; MG/1
1 TABLET ORAL
Qty: 30 TABLET | Refills: 0 | Status: SHIPPED | OUTPATIENT
Start: 2023-05-19 | End: 2023-06-19 | Stop reason: SDUPTHER

## 2023-05-19 NOTE — TELEPHONE ENCOUNTER
Patient requesting refill on Norco  mg   Last office visit 4/20/23   shows last refill on 4/20  Patient does not have a pain contract on file with [facility] department  Patient last UDS 4/20/23 was consistent with current therapy     CODEINE  Not Detected  Not Detected         MORPHINE  Not Detected  Not Detected         6-ACETYLMORPHINE  Not Detected  Not Detected         OXYCODONE  Not Detected  Not Detected         NOROYXCODONE  Not Detected  Not Detected         OXYMORPHONE  Not Detected  Not Detected         NOROXYMORPHONE  Not Detected  Not Detected         HYDROCODONE  Not Detected  Not Detected         NORHYDROCODONE  Not Detected  Not Detected         HYDROMORPHONE  Not Detected  Not Detected         BUPRENORPHINE  Not Detected  Not Detected         NORUBPRENORPHINE  Not Detected  Not Detected         FENTANYL  Not Detected  Not Detected         NORFENTANYL  Not Detected  Not Detected         MEPERIDINE METABOLITE  Not Detected  Not Detected         TAPENTADOL  Not Detected  Not Detected         TAPENTADOL-O-SULF  Not Detected  Not Detected         METHADONE  Not Detected  Not Detected         TRAMADOL  Not Detected  Not Detected         AMPHETAMINE  Not Detected  Not Detected         METHAMPHETAMINE  Not Detected  Not Detected         MDMA- ECSTASY  Not Detected  Not Detected         MDA  Not Detected  Not Detected         MDEA- Mary Ellen  Not Detected  Not Detected         METHYLPHENIDATE  Not Detected  Not Detected         PHENTERMINE  Not Detected  Not Detected         BENZOYLECGONINE  Not Detected  Not Detected         ALPRAZOLAM  Not Detected  Not Detected         ALPHA-OH-ALPRAZOLAM  Not Detected  Not Detected         CLONAZEPAM  Not Detected  Not Detected         7-AMINOCLONAZEPAM  Not Detected  Not Detected         DIAZEPAM  Not Detected  Not Detected         NORDIAZEPAM  Not Detected  Not Detected         OXAZEPAM  Present  Not Detected         TEMAZEPAM  Present  Not  Detected         Lorazepam  Not Detected  Not Detected         MIDAZOLAM  Not Detected  Not Detected         ZOLPIDEM  Not Detected  Not Detected         BARBITURATES  Not Detected  Not Detected         Creatinine, Urine 20.0 - 400.0 mg/dL 69.6  70.3  88.0 R  85.0 R, CM  103.0 R, CM  109.0 R, CM  139.0 R, CM    ETHYL GLUCURONIDE  Not Detected  Not Detected         MARIJUANA METABOLITE  Not Detected  Not Detected         PCP  Not Detected  Not Detected         CARISOPRODOL  Not Detected  Not Detected CM         Comment: The carisoprodol immunoassay has cross-reactivity to   carisoprodol and meprobamate.    Naloxone  Not Detected  Not Detected         Gabapentin  Present  Present         Pregabalin  Not Detected  Not Detected         Alpha-OH-Midazolam  Not Detected  Not Detected         Zolpidem Metabolite  Not Detected  Not Detected         PAIN MANAGEMENT DRUG PANEL  See Below  See Below

## 2023-06-19 DIAGNOSIS — M79.7 FIBROMYALGIA: ICD-10-CM

## 2023-06-19 DIAGNOSIS — Z98.890 HISTORY OF LUMBOSACRAL SPINE SURGERY: ICD-10-CM

## 2023-06-19 RX ORDER — HYDROCODONE BITARTRATE AND ACETAMINOPHEN 10; 325 MG/1; MG/1
1 TABLET ORAL
Qty: 30 TABLET | Refills: 0 | Status: SHIPPED | OUTPATIENT
Start: 2023-06-19 | End: 2023-07-20

## 2023-06-19 NOTE — TELEPHONE ENCOUNTER
----- Message from Latesha Viavs sent at 6/19/2023  1:52 PM CDT -----  Regarding: Refill Request  Type:  RX Refill Request    Who Called: WOODROW SALCIDO [[id number]]    Refill or New Rx: Refill    RX Name and Strength: HYDROcodone-acetaminophen (NORCO)  mg per tablet    How is the patient currently taking it? (ex. 1XDay) 1xday     Is this a 30 day or 90 day RX:   30 days   Preferred Pharmacy with phone number: [facility] #[id number] - [facility] [address] [facility] [address] AT [facility] [address]    Local or Mail Order: local    Ordering Provider: Wilfredo Esparza Call Back Number: [phone]    Additional Information:

## 2023-06-19 NOTE — TELEPHONE ENCOUNTER
Patient requesting refill on Norco 10/325mg  Last office visit 04.20.23   shows last refill on 05.19.23  Patient does have a pain contract on file with [facility] department  Patient last UDS 04.20.23 was consistent with current therapy

## 2023-07-20 ENCOUNTER — OFFICE VISIT (OUTPATIENT)
Dept: PAIN MEDICINE | Facility: CLINIC | Age: 63
End: 2023-07-20
Payer: MEDICARE

## 2023-07-20 VITALS
DIASTOLIC BLOOD PRESSURE: 74 MMHG | WEIGHT: 211.63 LBS | BODY MASS INDEX: 33.21 KG/M2 | RESPIRATION RATE: 18 BRPM | SYSTOLIC BLOOD PRESSURE: 106 MMHG | HEIGHT: 67 IN | OXYGEN SATURATION: 100 %

## 2023-07-20 DIAGNOSIS — F43.21 GRIEF: ICD-10-CM

## 2023-07-20 DIAGNOSIS — F11.90 CHRONIC, CONTINUOUS USE OF OPIOIDS: Primary | ICD-10-CM

## 2023-07-20 DIAGNOSIS — Z98.890 HISTORY OF LUMBOSACRAL SPINE SURGERY: ICD-10-CM

## 2023-07-20 DIAGNOSIS — M79.7 FIBROMYALGIA: ICD-10-CM

## 2023-07-20 PROCEDURE — 1101F PT FALLS ASSESS-DOCD LE1/YR: CPT | Mod: CPTII,S$GLB,, | Performed by: ANESTHESIOLOGY

## 2023-07-20 PROCEDURE — 3052F HG A1C>EQUAL 8.0%<EQUAL 9.0%: CPT | Mod: CPTII,S$GLB,, | Performed by: ANESTHESIOLOGY

## 2023-07-20 PROCEDURE — 3288F FALL RISK ASSESSMENT DOCD: CPT | Mod: CPTII,S$GLB,, | Performed by: ANESTHESIOLOGY

## 2023-07-20 PROCEDURE — 3052F PR MOST RECENT HEMOGLOBIN A1C LEVEL 8.0 - < 9.0%: ICD-10-PCS | Mod: CPTII,S$GLB,, | Performed by: ANESTHESIOLOGY

## 2023-07-20 PROCEDURE — 99214 OFFICE O/P EST MOD 30 MIN: CPT | Mod: S$GLB,,, | Performed by: ANESTHESIOLOGY

## 2023-07-20 PROCEDURE — 3074F PR MOST RECENT SYSTOLIC BLOOD PRESSURE < 130 MM HG: ICD-10-PCS | Mod: CPTII,S$GLB,, | Performed by: ANESTHESIOLOGY

## 2023-07-20 PROCEDURE — 1160F PR REVIEW ALL MEDS BY PRESCRIBER/CLIN PHARMACIST DOCUMENTED: ICD-10-PCS | Mod: CPTII,S$GLB,, | Performed by: ANESTHESIOLOGY

## 2023-07-20 PROCEDURE — 3078F PR MOST RECENT DIASTOLIC BLOOD PRESSURE < 80 MM HG: ICD-10-PCS | Mod: CPTII,S$GLB,, | Performed by: ANESTHESIOLOGY

## 2023-07-20 PROCEDURE — 4010F ACE/ARB THERAPY RXD/TAKEN: CPT | Mod: CPTII,S$GLB,, | Performed by: ANESTHESIOLOGY

## 2023-07-20 PROCEDURE — 3288F PR FALLS RISK ASSESSMENT DOCUMENTED: ICD-10-PCS | Mod: CPTII,S$GLB,, | Performed by: ANESTHESIOLOGY

## 2023-07-20 PROCEDURE — 99999 PR PBB SHADOW E&M-EST. PATIENT-LVL III: CPT | Mod: PBBFAC,,, | Performed by: ANESTHESIOLOGY

## 2023-07-20 PROCEDURE — 3008F PR BODY MASS INDEX (BMI) DOCUMENTED: ICD-10-PCS | Mod: CPTII,S$GLB,, | Performed by: ANESTHESIOLOGY

## 2023-07-20 PROCEDURE — 1160F RVW MEDS BY RX/DR IN RCRD: CPT | Mod: CPTII,S$GLB,, | Performed by: ANESTHESIOLOGY

## 2023-07-20 PROCEDURE — 1159F MED LIST DOCD IN RCRD: CPT | Mod: CPTII,S$GLB,, | Performed by: ANESTHESIOLOGY

## 2023-07-20 PROCEDURE — 1159F PR MEDICATION LIST DOCUMENTED IN MEDICAL RECORD: ICD-10-PCS | Mod: CPTII,S$GLB,, | Performed by: ANESTHESIOLOGY

## 2023-07-20 PROCEDURE — 99999 PR PBB SHADOW E&M-EST. PATIENT-LVL III: ICD-10-PCS | Mod: PBBFAC,,, | Performed by: ANESTHESIOLOGY

## 2023-07-20 PROCEDURE — 3074F SYST BP LT 130 MM HG: CPT | Mod: CPTII,S$GLB,, | Performed by: ANESTHESIOLOGY

## 2023-07-20 PROCEDURE — 1101F PR PT FALLS ASSESS DOC 0-1 FALLS W/OUT INJ PAST YR: ICD-10-PCS | Mod: CPTII,S$GLB,, | Performed by: ANESTHESIOLOGY

## 2023-07-20 PROCEDURE — 3008F BODY MASS INDEX DOCD: CPT | Mod: CPTII,S$GLB,, | Performed by: ANESTHESIOLOGY

## 2023-07-20 PROCEDURE — 3078F DIAST BP <80 MM HG: CPT | Mod: CPTII,S$GLB,, | Performed by: ANESTHESIOLOGY

## 2023-07-20 PROCEDURE — 99214 PR OFFICE/OUTPT VISIT, EST, LEVL IV, 30-39 MIN: ICD-10-PCS | Mod: S$GLB,,, | Performed by: ANESTHESIOLOGY

## 2023-07-20 PROCEDURE — 4010F PR ACE/ARB THEARPY RXD/TAKEN: ICD-10-PCS | Mod: CPTII,S$GLB,, | Performed by: ANESTHESIOLOGY

## 2023-07-20 RX ORDER — HYDROCODONE BITARTRATE AND ACETAMINOPHEN 10; 325 MG/1; MG/1
1 TABLET ORAL EVERY 12 HOURS PRN
Qty: 35 TABLET | Refills: 0 | Status: SHIPPED | OUTPATIENT
Start: 2023-07-20 | End: 2023-08-21 | Stop reason: SDUPTHER

## 2023-07-20 NOTE — PROGRESS NOTES
PCP: Elvira Quinones MD    REFERRING PHYSICIAN: No ref. provider found    CHIEF COMPLAINT: Low Back Pain and left leg pain     Original HISTORY OF PRESENT ILLNESS: Navin Beck presents to the clinic for the evaluation of the above pain. The pain started 15-20 years ago. However, the pain worsened over the past 6 months, she thinks due to stress (Gissell/housing).     Original Pain Description:  The pain is located in the left low back and radiates down the back of the left leg to the foot. The pain is described as throbbing. Exacerbating factors: Standing and Walking. Mitigating factors rest and sitting. Symptoms interfere with daily activity. The patient feels like symptoms have been worsening. Patient denies urinary incontinence, bowel incontinence, significant weight loss, and significant motor weakness.    Original PAIN SCORES:  Best: Pain is 4  Worst: Pain is 9  Usually: Pain is 7  Current: Pain is 9    INTERVAL HISTORY:   Interval History 12/22/22: Navin Beck returns for follow up. At our last visit we prescribed home PT and a short course of opioids for what appears to be sacroiliac joint pain. Today, she returns saying the pain is all over her low back now, not just on the left as previously. On exam however, she has pain to even light touch in throughout her low back. I therefore tested many more muscle groups and found pain out of proportion to exam throughout her musculature, especially traps, low back, and legs. She does appear to have neuropathy as well, but only below the ankles bilaterally.     Interval History 1/19/23: Navin Beck returns for follow up. At our last visit we discussed fibromyalgia and some strategies to help her get more sleep and more exercise. Today, she presents back noting that she is doing rather well. The increased Gabapentin has helped her, and limiting screen time has allowed her to get sufficient sleep. She is falling asleep around 12:30 and  getting up around 9:30. She has been using about 1 Hydrocodone/day.     Interval History 4/20/23: Navin Beck returns for follow up. At our last visit she was doing well with increased Gabapentin and limiting screen time. Today, she continues taking the gabapentin and hydrocodone but continues to note fibromyalgia pain, but mainly in her low back. She rates it at 6-7/10. She has been having trouble being active. She notes she has a lot of pain even with taking a shower and taking care of her house. She does admit that she had less pain when she was sleeping better.     Interval History 7/20/23: Navin Beck returns for follow up. At our last visit we were only continuing her care and we repeat a Utox. Today, she notes that the medications are helping, but she does continue to have widespread pain. She has tried advil to assist with this, but it bothers her stomach. She also is open about having to take 2 tablets of Hydrocodone several times and thus running out a little early. This is why her last Utox did not show Hydrocodone. We discussed this. I can be flexible given her age, but I do ask they she stick with what we agree upon.     6 weeks of Conservative therapy (PT/Chiro/Home Exercises with Dates)  PT: Done previously  Chiro: No  HEP: No    Treatments / Medications: (Ice/Heat/NSAIDS/APAP/etc):  APAP  Ibuprofen  Aleve  Excedrine  Heat     Report:    Interventional Pain Procedures: (Previous injections)  Rudy Kraft - She was discharged for not doing Utox X 2    Past Medical History:   Diagnosis Date    Allergy     Anemia due to stage 3 chronic kidney disease 12/6/2020    Arrhythmia     Arthritis     Bronchial asthma     Diabetes mellitus     Glaucoma     Hormone disorder     hysterectomy    Hypercholesteremia     Hypertension     Hyperthyroidism     Insomnia     Kidney stones     Thyroid disease     Urine, incontinence, stress female     UTI (urinary tract infection) 11/27/2020     Past  Surgical History:   Procedure Laterality Date    APPENDECTOMY      BACK SURGERY      disc removal     CARPAL TUNNEL RELEASE Bilateral     CHOLECYSTECTOMY      CYSTOSCOPY N/A 10/26/2020    Procedure: CYSTOSCOPY;  Surgeon: Wilner Goel MD;  Location: [facility];  Service: OB/GYN;  Laterality: N/A;    CYSTOSCOPY W/ RETROGRADES Bilateral 12/7/2020    Procedure: CYSTOSCOPY, WITH RETROGRADE PYELOGRAM;  Surgeon: Daniel Jalloh MD;  Location: [address];  Service: Urology;  Laterality: Bilateral;    FRACTURE SURGERY Right     wrist    HYSTERECTOMY  2010    [facility]    JOINT REPLACEMENT      KNEE ARTHROSCOPY W/ DEBRIDEMENT      KNEE SURGERY Right     Knee replacement    LITHOTRIPSY      AR REMOVAL OF OVARY/TUBE(S)  9/9/13    benign    removal of round ligament mass      ROBOT-ASSISTED LAPAROSCOPIC LYSIS OF ADHESIONS USING DA JAMES XI N/A 10/26/2020    Procedure: XI ROBOTIC LYSIS, ADHESIONS;  Surgeon: Wilner Goel MD;  Location: [facility];  Service: OB/GYN;  Laterality: N/A;  ROBOTIC MOBILIZATION OF BLADDER- DR BURNHAM    SPINE SURGERY      WRIST FRACTURE SURGERY Left      Social History     Socioeconomic History    Marital status:    Tobacco Use    Smoking status: Every Day     Packs/day: 0.50     Years: 30.00     Pack years: 15.00     Types: Cigarettes    Smokeless tobacco: Never   Substance and Sexual Activity    Alcohol use: No    Drug use: No    Sexual activity: Not Currently     Partners: Male     Birth control/protection: Surgical     Family History   Problem Relation Age of Onset    Cancer Mother     Cancer Brother     Colon cancer Brother     Cancer Brother     Cancer Brother     Cancer Brother     Ovarian cancer Neg Hx     Uterine cancer Neg Hx     Breast cancer Neg Hx        Review of patient's allergies indicates:   Allergen Reactions    Penicillins Shortness Of Breath, Itching and Swelling     Tolerates cefepime 11/30/2020       Current Outpatient Medications   Medication Sig     albuterol (PROVENTIL/VENTOLIN HFA) 90 mcg/actuation inhaler Inhale 1-2 puffs into the lungs every 6 (six) hours as needed for Wheezing or Shortness of Breath.     atorvastatin (LIPITOR) 20 MG tablet Take 20 mg by mouth once daily.    blood-glucose meter (TRUE METRIX GLUCOSE METER MISC) True Metrix Glucose Meter    diclofenac (VOLTAREN) 75 MG EC tablet Take 1 tablet (75 mg total) by mouth 2 (two) times daily.    DULoxetine (CYMBALTA) 60 MG capsule Take 1 capsule (60 mg total) by mouth once daily.    empagliflozin (JARDIANCE) 10 mg tablet Jardiance 10 mg tablet   Take 1 tablet every day by oral route.    flurazepam (DALMANE) 30 MG capsule Take 30 mg by mouth nightly as needed for Insomnia.    fluticasone propionate (FLOVENT HFA) 44 mcg/actuation inhaler Inhale 1 puff into the lungs once daily.     gabapentin (NEURONTIN) 300 MG capsule Take 2 capsules (600 mg total) by mouth 3 (three) times daily.    methimazole (TAPAZOLE) 10 MG Tab Take 10 mg by mouth once daily.     miconazole (MICOTIN) 2 % cream Apply topically 2 (two) times daily.    pantoprazole (PROTONIX) 40 MG tablet Take 1 tablet (40 mg total) by mouth once daily. Start after the twice daily Protonix is complete.Follow-up with doctor for further refills. (Patient taking differently: Take 40 mg by mouth daily as needed.)    tamsulosin (FLOMAX) 0.4 mg Cap Take 1 capsule (0.4 mg total) by mouth once daily.    tiZANidine (ZANAFLEX) 4 MG tablet Take 2-3 tablets (8-12 mg total) by mouth nightly as needed (muscle spasms).    TRUE METRIX GLUCOSE TEST STRIP Strp     estradioL (ESTRACE) 0.01 % (0.1 mg/gram) vaginal cream Place 1 g vaginally 3 (three) times a week. Alternate days with Metrogel for 14 days (Patient taking differently: Place 1 g vaginally 3 (three) times a week. )    HYDROcodone-acetaminophen (NORCO)  mg per tablet Take 1 tablet by mouth every 24 hours as needed for Pain.    insulin aspart U-100 (NOVOLOG) 100 unit/mL (3 mL) InPn pen Inject 18 Units  "into the skin 3 (three) times daily.   Sliding scale: Inject 0-5 Units into the skin before meals and at bedtime as needed (Hyperglycemia). Max daily dose of 69 units.    insulin detemir U-100 (LEVEMIR FLEXTOUCH) 100 unit/mL (3 mL) SubQ InPn pen Inject 30 Units into the skin 2 (two) times daily.     No current facility-administered medications for this visit.       ROS:  GENERAL: No fever. No chills. No fatigue. Denies weight loss. Denies weight gain.  HEENT: Denies headaches. Denies vision change. Denies eye pain. Denies double vision. Denies ear pain.   CV: Denies chest pain.   PULM: Denies of shortness of breath.  GI: Denies constipation. No diarrhea. No abdominal pain. Denies nausea. Denies vomiting. No blood in stool.  HEME: Denies bleeding problems.  : Denies urgency. No painful urination. No blood in urine.  MS: Denies joint stiffness. Denies joint swelling.  + back pain.  SKIN: Denies rash.   NEURO: Denies seizures. No weakness.  PSYCH:  Denies difficulty sleeping. No anxiety. Denies depression. No suicidal thoughts.       VITALS:   Vitals:    07/20/23 1426   BP: 106/74   Resp: 18   SpO2: 100%   Weight: 96 kg (211 lb 10.3 oz)   Height: 5' 7" (1.702 m)   PainSc:   7         PHYSICAL EXAM:   GENERAL: Well appearing, in no acute distress, alert and oriented x3.  PSYCH:  Mood and affect appropriate.  SKIN: Skin color, texture, turgor normal, no rashes or lesions.  HEENT:  Normocephalic, atraumatic. Cranial nerves grossly intact.  NECK: No pain to palpation over the cervical paraspinous muscles. No pain to palpation over facets. No pain with neck flexion, extension, or lateral flexion.   PULM: No evidence of respiratory difficulty, symmetric chest rise.  GI:  Non-distended  BACK: Stiff range of motion. Well healed midline incision. No pain to palpation over the spinous processes. No pain to palpation over facet joints. Straight leg raising in the supine position is negative to radicular pain.   EXTREMITIES: No " deformities, edema, or skin discoloration.   MUSCULOSKELETAL: Shoulder, hip, and knee provocative maneuvers are negative. Bilateral upper and lower extremity strength is normal and symmetric. No atrophy is noted. Pain to light touch throughout musculature.   NEURO: Sensation is altered below the ankles bilaterally. Bilateral upper and lower extremity coordination and muscle stretch reflexes are physiologic and symmetric. Plantar response are downgoing.   GAIT: Antalgic.      LABS:      IMAGING:  None available      ASSESSMENT: 63 y.o. year old female with pain, consistent with fibromyalgia.      DISCUSSION: Ms. Beck is a woman with a history of lumbar surgery who comes to us with low back pain. We do not have imaging at this time. She identifies her pain as coming from the area of the SIJ. On follow up however, she presented with pain throughout her musculature. She notes no trauma but lots of stress from being displaced by Gissell and she is only sleeping 4 hours/night. She was previously on Cymbalta and Gabapentin before being discharged from a prior pain clinic.     OPIOID MANAGEMENT: 4-5 year history of opioids with Dr. Kraft (discharged for missed Utox)  MME: 10  Risk: Low (Family History)  : Reviewed today  Naloxone:   Utox Appropriate: 10/13/22  Violations: None  Contract: 12/22/22    PLAN:  Continue Gabapentin 600 TID -  she reports that this helps  Continue Cymbalta   Continue Hydrocodone 10/325 QD #35  Discussed the importance of exercise.   Referred to Psych for grief  Follow up in 3 months     Sapphire Villalobos  07/20/2023

## 2023-08-21 DIAGNOSIS — Z98.890 HISTORY OF LUMBOSACRAL SPINE SURGERY: Primary | ICD-10-CM

## 2023-08-21 DIAGNOSIS — M79.7 FIBROMYALGIA: ICD-10-CM

## 2023-08-21 RX ORDER — HYDROCODONE BITARTRATE AND ACETAMINOPHEN 10; 325 MG/1; MG/1
1 TABLET ORAL EVERY 12 HOURS PRN
Qty: 35 TABLET | Refills: 0 | Status: SHIPPED | OUTPATIENT
Start: 2023-08-21 | End: 2023-09-21 | Stop reason: SDUPTHER

## 2023-08-21 NOTE — TELEPHONE ENCOUNTER
Patient requesting refill on  HYDROcodone-acetaminophen (NORCO)  mg   Last office visit 07/20/23   shows last refill on 07/20/23  Patient does have a pain contract on file with [facility]  Patient last UDS 04/20/23 was not consistent with current therapy     CODEINE  Not Detected  Not Detected         MORPHINE  Not Detected  Not Detected         6-ACETYLMORPHINE  Not Detected  Not Detected         OXYCODONE  Not Detected  Not Detected         NOROYXCODONE  Not Detected  Not Detected         OXYMORPHONE  Not Detected  Not Detected         NOROXYMORPHONE  Not Detected  Not Detected         HYDROCODONE  Not Detected  Not Detected         NORHYDROCODONE  Not Detected  Not Detected         HYDROMORPHONE  Not Detected  Not Detected         BUPRENORPHINE  Not Detected  Not Detected         NORUBPRENORPHINE  Not Detected  Not Detected         FENTANYL  Not Detected  Not Detected         NORFENTANYL  Not Detected  Not Detected         MEPERIDINE METABOLITE  Not Detected  Not Detected         TAPENTADOL  Not Detected  Not Detected         TAPENTADOL-O-SULF  Not Detected  Not Detected         METHADONE  Not Detected  Not Detected         TRAMADOL  Not Detected  Not Detected         AMPHETAMINE  Not Detected  Not Detected         METHAMPHETAMINE  Not Detected  Not Detected         MDMA- ECSTASY  Not Detected  Not Detected         MDA  Not Detected  Not Detected         MDEA- Mary Ellen  Not Detected  Not Detected         METHYLPHENIDATE  Not Detected  Not Detected         PHENTERMINE  Not Detected  Not Detected         BENZOYLECGONINE  Not Detected  Not Detected         ALPRAZOLAM  Not Detected  Not Detected         ALPHA-OH-ALPRAZOLAM  Not Detected  Not Detected         CLONAZEPAM  Not Detected  Not Detected         7-AMINOCLONAZEPAM  Not Detected  Not Detected         DIAZEPAM  Not Detected  Not Detected         NORDIAZEPAM  Not Detected  Not Detected         OXAZEPAM  Present  Not Detected          TEMAZEPAM  Present  Not Detected         Lorazepam  Not Detected  Not Detected         MIDAZOLAM  Not Detected  Not Detected         ZOLPIDEM  Not Detected  Not Detected         BARBITURATES  Not Detected  Not Detected         Creatinine, Urine 20.0 - 400.0 mg/dL 69.6  70.3  88.0 R  85.0 R, CM  103.0 R, CM  109.0 R, CM  139.0 R, CM    ETHYL GLUCURONIDE  Not Detected  Not Detected         MARIJUANA METABOLITE  Not Detected  Not Detected         PCP  Not Detected  Not Detected         CARISOPRODOL  Not Detected  Not Detected CM         Comment: The carisoprodol immunoassay has cross-reactivity to   carisoprodol and meprobamate.    Naloxone  Not Detected  Not Detected         Gabapentin  Present  Present         Pregabalin  Not Detected  Not Detected         Alpha-OH-Midazolam  Not Detected  Not Detected         Zolpidem Metabolite  Not Detected  Not Detected         PAIN MANAGEMENT DRUG PANEL  See Below  See Below CM         Comment: Methodology: Qualitative Enzyme Immunoassay and Qualitative   Liquid Chromatography-Tandem Mass Spectrometry,   Quantitative Spectrophotometry   The absence of expected drug(s) and/or drug metabolite(s)   may indicate non-compliance, inappropriate timing of   specimen collection relative to drug administration, poor   drug absorption, diluted/adulterated urine, or limitations   of testing. The concentration must be greater than or equal   to the cutoff to be reported as present.  If specific drug   concentrations are required, contact the laboratory within   two weeks of specimen collection to request quantification   by a second analytical technique. Interpretive questions   should be directed to the laboratory.   Results based on immunoassay detection that do not match   clinical expectations should be   interpreted with caution. Confirmatory testing by mass   spectrometry for immunoassay-based results is available, if   ordered within two weeks of specimen collection. Additional    charges apply.   For medical purposes only; not valid for forensic use.   This test was developed and its performance characteristics   determined by [employer]. It has not been cleared or   approved by the US Food and Drug Administration. This test   was performed in a CLIA certified laboratory and is   intended for clinical purposes.    EER PAIN MGT QUEVEDO,HIGH RES/EMIT, INTERP  See Note  See Note CM         Comment: Authorized individuals can access the [employer]   Enhanced Report using the following link:     [email or web address]   Performed By: [employer]   [address] [place] [id number]   : Dheeraj Gruber MD, PhD    Resulting Agency  [facility]              Narrative  Performed by: [employer]  Specimen Source->Urine      Specimen Collected: 04/20/23 14:57 Last Resulted: 04/25/23 21:01        Lab Flowsheet     Order Details     View Encounter     Lab and Collection Details     Routing     Result History     View All Conversations on this Encounter        CM=Additional comments  R=Reference range differs from displayed range        Result Care Coordination      Patient Communication     Add Comments   Not seen Back to Top          Satisfied Health Maintenance Topics       Back to Top  Urine Drug Screen (Every 6 Months)  Next due on 10/20/2023  Address Topic

## 2023-08-21 NOTE — TELEPHONE ENCOUNTER
----- Message from Darlene Isaac sent at 8/21/2023 10:30 AM CDT -----  Regarding: Refill Request  Can the clinic reply in [facility]:NO          Please refill the medication(s) listed below. Please call the patient when the prescription(s) is ready for  at this phone number  [phone]           Medication #1  HYDROcodone-acetaminophen (NORCO)  mg per tablet             Medication #2               Preferred Pharmacy:  [facility] #[id number] [place] [address] AT [facility] [address]

## 2023-09-21 DIAGNOSIS — Z98.890 HISTORY OF LUMBOSACRAL SPINE SURGERY: ICD-10-CM

## 2023-09-21 DIAGNOSIS — M79.7 FIBROMYALGIA: ICD-10-CM

## 2023-09-21 RX ORDER — HYDROCODONE BITARTRATE AND ACETAMINOPHEN 10; 325 MG/1; MG/1
1 TABLET ORAL EVERY 12 HOURS PRN
Qty: 35 TABLET | Refills: 0 | Status: SHIPPED | OUTPATIENT
Start: 2023-09-21 | End: 2023-10-03 | Stop reason: SDUPTHER

## 2023-09-21 NOTE — TELEPHONE ENCOUNTER
HYDROCODONE  : 8-22-23  LV: 8-23-23  CONTRACT: YES  UDS: 04-20-23    CODEINE  Not Detected  Not Detected         MORPHINE  Not Detected  Not Detected         6-ACETYLMORPHINE  Not Detected  Not Detected         OXYCODONE  Not Detected  Not Detected         NOROYXCODONE  Not Detected  Not Detected         OXYMORPHONE  Not Detected  Not Detected         NOROXYMORPHONE  Not Detected  Not Detected         HYDROCODONE  Not Detected  Not Detected         NORHYDROCODONE  Not Detected  Not Detected         HYDROMORPHONE  Not Detected  Not Detected         BUPRENORPHINE  Not Detected  Not Detected         NORUBPRENORPHINE  Not Detected  Not Detected         FENTANYL  Not Detected  Not Detected         NORFENTANYL  Not Detected  Not Detected         MEPERIDINE METABOLITE  Not Detected  Not Detected         TAPENTADOL  Not Detected  Not Detected         TAPENTADOL-O-SULF  Not Detected  Not Detected         METHADONE  Not Detected  Not Detected         TRAMADOL  Not Detected  Not Detected         AMPHETAMINE  Not Detected  Not Detected         METHAMPHETAMINE  Not Detected  Not Detected         MDMA- ECSTASY  Not Detected  Not Detected         MDA  Not Detected  Not Detected         MDEA- Mary Ellen  Not Detected  Not Detected         METHYLPHENIDATE  Not Detected  Not Detected         PHENTERMINE  Not Detected  Not Detected         BENZOYLECGONINE  Not Detected  Not Detected         ALPRAZOLAM  Not Detected  Not Detected         ALPHA-OH-ALPRAZOLAM  Not Detected  Not Detected         CLONAZEPAM  Not Detected  Not Detected         7-AMINOCLONAZEPAM  Not Detected  Not Detected         DIAZEPAM  Not Detected  Not Detected         NORDIAZEPAM  Not Detected  Not Detected         OXAZEPAM  Present  Not Detected         TEMAZEPAM  Present  Not Detected         Lorazepam  Not Detected  Not Detected         MIDAZOLAM  Not Detected  Not Detected         ZOLPIDEM  Not Detected  Not Detected         BARBITURATES  Not Detected  Not Detected          Creatinine, Urine 20.0 - 400.0 mg/dL 69.6  70.3  88.0 R  85.0 R, CM  103.0 R, CM  109.0 R, CM  139.0 R, CM    ETHYL GLUCURONIDE  Not Detected  Not Detected         MARIJUANA METABOLITE  Not Detected  Not Detected         PCP  Not Detected  Not Detected         CARISOPRODOL  Not Detected  Not Detected CM         Comment: The carisoprodol immunoassay has cross-reactivity to   carisoprodol and meprobamate.    Naloxone  Not Detected  Not Detected         Gabapentin  Present  Present         Pregabalin  Not Detected  Not Detected         Alpha-OH-Midazolam  Not Detected  Not Detected         Zolpidem Metabolite  Not Detected  Not Detected         PAIN MANAGEMENT DRUG PANEL  See Below  See Below CM

## 2023-10-02 ENCOUNTER — TELEPHONE (OUTPATIENT)
Dept: PAIN MEDICINE | Facility: CLINIC | Age: 63
End: 2023-10-02
Payer: MEDICARE

## 2023-10-02 NOTE — TELEPHONE ENCOUNTER
----- Message from Tone Jones sent at 10/2/2023  2:45 PM CDT -----      Can the clinic reply in [facility]:Y        Please refill the medication(s) listed below. Please call the patient when the prescription(s) is ready for  at this phone number. Pt found pharmacy with script.Please contact to further discuss and advise.             Medication #1 HYDROcodone-acetaminophen (NORCO)  mg per tablet    Medication #2       Preferred Pharmacy: Get online care: [facility]  Address: [address] [place] [id number]  Hours:   Open · Closes 12 AM

## 2023-10-03 DIAGNOSIS — Z98.890 HISTORY OF LUMBOSACRAL SPINE SURGERY: ICD-10-CM

## 2023-10-03 DIAGNOSIS — M79.7 FIBROMYALGIA: ICD-10-CM

## 2023-10-03 RX ORDER — HYDROCODONE BITARTRATE AND ACETAMINOPHEN 10; 325 MG/1; MG/1
1 TABLET ORAL EVERY 12 HOURS PRN
Qty: 35 TABLET | Refills: 0 | Status: SHIPPED | OUTPATIENT
Start: 2023-10-03 | End: 2023-11-02

## 2023-10-03 NOTE — TELEPHONE ENCOUNTER
HYDROCODONE  : 8-22-23  LV: 8-23-23  CONTRACT: YES  UDS: 04-20-23     CODEINE   Not Detected  Not Detected              MORPHINE   Not Detected  Not Detected              6-ACETYLMORPHINE   Not Detected  Not Detected              OXYCODONE   Not Detected  Not Detected              NOROYXCODONE   Not Detected  Not Detected              OXYMORPHONE   Not Detected  Not Detected              NOROXYMORPHONE   Not Detected  Not Detected              HYDROCODONE   Not Detected  Not Detected              NORHYDROCODONE   Not Detected  Not Detected              HYDROMORPHONE   Not Detected  Not Detected              BUPRENORPHINE   Not Detected  Not Detected              NORUBPRENORPHINE   Not Detected  Not Detected              FENTANYL   Not Detected  Not Detected              NORFENTANYL   Not Detected  Not Detected              MEPERIDINE METABOLITE   Not Detected  Not Detected              TAPENTADOL   Not Detected  Not Detected              TAPENTADOL-O-SULF   Not Detected  Not Detected              METHADONE   Not Detected  Not Detected              TRAMADOL   Not Detected  Not Detected              AMPHETAMINE   Not Detected  Not Detected              METHAMPHETAMINE   Not Detected  Not Detected              MDMA- ECSTASY   Not Detected  Not Detected              MDA   Not Detected  Not Detected              MDEA- Mary Ellen   Not Detected  Not Detected              METHYLPHENIDATE   Not Detected  Not Detected              PHENTERMINE   Not Detected  Not Detected              BENZOYLECGONINE   Not Detected  Not Detected              ALPRAZOLAM   Not Detected  Not Detected              ALPHA-OH-ALPRAZOLAM   Not Detected  Not Detected              CLONAZEPAM   Not Detected  Not Detected              7-AMINOCLONAZEPAM   Not Detected  Not Detected

## 2023-11-08 ENCOUNTER — TELEPHONE (OUTPATIENT)
Dept: PAIN MEDICINE | Facility: CLINIC | Age: 63
End: 2023-11-08
Payer: MEDICARE

## 2023-11-08 NOTE — TELEPHONE ENCOUNTER
----- Message from Belem Kim sent at 11/8/2023  3:00 PM CST -----  Regarding: refill  Who Called:WOODROW SALCIDO [[id number]]      Refill or New Rx: Refill        RX Name and Strength  HYDROcodone-acetaminophen (NORCO)  mg per tablet     Is this a 30 day or 90 day RX:      Preferred Pharmacy with phone number:  [facility] #[id number] - [place], [place] - [address] [facility] AT [facility] [address]   Phone:  [phone]  Fax:  [phone]            Local or Mail Order:local local       Would the patient rather a call back or a response via [facility]?      best Call Back Number:[phone]      Additional Information:pt has virtual appt tomorrow. For her refill

## 2023-11-09 ENCOUNTER — OFFICE VISIT (OUTPATIENT)
Dept: PAIN MEDICINE | Facility: CLINIC | Age: 63
End: 2023-11-09
Payer: MEDICARE

## 2023-11-09 ENCOUNTER — TELEPHONE (OUTPATIENT)
Dept: PAIN MEDICINE | Facility: CLINIC | Age: 63
End: 2023-11-09

## 2023-11-09 DIAGNOSIS — F43.21 GRIEF: ICD-10-CM

## 2023-11-09 DIAGNOSIS — Z98.890 HISTORY OF LUMBOSACRAL SPINE SURGERY: ICD-10-CM

## 2023-11-09 DIAGNOSIS — M70.60 TROCHANTERIC BURSITIS, UNSPECIFIED LATERALITY: ICD-10-CM

## 2023-11-09 DIAGNOSIS — M79.7 FIBROMYALGIA: ICD-10-CM

## 2023-11-09 DIAGNOSIS — F11.90 CHRONIC, CONTINUOUS USE OF OPIOIDS: Primary | ICD-10-CM

## 2023-11-09 PROCEDURE — 4010F PR ACE/ARB THEARPY RXD/TAKEN: ICD-10-PCS | Mod: CPTII,95,, | Performed by: ANESTHESIOLOGY

## 2023-11-09 PROCEDURE — 3052F HG A1C>EQUAL 8.0%<EQUAL 9.0%: CPT | Mod: CPTII,95,, | Performed by: ANESTHESIOLOGY

## 2023-11-09 PROCEDURE — 3052F PR MOST RECENT HEMOGLOBIN A1C LEVEL 8.0 - < 9.0%: ICD-10-PCS | Mod: CPTII,95,, | Performed by: ANESTHESIOLOGY

## 2023-11-09 PROCEDURE — 99214 OFFICE O/P EST MOD 30 MIN: CPT | Mod: 95,,, | Performed by: ANESTHESIOLOGY

## 2023-11-09 PROCEDURE — 4010F ACE/ARB THERAPY RXD/TAKEN: CPT | Mod: CPTII,95,, | Performed by: ANESTHESIOLOGY

## 2023-11-09 PROCEDURE — 99214 PR OFFICE/OUTPT VISIT, EST, LEVL IV, 30-39 MIN: ICD-10-PCS | Mod: 95,,, | Performed by: ANESTHESIOLOGY

## 2023-11-09 RX ORDER — DULOXETIN HYDROCHLORIDE 60 MG/1
60 CAPSULE, DELAYED RELEASE ORAL DAILY
Qty: 1 CAPSULE | Refills: 3 | Status: SHIPPED | OUTPATIENT
Start: 2023-11-09 | End: 2024-02-22

## 2023-11-09 RX ORDER — HYDROCODONE BITARTRATE AND ACETAMINOPHEN 10; 325 MG/1; MG/1
1 TABLET ORAL EVERY 12 HOURS PRN
Qty: 35 TABLET | Refills: 0 | Status: SHIPPED | OUTPATIENT
Start: 2023-11-09 | End: 2023-12-11 | Stop reason: SDUPTHER

## 2023-11-09 RX ORDER — DULOXETIN HYDROCHLORIDE 60 MG/1
60 CAPSULE, DELAYED RELEASE ORAL DAILY
Qty: 1 CAPSULE | Refills: 1 | Status: SHIPPED | OUTPATIENT
Start: 2023-11-09 | End: 2023-11-09

## 2023-11-09 NOTE — PROGRESS NOTES
Chronic Pain-Tele-Medicine-Established Note (Follow up visit)      The patient location is: [place]  The chief complaint leading to consultation is: low back and left leg pain  Visit type: Virtual visit with synchronous audio and video  Total time spent with patient: 20 minutes  Each patient to whom he or she provides medical services by telemedicine is:  (1) informed of the relationship between the physician and patient and the respective role of any other health care provider with respect to management of the patient; and (2) notified that he or she may decline to receive medical services by telemedicine and may withdraw from such care at any time.      PCP: Angel Quinones MD    REFERRING PHYSICIAN: No ref. provider found    CHIEF COMPLAINT: Low Back Pain and left leg pain     Original HISTORY OF PRESENT ILLNESS: Navin Beck presents to the clinic for the evaluation of the above pain. The pain started 15-20 years ago. However, the pain worsened over the past 6 months, she thinks due to stress (Gissell/housing).     Original Pain Description:  The pain is located in the left low back and radiates down the back of the left leg to the foot. The pain is described as throbbing. Exacerbating factors: Standing and Walking. Mitigating factors rest and sitting. Symptoms interfere with daily activity. The patient feels like symptoms have been worsening. Patient denies urinary incontinence, bowel incontinence, significant weight loss, and significant motor weakness.    Original PAIN SCORES:  Best: Pain is 4  Worst: Pain is 9  Usually: Pain is 7  Current: Pain is 9    INTERVAL HISTORY:   Interval History 12/22/22: Navin Beck returns for follow up. At our last visit we prescribed home PT and a short course of opioids for what appears to be sacroiliac joint pain. Today, she returns saying the pain is all over her low back now, not just on the left as previously. On exam however, she has pain to even  light touch in throughout her low back. I therefore tested many more muscle groups and found pain out of proportion to exam throughout her musculature, especially traps, low back, and legs. She does appear to have neuropathy as well, but only below the ankles bilaterally.     Interval History 1/19/23: Navin Beck returns for follow up. At our last visit we discussed fibromyalgia and some strategies to help her get more sleep and more exercise. Today, she presents back noting that she is doing rather well. The increased Gabapentin has helped her, and limiting screen time has allowed her to get sufficient sleep. She is falling asleep around 12:30 and getting up around 9:30. She has been using about 1 Hydrocodone/day.     Interval History 4/20/23: Navin Beck returns for follow up. At our last visit she was doing well with increased Gabapentin and limiting screen time. Today, she continues taking the gabapentin and hydrocodone but continues to note fibromyalgia pain, but mainly in her low back. She rates it at 6-7/10. She has been having trouble being active. She notes she has a lot of pain even with taking a shower and taking care of her house. She does admit that she had less pain when she was sleeping better.     Interval History 7/20/23: Navin Beck returns for follow up. At our last visit we were only continuing her care and we repeat a Utox. Today, she notes that the medications are helping, but she does continue to have widespread pain. She has tried advil to assist with this, but it bothers her stomach. She also is open about having to take 2 tablets of Hydrocodone several times and thus running out a little early. This is why her last Utox did not show Hydrocodone. We discussed this. I can be flexible given her age, but I do ask they she stick with what we agree upon.     Interval History 11/9/23:  States she feels her typical pain has been worsening. The pain is unchanged in nature,  "location and associated symptoms. She has stopped taking cymbalta because she ran out, but feels that it was helpful. She is taking 1-2 doses of her hydrocodone daily (2 doses for the rare bad days). She does not feel like she will run out and that 35 pills is a sufficient prescription. She notes that she has pain in her said/hip/back when she sleeps on her left side and that she can "press on the nerve" and reproduce her pain. She denies weakness, numbness, change in bowel or bladder function. She has temporary relief from a heating pad and OTC medications.    6 weeks of Conservative therapy (PT/Chiro/Home Exercises with Dates)  PT: Done previously  Chiro: No  HEP: No    Treatments / Medications: (Ice/Heat/NSAIDS/APAP/etc):  APAP  Ibuprofen  Aleve  Excedrine  Heat     Report:    Interventional Pain Procedures: (Previous injections)  Rudy Kraft - She was discharged for not doing Utox X 2    Past Medical History:   Diagnosis Date    Allergy     Anemia due to stage 3 chronic kidney disease 12/6/2020    Arrhythmia     Arthritis     Bronchial asthma     Diabetes mellitus     Glaucoma     Hormone disorder     hysterectomy    Hypercholesteremia     Hypertension     Hyperthyroidism     Insomnia     Kidney stones     Thyroid disease     Urine, incontinence, stress female     UTI (urinary tract infection) 11/27/2020     Past Surgical History:   Procedure Laterality Date    APPENDECTOMY      BACK SURGERY      disc removal     CARPAL TUNNEL RELEASE Bilateral     CHOLECYSTECTOMY      CYSTOSCOPY N/A 10/26/2020    Procedure: CYSTOSCOPY;  Surgeon: Wilner Goel MD;  Location: [facility];  Service: OB/GYN;  Laterality: N/A;    CYSTOSCOPY W/ RETROGRADES Bilateral 12/7/2020    Procedure: CYSTOSCOPY, WITH RETROGRADE PYELOGRAM;  Surgeon: Daniel Jalloh MD;  Location: [address];  Service: Urology;  Laterality: Bilateral;    FRACTURE SURGERY Right     wrist    HYSTERECTOMY  2010    Dr Gordon wilburn [facility]    JOINT " REPLACEMENT      KNEE ARTHROSCOPY W/ DEBRIDEMENT      KNEE SURGERY Right     Knee replacement    LITHOTRIPSY      MN REMOVAL OF OVARY/TUBE(S)  9/9/13    benign    removal of round ligament mass      ROBOT-ASSISTED LAPAROSCOPIC LYSIS OF ADHESIONS USING DA JAMES XI N/A 10/26/2020    Procedure: XI ROBOTIC LYSIS, ADHESIONS;  Surgeon: Wilner Goel MD;  Location: [facility];  Service: OB/GYN;  Laterality: N/A;  ROBOTIC MOBILIZATION OF BLADDER- DR BURNHAM    SPINE SURGERY      WRIST FRACTURE SURGERY Left      Social History     Socioeconomic History    Marital status:    Tobacco Use    Smoking status: Every Day     Current packs/day: 0.50     Average packs/day: 0.5 packs/day for 30.0 years (15.0 ttl pk-yrs)     Types: Cigarettes    Smokeless tobacco: Never   Substance and Sexual Activity    Alcohol use: No    Drug use: No    Sexual activity: Not Currently     Partners: Male     Birth control/protection: Surgical     Family History   Problem Relation Age of Onset    Cancer Mother     Cancer Brother     Colon cancer Brother     Cancer Brother     Cancer Brother     Cancer Brother     Ovarian cancer Neg Hx     Uterine cancer Neg Hx     Breast cancer Neg Hx        Review of patient's allergies indicates:   Allergen Reactions    Penicillins Shortness Of Breath, Itching and Swelling     Tolerates cefepime 11/30/2020       Current Outpatient Medications   Medication Sig    albuterol (PROVENTIL/VENTOLIN HFA) 90 mcg/actuation inhaler Inhale 1-2 puffs into the lungs every 6 (six) hours as needed for Wheezing or Shortness of Breath.     atorvastatin (LIPITOR) 20 MG tablet Take 20 mg by mouth once daily.    blood-glucose meter (TRUE METRIX GLUCOSE METER MISC) True Metrix Glucose Meter    diclofenac (VOLTAREN) 75 MG EC tablet Take 1 tablet (75 mg total) by mouth 2 (two) times daily.    DULoxetine (CYMBALTA) 60 MG capsule Take 1 capsule (60 mg total) by mouth once daily.    empagliflozin (JARDIANCE) 10 mg tablet Jardiance 10 mg  tablet   Take 1 tablet every day by oral route.    estradioL (ESTRACE) 0.01 % (0.1 mg/gram) vaginal cream Place 1 g vaginally 3 (three) times a week. Alternate days with Metrogel for 14 days (Patient taking differently: Place 1 g vaginally 3 (three) times a week. )    flurazepam (DALMANE) 30 MG capsule Take 30 mg by mouth nightly as needed for Insomnia.    fluticasone propionate (FLOVENT HFA) 44 mcg/actuation inhaler Inhale 1 puff into the lungs once daily.     gabapentin (NEURONTIN) 300 MG capsule Take 2 capsules (600 mg total) by mouth 3 (three) times daily.    HYDROcodone-acetaminophen (NORCO)  mg per tablet Take 1 tablet by mouth every 12 (twelve) hours as needed for Pain.    insulin aspart U-100 (NOVOLOG) 100 unit/mL (3 mL) InPn pen Inject 18 Units into the skin 3 (three) times daily.   Sliding scale: Inject 0-5 Units into the skin before meals and at bedtime as needed (Hyperglycemia). Max daily dose of 69 units.    insulin detemir U-100 (LEVEMIR FLEXTOUCH) 100 unit/mL (3 mL) SubQ InPn pen Inject 30 Units into the skin 2 (two) times daily.    methimazole (TAPAZOLE) 10 MG Tab Take 10 mg by mouth once daily.     miconazole (MICOTIN) 2 % cream Apply topically 2 (two) times daily.    pantoprazole (PROTONIX) 40 MG tablet Take 1 tablet (40 mg total) by mouth once daily. Start after the twice daily Protonix is complete.Follow-up with doctor for further refills. (Patient taking differently: Take 40 mg by mouth daily as needed.)    tamsulosin (FLOMAX) 0.4 mg Cap Take 1 capsule (0.4 mg total) by mouth once daily.    tiZANidine (ZANAFLEX) 4 MG tablet Take 2-3 tablets (8-12 mg total) by mouth nightly as needed (muscle spasms).    TRUE METRIX GLUCOSE TEST STRIP Strp      No current facility-administered medications for this visit.       ROS:  GENERAL: No fever. No chills. No fatigue. Denies weight loss. Denies weight gain.  HEENT: Denies headaches. Denies vision change. Denies eye pain. Denies double vision. Denies  ear pain.   CV: Denies chest pain.   PULM: Denies of shortness of breath.  GI: Denies constipation. No diarrhea. No abdominal pain. Denies nausea. Denies vomiting. No blood in stool.  HEME: Denies bleeding problems.  : Denies urgency. No painful urination. No blood in urine.  MS: Denies joint stiffness. Denies joint swelling.  + back pain.  SKIN: Denies rash.   NEURO: Denies seizures. No weakness.  PSYCH:  Denies difficulty sleeping. No anxiety. Denies depression. No suicidal thoughts.       VITALS:   There were no vitals filed for this visit.      Physical Exam (today):  GENERAL: Well appearing, in no acute distress, alert and oriented x3.  PSYCH:  Context appropriate thought content, mood, affect and range of emotion.  HEENT: EOMI, pupils symmetric 2-3 mm and round, nares patent, face symmetric  Pulm: Normal respiratory rate without accessory muscle use.    PRIOR PHYSICAL EXAM:   GENERAL: Well appearing, in no acute distress, alert and oriented x3.  PSYCH:  Mood and affect appropriate.  SKIN: Skin color, texture, turgor normal, no rashes or lesions.  HEENT:  Normocephalic, atraumatic. Cranial nerves grossly intact.  NECK: No pain to palpation over the cervical paraspinous muscles. No pain to palpation over facets. No pain with neck flexion, extension, or lateral flexion.   PULM: No evidence of respiratory difficulty, symmetric chest rise.  GI:  Non-distended  BACK: Stiff range of motion. Well healed midline incision. No pain to palpation over the spinous processes. No pain to palpation over facet joints. Straight leg raising in the supine position is negative to radicular pain.   EXTREMITIES: No deformities, edema, or skin discoloration.   MUSCULOSKELETAL: Shoulder, hip, and knee provocative maneuvers are negative. Bilateral upper and lower extremity strength is normal and symmetric. No atrophy is noted. Pain to light touch throughout musculature.   NEURO: Sensation is altered below the ankles bilaterally.  Bilateral upper and lower extremity coordination and muscle stretch reflexes are physiologic and symmetric. Plantar response are downgoing.   GAIT: Antalgic.      LABS:      IMAGING:  None available      ASSESSMENT: 63 y.o. year old female with pain, consistent with fibromyalgia.      DISCUSSION: Ms. Beck is a woman with a history of lumbar surgery who comes to us with low back pain. We do not have imaging at this time. She identifies her pain as coming from the area of the SIJ. On follow up however, she presented with pain throughout her musculature. She notes no trauma but lots of stress from being displaced by Gissell and she is only sleeping 4 hours/night. She was previously on Cymbalta and Gabapentin before being discharged from a prior pain clinic.     OPIOID MANAGEMENT: 4-5 year history of opioids with Dr. Kraft (discharged for missed Utox)  MME: 10  Risk: Low (Family History)  : Reviewed today  Naloxone:   Utox Appropriate: 10/13/22  Violations: None  Contract: 12/22/22    PLAN:  Continue Gabapentin 600 TID -  she reports that this helps and denies somnolence, swelling or other adverse effect  Has stopped Cymbalta because she ran out and did not get a refill. She felt this helped her and did not cause adverse effect. She wishes to restart. Will re-order cymbalta 60 mg daily  Continue Hydrocodone 10/325 QD #35. Typically takes 1 per day but on particularly bad days will use 2 doses. Denies constipation, itching, somnolence or confusion  Discussed OTC medications and dosing. She notes she has to be cautious because of gastric ulcers.  Discussed the importance of exercise.   Referred to Psych previously, however she worked closely with her  instead, and feels this is helping her  Follow up in 3 months IN PERSON, as she describes being able to press on areas that exacerbate her pain, and she may have superimposed musculoskeletal pain that would respond to different therapies.    Sapphire PARDO  Wilfredo  11/09/2023

## 2023-11-09 NOTE — TELEPHONE ENCOUNTER
----- Message from Raulito Benedict MD sent at 11/9/2023 12:11 PM CST -----  Regarding: Follow-up  Gabriella  MsMike Kiran needs a 3 mo follow-up in person with Dr. Villalobos. Could you please help us with scheduling that and communicating with her?  Thank you thank you thank you!    Raulito

## 2023-12-11 DIAGNOSIS — Z98.890 HISTORY OF LUMBOSACRAL SPINE SURGERY: ICD-10-CM

## 2023-12-11 RX ORDER — HYDROCODONE BITARTRATE AND ACETAMINOPHEN 10; 325 MG/1; MG/1
1 TABLET ORAL EVERY 12 HOURS PRN
Qty: 35 TABLET | Refills: 0 | Status: SHIPPED | OUTPATIENT
Start: 2023-12-11 | End: 2024-01-10 | Stop reason: SDUPTHER

## 2023-12-11 NOTE — TELEPHONE ENCOUNTER
----- Message from Brigitte Dobbs sent at 12/11/2023 10:11 AM CST -----  Regarding: REFILL  Who Called:WOODROW SALCIDO [[id number]]          RX Name and Strength:HYDROcodone-acetaminophen (NORCO)  mg per tablet             Is this a 30 day or 90 day RX: 30          Preferred Pharmacy with phone number:  [facility] #[id number] - [place], [place] - [address] AT [facility] [address] & [address]           Local or Mail Order: LOCAL

## 2023-12-11 NOTE — TELEPHONE ENCOUNTER
Patient requesting refill on hydrocodone  Last office visit 11-09-23   shows last refill on 11-10-23  Patient does have a pain [facility] [place] department  Patient last UDS 4-20-23 was consistent with current therapy               CODEINE  Not Detected Not Detected        MORPHINE  Not Detected Not Detected        6-ACETYLMORPHINE  Not Detected Not Detected        OXYCODONE  Not Detected Not Detected        NOROYXCODONE  Not Detected Not Detected        OXYMORPHONE  Not Detected Not Detected        NOROXYMORPHONE  Not Detected Not Detected        HYDROCODONE  Not Detected Not Detected        NORHYDROCODONE  Not Detected Not Detected        HYDROMORPHONE  Not Detected Not Detected        BUPRENORPHINE  Not Detected Not Detected        NORUBPRENORPHINE  Not Detected Not Detected        FENTANYL  Not Detected Not Detected        NORFENTANYL  Not Detected Not Detected        MEPERIDINE METABOLITE  Not Detected Not Detected        TAPENTADOL  Not Detected Not Detected        TAPENTADOL-O-SULF  Not Detected Not Detected        METHADONE  Not Detected Not Detected        TRAMADOL  Not Detected Not Detected        AMPHETAMINE  Not Detected Not Detected        METHAMPHETAMINE  Not Detected Not Detected        MDMA- ECSTASY  Not Detected Not Detected        MDA  Not Detected Not Detected        MDEA- Mary Ellen  Not Detected Not Detected        METHYLPHENIDATE  Not Detected Not Detected        PHENTERMINE  Not Detected Not Detected        BENZOYLECGONINE  Not Detected Not Detected        ALPRAZOLAM  Not Detected Not Detected

## 2024-01-10 DIAGNOSIS — Z98.890 HISTORY OF LUMBOSACRAL SPINE SURGERY: ICD-10-CM

## 2024-01-10 DIAGNOSIS — M79.7 FIBROMYALGIA: ICD-10-CM

## 2024-01-10 RX ORDER — HYDROCODONE BITARTRATE AND ACETAMINOPHEN 10; 325 MG/1; MG/1
1 TABLET ORAL EVERY 12 HOURS PRN
Qty: 35 TABLET | Refills: 0 | Status: SHIPPED | OUTPATIENT
Start: 2024-01-10

## 2024-01-10 RX ORDER — GABAPENTIN 300 MG/1
600 CAPSULE ORAL 3 TIMES DAILY
Qty: 180 CAPSULE | Refills: 2 | Status: SHIPPED | OUTPATIENT
Start: 2024-01-10

## 2024-01-10 NOTE — TELEPHONE ENCOUNTER
----- Message from Amelie White sent at 1/10/2024  2:28 PM CST -----  Regarding: med refill  Can the clinic reply in [facility]:       Please refill the medication(s) listed below.       Please call the patient when the prescription(s) is ready for  at this phone number: [phone]           Medication #1 HYDROcodone-acetaminophen (NORCO)  mg per tablet     Medication #2 gabapentin (NEURONTIN) 300 MG capsule     Preferred Pharmacy:     [facility] #[id number] [address] [facility] [address] [facility]  [address] [place] [id number]  Phone: [phone] Fax: [phone]

## 2024-01-10 NOTE — TELEPHONE ENCOUNTER
Patient requesting refill on hydrocodone  Last office visit 11-09-23   shows last refill on 12-10-23  Patient does have a pain contract on file with [facility] department  Patient last UDS 4-20-23 was consistent with current therapy                         CODEINE   Not Detected Not Detected             MORPHINE   Not Detected Not Detected             6-ACETYLMORPHINE   Not Detected Not Detected             OXYCODONE   Not Detected Not Detected             NOROYXCODONE   Not Detected Not Detected             OXYMORPHONE   Not Detected Not Detected             NOROXYMORPHONE   Not Detected Not Detected             HYDROCODONE   Not Detected Not Detected             NORHYDROCODONE   Not Detected Not Detected             HYDROMORPHONE   Not Detected Not Detected             BUPRENORPHINE   Not Detected Not Detected             NORUBPRENORPHINE   Not Detected Not Detected             FENTANYL   Not Detected Not Detected             NORFENTANYL   Not Detected Not Detected             MEPERIDINE METABOLITE   Not Detected Not Detected             TAPENTADOL   Not Detected Not Detected             TAPENTADOL-O-SULF   Not Detected Not Detected             METHADONE   Not Detected Not Detected             TRAMADOL   Not Detected Not Detected             AMPHETAMINE   Not Detected Not Detected             METHAMPHETAMINE   Not Detected Not Detected             MDMA- ECSTASY   Not Detected Not Detected             MDA   Not Detected Not Detected             MDEA- Mary Ellen   Not Detected Not Detected             METHYLPHENIDATE   Not Detected Not Detected             PHENTERMINE   Not Detected Not Detected             BENZOYLECGONINE   Not Detected Not Detected             ALPRAZOLAM   Not Detected Not Detec

## 2024-01-29 ENCOUNTER — TELEPHONE (OUTPATIENT)
Dept: PAIN MEDICINE | Facility: CLINIC | Age: 64
End: 2024-01-29

## 2024-02-16 ENCOUNTER — TELEPHONE (OUTPATIENT)
Dept: PAIN MEDICINE | Facility: CLINIC | Age: 64
End: 2024-02-16

## 2024-02-16 DIAGNOSIS — Z98.890 HISTORY OF LUMBOSACRAL SPINE SURGERY: ICD-10-CM

## 2024-02-16 RX ORDER — HYDROCODONE BITARTRATE AND ACETAMINOPHEN 10; 325 MG/1; MG/1
1 TABLET ORAL EVERY 12 HOURS PRN
Qty: 35 TABLET | Refills: 0 | Status: CANCELLED | OUTPATIENT
Start: 2024-02-16

## 2024-02-16 NOTE — TELEPHONE ENCOUNTER
----- Message from Brigitte Dobbs sent at 2/15/2024  1:35 PM CST -----  Regarding: REFILL  Who Called:WOODROW SALCIDO [[id number]]          RX Name and Strength:  HYDROcodone-acetaminophen (NORCO)  mg per tablet            Is this a 30 day or 90 day RX: 30          Preferred Pharmacy with phone number: [facility] #[id number] [facility] [id number] [place] [address] AT [facility] [address]             Local or Mail Order: LOCAL                     Would the patient rather a call back or a response via [facility]?no    Best Call Back Number:  [phone]     Additional Information:

## 2024-02-16 NOTE — TELEPHONE ENCOUNTER
Patient requesting refill on hydrocodone  Last office visit 11/09/2023   shows last refill on 1/10/2024  Patient does have a pain contract on file with [facility] Pain Management department  Patient last UDS 4/20/2023 was consistent with current therapy     CODEINE  Not Detected Not Detected        MORPHINE  Not Detected Not Detected        6-ACETYLMORPHINE  Not Detected Not Detected        OXYCODONE  Not Detected Not Detected        NOROYXCODONE  Not Detected Not Detected        OXYMORPHONE  Not Detected Not Detected        NOROXYMORPHONE  Not Detected Not Detected        HYDROCODONE  Not Detected Not Detected        NORHYDROCODONE  Not Detected Not Detected        HYDROMORPHONE  Not Detected Not Detected        BUPRENORPHINE  Not Detected Not Detected        NORUBPRENORPHINE  Not Detected Not Detected        FENTANYL  Not Detected Not Detected        NORFENTANYL  Not Detected Not Detected        MEPERIDINE METABOLITE  Not Detected Not Detected        TAPENTADOL  Not Detected Not Detected        TAPENTADOL-O-SULF  Not Detected Not Detected        METHADONE  Not Detected Not Detected        TRAMADOL  Not Detected Not Detected        AMPHETAMINE  Not Detected Not Detected        METHAMPHETAMINE  Not Detected Not Detected        MDMA- ECSTASY  Not Detected Not Detected        MDA  Not Detected Not Detected        MDEA- Mary Ellen  Not Detected Not Detected        METHYLPHENIDATE  Not Detected Not Detected        PHENTERMINE  Not Detected Not Detected        BENZOYLECGONINE  Not Detected Not Detected        ALPRAZOLAM  Not Detected Not Detected        ALPHA-OH-ALPRAZOLAM  Not Detected Not Detected        CLONAZEPAM  Not Detected Not Detected        7-AMINOCLONAZEPAM  Not Detected Not Detected        DIAZEPAM  Not Detected Not Detected        NORDIAZEPAM  Not Detected Not Detected        OXAZEPAM  Present Not Detected        TEMAZEPAM  Present Not Detected        Lorazepam  Not Detected Not Detected        MIDAZOLAM   Not Detected Not Detected        ZOLPIDEM  Not Detected Not Detected        BARBITURATES  Not Detected Not Detected        Creatinine, Urine 20.0 - 400.0 mg/dL 69.6 70.3 88.0 R 85.0 R, .0 R, .0 R, .0 R, CM   ETHYL GLUCURONIDE  Not Detected Not Detected        MARIJUANA METABOLITE  Not Detected Not Detected        PCP  Not Detected Not Detected        CARISOPRODOL  Not Detected Not Detected CM        Comment: The carisoprodol immunoassay has cross-reactivity to  carisoprodol and meprobamate.   Naloxone  Not Detected Not Detected        Gabapentin  Present Present        Pregabalin  Not Detected Not Detected        Alpha-OH-Midazolam  Not Detected Not Detected        Zolpidem Metabolite  Not Detected Not Detected        PAIN MANAGEMENT DRUG PANEL  See Below See Below CM

## 2024-02-22 ENCOUNTER — OFFICE VISIT (OUTPATIENT)
Dept: SPINE | Facility: CLINIC | Age: 64
End: 2024-02-22
Payer: MEDICARE

## 2024-02-22 DIAGNOSIS — G89.4 CHRONIC PAIN DISORDER: Primary | ICD-10-CM

## 2024-02-22 DIAGNOSIS — M54.50 CHRONIC LOW BACK PAIN WITHOUT SCIATICA, UNSPECIFIED BACK PAIN LATERALITY: ICD-10-CM

## 2024-02-22 DIAGNOSIS — G89.29 CHRONIC LOW BACK PAIN WITHOUT SCIATICA, UNSPECIFIED BACK PAIN LATERALITY: ICD-10-CM

## 2024-02-22 DIAGNOSIS — Z98.890 HISTORY OF LUMBOSACRAL SPINE SURGERY: ICD-10-CM

## 2024-02-22 DIAGNOSIS — F11.90 CHRONIC, CONTINUOUS USE OF OPIOIDS: ICD-10-CM

## 2024-02-22 PROCEDURE — 99214 OFFICE O/P EST MOD 30 MIN: CPT | Mod: 95,,, | Performed by: ANESTHESIOLOGY

## 2024-02-22 RX ORDER — DULOXETIN HYDROCHLORIDE 60 MG/1
60 CAPSULE, DELAYED RELEASE ORAL DAILY
Qty: 30 CAPSULE | Refills: 3 | Status: SHIPPED | OUTPATIENT
Start: 2024-02-22 | End: 2024-03-27 | Stop reason: SDUPTHER

## 2024-02-22 NOTE — PROGRESS NOTES
Chronic Pain-Tele-Medicine-Established Note (Follow up visit)      The patient location is: [place]  The chief complaint leading to consultation is: low back and left leg pain  Visit type: Virtual visit with synchronous audio and video  Total time spent with patient: 20 minutes  Each patient to whom he or she provides medical services by telemedicine is:  (1) informed of the relationship between the physician and patient and the respective role of any other health care provider with respect to management of the patient; and (2) notified that he or she may decline to receive medical services by telemedicine and may withdraw from such care at any time.      PCP: Angel Quinones MD    REFERRING PHYSICIAN: No ref. provider found    CHIEF COMPLAINT: Low Back Pain and left leg pain     Original HISTORY OF PRESENT ILLNESS: Navin Beck presents to the clinic for the evaluation of the above pain. The pain started 15-20 years ago. However, the pain worsened over the past 6 months, she thinks due to stress ([place]/housing).     Original Pain Description:  The pain is located in the left low back and radiates down the back of the left leg to the foot. The pain is described as throbbing. Exacerbating factors: Standing and Walking. Mitigating factors rest and sitting. Symptoms interfere with daily activity. The patient feels like symptoms have been worsening. Patient denies urinary incontinence, bowel incontinence, significant weight loss, and significant motor weakness.    Original PAIN SCORES:  Best: Pain is 4  Worst: Pain is 9  Usually: Pain is 7  Current: Pain is 9    INTERVAL HISTORY:   Interval History 12/22/22: Navin Beck returns for follow up. At our last visit we prescribed home PT and a short course of opioids for what appears to be sacroiliac joint pain. Today, she returns saying the pain is all over her low back now, not just on the left as previously. On exam however, she has pain to even  light touch in throughout her low back. I therefore tested many more muscle groups and found pain out of proportion to exam throughout her musculature, especially traps, low back, and legs. She does appear to have neuropathy as well, but only below the ankles bilaterally.     Interval History 1/19/23: Navin Beck returns for follow up. At our last visit we discussed fibromyalgia and some strategies to help her get more sleep and more exercise. Today, she presents back noting that she is doing rather well. The increased Gabapentin has helped her, and limiting screen time has allowed her to get sufficient sleep. She is falling asleep around 12:30 and getting up around 9:30. She has been using about 1 Hydrocodone/day.     Interval History 4/20/23: Navin Beck returns for follow up. At our last visit she was doing well with increased Gabapentin and limiting screen time. Today, she continues taking the gabapentin and hydrocodone but continues to note fibromyalgia pain, but mainly in her low back. She rates it at 6-7/10. She has been having trouble being active. She notes she has a lot of pain even with taking a shower and taking care of her house. She does admit that she had less pain when she was sleeping better.     Interval History 7/20/23: Navin Beck returns for follow up. At our last visit we were only continuing her care and we repeat a Utox. Today, she notes that the medications are helping, but she does continue to have widespread pain. She has tried advil to assist with this, but it bothers her stomach. She also is open about having to take 2 tablets of Hydrocodone several times and thus running out a little early. This is why her last Utox did not show Hydrocodone. We discussed this. I can be flexible given her age, but I do ask they she stick with what we agree upon.     Interval History 11/9/23:  States she feels her typical pain has been worsening. The pain is unchanged in nature,  "location and associated symptoms. She has stopped taking cymbalta because she ran out, but feels that it was helpful. She is taking 1-2 doses of her hydrocodone daily (2 doses for the rare bad days). She does not feel like she will run out and that 35 pills is a sufficient prescription. She notes that she has pain in her said/hip/back when she sleeps on her left side and that she can "press on the nerve" and reproduce her pain. She denies weakness, numbness, change in bowel or bladder function. She has temporary relief from a heating pad and OTC medications.    Interval History 2/22/24: Navin Beck returns for follow up. At our last visit we restarted Cymbalta 60 QHS. Today, she notes that adding this medication has made a big difference. She finds that the combination of cymbalta with the gabapentin and hydrocodone allows her to control her pain. She is able to only take the Hydrocodone once daily since starting the cymbalta. She is happy in [place] with her grandchildren. However, she notes 7/10 in pain, which is manageable to her.     6 weeks of Conservative therapy (PT/Chiro/Home Exercises with Dates)  PT: Done previously  Chiro: No  HEP: No    Treatments / Medications: (Ice/Heat/NSAIDS/APAP/etc):  APAP  Ibuprofen  Aleve  Excedrine  Heat     Report:    Interventional Pain Procedures: (Previous injections)  Rudy Kraft - She was discharged for not doing Utox X 2    Past Medical History:   Diagnosis Date    Allergy     Anemia due to stage 3 chronic kidney disease 12/6/2020    Arrhythmia     Arthritis     Bronchial asthma     Diabetes mellitus     Glaucoma     Hormone disorder     hysterectomy    Hypercholesteremia     Hypertension     Hyperthyroidism     Insomnia     Kidney stones     Thyroid disease     Urine, incontinence, stress female     UTI (urinary tract infection) 11/27/2020     Past Surgical History:   Procedure Laterality Date    APPENDECTOMY      BACK SURGERY      disc removal     CARPAL " TUNNEL RELEASE Bilateral     CHOLECYSTECTOMY      CYSTOSCOPY N/A 10/26/2020    Procedure: CYSTOSCOPY;  Surgeon: Wilner Goel MD;  Location: [facility] OR;  Service: OB/GYN;  Laterality: N/A;    CYSTOSCOPY W/ RETROGRADES Bilateral 12/7/2020    Procedure: CYSTOSCOPY, WITH RETROGRADE PYELOGRAM;  Surgeon: Daniel Jalloh MD;  Location: [facility] OR [facility] FLR;  Service: Urology;  Laterality: Bilateral;    FRACTURE SURGERY Right     wrist    HYSTERECTOMY  2010    [facility]    JOINT REPLACEMENT      KNEE ARTHROSCOPY W/ DEBRIDEMENT      KNEE SURGERY Right     Knee replacement    LITHOTRIPSY      MD REMOVAL OF OVARY/TUBE(S)  9/9/13    benign    removal of round ligament mass      ROBOT-ASSISTED LAPAROSCOPIC LYSIS OF ADHESIONS USING DA JAMES XI N/A 10/26/2020    Procedure: XI ROBOTIC LYSIS, ADHESIONS;  Surgeon: Wilner Goel MD;  Location: [facility] OR;  Service: OB/GYN;  Laterality: N/A;  ROBOTIC MOBILIZATION OF BLADDER- DR BURNHAM    SPINE SURGERY      WRIST FRACTURE SURGERY Left      Social History     Socioeconomic History    Marital status:    Tobacco Use    Smoking status: Every Day     Current packs/day: 0.50     Average packs/day: 0.5 packs/day for 30.0 years (15.0 ttl pk-yrs)     Types: Cigarettes    Smokeless tobacco: Never   Substance and Sexual Activity    Alcohol use: No    Drug use: No    Sexual activity: Not Currently     Partners: Male     Birth control/protection: Surgical     Family History   Problem Relation Age of Onset    Cancer Mother     Cancer Brother     Colon cancer Brother     Cancer Brother     Cancer Brother     Cancer Brother     Ovarian cancer Neg Hx     Uterine cancer Neg Hx     Breast cancer Neg Hx        Review of patient's allergies indicates:   Allergen Reactions    Penicillins Shortness Of Breath, Itching and Swelling     Tolerates cefepime 11/30/2020       Current Outpatient Medications   Medication Sig    albuterol (PROVENTIL/VENTOLIN HFA) 90 mcg/actuation inhaler Inhale  1-2 puffs into the lungs every 6 (six) hours as needed for Wheezing or Shortness of Breath.     atorvastatin (LIPITOR) 20 MG tablet Take 20 mg by mouth once daily.    blood-glucose meter (TRUE METRIX GLUCOSE METER MISC) True Metrix Glucose Meter    diclofenac (VOLTAREN) 75 MG EC tablet Take 1 tablet (75 mg total) by mouth 2 (two) times daily.    DULoxetine (CYMBALTA) 60 MG capsule Take 1 capsule (60 mg total) by mouth once daily.    empagliflozin (JARDIANCE) 10 mg tablet Jardiance 10 mg tablet   Take 1 tablet every day by oral route.    estradioL (ESTRACE) 0.01 % (0.1 mg/gram) vaginal cream Place 1 g vaginally 3 (three) times a week. Alternate days with Metrogel for 14 days (Patient taking differently: Place 1 g vaginally 3 (three) times a week. )    flurazepam (DALMANE) 30 MG capsule Take 30 mg by mouth nightly as needed for Insomnia.    fluticasone propionate (FLOVENT HFA) 44 mcg/actuation inhaler Inhale 1 puff into the lungs once daily.     gabapentin (NEURONTIN) 300 MG capsule Take 2 capsules (600 mg total) by mouth 3 (three) times daily.    HYDROcodone-acetaminophen (NORCO)  mg per tablet Take 1 tablet by mouth every 12 (twelve) hours as needed for Pain.    insulin aspart U-100 (NOVOLOG) 100 unit/mL (3 mL) InPn pen Inject 18 Units into the skin 3 (three) times daily.   Sliding scale: Inject 0-5 Units into the skin before meals and at bedtime as needed (Hyperglycemia). Max daily dose of 69 units.    insulin detemir U-100 (LEVEMIR FLEXTOUCH) 100 unit/mL (3 mL) SubQ InPn pen Inject 30 Units into the skin 2 (two) times daily.    methimazole (TAPAZOLE) 10 MG Tab Take 10 mg by mouth once daily.     miconazole (MICOTIN) 2 % cream Apply topically 2 (two) times daily.    pantoprazole (PROTONIX) 40 MG tablet Take 1 tablet (40 mg total) by mouth once daily. Start after the twice daily Protonix is complete.Follow-up with doctor for further refills. (Patient taking differently: Take 40 mg by mouth daily as needed.)     tamsulosin (FLOMAX) 0.4 mg Cap Take 1 capsule (0.4 mg total) by mouth once daily.    tiZANidine (ZANAFLEX) 4 MG tablet Take 2-3 tablets (8-12 mg total) by mouth nightly as needed (muscle spasms).    TRUE METRIX GLUCOSE TEST STRIP Strp      No current facility-administered medications for this visit.       ROS:  GENERAL: No fever. No chills. No fatigue. Denies weight loss. Denies weight gain.  HEENT: Denies headaches. Denies vision change. Denies eye pain. Denies double vision. Denies ear pain.   CV: Denies chest pain.   PULM: Denies of shortness of breath.  GI: Denies constipation. No diarrhea. No abdominal pain. Denies nausea. Denies vomiting. No blood in stool.  HEME: Denies bleeding problems.  : Denies urgency. No painful urination. No blood in urine.  MS: Denies joint stiffness. Denies joint swelling.  + back pain.  SKIN: Denies rash.   NEURO: Denies seizures. No weakness.  PSYCH:  Denies difficulty sleeping. No anxiety. Denies depression. No suicidal thoughts.       VITALS:   There were no vitals filed for this visit.      Physical Exam (today):  GENERAL: Well appearing, in no acute distress, alert and oriented x3.  PSYCH:  Context appropriate thought content, mood, affect and range of emotion.  HEENT: EOMI, pupils symmetric 2-3 mm and round, nares patent, face symmetric  Pulm: Normal respiratory rate without accessory muscle use.    PRIOR PHYSICAL EXAM:   GENERAL: Well appearing, in no acute distress, alert and oriented x3.  PSYCH:  Mood and affect appropriate.  SKIN: Skin color, texture, turgor normal, no rashes or lesions.  HEENT:  Normocephalic, atraumatic. Cranial nerves grossly intact.  NECK: No pain to palpation over the cervical paraspinous muscles. No pain to palpation over facets. No pain with neck flexion, extension, or lateral flexion.   PULM: No evidence of respiratory difficulty, symmetric chest rise.  GI:  Non-distended  BACK: Stiff range of motion. Well healed midline incision. No pain to  palpation over the spinous processes. No pain to palpation over facet joints. Straight leg raising in the supine position is negative to radicular pain.   EXTREMITIES: No deformities, edema, or skin discoloration.   MUSCULOSKELETAL: Shoulder, hip, and knee provocative maneuvers are negative. Bilateral upper and lower extremity strength is normal and symmetric. No atrophy is noted. Pain to light touch throughout musculature.   NEURO: Sensation is altered below the ankles bilaterally. Bilateral upper and lower extremity coordination and muscle stretch reflexes are physiologic and symmetric. Plantar response are downgoing.   GAIT: Antalgic.      LABS:      IMAGING:  None available      ASSESSMENT: 63 y.o. year old female with pain, consistent with fibromyalgia.      DISCUSSION: Ms. Beck is a woman with a history of lumbar surgery who comes to us with low back pain. She identifies her pain as coming from the area of the SIJ. On follow up however, she presented with pain in different areas. She notes no trauma but lots of stress from being displaced by [place] and she is only sleeping 4 hours/night while staying with her son in [place].     OPIOID MANAGEMENT: 4-5 year history of opioids with Dr. Kraft (discharged for missed Utox)  MME: 10  Risk: Low (Family History)  : Reviewed today  Naloxone:   Utox Appropriate: 10/13/22  Violations: None  Contract: 12/22/22    PLAN:  Continue Gabapentin 600 TID   Continue Cymbalta 60 mg daily  Continue Hydrocodone 10/325 QD #35.   Referred to Psych previously, however she worked closely with her  instead, and feels this is helping her  Follow up in 3 months virtually, or in-person when back in [place]    Sapphire Villalobos  02/22/2024

## 2024-02-23 DIAGNOSIS — Z98.890 HISTORY OF LUMBOSACRAL SPINE SURGERY: ICD-10-CM

## 2024-02-23 DIAGNOSIS — M79.7 FIBROMYALGIA: ICD-10-CM

## 2024-02-23 RX ORDER — DULOXETIN HYDROCHLORIDE 60 MG/1
60 CAPSULE, DELAYED RELEASE ORAL DAILY
Qty: 30 CAPSULE | Refills: 3 | OUTPATIENT
Start: 2024-02-23

## 2024-02-23 RX ORDER — GABAPENTIN 300 MG/1
600 CAPSULE ORAL 3 TIMES DAILY
Qty: 180 CAPSULE | Refills: 2 | OUTPATIENT
Start: 2024-02-23

## 2024-02-23 NOTE — TELEPHONE ENCOUNTER
----- Message from Brigitet Dobbs sent at 2/23/2024 12:07 PM CST -----  Regarding: REFILL  Who Called:WOODROW SALCIDO [[id number]]          RX Name and Strength:    DULoxetine (CYMBALTA) 60 MG capsule   gabapentin (NEURONTIN) 300 MG capsule    HYDROcodone-acetaminophen (NORCO)  mg per tablet     Is this a 30 day or 90 day RX: 30          Preferred Pharmacy with phone number:  [facility] #[id number] [facility] [address] [address] AT [facility] [address]           Local or Mail Order: LOCAL

## 2024-02-27 DIAGNOSIS — Z98.890 HISTORY OF LUMBOSACRAL SPINE SURGERY: ICD-10-CM

## 2024-02-27 NOTE — TELEPHONE ENCOUNTER
----- Message from [place]  sent at 2/26/2024  1:07 PM CST -----  Regarding: Refill Request  Who Called: WOODROW SALCIDO [[id number]]          New Prescription or Refill : Refill      RX Name and Strength:  HYDROcodone-acetaminophen (NORCO)  mg per tablet          RX Name and Strength:  gabapentin (NEURONTIN) 300 MG capsule      30 day or 90 day RX: 30      Preferred Pharmacy:  [facility] [address] [place] [address] AT [facility] OF [address]   Phone: [phone]  Fax: [phone]            Would the patient rather a call back or a response via [facility]? Call back        Best Call Back Number:  [phone]        Additional Information:

## 2024-02-27 NOTE — TELEPHONE ENCOUNTER
Pt refill request is being sent to [facility] for pending to the provider.    ----- Message from Virginia Rowe sent at 2/26/2024  1:07 PM CST -----  Regarding: Refill Request  Who Called: WOODROW SALCIDO [[id number]]          New Prescription or Refill : Refill      RX Name and Strength:  HYDROcodone-acetaminophen (NORCO)  mg per tablet          RX Name and Strength:  gabapentin (NEURONTIN) 300 MG capsule      30 day or 90 day RX: 30      Preferred Pharmacy:  [facility]ORE #[id number] [address] [address] AT [facility] [address]   Phone: [phone]  Fax: [phone]            Would the patient rather a call back or a response via [employer]? Call back        Best Call Back Number:  [phone]        Additional Information:

## 2024-02-28 RX ORDER — HYDROCODONE BITARTRATE AND ACETAMINOPHEN 10; 325 MG/1; MG/1
1 TABLET ORAL EVERY 12 HOURS PRN
Qty: 35 TABLET | Refills: 0 | Status: SHIPPED | OUTPATIENT
Start: 2024-02-28 | End: 2024-03-27 | Stop reason: SDUPTHER

## 2024-02-28 NOTE — TELEPHONE ENCOUNTER
----- Message from Brigitte Dobbs sent at 2/27/2024  3:21 PM CST -----  Regarding: RETURN CALL  Who Called: WOODROW SALCIDO [[id number]]        Who Left Message for Patient:heath        Does the patient know what this is regarding?yes        Best Call Back Number:[phone]         Additional Information: send rx for painmeds

## 2024-02-28 NOTE — TELEPHONE ENCOUNTER
Patient requesting refill on hydrocodone  Last office visit 2-22-24   shows last refill on 1-11-24  Patient [facility]  Patient last UDS 4-20-23 was consistent with current therapy                         CODEINE   Not Detected Not Detected             MORPHINE   Not Detected Not Detected             6-ACETYLMORPHINE   Not Detected Not Detected             OXYCODONE   Not Detected Not Detected             NOROYXCODONE   Not Detected Not Detected             OXYMORPHONE   Not Detected Not Detected             NOROXYMORPHONE   Not Detected Not Detected             HYDROCODONE   Not Detected Not Detected             NORHYDROCODONE   Not Detected Not Detected             HYDROMORPHONE   Not Detected Not Detected             BUPRENORPHINE   Not Detected Not Detected             NORUBPRENORPHINE   Not Detected Not Detected             FENTANYL   Not Detected Not Detected             NORFENTANYL   Not Detected Not Detected             MEPERIDINE METABOLITE   Not Detected Not Detected             TAPENTADOL   Not Detected Not Detected             TAPENTADOL-O-SULF   Not Detected Not Detected             METHADONE   Not Detected Not Detected             TRAMADOL   Not Detected Not Detected             AMPHETAMINE   Not Detected Not Detected             METHAMPHETAMINE   Not Detected Not Detected             MDMA- ECSTASY   Not Detected Not Detected             MDA   Not Detected Not Detected             MDEA- Mary Ellen   Not Detected Not Detected             METHYLPHENIDATE   Not Detected Not Detected             PHENTERMINE   Not Detected Not Detected             BENZOYLECGONINE   Not Detected Not Detected             ALPRAZOLAM   Not Detected Not Detec

## 2024-03-27 DIAGNOSIS — Z98.890 HISTORY OF LUMBOSACRAL SPINE SURGERY: ICD-10-CM

## 2024-03-27 DIAGNOSIS — M79.7 FIBROMYALGIA: ICD-10-CM

## 2024-03-27 RX ORDER — GABAPENTIN 300 MG/1
600 CAPSULE ORAL 3 TIMES DAILY
Qty: 180 CAPSULE | Refills: 2 | Status: SHIPPED | OUTPATIENT
Start: 2024-03-27

## 2024-03-27 RX ORDER — HYDROCODONE BITARTRATE AND ACETAMINOPHEN 10; 325 MG/1; MG/1
1 TABLET ORAL EVERY 12 HOURS PRN
Qty: 35 TABLET | Refills: 0 | Status: SHIPPED | OUTPATIENT
Start: 2024-03-30 | End: 2024-04-29 | Stop reason: SDUPTHER

## 2024-03-27 RX ORDER — DULOXETIN HYDROCHLORIDE 60 MG/1
60 CAPSULE, DELAYED RELEASE ORAL DAILY
Qty: 30 CAPSULE | Refills: 3 | Status: SHIPPED | OUTPATIENT
Start: 2024-03-27 | End: 2024-06-19 | Stop reason: SDUPTHER

## 2024-03-27 NOTE — TELEPHONE ENCOUNTER
----- Message from Gunjan Cole sent at 3/27/2024  1:18 PM CDT -----  Regarding: rx refill  Type: RX Refill Request    Who Called:     Pharmacy Name:  [id number] [place] [facility] [address] [facility] [address] [facility] [address]   Phone: [phone]  Fax: [phone]      RX Name and Strength:HYDROcodone-acetaminophen (NORCO)  mg per tablet, gabapentin (NEURONTIN) 300 MG capsule, DULoxetine (CYMBALTA) 60 MG capsule    How is the patient currently taking it? (ex. 1XDay):    Is this a 30 day or 90 day RX:    Additional Notes:      
present and normal in 4 extremities

## 2024-03-27 NOTE — TELEPHONE ENCOUNTER
Patient requesting refill on norco  Last office visit 2/22/2024   shows last refill on 2/28/2024  Patient [facility] have [facility]  Patient last UDS 4/20/2023 was consistent with current therapy          CODEINE  Not Detected Not Detected        MORPHINE  Not Detected Not Detected        6-ACETYLMORPHINE  Not Detected Not Detected        OXYCODONE  Not Detected Not Detected        NOROYXCODONE  Not Detected Not Detected        OXYMORPHONE  Not Detected Not Detected        NOROXYMORPHONE  Not Detected Not Detected        HYDROCODONE  Not Detected Not Detected        NORHYDROCODONE  Not Detected Not Detected        HYDROMORPHONE  Not Detected Not Detected        BUPRENORPHINE  Not Detected Not Detected        NORUBPRENORPHINE  Not Detected Not Detected        FENTANYL  Not Detected Not Detected        NORFENTANYL  Not Detected Not Detected        MEPERIDINE METABOLITE  Not Detected Not Detected        TAPENTADOL  Not Detected Not Detected        TAPENTADOL-O-SULF  Not Detected Not Detected        METHADONE  Not Detected Not Detected        TRAMADOL  Not Detected Not Detected        AMPHETAMINE  Not Detected Not Detected        METHAMPHETAMINE  Not Detected Not Detected        MDMA- ECSTASY  Not Detected Not Detected        MDA  Not Detected Not Detected        MDEA- Mary Ellen  Not Detected Not Detected        METHYLPHENIDATE  Not Detected Not Detected        PHENTERMINE  Not Detected Not Detected        BENZOYLECGONINE  Not Detected Not Detected        ALPRAZOLAM  Not Detected Not Detected        ALPHA-OH-ALPRAZOLAM  Not Detected Not Detected        CLONAZEPAM  Not Detected Not Detected        7-AMINOCLONAZEPAM  Not Detected Not Detected        DIAZEPAM  Not Detected Not Detected        NORDIAZEPAM  Not Detected Not Detected        OXAZEPAM  Present Not Detected        TEMAZEPAM  Present Not Detected        Lorazepam  Not Detected Not Detected        MIDAZOLAM  Not  Detected Not Detected        ZOLPIDEM  Not Detected Not Detected        BARBITURATES  Not Detected Not Detected        Creatinine, Urine 20.0 - 400.0 mg/dL 69.6 70.3 88.0 R 85.0 R, .0 R, .0 R, .0 R, CM   ETHYL GLUCURONIDE  Not Detected Not Detected        MARIJUANA METABOLITE  Not Detected Not Detected        PCP  Not Detected Not Detected        CARISOPRODOL  Not Detected Not Detected CM        Comment: The carisoprodol immunoassay has cross-reactivity to  carisoprodol and meprobamate.   Naloxone  Not Detected Not Detected        Gabapentin  Present Present        Pregabalin  Not Detected Not Detected        Alpha-OH-Midazolam  Not Detected Not Detected        Zolpidem Metabolite  Not Detected Not Detected        PAIN MANAGEMENT DRUG PANEL  See Below See Below CM

## 2024-04-29 DIAGNOSIS — Z98.890 HISTORY OF LUMBOSACRAL SPINE SURGERY: ICD-10-CM

## 2024-04-29 RX ORDER — HYDROCODONE BITARTRATE AND ACETAMINOPHEN 10; 325 MG/1; MG/1
1 TABLET ORAL EVERY 12 HOURS PRN
Qty: 35 TABLET | Refills: 0 | Status: SHIPPED | OUTPATIENT
Start: 2024-04-30

## 2024-04-29 NOTE — TELEPHONE ENCOUNTER
----- Message from Jannette Bernabe sent at 4/29/2024  8:26 AM CDT -----  Regarding: Refil Request  Who Called:WOODROW SALCIDO [[id number]]         New Prescription or Refill : Refill      RX Name and Strength:  HYDROcodone-acetaminophen (NORCO)  mg per tablet        RX Name and Strength:  DULoxetine (CYMBALTA) 60 MG capsule           Local or Mail Order : Local               [id number] [place] [facility] [address] [facility] [address]      Would the patient rather a call back or a response via [facility]?no

## 2024-04-29 NOTE — TELEPHONE ENCOUNTER
Patient requesting refill on norco  Last office visit 2/22/2024   shows last refill on 04/01/024  Patient does have a pain contract on file [facility] Pain Management department  Patient last UDS 4/20/2023 was consistent with current therapy           CODEINE   Not Detected Not Detected             MORPHINE   Not Detected Not Detected             6-ACETYLMORPHINE   Not Detected Not Detected             OXYCODONE   Not Detected Not Detected             NOROYXCODONE   Not Detected Not Detected             OXYMORPHONE   Not Detected Not Detected             NOROXYMORPHONE   Not Detected Not Detected             HYDROCODONE   Not Detected Not Detected             NORHYDROCODONE   Not Detected Not Detected             HYDROMORPHONE   Not Detected Not Detected             BUPRENORPHINE   Not Detected Not Detected             NORUBPRENORPHINE   Not Detected Not Detected             FENTANYL   Not Detected Not Detected             NORFENTANYL   Not Detected Not Detected             MEPERIDINE METABOLITE   Not Detected Not Detected             TAPENTADOL   Not Detected Not Detected             TAPENTADOL-O-SULF   Not Detected Not Detected             METHADONE   Not Detected Not Detected             TRAMADOL   Not Detected Not Detected             AMPHETAMINE   Not Detected Not Detected             METHAMPHETAMINE   Not Detected Not Detected             MDMA- ECSTASY   Not Detected Not Detected             MDA   Not Detected Not Detected             MDEA- [place]   Not Detected Not Detected             METHYLPHENIDATE   Not Detected Not Detected             PHENTERMINE   Not Detected Not Detected             BENZOYLECGONINE   Not Detected Not Detected             ALPRAZOLAM   Not Detected Not Detected             ALPHA-OH-ALPRAZOLAM   Not Detected Not Detected             CLONAZEPAM   Not Detected Not Detected             7-AMINOCLONAZEPAM   Not Detected Not Detected             DIAZEPAM   Not Detected Not Detected              NORDIAZEPAM   Not Detected Not Detected             OXAZEPAM   Present Not Detected             TEMAZEPAM   Present Not Detected             Lorazepam   Not Detected Not Detected             MIDAZOLAM   Not Detected Not Detected             ZOLPIDEM   Not Detected Not Detected             BARBITURATES   Not Detected Not Detected             Creatinine, Urine 20.0 - 400.0 mg/dL 69.6 70.3 88.0 R 85.0 R, .0 R, .0 R, .0 R, CM   ETHYL GLUCURONIDE   Not Detected Not Detected             MARIJUANA METABOLITE   Not Detected Not Detected             PCP   Not Detected Not Detected             CARISOPRODOL   Not Detected Not Detected CM             Comment: The carisoprodol immunoassay has cross-reactivity to  carisoprodol and meprobamate.   Naloxone   Not Detected Not Detected             Gabapentin   Present Present             Pregabalin   Not Detected Not Detected             Alpha-OH-Midazolam   Not Detected Not Detected             Zolpidem Metabolite   Not Detected Not Detected             PAIN MANAGEMENT DRUG PANEL   See Below See Below CM

## 2024-05-02 NOTE — CONSULTS
Patient was observed labile in mood,irritable at times but redirectable in the evening,no aggressive behavior noted,visible in the mileu/dayroom during snacks and watched TV.Patient denies suicidal /homicidal thoughts but reported having auditory hallucinations states \"I can hear voices of spirits sometimes but its not clear\" ;Patient was agitated in the morning shift yesterday,got Haldol 5mg,po and Lorazepam 2mg,po,around 1736 pm,patient is focusing on placement when discharge.Patient is compliant with medication,on Olanzapine 15mg,po at HS,encouraged to continue to comply with treatment and medications,participate in group activities and use positive coping skills.For continuity of care and treatment plan management.    Problem: Adult Mental Health  Goal: Reports or exhibits reduction in symptoms associated with elevated or labile mood/estela  Outcome: Not met at discharge, continue plan     Problem: Adult Mental Health  Goal: Demonstrates ability to proactively perform self cares  Outcome: Not met at discharge, continue plan     Problem: Adult Mental Health  Goal: Demonstrates the ability to engage in reality-based communication and refrains from acting on delusional thoughts  Outcome: Not met at discharge, continue plan     Problem: Adult Mental Health  Goal: Reports or exhibits hallucinations absent or at manageable level  Outcome: Not met at discharge, continue plan     Problem: Adult Mental Health  Goal: Demonstrates control of behavior, refraining from hostility, aggression or threats of violence  Outcome: Not met at discharge, continue plan     Problem: Adult Mental Health  Goal: Verbalizes understanding of medication management/monitoring/assessment of effectiveness  Outcome: Not met at discharge, continue plan      Pharmacokinetic Initial Assessment: IV Vancomycin    Assessment/Plan:    Patient requiring 3 L O2 and B/L lower lobe infiltrates on CT.  Currently with LAURIE 2/2 ATN requiring CRRT in ICU. On CTX/MALIK for post-op intraabdominal abscess. Broadening therapy to cover HAP.    1. Vancomycin 1500 mg (15 mg/kg) IV x 1 today  2. Obtain concentration in AM 12/10/20, subsequent dosing if < 15 mcg/mL  3. Goal trough = 15-20 mcg/mL  4. Consider respiratory culture prior to abx.    Thank you for the consult, will continue to follow    Angel Adams Pharm.D., BCPS  [id number]       Patient brief summary:  Navin Beck is a 60 y.o. female initiated on antimicrobial therapy with IV Vancomycin for treatment of suspected HAP    Drug Allergies:   Review of patient's allergies indicates:   Allergen Reactions    Penicillins Shortness Of Breath, Itching and Swelling     Tolerates cefepime 11/30/2020       Actual Body Weight:   109.5 kg    Renal Function:   Estimated Creatinine Clearance: 15.8 mL/min (A) (based on SCr of 4.7 mg/dL (H)).,     CBC (last 72 hours):  Recent Labs   Lab Result Units 12/07/20  0511 12/08/20  0354 12/09/20  0909   WBC K/uL 12.29 12.77* 11.50   Hemoglobin g/dL 7.9* 8.0* 9.0*   Hematocrit % 25.4* 25.8* 29.8*   Platelets K/uL 391* 342 369*   Gran % % 62.2 68.1 62.8   Lymph % % 23.4 20.8 27.1   Mono % % 10.7 8.5 6.9   Eosinophil % % 1.4 1.0 1.1   Basophil % % 0.5 0.5 0.7   Differential Method  Automated Automated Automated       Metabolic Panel (last 72 hours):  Recent Labs   Lab Result Units 12/07/20  0511 12/08/20  0354 12/09/20  0909   Sodium mmol/L 136 134* 135*   Potassium mmol/L 3.8 3.9 3.6   Chloride mmol/L 95 92* 93*   CO2 mmol/L 31* 31* 31*   Glucose mg/dL 156* 123* 156*   BUN mg/dL 24* 26* 27*   Creatinine mg/dL 5.1* 5.3* 4.7*   Albumin g/dL 1.8* 2.0* 2.2*   Total Bilirubin mg/dL 0.1 0.1 0.1   Alkaline Phosphatase U/L 201* 474* 566*   AST U/L 16 45* 29   ALT U/L 5* 10 9*   Magnesium mg/dL 1.6 1.4* 1.4*    Phosphorus mg/dL 4.8* 5.3*  5.3* 5.3*       Drug levels (last 3 results):  No results for input(s): VANCOMYCINRA, VANCOMYCINPE, VANCOMYCINTR in the last 72 hours.    Microbiologic Results:  Microbiology Results (last 7 days)     Procedure Component Value Units Date/Time    Culture, Anaerobe [[id number]]  (Abnormal) Collected: 11/28/20 1405    Order Status: Completed Specimen: Abscess from Abdomen Updated: 12/04/20 1350     Anaerobic Culture PEPTOSTREPTOCOCCUS ANAEROBIUS  Moderate      Narrative:      Right pelvis    Blood culture [[id number]] Collected: 11/27/20 1919    Order Status: Completed Specimen: Blood Updated: 12/02/20 2212     Blood Culture, Routine No growth after 5 days.    Blood culture [[id number]] Collected: 11/27/20 1915    Order Status: Completed Specimen: Blood Updated: 12/02/20 2212     Blood Culture, Routine No growth after 5 days.

## 2024-05-07 ENCOUNTER — HOSPITAL ENCOUNTER (INPATIENT)
Facility: HOSPITAL | Age: 64
LOS: 3 days | Discharge: HOME | DRG: 871 | End: 2024-05-11
Attending: STUDENT IN AN ORGANIZED HEALTH CARE EDUCATION/TRAINING PROGRAM | Admitting: INTERNAL MEDICINE
Payer: MEDICARE

## 2024-05-07 DIAGNOSIS — I38 ENDOCARDITIS: ICD-10-CM

## 2024-05-07 DIAGNOSIS — N39.0 URINARY TRACT INFECTION WITHOUT HEMATURIA, SITE UNSPECIFIED: ICD-10-CM

## 2024-05-07 DIAGNOSIS — J18.9 PNEUMONIA OF RIGHT LOWER LOBE DUE TO INFECTIOUS ORGANISM: ICD-10-CM

## 2024-05-07 DIAGNOSIS — R07.9 CHEST PAIN: ICD-10-CM

## 2024-05-07 DIAGNOSIS — J02.0 STREP PHARYNGITIS: Primary | ICD-10-CM

## 2024-05-07 LAB
ABS NEUT (OLG): 19.96 X10(3)/MCL (ref 2.1–9.2)
ALBUMIN SERPL-MCNC: 3.3 G/DL (ref 3.4–4.8)
ALBUMIN/GLOB SERPL: 0.6 RATIO (ref 1.1–2)
ALP SERPL-CCNC: 158 UNIT/L (ref 40–150)
ALT SERPL-CCNC: 12 UNIT/L (ref 0–55)
AMORPH URATE CRY URNS QL MICRO: ABNORMAL /UL
APPEARANCE UR: ABNORMAL
AST SERPL-CCNC: 10 UNIT/L (ref 5–34)
BACTERIA #/AREA URNS AUTO: ABNORMAL /HPF
BILIRUB SERPL-MCNC: 0.7 MG/DL
BILIRUB UR QL STRIP.AUTO: NEGATIVE
BUN SERPL-MCNC: 15.2 MG/DL (ref 9.8–20.1)
CALCIUM SERPL-MCNC: 9.4 MG/DL (ref 8.4–10.2)
CHLORIDE SERPL-SCNC: 99 MMOL/L (ref 98–107)
CO2 SERPL-SCNC: 19 MMOL/L (ref 23–31)
COLOR UR AUTO: YELLOW
CREAT SERPL-MCNC: 1.38 MG/DL (ref 0.55–1.02)
ERYTHROCYTE [DISTWIDTH] IN BLOOD BY AUTOMATED COUNT: 12.4 % (ref 11.5–17)
FLUAV AG UPPER RESP QL IA.RAPID: NOT DETECTED
FLUBV AG UPPER RESP QL IA.RAPID: NOT DETECTED
GFR SERPLBLD CREATININE-BSD FMLA CKD-EPI: 43 ML/MIN/1.73/M2
GLOBULIN SER-MCNC: 5.5 GM/DL (ref 2.4–3.5)
GLUCOSE SERPL-MCNC: 185 MG/DL (ref 82–115)
GLUCOSE UR QL STRIP.AUTO: ABNORMAL
HCT VFR BLD AUTO: 36.7 % (ref 37–47)
HGB BLD-MCNC: 12 G/DL (ref 12–16)
HYALINE CASTS #/AREA URNS LPF: ABNORMAL /LPF
INSTRUMENT WBC (OLG): 23.76 X10(3)/MCL
KETONES UR QL STRIP.AUTO: ABNORMAL
LACTATE SERPL-SCNC: 1.1 MMOL/L (ref 0.5–2.2)
LEUKOCYTE ESTERASE UR QL STRIP.AUTO: 500
LYMPHOCYTES NFR BLD MANUAL: 1.43 X10(3)/MCL
LYMPHOCYTES NFR BLD MANUAL: 6 %
MCH RBC QN AUTO: 30.1 PG (ref 27–31)
MCHC RBC AUTO-ENTMCNC: 32.7 G/DL (ref 33–36)
MCV RBC AUTO: 92 FL (ref 80–94)
MONOCYTES NFR BLD MANUAL: 10 %
MONOCYTES NFR BLD MANUAL: 2.38 X10(3)/MCL (ref 0.1–1.3)
MUCOUS THREADS URNS QL MICRO: ABNORMAL /LPF
NEUTROPHILS NFR BLD MANUAL: 84 %
NITRITE UR QL STRIP.AUTO: NEGATIVE
NRBC BLD AUTO-RTO: 0 %
PH UR STRIP.AUTO: 5.5 [PH]
PLATELET # BLD AUTO: 344 X10(3)/MCL (ref 130–400)
PLATELET # BLD EST: NORMAL 10*3/UL
PMV BLD AUTO: 9.4 FL (ref 7.4–10.4)
POTASSIUM SERPL-SCNC: 4.5 MMOL/L (ref 3.5–5.1)
PROT SERPL-MCNC: 8.8 GM/DL (ref 5.8–7.6)
PROT UR QL STRIP.AUTO: ABNORMAL
RBC # BLD AUTO: 3.99 X10(6)/MCL (ref 4.2–5.4)
RBC #/AREA URNS AUTO: ABNORMAL /HPF
RBC MORPH BLD: NORMAL
RBC UR QL AUTO: NEGATIVE
SARS-COV-2 RNA RESP QL NAA+PROBE: NOT DETECTED
SODIUM SERPL-SCNC: 131 MMOL/L (ref 136–145)
SP GR UR STRIP.AUTO: 1.02 (ref 1–1.03)
SQUAMOUS #/AREA URNS LPF: ABNORMAL /HPF
STREP A PCR (OHS): DETECTED
UROBILINOGEN UR STRIP-ACNC: 2
WBC # SPEC AUTO: 23.76 X10(3)/MCL (ref 4.5–11.5)
WBC #/AREA URNS AUTO: ABNORMAL /HPF

## 2024-05-07 PROCEDURE — 96375 TX/PRO/DX INJ NEW DRUG ADDON: CPT

## 2024-05-07 PROCEDURE — 63600175 PHARM REV CODE 636 W HCPCS

## 2024-05-07 PROCEDURE — 87040 BLOOD CULTURE FOR BACTERIA: CPT | Performed by: NURSE PRACTITIONER

## 2024-05-07 PROCEDURE — 87086 URINE CULTURE/COLONY COUNT: CPT | Performed by: NURSE PRACTITIONER

## 2024-05-07 PROCEDURE — 63600175 PHARM REV CODE 636 W HCPCS: Performed by: NURSE PRACTITIONER

## 2024-05-07 PROCEDURE — 25500020 PHARM REV CODE 255

## 2024-05-07 PROCEDURE — 99285 EMERGENCY DEPT VISIT HI MDM: CPT | Mod: 25

## 2024-05-07 PROCEDURE — 0240U COVID/FLU A&B PCR: CPT | Performed by: NURSE PRACTITIONER

## 2024-05-07 PROCEDURE — 25000003 PHARM REV CODE 250: Performed by: NURSE PRACTITIONER

## 2024-05-07 PROCEDURE — 80053 COMPREHEN METABOLIC PANEL: CPT | Performed by: NURSE PRACTITIONER

## 2024-05-07 PROCEDURE — 87651 STREP A DNA AMP PROBE: CPT | Performed by: NURSE PRACTITIONER

## 2024-05-07 PROCEDURE — 85007 BL SMEAR W/DIFF WBC COUNT: CPT | Performed by: NURSE PRACTITIONER

## 2024-05-07 PROCEDURE — 81001 URINALYSIS AUTO W/SCOPE: CPT | Performed by: NURSE PRACTITIONER

## 2024-05-07 PROCEDURE — 96361 HYDRATE IV INFUSION ADD-ON: CPT

## 2024-05-07 PROCEDURE — 83605 ASSAY OF LACTIC ACID: CPT | Performed by: NURSE PRACTITIONER

## 2024-05-07 RX ORDER — ACETAMINOPHEN 325 MG/1
650 TABLET ORAL
Status: COMPLETED | OUTPATIENT
Start: 2024-05-07 | End: 2024-05-07

## 2024-05-07 RX ORDER — ONDANSETRON HYDROCHLORIDE 2 MG/ML
4 INJECTION, SOLUTION INTRAVENOUS
Status: COMPLETED | OUTPATIENT
Start: 2024-05-07 | End: 2024-05-07

## 2024-05-07 RX ORDER — MORPHINE SULFATE 4 MG/ML
4 INJECTION, SOLUTION INTRAMUSCULAR; INTRAVENOUS
Status: COMPLETED | OUTPATIENT
Start: 2024-05-07 | End: 2024-05-07

## 2024-05-07 RX ADMIN — IOHEXOL 100 ML: 350 INJECTION, SOLUTION INTRAVENOUS at 10:05

## 2024-05-07 RX ADMIN — ONDANSETRON 4 MG: 2 INJECTION INTRAMUSCULAR; INTRAVENOUS at 11:05

## 2024-05-07 RX ADMIN — SODIUM CHLORIDE, POTASSIUM CHLORIDE, SODIUM LACTATE AND CALCIUM CHLORIDE 1000 ML: 600; 310; 30; 20 INJECTION, SOLUTION INTRAVENOUS at 08:05

## 2024-05-07 RX ADMIN — MORPHINE SULFATE 4 MG: 4 INJECTION INTRAVENOUS at 11:05

## 2024-05-07 RX ADMIN — ACETAMINOPHEN 650 MG: 325 TABLET, FILM COATED ORAL at 06:05

## 2024-05-07 NOTE — FIRST PROVIDER EVALUATION
Medical screening examination initiated.  I have conducted a focused provider triage encounter, findings are as follows:    Brief history of present illness:  Patient states generalized body aches, chills, and fever starting today.     There were no vitals filed for this visit.    Pertinent physical exam:  Awake, alert    Brief workup plan:  Labs    Preliminary workup initiated; this workup will be continued and followed by the physician or advanced practice provider that is assigned to the patient when roomed.

## 2024-05-08 PROBLEM — J15.4: Status: ACTIVE | Noted: 2024-05-08

## 2024-05-08 PROBLEM — A40.0 SEPSIS DUE TO GROUP A STREPTOCOCCUS: Status: ACTIVE | Noted: 2024-05-08

## 2024-05-08 LAB
ALBUMIN SERPL-MCNC: 2.7 G/DL (ref 3.4–4.8)
ALBUMIN/GLOB SERPL: 0.7 RATIO (ref 1.1–2)
ALP SERPL-CCNC: 260 UNIT/L (ref 40–150)
ALT SERPL-CCNC: 29 UNIT/L (ref 0–55)
AST SERPL-CCNC: 83 UNIT/L (ref 5–34)
AV INDEX (PROSTH): 0.7
AV MEAN GRADIENT: 5 MMHG
AV PEAK GRADIENT: 10 MMHG
AV VALVE AREA BY VELOCITY RATIO: 1.63 CM²
AV VALVE AREA: 1.59 CM²
AV VELOCITY RATIO: 0.72
BASOPHILS # BLD AUTO: 0.06 X10(3)/MCL
BASOPHILS # BLD AUTO: 0.09 X10(3)/MCL
BASOPHILS NFR BLD AUTO: 0.3 %
BASOPHILS NFR BLD AUTO: 0.4 %
BILIRUB SERPL-MCNC: 1.5 MG/DL
BSA FOR ECHO PROCEDURE: 2.11 M2
BUN SERPL-MCNC: 16 MG/DL (ref 9.8–20.1)
CALCIUM SERPL-MCNC: 8.2 MG/DL (ref 8.4–10.2)
CHLORIDE SERPL-SCNC: 99 MMOL/L (ref 98–107)
CO2 SERPL-SCNC: 18 MMOL/L (ref 23–31)
CREAT SERPL-MCNC: 1.31 MG/DL (ref 0.55–1.02)
CV ECHO LV RWT: 0.65 CM
DOP CALC AO PEAK VEL: 1.56 M/S
DOP CALC AO VTI: 29.8 CM
DOP CALC LVOT AREA: 2.3 CM2
DOP CALC LVOT DIAMETER: 1.7 CM
DOP CALC LVOT PEAK VEL: 1.12 M/S
DOP CALC LVOT STROKE VOLUME: 47.41 CM3
DOP CALC MV VTI: 43.4 CM
DOP CALCLVOT PEAK VEL VTI: 20.9 CM
E WAVE DECELERATION TIME: 243 MSEC
E/A RATIO: 0.82
E/E' RATIO: 16.17 M/S
ECHO LV POSTERIOR WALL: 1.1 CM (ref 0.6–1.1)
EOSINOPHIL # BLD AUTO: 0.01 X10(3)/MCL (ref 0–0.9)
EOSINOPHIL # BLD AUTO: 0.03 X10(3)/MCL (ref 0–0.9)
EOSINOPHIL NFR BLD AUTO: 0 %
EOSINOPHIL NFR BLD AUTO: 0.1 %
ERYTHROCYTE [DISTWIDTH] IN BLOOD BY AUTOMATED COUNT: 12.3 % (ref 11.5–17)
ERYTHROCYTE [DISTWIDTH] IN BLOOD BY AUTOMATED COUNT: 12.7 % (ref 11.5–17)
EST. AVERAGE GLUCOSE BLD GHB EST-MCNC: 220.2 MG/DL
FRACTIONAL SHORTENING: 19 % (ref 28–44)
GFR SERPLBLD CREATININE-BSD FMLA CKD-EPI: 45 ML/MIN/1.73/M2
GLOBULIN SER-MCNC: 3.7 GM/DL (ref 2.4–3.5)
GLUCOSE SERPL-MCNC: 301 MG/DL (ref 82–115)
HBA1C MFR BLD: 9.3 %
HCT VFR BLD AUTO: 31.4 % (ref 37–47)
HCT VFR BLD AUTO: 32.5 % (ref 37–47)
HGB BLD-MCNC: 10 G/DL (ref 12–16)
HGB BLD-MCNC: 10.7 G/DL (ref 12–16)
IMM GRANULOCYTES # BLD AUTO: 0.19 X10(3)/MCL (ref 0–0.04)
IMM GRANULOCYTES # BLD AUTO: 0.2 X10(3)/MCL (ref 0–0.04)
IMM GRANULOCYTES NFR BLD AUTO: 0.9 %
IMM GRANULOCYTES NFR BLD AUTO: 0.9 %
INTERVENTRICULAR SEPTUM: 1.15 CM (ref 0.6–1.1)
LEFT ATRIUM SIZE: 3.3 CM
LEFT ATRIUM VOLUME INDEX MOD: 26.6 ML/M2
LEFT ATRIUM VOLUME MOD: 54.8 CM3
LEFT INTERNAL DIMENSION IN SYSTOLE: 2.77 CM (ref 2.1–4)
LEFT VENTRICLE DIASTOLIC VOLUME INDEX: 23.2 ML/M2
LEFT VENTRICLE DIASTOLIC VOLUME: 47.8 ML
LEFT VENTRICLE MASS INDEX: 58 G/M2
LEFT VENTRICLE SYSTOLIC VOLUME INDEX: 14 ML/M2
LEFT VENTRICLE SYSTOLIC VOLUME: 28.8 ML
LEFT VENTRICULAR INTERNAL DIMENSION IN DIASTOLE: 3.41 CM (ref 3.5–6)
LEFT VENTRICULAR MASS: 118.47 G
LV LATERAL E/E' RATIO: 16.17 M/S
LV SEPTAL E/E' RATIO: 16.17 M/S
LVOT MG: 3 MMHG
LVOT MV: 0.75 CM/S
LYMPHOCYTES # BLD AUTO: 1.23 X10(3)/MCL (ref 0.6–4.6)
LYMPHOCYTES # BLD AUTO: 2.79 X10(3)/MCL (ref 0.6–4.6)
LYMPHOCYTES NFR BLD AUTO: 12 %
LYMPHOCYTES NFR BLD AUTO: 6 %
MAGNESIUM SERPL-MCNC: 2 MG/DL (ref 1.6–2.6)
MCH RBC QN AUTO: 29.6 PG (ref 27–31)
MCH RBC QN AUTO: 29.9 PG (ref 27–31)
MCHC RBC AUTO-ENTMCNC: 31.8 G/DL (ref 33–36)
MCHC RBC AUTO-ENTMCNC: 32.9 G/DL (ref 33–36)
MCV RBC AUTO: 90.8 FL (ref 80–94)
MCV RBC AUTO: 92.9 FL (ref 80–94)
MONOCYTES # BLD AUTO: 0.27 X10(3)/MCL (ref 0.1–1.3)
MONOCYTES # BLD AUTO: 2.13 X10(3)/MCL (ref 0.1–1.3)
MONOCYTES NFR BLD AUTO: 1.3 %
MONOCYTES NFR BLD AUTO: 9.1 %
MV MEAN GRADIENT: 4 MMHG
MV PEAK A VEL: 1.19 M/S
MV PEAK E VEL: 0.97 M/S
MV PEAK GRADIENT: 8 MMHG
MV STENOSIS PRESSURE HALF TIME: 80 MS
MV VALVE AREA BY CONTINUITY EQUATION: 1.09 CM2
MV VALVE AREA P 1/2 METHOD: 2.75 CM2
NEUTROPHILS # BLD AUTO: 18.06 X10(3)/MCL (ref 2.1–9.2)
NEUTROPHILS # BLD AUTO: 18.85 X10(3)/MCL (ref 2.1–9.2)
NEUTROPHILS NFR BLD AUTO: 77.6 %
NEUTROPHILS NFR BLD AUTO: 91.4 %
NRBC BLD AUTO-RTO: 0 %
NRBC BLD AUTO-RTO: 0 %
OHS CV RV/LV RATIO: 1.13 CM
OHS LV EJECTION FRACTION SIMPSONS BIPLANE MOD: 56 %
OSMOLALITY SERPL: 286 MOSM/KG (ref 280–300)
PHOSPHATE SERPL-MCNC: 3.6 MG/DL (ref 2.3–4.7)
PISA TR MAX VEL: 1.39 M/S
PLATELET # BLD AUTO: 298 X10(3)/MCL (ref 130–400)
PLATELET # BLD AUTO: 339 X10(3)/MCL (ref 130–400)
PMV BLD AUTO: 9.5 FL (ref 7.4–10.4)
PMV BLD AUTO: 9.8 FL (ref 7.4–10.4)
POCT GLUCOSE: 305 MG/DL (ref 70–110)
POCT GLUCOSE: 419 MG/DL (ref 70–110)
POTASSIUM SERPL-SCNC: 4.5 MMOL/L (ref 3.5–5.1)
PROT SERPL-MCNC: 6.4 GM/DL (ref 5.8–7.6)
PV PEAK GRADIENT: 2 MMHG
PV PEAK VELOCITY: 0.64 M/S
RBC # BLD AUTO: 3.38 X10(6)/MCL (ref 4.2–5.4)
RBC # BLD AUTO: 3.58 X10(6)/MCL (ref 4.2–5.4)
RIGHT VENTRICULAR END-DIASTOLIC DIMENSION: 3.85 CM
SODIUM SERPL-SCNC: 127 MMOL/L (ref 136–145)
SODIUM SERPL-SCNC: 128 MMOL/L (ref 136–145)
SODIUM SERPL-SCNC: 129 MMOL/L (ref 136–145)
TDI LATERAL: 0.06 M/S
TDI SEPTAL: 0.06 M/S
TDI: 0.06 M/S
TR MAX PG: 8 MMHG
TRICUSPID ANNULAR PLANE SYSTOLIC EXCURSION: 3.44 CM
URATE SERPL-MCNC: 6.5 MG/DL (ref 2.6–6)
WBC # SPEC AUTO: 20.63 X10(3)/MCL (ref 4.5–11.5)
WBC # SPEC AUTO: 23.28 X10(3)/MCL (ref 4.5–11.5)
Z-SCORE OF LEFT VENTRICULAR DIMENSION IN END DIASTOLE: -6.01
Z-SCORE OF LEFT VENTRICULAR DIMENSION IN END SYSTOLE: -2.52

## 2024-05-08 PROCEDURE — 63600175 PHARM REV CODE 636 W HCPCS

## 2024-05-08 PROCEDURE — 36415 COLL VENOUS BLD VENIPUNCTURE: CPT | Performed by: INTERNAL MEDICINE

## 2024-05-08 PROCEDURE — 83930 ASSAY OF BLOOD OSMOLALITY: CPT | Performed by: INTERNAL MEDICINE

## 2024-05-08 PROCEDURE — 84550 ASSAY OF BLOOD/URIC ACID: CPT | Performed by: INTERNAL MEDICINE

## 2024-05-08 PROCEDURE — 63600175 PHARM REV CODE 636 W HCPCS: Performed by: INTERNAL MEDICINE

## 2024-05-08 PROCEDURE — 83735 ASSAY OF MAGNESIUM: CPT | Performed by: INTERNAL MEDICINE

## 2024-05-08 PROCEDURE — 84295 ASSAY OF SERUM SODIUM: CPT | Performed by: INTERNAL MEDICINE

## 2024-05-08 PROCEDURE — 63600175 PHARM REV CODE 636 W HCPCS: Performed by: STUDENT IN AN ORGANIZED HEALTH CARE EDUCATION/TRAINING PROGRAM

## 2024-05-08 PROCEDURE — 25000003 PHARM REV CODE 250: Performed by: INTERNAL MEDICINE

## 2024-05-08 PROCEDURE — 80053 COMPREHEN METABOLIC PANEL: CPT | Performed by: INTERNAL MEDICINE

## 2024-05-08 PROCEDURE — 96365 THER/PROPH/DIAG IV INF INIT: CPT

## 2024-05-08 PROCEDURE — 94640 AIRWAY INHALATION TREATMENT: CPT

## 2024-05-08 PROCEDURE — 85025 COMPLETE CBC W/AUTO DIFF WBC: CPT | Performed by: INTERNAL MEDICINE

## 2024-05-08 PROCEDURE — 25000003 PHARM REV CODE 250

## 2024-05-08 PROCEDURE — 83036 HEMOGLOBIN GLYCOSYLATED A1C: CPT | Performed by: INTERNAL MEDICINE

## 2024-05-08 PROCEDURE — 11000001 HC ACUTE MED/SURG PRIVATE ROOM

## 2024-05-08 PROCEDURE — 99900035 HC TECH TIME PER 15 MIN (STAT)

## 2024-05-08 PROCEDURE — 84100 ASSAY OF PHOSPHORUS: CPT | Performed by: INTERNAL MEDICINE

## 2024-05-08 PROCEDURE — 25000242 PHARM REV CODE 250 ALT 637 W/ HCPCS: Performed by: INTERNAL MEDICINE

## 2024-05-08 PROCEDURE — C9113 INJ PANTOPRAZOLE SODIUM, VIA: HCPCS | Performed by: INTERNAL MEDICINE

## 2024-05-08 PROCEDURE — 27000221 HC OXYGEN, UP TO 24 HOURS

## 2024-05-08 RX ORDER — TEMAZEPAM 7.5 MG/1
15 CAPSULE ORAL NIGHTLY PRN
COMMUNITY
End: 2024-06-19

## 2024-05-08 RX ORDER — PROCHLORPERAZINE EDISYLATE 5 MG/ML
5 INJECTION INTRAMUSCULAR; INTRAVENOUS EVERY 6 HOURS PRN
Status: DISCONTINUED | OUTPATIENT
Start: 2024-05-08 | End: 2024-05-11 | Stop reason: HOSPADM

## 2024-05-08 RX ORDER — IBUPROFEN 200 MG
24 TABLET ORAL
Status: DISCONTINUED | OUTPATIENT
Start: 2024-05-08 | End: 2024-05-11 | Stop reason: HOSPADM

## 2024-05-08 RX ORDER — DULOXETIN HYDROCHLORIDE 60 MG/1
60 CAPSULE, DELAYED RELEASE ORAL DAILY
COMMUNITY
End: 2024-06-19 | Stop reason: SDUPTHER

## 2024-05-08 RX ORDER — PANTOPRAZOLE SODIUM 40 MG/10ML
40 INJECTION, POWDER, LYOPHILIZED, FOR SOLUTION INTRAVENOUS ONCE
Status: COMPLETED | OUTPATIENT
Start: 2024-05-08 | End: 2024-05-08

## 2024-05-08 RX ORDER — PANTOPRAZOLE SODIUM 40 MG/1
40 TABLET, DELAYED RELEASE ORAL DAILY
COMMUNITY
End: 2024-06-19

## 2024-05-08 RX ORDER — HYDROCODONE BITARTRATE AND ACETAMINOPHEN 10; 325 MG/1; MG/1
1 TABLET ORAL EVERY 6 HOURS PRN
Status: DISCONTINUED | OUTPATIENT
Start: 2024-05-08 | End: 2024-05-11 | Stop reason: HOSPADM

## 2024-05-08 RX ORDER — PANTOPRAZOLE SODIUM 40 MG/1
40 TABLET, DELAYED RELEASE ORAL
Status: DISCONTINUED | OUTPATIENT
Start: 2024-05-09 | End: 2024-05-11 | Stop reason: HOSPADM

## 2024-05-08 RX ORDER — IPRATROPIUM BROMIDE AND ALBUTEROL SULFATE 2.5; .5 MG/3ML; MG/3ML
3 SOLUTION RESPIRATORY (INHALATION) EVERY 4 HOURS PRN
Status: DISCONTINUED | OUTPATIENT
Start: 2024-05-08 | End: 2024-05-11 | Stop reason: HOSPADM

## 2024-05-08 RX ORDER — DEXAMETHASONE SODIUM PHOSPHATE 4 MG/ML
8 INJECTION, SOLUTION INTRA-ARTICULAR; INTRALESIONAL; INTRAMUSCULAR; INTRAVENOUS; SOFT TISSUE ONCE
Status: COMPLETED | OUTPATIENT
Start: 2024-05-08 | End: 2024-05-08

## 2024-05-08 RX ORDER — ENOXAPARIN SODIUM 100 MG/ML
40 INJECTION SUBCUTANEOUS EVERY 24 HOURS
Status: DISCONTINUED | OUTPATIENT
Start: 2024-05-08 | End: 2024-05-11 | Stop reason: HOSPADM

## 2024-05-08 RX ORDER — ALUMINUM HYDROXIDE, MAGNESIUM HYDROXIDE, AND SIMETHICONE 1200; 120; 1200 MG/30ML; MG/30ML; MG/30ML
30 SUSPENSION ORAL 4 TIMES DAILY PRN
Status: DISCONTINUED | OUTPATIENT
Start: 2024-05-08 | End: 2024-05-11 | Stop reason: HOSPADM

## 2024-05-08 RX ORDER — AMOXICILLIN 250 MG
2 CAPSULE ORAL 2 TIMES DAILY PRN
Status: DISCONTINUED | OUTPATIENT
Start: 2024-05-08 | End: 2024-05-11 | Stop reason: HOSPADM

## 2024-05-08 RX ORDER — KETOROLAC TROMETHAMINE 30 MG/ML
15 INJECTION, SOLUTION INTRAMUSCULAR; INTRAVENOUS EVERY 6 HOURS PRN
Status: DISCONTINUED | OUTPATIENT
Start: 2024-05-08 | End: 2024-05-08

## 2024-05-08 RX ORDER — HYDROCODONE BITARTRATE AND ACETAMINOPHEN 10; 325 MG/1; MG/1
1 TABLET ORAL EVERY 12 HOURS PRN
COMMUNITY
End: 2024-06-19

## 2024-05-08 RX ORDER — INSULIN ASPART 100 [IU]/ML
1-10 INJECTION, SOLUTION INTRAVENOUS; SUBCUTANEOUS
Status: DISCONTINUED | OUTPATIENT
Start: 2024-05-08 | End: 2024-05-11 | Stop reason: HOSPADM

## 2024-05-08 RX ORDER — SODIUM CHLORIDE, SODIUM LACTATE, POTASSIUM CHLORIDE, CALCIUM CHLORIDE 600; 310; 30; 20 MG/100ML; MG/100ML; MG/100ML; MG/100ML
INJECTION, SOLUTION INTRAVENOUS CONTINUOUS
Status: DISCONTINUED | OUTPATIENT
Start: 2024-05-08 | End: 2024-05-08

## 2024-05-08 RX ORDER — SODIUM BICARBONATE 650 MG/1
650 TABLET ORAL 2 TIMES DAILY
Status: COMPLETED | OUTPATIENT
Start: 2024-05-08 | End: 2024-05-10

## 2024-05-08 RX ORDER — GUAIFENESIN AND DEXTROMETHORPHAN HYDROBROMIDE 10; 100 MG/5ML; MG/5ML
5 SYRUP ORAL EVERY 4 HOURS PRN
Status: DISCONTINUED | OUTPATIENT
Start: 2024-05-08 | End: 2024-05-11 | Stop reason: HOSPADM

## 2024-05-08 RX ORDER — AMLODIPINE BESYLATE 10 MG/1
10 TABLET ORAL DAILY
COMMUNITY
End: 2024-06-19

## 2024-05-08 RX ORDER — SODIUM CHLORIDE 0.9 % (FLUSH) 0.9 %
10 SYRINGE (ML) INJECTION
Status: DISCONTINUED | OUTPATIENT
Start: 2024-05-08 | End: 2024-05-11 | Stop reason: HOSPADM

## 2024-05-08 RX ORDER — HYDRALAZINE HYDROCHLORIDE 20 MG/ML
10 INJECTION INTRAMUSCULAR; INTRAVENOUS EVERY 6 HOURS PRN
Status: DISCONTINUED | OUTPATIENT
Start: 2024-05-08 | End: 2024-05-11 | Stop reason: HOSPADM

## 2024-05-08 RX ORDER — IBUPROFEN 200 MG
16 TABLET ORAL
Status: DISCONTINUED | OUTPATIENT
Start: 2024-05-08 | End: 2024-05-11 | Stop reason: HOSPADM

## 2024-05-08 RX ORDER — ONDANSETRON HYDROCHLORIDE 2 MG/ML
4 INJECTION, SOLUTION INTRAVENOUS EVERY 4 HOURS PRN
Status: DISCONTINUED | OUTPATIENT
Start: 2024-05-08 | End: 2024-05-11 | Stop reason: HOSPADM

## 2024-05-08 RX ORDER — POLYETHYLENE GLYCOL 3350 17 G/17G
17 POWDER, FOR SOLUTION ORAL 2 TIMES DAILY PRN
Status: DISCONTINUED | OUTPATIENT
Start: 2024-05-08 | End: 2024-05-11 | Stop reason: HOSPADM

## 2024-05-08 RX ORDER — GUAIFENESIN 600 MG/1
600 TABLET, EXTENDED RELEASE ORAL 2 TIMES DAILY
Status: DISCONTINUED | OUTPATIENT
Start: 2024-05-08 | End: 2024-05-11 | Stop reason: HOSPADM

## 2024-05-08 RX ORDER — BENZONATATE 100 MG/1
200 CAPSULE ORAL 3 TIMES DAILY PRN
Status: DISCONTINUED | OUTPATIENT
Start: 2024-05-08 | End: 2024-05-11 | Stop reason: HOSPADM

## 2024-05-08 RX ORDER — KETOROLAC TROMETHAMINE 30 MG/ML
15 INJECTION, SOLUTION INTRAMUSCULAR; INTRAVENOUS
Status: COMPLETED | OUTPATIENT
Start: 2024-05-08 | End: 2024-05-08

## 2024-05-08 RX ORDER — TALC
6 POWDER (GRAM) TOPICAL NIGHTLY PRN
Status: DISCONTINUED | OUTPATIENT
Start: 2024-05-08 | End: 2024-05-11 | Stop reason: HOSPADM

## 2024-05-08 RX ORDER — IPRATROPIUM BROMIDE AND ALBUTEROL SULFATE 2.5; .5 MG/3ML; MG/3ML
3 SOLUTION RESPIRATORY (INHALATION)
Status: DISCONTINUED | OUTPATIENT
Start: 2024-05-08 | End: 2024-05-11 | Stop reason: HOSPADM

## 2024-05-08 RX ORDER — ACETAMINOPHEN 325 MG/1
650 TABLET ORAL EVERY 4 HOURS PRN
Status: DISCONTINUED | OUTPATIENT
Start: 2024-05-08 | End: 2024-05-11 | Stop reason: HOSPADM

## 2024-05-08 RX ORDER — ALUMINUM HYDROXIDE, MAGNESIUM HYDROXIDE, AND SIMETHICONE 1200; 120; 1200 MG/30ML; MG/30ML; MG/30ML
30 SUSPENSION ORAL
Status: DISCONTINUED | OUTPATIENT
Start: 2024-05-08 | End: 2024-05-11 | Stop reason: HOSPADM

## 2024-05-08 RX ORDER — GLUCAGON 1 MG
1 KIT INJECTION
Status: DISCONTINUED | OUTPATIENT
Start: 2024-05-08 | End: 2024-05-11 | Stop reason: HOSPADM

## 2024-05-08 RX ADMIN — PANTOPRAZOLE SODIUM 40 MG: 40 INJECTION, POWDER, LYOPHILIZED, FOR SOLUTION INTRAVENOUS at 08:05

## 2024-05-08 RX ADMIN — HYDROCODONE BITARTRATE AND ACETAMINOPHEN 1 TABLET: 10; 325 TABLET ORAL at 11:05

## 2024-05-08 RX ADMIN — KETOROLAC TROMETHAMINE 15 MG: 30 INJECTION, SOLUTION INTRAMUSCULAR at 01:05

## 2024-05-08 RX ADMIN — SODIUM BICARBONATE 650 MG TABLET 650 MG: at 08:05

## 2024-05-08 RX ADMIN — KETOROLAC TROMETHAMINE 15 MG: 30 INJECTION, SOLUTION INTRAMUSCULAR at 06:05

## 2024-05-08 RX ADMIN — IPRATROPIUM BROMIDE AND ALBUTEROL SULFATE 3 ML: .5; 3 SOLUTION RESPIRATORY (INHALATION) at 02:05

## 2024-05-08 RX ADMIN — INSULIN ASPART 10 UNITS: 100 INJECTION, SOLUTION INTRAVENOUS; SUBCUTANEOUS at 08:05

## 2024-05-08 RX ADMIN — ENOXAPARIN SODIUM 40 MG: 40 INJECTION SUBCUTANEOUS at 05:05

## 2024-05-08 RX ADMIN — GUAIFENESIN AND DEXTROMETHORPHAN 5 ML: 100; 10 SYRUP ORAL at 08:05

## 2024-05-08 RX ADMIN — CEFEPIME 2 G: 2 INJECTION, POWDER, FOR SOLUTION INTRAVENOUS at 01:05

## 2024-05-08 RX ADMIN — SODIUM CHLORIDE, POTASSIUM CHLORIDE, SODIUM LACTATE AND CALCIUM CHLORIDE: 600; 310; 30; 20 INJECTION, SOLUTION INTRAVENOUS at 09:05

## 2024-05-08 RX ADMIN — CEFTRIAXONE SODIUM 2 G: 2 INJECTION, POWDER, FOR SOLUTION INTRAMUSCULAR; INTRAVENOUS at 08:05

## 2024-05-08 RX ADMIN — VANCOMYCIN HYDROCHLORIDE 2000 MG: 500 INJECTION, POWDER, LYOPHILIZED, FOR SOLUTION INTRAVENOUS at 12:05

## 2024-05-08 RX ADMIN — HYDROCODONE BITARTRATE AND ACETAMINOPHEN 1 TABLET: 10; 325 TABLET ORAL at 05:05

## 2024-05-08 RX ADMIN — SODIUM CHLORIDE, POTASSIUM CHLORIDE, SODIUM LACTATE AND CALCIUM CHLORIDE 1000 ML: 600; 310; 30; 20 INJECTION, SOLUTION INTRAVENOUS at 01:05

## 2024-05-08 RX ADMIN — GUAIFENESIN AND DEXTROMETHORPHAN 5 ML: 100; 10 SYRUP ORAL at 04:05

## 2024-05-08 RX ADMIN — GUAIFENESIN 600 MG: 600 TABLET, EXTENDED RELEASE ORAL at 08:05

## 2024-05-08 RX ADMIN — INSULIN ASPART 10 UNITS: 100 INJECTION, SOLUTION INTRAVENOUS; SUBCUTANEOUS at 05:05

## 2024-05-08 RX ADMIN — INSULIN DETEMIR 10 UNITS: 100 INJECTION, SOLUTION SUBCUTANEOUS at 06:05

## 2024-05-08 RX ADMIN — INSULIN ASPART 8 UNITS: 100 INJECTION, SOLUTION INTRAVENOUS; SUBCUTANEOUS at 06:05

## 2024-05-08 RX ADMIN — DEXAMETHASONE SODIUM PHOSPHATE 8 MG: 4 INJECTION, SOLUTION INTRA-ARTICULAR; INTRALESIONAL; INTRAMUSCULAR; INTRAVENOUS; SOFT TISSUE at 04:05

## 2024-05-08 NOTE — CARE UPDATE
I, , have assumed the care of this patient and have seen the patient at bedside, agree with Dr. Albarran's recommendations.  Continue the current antibiotics .Monitor WBC and fever curve.  Follow up on blood cultures. Mucinex b.i.d. and DuoNeb q.4 hours while awake, incentive spirometry.  PRN bronchodilators and antitussives. Monitor sodium levels.  If sodium level drops ,hold the fluids.  Ordered urine sodium, urine Osmo, serum Osmo.  Noted acidosis, follow up ABG .  Monitor renal function.  Strict Is&Os.  Requested for home medications.  Sugars being high, Added long-acting insulin as well, continue with sliding scale.  Check A1c.  Diet as tolerated.  Lovenox for DVT prophylaxis

## 2024-05-08 NOTE — ED PROVIDER NOTES
Encounter Date: 5/7/2024       History     Chief Complaint   Patient presents with    Fever    Extremity Weakness     C/o gen weakness, subjective fever, and BLE pain onset yesterday. Pt took oxycodone and gabapentin today at 1300. Also states cough & congestion. Denies V/D.     The patient is a 63 y.o. female with a history of hypertension and diabetes who presents to the Emergency Department with a chief complaint of generalized body aches. Symptoms began yesterday and have been constant since onset. Her pain is currently rated as a 8/10 in severity and described as aching with no radiation. Associated symptoms include fever, cough, congestion, and general malaise. Symptoms are aggravated with nothing and there are no alleviating factors. The patient denies chest pain or SOB. She reports taking nothing prior to arrival with no relief of symptoms. No other reported symptoms at this time.      The history is provided by the patient. No  was used.   Fever  Primary symptoms of the febrile illness include fever, fatigue, cough and arthralgias. Primary symptoms do not include shortness of breath, nausea, vomiting, dysuria or rash. The current episode started yesterday. This is a new problem.   The maximum temperature recorded prior to her arrival was 100 to 100.9 F.   The fatigue began yesterday.   The cough began yesterday. The cough is non-productive and dry.   They are dull and aching in nature. The arthralgia pain is at a severity of 7/10.     Review of patient's allergies indicates:   Allergen Reactions    Penicillins Anaphylaxis     Past Medical History:   Diagnosis Date    Diabetes mellitus     Hypertension     Neuropathy      History reviewed. No pertinent surgical history.  No family history on file.  Social History     Tobacco Use    Smoking status: Every Day     Types: Cigarettes    Smokeless tobacco: Never     Review of Systems   Constitutional:  Positive for chills, fatigue and fever.    HENT:  Positive for congestion. Negative for sore throat.    Respiratory:  Positive for cough. Negative for chest tightness and shortness of breath.    Cardiovascular:  Negative for chest pain.   Gastrointestinal:  Negative for nausea and vomiting.   Genitourinary:  Negative for dysuria, frequency and urgency.   Musculoskeletal:  Positive for arthralgias. Negative for back pain.   Skin:  Negative for rash.   Neurological:  Negative for weakness.   Hematological:  Does not bruise/bleed easily.   All other systems reviewed and are negative.      Physical Exam     Initial Vitals [05/07/24 1848]   BP Pulse Resp Temp SpO2   (!) 155/84 (!) 111 20 (!) 100.5 °F (38.1 °C) 96 %      MAP       --         Physical Exam    Nursing note and vitals reviewed.  Constitutional: She appears well-developed and well-nourished.   HENT:   Head: Normocephalic.   Right Ear: Hearing and tympanic membrane normal.   Left Ear: Hearing and tympanic membrane normal.   Mouth/Throat: Uvula is midline and mucous membranes are normal. Posterior oropharyngeal erythema present. No tonsillar abscesses.   Eyes: Conjunctivae and EOM are normal. Pupils are equal, round, and reactive to light.   Cardiovascular:  Normal rate, regular rhythm, normal heart sounds and normal pulses.           Pulmonary/Chest: Effort normal. She has rhonchi in the right lower field and the left lower field.   Abdominal: Abdomen is soft. Bowel sounds are normal. There is no abdominal tenderness.     Lymphadenopathy:     She has no cervical adenopathy.   Neurological: She is alert. GCS eye subscore is 4. GCS verbal subscore is 5. GCS motor subscore is 6.   Skin: Skin is warm, dry and intact. Capillary refill takes less than 2 seconds.         ED Course   Procedures  Labs Reviewed   STREP GROUP A BY PCR - Abnormal; Notable for the following components:       Result Value    STREP A PCR ([facility]) Detected (*)     All other components within normal limits    Narrative:     The Xpert  Xpress Strep A test is a rapid, qualitative in vitro diagnostic test for the detection of Streptococcus pyogenes (Group A ß-hemolytic Streptococcus, Strep A) in throat swab specimens from patients with signs and symptoms of pharyngitis.     COMPREHENSIVE METABOLIC PANEL - Abnormal; Notable for the following components:    Sodium Level 131 (*)     Carbon Dioxide 19 (*)     Glucose Level 185 (*)     Creatinine 1.38 (*)     Protein Total 8.8 (*)     Albumin Level 3.3 (*)     Globulin 5.5 (*)     Albumin/Globulin Ratio 0.6 (*)     Alkaline Phosphatase 158 (*)     All other components within normal limits   URINALYSIS, REFLEX TO URINE CULTURE - Abnormal; Notable for the following components:    Appearance, UA Turbid (*)     Protein, UA 2+ (*)     Glucose, UA 1+ (*)     Ketones, UA 1+ (*)     Urobilinogen, UA 2.0 (*)     Leukocyte Esterase,  (*)     WBC, UA 21-50 (*)     Mucous, UA Trace (*)     Hyaline Casts, UA 6-10 (*)     Amorphous Crystal, UA Trace (*)     All other components within normal limits   CBC WITH DIFFERENTIAL - Abnormal; Notable for the following components:    WBC 23.76 (*)     RBC 3.99 (*)     Hct 36.7 (*)     MCHC 32.7 (*)     All other components within normal limits   MANUAL DIFFERENTIAL - Abnormal; Notable for the following components:    Neutrophils Abs 19.9584 (*)     Monocytes Abs 2.376 (*)     All other components within normal limits   COVID/FLU A&B PCR - Normal    Narrative:     The Xpert Xpress SARS-CoV-2/FLU/RSV plus is a rapid, multiplexed real-time PCR test intended for the simultaneous qualitative detection and differentiation of SARS-CoV-2, Influenza A, Influenza B, and respiratory syncytial virus (RSV) viral RNA in either nasopharyngeal swab or nasal swab specimens.         LACTIC ACID, PLASMA - Normal   BLOOD CULTURE [facility]   BLOOD CULTURE [facility]   CULTURE, URINE   CBC W/ AUTO DIFFERENTIAL    Narrative:     The following orders were created for panel order CBC Auto  Differential.  Procedure                               Abnormality         Status                     ---------                               -----------         ------                     CBC with Differential[[id number]]       Abnormal            Final result               Manual Differential[[id number]]         Abnormal            Final result                 Please view results for these tests on the individual orders.          Imaging Results              CT Soft Tissue Neck With Contrast (Final result)  Result time 05/07/24 22:29:46      Final result by Yunier Kelley MD (05/07/24 22:29:46)                   Impression:      1. Bilateral substantial tonsillar/adenoidal thickening and inflammation causing narrowing of the oropharynx    2. Enlarged thyroid gland containing hypodense nodules and calcifications      Electronically signed by: Yunier Kelley  Date:    05/07/2024  Time:    22:29               Narrative:    EXAMINATION:  CT SOFT TISSUE NECK WITH CONTRAST    CLINICAL HISTORY:  Tonsil/adenoid disorder;    TECHNIQUE:  Multidetector axial images were performed of the neck following intravenous contrast administration and the images were reformatted.    Automated radiation control was utilized to minimize radiation dose.    COMPARISON:  None available    FINDINGS:  There is bilateral tonsillar/adenoid substantial thickening inflammation protruding and results in narrowing of the oropharynx min associated abscess.  The epiglottis is not thickened and there are no findings of the larynx and the trachea.There is no asymmetric fullness or inflammatory changes of the aryepiglottic folds.  The salivary and the parotid glands are unremarkable.  There are bilateral neck up to 1.2 cm enlarged lymph nodes which are favored to be reactive. Bilateral mucoperiosteal thickening of the maxillary sinuses which is more pronounced on the right    Bilateral thyroid glands are enlarged in size and contain multiple hypodense  nodules.  There also few associated calcifications.  Please further assess with ultrasound exam                                       CT Chest With Contrast (Final result)  Result time 05/07/24 22:24:41   Procedure changed from CT Chest Without Contrast     Final result by Yunier Kelley MD (05/07/24 22:24:41)                   Impression:      1.  Right lower lung lobe superior segment large consolidation.  Follow-up exams are recommended to ensure this improves and resolves.    2. Mediastinal lymphadenopathy.      Electronically signed by: Yunier Kelley  Date:    05/07/2024  Time:    22:24               Narrative:    EXAMINATION:  CT CHEST WITH CONTRAST    CLINICAL HISTORY:  Respiratory illness, nondiagnostic xray;    TECHNIQUE:  Multidetector axial images were performed from thoracic inlet to below hemidiaphragms following intravenous contrast administration. Sagittal and coronal reformations performed.    Dose length product of 866 mGycm. Automated exposure control was utilized to minimize radiation dose.    COMPARISON:  Chest radiograph same date.    FINDINGS:  Large dense consolidation involves the superior segment of the right lower lung lobe and disc space seen on image 250 series 10.  Follow-up exams are highly recommended to ensure this improves and resolves and there is no underlying mass lesion.  Bilateral lung lobes scattered small ground-glass opacities which could represent associated pneumonitis.  There is no pulmonary edema.  No significant fluid within the pleural or the pericardial spaces.    There is anterior mediastinal density which could represent an enlarged lymph node though has atypical appearance and measures 2.6 x 1.6 cm.  There is precarinal enlarged lymph node measuring 2.2 cm.  Thoracic aorta is without aneurysmal dilatation or dissection.  Cardiac chamber size is within normal limits.  No pericardial effusion.    Left thyroid gland small hypodense nodules and calcifications.  Please  further assess with ultrasound exam.    Thoracic kyphosis and degenerative changes.                                       X-Ray Chest PA And Lateral (Final result)  Result time 05/07/24 19:22:25      Final result by Gonzalez Chawla MD (05/07/24 19:22:25)                   Impression:      Right basilar opacity could relate to diaphragmatic defect or lung consolidation.  Chest CT could differentiate.      Electronically signed by: Goznalez Chawla  Date:    05/07/2024  Time:    19:22               Narrative:    EXAMINATION:  XR CHEST PA AND LATERAL    CLINICAL HISTORY:  Fever;    COMPARISON:  No priors    FINDINGS:  PA and lateral views of the chest were obtained. The heart is not significantly enlarged.  There is a 6 cm rounded opacity overlying the medial right lung base on the frontal view, not clearly seen on the lateral.  This may relate to a diaphragmatic defect.  Lungs are otherwise clear.  No pneumothorax.                                       Medications   ceFEPIme (MAXIPIME) 2 g in dextrose 5 % in water (D5W) 100 mL IVPB (MB+) (has no administration in time range)   vancomycin 2 g in dextrose 5 % 500 mL IVPB (has no administration in time range)   ketorolac injection 15 mg (has no administration in time range)   lactated ringers bolus 1,000 mL (has no administration in time range)   acetaminophen tablet 650 mg (650 mg Oral Given 5/7/24 1850)   lactated ringers bolus 1,000 mL (1,000 mLs Intravenous New Bag 5/7/24 2030)   iohexoL (OMNIPAQUE 350) injection 100 mL (100 mLs Intravenous Given 5/7/24 2203)   morphine injection 4 mg (4 mg Intravenous Given 5/7/24 2330)   ondansetron injection 4 mg (4 mg Intravenous Given 5/7/24 2330)     Medical Decision Making  The patient is a 63 y.o. female with a history of hypertension and diabetes who presents to the Emergency Department with a chief complaint of generalized body aches. Symptoms began yesterday and have been constant since onset. Her pain is currently rated as a  8/10 in severity and described as aching with no radiation. Associated symptoms include fever, cough, congestion, and general malaise. Symptoms are aggravated with nothing and there are no alleviating factors. The patient denies chest pain or SOB. She reports taking nothing prior to arrival with no relief of symptoms. No other reported symptoms at this time.      Differential diagnosis include but are not limited to COVID, influenza, strep pharyngitis, URI, pneumonia, UTI, metabolic derangement, dehydration     Problems Addressed:  Pneumonia of right lower lobe due to infectious organism: acute illness or injury  Strep pharyngitis: acute illness or injury  Urinary tract infection without hematuria, site unspecified: acute illness or injury    Amount and/or Complexity of Data Reviewed  Labs:  Decision-making details documented in ED Course.  Radiology: ordered. Decision-making details documented in ED Course.  Discussion of management or test interpretation with external provider(s): Discussed with ED attending, , he had face to face encounter with patient.     Discussed with SOTO Gore with hospital medicine who accepts admission for Dr. Albarran.     Risk  Prescription drug management.  Decision regarding hospitalization.               ED Course as of 05/08/24 0051   Tue May 07, 2024   2236 STREP A PCR ([facility])(!): Detected [LM]   2236 WBC(!): 23.76 [LM]   2236 Leukocyte Esterase, UA(!): 500 [LM]   2236 WBC, UA(!): 21-50 [LM]   2236 CT Soft Tissue Neck With Contrast  Impression:     1. Bilateral substantial tonsillar/adenoidal thickening and inflammation causing narrowing of the oropharynx   [LM]   2236 CT Chest With Contrast  Impression:     1.  Right lower lung lobe superior segment large consolidation.  Follow-up exams are recommended to ensure this improves and resolves.     2. Mediastinal lymphadenopathy.   [LM]      ED Course User Index  [LM] Annamarie Nielsen NP                           Clinical  Impression:  Final diagnoses:  [J02.0] Strep pharyngitis (Primary)  [N39.0] Urinary tract infection without hematuria, site unspecified  [J18.9] Pneumonia of right lower lobe due to infectious organism          ED Disposition Condition    Admit Stable                Annamarie Nielsen NP  05/08/24 0051

## 2024-05-08 NOTE — H&P
[facility] Hospital Medicine - H&P Note    Patient Name: Navin Beck  MRN: [id number]  PCP: No, Primary Doctor  Admitting Physician: Britta Albarran MD  Admission Class: IP- Inpatient   Date of Service: 05/08/2024  Code status: Full    Chief Complaint   Fever, sore throat, productive cough, shortness breast for 4-5 days    History of Present Illness   This is a 63-year-old female with medical history of T2DM, HTN, diabetic neuropathy, DDD/radiculopathy, Graves disease, asthma/COPD present to the ED with complaint of about 5 days' history of sore throat, painful swallowing, productive cough, myalgia and fever.  Report pleuritic type chest pain upon coughing.  Denies nausea, or vomiting or diarrhea.  Report live round many grand kids but none of them recently sick.    On arrival to ED she was febrile 100.5 F, tachycardic, hypertensive and saturating above 95% on room air.  EKG sinus tachycardia.  Labs notable for WBC 23.76, hemoglobin 12.0, platelets 344, creatinine 1.38, CO2 19, lactic acid 1.1, throat Strep A PCR positive.  Urinalysis showed no evidence of UTI, consistent with dehydration with hyaline cast.    CT chestShowed right lower lobe superior segment large consolidation consistent with pneumonia, mediastinal reactive lymphadenopathy.      CT soft tissue neck show bilateral substantial tonsillar/adenoidal thickening and inflammation causing narrowing of the oropharynx, enlarged thyroid gland containing hypodense nodules and calcifications.    She was resuscitated with IV fluid, given cefepime and vancomycin and subsequently referred to hospital medicine service for further evaluation and management.    ROS   Except as documented, all other systems reviewed and negative     Past Medical History   T2DM  HTN  HLD   Diabetic neuropathy  Asthma/COPD not on home oxygen   Chronic pain  History of Graves disease  Kidney stones  Glaucoma  Insomnia  Recurrent UTI/stress incontinence    Past  Surgical History   Appendectomy  Cholecystectomy  Hysterectomy  Robotic assisted laparoscopic lysis of adhesions  Lumbar diskectomy  Carpal tunnel release  Left wrist ORIF  Cystoscopy    Social History     Social History     Tobacco Use    Smoking status: Every Day     Types: Cigarettes    Smokeless tobacco: Never   Substance Use Topics    Alcohol use: Not on file        Family History   Reviewed and negative    Allergies   Penicillins    Home Medications     Prior to Admission medications    Not on File     Home medication pending verification, patient does not have a list or recall the medication names, this from the other chart that pending merge recently refilled.  Duloxetine 60 mg daily  Gabapentin 600 mg TID  Norco 10 -325 q.12 hours PRN  Protonix 40 mg daily  Methimazole 10 mg  Empagliflozin  Atorvastatin 20 mg  Fluticasone inhaler    Physical Exam   Vital Signs  Temp:  [99.2 °F (37.3 °C)-100.5 °F (38.1 °C)]   Pulse:  []   Resp:  [16-20]   BP: (131-158)/(44-84)   SpO2:  [95 %-100 %]    General: Appears restless  HEENT: NC/AT, erythematous oropharynx with exudate  Neck:  No JVD  Chest:  Bilateral rhonchi worse on the right  CVS: Regular rhythm. Normal S1/S2.  No pedal edema  Abdomen: nondistended, normoactive BS, soft and non-tender.  MSK: No obvious deformity or joint swelling  Skin: Warm and dry  Neuro: AAOx3, no focal neurological deficit  Psych: Cooperative    Labs     Recent Labs     05/07/24  1901   WBC 23.76  23.76*   RBC 3.99*   HGB 12.0   HCT 36.7*   MCV 92.0   MCH 30.1   MCHC 32.7*   RDW 12.4      ABSNEUT 19.9584*        Recent Labs     05/07/24  1901   *   K 4.5   CHLORIDE 99   CO2 19*   BUN 15.2   CREATININE 1.38*   EGFRNORACEVR 43   GLUCOSE 185*   CALCIUM 9.4   ALBUMIN 3.3*   GLOBULIN 5.5*   ALKPHOS 158*   ALT 12   AST 10   BILITOT 0.7     Recent Labs     05/07/24  1953   LACTIC 1.1        Microbiology Results (last 7 days)       Procedure Component Value Units Date/Time     Urine culture [[id number]] Collected: 05/07/24 2119    Order Status: Sent Specimen: Urine Updated: 05/07/24 2202    Blood Culture [[id number]] Collected: 05/07/24 1953    Order Status: Resulted Specimen: Blood Updated: 05/07/24 2028    Blood Culture [[id number]] Collected: 05/07/24 1953    Order Status: Resulted Specimen: Blood Updated: 05/07/24 2019           Imaging     CT Soft Tissue Neck With Contrast   Final Result      1. Bilateral substantial tonsillar/adenoidal thickening and inflammation causing narrowing of the oropharynx      2. Enlarged thyroid gland containing hypodense nodules and calcifications         Electronically signed by: Yunier Kelley   Date:    05/07/2024   Time:    22:29      CT Chest With Contrast   Final Result      1.  Right lower lung lobe superior segment large consolidation.  Follow-up exams are recommended to ensure this improves and resolves.      2. Mediastinal lymphadenopathy.         Electronically signed by: Yunier Kelley   Date:    05/07/2024   Time:    22:24      X-Ray Chest PA And Lateral   Final Result      Right basilar opacity could relate to diaphragmatic defect or lung consolidation.  Chest CT could differentiate.         Electronically signed by: Gonzalez Chawla   Date:    05/07/2024   Time:    19:22        Assessment   Severe Sepsis secondary to Group A Streptococcus pneumonia and pharyngitis/tonsillitis and concern for bacteremia  Community-acquired pneumonia -RLL  Group A Streptococcus tonsillitis/pharyngitis  Acute kidney injury  Mild hyponatremia    History of T2DM, HTN, HLD, diabetic neuropathy, DDD/radiculopathy, Graves disease/hyperthyroid, insomnia, Asthma    Plan   Blood cultures x2  Discontinue cefepime and vancomycin  Start IV ceftriaxone 2 g IV Q 24 hours  Given significant tonsillar and adenoidal swelling with narrowing of oropharynx will give dexamethasone 8 mg IV x1  Mucinex b.i.d. and DuoNeb q.4 hours while awake, incentive spirometry  PRN bronchodilators and  antitussives  Check transthoracic echo  LR at 75 ml/hr  Home medication pending verification, RN notified and working to contact family.  VTE Prophylaxis:  Enoxaparin 40 mg subQ daily     Critical care time:  35 minutes  Critical care diagnosis:  Severe sepsis    Britta Albarran MD  Internal Medicine

## 2024-05-08 NOTE — PLAN OF CARE
1325  Went to pt's room- asleep deep reg resp noted- did not awaken.  Will return for assess.    1730  Returned to pt's room to attempt assessment.  Pt with HOB elevated,  O2 applied per n/c and pt with SOB noted when trying to speak.  Pt states feeling poorly - c/o all her bones and her spine is hurting. She requested to have assessment done tomorrow.  I told pt I would tell her nurse - iSmran RN updated re: pt's c/o.

## 2024-05-09 LAB
ALBUMIN SERPL-MCNC: 2.5 G/DL (ref 3.4–4.8)
ALBUMIN/GLOB SERPL: 0.6 RATIO (ref 1.1–2)
ALP SERPL-CCNC: 229 UNIT/L (ref 40–150)
ALT SERPL-CCNC: 19 UNIT/L (ref 0–55)
AST SERPL-CCNC: 13 UNIT/L (ref 5–34)
BACTERIA UR CULT: NORMAL
BASOPHILS # BLD AUTO: 0.06 X10(3)/MCL
BASOPHILS NFR BLD AUTO: 0.3 %
BILIRUB SERPL-MCNC: 0.3 MG/DL
BUN SERPL-MCNC: 31.8 MG/DL (ref 9.8–20.1)
CALCIUM SERPL-MCNC: 8.4 MG/DL (ref 8.4–10.2)
CHLORIDE SERPL-SCNC: 102 MMOL/L (ref 98–107)
CO2 SERPL-SCNC: 20 MMOL/L (ref 23–31)
CREAT SERPL-MCNC: 1.29 MG/DL (ref 0.55–1.02)
EOSINOPHIL # BLD AUTO: 0.1 X10(3)/MCL (ref 0–0.9)
EOSINOPHIL NFR BLD AUTO: 0.4 %
ERYTHROCYTE [DISTWIDTH] IN BLOOD BY AUTOMATED COUNT: 12.5 % (ref 11.5–17)
GFR SERPLBLD CREATININE-BSD FMLA CKD-EPI: 46 ML/MIN/1.73/M2
GLOBULIN SER-MCNC: 3.9 GM/DL (ref 2.4–3.5)
GLUCOSE SERPL-MCNC: 290 MG/DL (ref 82–115)
HCT VFR BLD AUTO: 31.1 % (ref 37–47)
HGB BLD-MCNC: 10.1 G/DL (ref 12–16)
IMM GRANULOCYTES # BLD AUTO: 0.28 X10(3)/MCL (ref 0–0.04)
IMM GRANULOCYTES NFR BLD AUTO: 1.2 %
LYMPHOCYTES # BLD AUTO: 1.89 X10(3)/MCL (ref 0.6–4.6)
LYMPHOCYTES NFR BLD AUTO: 8.4 %
MAGNESIUM SERPL-MCNC: 2.4 MG/DL (ref 1.6–2.6)
MCH RBC QN AUTO: 29.4 PG (ref 27–31)
MCHC RBC AUTO-ENTMCNC: 32.5 G/DL (ref 33–36)
MCV RBC AUTO: 90.7 FL (ref 80–94)
MONOCYTES # BLD AUTO: 1.2 X10(3)/MCL (ref 0.1–1.3)
MONOCYTES NFR BLD AUTO: 5.3 %
NEUTROPHILS # BLD AUTO: 18.97 X10(3)/MCL (ref 2.1–9.2)
NEUTROPHILS NFR BLD AUTO: 84.4 %
NRBC BLD AUTO-RTO: 0 %
PHOSPHATE SERPL-MCNC: 5.2 MG/DL (ref 2.3–4.7)
PLATELET # BLD AUTO: 341 X10(3)/MCL (ref 130–400)
PMV BLD AUTO: 9.8 FL (ref 7.4–10.4)
POCT GLUCOSE: 333 MG/DL (ref 70–110)
POCT GLUCOSE: 347 MG/DL (ref 70–110)
POCT GLUCOSE: 399 MG/DL (ref 70–110)
POCT GLUCOSE: 421 MG/DL (ref 70–110)
POCT GLUCOSE: 441 MG/DL (ref 70–110)
POCT GLUCOSE: 491 MG/DL (ref 70–110)
POTASSIUM SERPL-SCNC: 5.1 MMOL/L (ref 3.5–5.1)
PROT SERPL-MCNC: 6.4 GM/DL (ref 5.8–7.6)
RBC # BLD AUTO: 3.43 X10(6)/MCL (ref 4.2–5.4)
SODIUM SERPL-SCNC: 129 MMOL/L (ref 136–145)
WBC # SPEC AUTO: 22.5 X10(3)/MCL (ref 4.5–11.5)

## 2024-05-09 PROCEDURE — 25000003 PHARM REV CODE 250: Performed by: INTERNAL MEDICINE

## 2024-05-09 PROCEDURE — 94640 AIRWAY INHALATION TREATMENT: CPT

## 2024-05-09 PROCEDURE — 27000221 HC OXYGEN, UP TO 24 HOURS

## 2024-05-09 PROCEDURE — 11000001 HC ACUTE MED/SURG PRIVATE ROOM

## 2024-05-09 PROCEDURE — 85025 COMPLETE CBC W/AUTO DIFF WBC: CPT | Performed by: INTERNAL MEDICINE

## 2024-05-09 PROCEDURE — 36415 COLL VENOUS BLD VENIPUNCTURE: CPT | Performed by: INTERNAL MEDICINE

## 2024-05-09 PROCEDURE — 63600175 PHARM REV CODE 636 W HCPCS: Performed by: INTERNAL MEDICINE

## 2024-05-09 PROCEDURE — 84100 ASSAY OF PHOSPHORUS: CPT | Performed by: INTERNAL MEDICINE

## 2024-05-09 PROCEDURE — 94760 N-INVAS EAR/PLS OXIMETRY 1: CPT

## 2024-05-09 PROCEDURE — 99900031 HC PATIENT EDUCATION (STAT)

## 2024-05-09 PROCEDURE — 25000242 PHARM REV CODE 250 ALT 637 W/ HCPCS: Performed by: INTERNAL MEDICINE

## 2024-05-09 PROCEDURE — 80053 COMPREHEN METABOLIC PANEL: CPT | Performed by: INTERNAL MEDICINE

## 2024-05-09 PROCEDURE — 99900035 HC TECH TIME PER 15 MIN (STAT)

## 2024-05-09 PROCEDURE — 83735 ASSAY OF MAGNESIUM: CPT | Performed by: INTERNAL MEDICINE

## 2024-05-09 RX ADMIN — ENOXAPARIN SODIUM 40 MG: 40 INJECTION SUBCUTANEOUS at 04:05

## 2024-05-09 RX ADMIN — INSULIN ASPART 10 UNITS: 100 INJECTION, SOLUTION INTRAVENOUS; SUBCUTANEOUS at 08:05

## 2024-05-09 RX ADMIN — INSULIN ASPART 8 UNITS: 100 INJECTION, SOLUTION INTRAVENOUS; SUBCUTANEOUS at 04:05

## 2024-05-09 RX ADMIN — HYDROCODONE BITARTRATE AND ACETAMINOPHEN 1 TABLET: 10; 325 TABLET ORAL at 05:05

## 2024-05-09 RX ADMIN — SODIUM BICARBONATE 650 MG TABLET 650 MG: at 08:05

## 2024-05-09 RX ADMIN — IPRATROPIUM BROMIDE AND ALBUTEROL SULFATE 3 ML: .5; 3 SOLUTION RESPIRATORY (INHALATION) at 03:05

## 2024-05-09 RX ADMIN — INSULIN DETEMIR 10 UNITS: 100 INJECTION, SOLUTION SUBCUTANEOUS at 08:05

## 2024-05-09 RX ADMIN — ALUMINUM HYDROXIDE, MAGNESIUM HYDROXIDE, AND SIMETHICONE 30 ML: 200; 200; 20 SUSPENSION ORAL at 08:05

## 2024-05-09 RX ADMIN — HYDROCODONE BITARTRATE AND ACETAMINOPHEN 1 TABLET: 10; 325 TABLET ORAL at 11:05

## 2024-05-09 RX ADMIN — GUAIFENESIN 600 MG: 600 TABLET, EXTENDED RELEASE ORAL at 08:05

## 2024-05-09 RX ADMIN — GUAIFENESIN 600 MG: 600 TABLET, EXTENDED RELEASE ORAL at 09:05

## 2024-05-09 RX ADMIN — PANTOPRAZOLE SODIUM 40 MG: 40 TABLET, DELAYED RELEASE ORAL at 04:05

## 2024-05-09 RX ADMIN — SODIUM BICARBONATE 650 MG TABLET 650 MG: at 09:05

## 2024-05-09 RX ADMIN — HYDROCODONE BITARTRATE AND ACETAMINOPHEN 1 TABLET: 10; 325 TABLET ORAL at 09:05

## 2024-05-09 RX ADMIN — IPRATROPIUM BROMIDE AND ALBUTEROL SULFATE 3 ML: .5; 3 SOLUTION RESPIRATORY (INHALATION) at 08:05

## 2024-05-09 RX ADMIN — INSULIN ASPART 10 UNITS: 100 INJECTION, SOLUTION INTRAVENOUS; SUBCUTANEOUS at 05:05

## 2024-05-09 RX ADMIN — INSULIN ASPART 8 UNITS: 100 INJECTION, SOLUTION INTRAVENOUS; SUBCUTANEOUS at 10:05

## 2024-05-09 RX ADMIN — IPRATROPIUM BROMIDE AND ALBUTEROL SULFATE 3 ML: .5; 3 SOLUTION RESPIRATORY (INHALATION) at 07:05

## 2024-05-09 RX ADMIN — ALUMINUM HYDROXIDE, MAGNESIUM HYDROXIDE, AND SIMETHICONE 30 ML: 200; 200; 20 SUSPENSION ORAL at 04:05

## 2024-05-09 RX ADMIN — CEFTRIAXONE SODIUM 2 G: 2 INJECTION, POWDER, FOR SOLUTION INTRAMUSCULAR; INTRAVENOUS at 09:05

## 2024-05-09 NOTE — PROGRESS NOTES
[facility]  Hospital Medicine Progress Note        Chief Complaint: Inpatient Follow-up     HPI:    63-year-old female with medical history of T2DM, HTN, diabetic neuropathy, DDD/radiculopathy, Graves disease, asthma/COPD present to the ED with complaint of about 5 days' history of sore throat, painful swallowing, productive cough, myalgia and fever.  Report pleuritic type chest pain upon coughing.  Denies nausea, or vomiting or diarrhea.  Report live round many grand kids but none of them recently sick.     On arrival to ED she was febrile 100.5 F, tachycardic, hypertensive and saturating above 95% on room air.  EKG sinus tachycardia.  Labs notable for WBC 23.76, hemoglobin 12.0, platelets 344, creatinine 1.38, CO2 19, lactic acid 1.1, throat Strep A PCR positive.  Urinalysis showed no evidence of UTI, consistent with dehydration with hyaline cast.     CT chestShowed right lower lobe superior segment large consolidation consistent with pneumonia, mediastinal reactive lymphadenopathy.       CT soft tissue neck show bilateral substantial tonsillar/adenoidal thickening and inflammation causing narrowing of the oropharynx, enlarged thyroid gland containing hypodense nodules and calcifications.     She was resuscitated with IV fluid, given cefepime and vancomycin and subsequently referred to hospital medicine service for further evaluation and management.       Interval Hx:  No significant changes overnight.  She feels like her throat is feeling a little bit better.  Afebrile.  Said that she was able to eat a little bit but still with some soreness.  I encouraged her try to increase her oral intake today.  She was received a dose of IV steroids yesterday.  Will continue with the empiric antibiotics for now.  Discussed that her cultures are negative so far    Objective/physical exam:  General: Appears restless  HEENT: NC/AT, erythematous oropharynx with exudate  Neck:  No JVD  Chest:   Bilateral rhonchi worse on the right  CVS: Regular rhythm. Normal S1/S2.  No pedal edema  Abdomen: nondistended, normoactive BS, soft and non-tender.  MSK: No obvious deformity or joint swelling  Skin: Warm and dry  Neuro: AAOx3, no focal neurological deficit  Psych: Cooperative    VITAL SIGNS: 24 HRS MIN & MAX LAST   Temp  Min: 97.6 °F (36.4 °C)  Max: 98.2 °F (36.8 °C) 97.6 °F (36.4 °C)   BP  Min: 106/58  Max: 133/64 (!) 106/58   Pulse  Min: 57  Max: 80  61   Resp  Min: 16  Max: 17 16   SpO2  Min: 95 %  Max: 99 % 98 %       Recent Labs   Lab 05/08/24  0330 05/08/24 1819 05/09/24  0404   WBC 23.28* 20.63* 22.50*   RBC 3.38* 3.58* 3.43*   HGB 10.0* 10.7* 10.1*   HCT 31.4* 32.5* 31.1*   MCV 92.9 90.8 90.7   MCH 29.6 29.9 29.4   MCHC 31.8* 32.9* 32.5*   RDW 12.7 12.3 12.5    339 341   MPV 9.5 9.8 9.8       Recent Labs   Lab 05/07/24  1901 05/08/24 0330 05/08/24  0936 05/08/24 1957 05/09/24  0404   * 127* 129* 128* 129*   K 4.5 4.5  --   --  5.1   CO2 19* 18*  --   --  20*   BUN 15.2 16.0  --   --  31.8*   CREATININE 1.38* 1.31*  --   --  1.29*   CALCIUM 9.4 8.2*  --   --  8.4   MG  --  2.00  --   --  2.40   ALBUMIN 3.3* 2.7*  --   --  2.5*   ALKPHOS 158* 260*  --   --  229*   ALT 12 29  --   --  19   AST 10 83*  --   --  13   BILITOT 0.7 1.5  --   --  0.3          Microbiology Results (last 7 days)       Procedure Component Value Units Date/Time    Blood Culture [[id number]]  (Normal) Collected: 05/07/24 1953    Order Status: Completed Specimen: Blood Updated: 05/08/24 2100     Blood Culture No Growth At 24 Hours    Blood Culture [[id number]]  (Normal) Collected: 05/07/24 1953    Order Status: Completed Specimen: Blood Updated: 05/08/24 2100     Blood Culture No Growth At 24 Hours    Respiratory Culture [[id number]]     Order Status: Sent Specimen: Sputum, Expectorated     Urine culture [[id number]] Collected: 05/07/24 2119    Order Status: Completed Specimen: Urine Updated: 05/08/24 0649     Urine  Culture No Growth At 24 Hours             Radiology:  Echo    Left Ventricle: The left ventricle is normal in size. Mildly increased   wall thickness. There is low normal systolic function with a visually   estimated ejection fraction of 50 - 55%. There is normal diastolic   function.    Right Ventricle: Normal right ventricular cavity size. Systolic   function is normal.    Aortic Valve: Mildly calcified cusps.    Mitral Valve: The mean pressure gradient across the mitral valve is 4   mmHg at a heart rate of  bpm. There is trace regurgitation.          Assessment/Plan:   Severe Sepsis secondary to Group A Streptococcus pneumonia and pharyngitis/tonsillitis and concern for bacteremia  Community-acquired pneumonia -RLL  Group A Streptococcus tonsillitis/pharyngitis  Acute kidney injury  Mild hyponatremia     History of T2DM, HTN, HLD, diabetic neuropathy, DDD/radiculopathy, Graves disease/hyperthyroid, insomnia, Asthma    Cultures negative so far.    Continue with IV Rocephin for now but can likely transitioned to oral tomorrow.  She was received a dose of IV steroids.  She did have a bump in her leukocytosis but suspect that this is steroid induced.  Symptomatically she was does seem to be improving.    Encouraged her to increase her oral intake.  Continue with IV fluids for now.  Renal function is improving.  Hyponatremia is about the same.  Will repeat labs in the morning.    Critical care diagnosis: sepsis, iv antibiotics  Critical care interventions: hands on evaluation, review of labs/radiographs/records and discussions with family  Critical care time spent: >32 minutes      Marcos Avendaño MD   05/09/2024     All diagnosis and differential diagnosis have been reviewed; assessment and plan has been documented; I have personally reviewed the labs and test results that are presently available; I have reviewed the patients medication list; I have reviewed the consulting providers response and recommendations. I have  reviewed or attempted to review medical records based upon their availability    All of the patient's questions have been  addressed and answered. Patient's is agreeable to the above stated plan. I will continue to monitor closely and make adjustments to medical management as needed.  _____________________________________________________________________

## 2024-05-09 NOTE — PLAN OF CARE
Patient recently moved here from [facility]. Does not have a PCP. Wants home health at discharge which may be have to wait until patient is established with a PCP

## 2024-05-09 NOTE — PLAN OF CARE
05/09/24 1520   Discharge Assessment   Assessment Type Discharge Planning Assessment   Confirmed/corrected address, phone number and insurance Yes   Confirmed Demographics Correct on Facesheet   Source of Information patient   Communicated RAFA with patient/caregiver Date not available/Unable to determine   Reason For Admission strep   People in Home child(jovanny), adult;grandchild(jovanny)   Do you expect to return to your current living situation? Yes   Do you have help at home or someone to help you manage your care at home? Yes   Who are your caregiver(s) and their phone number(s)? son   Prior to hospitilization cognitive status: Alert/Oriented   Current cognitive status: Alert/Oriented   Walking or Climbing Stairs Difficulty no   Dressing/Bathing Difficulty no   Do you have any problems with: Errands/Grocery   Home Accessibility wheelchair accessible   Home Layout Able to live on 1st floor   Equipment Currently Used at Home nebulizer   Readmission within 30 days? No   Patient currently being followed by outpatient case management? No   Do you currently have service(s) that help you manage your care at home? No   Do you take prescription medications? Yes   Do you have prescription coverage? Yes   Coverage medicare   Do you have any problems affording any of your prescribed medications? No   Is the patient taking medications as prescribed? yes   Who is going to help you get home at discharge? family   How do you get to doctors appointments? family or friend will provide;other (see comments)  (uber)   Are you on dialysis? No   Do you take coumadin? No   Discharge Plan A Home   Discharge Plan B Home   DME Needed Upon Discharge  none   Discharge Plan discussed with: Patient   Transition of Care Barriers Other (see comments)  (no established PCP)

## 2024-05-10 LAB
ABS NEUT (OLG): 9.93 X10(3)/MCL (ref 2.1–9.2)
ANION GAP SERPL CALC-SCNC: 6 MEQ/L
BASOPHILS NFR BLD MANUAL: 0.13 X10(3)/MCL (ref 0–0.2)
BASOPHILS NFR BLD MANUAL: 1 %
BUN SERPL-MCNC: 30.8 MG/DL (ref 9.8–20.1)
CALCIUM SERPL-MCNC: 8.2 MG/DL (ref 8.4–10.2)
CHLORIDE SERPL-SCNC: 102 MMOL/L (ref 98–107)
CO2 SERPL-SCNC: 24 MMOL/L (ref 23–31)
CREAT SERPL-MCNC: 1.28 MG/DL (ref 0.55–1.02)
CREAT/UREA NIT SERPL: 24
ERYTHROCYTE [DISTWIDTH] IN BLOOD BY AUTOMATED COUNT: 12.7 % (ref 11.5–17)
GFR SERPLBLD CREATININE-BSD FMLA CKD-EPI: 47 ML/MIN/1.73/M2
GLUCOSE SERPL-MCNC: 306 MG/DL (ref 82–115)
HCT VFR BLD AUTO: 30.9 % (ref 37–47)
HGB BLD-MCNC: 10 G/DL (ref 12–16)
INSTRUMENT WBC (OLG): 13.07 X10(3)/MCL
LYMPHOCYTES NFR BLD MANUAL: 19 %
LYMPHOCYTES NFR BLD MANUAL: 2.48 X10(3)/MCL
MCH RBC QN AUTO: 29.6 PG (ref 27–31)
MCHC RBC AUTO-ENTMCNC: 32.4 G/DL (ref 33–36)
MCV RBC AUTO: 91.4 FL (ref 80–94)
MONOCYTES NFR BLD MANUAL: 0.52 X10(3)/MCL (ref 0.1–1.3)
MONOCYTES NFR BLD MANUAL: 4 %
NEUTROPHILS NFR BLD MANUAL: 76 %
NRBC BLD AUTO-RTO: 0 %
PLATELET # BLD AUTO: 363 X10(3)/MCL (ref 130–400)
PLATELET # BLD EST: NORMAL 10*3/UL
PMV BLD AUTO: 9.8 FL (ref 7.4–10.4)
POCT GLUCOSE: 288 MG/DL (ref 70–110)
POCT GLUCOSE: 299 MG/DL (ref 70–110)
POCT GLUCOSE: 305 MG/DL (ref 70–110)
POCT GLUCOSE: 346 MG/DL (ref 70–110)
POCT GLUCOSE: >500 MG/DL (ref 70–110)
POTASSIUM SERPL-SCNC: 4.8 MMOL/L (ref 3.5–5.1)
RBC # BLD AUTO: 3.38 X10(6)/MCL (ref 4.2–5.4)
RBC MORPH BLD: NORMAL
SODIUM SERPL-SCNC: 132 MMOL/L (ref 136–145)
WBC # SPEC AUTO: 13.07 X10(3)/MCL (ref 4.5–11.5)

## 2024-05-10 PROCEDURE — 25000003 PHARM REV CODE 250: Performed by: INTERNAL MEDICINE

## 2024-05-10 PROCEDURE — 25000003 PHARM REV CODE 250: Performed by: HOSPITALIST

## 2024-05-10 PROCEDURE — 80048 BASIC METABOLIC PNL TOTAL CA: CPT | Performed by: HOSPITALIST

## 2024-05-10 PROCEDURE — 27000221 HC OXYGEN, UP TO 24 HOURS

## 2024-05-10 PROCEDURE — 11000001 HC ACUTE MED/SURG PRIVATE ROOM

## 2024-05-10 PROCEDURE — 25000242 PHARM REV CODE 250 ALT 637 W/ HCPCS: Performed by: INTERNAL MEDICINE

## 2024-05-10 PROCEDURE — 36415 COLL VENOUS BLD VENIPUNCTURE: CPT | Performed by: HOSPITALIST

## 2024-05-10 PROCEDURE — 94760 N-INVAS EAR/PLS OXIMETRY 1: CPT

## 2024-05-10 PROCEDURE — 94640 AIRWAY INHALATION TREATMENT: CPT

## 2024-05-10 PROCEDURE — 99900035 HC TECH TIME PER 15 MIN (STAT)

## 2024-05-10 PROCEDURE — 85007 BL SMEAR W/DIFF WBC COUNT: CPT | Performed by: HOSPITALIST

## 2024-05-10 PROCEDURE — 63600175 PHARM REV CODE 636 W HCPCS: Performed by: INTERNAL MEDICINE

## 2024-05-10 PROCEDURE — 63600175 PHARM REV CODE 636 W HCPCS: Performed by: NURSE PRACTITIONER

## 2024-05-10 PROCEDURE — 85027 COMPLETE CBC AUTOMATED: CPT | Performed by: HOSPITALIST

## 2024-05-10 RX ORDER — CEFDINIR 300 MG/1
300 CAPSULE ORAL EVERY 12 HOURS
Status: COMPLETED | OUTPATIENT
Start: 2024-05-10 | End: 2024-05-11

## 2024-05-10 RX ADMIN — IPRATROPIUM BROMIDE AND ALBUTEROL SULFATE 3 ML: .5; 3 SOLUTION RESPIRATORY (INHALATION) at 05:05

## 2024-05-10 RX ADMIN — ALUMINUM HYDROXIDE, MAGNESIUM HYDROXIDE, AND SIMETHICONE 30 ML: 200; 200; 20 SUSPENSION ORAL at 06:05

## 2024-05-10 RX ADMIN — IPRATROPIUM BROMIDE AND ALBUTEROL SULFATE 3 ML: .5; 3 SOLUTION RESPIRATORY (INHALATION) at 11:05

## 2024-05-10 RX ADMIN — INSULIN HUMAN 5 UNITS: 100 INJECTION, SOLUTION PARENTERAL at 12:05

## 2024-05-10 RX ADMIN — HYDROCODONE BITARTRATE AND ACETAMINOPHEN 1 TABLET: 10; 325 TABLET ORAL at 02:05

## 2024-05-10 RX ADMIN — ENOXAPARIN SODIUM 40 MG: 40 INJECTION SUBCUTANEOUS at 04:05

## 2024-05-10 RX ADMIN — HYDROCODONE BITARTRATE AND ACETAMINOPHEN 1 TABLET: 10; 325 TABLET ORAL at 08:05

## 2024-05-10 RX ADMIN — SODIUM BICARBONATE 650 MG TABLET 650 MG: at 09:05

## 2024-05-10 RX ADMIN — HYDROCODONE BITARTRATE AND ACETAMINOPHEN 1 TABLET: 10; 325 TABLET ORAL at 06:05

## 2024-05-10 RX ADMIN — INSULIN ASPART 8 UNITS: 100 INJECTION, SOLUTION INTRAVENOUS; SUBCUTANEOUS at 11:05

## 2024-05-10 RX ADMIN — GUAIFENESIN 600 MG: 600 TABLET, EXTENDED RELEASE ORAL at 08:05

## 2024-05-10 RX ADMIN — GUAIFENESIN 600 MG: 600 TABLET, EXTENDED RELEASE ORAL at 09:05

## 2024-05-10 RX ADMIN — CEFDINIR 300 MG: 300 CAPSULE ORAL at 08:05

## 2024-05-10 RX ADMIN — INSULIN ASPART 6 UNITS: 100 INJECTION, SOLUTION INTRAVENOUS; SUBCUTANEOUS at 05:05

## 2024-05-10 RX ADMIN — ALUMINUM HYDROXIDE, MAGNESIUM HYDROXIDE, AND SIMETHICONE 30 ML: 200; 200; 20 SUSPENSION ORAL at 08:05

## 2024-05-10 RX ADMIN — PANTOPRAZOLE SODIUM 40 MG: 40 TABLET, DELAYED RELEASE ORAL at 06:05

## 2024-05-10 RX ADMIN — INSULIN DETEMIR 10 UNITS: 100 INJECTION, SOLUTION SUBCUTANEOUS at 08:05

## 2024-05-10 RX ADMIN — CEFDINIR 300 MG: 300 CAPSULE ORAL at 09:05

## 2024-05-10 RX ADMIN — INSULIN ASPART 10 UNITS: 100 INJECTION, SOLUTION INTRAVENOUS; SUBCUTANEOUS at 08:05

## 2024-05-10 RX ADMIN — INSULIN ASPART 8 UNITS: 100 INJECTION, SOLUTION INTRAVENOUS; SUBCUTANEOUS at 06:05

## 2024-05-10 NOTE — PROGRESS NOTES
[facility] Hospital Medicine Progress Note        Chief Complaint: Inpatient Follow-up     HPI:   63-year-old female with medical history of T2DM, HTN, diabetic neuropathy, DDD/radiculopathy, Graves disease, asthma/COPD present to the ED with complaint of about 5 days' history of sore throat, painful swallowing, productive cough, myalgia and fever.  Report pleuritic type chest pain upon coughing.  Denies nausea, or vomiting or diarrhea.  Report live round many grand kids but none of them recently sick.     On arrival to ED she was febrile 100.5 F, tachycardic, hypertensive and saturating above 95% on room air.  EKG sinus tachycardia.  Labs notable for WBC 23.76, hemoglobin 12.0, platelets 344, creatinine 1.38, CO2 19, lactic acid 1.1, throat Strep A PCR positive.  Urinalysis showed no evidence of UTI, consistent with dehydration with hyaline cast.     CT chestShowed right lower lobe superior segment large consolidation consistent with pneumonia, mediastinal reactive lymphadenopathy.       CT soft tissue neck show bilateral substantial tonsillar/adenoidal thickening and inflammation causing narrowing of the oropharynx, enlarged thyroid gland containing hypodense nodules and calcifications.     She was resuscitated with IV fluid, given cefepime and vancomycin and subsequently referred to hospital medicine service for further evaluation and management.        Interval Hx:  Doing okay this morning.  Tolerating p.o..  She does feel that her throat is significantly better.  We discussed switching her antibiotics to oral and if she was got good oral intake he was potentially go home later today or tomorrow.  Still waiting on her labs returned this morning.     Objective/physical exam:  General: Appears restless  HEENT: NC/AT, erythematous oropharynx with exudate  Neck:  No JVD  Chest:  Bilateral rhonchi worse on the right  CVS: Regular rhythm. Normal S1/S2.  No pedal edema  Abdomen:  nondistended, normoactive BS, soft and non-tender.  MSK: No obvious deformity or joint swelling  Skin: Warm and dry  Neuro: AAOx3, no focal neurological deficit  Psych: Cooperative       VITAL SIGNS: 24 HRS MIN & MAX LAST   Temp  Min: 97.3 °F (36.3 °C)  Max: 98.2 °F (36.8 °C) 97.3 °F (36.3 °C)   BP  Min: 107/61  Max: 135/75 107/61   Pulse  Min: 57  Max: 69  62   Resp  Min: 16  Max: 18 18   SpO2  Min: 97 %  Max: 100 % 100 %       Recent Labs   Lab 05/08/24  0330 05/08/24  1819 05/09/24  0404   WBC 23.28* 20.63* 22.50*   RBC 3.38* 3.58* 3.43*   HGB 10.0* 10.7* 10.1*   HCT 31.4* 32.5* 31.1*   MCV 92.9 90.8 90.7   MCH 29.6 29.9 29.4   MCHC 31.8* 32.9* 32.5*   RDW 12.7 12.3 12.5    339 341   MPV 9.5 9.8 9.8       Recent Labs   Lab 05/07/24  1901 05/08/24  0330 05/08/24  0936 05/08/24  1957 05/09/24  0404 05/10/24  0544   * 127*   < > 128* 129* 132*   K 4.5 4.5  --   --  5.1 4.8   CO2 19* 18*  --   --  20* 24   BUN 15.2 16.0  --   --  31.8* 30.8*   CREATININE 1.38* 1.31*  --   --  1.29* 1.28*   CALCIUM 9.4 8.2*  --   --  8.4 8.2*   MG  --  2.00  --   --  2.40  --    ALBUMIN 3.3* 2.7*  --   --  2.5*  --    ALKPHOS 158* 260*  --   --  229*  --    ALT 12 29  --   --  19  --    AST 10 83*  --   --  13  --    BILITOT 0.7 1.5  --   --  0.3  --     < > = values in this interval not displayed.          Microbiology Results (last 7 days)       Procedure Component Value Units Date/Time    Blood Culture [[id number]]  (Normal) Collected: 05/07/24 1953    Order Status: Completed Specimen: Blood Updated: 05/09/24 2100     Blood Culture No Growth At 48 Hours    Blood Culture [[id number]]  (Normal) Collected: 05/07/24 1953    Order Status: Completed Specimen: Blood Updated: 05/09/24 2100     Blood Culture No Growth At 48 Hours    Urine culture [[id number]] Collected: 05/07/24 2119    Order Status: Completed Specimen: Urine Updated: 05/09/24 0919     Urine Culture No Significant Growth    Respiratory Culture [[id number]]      Order Status: Sent Specimen: Sputum, Expectorated              Radiology:  Echo    Left Ventricle: The left ventricle is normal in size. Mildly increased   wall thickness. There is low normal systolic function with a visually   estimated ejection fraction of 50 - 55%. There is normal diastolic   function.    Right Ventricle: Normal right ventricular cavity size. Systolic   function is normal.    Aortic Valve: Mildly calcified cusps.    Mitral Valve: The mean pressure gradient across the mitral valve is 4   mmHg at a heart rate of  bpm. There is trace regurgitation.          Assessment/Plan:   Severe Sepsis secondary to Group A Streptococcus pneumonia and pharyngitis/tonsillitis and concern for bacteremia  Community-acquired pneumonia -RLL  Group A Streptococcus tonsillitis/pharyngitis  Acute kidney injury  Mild hyponatremia     History of T2DM, HTN, HLD, diabetic neuropathy, DDD/radiculopathy, Graves disease/hyperthyroid, insomnia, Asthma    Change IV Rocephin to oral cefdinir.  He was not requiring any further steroids.    Follow up labs this morning.    If tolerating p.o. and feeling well could go home later today or 1st thing in the morning.  Case management setting her up with a PCP as she is recently moved to [facility]      Marcos Avendaño MD   05/10/2024     All diagnosis and differential diagnosis have been reviewed; assessment and plan has been documented; I have personally reviewed the labs and test results that are presently available; I have reviewed the patients medication list; I have reviewed the consulting providers response and recommendations. I have reviewed or attempted to review medical records based upon their availability    All of the patient's questions have been  addressed and answered. Patient's is agreeable to the above stated plan. I will continue to monitor closely and make adjustments to medical management as  needed.  _____________________________________________________________________

## 2024-05-11 VITALS
OXYGEN SATURATION: 98 % | DIASTOLIC BLOOD PRESSURE: 75 MMHG | WEIGHT: 208 LBS | TEMPERATURE: 98 F | RESPIRATION RATE: 18 BRPM | HEIGHT: 67 IN | HEART RATE: 72 BPM | BODY MASS INDEX: 32.65 KG/M2 | SYSTOLIC BLOOD PRESSURE: 148 MMHG

## 2024-05-11 LAB
ALBUMIN SERPL-MCNC: 2.8 G/DL (ref 3.4–4.8)
ALBUMIN/GLOB SERPL: 0.6 RATIO (ref 1.1–2)
ALP SERPL-CCNC: 188 UNIT/L (ref 40–150)
ALT SERPL-CCNC: 13 UNIT/L (ref 0–55)
AST SERPL-CCNC: 7 UNIT/L (ref 5–34)
BILIRUB SERPL-MCNC: 0.2 MG/DL
BUN SERPL-MCNC: 25.1 MG/DL (ref 9.8–20.1)
CALCIUM SERPL-MCNC: 8.9 MG/DL (ref 8.4–10.2)
CHLORIDE SERPL-SCNC: 105 MMOL/L (ref 98–107)
CO2 SERPL-SCNC: 24 MMOL/L (ref 23–31)
CREAT SERPL-MCNC: 1.31 MG/DL (ref 0.55–1.02)
GFR SERPLBLD CREATININE-BSD FMLA CKD-EPI: 45 ML/MIN/1.73/M2
GLOBULIN SER-MCNC: 4.8 GM/DL (ref 2.4–3.5)
GLUCOSE SERPL-MCNC: 375 MG/DL (ref 82–115)
POCT GLUCOSE: 276 MG/DL (ref 70–110)
POCT GLUCOSE: 289 MG/DL (ref 70–110)
POCT GLUCOSE: 369 MG/DL (ref 70–110)
POCT GLUCOSE: >500 MG/DL (ref 70–110)
POTASSIUM SERPL-SCNC: 4.9 MMOL/L (ref 3.5–5.1)
PROT SERPL-MCNC: 7.6 GM/DL (ref 5.8–7.6)
SODIUM SERPL-SCNC: 136 MMOL/L (ref 136–145)

## 2024-05-11 PROCEDURE — 25000003 PHARM REV CODE 250: Performed by: HOSPITALIST

## 2024-05-11 PROCEDURE — 36415 COLL VENOUS BLD VENIPUNCTURE: CPT | Performed by: NURSE PRACTITIONER

## 2024-05-11 PROCEDURE — 25000003 PHARM REV CODE 250: Performed by: INTERNAL MEDICINE

## 2024-05-11 PROCEDURE — 99900035 HC TECH TIME PER 15 MIN (STAT)

## 2024-05-11 PROCEDURE — 63600175 PHARM REV CODE 636 W HCPCS: Performed by: INTERNAL MEDICINE

## 2024-05-11 PROCEDURE — 25000242 PHARM REV CODE 250 ALT 637 W/ HCPCS: Performed by: INTERNAL MEDICINE

## 2024-05-11 PROCEDURE — 94760 N-INVAS EAR/PLS OXIMETRY 1: CPT

## 2024-05-11 PROCEDURE — 80053 COMPREHEN METABOLIC PANEL: CPT | Performed by: NURSE PRACTITIONER

## 2024-05-11 PROCEDURE — 94640 AIRWAY INHALATION TREATMENT: CPT

## 2024-05-11 RX ORDER — CEFDINIR 300 MG/1
300 CAPSULE ORAL EVERY 12 HOURS
Qty: 10 CAPSULE | Refills: 0 | Status: SHIPPED | OUTPATIENT
Start: 2024-05-11 | End: 2024-05-16

## 2024-05-11 RX ORDER — BENZONATATE 200 MG/1
200 CAPSULE ORAL 3 TIMES DAILY PRN
Qty: 30 CAPSULE | Refills: 0 | Status: SHIPPED | OUTPATIENT
Start: 2024-05-11 | End: 2024-05-21

## 2024-05-11 RX ADMIN — ALUMINUM HYDROXIDE, MAGNESIUM HYDROXIDE, AND SIMETHICONE 30 ML: 200; 200; 20 SUSPENSION ORAL at 06:05

## 2024-05-11 RX ADMIN — CEFDINIR 300 MG: 300 CAPSULE ORAL at 08:05

## 2024-05-11 RX ADMIN — GUAIFENESIN 600 MG: 600 TABLET, EXTENDED RELEASE ORAL at 08:05

## 2024-05-11 RX ADMIN — INSULIN ASPART 6 UNITS: 100 INJECTION, SOLUTION INTRAVENOUS; SUBCUTANEOUS at 05:05

## 2024-05-11 RX ADMIN — PANTOPRAZOLE SODIUM 40 MG: 40 TABLET, DELAYED RELEASE ORAL at 05:05

## 2024-05-11 RX ADMIN — IPRATROPIUM BROMIDE AND ALBUTEROL SULFATE 3 ML: .5; 3 SOLUTION RESPIRATORY (INHALATION) at 07:05

## 2024-05-11 RX ADMIN — HYDROCODONE BITARTRATE AND ACETAMINOPHEN 1 TABLET: 10; 325 TABLET ORAL at 03:05

## 2024-05-11 RX ADMIN — HYDROCODONE BITARTRATE AND ACETAMINOPHEN 1 TABLET: 10; 325 TABLET ORAL at 08:05

## 2024-05-11 NOTE — DISCHARGE SUMMARY
[facility] Hospital Medicine Discharge Summary    Admit Date: 5/7/2024  Discharge Date and Time: 5/11/20246:46 AM  Admitting Physician: Hospitalist team   Discharging Physician: Marcos Avendaño MD.  Primary Care Physician: Lou, Primary Doctor      Discharge Diagnoses:  Severe Sepsis secondary to Group A Streptococcus pneumonia and pharyngitis/tonsillitis and concern for bacteremia  Community-acquired pneumonia -RLL  Group A Streptococcus tonsillitis/pharyngitis  Acute kidney injury  Mild hyponatremia     History of T2DM, HTN, HLD, diabetic neuropathy, DDD/radiculopathy, Graves disease/hyperthyroid, insomnia, Asthma    Hospital Course:   63-year-old female with medical history of T2DM, HTN, diabetic neuropathy, DDD/radiculopathy, Graves disease, asthma/COPD present to the ED with complaint of about 5 days' history of sore throat, painful swallowing, productive cough, myalgia and fever.  Report pleuritic type chest pain upon coughing.  Denies nausea, or vomiting or diarrhea.  Report live round many grand kids but none of them recently sick.     On arrival to ED she was febrile 100.5 F, tachycardic, hypertensive and saturating above 95% on room air.  EKG sinus tachycardia.  Labs notable for WBC 23.76, hemoglobin 12.0, platelets 344, creatinine 1.38, CO2 19, lactic acid 1.1, throat Strep A PCR positive.  Urinalysis showed no evidence of UTI, consistent with dehydration with hyaline cast.     CT chestShowed right lower lobe superior segment large consolidation consistent with pneumonia, mediastinal reactive lymphadenopathy.       CT soft tissue neck show bilateral substantial tonsillar/adenoidal thickening and inflammation causing narrowing of the oropharynx, enlarged thyroid gland containing hypodense nodules and calcifications.     She was resuscitated with IV fluid, given cefepime and vancomycin and subsequently referred to hospital medicine service for further evaluation and management.   "Due to some dysphagia and felt like she had throat tightness she was given a dose of Solu-Medrol.  He was he was improved rapidly.  Symptoms continued to improve.  Leukocytosis trending down.  She was tolerating p.o..  Antibiotics transitioned to oral on 5/10.  She was have a reported penicillin allergy but tolerating cephalosporins.  Will discharge home on 5 more days of oral cefdinir.  Will also get her some Tessalon Perles for cough.  She was recently moved to the area.  Case management is setting up with a PCP.  Okay to discharge home today.  Discussed discharge plan in detail with the patient she was agreeable to going home.  Help her set up PCP next week.  Okay to resume home meds          Vitals:  Blood pressure (!) 150/76, pulse 63, temperature 97.9 °F (36.6 °C), temperature source Oral, resp. rate 18, height 5' 7" (1.702 m), weight 94.3 kg (208 lb), SpO2 97%..    Physical Exam:  Awake, Alert, Oriented x 3, No new focal Neurologic deficit, Normal Affect  NC/AT, PERRLA, EOMI  Supple neck, no JVD, No cervical lymphadenopathy  Symmetrical chest, Good air entry bilaterally. No rhonchi, wheezes, crackles appreciated  RRR, No gallop, rub or murmur  +ve Bowel sounds x4, Abd soft and non tender, no rebound, guarding or rigidity  No Cyanosis, cludding or edema, No new rash or bruises    Procedures Performed: No admission procedures for hospital encounter.     Significant Diagnostic Studies: See Full reports for all details  No results displayed because visit has over 200 results.           Microbiology Results (last 7 days)       Procedure Component Value Units Date/Time    Blood Culture [[id number]]  (Normal) Collected: 05/07/24 1953    Order Status: Completed Specimen: Blood Updated: 05/10/24 2100     Blood Culture No Growth At 72 Hours    Blood Culture [[id number]]  (Normal) Collected: 05/07/24 1953    Order Status: Completed Specimen: Blood Updated: 05/10/24 2100     Blood Culture No Growth At 72 Hours    Urine " culture [[id number]] Collected: 05/07/24 2119    Order Status: Completed Specimen: Urine Updated: 05/09/24 0919     Urine Culture No Significant Growth    Respiratory Culture [[id number]]     Order Status: Sent Specimen: Sputum, Expectorated              Echo    Result Date: 5/8/2024    Left Ventricle: The left ventricle is normal in size. Mildly increased wall thickness. There is low normal systolic function with a visually estimated ejection fraction of 50 - 55%. There is normal diastolic function.   Right Ventricle: Normal right ventricular cavity size. Systolic function is normal.   Aortic Valve: Mildly calcified cusps.   Mitral Valve: The mean pressure gradient across the mitral valve is 4 mmHg at a heart rate of  bpm. There is trace regurgitation.     CT Soft Tissue Neck With Contrast    Result Date: 5/7/2024  EXAMINATION: CT SOFT TISSUE NECK WITH CONTRAST CLINICAL HISTORY: Tonsil/adenoid disorder; TECHNIQUE: Multidetector axial images were performed of the neck following intravenous contrast administration and the images were reformatted. Automated radiation control was utilized to minimize radiation dose. COMPARISON: None available FINDINGS: There is bilateral tonsillar/adenoid substantial thickening inflammation protruding and results in narrowing of the oropharynx min associated abscess.  The epiglottis is not thickened and there are no findings of the larynx and the trachea.There is no asymmetric fullness or inflammatory changes of the aryepiglottic folds.  The salivary and the parotid glands are unremarkable.  There are bilateral neck up to 1.2 cm enlarged lymph nodes which are favored to be reactive. Bilateral mucoperiosteal thickening of the maxillary sinuses which is more pronounced on the right Bilateral thyroid glands are enlarged in size and contain multiple hypodense nodules.  There also few associated calcifications.  Please further assess with ultrasound exam     1. Bilateral substantial  tonsillar/adenoidal thickening and inflammation causing narrowing of the oropharynx 2. Enlarged thyroid gland containing hypodense nodules and calcifications Electronically signed by: Yunier Kelley Date:    05/07/2024 Time:    22:29    CT Chest With Contrast    Result Date: 5/7/2024  EXAMINATION: CT CHEST WITH CONTRAST CLINICAL HISTORY: Respiratory illness, nondiagnostic xray; TECHNIQUE: Multidetector axial images were performed from thoracic inlet to below hemidiaphragms following intravenous contrast administration. Sagittal and coronal reformations performed. Dose length product of 866 mGycm. Automated exposure control was utilized to minimize radiation dose. COMPARISON: Chest radiograph same date. FINDINGS: Large dense consolidation involves the superior segment of the right lower lung lobe and disc space seen on image 250 series 10.  Follow-up exams are highly recommended to ensure this improves and resolves and there is no underlying mass lesion.  Bilateral lung lobes scattered small ground-glass opacities which could represent associated pneumonitis.  There is no pulmonary edema.  No significant fluid within the pleural or the pericardial spaces. There is anterior mediastinal density which could represent an enlarged lymph node though has atypical appearance and measures 2.6 x 1.6 cm.  There is precarinal enlarged lymph node measuring 2.2 cm.  Thoracic aorta is without aneurysmal dilatation or dissection.  Cardiac chamber size is within normal limits.  No pericardial effusion. Left thyroid gland small hypodense nodules and calcifications.  Please further assess with ultrasound exam. Thoracic kyphosis and degenerative changes.     1.  Right lower lung lobe superior segment large consolidation.  Follow-up exams are recommended to ensure this improves and resolves. 2. Mediastinal lymphadenopathy. Electronically signed by: Yunier Kelley Date:    05/07/2024 Time:    22:24    X-Ray Chest PA And Lateral    Result  Date: 5/7/2024  EXAMINATION: XR CHEST PA AND LATERAL CLINICAL HISTORY: Fever; COMPARISON: No priors FINDINGS: PA and lateral views of the chest were obtained. The heart is not significantly enlarged.  There is a 6 cm rounded opacity overlying the medial right lung base on the frontal view, not clearly seen on the lateral.  This may relate to a diaphragmatic defect.  Lungs are otherwise clear.  No pneumothorax.     Right basilar opacity could relate to diaphragmatic defect or lung consolidation.  Chest CT could differentiate. Electronically signed by: Gonzalez Chawla Date:    05/07/2024 Time:    19:22  - pulls last radiology orders        Medication List        START taking these medications      benzonatate 200 MG capsule  Commonly known as: TESSALON  Take 1 capsule (200 mg total) by mouth 3 (three) times daily as needed for Cough ((2nd Choice)).     cefdinir 300 MG capsule  Commonly known as: OMNICEF  Take 1 capsule (300 mg total) by mouth every 12 (twelve) hours. for 5 days            CONTINUE taking these medications      amLODIPine 10 MG tablet  Commonly known as: NORVASC     DULoxetine 60 MG capsule  Commonly known as: CYMBALTA     HYDROcodone-acetaminophen  mg per tablet  Commonly known as: NORCO     pantoprazole 40 MG tablet  Commonly known as: PROTONIX     temazepam 7.5 MG Cap  Commonly known as: RESTORIL               Where to Get Your Medications        These medications were sent to [facility] - [address]  [address] [facility] [address] [address]      Phone: [phone]   benzonatate 200 MG capsule  cefdinir 300 MG capsule          Explained in detail to the patient about the discharge plan, medications, and follow-up visits. Pt understands and agrees with the treatment plan  Discharged Condition: stable  Diet: regular  Disposition: home    Medications Per [place] med rec  Activities as tolerated  Follow up with your PCP in 2 wks    For further questions contact hospitalist office    Discharge time 33 minutes    For worsening symptoms, chest pain, shortness of breath, increased abdominal pain, high grade fever, stroke or stroke like symptoms, immediately go to the nearest Emergency Room or call 911 as soon as possible.        Marcos Villavicencio M.D, on 5/11/2024. at 6:46 AM.

## 2024-05-12 LAB
BACTERIA BLD CULT: NORMAL
BACTERIA BLD CULT: NORMAL

## 2024-05-13 ENCOUNTER — PATIENT OUTREACH (OUTPATIENT)
Dept: ADMINISTRATIVE | Facility: CLINIC | Age: 64
End: 2024-05-13

## 2024-05-13 NOTE — PROGRESS NOTES
C3 nurse spoke with Navin Beck  for a TCC post hospital discharge follow up call. The patient reports does not have a scheduled HOSFU appointment. C3 nurse was unable to schedule [facility]. Patient advised to contact their PCP to schedule a HOSPFU within 5-7 days.

## 2024-06-19 ENCOUNTER — CLINICAL SUPPORT (OUTPATIENT)
Dept: FAMILY MEDICINE | Facility: CLINIC | Age: 64
End: 2024-06-19
Attending: STUDENT IN AN ORGANIZED HEALTH CARE EDUCATION/TRAINING PROGRAM
Payer: MEDICARE

## 2024-06-19 ENCOUNTER — OFFICE VISIT (OUTPATIENT)
Dept: FAMILY MEDICINE | Facility: CLINIC | Age: 64
End: 2024-06-19
Payer: MEDICARE

## 2024-06-19 VITALS
TEMPERATURE: 98 F | DIASTOLIC BLOOD PRESSURE: 56 MMHG | BODY MASS INDEX: 33.43 KG/M2 | OXYGEN SATURATION: 100 % | HEART RATE: 86 BPM | WEIGHT: 213 LBS | SYSTOLIC BLOOD PRESSURE: 129 MMHG | HEIGHT: 67 IN

## 2024-06-19 DIAGNOSIS — E11.59 HYPERTENSION ASSOCIATED WITH DIABETES: ICD-10-CM

## 2024-06-19 DIAGNOSIS — J45.909 ASTHMA, UNSPECIFIED ASTHMA SEVERITY, UNSPECIFIED WHETHER COMPLICATED, UNSPECIFIED WHETHER PERSISTENT: ICD-10-CM

## 2024-06-19 DIAGNOSIS — N17.9: ICD-10-CM

## 2024-06-19 DIAGNOSIS — I15.2 HYPERTENSION ASSOCIATED WITH DIABETES: ICD-10-CM

## 2024-06-19 DIAGNOSIS — R65.20: ICD-10-CM

## 2024-06-19 DIAGNOSIS — F11.20 OPIOID DEPENDENCE, UNCOMPLICATED: ICD-10-CM

## 2024-06-19 DIAGNOSIS — I25.10 ASCVD (ARTERIOSCLEROTIC CARDIOVASCULAR DISEASE): ICD-10-CM

## 2024-06-19 DIAGNOSIS — Z79.4 TYPE 2 DIABETES MELLITUS WITH HYPERGLYCEMIA, WITH LONG-TERM CURRENT USE OF INSULIN: Primary | ICD-10-CM

## 2024-06-19 DIAGNOSIS — Z78.0 POST-MENOPAUSAL: ICD-10-CM

## 2024-06-19 DIAGNOSIS — F51.01 PRIMARY INSOMNIA: ICD-10-CM

## 2024-06-19 DIAGNOSIS — Z98.890 HISTORY OF LUMBOSACRAL SPINE SURGERY: ICD-10-CM

## 2024-06-19 DIAGNOSIS — Z00.00 WELLNESS EXAMINATION: Primary | ICD-10-CM

## 2024-06-19 DIAGNOSIS — E05.00 GRAVES DISEASE: ICD-10-CM

## 2024-06-19 DIAGNOSIS — M51.36 DDD (DEGENERATIVE DISC DISEASE), LUMBAR: ICD-10-CM

## 2024-06-19 DIAGNOSIS — Z12.12 ENCOUNTER FOR COLORECTAL CANCER SCREENING: ICD-10-CM

## 2024-06-19 DIAGNOSIS — M46.1 SACROILIITIS: ICD-10-CM

## 2024-06-19 DIAGNOSIS — E11.65 TYPE 2 DIABETES MELLITUS WITH HYPERGLYCEMIA, WITH LONG-TERM CURRENT USE OF INSULIN: Primary | ICD-10-CM

## 2024-06-19 DIAGNOSIS — Z72.0 TOBACCO ABUSE: ICD-10-CM

## 2024-06-19 DIAGNOSIS — E11.65 TYPE 2 DIABETES MELLITUS WITH HYPERGLYCEMIA, WITH LONG-TERM CURRENT USE OF INSULIN: ICD-10-CM

## 2024-06-19 DIAGNOSIS — I10 ESSENTIAL HYPERTENSION: ICD-10-CM

## 2024-06-19 DIAGNOSIS — J40 BRONCHITIS: ICD-10-CM

## 2024-06-19 DIAGNOSIS — N18.31 CHRONIC KIDNEY DISEASE, STAGE 3A: ICD-10-CM

## 2024-06-19 DIAGNOSIS — E05.90 HYPERTHYROIDISM: ICD-10-CM

## 2024-06-19 DIAGNOSIS — Z12.31 ENCOUNTER FOR SCREENING MAMMOGRAM FOR BREAST CANCER: ICD-10-CM

## 2024-06-19 DIAGNOSIS — R59.0 LOCALIZED ENLARGED LYMPH NODES: ICD-10-CM

## 2024-06-19 DIAGNOSIS — Z79.4 TYPE 2 DIABETES MELLITUS WITH HYPERGLYCEMIA, WITH LONG-TERM CURRENT USE OF INSULIN: ICD-10-CM

## 2024-06-19 DIAGNOSIS — E78.2 MIXED HYPERLIPIDEMIA: ICD-10-CM

## 2024-06-19 DIAGNOSIS — I70.0 CALCIFICATION OF AORTA: ICD-10-CM

## 2024-06-19 DIAGNOSIS — A40.0: ICD-10-CM

## 2024-06-19 DIAGNOSIS — J18.9 PNEUMONIA OF RIGHT LOWER LOBE DUE TO INFECTIOUS ORGANISM: ICD-10-CM

## 2024-06-19 DIAGNOSIS — Z12.11 ENCOUNTER FOR COLORECTAL CANCER SCREENING: ICD-10-CM

## 2024-06-19 DIAGNOSIS — R79.9 ABNORMAL FINDING OF BLOOD CHEMISTRY, UNSPECIFIED: ICD-10-CM

## 2024-06-19 DIAGNOSIS — I50.9 CONGESTIVE HEART FAILURE, UNSPECIFIED HF CHRONICITY, UNSPECIFIED HEART FAILURE TYPE: ICD-10-CM

## 2024-06-19 LAB
25(OH)D3+25(OH)D2 SERPL-MCNC: 7 NG/ML (ref 30–80)
ALBUMIN SERPL-MCNC: 3.6 G/DL (ref 3.4–4.8)
ALBUMIN/GLOB SERPL: 0.9 RATIO (ref 1.1–2)
ALP SERPL-CCNC: 126 UNIT/L (ref 40–150)
ALT SERPL-CCNC: 5 UNIT/L (ref 0–55)
ANION GAP SERPL CALC-SCNC: 9 MEQ/L
AST SERPL-CCNC: 12 UNIT/L (ref 5–34)
BACTERIA #/AREA URNS AUTO: ABNORMAL /HPF
BASOPHILS # BLD AUTO: 0.05 X10(3)/MCL
BASOPHILS NFR BLD AUTO: 0.5 %
BILIRUB SERPL-MCNC: 0.1 MG/DL
BILIRUB UR QL STRIP.AUTO: NEGATIVE
BUN SERPL-MCNC: 16 MG/DL (ref 9.8–20.1)
CALCIUM SERPL-MCNC: 9.1 MG/DL (ref 8.4–10.2)
CHLORIDE SERPL-SCNC: 106 MMOL/L (ref 98–107)
CHOLEST SERPL-MCNC: 201 MG/DL
CHOLEST/HDLC SERPL: 4 {RATIO} (ref 0–5)
CLARITY UR: CLEAR
CO2 SERPL-SCNC: 23 MMOL/L (ref 23–31)
COLOR UR AUTO: ABNORMAL
CREAT SERPL-MCNC: 1.12 MG/DL (ref 0.55–1.02)
CREAT UR-MCNC: 123.2 MG/DL (ref 45–106)
CREAT/UREA NIT SERPL: 14
EOSINOPHIL # BLD AUTO: 0.05 X10(3)/MCL (ref 0–0.9)
EOSINOPHIL NFR BLD AUTO: 0.5 %
ERYTHROCYTE [DISTWIDTH] IN BLOOD BY AUTOMATED COUNT: 13 % (ref 11.5–17)
EST. AVERAGE GLUCOSE BLD GHB EST-MCNC: 208.7 MG/DL
GFR SERPLBLD CREATININE-BSD FMLA CKD-EPI: 55 ML/MIN/1.73/M2
GLOBULIN SER-MCNC: 4.1 GM/DL (ref 2.4–3.5)
GLUCOSE SERPL-MCNC: 179 MG/DL (ref 82–115)
GLUCOSE UR QL STRIP: ABNORMAL
HBA1C MFR BLD: 8.9 %
HCT VFR BLD AUTO: 38.4 % (ref 37–47)
HCV AB SERPL QL IA: NONREACTIVE
HDLC SERPL-MCNC: 50 MG/DL (ref 35–60)
HGB BLD-MCNC: 12.2 G/DL (ref 12–16)
HGB UR QL STRIP: NEGATIVE
HIV 1+2 AB+HIV1 P24 AG SERPL QL IA: NONREACTIVE
HYALINE CASTS #/AREA URNS LPF: ABNORMAL /LPF
IMM GRANULOCYTES # BLD AUTO: 0.02 X10(3)/MCL (ref 0–0.04)
IMM GRANULOCYTES NFR BLD AUTO: 0.2 %
KETONES UR QL STRIP: NEGATIVE
LDLC SERPL CALC-MCNC: 117 MG/DL (ref 50–140)
LEUKOCYTE ESTERASE UR QL STRIP: 75
LYMPHOCYTES # BLD AUTO: 2.32 X10(3)/MCL (ref 0.6–4.6)
LYMPHOCYTES NFR BLD AUTO: 22 %
MCH RBC QN AUTO: 29.8 PG (ref 27–31)
MCHC RBC AUTO-ENTMCNC: 31.8 G/DL (ref 33–36)
MCV RBC AUTO: 93.9 FL (ref 80–94)
MICROALBUMIN UR-MCNC: 84.8 UG/ML
MICROALBUMIN/CREAT RATIO PNL UR: 68.8 MG/GM CR (ref 0–30)
MONOCYTES # BLD AUTO: 0.54 X10(3)/MCL (ref 0.1–1.3)
MONOCYTES NFR BLD AUTO: 5.1 %
MUCOUS THREADS URNS QL MICRO: ABNORMAL /LPF
NEUTROPHILS # BLD AUTO: 7.58 X10(3)/MCL (ref 2.1–9.2)
NEUTROPHILS NFR BLD AUTO: 71.7 %
NITRITE UR QL STRIP: NEGATIVE
NRBC BLD AUTO-RTO: 0 %
PH UR STRIP: 5.5 [PH]
PLATELET # BLD AUTO: 353 X10(3)/MCL (ref 130–400)
PMV BLD AUTO: 9.5 FL (ref 7.4–10.4)
POTASSIUM SERPL-SCNC: 5.2 MMOL/L (ref 3.5–5.1)
PROT SERPL-MCNC: 7.7 GM/DL (ref 5.8–7.6)
PROT UR QL STRIP: ABNORMAL
RBC # BLD AUTO: 4.09 X10(6)/MCL (ref 4.2–5.4)
RBC #/AREA URNS AUTO: ABNORMAL /HPF
SODIUM SERPL-SCNC: 138 MMOL/L (ref 136–145)
SP GR UR STRIP.AUTO: 1.02 (ref 1–1.03)
SQUAMOUS #/AREA URNS LPF: ABNORMAL /HPF
T4 FREE SERPL-MCNC: 0.73 NG/DL (ref 0.7–1.48)
TRIGL SERPL-MCNC: 171 MG/DL (ref 37–140)
TSH SERPL-ACNC: 2.87 UIU/ML (ref 0.35–4.94)
UROBILINOGEN UR STRIP-ACNC: NORMAL
VLDLC SERPL CALC-MCNC: 34 MG/DL
WBC # BLD AUTO: 10.56 X10(3)/MCL (ref 4.5–11.5)
WBC #/AREA URNS AUTO: ABNORMAL /HPF

## 2024-06-19 PROCEDURE — 87389 HIV-1 AG W/HIV-1&-2 AB AG IA: CPT | Performed by: STUDENT IN AN ORGANIZED HEALTH CARE EDUCATION/TRAINING PROGRAM

## 2024-06-19 PROCEDURE — 81001 URINALYSIS AUTO W/SCOPE: CPT | Performed by: STUDENT IN AN ORGANIZED HEALTH CARE EDUCATION/TRAINING PROGRAM

## 2024-06-19 PROCEDURE — 84443 ASSAY THYROID STIM HORMONE: CPT | Performed by: STUDENT IN AN ORGANIZED HEALTH CARE EDUCATION/TRAINING PROGRAM

## 2024-06-19 PROCEDURE — 83036 HEMOGLOBIN GLYCOSYLATED A1C: CPT | Performed by: STUDENT IN AN ORGANIZED HEALTH CARE EDUCATION/TRAINING PROGRAM

## 2024-06-19 PROCEDURE — 84439 ASSAY OF FREE THYROXINE: CPT | Performed by: STUDENT IN AN ORGANIZED HEALTH CARE EDUCATION/TRAINING PROGRAM

## 2024-06-19 PROCEDURE — 80061 LIPID PANEL: CPT | Performed by: STUDENT IN AN ORGANIZED HEALTH CARE EDUCATION/TRAINING PROGRAM

## 2024-06-19 PROCEDURE — 99215 OFFICE O/P EST HI 40 MIN: CPT | Mod: PBBFAC,PN,25 | Performed by: STUDENT IN AN ORGANIZED HEALTH CARE EDUCATION/TRAINING PROGRAM

## 2024-06-19 PROCEDURE — 36415 COLL VENOUS BLD VENIPUNCTURE: CPT | Performed by: STUDENT IN AN ORGANIZED HEALTH CARE EDUCATION/TRAINING PROGRAM

## 2024-06-19 PROCEDURE — 80053 COMPREHEN METABOLIC PANEL: CPT | Performed by: STUDENT IN AN ORGANIZED HEALTH CARE EDUCATION/TRAINING PROGRAM

## 2024-06-19 PROCEDURE — 86803 HEPATITIS C AB TEST: CPT | Performed by: STUDENT IN AN ORGANIZED HEALTH CARE EDUCATION/TRAINING PROGRAM

## 2024-06-19 PROCEDURE — 92228 IMG RTA DETC/MNTR DS PHY/QHP: CPT | Mod: TC,PBBFAC,PN | Performed by: STUDENT IN AN ORGANIZED HEALTH CARE EDUCATION/TRAINING PROGRAM

## 2024-06-19 PROCEDURE — 82306 VITAMIN D 25 HYDROXY: CPT | Performed by: STUDENT IN AN ORGANIZED HEALTH CARE EDUCATION/TRAINING PROGRAM

## 2024-06-19 PROCEDURE — 82570 ASSAY OF URINE CREATININE: CPT | Performed by: STUDENT IN AN ORGANIZED HEALTH CARE EDUCATION/TRAINING PROGRAM

## 2024-06-19 PROCEDURE — 85025 COMPLETE CBC W/AUTO DIFF WBC: CPT | Performed by: STUDENT IN AN ORGANIZED HEALTH CARE EDUCATION/TRAINING PROGRAM

## 2024-06-19 RX ORDER — DULOXETIN HYDROCHLORIDE 60 MG/1
60 CAPSULE, DELAYED RELEASE ORAL DAILY
Qty: 30 CAPSULE | Refills: 3 | Status: SHIPPED | OUTPATIENT
Start: 2024-06-19

## 2024-06-19 RX ORDER — METFORMIN HYDROCHLORIDE 500 MG/1
1 TABLET ORAL 2 TIMES DAILY
COMMUNITY
End: 2024-06-19 | Stop reason: SDUPTHER

## 2024-06-19 RX ORDER — INSULIN GLARGINE 100 [IU]/ML
INJECTION, SOLUTION SUBCUTANEOUS
COMMUNITY
End: 2024-06-19

## 2024-06-19 RX ORDER — INSULIN ASPART 100 [IU]/ML
18 INJECTION, SOLUTION INTRAVENOUS; SUBCUTANEOUS 3 TIMES DAILY
Qty: 194.4 ML | Refills: 0 | Status: SHIPPED | OUTPATIENT
Start: 2024-06-19 | End: 2025-06-19

## 2024-06-19 RX ORDER — ZOLPIDEM TARTRATE 5 MG/1
5 TABLET ORAL NIGHTLY PRN
Qty: 30 TABLET | Refills: 3 | Status: SHIPPED | OUTPATIENT
Start: 2024-06-19 | End: 2024-12-18

## 2024-06-19 RX ORDER — METFORMIN HYDROCHLORIDE 500 MG/1
500 TABLET ORAL 2 TIMES DAILY
Qty: 180 TABLET | Refills: 3 | Status: SHIPPED | OUTPATIENT
Start: 2024-06-19

## 2024-06-19 RX ORDER — ATORVASTATIN CALCIUM 20 MG/1
20 TABLET, FILM COATED ORAL DAILY
Qty: 90 TABLET | Refills: 3 | Status: SHIPPED | OUTPATIENT
Start: 2024-06-19

## 2024-06-19 RX ORDER — DULOXETIN HYDROCHLORIDE 60 MG/1
60 CAPSULE, DELAYED RELEASE ORAL DAILY
Qty: 90 CAPSULE | Refills: 3 | Status: SHIPPED | OUTPATIENT
Start: 2024-06-19

## 2024-06-19 RX ORDER — METHIMAZOLE 10 MG/1
10 TABLET ORAL DAILY
Qty: 90 TABLET | Refills: 3 | Status: SHIPPED | OUTPATIENT
Start: 2024-06-19

## 2024-06-19 RX ORDER — BENAZEPRIL HYDROCHLORIDE 5 MG/1
5 TABLET ORAL DAILY
Qty: 90 TABLET | Refills: 3 | Status: SHIPPED | OUTPATIENT
Start: 2024-06-19 | End: 2025-06-19

## 2024-06-19 NOTE — ASSESSMENT & PLAN NOTE
Is asking for referral to pain management   Extensive discussion with patient that I will refer her to 2 known pain management clinics in the area however I am unsure if they will accept her health insurance   Did encourage patient to continue with her current pain management provider in [place] and to call the phone number of the back of her insurance card to assess if pain management resources maybe available in this area for her.  Did refill patient's gabapentin and Cymbalta

## 2024-06-19 NOTE — ASSESSMENT & PLAN NOTE
Spent 4 minutes discussing smoking cessation with patient. Patient declines referral to smoking cessation. Discussed patient's recent pneumonia and bronchitis. Also questioned COPD diagnosis and ordering PFT's.

## 2024-06-19 NOTE — ASSESSMENT & PLAN NOTE
Referring patient to Cardiology as patient has some chart records that indicate she may have CHF   Continuing Lipitor

## 2024-06-19 NOTE — PROGRESS NOTES
Patient Name: Navin Beck     : 1960    MRN: [id number]     Subjective:     Patient ID: Navin Beck is a 63 y.o. female.    Chief Complaint:   Chief Complaint   Patient presents with    Follow-up     Patient here to establish care. Needs a new pain management provider. 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    Degenerative disc disease with sciatic nerve pain  Reports having been through epidural injections and ablation which did not relieve pain  Has a pain management doctor in [place] for which she is being prescribed Cymbalta 60 gabapentin 600 mg t.i.d. and Norco 10 17 pills   per month    Hypertension is on benazepril 10 mg daily   Blood pressure in clinic 129/56    Patient also has a reported history of heart failure however does not appear to be on GDM T    Also reports history of bronchitis yet patient reports that she does smoke and has been since she was 13 years old    Insomnia  Is on Ambien 10  ROS:          ROS     12 point review of systems conducted, negative except as stated in the history of present illness. See HPI for details.    History:     Past Medical History:   Diagnosis Date    Allergy     Anemia due to stage 3 chronic kidney disease 2020    Arrhythmia     Arthritis     Bronchial asthma     Diabetes mellitus     Diabetes mellitus     Glaucoma     Hormone disorder     hysterectomy    Hypercholesteremia     Hypertension     Hyperthyroidism     Insomnia     Kidney stones     Neuropathy     Thyroid disease     Urine, incontinence, stress female     UTI (urinary tract infection) 2020        Past Surgical History:   Procedure Laterality Date    APPENDECTOMY       BACK SURGERY      disc removal     CARPAL TUNNEL RELEASE Bilateral     CHOLECYSTECTOMY      CYSTOSCOPY N/A 10/26/2020    Procedure: CYSTOSCOPY;  Surgeon: Wilner Goel MD;  Location: [facility] OR;  Service: OB/GYN;  Laterality: N/A;    CYSTOSCOPY W/ RETROGRADES Bilateral 12/7/2020    Procedure: CYSTOSCOPY, WITH RETROGRADE PYELOGRAM;  Surgeon: Daniel Jalloh MD;  [facility];  Service: Urology;  Laterality: Bilateral;    FRACTURE SURGERY Right     wrist    HYSTERECTOMY  2010    [facility]    JOINT REPLACEMENT      KNEE ARTHROSCOPY W/ DEBRIDEMENT      KNEE SURGERY Right     Knee replacement    LITHOTRIPSY      AR REMOVAL OF OVARY/TUBE(S)  9/9/13    benign    removal of round ligament mass      ROBOT-ASSISTED LAPAROSCOPIC LYSIS OF ADHESIONS USING DA JAMES XI N/A 10/26/2020    Procedure: XI ROBOTIC LYSIS, ADHESIONS;  Surgeon: Wilner Goel MD;  Location: [facility] OR;  Service: OB/GYN;  Laterality: N/A;  ROBOTIC MOBILIZATION OF BLADDER- DR BURNHAM    SPINE SURGERY      WRIST FRACTURE SURGERY Left        Family History   Problem Relation Name Age of Onset    Cancer Mother      Cancer Brother      Colon cancer Brother      Cancer Brother      Cancer Brother      Cancer Brother      Ovarian cancer Neg Hx      Uterine cancer Neg Hx      Breast cancer Neg Hx          Social History     Tobacco Use    Smoking status: Every Day     Current packs/day: 0.50     Average packs/day: 0.5 packs/day for 30.0 years (15.0 ttl pk-yrs)     Types: Cigarettes    Smokeless tobacco: Never   Substance and Sexual Activity    Alcohol use: No    Drug use: No    Sexual activity: Not Currently     Partners: Male     Birth control/protection: Surgical       Current Outpatient Medications   Medication Instructions    albuterol (PROVENTIL/VENTOLIN HFA) 90 mcg/actuation inhaler 1-2 puffs, Inhalation, Every 6 hours PRN    atorvastatin (LIPITOR) 20 mg, Oral, Daily    benazepriL (LOTENSIN) 5 mg, Oral, Daily    blood-glucose  "meter (TRUE METRIX GLUCOSE METER MISC) True Metrix Glucose Meter    diclofenac (VOLTAREN) 75 mg, Oral, 2 times daily    DULoxetine (CYMBALTA) 60 mg, Oral, Daily    DULoxetine (CYMBALTA) 60 mg, Oral, Daily    estradioL (ESTRACE) 1 g, Vaginal, Three times weekly, Alternate days with Metrogel    fluticasone propionate (FLOVENT HFA) 44 mcg/actuation inhaler 1 puff, Inhalation, Daily    gabapentin (NEURONTIN) 600 mg, Oral, 3 times daily    HYDROcodone-acetaminophen (NORCO)  mg per tablet 1 tablet, Oral, Every 12 hours PRN    insulin aspart U-100 (NOVOLOG) 100 unit/mL (3 mL) InPn pen Inject 18 Units into the skin 3 (three) times daily.   Sliding scale: Inject 0-5 Units into the skin before meals and at bedtime as needed (Hyperglycemia). Max daily dose of 69 units.    insulin detemir U-100, Levemir, 100 unit/mL (3 mL) SubQ InPn pen 70 units into the skin in the am and 40 units PM    metFORMIN (GLUCOPHAGE) 500 mg, Oral, 2 times daily    methIMAzole (TAPAZOLE) 10 mg, Oral, Daily    pantoprazole (PROTONIX) 40 mg, Oral, Daily, Start after the twice daily Protonix is complete.Follow-up with doctor for further refills.    tiZANidine (ZANAFLEX) 8-12 mg, Oral, Nightly PRN    TRUE METRIX GLUCOSE TEST STRIP Strp No dose, route, or frequency recorded.    zolpidem (AMBIEN) 5 mg, Oral, Nightly PRN, May take with 5-10 mg of Melatonin        Review of patient's allergies indicates:   Allergen Reactions    Penicillins Shortness Of Breath, Itching and Swelling     Tolerates cefepime 11/30/2020    Penicillins Anaphylaxis       Objective:     Visit Vitals  BP (!) 129/56 (BP Location: Right arm, Patient Position: Sitting)   Pulse 86   Temp 97.9 °F (36.6 °C) (Oral)   Ht 5' 7" (1.702 m)   Wt 96.6 kg (213 lb)   SpO2 100%   BMI 33.36 kg/m²       Physical Examination:     Physical Exam  Constitutional:       General: She is not in acute distress.     Appearance: Normal appearance. She is not ill-appearing or diaphoretic.   HENT:      Nose: " No congestion.   Eyes:      General: No scleral icterus.     Extraocular Movements: Extraocular movements intact.      Conjunctiva/sclera: Conjunctivae normal.      Pupils: Pupils are equal, round, and reactive to light.   Cardiovascular:      Rate and Rhythm: Normal rate and regular rhythm.      Heart sounds: No murmur heard.  Pulmonary:      Effort: Pulmonary effort is normal. No respiratory distress.      Breath sounds: Normal breath sounds. No stridor. No wheezing or rhonchi.   Abdominal:      General: Bowel sounds are normal. There is no distension.      Palpations: Abdomen is soft.      Tenderness: There is no abdominal tenderness. There is no guarding.   Musculoskeletal:         General: No swelling, tenderness, deformity or signs of injury. Normal range of motion.      Cervical back: Normal range of motion.   Skin:     General: Skin is warm and dry.      Coloration: Skin is not jaundiced.      Findings: No rash.   Neurological:      General: No focal deficit present.      Mental Status: She is alert and oriented to person, place, and time. Mental status is at baseline.      Gait: Gait normal.   Psychiatric:         Thought Content: Thought content normal.         Lab Results:     Chemistry:  Lab Results   Component Value Date     05/11/2024    K 4.9 05/11/2024    BUN 25.1 (H) 05/11/2024    CREATININE 1.31 (H) 05/11/2024    EGFRNORACEVR 45 05/11/2024    GLUCOSE 375 (H) 05/11/2024    CALCIUM 8.9 05/11/2024    ALKPHOS 188 (H) 05/11/2024    LABPROT 7.6 05/11/2024    ALBUMIN 2.8 (L) 05/11/2024    AST 7 05/11/2024    ALT 13 05/11/2024    MG 2.40 05/09/2024    PHOS 5.2 (H) 05/09/2024    TSH 2.120 04/20/2023        Lab Results   Component Value Date    HGBA1C 9.3 (H) 05/08/2024        Hematology:  Lab Results   Component Value Date    WBC 13.07 (H) 05/10/2024    WBC 13.07 05/10/2024    HGB 10.0 (L) 05/10/2024    HCT 30.9 (L) 05/10/2024     05/10/2024       Lipid Panel:  Lab Results   Component Value  Date    CHOL 242 (H) 04/20/2023    HDL 53 04/20/2023    TRIG 118 04/20/2023    TOTALCHOLEST 4.6 04/20/2023        Urine:  Lab Results   Component Value Date    APPEARANCEUA Turbid (A) 05/07/2024    SGUA 1.022 05/07/2024    PROTEINUA 2+ (A) 05/07/2024    KETONESUA 1+ (A) 05/07/2024    LEUKOCYTESUR 500 (A) 05/07/2024    RBCUA 0-5 05/07/2024    WBCUA 21-50 (A) 05/07/2024    BACTERIA None Seen 05/07/2024    SQEPUA Trace 05/07/2024    HYALINECASTS 6-10 (A) 05/07/2024    CREATRANDUR 69.6 04/20/2023        Assessment:          ICD-10-CM ICD-9-CM   1. Wellness examination  Z00.00 V70.0   2. Mixed hyperlipidemia  E78.2 272.2   3. Essential hypertension  I10 401.9   4. Graves disease  E05.00 242.00   5. Type 2 diabetes mellitus with hyperglycemia, with long-term current use of insulin  E11.65 250.00    Z79.4 790.29     V58.67   6. Abnormal finding of blood chemistry, unspecified  R79.9 790.6   7. Body mass index (BMI) 33.0-33.9, adult  Z68.33 V85.33   8. Bronchitis  J40 490   9. Post-menopausal  Z78.0 V49.81   10. Encounter for screening mammogram for breast cancer  Z12.31 V76.12   11. Encounter for colorectal cancer screening  Z12.11 V76.51    Z12.12 V76.41   12. DDD (degenerative disc disease), lumbar  M51.36 722.52   13. History of lumbosacral spine surgery  Z98.890 V15.29   14. Primary insomnia  F51.01 307.42   15. Congestive heart failure, unspecified HF chronicity, unspecified heart failure type  I50.9 428.0   16. Opioid dependence, uncomplicated  F11.20 304.00   17. Chronic kidney disease, stage 3a  N18.31 585.3   18. Sacroiliitis  M46.1 720.2   19. Hypertension associated with diabetes  E11.59 250.80    I15.2 401.9   20. Calcification of aorta  I70.0 440.0   21. Localized enlarged lymph nodes  R59.0 785.6   22. Pneumonia of right lower lobe due to infectious organism  J18.9 486   23. Asthma, unspecified asthma severity, unspecified whether complicated, unspecified whether persistent  J45.909 493.90   24. ASCVD  (arteriosclerotic cardiovascular disease)  I25.10 429.2     440.9   25. Sepsis due to group A Streptococcus with acute renal failure, unspecified acute renal failure type, unspecified whether septic shock present  A40.0 038.0    R65.20 995.92    N17.9 584.9   26. Tobacco abuse  Z72.0 305.1   27. Hyperthyroidism  E05.90 242.90        Plan:     1. Wellness examination  -     CBC Auto Differential; Future; Expected date: 06/19/2024  -     Comprehensive Metabolic Panel; Future; Expected date: 06/19/2024  -     Lipid Panel; Future; Expected date: 06/19/2024  -     TSH; Future; Expected date: 06/19/2024  -     Hemoglobin A1C; Future; Expected date: 06/19/2024  -     Urinalysis; Future; Expected date: 06/19/2024  -     T4, Free; Future; Expected date: 06/19/2024  -     Vitamin D; Future; Expected date: 06/19/2024  -     Microalbumin/Creatinine Ratio, Urine; Future; Expected date: 06/19/2024  -     Hepatitis C Antibody; Future; Expected date: 06/19/2024  -     HIV 1/2 Ag/Ab (4th Gen); Future; Expected date: 06/19/2024  -     Diabetic Eye Screening Photo    2. Mixed hyperlipidemia  Assessment & Plan:  Refills given for Lipitor lipid panel today    Orders:  -     Lipid Panel; Future; Expected date: 06/19/2024  -     atorvastatin (LIPITOR) 20 MG tablet; Take 1 tablet (20 mg total) by mouth once daily.  Dispense: 90 tablet; Refill: 3    3. Essential hypertension  -     Comprehensive Metabolic Panel; Future; Expected date: 06/19/2024  -     benazepriL (LOTENSIN) 5 MG tablet; Take 1 tablet (5 mg total) by mouth once daily.  Dispense: 90 tablet; Refill: 3    4. Graves disease  -     TSH; Future; Expected date: 06/19/2024  -     T4, Free; Future; Expected date: 06/19/2024  -     DXA Bone Density Axial Skeleton 1 or more sites; Future; Expected date: 06/19/2024    5. Type 2 diabetes mellitus with hyperglycemia, with long-term current use of insulin  -     Hemoglobin A1C; Future; Expected date: 06/19/2024  -      Microalbumin/Creatinine Ratio, Urine; Future; Expected date: 06/19/2024  -     Diabetic Eye Screening Photo    6. Abnormal finding of blood chemistry, unspecified  -     CBC Auto Differential; Future; Expected date: 06/19/2024    7. Body mass index (BMI) 33.0-33.9, adult  -     Vitamin D; Future; Expected date: 06/19/2024    8. Bronchitis  -     Complete PFT w/ bronchodilator; Future    9. Post-menopausal  -     DXA Bone Density Axial Skeleton 1 or more sites; Future; Expected date: 06/19/2024    10. Encounter for screening mammogram for breast cancer  -     Mammo Digital Screening Bilat w/ Humberto; Future; Expected date: 06/19/2024    11. Encounter for colorectal cancer screening  -     Cologuard Screening (Multitarget Stool DNA); Future; Expected date: 06/19/2024    12. DDD (degenerative disc disease), lumbar  -     Ambulatory referral/consult to Pain Clinic; Future; Expected date: 06/26/2024  -     Ambulatory referral/consult to Pain Clinic; Future; Expected date: 06/26/2024    13. History of lumbosacral spine surgery  -     DULoxetine (CYMBALTA) 60 MG capsule; Take 1 capsule (60 mg total) by mouth once daily.  Dispense: 30 capsule; Refill: 3    14. Primary insomnia  -     zolpidem (AMBIEN) 5 MG Tab; Take 1 tablet (5 mg total) by mouth nightly as needed (insomnia). May take with 5-10 mg of Melatonin  Dispense: 30 tablet; Refill: 3    15. Congestive heart failure, unspecified HF chronicity, unspecified heart failure type  -     Ambulatory referral/consult to Cardiology; Future; Expected date: 06/26/2024    16. Opioid dependence, uncomplicated  Assessment & Plan:  Is asking for referral to pain management   Extensive discussion with patient that I will refer her to 2 known pain management clinics in the area however I am unsure if they will accept her health insurance   Did encourage patient to continue with her current pain management provider in [place] and to call the phone number of the back of her insurance  card to assess if pain management resources maybe available in this area for her.  Did refill patient's gabapentin and Cymbalta      17. Chronic kidney disease, stage 3a  Assessment & Plan:  Chart records indicate that patient has a history of CKD 3 repeating labs today to assess       18. Sacroiliitis    19. Hypertension associated with diabetes  Assessment & Plan:  Patient's blood pressure is lower in clinic than anticipated will decrease benazepril to 5 mg daily  Blood pressure 129/56      20. Calcification of aorta  Overview:  Noted on CT abdomen and pelvis dated 8/7/2017.       21. Localized enlarged lymph nodes  -     CT Chest Without Contrast; Future; Expected date: 06/19/2024    22. Pneumonia of right lower lobe due to infectious organism  Assessment & Plan:  Reviewed CT results which shows mediastinal lymphadenopathy  Recommendation to repeat follow-up testing for this and atelectasis lobar consolidation      23. Asthma, unspecified asthma severity, unspecified whether complicated, unspecified whether persistent  Assessment & Plan:  Unclear history of asthma versus COPD patient is still smoking ordering PFTs      24. ASCVD (arteriosclerotic cardiovascular disease)  Assessment & Plan:  Referring patient to Cardiology as patient has some chart records that indicate she may have CHF   Continuing Lipitor      25. Sepsis due to group A Streptococcus with acute renal failure, unspecified acute renal failure type, unspecified whether septic shock present  Assessment & Plan:  Reviewed hospital records indicating that patient had recent admission for sepsis secondary to pneumonia      26. Tobacco abuse  Assessment & Plan:  Spent 4 minutes discussing smoking cessation with patient. Patient declines referral to smoking cessation. Discussed patient's recent pneumonia and bronchitis. Also questioned COPD diagnosis and ordering PFT's.       27. Hyperthyroidism  Assessment & Plan:  TSH and T4.  Refilled methimazole 10 mg  daily      Other orders  -     methIMAzole (TAPAZOLE) 10 MG Tab; Take 1 tablet (10 mg total) by mouth once daily.  Dispense: 90 tablet; Refill: 3  -     metFORMIN (GLUCOPHAGE) 500 MG tablet; Take 1 tablet (500 mg total) by mouth 2 (two) times daily.  Dispense: 180 tablet; Refill: 3  -     insulin aspart U-100 (NOVOLOG) 100 unit/mL (3 mL) InPn pen; Inject 18 Units into the skin 3 (three) times daily.   Sliding scale: Inject 0-5 Units into the skin before meals and at bedtime as needed (Hyperglycemia). Max daily dose of 69 units.  Dispense: 194.4 mL; Refill: 0  -     insulin detemir U-100, Levemir, 100 unit/mL (3 mL) SubQ InPn pen; 70 units into the skin in the am and 40 units PM  Dispense: 216 mL; Refill: 0  -     DULoxetine (CYMBALTA) 60 MG capsule; Take 1 capsule (60 mg total) by mouth once daily.  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    Future Appointments   Date Time Provider Department Center   7/16/2024  9:20 AM Seema Mirza MD [facility]        Seema Mirza MD

## 2024-06-19 NOTE — ASSESSMENT & PLAN NOTE
Reviewed CT results which shows mediastinal lymphadenopathy  Recommendation to repeat follow-up testing for this and atelectasis lobar consolidation

## 2024-06-19 NOTE — ASSESSMENT & PLAN NOTE
Reviewed hospital records indicating that patient had recent admission for sepsis secondary to pneumonia

## 2024-06-19 NOTE — PROGRESS NOTES
Navin Beck is a 63 y.o. female here for a diabetic eye screening with non-dilated fundus photos per Seema Mirza MD.    Patient cooperative?: Yes  Small pupils?: No  Last eye exam:     For exam results, see Encounter Report.

## 2024-06-19 NOTE — ASSESSMENT & PLAN NOTE
Patient's blood pressure is lower in clinic than anticipated will decrease benazepril to 5 mg daily  Blood pressure 129/56

## 2024-06-24 DIAGNOSIS — E11.65 TYPE 2 DIABETES MELLITUS WITH HYPERGLYCEMIA, WITH LONG-TERM CURRENT USE OF INSULIN: Primary | ICD-10-CM

## 2024-06-24 DIAGNOSIS — E11.3293 MILD NONPROLIFERATIVE DIABETIC RETINOPATHY OF BOTH EYES ASSOCIATED WITH TYPE 2 DIABETES MELLITUS, MACULAR EDEMA PRESENCE UNSPECIFIED: ICD-10-CM

## 2024-06-24 DIAGNOSIS — Z98.890 HISTORY OF LUMBOSACRAL SPINE SURGERY: ICD-10-CM

## 2024-06-24 DIAGNOSIS — Z79.4 TYPE 2 DIABETES MELLITUS WITH HYPERGLYCEMIA, WITH LONG-TERM CURRENT USE OF INSULIN: Primary | ICD-10-CM

## 2024-06-24 RX ORDER — HYDROCODONE BITARTRATE AND ACETAMINOPHEN 10; 325 MG/1; MG/1
1 TABLET ORAL EVERY 12 HOURS PRN
Qty: 35 TABLET | Refills: 0 | Status: SHIPPED | OUTPATIENT
Start: 2024-06-24

## 2024-07-03 ENCOUNTER — HOSPITAL ENCOUNTER (OUTPATIENT)
Dept: RADIOLOGY | Facility: HOSPITAL | Age: 64
Discharge: HOME | End: 2024-07-03
Attending: STUDENT IN AN ORGANIZED HEALTH CARE EDUCATION/TRAINING PROGRAM
Payer: MEDICARE

## 2024-07-03 DIAGNOSIS — E05.00 GRAVES DISEASE: ICD-10-CM

## 2024-07-03 DIAGNOSIS — Z78.0 POST-MENOPAUSAL: ICD-10-CM

## 2024-07-03 DIAGNOSIS — Z12.31 ENCOUNTER FOR SCREENING MAMMOGRAM FOR BREAST CANCER: ICD-10-CM

## 2024-07-03 PROCEDURE — 77067 SCR MAMMO BI INCL CAD: CPT | Mod: 26,,, | Performed by: RADIOLOGY

## 2024-07-03 PROCEDURE — 77080 DXA BONE DENSITY AXIAL: CPT | Mod: TC

## 2024-07-03 PROCEDURE — 77067 SCR MAMMO BI INCL CAD: CPT | Mod: TC

## 2024-07-03 PROCEDURE — 77080 DXA BONE DENSITY AXIAL: CPT | Mod: 26,,, | Performed by: RADIOLOGY

## 2024-07-03 PROCEDURE — 77063 BREAST TOMOSYNTHESIS BI: CPT | Mod: 26,,, | Performed by: RADIOLOGY

## 2024-07-10 ENCOUNTER — PROCEDURE VISIT (OUTPATIENT)
Dept: RESPIRATORY THERAPY | Facility: HOSPITAL | Age: 64
End: 2024-07-10
Attending: STUDENT IN AN ORGANIZED HEALTH CARE EDUCATION/TRAINING PROGRAM
Payer: MEDICARE

## 2024-07-10 ENCOUNTER — HOSPITAL ENCOUNTER (OUTPATIENT)
Dept: RADIOLOGY | Facility: HOSPITAL | Age: 64
Discharge: HOME | End: 2024-07-10
Attending: STUDENT IN AN ORGANIZED HEALTH CARE EDUCATION/TRAINING PROGRAM
Payer: MEDICARE

## 2024-07-10 VITALS — HEART RATE: 77 BPM | RESPIRATION RATE: 19 BRPM | OXYGEN SATURATION: 98 %

## 2024-07-10 DIAGNOSIS — R59.0 LOCALIZED ENLARGED LYMPH NODES: ICD-10-CM

## 2024-07-10 DIAGNOSIS — J40 BRONCHITIS: ICD-10-CM

## 2024-07-10 LAB
DLCO SINGLE BREATH LLN: 18
DLCO SINGLE BREATH PRE REF: 64.8 %
DLCO SINGLE BREATH REF: 23.73
DLCOC SBVA LLN: 3.04
DLCOC SBVA REF: 4.36
DLCOC SINGLE BREATH LLN: 18
DLCOC SINGLE BREATH REF: 23.73
DLCOCSBVAULN: 5.68
DLCOCSINGLEBREATHULN: 29.46
DLCOSINGLEBREATHULN: 29.46
DLCOVA LLN: 3.04
DLCOVA PRE REF: 85.1 %
DLCOVA PRE: 3.71 ML/(MIN*MMHG*L) (ref 3.04–5.68)
DLCOVA REF: 4.36
DLCOVAULN: 5.68
ERV LLN: -16449.26
ERV PRE REF: 66.6 %
ERV REF: 0.74
ERVULN: ABNORMAL
FEF 25 75 LLN: 1.41
FEF 25 75 PRE REF: 54.2 %
FEF 25 75 REF: 2.8
FEF2575CHANGE: 19.9 %
FET100CHANGE: -21.2 %
FEV1 FVC LLN: 66
FEV1 FVC PRE REF: 94.5 %
FEV1 FVC REF: 79
FEV1 LLN: 1.57
FEV1 PRE REF: 90.9 %
FEV1 REF: 2.21
FEV1CHANGE: 1.9 %
FEV1FVCCHANGE: 3.9 %
FRCPLETH LLN: 2.06
FRCPLETH PREREF: 99.3 %
FRCPLETH REF: 2.88
FRCPLETHULN: 3.7
FVC LLN: 2.04
FVC PRE REF: 95.5 %
FVC REF: 2.83
FVCCHANGE: -2 %
IVC PRE: 2.6 L (ref 2.04–3.66)
IVC SINGLE BREATH LLN: 2.04
IVC SINGLE BREATH PRE REF: 92.1 %
IVC SINGLE BREATH REF: 2.83
IVCSINGLEBREATHULN: 3.66
LLN IC: -16447.68
MVV LLN: 84
MVV PRE REF: 75.1 %
MVV REF: 99
PEF LLN: 3.47
PEF PRE REF: 82.9 %
PEF REF: 5.67
PEFCHANGE: 4.6 %
POST FEF 25 75: 1.82 L/S (ref 1.41–4.2)
POST FET 100: 8.02 SEC
POST FEV1 FVC: 77.26 % (ref 66.33–89.28)
POST FEV1: 2.04 L (ref 1.57–2.82)
POST FVC: 2.65 L (ref 2.04–3.66)
POST PEF: 4.92 L/S (ref 3.47–7.87)
PRE DLCO: 15.38 ML/(MIN*MMHG) (ref 18–29.46)
PRE ERV: 0.49 L (ref -16449.26–16450.74)
PRE FEF 25 75: 1.52 L/S (ref 1.41–4.2)
PRE FET 100: 10.18 SEC
PRE FEV1 FVC: 74.36 % (ref 66.33–89.28)
PRE FEV1: 2.01 L (ref 1.57–2.82)
PRE FRC PL: 2.86 L (ref 2.06–3.7)
PRE FVC: 2.7 L (ref 2.04–3.66)
PRE IC: 2.21 L (ref -16447.68–16452.32)
PRE MVV: 74.27 L/MIN (ref 84.05–113.72)
PRE PEF: 4.7 L/S (ref 3.47–7.87)
PRE REF IC: 94.9 %
PRE RV: 2.36 L (ref 1.56–2.71)
PRE TLC: 5.06 L (ref 4.45–6.43)
REF IC: 2.32
RV LLN: 1.56
RV PRE REF: 110.7 %
RV REF: 2.14
RVTLC LLN: 32
RVTLC PRE REF: 112.8 %
RVTLC PRE: 46.7 % (ref 31.81–50.99)
RVTLC REF: 41
RVTLCULN: 51
RVULN: 2.71
TLC LLN: 4.45
TLC PRE REF: 93 %
TLC REF: 5.44
TLC ULN: 6.43
ULN IC: ABNORMAL
VA PRE: 4.15 L (ref 5.29–5.29)
VA SINGLE BREATH PRE REF: 78.3 %
VA SINGLE BREATH REF: 5.29
VC LLN: 2.04
VC PRE REF: 95.5 %
VC PRE: 2.7 L (ref 2.04–3.66)
VC REF: 2.83
VC ULN: 3.66

## 2024-07-10 PROCEDURE — 94729 DIFFUSING CAPACITY: CPT

## 2024-07-10 PROCEDURE — 71250 CT THORAX DX C-: CPT | Mod: TC

## 2024-07-10 PROCEDURE — 94060 EVALUATION OF WHEEZING: CPT

## 2024-07-10 PROCEDURE — 94760 N-INVAS EAR/PLS OXIMETRY 1: CPT

## 2024-07-10 PROCEDURE — 94727 GAS DIL/WSHOT DETER LNG VOL: CPT

## 2024-07-10 RX ORDER — ALBUTEROL SULFATE 0.83 MG/ML
SOLUTION RESPIRATORY (INHALATION)
Status: DISPENSED
Start: 2024-07-10 | End: 2024-07-11

## 2024-07-10 RX ORDER — ALBUTEROL SULFATE 0.83 MG/ML
2.5 SOLUTION RESPIRATORY (INHALATION)
Status: COMPLETED | OUTPATIENT
Start: 2024-07-10 | End: 2024-07-10

## 2024-07-10 RX ADMIN — ALBUTEROL SULFATE 2.5 MG: 0.83 SOLUTION RESPIRATORY (INHALATION) at 01:07

## 2024-07-16 NOTE — SUBJECTIVE & OBJECTIVE
"Interval HPI:   Overnight events: Remains in ICU. BG above goal with basal insulin and prn correction scale. PO intake poor. Decreased appetite. Dialysis per nephrology.   Eating:   <25%  Nausea: No  Hypoglycemia and intervention: No  Fever: No  TPN and/or TF: No      BP (!) 148/67   Pulse 74   Temp 97.6 °F (36.4 °C) (Axillary)   Resp 12   Ht 5' 7" (1.702 m)   Wt 115.5 kg (254 lb 10.1 oz)   SpO2 99%   BMI 39.88 kg/m²     Labs Reviewed and Include    Recent Labs   Lab 12/04/20  0620  12/04/20  1430   *   < > 236*  240*   CALCIUM 9.3   < > 10.5  10.2   ALBUMIN 1.5*  --  1.6*   PROT 5.5*  --   --    *   < > 129*  129*   K 3.9   < > 4.0  4.0   CO2 22*   < > 25  25   CL 95   < > 95  96   BUN 9   < > 7*  7*   CREATININE 2.7*   < > 2.2*  2.3*   ALKPHOS 198*  --   --    ALT <5*  --   --    AST 11  --   --    BILITOT 0.2  --   --     < > = values in this interval not displayed.     Lab Results   Component Value Date    WBC 12.39 12/04/2020    HGB 7.0 (L) 12/04/2020    HCT 22.0 (L) 12/04/2020    MCV 90 12/04/2020     (H) 12/04/2020     No results for input(s): TSH, FREET4 in the last 168 hours.  Lab Results   Component Value Date    HGBA1C 11.4 (H) 11/27/2020       Nutritional status:   Body mass index is 39.88 kg/m².  Lab Results   Component Value Date    ALBUMIN 1.6 (L) 12/04/2020    ALBUMIN 1.5 (L) 12/04/2020    ALBUMIN 1.5 (L) 12/04/2020     No results found for: PREALBUMIN    Estimated Creatinine Clearance: 34.8 mL/min (A) (based on SCr of 2.2 mg/dL (H)).    Accu-Checks  Recent Labs     12/02/20  1659 12/02/20  2049 12/03/20  0850 12/03/20  1142 12/03/20  1547 12/03/20  1549 12/03/20  2008 12/04/20  0752 12/04/20  0916 12/04/20  1232   POCTGLUCOSE 237* 219* 317* 333* 356* 349* 235* 267* 286* 251*       Current Medications and/or Treatments Impacting Glycemic Control  Immunotherapy:    Immunosuppressants     None        Steroids:   Hormones (From admission, onward)    Start     Stop " Tai in for TB placement today.  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  He is doing some shadowing at [facility] and a Mantoux test is required for this.  He does not have any forms with him today. Just says he will need the printed result when he gets it read on Thursday.   TB read is scheduled.   Route Frequency Ordered    11/27/20 0959  melatonin tablet 6 mg      -- Oral Nightly PRN 11/27/20 0903        Pressors:    Autonomic Drugs (From admission, onward)    None        Hyperglycemia/Diabetes Medications:   Antihyperglycemics (From admission, onward)    Start     Stop Route Frequency Ordered    12/04/20 2100  insulin detemir U-100 pen 30 Units      -- SubQ 2 times daily 12/04/20 1711    12/04/20 1130  insulin aspart U-100 pen 10-15 Units      -- SubQ 3 times daily with meals 12/04/20 0947    12/03/20 1658  insulin aspart U-100 pen 0-5 Units      -- SubQ Before meals & nightly PRN 12/03/20 1558    11/28/20 1245  insulin regular in 0.9 % NaCl 100 unit/100 mL (1 unit/mL) infusion     Question:  Enter initial dose (Units/hr):  Answer:  4    11/28 2300 IV Continuous 11/28/20 1133

## 2024-08-12 PROBLEM — J15.4: Status: RESOLVED | Noted: 2024-05-08 | Resolved: 2024-08-12

## 2024-08-12 PROBLEM — A40.0 SEPSIS DUE TO GROUP A STREPTOCOCCUS: Status: RESOLVED | Noted: 2024-05-08 | Resolved: 2024-08-12

## 2024-08-16 DIAGNOSIS — Z98.890 HISTORY OF LUMBOSACRAL SPINE SURGERY: ICD-10-CM

## 2024-08-16 NOTE — TELEPHONE ENCOUNTER
No care due was identified.  [facility] Embedded Care Due Messages. Reference number: [id number].   8/16/2024 2:38:50 PM CDT

## 2024-08-16 NOTE — TELEPHONE ENCOUNTER
----- Message from Alee Jackson sent at 8/16/2024 11:53 AM CDT -----  Regarding: refill  Who Called: Navin Beck    Refill or New Rx:Refill  RX Name and Strength:HYDROcodone-acetaminophen (NORCO)  mg per tablet  How is the patient currently taking it? (ex. 1XDay):prn  Is this a 30 day or 90 day RX:30  Local or Mail Order:local  List of preferred pharmacies on file (remove unneeded): [unfilled]  If different Pharmacy is requested, enter Pharmacy information here including location and phone number:  [id number] - [place] - [address]  Ordering Provider:      Preferred Method of Contact: Phone Call  Patient's Preferred Phone Number on File: [phone]   Best Call Back Number, if different:  Additional Information:

## 2024-08-20 RX ORDER — HYDROCODONE BITARTRATE AND ACETAMINOPHEN 10; 325 MG/1; MG/1
1 TABLET ORAL EVERY 12 HOURS PRN
Qty: 35 TABLET | Refills: 0 | OUTPATIENT
Start: 2024-08-20

## 2024-08-22 ENCOUNTER — TELEPHONE (OUTPATIENT)
Dept: FAMILY MEDICINE | Facility: CLINIC | Age: 64
End: 2024-08-22
Payer: MEDICARE

## 2024-08-22 NOTE — TELEPHONE ENCOUNTER
----- Message from Alee Jackson sent at 8/22/2024  2:48 PM CDT -----  Regarding: referral  Who Called: Navin Beck    Does the patient already have the specialty appointment scheduled?:no  If yes, what is the date of that appointment?:  Referral to What Specialty:Pain Management  Reason for Referral:  Does the patient want the referral with a specific physician?:  If yes, which provider?:   Is the specialist an [facility]    Preferred Method of Contact: Phone Call  Patient's Preferred Phone Number on File: [phone]   Best Call Back Number, if different:  Additional Information: pt doesn't have name of physician but has ph & fax number #[phone] fax#[phone]

## 2024-08-23 DIAGNOSIS — M54.30 SCIATIC NERVE PAIN, UNSPECIFIED LATERALITY: ICD-10-CM

## 2024-08-23 DIAGNOSIS — M51.36 DEGENERATIVE DISC DISEASE, LUMBAR: Primary | ICD-10-CM

## 2024-09-16 DIAGNOSIS — M79.7 FIBROMYALGIA: ICD-10-CM

## 2024-09-17 RX ORDER — GABAPENTIN 300 MG/1
600 CAPSULE ORAL 3 TIMES DAILY
Qty: 180 CAPSULE | Refills: 2 | Status: SHIPPED | OUTPATIENT
Start: 2024-09-17

## 2024-09-19 ENCOUNTER — TELEPHONE (OUTPATIENT)
Dept: FAMILY MEDICINE | Facility: CLINIC | Age: 64
End: 2024-09-19
Payer: MEDICARE

## 2024-09-19 DIAGNOSIS — E11.65 TYPE 2 DIABETES MELLITUS WITH HYPERGLYCEMIA, WITH LONG-TERM CURRENT USE OF INSULIN: Primary | ICD-10-CM

## 2024-09-19 DIAGNOSIS — Z79.4 TYPE 2 DIABETES MELLITUS WITH HYPERGLYCEMIA, WITH LONG-TERM CURRENT USE OF INSULIN: Primary | ICD-10-CM

## 2024-09-19 RX ORDER — INSULIN GLARGINE 100 [IU]/ML
70 INJECTION, SOLUTION SUBCUTANEOUS DAILY
Qty: 20 ML | Refills: 0 | Status: SHIPPED | OUTPATIENT
Start: 2024-09-19 | End: 2024-10-19

## 2024-09-19 RX ORDER — INSULIN GLARGINE 100 [IU]/ML
40 INJECTION, SOLUTION SUBCUTANEOUS NIGHTLY
Qty: 10 ML | Refills: 0 | Status: SHIPPED | OUTPATIENT
Start: 2024-09-19 | End: 2024-10-19

## 2024-09-19 NOTE — TELEPHONE ENCOUNTER
Called the pharmacy to see what it is that she is filling as far as insulin.     I see only novolog and levemir which are the medication that Dr. Mirza has sent in refills of at her appointment in June.     Novolog is the medication that she has filled.   Levemir is non formulary, they have not received a PA on this medication.     I asked the pharmacy when is the last time they filled lantus, they states that she does not have this script.     I have completed a PA for this medication today via cover my meds.

## 2024-09-19 NOTE — TELEPHONE ENCOUNTER
Called patient.  confirmed. I let the patient know that when she was here in  Dr. Mirza had sent her in novolog and levemir. Patient states that she only received the novolog. I let her know that I did call the pharmacy and confirm this. I did let her know that the pharmacy stated that the levemir needed a PA in which I just completed but have not received a approval or denial yet.     Patient is taking 70 units lantus in the morning and 30 units at night. The script provider wrote for the patient states that 70 units should be in the morning and 40 units at night.     When I completed the PA it did say that the lantus was a preferred medication. Can we sub this medication she is completley out and the PA determination even though I selected urgent is saying 3-7 days.

## 2024-09-19 NOTE — TELEPHONE ENCOUNTER
----- Message from Alee Jackson sent at 9/18/2024  1:59 PM CDT -----  Regarding: refill  Who Called: Navin Beck    Refill or New Rx:Refill  RX Name and Strength:LANTUS U-100 INSULIN 100 unit/mL injection  How is the patient currently taking it? (ex. 1XDay):2x day  Is this a 30 day or 90 day RX:30  Local or Mail Order:local  List of preferred pharmacies on file (remove unneeded): [unfilled]  If different Pharmacy is requested, enter Pharmacy information here including location and phone number: [id number] - [place], [place] - [address]       Ordering Provider:      Preferred Method of Contact: Phone Call  Patient's Preferred Phone Number on File: [phone]   Best Call Back Number, if different:  Additional Information: advised pt this medication was discontinued, pt states it shouldn't have been because she's still currently taking and ran out 2 days ago

## 2024-09-19 NOTE — TELEPHONE ENCOUNTER
Called patient.  confirmed. Patient notified that this medication has been sent to pharmacy. Patient notified that the directions for use for this medication is 70 units in the morning and 40 units at night. Patient understood.

## 2024-10-01 ENCOUNTER — OFFICE VISIT (OUTPATIENT)
Dept: FAMILY MEDICINE | Facility: CLINIC | Age: 64
End: 2024-10-01
Payer: MEDICARE

## 2024-10-01 ENCOUNTER — TELEPHONE (OUTPATIENT)
Dept: FAMILY MEDICINE | Facility: CLINIC | Age: 64
End: 2024-10-01

## 2024-10-01 VITALS
SYSTOLIC BLOOD PRESSURE: 135 MMHG | HEIGHT: 67 IN | TEMPERATURE: 99 F | HEART RATE: 89 BPM | OXYGEN SATURATION: 98 % | WEIGHT: 217 LBS | BODY MASS INDEX: 34.06 KG/M2 | DIASTOLIC BLOOD PRESSURE: 78 MMHG

## 2024-10-01 DIAGNOSIS — R06.02 SOB (SHORTNESS OF BREATH): ICD-10-CM

## 2024-10-01 DIAGNOSIS — Z23 ENCOUNTER FOR IMMUNIZATION: Primary | ICD-10-CM

## 2024-10-01 DIAGNOSIS — I10 ESSENTIAL HYPERTENSION: ICD-10-CM

## 2024-10-01 DIAGNOSIS — J18.9 PNEUMONIA OF RIGHT LOWER LOBE DUE TO INFECTIOUS ORGANISM: ICD-10-CM

## 2024-10-01 DIAGNOSIS — Z98.890 HISTORY OF LUMBOSACRAL SPINE SURGERY: ICD-10-CM

## 2024-10-01 DIAGNOSIS — F51.01 PRIMARY INSOMNIA: ICD-10-CM

## 2024-10-01 DIAGNOSIS — M85.89 OSTEOPENIA OF MULTIPLE SITES: ICD-10-CM

## 2024-10-01 DIAGNOSIS — M46.1 SACROILIITIS: ICD-10-CM

## 2024-10-01 DIAGNOSIS — E11.649 UNCONTROLLED TYPE 2 DIABETES MELLITUS WITH HYPOGLYCEMIA, UNSPECIFIED HYPOGLYCEMIA COMA STATUS: ICD-10-CM

## 2024-10-01 DIAGNOSIS — Z79.4 TYPE 2 DIABETES MELLITUS WITH HYPERGLYCEMIA, WITH LONG-TERM CURRENT USE OF INSULIN: ICD-10-CM

## 2024-10-01 DIAGNOSIS — E11.65 TYPE 2 DIABETES MELLITUS WITH HYPERGLYCEMIA, WITH LONG-TERM CURRENT USE OF INSULIN: ICD-10-CM

## 2024-10-01 PROCEDURE — 90653 IIV ADJUVANT VACCINE IM: CPT | Mod: PBBFAC,PN

## 2024-10-01 PROCEDURE — G0008 ADMIN INFLUENZA VIRUS VAC: HCPCS | Mod: PBBFAC,PN

## 2024-10-01 PROCEDURE — 99214 OFFICE O/P EST MOD 30 MIN: CPT | Mod: PBBFAC,PN | Performed by: STUDENT IN AN ORGANIZED HEALTH CARE EDUCATION/TRAINING PROGRAM

## 2024-10-01 RX ORDER — VITAMIN E (DL,TOCOPHERYL ACET) 45 MG/0.25
1 DROPS ORAL 2 TIMES DAILY
Qty: 60 TABLET | Refills: 11 | Status: SHIPPED | OUTPATIENT
Start: 2024-10-01

## 2024-10-01 RX ORDER — TRAZODONE HYDROCHLORIDE 50 MG/1
50 TABLET ORAL NIGHTLY
Qty: 30 TABLET | Refills: 11 | Status: SHIPPED | OUTPATIENT
Start: 2024-10-01 | End: 2025-10-01

## 2024-10-01 RX ORDER — SYRINGE AND NEEDLE,INSULIN,1ML 30 G X1/2"
1 SYRINGE, EMPTY DISPOSABLE MISCELLANEOUS 2 TIMES DAILY
Qty: 100 EACH | Refills: 11 | Status: SHIPPED | OUTPATIENT
Start: 2024-10-01

## 2024-10-01 RX ORDER — CALCIUM CITRATE/VITAMIN D3 200MG-6.25
1 TABLET ORAL 3 TIMES DAILY
Qty: 200 EACH | Refills: 11 | Status: SHIPPED | OUTPATIENT
Start: 2024-10-01

## 2024-10-01 RX ORDER — FLUCONAZOLE 150 MG/1
TABLET ORAL
Qty: 2 TABLET | Refills: 0 | Status: SHIPPED | OUTPATIENT
Start: 2024-10-01

## 2024-10-01 RX ORDER — METFORMIN HYDROCHLORIDE 500 MG/1
500 TABLET ORAL
Qty: 90 TABLET | Refills: 3 | Status: SHIPPED | OUTPATIENT
Start: 2024-10-01

## 2024-10-01 RX ORDER — HYDROCODONE BITARTRATE AND ACETAMINOPHEN 10; 325 MG/1; MG/1
1 TABLET ORAL EVERY 12 HOURS PRN
Qty: 35 TABLET | Refills: 0 | Status: SHIPPED | OUTPATIENT
Start: 2024-10-01

## 2024-10-01 RX ORDER — INSULIN GLARGINE 100 [IU]/ML
40 INJECTION, SOLUTION SUBCUTANEOUS 2 TIMES DAILY
Qty: 24 ML | Refills: 11 | Status: SHIPPED | OUTPATIENT
Start: 2024-10-01 | End: 2025-10-01

## 2024-10-01 RX ADMIN — INFLUENZA A VIRUS A/VICTORIA/4897/2022 IVR-238 (H1N1) ANTIGEN (FORMALDEHYDE INACTIVATED), INFLUENZA A VIRUS A/THAILAND/8/2022 IVR-237 (H3N2) ANTIGEN (FORMALDEHYDE INACTIVATED), INFLUENZA B VIRUS B/AUSTRIA/1359417/2021 BVR-26 ANTIGEN (FORMALDEHYDE INACTIVATED) 0.5 ML: 15; 15; 15 INJECTION, SUSPENSION INTRAMUSCULAR at 01:10

## 2024-10-01 NOTE — PROGRESS NOTES
Dictation #1  MRN:[id number]  [facility]:[id number] Patient Name: Navin Beck     : 1960    MRN: [id number]     Subjective:     Patient ID: Navin Beck is a 64 y.o. female.    Chief Complaint:   Chief Complaint   Patient presents with    Follow-up     Needs refills on novolog and lantus. She is also requesting the vials instead of the pens. She is requesting something for pain rates the pain an 8 that is located in her lower back. She is also requesting something for insomnia. Needs refills on true metrix strips. Bp 135/78        HPI: HPI  64-year-old female presents to for follow up of multiple medication issues  Hyperthyroidism  Patient reports that she takes methimazole 10 mg daily  TSH and T4 WNL    Diabetes with neuropathy and possibly CKD  Patient is asking for Lantus in vials as she is unsure of how to use  Pins and feels the vials work better patient reports CBG is at home have been running 4-500  A1c [id number]  Reports that she is taking Lantus 60-70 units in the morning and 20 units at night  Reports that she is also taking metformin 500 mg twice a day but would like to decrease this stating she thinks it might be causing some stomach issues  Reports in the past being on Jardiance in his amenable to trying it again    Degenerative disc disease with sciatic nerve pain  Reports having been through epidural injections and ablation which did not relieve pain  Has a pain management doctor in [place] for which she is being prescribed Cymbalta 60 gabapentin 600 mg t.i.d. and Norco 10 35 pills per month   Reports that she has been unable to get to her appointments in [place]   Asking for referral to pain management   Patient was attempted to be contacted by "[employer] but they were unable to reach patient's    Hypertension is on benazepril 10 mg daily   Blood pressure in clinic 135/78  Patient also has a reported history of heart failure however does not appear to be on GDM T    Patient  also reports that she has had insomnia    No history of seizure states she has tried over-the-counter medications which have not helped her sleep        Also reports history of bronchitis yet patient reports that she does smoke and has been since she was 13 years old    Insomnia  Is on Ambien 10  ROS:      ROS     12 point review of systems conducted, negative except as stated in the history of present illness. See HPI for details.    History:     Past Medical History:   Diagnosis Date    Allergy     Anemia due to stage 3 chronic kidney disease 12/6/2020    Arrhythmia     Arthritis     Bronchial asthma     Diabetes mellitus     Diabetes mellitus     Glaucoma     Hormone disorder     hysterectomy    Hypercholesteremia     Hypertension     Hyperthyroidism     Insomnia     Kidney stones     Neuropathy     Thyroid disease     Urine, incontinence, stress female     UTI (urinary tract infection) 11/27/2020        Past Surgical History:   Procedure Laterality Date    APPENDECTOMY      BACK SURGERY      disc removal     CARPAL TUNNEL RELEASE Bilateral     CHOLECYSTECTOMY      CYSTOSCOPY N/A 10/26/2020    Procedure: CYSTOSCOPY;  Surgeon: Wilner Goel MD;  Location: [facility];  Service: OB/GYN;  Laterality: N/A;    CYSTOSCOPY W/ RETROGRADES Bilateral 12/7/2020    Procedure: CYSTOSCOPY, WITH RETROGRADE PYELOGRAM;  Surgeon: Daniel Jalloh MD;  Location: [address];  Service: Urology;  Laterality: Bilateral;    FRACTURE SURGERY Right     wrist    HYSTERECTOMY  2010    [facility]    JOINT REPLACEMENT      KNEE ARTHROSCOPY W/ DEBRIDEMENT      KNEE SURGERY Right     Knee replacement    LITHOTRIPSY      TN REMOVAL OF OVARY/TUBE(S)  9/9/13    benign    removal of round ligament mass      ROBOT-ASSISTED LAPAROSCOPIC LYSIS OF ADHESIONS USING DA JAMES XI N/A 10/26/2020    Procedure: XI ROBOTIC LYSIS, ADHESIONS;  Surgeon: Wilner Goel MD;  Location: [facility];  Service: OB/GYN;  Laterality: N/A;  ROBOTIC  MOBILIZATION OF BLADDER- DR BURNHAM    SPINE SURGERY      WRIST FRACTURE SURGERY Left        Family History   Problem Relation Name Age of Onset    Cancer Mother      Cancer Brother      Colon cancer Brother      Cancer Brother      Cancer Brother      Cancer Brother      Ovarian cancer Neg Hx      Uterine cancer Neg Hx      Breast cancer Neg Hx          Social History     Tobacco Use    Smoking status: Every Day     Current packs/day: 0.50     Average packs/day: 0.5 packs/day for 30.0 years (15.0 ttl pk-yrs)     Types: Cigarettes    Smokeless tobacco: Never   Substance and Sexual Activity    Alcohol use: No    Drug use: No    Sexual activity: Not Currently     Partners: Male     Birth control/protection: Surgical       Current Outpatient Medications   Medication Instructions    albuterol (PROVENTIL/VENTOLIN HFA) 90 mcg/actuation inhaler 1-2 puffs, Every 6 hours PRN    atorvastatin (LIPITOR) 20 mg, Oral, Daily    benazepriL (LOTENSIN) 5 mg, Oral, Daily    blood-glucose meter (TRUE METRIX GLUCOSE METER MISC) True Metrix Glucose Meter    calcium carb and citrate-vitD3 600 mg-12.5 mcg (500 unit) TbSR 1 each, Oral, 2 times daily    diclofenac (VOLTAREN) 75 mg, Oral, 2 times daily    DULoxetine (CYMBALTA) 60 mg, Oral, Daily    empagliflozin (JARDIANCE) 10 mg, Oral, Daily    [START ON 10/31/2024] empagliflozin (JARDIANCE) 25 mg, Oral, Daily    estradioL (ESTRACE) 1 g, Vaginal, Three times weekly, Alternate days with Metrogel    fluconazole (DIFLUCAN) 150 MG Tab Take 1 pill for yeast infection repeat in 3 days    fluticasone propionate (FLOVENT HFA) 44 mcg/actuation inhaler 1 puff, Daily    gabapentin (NEURONTIN) 600 mg, Oral, 3 times daily    HYDROcodone-acetaminophen (NORCO)  mg per tablet 1 tablet, Oral, Every 12 hours PRN    insulin aspart U-100 (NOVOLOG) 100 unit/mL (3 mL) InPn pen Inject 18 Units into the skin 3 (three) times daily.   Sliding scale: Inject 0-5 Units into the skin before meals and at bedtime as  "needed (Hyperglycemia). Max daily dose of 69 units.    insulin detemir U-100, Levemir, 100 unit/mL (3 mL) SubQ InPn pen 70 units into the skin in the am and 40 units PM    insulin syringe-needle U-100 (INSULIN SYRINGE MICROFINE) 1 mL 27 gauge x 5/8" Syrg 1 each, Misc.(Non-Drug; Combo Route), 2 times daily    LANTUS U-100 INSULIN 40 Units, Subcutaneous, 2 times daily    metFORMIN (GLUCOPHAGE) 500 mg, Oral, With breakfast    methIMAzole (TAPAZOLE) 10 mg, Oral, Daily    pantoprazole (PROTONIX) 40 mg, Oral, Daily, Start after the twice daily Protonix is complete.Follow-up with doctor for further refills.    tiZANidine (ZANAFLEX) 8-12 mg, Oral, Nightly PRN    traZODone (DESYREL) 50 mg, Oral, Nightly    TRUE METRIX GLUCOSE TEST STRIP Strp 1 each, Topical (Top), 3 times daily    zolpidem (AMBIEN) 5 mg, Oral, Nightly PRN, May take with 5-10 mg of Melatonin        Review of patient's allergies indicates:   Allergen Reactions    Penicillins Shortness Of Breath, Itching and Swelling     Tolerates cefepime 11/30/2020    Penicillins Anaphylaxis       Objective:     Visit Vitals  /78 (BP Location: Right arm)   Pulse 89   Temp 98.7 °F (37.1 °C) (Oral)   Ht 5' 7" (1.702 m)   Wt 98.4 kg (217 lb)   SpO2 98%   BMI 33.99 kg/m²       Physical Examination:     Physical Exam  Constitutional:       General: She is not in acute distress.  Eyes:      General: No scleral icterus.     Extraocular Movements: Extraocular movements intact.      Conjunctiva/sclera: Conjunctivae normal.      Pupils: Pupils are equal, round, and reactive to light.   Cardiovascular:      Rate and Rhythm: Normal rate and regular rhythm.      Heart sounds: No murmur heard.  Pulmonary:      Effort: Pulmonary effort is normal. No respiratory distress.      Breath sounds: No stridor. No wheezing or rhonchi.   Abdominal:      General: Bowel sounds are normal. There is no distension.      Palpations: Abdomen is soft.      Tenderness: There is no abdominal tenderness. " There is no guarding.   Musculoskeletal:         General: No swelling.   Skin:     General: Skin is warm and dry.      Coloration: Skin is not jaundiced.      Findings: No rash.   Neurological:      Mental Status: She is alert.      Gait: Gait abnormal.   Psychiatric:         Thought Content: Thought content normal.         Lab Results:     Chemistry:  Lab Results   Component Value Date     06/19/2024    K 5.2 (H) 06/19/2024    BUN 16.0 06/19/2024    CREATININE 1.12 (H) 06/19/2024    EGFRNORACEVR 55 06/19/2024    GLUCOSE 179 (H) 06/19/2024    CALCIUM 9.1 06/19/2024    ALKPHOS 126 06/19/2024    LABPROT 7.7 (H) 06/19/2024    ALBUMIN 3.6 06/19/2024    AST 12 06/19/2024    ALT 5 06/19/2024    MG 2.40 05/09/2024    PHOS 5.2 (H) 05/09/2024    PORNYHUU20WE 7 (L) 06/19/2024    TSH 2.872 06/19/2024    ZXJTMG0GURS 0.73 06/19/2024        Lab Results   Component Value Date    HGBA1C 8.9 (H) 06/19/2024        Hematology:  Lab Results   Component Value Date    WBC 10.56 06/19/2024    HGB 12.2 06/19/2024    HCT 38.4 06/19/2024     06/19/2024       Lipid Panel:  Lab Results   Component Value Date    CHOL 201 (H) 06/19/2024    HDL 50 06/19/2024    .00 06/19/2024    TRIG 171 (H) 06/19/2024    TOTALCHOLEST 4 06/19/2024        Urine:  Lab Results   Component Value Date    APPEARANCEUA Clear 06/19/2024    SGUA 1.017 06/19/2024    PROTEINUA Trace (A) 06/19/2024    KETONESUA Negative 06/19/2024    LEUKOCYTESUR 75 (A) 06/19/2024    RBCUA 0-5 06/19/2024    WBCUA 0-5 06/19/2024    BACTERIA None Seen 06/19/2024    SQEPUA Occ (A) 06/19/2024    HYALINECASTS None Seen 06/19/2024    CREATRANDUR 123.2 (H) 06/19/2024        Assessment:          ICD-10-CM ICD-9-CM   1. Encounter for immunization  Z23 V03.89   2. History of lumbosacral spine surgery  Z98.890 V15.29   3. Uncontrolled type 2 diabetes mellitus with hypoglycemia, unspecified hypoglycemia coma status  E11.649 250.82   4. Osteopenia of multiple sites  M85.89 733.90   5.  "Pneumonia of right lower lobe due to infectious organism  J18.9 486   6. Essential hypertension  I10 401.9   7. Type 2 diabetes mellitus with hyperglycemia, with long-term current use of insulin  E11.65 250.00    Z79.4 790.29     V58.67   8. Sacroiliitis  M46.1 720.2   9. Primary insomnia  F51.01 307.42        Plan:     1. Encounter for immunization  -     influenza (adjuvanted) (Fluad) 45 mcg/0.5 mL IM vaccine (> or = 66 yo) 0.5 mL    2. History of lumbosacral spine surgery  -     HYDROcodone-acetaminophen (NORCO)  mg per tablet; Take 1 tablet by mouth every 12 (twelve) hours as needed for Pain.  Dispense: 35 tablet; Refill: 0  -     Ambulatory referral/consult to Pain Clinic; Future; Expected date: 10/08/2024    3. Uncontrolled type 2 diabetes mellitus with hypoglycemia, unspecified hypoglycemia coma status  -     metFORMIN (GLUCOPHAGE) 500 MG tablet; Take 1 tablet (500 mg total) by mouth daily with breakfast.  Dispense: 90 tablet; Refill: 3  -     insulin glargine U-100, Lantus, (LANTUS U-100 INSULIN) 100 unit/mL injection; Inject 40 Units into the skin 2 (two) times a day.  Dispense: 24 mL; Refill: 11  -     insulin syringe-needle U-100 (INSULIN SYRINGE MICROFINE) 1 mL 27 gauge x 5/8" Syrg; 1 each by Misc.(Non-Drug; Combo Route) route 2 (two) times a day.  Dispense: 100 each; Refill: 11  -     empagliflozin (JARDIANCE) 10 mg tablet; Take 1 tablet (10 mg total) by mouth once daily.  Dispense: 30 tablet; Refill: 0  -     empagliflozin (JARDIANCE) 25 mg tablet; Take 1 tablet (25 mg total) by mouth once daily.  Dispense: 90 tablet; Refill: 3  -     fluconazole (DIFLUCAN) 150 MG Tab; Take 1 pill for yeast infection repeat in 3 days  Dispense: 2 tablet; Refill: 0    4. Osteopenia of multiple sites  Assessment & Plan:  Reviewed DEXA scan results with patient and started calcium and vitamin-D supplementation    Orders:  -     calcium carb and citrate-vitD3 600 mg-12.5 mcg (500 unit) TbSR; Take 1 each by mouth 2 " (two) times a day.  Dispense: 60 tablet; Refill: 11    5. Pneumonia of right lower lobe due to infectious organism  Assessment & Plan:  Reviewed new CT results with patient that shows near resolution of right lower lobe consolidation and stable lymphadenopathy      6. Essential hypertension  Assessment & Plan:  Patient's blood pressure controlled 135/78 continue current medication      7. Type 2 diabetes mellitus with hyperglycemia, with long-term current use of insulin  Assessment & Plan:  Diabetes is not controlled adding Jardiance 10 mg with titration to 25 switching patient from Lantus pens 2 vials which she is more comfortable with and encourage patient to take 40 units twice per day decrease metformin to once daily  Medication side effects discussed with patient and patient given prescription of Diflucan      8. Sacroiliitis  Assessment & Plan:  Patient given phone number for [facility]   Prescribe Norco 10 t.i.d. 35 pills per month      9. Primary insomnia  Assessment & Plan:  Trazodone 50 mg daily     Orders:  -     traZODone (DESYREL) 50 MG tablet; Take 1 tablet (50 mg total) by mouth every evening.  Dispense: 30 tablet; Refill: 11    Other orders  -     Influenza - Quadrivalent - High Dose (65+) (PF) (IM)  -     TRUE METRIX GLUCOSE TEST STRIP Strp; Apply 1 each topically 3 (three) times daily.  Dispense: 200 each; Refill: 11         Follow up in about 1 month (around 11/1/2024) for Diabetes.    Future Appointments   Date Time Provider Department Center   11/4/2024  1:20 PM [facility]        Seema Mirza MD

## 2024-10-01 NOTE — ASSESSMENT & PLAN NOTE
Reviewed new CT results with patient that shows near resolution of right lower lobe consolidation and stable lymphadenopathy

## 2024-10-01 NOTE — ASSESSMENT & PLAN NOTE
Diabetes is not controlled adding Jardiance 10 mg with titration to 25 switching patient from Lantus pens 2 vials which she is more comfortable with and encourage patient to take 40 units twice per day decrease metformin to once daily  Medication side effects discussed with patient and patient given prescription of Diflucan

## 2024-10-01 NOTE — TELEPHONE ENCOUNTER
----- Message from Leila sent at 10/1/2024  2:50 PM CDT -----  .Who Called: Navin Jannetshannen Kiran    Does the patient already have the specialty appointment scheduled?:no  If yes, what is the date of that appointment?:n/a  Referral to What Specialty:Pain management   Reason for Referral:pain management   Does the patient want the referral with a specific physician?:no  If yes, which provider?:   Is the specialist an [facility]?:n/a    Preferred Method of Contact: Phone Call  Patient's Preferred Phone Number on File: [phone]   Best Call Back Number, if different:  Additional Information: Pts stated  that she received a phone number at the time of her visit and that she was advised that she would need a referral before being seen by pain management , please call to advise once completed,

## 2024-10-01 NOTE — ASSESSMENT & PLAN NOTE
Patient given phone number for [facility]   Prescribe Norco 10 t.i.d. 35 pills per month

## 2024-10-03 ENCOUNTER — TELEPHONE (OUTPATIENT)
Dept: FAMILY MEDICINE | Facility: CLINIC | Age: 64
End: 2024-10-03
Payer: MEDICARE

## 2024-10-03 NOTE — TELEPHONE ENCOUNTER
----- Message from Sena sent at 10/3/2024 12:42 PM CDT -----  Regarding: PFT  Good afternoon,    I spoke with MsMike Kiran and she stated she had the PFT done already. I checked and she had the test performed in July. Please verify if you would like her to repeat the test, and if so when would you like the test performed?    Thank you for your assistance,    [facility]

## 2024-10-14 ENCOUNTER — PROCEDURE VISIT (OUTPATIENT)
Dept: RESPIRATORY THERAPY | Facility: HOSPITAL | Age: 64
End: 2024-10-14
Attending: STUDENT IN AN ORGANIZED HEALTH CARE EDUCATION/TRAINING PROGRAM
Payer: MEDICARE

## 2024-10-14 VITALS — HEART RATE: 83 BPM | RESPIRATION RATE: 20 BRPM | OXYGEN SATURATION: 93 %

## 2024-10-14 DIAGNOSIS — R06.02 SOB (SHORTNESS OF BREATH): ICD-10-CM

## 2024-10-14 PROCEDURE — 94760 N-INVAS EAR/PLS OXIMETRY 1: CPT

## 2024-10-14 PROCEDURE — 94729 DIFFUSING CAPACITY: CPT

## 2024-10-14 PROCEDURE — 94060 EVALUATION OF WHEEZING: CPT

## 2024-10-14 PROCEDURE — 94727 GAS DIL/WSHOT DETER LNG VOL: CPT

## 2024-10-14 RX ORDER — ALBUTEROL SULFATE 0.83 MG/ML
2.5 SOLUTION RESPIRATORY (INHALATION)
Status: COMPLETED | OUTPATIENT
Start: 2024-10-14 | End: 2024-10-14

## 2024-10-14 RX ORDER — ALBUTEROL SULFATE 0.83 MG/ML
SOLUTION RESPIRATORY (INHALATION)
Status: DISPENSED
Start: 2024-10-14 | End: 2024-10-15

## 2024-10-14 RX ADMIN — ALBUTEROL SULFATE 2.5 MG: 0.83 SOLUTION RESPIRATORY (INHALATION) at 12:10

## 2024-10-15 ENCOUNTER — TELEPHONE (OUTPATIENT)
Dept: FAMILY MEDICINE | Facility: CLINIC | Age: 64
End: 2024-10-15
Payer: MEDICARE

## 2024-10-15 NOTE — TELEPHONE ENCOUNTER
----- Message from Rose sent at 10/15/2024 12:17 PM CDT -----  .Who Called: Navin Beck        Preferred Method of Contact: Phone Call  Patient's Preferred Phone Number on File: [phone]   Best Call Back Number, if different:  Additional Information: pt said she not comfortable using the pins she likes using the vol please address at pharmacy she asking

## 2024-10-15 NOTE — TELEPHONE ENCOUNTER
Called patient.  confirmed. Patient requested something for coughing and congestion. She is requesting antibiotics. I did let her know that she would need to be seen for antibiotics. She is asking if there is anything else that you can send her. She is also asking me why the pharmacy did not fill her needles for her insulin, and said that they did not have the script. I let the patient know that this script was sent in with the rest of her scripts. She then said that when she went to  her insulin they did not fill it because they were out of something so maybe that is why they did not fill her needles. I let the patient know that she can call her pharmacy so that they can fill this script for her. She will call them. When ending the call I let her know that I would message Dr. Mirza know she is requesting something for her congestion but I am not sure that she will be able to send anything but I will let her know.

## 2024-10-15 NOTE — TELEPHONE ENCOUNTER
She will definitely need to be seen for the cough and congestion. She may try over the counter saline nasal spray and honey with lemon juice for the cough

## 2024-10-31 ENCOUNTER — TELEPHONE (OUTPATIENT)
Dept: FAMILY MEDICINE | Facility: CLINIC | Age: 64
End: 2024-10-31
Payer: MEDICARE

## 2024-11-04 ENCOUNTER — OFFICE VISIT (OUTPATIENT)
Dept: FAMILY MEDICINE | Facility: CLINIC | Age: 64
End: 2024-11-04
Payer: MEDICARE

## 2024-11-04 DIAGNOSIS — E05.90 HYPERTHYROIDISM: ICD-10-CM

## 2024-11-04 DIAGNOSIS — Z79.4 TYPE 2 DIABETES MELLITUS WITH HYPERGLYCEMIA, WITH LONG-TERM CURRENT USE OF INSULIN: ICD-10-CM

## 2024-11-04 DIAGNOSIS — M54.16 LUMBAR RADICULOPATHY: ICD-10-CM

## 2024-11-04 DIAGNOSIS — F51.01 PRIMARY INSOMNIA: ICD-10-CM

## 2024-11-04 DIAGNOSIS — M54.16 LUMBAR RADICULOPATHY, CHRONIC: Primary | ICD-10-CM

## 2024-11-04 DIAGNOSIS — F41.9 ANXIETY: ICD-10-CM

## 2024-11-04 DIAGNOSIS — Z98.890 HISTORY OF LUMBOSACRAL SPINE SURGERY: ICD-10-CM

## 2024-11-04 DIAGNOSIS — F11.20 OPIOID DEPENDENCE, UNCOMPLICATED: ICD-10-CM

## 2024-11-04 DIAGNOSIS — E11.65 TYPE 2 DIABETES MELLITUS WITH HYPERGLYCEMIA, WITH LONG-TERM CURRENT USE OF INSULIN: ICD-10-CM

## 2024-11-04 RX ORDER — PREGABALIN 75 MG/1
75 CAPSULE ORAL 3 TIMES DAILY
Qty: 90 CAPSULE | Refills: 6 | Status: SHIPPED | OUTPATIENT
Start: 2024-11-04 | End: 2025-06-02

## 2024-11-04 RX ORDER — INSULIN ASPART 100 [IU]/ML
18 INJECTION, SOLUTION INTRAVENOUS; SUBCUTANEOUS 3 TIMES DAILY
Qty: 194.4 ML | Refills: 6 | Status: SHIPPED | OUTPATIENT
Start: 2024-11-04

## 2024-11-04 RX ORDER — ZOLPIDEM TARTRATE 5 MG/1
5 TABLET ORAL NIGHTLY PRN
Qty: 30 TABLET | Refills: 3 | Status: SHIPPED | OUTPATIENT
Start: 2024-11-04 | End: 2025-05-05

## 2024-11-04 RX ORDER — ALPRAZOLAM 0.25 MG/1
TABLET ORAL
Qty: 3 TABLET | Refills: 0 | Status: SHIPPED | OUTPATIENT
Start: 2024-11-04

## 2024-11-04 RX ORDER — HYDROCODONE BITARTRATE AND ACETAMINOPHEN 10; 325 MG/1; MG/1
1 TABLET ORAL EVERY 12 HOURS PRN
Qty: 35 TABLET | Refills: 0 | Status: SHIPPED | OUTPATIENT
Start: 2024-11-04

## 2024-11-04 RX ORDER — TIZANIDINE 4 MG/1
8-12 TABLET ORAL NIGHTLY PRN
Qty: 90 TABLET | Refills: 3 | Status: SHIPPED | OUTPATIENT
Start: 2024-11-04

## 2024-11-05 PROBLEM — M54.16 LUMBAR RADICULOPATHY: Status: ACTIVE | Noted: 2024-11-05

## 2024-11-05 NOTE — ASSESSMENT & PLAN NOTE
Diabetes is improving with the addition of Jardiance  Blood sugars running in the 100s in the morning fasting  Continue Jardiance 25 mg daily as well as Lantus and NovoLog

## 2024-11-05 NOTE — ASSESSMENT & PLAN NOTE
IExtensive discussion with patient again that I have referred her to 2 known pain management clinics in the area however I am unsure if they will accept her health insurance ; or be able to provide her with the opioid prescriptions that she is asking for  Did encourage patient to continue with her current pain management provider in [place] and to call the phone number of the back of her insurance card to assess if pain management resources maybe available in this area for her.  Stopping gabapentin and starting Lyrica 75 mg t.i.d. also refilled tizanidine 8-12 mg nightly  Gave patient has 7 days of Norco but stressed that I can not refill this medication after this date

## 2024-11-05 NOTE — PROGRESS NOTES
Audio Visual Telehealth Visit     The patient location is: [place]   The chief complaint leading to consultation is: DM and Pain  Visit type: Virtual visit with audiovisual  Total time spent with patient: 20 minutes     Each patient to whom I provide medical services by telemedicine is:  (1) informed of the relationship between the physician and patient and the respective role of any other health care provider with respect to management of the patient; and (2) notified that they may decline to receive medical services by telemedicine and may withdraw from such care at any time. Patient verbally consented to receive this service via audiovisual.  Patient Name: Navin Beck   : 1960  MRN: [id number]     Subjective:   Patient ID: Navin Beck is a 64 y.o. female.    Chief Complaint: No chief complaint on file.       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 and Norco 10 35 pills per month   Reports that she has been unable to get to her appointments in [place]   Asking for referral to pain management ; unable to be contacted by [facility]. Was given phone number. They are awaiting and MRI. Patient states she has not been able to attend one in the past few years. Reports anxiety will need pre-medication. Has also been referred to Anesthesiology and pain consultants.  Patient has been informed numerous times that this clinic does not provide chronic pain  management    Hypertension   is on benazepril 10 mg daily   Patient also has a reported history of heart failure however does not appear to be on GDM T    Patient also reports that she has had insomnia    No history of seizure states she has tried over-the-counter medications which have not helped her sleep  Was weaned from Ambien 10 as started by previous provider and is now on Ambien 5 mg.       Also reports history of bronchitis yet patient reports that she does smoke and has been since she was 13 years old      ROS:  ROS   History:     Past Medical History:   Diagnosis Date    Allergy     Anemia due to stage 3 chronic kidney disease 12/6/2020    Arrhythmia     Arthritis     Bronchial asthma     Diabetes mellitus     Diabetes mellitus     Glaucoma     Hormone disorder     hysterectomy    Hypercholesteremia     Hypertension     Hyperthyroidism     Insomnia     Kidney stones     Neuropathy     Thyroid disease     Urine, incontinence, stress female     UTI (urinary tract infection) 11/27/2020      Past Surgical History:   Procedure Laterality Date    APPENDECTOMY      BACK SURGERY      disc removal     CARPAL TUNNEL RELEASE Bilateral     CHOLECYSTECTOMY      CYSTOSCOPY N/A 10/26/2020    Procedure: CYSTOSCOPY;  Surgeon: Wilner Goel MD;  Location: [facility];  Service: OB/GYN;  Laterality: N/A;    CYSTOSCOPY W/ RETROGRADES Bilateral 12/7/2020    Procedure: CYSTOSCOPY, WITH RETROGRADE PYELOGRAM;  Surgeon: Daniel Jalloh MD;  Location: [address];  Service: Urology;  Laterality: Bilateral;    FRACTURE SURGERY Right     wrist    HYSTERECTOMY  2010    [facility]    JOINT REPLACEMENT      KNEE ARTHROSCOPY W/ DEBRIDEMENT      KNEE SURGERY Right     Knee replacement    LITHOTRIPSY      NC REMOVAL OF OVARY/TUBE(S)  9/9/13    benign    removal of round ligament mass      ROBOT-ASSISTED LAPAROSCOPIC LYSIS OF ADHESIONS USING DA JAMES XI N/A 10/26/2020    Procedure: XI ROBOTIC LYSIS, ADHESIONS;   Surgeon: Wilner Goel MD;  Location[facility];  Service: OB/GYN;  Laterality: N/A;  ROBOTIC MOBILIZATION OF BLADDER- DR BURNHAM    SPINE SURGERY      WRIST FRACTURE SURGERY Left      Family History   Problem Relation Name Age of Onset    Cancer Mother      Cancer Brother      Colon cancer Brother      Cancer Brother      Cancer Brother      Cancer Brother      Ovarian cancer Neg Hx      Uterine cancer Neg Hx      Breast cancer Neg Hx        Social History     Tobacco Use    Smoking status: Every Day     Current packs/day: 0.50     Average packs/day: 0.5 packs/day for 30.0 years (15.0 ttl pk-yrs)     Types: Cigarettes    Smokeless tobacco: Never   Substance and Sexual Activity    Alcohol use: No    Drug use: No    Sexual activity: Not Currently     Partners: Male     Birth control/protection: Surgical        Allergies:   Review of patient's allergies indicates:   Allergen Reactions    Penicillins Shortness Of Breath, Itching and Swelling     Tolerates cefepime 11/30/2020    Penicillins Anaphylaxis     Objective:   There were no vitals filed for this visit.  There is no height or weight on file to calculate BMI.     Physical Examination:   Physical Exam  NO acute distress able to speak in full and complete sentences  Assessment:     Problem List Items Addressed This Visit          Neuro    Lumbar radiculopathy    Current Assessment & Plan     Placed order for MRI lumbar and gave Xanax pre-medication for procedure.             Psychiatric    Opioid dependence, uncomplicated    Current Assessment & Plan     IExtensive discussion with patient again that I have referred her to 2 known pain management clinics in the area however I am unsure if they will accept her health insurance ; or be able to provide her with the opioid prescriptions that she is asking for  Did encourage patient to continue with her current pain management provider in [place] and to call the phone number of the back of her insurance card to  assess if pain management resources maybe available in this area for her.  Stopping gabapentin and starting Lyrica 75 mg t.i.d. also refilled tizanidine 8-12 mg nightly  Gave patient has 7 days of Norco but stressed that I can not refill this medication after this date            Endocrine    Type 2 diabetes mellitus with hyperglycemia, with long-term current use of insulin    Current Assessment & Plan     Diabetes is improving with the addition of Jardiance  Blood sugars running in the 100s in the morning fasting  Continue Jardiance 25 mg daily as well as Lantus and NovoLog         Relevant Medications    insulin aspart U-100 (NOVOLOG) 100 unit/mL (3 mL) InPn pen    Hyperthyroidism    Relevant Orders    Ambulatory referral/consult to Endocrinology       Other    Primary insomnia    Current Assessment & Plan     Refilled ambien for patient         Relevant Medications    zolpidem (AMBIEN) 5 MG Tab     Other Visit Diagnoses       Lumbar radiculopathy, chronic    -  Primary    Relevant Medications    pregabalin (LYRICA) 75 MG capsule    Other Relevant Orders    MRI Lumbar Spine Without Contrast    Anxiety        Relevant Medications    ALPRAZolam (XANAX) 0.25 MG tablet    History of lumbosacral spine surgery        Relevant Medications    HYDROcodone-acetaminophen (NORCO)  mg per tablet            Plan:   Diagnoses and all orders for this visit:    Lumbar radiculopathy, chronic  -     MRI Lumbar Spine Without Contrast; Future  -     pregabalin (LYRICA) 75 MG capsule; Take 1 capsule (75 mg total) by mouth 3 (three) times daily.    Anxiety  -     ALPRAZolam (XANAX) 0.25 MG tablet; Take one pill 60 minutes before procedure and then repeat 15 to 20 minutes before.    Primary insomnia  -     zolpidem (AMBIEN) 5 MG Tab; Take 1 tablet (5 mg total) by mouth nightly as needed (insomnia). May take with 5-10 mg of Melatonin    Type 2 diabetes mellitus with hyperglycemia, with long-term current use of insulin  -     insulin  aspart U-100 (NOVOLOG) 100 unit/mL (3 mL) InPn pen; Inject 18 Units into the skin 3 (three) times daily.   Sliding scale: Inject 0-5 Units into the skin before meals and at bedtime as needed (Hyperglycemia). Max daily dose of 69 units.    History of lumbosacral spine surgery  -     HYDROcodone-acetaminophen (NORCO)  mg per tablet; Take 1 tablet by mouth every 12 (twelve) hours as needed for Pain.    Opioid dependence, uncomplicated    Hyperthyroidism  -     Ambulatory referral/consult to Endocrinology; Future    Lumbar radiculopathy    Other orders  -     tiZANidine (ZANAFLEX) 4 MG tablet; Take 2-3 tablets (8-12 mg total) by mouth nightly as needed (muscle spasms).       Problem List Items Addressed This Visit          Neuro    Lumbar radiculopathy    Current Assessment & Plan     Placed order for MRI lumbar and gave Xanax pre-medication for procedure.             Psychiatric    Opioid dependence, uncomplicated    Current Assessment & Plan     IExtensive discussion with patient again that I have referred her to 2 known pain management clinics in the area however I am unsure if they will accept her health insurance ; or be able to provide her with the opioid prescriptions that she is asking for  Did encourage patient to continue with her current pain management provider in [place] and to call the phone number of the back of her insurance card to assess if pain management resources maybe available in this area for her.  Stopping gabapentin and starting Lyrica 75 mg t.i.d. also refilled tizanidine 8-12 mg nightly  Gave patient has 7 days of Norco but stressed that I can not refill this medication after this date            Endocrine    Type 2 diabetes mellitus with hyperglycemia, with long-term current use of insulin    Current Assessment & Plan     Diabetes is improving with the addition of Jardiance  Blood sugars running in the 100s in the morning fasting  Continue Jardiance 25 mg daily as well as Lantus  and NovoLog         Relevant Medications    insulin aspart U-100 (NOVOLOG) 100 unit/mL (3 mL) InPn pen    Hyperthyroidism    Relevant Orders    Ambulatory referral/consult to Endocrinology       Other    Primary insomnia    Current Assessment & Plan     Refilled ambien for patient         Relevant Medications    zolpidem (AMBIEN) 5 MG Tab     Other Visit Diagnoses       Lumbar radiculopathy, chronic    -  Primary    Relevant Medications    pregabalin (LYRICA) 75 MG capsule    Other Relevant Orders    MRI Lumbar Spine Without Contrast    Anxiety        Relevant Medications    ALPRAZolam (XANAX) 0.25 MG tablet    History of lumbosacral spine surgery        Relevant Medications    HYDROcodone-acetaminophen (NORCO)  mg per tablet           Follow up in about 3 weeks (around 11/25/2024) for Diabetes, thyroid.       This note was created with the assistance of a voice recognition software or phone dictation. There may be transcription errors as a result of using this technology however minimal. Effort has been made to assure accuracy of transcription but any obvious errors or omissions should be clarified with the author of the document      This service was not originating from a related E/M service provided within the previous 7 days nor will  to an E/M service or procedure within the next 24 hours or my soonest available appointment.  Prevailing standard of care was able to be met in this time audiovisual.

## 2024-11-12 DIAGNOSIS — E11.649 UNCONTROLLED TYPE 2 DIABETES MELLITUS WITH HYPOGLYCEMIA, UNSPECIFIED HYPOGLYCEMIA COMA STATUS: ICD-10-CM

## 2024-11-12 RX ORDER — INSULIN GLARGINE 100 [IU]/ML
40 INJECTION, SOLUTION SUBCUTANEOUS 2 TIMES DAILY
Qty: 24 ML | Refills: 11 | Status: SHIPPED | OUTPATIENT
Start: 2024-11-12 | End: 2025-11-12

## 2024-11-19 ENCOUNTER — HOSPITAL ENCOUNTER (OUTPATIENT)
Dept: RADIOLOGY | Facility: HOSPITAL | Age: 64
Discharge: HOME | End: 2024-11-19
Attending: STUDENT IN AN ORGANIZED HEALTH CARE EDUCATION/TRAINING PROGRAM
Payer: MEDICARE

## 2024-11-19 DIAGNOSIS — M54.16 LUMBAR RADICULOPATHY, CHRONIC: ICD-10-CM

## 2024-11-19 PROCEDURE — 72148 MRI LUMBAR SPINE W/O DYE: CPT | Mod: TC

## 2024-11-21 DIAGNOSIS — E11.65 TYPE 2 DIABETES MELLITUS WITH HYPERGLYCEMIA, WITH LONG-TERM CURRENT USE OF INSULIN: ICD-10-CM

## 2024-11-21 DIAGNOSIS — Z79.4 TYPE 2 DIABETES MELLITUS WITH HYPERGLYCEMIA, WITH LONG-TERM CURRENT USE OF INSULIN: ICD-10-CM

## 2024-11-21 NOTE — TELEPHONE ENCOUNTER
----- Message from [employer] sent at 11/21/2024  3:17 PM CST -----  .Type:  Patient Returning Call    Who Called:pt  Who Left Message for Patient:pt  Does the patient know what this is regarding?:Blood sugar   Would the patient rather a call back or a response via [facility]?   Best Call Back Number:[phone]   Additional Information: Please call back about Blood sugar being out of wack and her not feeling well. Called back line no answer

## 2024-11-21 NOTE — TELEPHONE ENCOUNTER
Called patient.  confirmed. Patient states that her blood sugar has been running up to 500.     Patient states that after her appt on 2024 her novolog was suppose to be sent to [facility] and [facility] does not have the script. I let her know that I see in her chat that it was sent. It says that it was printed and it was not sent to anyone. I let the patient know that I will propose Dr. Mirza know that this needs to be sent in.     I also let the patient know that this is some extremely high numbers and she needs to follow up with this in the ER. Informed her that this could be detrimental to her health. Patient states that she understands and she has delt with this before.     She then asked when she would be able to come in to See dr. Mirza, I looked and she does not have an appt scheduled. She said at her virtual appointment she was told that she needed to follow up in the office in 3 weeks. Patient needs to be scheduled.

## 2024-11-21 NOTE — TELEPHONE ENCOUNTER
No care due was identified.  [facility] Embedded Care Due Messages. Reference number: [id number].   11/21/2024 3:41:34 PM CST

## 2024-11-22 ENCOUNTER — TELEPHONE (OUTPATIENT)
Dept: FAMILY MEDICINE | Facility: CLINIC | Age: 64
End: 2024-11-22
Payer: MEDICARE

## 2024-11-22 RX ORDER — INSULIN ASPART 100 [IU]/ML
18 INJECTION, SOLUTION INTRAVENOUS; SUBCUTANEOUS 3 TIMES DAILY
Qty: 194.4 ML | Refills: 6 | Status: SHIPPED | OUTPATIENT
Start: 2024-11-22

## 2024-11-22 NOTE — TELEPHONE ENCOUNTER
----- Message from Bethany sent at 11/22/2024  7:24 AM CST -----  A message was sent to call this patient to get scheduled, I called the patient this morning and offered her the 8:40 spot this morning because her blood sugar has been high but patient declined because she has to give transportation advance notice. I am not sure where else to schedule her please advise.

## 2024-11-22 NOTE — TELEPHONE ENCOUNTER
Called patient.  confirmed. Notified patient that if her sugars do not improve after taking her other insulin she should follow up in the ER to make sure that she is not having any major issues with her diabetes. Patient understood. She is still asking about another appointment. I explained that I do not have anything in November and I'm not sure that I can guarantee there is something in December but I will let the front office staff know. She also would like to be on cancellation list and states that if it is possible and something comes up within two days of an open appointment she can be contacted so that she can contact transportation about the appointment.     Patient informed that I will let the front office staff know.

## 2024-11-22 NOTE — TELEPHONE ENCOUNTER
I refilled the Novolog. She can take it but if sugars do not decrease to 200 or less she needs to go to the ER to make sure that she isn't having a complication of her diabetes that could be serious.   I did sent her prescription to her pharmacy on file but the son expressed issues with picking them up due to lack of transportation. I can send to [facility] pharmacy for delivery but not sure if they can deliver today.

## 2024-12-05 ENCOUNTER — TELEPHONE (OUTPATIENT)
Dept: FAMILY MEDICINE | Facility: CLINIC | Age: 64
End: 2024-12-05
Payer: MEDICARE

## 2024-12-05 NOTE — TELEPHONE ENCOUNTER
----- Message from Bethany sent at 12/5/2024  3:37 PM CST -----  [facility] is requesting Imaging on this patient.

## 2024-12-15 DIAGNOSIS — Z79.4 TYPE 2 DIABETES MELLITUS WITH HYPERGLYCEMIA, WITH LONG-TERM CURRENT USE OF INSULIN: ICD-10-CM

## 2024-12-15 DIAGNOSIS — E11.65 TYPE 2 DIABETES MELLITUS WITH HYPERGLYCEMIA, WITH LONG-TERM CURRENT USE OF INSULIN: ICD-10-CM

## 2024-12-15 NOTE — TELEPHONE ENCOUNTER
No care due was identified.  [facility] Embedded Care Due Messages. Reference number: [id number].   12/15/2024 3:21:50 PM CST

## 2024-12-16 DIAGNOSIS — Z98.890 HISTORY OF LUMBOSACRAL SPINE SURGERY: ICD-10-CM

## 2024-12-16 RX ORDER — INSULIN ASPART 100 [IU]/ML
INJECTION, SOLUTION INTRAVENOUS; SUBCUTANEOUS
Qty: 3 ML | Refills: 11 | Status: SHIPPED | OUTPATIENT
Start: 2024-12-16 | End: 2024-12-17 | Stop reason: SDUPTHER

## 2024-12-16 RX ORDER — HYDROCODONE BITARTRATE AND ACETAMINOPHEN 10; 325 MG/1; MG/1
1 TABLET ORAL EVERY 12 HOURS PRN
Qty: 35 TABLET | Refills: 0 | OUTPATIENT
Start: 2024-12-16

## 2024-12-16 NOTE — TELEPHONE ENCOUNTER
No care due was identified.  [facility] Embedded Care Due Messages. Reference number: [id number].   12/16/2024 2:57:40 PM CST

## 2024-12-16 NOTE — TELEPHONE ENCOUNTER
Refill Routing Note   Medication(s) are not appropriate for processing by [facility] for the following reason(s):        Outside of protocol    [facility] action(s):  Route      Medication Therapy Plan: SELF-TITRATION-Dose may not reflect what the patient is actually takintg.      Appointments  past 12m or future 3m with PCP    Date Provider   Last Visit   11/4/2024 Seema Mirza MD   Next Visit   1/16/2025 Seema Mirza MD   ED visits in past 90 days: 0        Note composed:8:21 AM 12/16/2024

## 2024-12-16 NOTE — TELEPHONE ENCOUNTER
----- Message from Jessica sent at 12/16/2024  2:49 PM CST -----  .Who Called: Navin Beck    Refill or New Rx:New Rx    RX Name and Strength: HYDROcodone-acetaminophen (NORCO)  mg per tablet    How is the patient currently taking it? (ex. 1XDay): Sig - Route: Take 1 tablet by mouth every 12 (twelve) hours as needed for Pain. - Oral    Is this a 30 day or 90 day RX: N/A    Local or Mail Order: Local    List of preferred pharmacies on file (remove unneeded):   [id number] - [place], [place] - [address]   Phone: [phone]  Fax: [phone]      Ordering Provider: Dr. Mirza    Preferred Method of Contact: Phone Call    Patient's Preferred Phone Number on File: [phone]     Best Call Back Number, if different:    Additional Information: N/A

## 2024-12-17 ENCOUNTER — TELEPHONE (OUTPATIENT)
Dept: FAMILY MEDICINE | Facility: CLINIC | Age: 64
End: 2024-12-17
Payer: MEDICARE

## 2024-12-17 DIAGNOSIS — F51.01 PRIMARY INSOMNIA: ICD-10-CM

## 2024-12-17 DIAGNOSIS — I10 ESSENTIAL HYPERTENSION: ICD-10-CM

## 2024-12-17 DIAGNOSIS — M54.16 LUMBAR RADICULOPATHY, CHRONIC: ICD-10-CM

## 2024-12-17 DIAGNOSIS — E11.65 TYPE 2 DIABETES MELLITUS WITH HYPERGLYCEMIA, WITH LONG-TERM CURRENT USE OF INSULIN: ICD-10-CM

## 2024-12-17 DIAGNOSIS — Z79.4 TYPE 2 DIABETES MELLITUS WITH HYPERGLYCEMIA, WITH LONG-TERM CURRENT USE OF INSULIN: ICD-10-CM

## 2024-12-17 DIAGNOSIS — M85.89 OSTEOPENIA OF MULTIPLE SITES: ICD-10-CM

## 2024-12-17 DIAGNOSIS — E11.649 UNCONTROLLED TYPE 2 DIABETES MELLITUS WITH HYPOGLYCEMIA, UNSPECIFIED HYPOGLYCEMIA COMA STATUS: ICD-10-CM

## 2024-12-17 DIAGNOSIS — E78.2 MIXED HYPERLIPIDEMIA: ICD-10-CM

## 2024-12-17 RX ORDER — PREGABALIN 75 MG/1
75 CAPSULE ORAL 3 TIMES DAILY
Qty: 90 CAPSULE | Refills: 6 | Status: SHIPPED | OUTPATIENT
Start: 2024-12-17 | End: 2025-07-15

## 2024-12-17 RX ORDER — ATORVASTATIN CALCIUM 20 MG/1
20 TABLET, FILM COATED ORAL DAILY
Qty: 90 TABLET | Refills: 3 | Status: SHIPPED | OUTPATIENT
Start: 2024-12-17

## 2024-12-17 RX ORDER — VITAMIN E (DL,TOCOPHERYL ACET) 45 MG/0.25
1 DROPS ORAL 2 TIMES DAILY
Qty: 60 TABLET | Refills: 11 | Status: SHIPPED | OUTPATIENT
Start: 2024-12-17

## 2024-12-17 RX ORDER — TIZANIDINE 4 MG/1
8-12 TABLET ORAL NIGHTLY PRN
Qty: 90 TABLET | Refills: 3 | Status: SHIPPED | OUTPATIENT
Start: 2024-12-17

## 2024-12-17 RX ORDER — TRAZODONE HYDROCHLORIDE 50 MG/1
50 TABLET ORAL NIGHTLY
Qty: 30 TABLET | Refills: 11 | Status: SHIPPED | OUTPATIENT
Start: 2024-12-17 | End: 2025-12-17

## 2024-12-17 RX ORDER — INSULIN ASPART 100 [IU]/ML
INJECTION, SOLUTION INTRAVENOUS; SUBCUTANEOUS
Qty: 3 ML | Refills: 11 | Status: SHIPPED | OUTPATIENT
Start: 2024-12-17

## 2024-12-17 RX ORDER — ZOLPIDEM TARTRATE 5 MG/1
5 TABLET ORAL NIGHTLY PRN
Qty: 30 TABLET | Refills: 1 | Status: SHIPPED | OUTPATIENT
Start: 2024-12-17 | End: 2025-06-17

## 2024-12-17 RX ORDER — METFORMIN HYDROCHLORIDE 500 MG/1
500 TABLET ORAL
Qty: 90 TABLET | Refills: 3 | Status: SHIPPED | OUTPATIENT
Start: 2024-12-17

## 2024-12-17 RX ORDER — SYRINGE AND NEEDLE,INSULIN,1ML 30 G X1/2"
1 SYRINGE, EMPTY DISPOSABLE MISCELLANEOUS 2 TIMES DAILY
Qty: 100 EACH | Refills: 11 | Status: SHIPPED | OUTPATIENT
Start: 2024-12-17

## 2024-12-17 RX ORDER — BENAZEPRIL HYDROCHLORIDE 5 MG/1
5 TABLET ORAL DAILY
Qty: 90 TABLET | Refills: 3 | Status: SHIPPED | OUTPATIENT
Start: 2024-12-17 | End: 2025-12-17

## 2024-12-17 RX ORDER — METHIMAZOLE 10 MG/1
10 TABLET ORAL DAILY
Qty: 90 TABLET | Refills: 3 | Status: SHIPPED | OUTPATIENT
Start: 2024-12-17

## 2024-12-17 RX ORDER — INSULIN GLARGINE 100 [IU]/ML
40 INJECTION, SOLUTION SUBCUTANEOUS 2 TIMES DAILY
Qty: 24 ML | Refills: 11 | Status: SHIPPED | OUTPATIENT
Start: 2024-12-17 | End: 2025-12-17

## 2024-12-17 NOTE — TELEPHONE ENCOUNTER
----- Message from Alee sent at 12/17/2024 11:49 AM CST -----  Regarding: med advice  Who Called: Navin Beck    Caller is requesting assistance/information from provider's office.    Symptoms (please be specific):    How long has patient had these symptoms:    List of preferred pharmacies on file (remove unneeded): [unfilled]  If different, enter pharmacy into here including location and phone number: [address]   Phone: [phone]  Fax: [phone]            Preferred Method of Contact: Phone Call  Patient's Preferred Phone Number on File: [phone]   Best Call Back Number, if different:  Additional Information: pt is requesting a call back, states she would like all meds on file transferred to [facility] pharmacy

## 2024-12-17 NOTE — TELEPHONE ENCOUNTER
Called patient to notify her that the medications have been sent.     When I notified patient she asked if she would be receiving the pain medication. I informed her that as mentioned by Dr. Mirza she does not do pain management and we have tried to refer the patient to pain management clinic. Patient states that she has not received a call and they wanted her to do a MRI which she completed last month. I informed the patient that I have a fax success of the MRI to [facility] on 12/09/2024 that they requested. She states they have not called her back. I asked if she had called them. She has not. I did let the patient know that Dr. Mirza also can not prescribe pain medication without an appointment. Patient states that she does not see provider until 01/16/2024. I let her know that is fine but even then she probably would not fill this medication. She will check on her referral for [facility].

## 2025-01-06 ENCOUNTER — HOSPITAL ENCOUNTER (INPATIENT)
Facility: HOSPITAL | Age: 65
LOS: 4 days | Discharge: HOME HEALTH | DRG: 871 | End: 2025-01-10
Attending: STUDENT IN AN ORGANIZED HEALTH CARE EDUCATION/TRAINING PROGRAM | Admitting: INTERNAL MEDICINE
Payer: MEDICARE

## 2025-01-06 DIAGNOSIS — R06.02 SHORTNESS OF BREATH: ICD-10-CM

## 2025-01-06 DIAGNOSIS — E87.5 HYPERKALEMIA: ICD-10-CM

## 2025-01-06 DIAGNOSIS — E13.10 DIABETIC KETOACIDOSIS WITHOUT COMA ASSOCIATED WITH OTHER SPECIFIED DIABETES MELLITUS: Primary | ICD-10-CM

## 2025-01-06 DIAGNOSIS — N17.9 AKI (ACUTE KIDNEY INJURY): ICD-10-CM

## 2025-01-06 DIAGNOSIS — E11.649 UNCONTROLLED TYPE 2 DIABETES MELLITUS WITH HYPOGLYCEMIA, UNSPECIFIED HYPOGLYCEMIA COMA STATUS: ICD-10-CM

## 2025-01-06 DIAGNOSIS — M46.1 SACROILIITIS: ICD-10-CM

## 2025-01-06 DIAGNOSIS — J21.0 RSV (ACUTE BRONCHIOLITIS DUE TO RESPIRATORY SYNCYTIAL VIRUS): ICD-10-CM

## 2025-01-06 PROBLEM — F17.200 TOBACCO DEPENDENCY: Status: ACTIVE | Noted: 2025-01-06

## 2025-01-06 LAB
ALBUMIN SERPL-MCNC: 3.9 G/DL (ref 3.4–4.8)
ALBUMIN/GLOB SERPL: 0.8 RATIO (ref 1.1–2)
ALLENS TEST BLOOD GAS (OHS): YES
ALP SERPL-CCNC: 118 UNIT/L (ref 40–150)
ALT SERPL-CCNC: 7 UNIT/L (ref 0–55)
ANION GAP SERPL CALC-SCNC: 23 MEQ/L
ANION GAP SERPL CALC-SCNC: 30 MEQ/L
AST SERPL-CCNC: 17 UNIT/L (ref 5–34)
B-OH-BUTYR SERPL-MCNC: 7.2 MMOL/L
BACTERIA #/AREA URNS AUTO: ABNORMAL /HPF
BASE EXCESS BLD CALC-SCNC: -22.5 MMOL/L (ref -2–2)
BASOPHILS # BLD AUTO: 0.03 X10(3)/MCL
BASOPHILS NFR BLD AUTO: 0.2 %
BILIRUB SERPL-MCNC: 0.4 MG/DL
BILIRUB UR QL STRIP.AUTO: NEGATIVE
BLOOD GAS SAMPLE TYPE (OHS): ABNORMAL
BNP BLD-MCNC: 85.9 PG/ML
BUN SERPL-MCNC: 56.5 MG/DL (ref 9.8–20.1)
BUN SERPL-MCNC: 60.3 MG/DL (ref 9.8–20.1)
CA-I BLD-SCNC: 1.1 MMOL/L (ref 1.12–1.23)
CALCIUM SERPL-MCNC: 7.9 MG/DL (ref 8.4–10.2)
CALCIUM SERPL-MCNC: 8.8 MG/DL (ref 8.4–10.2)
CHLORIDE SERPL-SCNC: 88 MMOL/L (ref 98–107)
CHLORIDE SERPL-SCNC: 94 MMOL/L (ref 98–107)
CLARITY UR: CLEAR
CO2 BLDA-SCNC: 6.9 MMOL/L
CO2 SERPL-SCNC: 6 MMOL/L (ref 23–31)
CO2 SERPL-SCNC: 9 MMOL/L (ref 23–31)
COHGB MFR BLDA: 1.3 % (ref 0.5–1.5)
COLOR UR AUTO: COLORLESS
CREAT SERPL-MCNC: 3.09 MG/DL (ref 0.55–1.02)
CREAT SERPL-MCNC: 3.16 MG/DL (ref 0.55–1.02)
CREAT/UREA NIT SERPL: 18
CREAT/UREA NIT SERPL: 19
DRAWN BY BLOOD GAS (OHS): ABNORMAL
EOSINOPHIL # BLD AUTO: 0.03 X10(3)/MCL (ref 0–0.9)
EOSINOPHIL NFR BLD AUTO: 0.2 %
ERYTHROCYTE [DISTWIDTH] IN BLOOD BY AUTOMATED COUNT: 12.5 % (ref 11.5–17)
FLUAV AG UPPER RESP QL IA.RAPID: NOT DETECTED
FLUBV AG UPPER RESP QL IA.RAPID: NOT DETECTED
GFR SERPLBLD CREATININE-BSD FMLA CKD-EPI: 16 ML/MIN/1.73/M2
GFR SERPLBLD CREATININE-BSD FMLA CKD-EPI: 16 ML/MIN/1.73/M2
GLOBULIN SER-MCNC: 4.7 GM/DL (ref 2.4–3.5)
GLUCOSE SERPL-MCNC: 593 MG/DL (ref 82–115)
GLUCOSE SERPL-MCNC: 699 MG/DL (ref 82–115)
GLUCOSE UR QL STRIP: ABNORMAL
HCO3 BLDA-SCNC: 6.2 MMOL/L (ref 22–26)
HCT VFR BLD AUTO: 40.3 % (ref 37–47)
HGB BLD-MCNC: 12.4 G/DL (ref 12–16)
HGB UR QL STRIP: NEGATIVE
IMM GRANULOCYTES # BLD AUTO: 0.08 X10(3)/MCL (ref 0–0.04)
IMM GRANULOCYTES NFR BLD AUTO: 0.5 %
INHALED O2 CONCENTRATION: 21 %
KETONES UR QL STRIP: ABNORMAL
LEUKOCYTE ESTERASE UR QL STRIP: NEGATIVE
LYMPHOCYTES # BLD AUTO: 2.05 X10(3)/MCL (ref 0.6–4.6)
LYMPHOCYTES NFR BLD AUTO: 14.1 %
MAGNESIUM SERPL-MCNC: 2.6 MG/DL (ref 1.6–2.6)
MAGNESIUM SERPL-MCNC: 2.8 MG/DL (ref 1.6–2.6)
MCH RBC QN AUTO: 30.3 PG (ref 27–31)
MCHC RBC AUTO-ENTMCNC: 30.8 G/DL (ref 33–36)
MCV RBC AUTO: 98.5 FL (ref 80–94)
METHGB MFR BLDA: 0.7 % (ref 0.4–1.5)
MONOCYTES # BLD AUTO: 0.77 X10(3)/MCL (ref 0.1–1.3)
MONOCYTES NFR BLD AUTO: 5.3 %
NEUTROPHILS # BLD AUTO: 11.63 X10(3)/MCL (ref 2.1–9.2)
NEUTROPHILS NFR BLD AUTO: 79.7 %
NITRITE UR QL STRIP: NEGATIVE
NRBC BLD AUTO-RTO: 0 %
O2 HB BLOOD GAS (OHS): 95.8 % (ref 94–97)
OHS QRS DURATION: 108 MS
OHS QTC CALCULATION: 311 MS
OXYHGB MFR BLDA: 11.9 G/DL (ref 12–16)
PCO2 BLDA: 22 MMHG (ref 35–45)
PH BLDA: 7.06 [PH] (ref 7.35–7.45)
PH UR STRIP: 5 [PH]
PHOSPHATE SERPL-MCNC: 7.2 MG/DL (ref 2.3–4.7)
PLATELET # BLD AUTO: 331 X10(3)/MCL (ref 130–400)
PMV BLD AUTO: 10.4 FL (ref 7.4–10.4)
PO2 BLDA: 94 MMHG (ref 80–100)
POCT GLUCOSE: 493 MG/DL (ref 70–110)
POCT GLUCOSE: >500 MG/DL (ref 70–110)
POTASSIUM BLOOD GAS (OHS): 7.4 MMOL/L (ref 3.5–5)
POTASSIUM SERPL-SCNC: 4.8 MMOL/L (ref 3.5–5.1)
POTASSIUM SERPL-SCNC: 7.3 MMOL/L (ref 3.5–5.1)
PROT SERPL-MCNC: 8.6 GM/DL (ref 5.8–7.6)
PROT UR QL STRIP: NEGATIVE
RBC # BLD AUTO: 4.09 X10(6)/MCL (ref 4.2–5.4)
RBC #/AREA URNS AUTO: ABNORMAL /HPF
RSV A 5' UTR RNA NPH QL NAA+PROBE: DETECTED
SAMPLE SITE BLOOD GAS (OHS): ABNORMAL
SAO2 % BLDA: 92.9 %
SARS-COV-2 RNA RESP QL NAA+PROBE: NOT DETECTED
SODIUM BLOOD GAS (OHS): 120 MMOL/L (ref 137–145)
SODIUM SERPL-SCNC: 124 MMOL/L (ref 136–145)
SODIUM SERPL-SCNC: 126 MMOL/L (ref 136–145)
SP GR UR STRIP.AUTO: 1.02 (ref 1–1.03)
SQUAMOUS #/AREA URNS LPF: ABNORMAL /HPF
TROPONIN I SERPL-MCNC: 0.01 NG/ML (ref 0–0.04)
UROBILINOGEN UR STRIP-ACNC: NORMAL
WBC # BLD AUTO: 14.59 X10(3)/MCL (ref 4.5–11.5)
WBC #/AREA URNS AUTO: ABNORMAL /HPF

## 2025-01-06 PROCEDURE — 27000207 HC ISOLATION

## 2025-01-06 PROCEDURE — 83880 ASSAY OF NATRIURETIC PEPTIDE: CPT | Performed by: PHYSICIAN ASSISTANT

## 2025-01-06 PROCEDURE — 36415 COLL VENOUS BLD VENIPUNCTURE: CPT

## 2025-01-06 PROCEDURE — 82010 KETONE BODYS QUAN: CPT | Performed by: STUDENT IN AN ORGANIZED HEALTH CARE EDUCATION/TRAINING PROGRAM

## 2025-01-06 PROCEDURE — 63600175 PHARM REV CODE 636 W HCPCS: Performed by: INTERNAL MEDICINE

## 2025-01-06 PROCEDURE — 63600175 PHARM REV CODE 636 W HCPCS: Performed by: STUDENT IN AN ORGANIZED HEALTH CARE EDUCATION/TRAINING PROGRAM

## 2025-01-06 PROCEDURE — 80053 COMPREHEN METABOLIC PANEL: CPT | Performed by: PHYSICIAN ASSISTANT

## 2025-01-06 PROCEDURE — 63600175 PHARM REV CODE 636 W HCPCS

## 2025-01-06 PROCEDURE — 99291 CRITICAL CARE FIRST HOUR: CPT

## 2025-01-06 PROCEDURE — 96365 THER/PROPH/DIAG IV INF INIT: CPT | Mod: 59

## 2025-01-06 PROCEDURE — 25000003 PHARM REV CODE 250: Performed by: STUDENT IN AN ORGANIZED HEALTH CARE EDUCATION/TRAINING PROGRAM

## 2025-01-06 PROCEDURE — 82803 BLOOD GASES ANY COMBINATION: CPT

## 2025-01-06 PROCEDURE — 83735 ASSAY OF MAGNESIUM: CPT | Performed by: PHYSICIAN ASSISTANT

## 2025-01-06 PROCEDURE — 0241U COVID/RSV/FLU A&B PCR: CPT | Performed by: PHYSICIAN ASSISTANT

## 2025-01-06 PROCEDURE — 82962 GLUCOSE BLOOD TEST: CPT

## 2025-01-06 PROCEDURE — 83735 ASSAY OF MAGNESIUM: CPT

## 2025-01-06 PROCEDURE — 20000000 HC ICU ROOM

## 2025-01-06 PROCEDURE — 81001 URINALYSIS AUTO W/SCOPE: CPT | Performed by: PHYSICIAN ASSISTANT

## 2025-01-06 PROCEDURE — 25000003 PHARM REV CODE 250

## 2025-01-06 PROCEDURE — 84484 ASSAY OF TROPONIN QUANT: CPT | Performed by: PHYSICIAN ASSISTANT

## 2025-01-06 PROCEDURE — 25000003 PHARM REV CODE 250: Performed by: INTERNAL MEDICINE

## 2025-01-06 PROCEDURE — 93005 ELECTROCARDIOGRAM TRACING: CPT

## 2025-01-06 PROCEDURE — 36600 WITHDRAWAL OF ARTERIAL BLOOD: CPT

## 2025-01-06 PROCEDURE — 84100 ASSAY OF PHOSPHORUS: CPT

## 2025-01-06 PROCEDURE — 85025 COMPLETE CBC W/AUTO DIFF WBC: CPT | Performed by: PHYSICIAN ASSISTANT

## 2025-01-06 PROCEDURE — 93010 ELECTROCARDIOGRAM REPORT: CPT | Mod: ,,, | Performed by: STUDENT IN AN ORGANIZED HEALTH CARE EDUCATION/TRAINING PROGRAM

## 2025-01-06 PROCEDURE — 96375 TX/PRO/DX INJ NEW DRUG ADDON: CPT

## 2025-01-06 PROCEDURE — 99900035 HC TECH TIME PER 15 MIN (STAT)

## 2025-01-06 RX ORDER — HYDROCODONE BITARTRATE AND ACETAMINOPHEN 7.5; 325 MG/1; MG/1
1 TABLET ORAL EVERY 6 HOURS PRN
Status: DISCONTINUED | OUTPATIENT
Start: 2025-01-06 | End: 2025-01-10 | Stop reason: HOSPADM

## 2025-01-06 RX ORDER — SODIUM CHLORIDE AND POTASSIUM CHLORIDE 150; 900 MG/100ML; MG/100ML
INJECTION, SOLUTION INTRAVENOUS CONTINUOUS PRN
Status: DISCONTINUED | OUTPATIENT
Start: 2025-01-06 | End: 2025-01-07

## 2025-01-06 RX ORDER — POTASSIUM CHLORIDE 14.9 MG/ML
20 INJECTION INTRAVENOUS
Status: DISCONTINUED | OUTPATIENT
Start: 2025-01-06 | End: 2025-01-10 | Stop reason: HOSPADM

## 2025-01-06 RX ORDER — SODIUM CHLORIDE 9 MG/ML
1000 INJECTION, SOLUTION INTRAVENOUS CONTINUOUS
Status: ACTIVE | OUTPATIENT
Start: 2025-01-06 | End: 2025-01-07

## 2025-01-06 RX ORDER — ALPRAZOLAM 0.25 MG/1
0.25 TABLET ORAL NIGHTLY PRN
Status: DISCONTINUED | OUTPATIENT
Start: 2025-01-06 | End: 2025-01-10 | Stop reason: HOSPADM

## 2025-01-06 RX ORDER — DEXTROSE MONOHYDRATE AND SODIUM CHLORIDE 5; .45 G/100ML; G/100ML
INJECTION, SOLUTION INTRAVENOUS CONTINUOUS PRN
Status: DISCONTINUED | OUTPATIENT
Start: 2025-01-06 | End: 2025-01-10 | Stop reason: HOSPADM

## 2025-01-06 RX ORDER — SODIUM CHLORIDE 450 MG/100ML
INJECTION, SOLUTION INTRAVENOUS CONTINUOUS PRN
Status: DISCONTINUED | OUTPATIENT
Start: 2025-01-06 | End: 2025-01-10 | Stop reason: HOSPADM

## 2025-01-06 RX ORDER — ATORVASTATIN CALCIUM 10 MG/1
20 TABLET, FILM COATED ORAL DAILY
Status: DISCONTINUED | OUTPATIENT
Start: 2025-01-07 | End: 2025-01-10 | Stop reason: HOSPADM

## 2025-01-06 RX ORDER — MUPIROCIN 20 MG/G
OINTMENT TOPICAL 2 TIMES DAILY
Status: DISCONTINUED | OUTPATIENT
Start: 2025-01-06 | End: 2025-01-10 | Stop reason: HOSPADM

## 2025-01-06 RX ORDER — MAGNESIUM SULFATE HEPTAHYDRATE 40 MG/ML
2 INJECTION, SOLUTION INTRAVENOUS
Status: DISCONTINUED | OUTPATIENT
Start: 2025-01-06 | End: 2025-01-10 | Stop reason: HOSPADM

## 2025-01-06 RX ORDER — SODIUM CHLORIDE 9 MG/ML
INJECTION, SOLUTION INTRAVENOUS CONTINUOUS PRN
Status: DISCONTINUED | OUTPATIENT
Start: 2025-01-06 | End: 2025-01-10 | Stop reason: HOSPADM

## 2025-01-06 RX ORDER — SODIUM CHLORIDE 0.9 % (FLUSH) 0.9 %
10 SYRINGE (ML) INJECTION
Status: DISCONTINUED | OUTPATIENT
Start: 2025-01-06 | End: 2025-01-10 | Stop reason: HOSPADM

## 2025-01-06 RX ORDER — CALCIUM GLUCONATE 20 MG/ML
1 INJECTION, SOLUTION INTRAVENOUS
Status: COMPLETED | OUTPATIENT
Start: 2025-01-06 | End: 2025-01-06

## 2025-01-06 RX ORDER — DEXTROSE MONOHYDRATE, SODIUM CHLORIDE, AND POTASSIUM CHLORIDE 50; 1.49; 4.5 G/1000ML; G/1000ML; G/1000ML
INJECTION, SOLUTION INTRAVENOUS CONTINUOUS PRN
Status: DISCONTINUED | OUTPATIENT
Start: 2025-01-06 | End: 2025-01-07

## 2025-01-06 RX ADMIN — INSULIN HUMAN 0.2 UNITS/KG/HR: 1 INJECTION, SOLUTION INTRAVENOUS at 11:01

## 2025-01-06 RX ADMIN — SODIUM CHLORIDE 1000 ML: 9 INJECTION, SOLUTION INTRAVENOUS at 09:01

## 2025-01-06 RX ADMIN — SODIUM ZIRCONIUM CYCLOSILICATE 10 G: 10 POWDER, FOR SUSPENSION ORAL at 06:01

## 2025-01-06 RX ADMIN — INSULIN HUMAN 0.1 UNITS/KG/HR: 1 INJECTION, SOLUTION INTRAVENOUS at 06:01

## 2025-01-06 RX ADMIN — SODIUM CHLORIDE 1000 ML: 9 INJECTION, SOLUTION INTRAVENOUS at 07:01

## 2025-01-06 RX ADMIN — CALCIUM GLUCONATE 1 G: 20 INJECTION, SOLUTION INTRAVENOUS at 06:01

## 2025-01-06 RX ADMIN — HUMAN INSULIN 20 UNITS: 100 INJECTION, SOLUTION SUBCUTANEOUS at 10:01

## 2025-01-06 RX ADMIN — SODIUM CHLORIDE 1000 ML: 9 INJECTION, SOLUTION INTRAVENOUS at 06:01

## 2025-01-06 RX ADMIN — HYDROCODONE BITARTRATE AND ACETAMINOPHEN 1 TABLET: 7.5; 325 TABLET ORAL at 09:01

## 2025-01-06 RX ADMIN — MUPIROCIN: 20 OINTMENT TOPICAL at 09:01

## 2025-01-06 RX ADMIN — HUMAN INSULIN 10 UNITS: 100 INJECTION, SOLUTION SUBCUTANEOUS at 06:01

## 2025-01-06 RX ADMIN — SODIUM BICARBONATE: 84 INJECTION, SOLUTION INTRAVENOUS at 07:01

## 2025-01-06 RX ADMIN — SODIUM CHLORIDE, POTASSIUM CHLORIDE, SODIUM LACTATE AND CALCIUM CHLORIDE 2000 ML: 600; 310; 30; 20 INJECTION, SOLUTION INTRAVENOUS at 09:01

## 2025-01-06 NOTE — ED PROVIDER NOTES
Encounter Date: 1/6/2025    SCRIBE #1 NOTE: I, Hilda Stokes, am scribing for, and in the presence of,  Yifan Brown MD. I have scribed the following portions of the note - Other sections scribed: HPI, ROS, PE.       History     Chief Complaint   Patient presents with    Shortness of Breath     C/O weakness and SOB x3 days. CBG >500. Has not taken insulin or bp meds x2-3 days due to not feeling well     The patient is a 64 year old female with a history of DM, CKD, glaucoma, hypercholesteremia, HTN, hyperthyroidism, and GERD presenting to the ED with shortness of breath onset 3 to 4 days ago. The patient complains of weakness, decreased appetite, chills, back pain, abdominal pain, and chest pain; she denies decreased urine output or fever. The patient reports that she has been non-compliant with her prescribed insulin or blood pressure medication for the past 3 to 4 days.       PCP: Seema Mirza MD    The history is provided by the patient. No  was used.     Review of patient's allergies indicates:   Allergen Reactions    Penicillins Shortness Of Breath, Itching and Swelling     Tolerates cefepime 11/30/2020    Penicillins Anaphylaxis     Past Medical History:   Diagnosis Date    Allergy     Anemia due to stage 3 chronic kidney disease 12/6/2020    Arrhythmia     Arthritis     Bronchial asthma     Diabetes mellitus     Diabetes mellitus     Glaucoma     Hormone disorder     hysterectomy    Hypercholesteremia     Hypertension     Hyperthyroidism     Insomnia     Kidney stones     Neuropathy     Thyroid disease     Urine, incontinence, stress female     UTI (urinary tract infection) 11/27/2020     Past Surgical History:   Procedure Laterality Date    APPENDECTOMY      BACK SURGERY      disc removal     CARPAL TUNNEL RELEASE Bilateral     CHOLECYSTECTOMY      CYSTOSCOPY N/A 10/26/2020    Procedure: CYSTOSCOPY;  Surgeon: Wilner Goel MD;  Location: [facility];  Service: OB/GYN;  Laterality:  N/A;    CYSTOSCOPY W/ RETROGRADES Bilateral 12/7/2020    Procedure: CYSTOSCOPY, WITH RETROGRADE PYELOGRAM;  Surgeon: Daniel Jalloh MD;  Location: [address];  Service: Urology;  Laterality: Bilateral;    FRACTURE SURGERY Right     wrist    HYSTERECTOMY  2010    [facility]    JOINT REPLACEMENT      KNEE ARTHROSCOPY W/ DEBRIDEMENT      KNEE SURGERY Right     Knee replacement    LITHOTRIPSY      IA REMOVAL OF OVARY/TUBE(S)  9/9/13    benign    removal of round ligament mass      ROBOT-ASSISTED LAPAROSCOPIC LYSIS OF ADHESIONS USING DA JAMES XI N/A 10/26/2020    Procedure: XI ROBOTIC LYSIS, ADHESIONS;  Surgeon: Wilner Goel MD;  Location: [facility];  Service: OB/GYN;  Laterality: N/A;  ROBOTIC MOBILIZATION OF BLADDER- DR BURNHAM    SPINE SURGERY      WRIST FRACTURE SURGERY Left      Family History   Problem Relation Name Age of Onset    Cancer Mother      Cancer Brother      Colon cancer Brother      Cancer Brother      Cancer Brother      Cancer Brother      Ovarian cancer Neg Hx      Uterine cancer Neg Hx      Breast cancer Neg Hx       Social History     Tobacco Use    Smoking status: Every Day     Current packs/day: 0.50     Average packs/day: 0.5 packs/day for 30.0 years (15.0 ttl pk-yrs)     Types: Cigarettes    Smokeless tobacco: Never   Substance Use Topics    Alcohol use: No    Drug use: No     Review of Systems   Constitutional:  Positive for appetite change and chills. Negative for fever.        Generalized weakness.    Respiratory:  Positive for shortness of breath.    Cardiovascular:  Positive for chest pain.   Gastrointestinal:  Positive for abdominal pain.   Genitourinary:  Negative for decreased urine volume.   Musculoskeletal:  Positive for back pain.       Physical Exam     Initial Vitals [01/06/25 1507]   BP Pulse Resp Temp SpO2   (!) 175/135 108 (!) 22 97.9 °F (36.6 °C) 98 %      MAP       --         Physical Exam    Nursing note and vitals reviewed.  Constitutional: She  appears well-developed and well-nourished. She is not diaphoretic. No distress.   Appears uncomfortable.    HENT:   Head: Normocephalic and atraumatic.   Right Ear: External ear normal.   Left Ear: External ear normal.   Nose: Nose normal. Mouth/Throat: Mucous membranes are dry.   Eyes: Conjunctivae and EOM are normal. Pupils are equal, round, and reactive to light. Right eye exhibits no discharge. Left eye exhibits no discharge.   Cardiovascular:  Regular rhythm, normal heart sounds and intact distal pulses.   Tachycardia present.   Exam reveals no gallop and no friction rub.       No murmur heard.  Pulmonary/Chest: Breath sounds normal. No respiratory distress. She has no wheezes. She has no rhonchi. She has no rales. She exhibits no tenderness.   Abdominal: Abdomen is soft. Bowel sounds are normal. She exhibits no distension and no mass.   Mild epigastric tenderness to palpation.  There is no rebound and no guarding.   Musculoskeletal:         General: No edema. Normal range of motion.     Neurological: She is alert and oriented to person, place, and time. No cranial nerve deficit or sensory deficit.   Skin: Skin is warm and dry. Capillary refill takes less than 2 seconds. No erythema. No pallor.         ED Course   Critical Care    Date/Time: 1/6/2025 8:15 PM    Performed by: Yifan Brown MD  Authorized by: Yifan Brown MD  Direct patient critical care time: 9 minutes  Additional history critical care time: 7 minutes  Ordering / reviewing critical care time: 8 minutes  Documentation critical care time: 5 minutes  Consulting other physicians critical care time: 6 minutes  Total critical care time (exclusive of procedural time) : 35 minutes  Critical care time was exclusive of separately billable procedures and treating other patients.  Critical care was necessary to treat or prevent imminent or life-threatening deterioration of the following conditions: metabolic crisis and endocrine crisis.  Critical  care was time spent personally by me on the following activities: discussions with consultants, discussions with primary provider, development of treatment plan with patient or surrogate, obtaining history from patient or surrogate, ordering and performing treatments and interventions, ordering and review of laboratory studies, ordering and review of radiographic studies, pulse oximetry, re-evaluation of patient's condition and review of old charts.  Comments: DKA        Labs Reviewed   COMPREHENSIVE METABOLIC PANEL - Abnormal       Result Value    Sodium 124 (*)     Potassium 7.3 (*)     Chloride 88 (*)     CO2 6 (*)     Glucose 699 (*)     Blood Urea Nitrogen 56.5 (*)     Creatinine 3.09 (*)     Calcium 8.8      Protein Total 8.6 (*)     Albumin 3.9      Globulin 4.7 (*)     Albumin/Globulin Ratio 0.8 (*)     Bilirubin Total 0.4            ALT 7      AST 17      eGFR 16      Anion Gap 30.0      BUN/Creatinine Ratio 18     COVID/RSV/FLU A&B PCR - Abnormal    Influenza A PCR Not Detected      Influenza B PCR Not Detected      Respiratory Syncytial Virus PCR Detected (*)     SARS-CoV-2 PCR Not Detected      Narrative:     The Xpert Xpress SARS-CoV-2/FLU/RSV plus is a rapid, multiplexed real-time PCR test intended for the simultaneous qualitative detection and differentiation of SARS-CoV-2, Influenza A, Influenza B, and respiratory syncytial virus (RSV) viral RNA in either nasopharyngeal swab or nasal swab specimens.         MAGNESIUM - Abnormal    Magnesium Level 2.80 (*)    BLOOD GAS - Abnormal    Sample Type Arterial Blood      Sample site Right Radial Artery      Drawn by a alex rrt      pH, Blood gas 7.060 (*)     pCO2, Blood gas 22.0 (*)     pO2, Blood gas 94.0      Sodium, Blood Gas 120 (*)     Potassium, Blood Gas 7.4 (*)     Calcium Level Ionized 1.10 (*)     TOC2, Blood gas 6.9      Base Excess, Blood gas -22.50 (*)     sO2, Blood gas 92.9      HCO3, Blood gas 6.2 (*)     THb, Blood gas 11.9 (*)      O2 Hb, Blood Gas 95.8      CO Hgb 1.3      Met Hgb 0.7      Allens Test Yes      FIO2, Blood gas 21     CBC WITH DIFFERENTIAL - Abnormal    WBC 14.59 (*)     RBC 4.09 (*)     Hgb 12.4      Hct 40.3      MCV 98.5 (*)     MCH 30.3      MCHC 30.8 (*)     RDW 12.5      Platelet 331      MPV 10.4      Neut % 79.7      Lymph % 14.1      Mono % 5.3      Eos % 0.2      Basophil % 0.2      Lymph # 2.05      Neut # 11.63 (*)     Mono # 0.77      Eos # 0.03      Baso # 0.03      IG# 0.08 (*)     IG% 0.5      NRBC% 0.0     BETA - HYDROXYBUTYRATE, SERUM - Abnormal    Beta Hydroxybutyrate 7.20 (*)    POCT GLUCOSE - Abnormal    POCT Glucose >500 (*)    POCT GLUCOSE - Abnormal    POCT Glucose >500 (*)    TROPONIN I - Normal    Troponin-I 0.015     B-TYPE NATRIURETIC PEPTIDE - Normal    Natriuretic Peptide 85.9     CBC W/ AUTO DIFFERENTIAL    Narrative:     The following orders were created for panel order CBC auto differential.  Procedure                               Abnormality         Status                     ---------                               -----------         ------                     CBC with Differential[[id number]]       Abnormal            Final result                 Please view results for these tests on the individual orders.   URINALYSIS, REFLEX TO URINE CULTURE   BASIC METABOLIC PANEL   POCT GLUCOSE MONITORING CONTINUOUS   POCT GLUCOSE MONITORING CONTINUOUS        ECG Results              EKG 12-lead (Final result)        Collection Time Result Time QRS Duration OHS QTC Calculation    01/06/25 15:10:19 01/06/25 15:25:38 108 311                     Final result by Interface, Lab In [facility] (01/06/25 15:25:42)                   Narrative:    Test Reason : R06.02,    Vent. Rate : 202 BPM     Atrial Rate :    BPM     P-R Int :    ms          QRS Dur : 108 ms      QT Int : 170 ms       P-R-T Axes :     63 259 degrees    QTcB Int : 311 ms    Sinus rhythm  Low voltage QRS  ST and T wave abnormality, consider  inferior ischemia  ST and T wave abnormality, consider anterolateral ischemia  Abnormal ECG    Confirmed by Siddharth De Guzman ([id number]) on 1/6/2025 3:25:36 PM    Referred By:            Confirmed By: Siddharth De Guzman                                  Imaging Results              X-Ray Chest 1 View (Final result)  Result time 01/06/25 18:18:11      Final result by Porsha Self MD (01/06/25 18:18:11)                   Impression:      No acute abnormality of the chest.      Electronically signed by: Porsha Self  Date:    01/06/2025  Time:    18:18               Narrative:    EXAMINATION:  XR CHEST 1 VIEW    CLINICAL HISTORY:  Shortness of breath    TECHNIQUE:  AP chest    COMPARISON:  Chest x-ray dated 05/07/2024    FINDINGS:  The heart is normal in size.  The lungs are clear.  There is no pleural effusion or visible pneumothorax.                                       Medications   sodium bicarbonate 150 mEq in D5W 1,000 mL infusion ( Intravenous New Bag 1/6/25 1947)   sodium chloride 0.9% flush 10 mL (has no administration in time range)   0.9% NaCl infusion (1,000 mLs Intravenous New Bag 1/6/25 1945)   dextrose 5 % and 0.45 % NaCl infusion (has no administration in time range)   dextrose 50% injection 25 g (has no administration in time range)   dextrose 50% injection 12.5 g (has no administration in time range)   insulin regular in 0.9 % NaCl 100 unit/100 mL (1 unit/mL) infusion (0.2 Units/kg/hr × 98.9 kg Intravenous Rate/Dose Change 1/6/25 1957)   atorvastatin tablet 20 mg (has no administration in time range)   fluticasone furoate 50 mcg/actuation inhaler 1 puff (has no administration in time range)   ALPRAZolam tablet 0.25 mg (has no administration in time range)   calcium gluconate 1 g in NS IVPB (premixed) (0 g Intravenous Stopped 1/6/25 1903)   insulin regular injection 10 Units 0.1 mL (10 Units Intravenous Given 1/6/25 1839)   sodium zirconium cyclosilicate packet 10 g (10 g Oral Given 1/6/25 1803)    sodium chloride 0.9% bolus 1,000 mL 1,000 mL (0 mLs Intravenous Stopped 1/6/25 1903)     Medical Decision Making  The differential diagnosis includes, but is not limited to, DKA, metabolic acidosis, sepsis, flu, COVID, RSV, electrolyte abnormality, renal dysfunction.      Patient is ill-appearing.  Significant electrolyte abnormalities, hyperkalemia, acute kidney injury, dehydration, significant hyperglycemia, acidosis, a decreased bicarb, ketones present.  Significant concern for diabetic ketoacidosis.  She is given calcium for her hyperkalemia with peaked T-waves.  She is started on insulin drip for her diabetic ketoacidosis.  Discussed with the ICU will admit to their service for further evaluation management.  She is positive for RSV has not been taking her insulin for several days due to feeling bad likely secondary to the RSV.  We will admit for further evaluation management      Amount and/or Complexity of Data Reviewed  External Data Reviewed: notes.     Details: See ED course  Labs: ordered. Decision-making details documented in ED Course.  Radiology: ordered and independent interpretation performed.     Details: Negative for acute  ECG/medicine tests: ordered and independent interpretation performed. Decision-making details documented in ED Course.    Risk  OTC drugs.  Prescription drug management.  Decision regarding hospitalization.            Scribe Attestation:   Scribe #1: I performed the above scribed service and the documentation accurately describes the services I performed. I attest to the accuracy of the note.    Attending Attestation:           Physician Attestation for Scribe:  Physician Attestation Statement for Scribe #1: I, Yifan Brown MD, reviewed documentation, as scribed by Hilda Stokes in my presence, and it is both accurate and complete.             ED Course as of 01/06/25 2016 Mon Jan 06, 2025   1707 EKG done at 15 10 shows tachycardia rate proximally 110.  Peaked T-waves.   No obvious ST elevation or depression [MM]   1711 Chart review reveals history of diabetes hypothyroidism degenerative disc disease with sciatic nerve pain A1c 10/2024 was 10.8.  [MM]   1744 POC PH(!!): 7.060 [MM]   1744 Potassium, Blood Gas(!!): 7.4 [MM]   1948 RSV Ag by Molecular Method(!): Detected [MM]      ED Course User Index  [MM] Yifan Brown MD                             Clinical Impression:  Final diagnoses:  [R06.02] Shortness of breath  [J21.0] RSV (acute bronchiolitis due to respiratory syncytial virus)  [E13.10] Diabetic ketoacidosis without coma associated with other specified diabetes mellitus (Primary)  [E87.5] Hyperkalemia  [N17.9] LAURIE (acute kidney injury)          ED Disposition Condition    Admit Stable                Yifan Brown MD  01/06/25 2016

## 2025-01-06 NOTE — Clinical Note
Diagnosis: Shortness of breath [786.05.ICD-9-CM]   Future Attending Provider: SONIA PRITCHETT [[id number]]   Admit to which facility:: [facility] [[id number]]   Reason for IP Medical Treatment  (Clinical interventions that can only be accomplished in the IP setting? ) :: DKA   Plans for Post-Acute care--if anticipated (pick the single best option):: A. No post acute care anticipated at this time

## 2025-01-07 LAB
ALBUMIN SERPL-MCNC: 3.3 G/DL (ref 3.4–4.8)
ALBUMIN/GLOB SERPL: 0.9 RATIO (ref 1.1–2)
ALP SERPL-CCNC: 92 UNIT/L (ref 40–150)
ALT SERPL-CCNC: 10 UNIT/L (ref 0–55)
ANION GAP SERPL CALC-SCNC: 11 MEQ/L
ANION GAP SERPL CALC-SCNC: 11 MEQ/L
ANION GAP SERPL CALC-SCNC: 15 MEQ/L
AST SERPL-CCNC: 17 UNIT/L (ref 5–34)
BASOPHILS # BLD AUTO: 0.02 X10(3)/MCL
BASOPHILS NFR BLD AUTO: 0.1 %
BILIRUB SERPL-MCNC: 0.2 MG/DL
BUN SERPL-MCNC: 46.2 MG/DL (ref 9.8–20.1)
BUN SERPL-MCNC: 52.4 MG/DL (ref 9.8–20.1)
BUN SERPL-MCNC: 56.2 MG/DL (ref 9.8–20.1)
CALCIUM SERPL-MCNC: 8 MG/DL (ref 8.4–10.2)
CALCIUM SERPL-MCNC: 8 MG/DL (ref 8.4–10.2)
CALCIUM SERPL-MCNC: 8.3 MG/DL (ref 8.4–10.2)
CHLORIDE SERPL-SCNC: 101 MMOL/L (ref 98–107)
CHLORIDE SERPL-SCNC: 101 MMOL/L (ref 98–107)
CHLORIDE SERPL-SCNC: 98 MMOL/L (ref 98–107)
CO2 SERPL-SCNC: 17 MMOL/L (ref 23–31)
CO2 SERPL-SCNC: 23 MMOL/L (ref 23–31)
CO2 SERPL-SCNC: 24 MMOL/L (ref 23–31)
CREAT SERPL-MCNC: 2.16 MG/DL (ref 0.55–1.02)
CREAT SERPL-MCNC: 2.44 MG/DL (ref 0.55–1.02)
CREAT SERPL-MCNC: 2.68 MG/DL (ref 0.55–1.02)
CREAT/UREA NIT SERPL: 21
EOSINOPHIL # BLD AUTO: 0 X10(3)/MCL (ref 0–0.9)
EOSINOPHIL NFR BLD AUTO: 0 %
ERYTHROCYTE [DISTWIDTH] IN BLOOD BY AUTOMATED COUNT: 12.5 % (ref 11.5–17)
EST. AVERAGE GLUCOSE BLD GHB EST-MCNC: 217.3 MG/DL
GFR SERPLBLD CREATININE-BSD FMLA CKD-EPI: 19 ML/MIN/1.73/M2
GFR SERPLBLD CREATININE-BSD FMLA CKD-EPI: 21 ML/MIN/1.73/M2
GFR SERPLBLD CREATININE-BSD FMLA CKD-EPI: 25 ML/MIN/1.73/M2
GLOBULIN SER-MCNC: 3.5 GM/DL (ref 2.4–3.5)
GLUCOSE SERPL-MCNC: 102 MG/DL (ref 82–115)
GLUCOSE SERPL-MCNC: 158 MG/DL (ref 82–115)
GLUCOSE SERPL-MCNC: 332 MG/DL (ref 82–115)
HBA1C MFR BLD: 9.2 %
HCT VFR BLD AUTO: 31.4 % (ref 37–47)
HGB BLD-MCNC: 10.5 G/DL (ref 12–16)
IMM GRANULOCYTES # BLD AUTO: 0.13 X10(3)/MCL (ref 0–0.04)
IMM GRANULOCYTES NFR BLD AUTO: 0.9 %
LYMPHOCYTES # BLD AUTO: 3.41 X10(3)/MCL (ref 0.6–4.6)
LYMPHOCYTES NFR BLD AUTO: 22.9 %
MAGNESIUM SERPL-MCNC: 2.3 MG/DL (ref 1.6–2.6)
MAGNESIUM SERPL-MCNC: 2.3 MG/DL (ref 1.6–2.6)
MAGNESIUM SERPL-MCNC: 2.4 MG/DL (ref 1.6–2.6)
MCH RBC QN AUTO: 29.7 PG (ref 27–31)
MCHC RBC AUTO-ENTMCNC: 33.4 G/DL (ref 33–36)
MCV RBC AUTO: 89 FL (ref 80–94)
MONOCYTES # BLD AUTO: 1.05 X10(3)/MCL (ref 0.1–1.3)
MONOCYTES NFR BLD AUTO: 7 %
NEUTROPHILS # BLD AUTO: 10.31 X10(3)/MCL (ref 2.1–9.2)
NEUTROPHILS NFR BLD AUTO: 69.1 %
NRBC BLD AUTO-RTO: 0 %
PHOSPHATE SERPL-MCNC: 4.5 MG/DL (ref 2.3–4.7)
PHOSPHATE SERPL-MCNC: 4.8 MG/DL (ref 2.3–4.7)
PHOSPHATE SERPL-MCNC: 5 MG/DL (ref 2.3–4.7)
PLATELET # BLD AUTO: 222 X10(3)/MCL (ref 130–400)
PMV BLD AUTO: 9.8 FL (ref 7.4–10.4)
POCT GLUCOSE: 105 MG/DL (ref 70–110)
POCT GLUCOSE: 141 MG/DL (ref 70–110)
POCT GLUCOSE: 209 MG/DL (ref 70–110)
POCT GLUCOSE: 245 MG/DL (ref 70–110)
POCT GLUCOSE: 332 MG/DL (ref 70–110)
POCT GLUCOSE: 394 MG/DL (ref 70–110)
POCT GLUCOSE: 433 MG/DL (ref 70–110)
POCT GLUCOSE: 96 MG/DL (ref 70–110)
POCT GLUCOSE: >500 MG/DL (ref 70–110)
POTASSIUM SERPL-SCNC: 4 MMOL/L (ref 3.5–5.1)
POTASSIUM SERPL-SCNC: 4.1 MMOL/L (ref 3.5–5.1)
POTASSIUM SERPL-SCNC: 4.2 MMOL/L (ref 3.5–5.1)
PROT SERPL-MCNC: 6.8 GM/DL (ref 5.8–7.6)
RBC # BLD AUTO: 3.53 X10(6)/MCL (ref 4.2–5.4)
SODIUM SERPL-SCNC: 130 MMOL/L (ref 136–145)
SODIUM SERPL-SCNC: 135 MMOL/L (ref 136–145)
SODIUM SERPL-SCNC: 136 MMOL/L (ref 136–145)
T4 FREE SERPL-MCNC: 0.83 NG/DL (ref 0.7–1.48)
TSH SERPL-ACNC: 3.89 UIU/ML (ref 0.35–4.94)
WBC # BLD AUTO: 14.92 X10(3)/MCL (ref 4.5–11.5)

## 2025-01-07 PROCEDURE — 85025 COMPLETE CBC W/AUTO DIFF WBC: CPT

## 2025-01-07 PROCEDURE — 84443 ASSAY THYROID STIM HORMONE: CPT | Performed by: INTERNAL MEDICINE

## 2025-01-07 PROCEDURE — 63600175 PHARM REV CODE 636 W HCPCS: Performed by: INTERNAL MEDICINE

## 2025-01-07 PROCEDURE — 27000207 HC ISOLATION

## 2025-01-07 PROCEDURE — 83735 ASSAY OF MAGNESIUM: CPT

## 2025-01-07 PROCEDURE — 21400001 HC TELEMETRY ROOM

## 2025-01-07 PROCEDURE — 84439 ASSAY OF FREE THYROXINE: CPT | Performed by: INTERNAL MEDICINE

## 2025-01-07 PROCEDURE — 25000003 PHARM REV CODE 250

## 2025-01-07 PROCEDURE — 25000003 PHARM REV CODE 250: Performed by: INTERNAL MEDICINE

## 2025-01-07 PROCEDURE — 80053 COMPREHEN METABOLIC PANEL: CPT

## 2025-01-07 PROCEDURE — 51701 INSERT BLADDER CATHETER: CPT

## 2025-01-07 PROCEDURE — 36415 COLL VENOUS BLD VENIPUNCTURE: CPT | Performed by: INTERNAL MEDICINE

## 2025-01-07 PROCEDURE — 25000003 PHARM REV CODE 250: Performed by: STUDENT IN AN ORGANIZED HEALTH CARE EDUCATION/TRAINING PROGRAM

## 2025-01-07 PROCEDURE — 36415 COLL VENOUS BLD VENIPUNCTURE: CPT

## 2025-01-07 PROCEDURE — 11000001 HC ACUTE MED/SURG PRIVATE ROOM

## 2025-01-07 PROCEDURE — 83036 HEMOGLOBIN GLYCOSYLATED A1C: CPT | Performed by: INTERNAL MEDICINE

## 2025-01-07 PROCEDURE — 84100 ASSAY OF PHOSPHORUS: CPT

## 2025-01-07 RX ORDER — ENOXAPARIN SODIUM 100 MG/ML
30 INJECTION SUBCUTANEOUS EVERY 24 HOURS
Status: DISCONTINUED | OUTPATIENT
Start: 2025-01-07 | End: 2025-01-10 | Stop reason: HOSPADM

## 2025-01-07 RX ORDER — TRAZODONE HYDROCHLORIDE 50 MG/1
50 TABLET ORAL NIGHTLY
Status: DISCONTINUED | OUTPATIENT
Start: 2025-01-07 | End: 2025-01-10 | Stop reason: HOSPADM

## 2025-01-07 RX ORDER — METHIMAZOLE 10 MG/1
10 TABLET ORAL DAILY
Status: DISCONTINUED | OUTPATIENT
Start: 2025-01-07 | End: 2025-01-10 | Stop reason: HOSPADM

## 2025-01-07 RX ORDER — GLUCAGON 1 MG
1 KIT INJECTION
Status: DISCONTINUED | OUTPATIENT
Start: 2025-01-07 | End: 2025-01-10 | Stop reason: HOSPADM

## 2025-01-07 RX ORDER — INSULIN ASPART 100 [IU]/ML
0-15 INJECTION, SOLUTION INTRAVENOUS; SUBCUTANEOUS
Status: DISCONTINUED | OUTPATIENT
Start: 2025-01-07 | End: 2025-01-10 | Stop reason: HOSPADM

## 2025-01-07 RX ORDER — INSULIN GLARGINE 100 [IU]/ML
20 INJECTION, SOLUTION SUBCUTANEOUS ONCE
Status: COMPLETED | OUTPATIENT
Start: 2025-01-07 | End: 2025-01-07

## 2025-01-07 RX ORDER — IBUPROFEN 200 MG
24 TABLET ORAL
Status: DISCONTINUED | OUTPATIENT
Start: 2025-01-07 | End: 2025-01-10 | Stop reason: HOSPADM

## 2025-01-07 RX ORDER — IBUPROFEN 200 MG
16 TABLET ORAL
Status: DISCONTINUED | OUTPATIENT
Start: 2025-01-07 | End: 2025-01-10 | Stop reason: HOSPADM

## 2025-01-07 RX ADMIN — INSULIN HUMAN 0.2 UNITS/KG/HR: 1 INJECTION, SOLUTION INTRAVENOUS at 04:01

## 2025-01-07 RX ADMIN — HYDROCODONE BITARTRATE AND ACETAMINOPHEN 1 TABLET: 7.5; 325 TABLET ORAL at 05:01

## 2025-01-07 RX ADMIN — ATORVASTATIN CALCIUM 20 MG: 10 TABLET, FILM COATED ORAL at 08:01

## 2025-01-07 RX ADMIN — INSULIN GLARGINE 20 UNITS: 100 INJECTION, SOLUTION SUBCUTANEOUS at 09:01

## 2025-01-07 RX ADMIN — HYDROCODONE BITARTRATE AND ACETAMINOPHEN 1 TABLET: 7.5; 325 TABLET ORAL at 04:01

## 2025-01-07 RX ADMIN — INSULIN ASPART 6 UNITS: 100 INJECTION, SOLUTION INTRAVENOUS; SUBCUTANEOUS at 08:01

## 2025-01-07 RX ADMIN — MUPIROCIN: 20 OINTMENT TOPICAL at 08:01

## 2025-01-07 RX ADMIN — ENOXAPARIN SODIUM 30 MG: 40 INJECTION SUBCUTANEOUS at 07:01

## 2025-01-07 RX ADMIN — SODIUM CHLORIDE, POTASSIUM CHLORIDE, SODIUM LACTATE AND CALCIUM CHLORIDE 1000 ML: 600; 310; 30; 20 INJECTION, SOLUTION INTRAVENOUS at 12:01

## 2025-01-07 RX ADMIN — TRAZODONE HYDROCHLORIDE 50 MG: 50 TABLET ORAL at 08:01

## 2025-01-07 RX ADMIN — POTASSIUM CHLORIDE: 2 INJECTION, SOLUTION, CONCENTRATE INTRAVENOUS at 04:01

## 2025-01-07 RX ADMIN — SODIUM BICARBONATE: 84 INJECTION, SOLUTION INTRAVENOUS at 04:01

## 2025-01-07 NOTE — H&P
[facility] - Emergency Dept  Pulmonary Critical Care Note    Patient Name: Navin Beck  MRN: [id number]  Admission Date: 1/6/2025  Hospital Length of Stay: 0 days  Code Status: Prior  Attending Provider: Yifan Brown MD  Primary Care Provider: Seema Mirza MD     Subjective:     HPI:   Navin Beck is a 64 y.o. female with PMH of DM, CKD, glaucoma, hypercholesteremia, HTN, hyperthyroidism, and GERD , who presented to the ED on 1/6/2025 with CC of shortness of breath for 3-4 days with associated symptoms of weakness, decreased appetite, nauseated, chills, back pain, abdominal pain and chest pain. Denies fever, headache, urinary frequency/urgency, depress/anxiety.     ED course:   Upon arrival, hypertensive, tachycardia and tachypnea. Other vitals stable. Pertinent lab findings include anion gap metabolic acidosis, elevated CBG, leukocytosis, hyponatremia, hyperkalemia, elevated BUN, and creatinine. EKG revealed low voltage QRS, inferior and anterolateral ischemia. CXT showed no acute abnormality of the chest. RSV detected. She was given insulin regular, calcium gluconate, NS bolus, dextrose bolus and sodium zirconium. Patient was admitted to ICU for DKA protocol.     Hospital Course/Significant events:  1/6/2024 - Admitted to ICU     24 Hour Interval History:  Pending    Past Medical History:   Diagnosis Date    Allergy     Anemia due to stage 3 chronic kidney disease 12/6/2020    Arrhythmia     Arthritis     Bronchial asthma     Diabetes mellitus     Diabetes mellitus     Glaucoma     Hormone disorder     hysterectomy    Hypercholesteremia     Hypertension     Hyperthyroidism     Insomnia     Kidney stones     Neuropathy     Thyroid disease     Urine, incontinence, stress female     UTI (urinary tract infection) 11/27/2020       Past Surgical History:   Procedure Laterality Date    APPENDECTOMY      BACK SURGERY      disc removal     CARPAL TUNNEL RELEASE Bilateral      CHOLECYSTECTOMY      CYSTOSCOPY N/A 10/26/2020    Procedure: CYSTOSCOPY;  Surgeon: Wilner Goel MD;  Location: [facility] OR;  Service: OB/GYN;  Laterality: N/A;    CYSTOSCOPY W/ RETROGRADES Bilateral 12/7/2020    Procedure: CYSTOSCOPY, WITH RETROGRADE PYELOGRAM;  Surgeon: Daniel Jalloh MD;  Location: [facility] OR [address] [place] [address];  Service: Urology;  Laterality: Bilateral;    FRACTURE SURGERY Right     wrist    HYSTERECTOMY  2010    [facility]    JOINT REPLACEMENT      KNEE ARTHROSCOPY W/ DEBRIDEMENT      KNEE SURGERY Right     Knee replacement    LITHOTRIPSY      UT REMOVAL OF OVARY/TUBE(S)  9/9/13    benign    removal of round ligament mass      ROBOT-ASSISTED LAPAROSCOPIC LYSIS OF ADHESIONS USING DA JAMES XI N/A 10/26/2020    Procedure: XI ROBOTIC LYSIS, ADHESIONS;  Surgeon: Wilner Goel MD;  Location: [facility] OR;  Service: OB/GYN;  Laterality: N/A;  ROBOTIC MOBILIZATION OF BLADDER- DR BURNHAM    SPINE SURGERY      WRIST FRACTURE SURGERY Left        Social History     Socioeconomic History    Marital status:    Tobacco Use    Smoking status: Every Day     Current packs/day: 0.50     Average packs/day: 0.5 packs/day for 30.0 years (15.0 ttl pk-yrs)     Types: Cigarettes    Smokeless tobacco: Never   Substance and Sexual Activity    Alcohol use: No    Drug use: No    Sexual activity: Not Currently     Partners: Male     Birth control/protection: Surgical   Social History Narrative    ** Merged History Encounter **            Current Outpatient Medications   Medication Instructions    albuterol (PROVENTIL/VENTOLIN HFA) 90 mcg/actuation inhaler 1-2 puffs, Every 6 hours PRN    ALPRAZolam (XANAX) 0.25 MG tablet Take one pill 60 minutes before procedure and then repeat 15 to 20 minutes before.    atorvastatin (LIPITOR) 20 mg, Oral, Daily    benazepriL (LOTENSIN) 5 mg, Oral, Daily    blood-glucose meter (TRUE METRIX GLUCOSE METER MISC) True Metrix Glucose Meter    calcium carb, citrate-vit D3 600 mg-12.5  "mcg (500 unit) TbSR 1 each, Oral, 2 times daily    diclofenac (VOLTAREN) 75 mg, Oral, 2 times daily    empagliflozin (JARDIANCE) 25 mg, Oral, Daily    fluconazole (DIFLUCAN) 150 MG Tab Take 1 pill for yeast infection repeat in 3 days    fluticasone propionate (FLOVENT HFA) 44 mcg/actuation inhaler 1 puff, Daily    HYDROcodone-acetaminophen (NORCO)  mg per tablet 1 tablet, Oral, Every 12 hours PRN    insulin syringe-needle U-100 (INSULIN SYRINGE MICROFINE) 1 mL 27 gauge x 5/8" Syrg 1 each, Misc.(Non-Drug; Combo Route), 2 times daily    LANTUS U-100 INSULIN 40 Units, Subcutaneous, 2 times daily    metFORMIN (GLUCOPHAGE) 500 mg, Oral, With breakfast    methIMAzole (TAPAZOLE) 10 mg, Oral, Daily    NOVOLOG FLEXPEN U-100 INSULIN 100 unit/mL (3 mL) InPn pen Inject 1 to 5 unites subq before meals and at bedtime as needed    pregabalin (LYRICA) 75 mg, Oral, 3 times daily    tiZANidine (ZANAFLEX) 8-12 mg, Oral, Nightly PRN    traZODone (DESYREL) 50 mg, Oral, Nightly    TRUE METRIX GLUCOSE TEST STRIP Strp 1 each, Topical (Top), 3 times daily    zolpidem (AMBIEN) 5 mg, Oral, Nightly PRN, May take with 5-10 mg of Melatonin     Current Inpatient Medications   calcium gluconate IVPB  1 g Intravenous ED 1 Time    insulin regular  10 Units Intravenous ED 1 Time    sodium chloride 0.9%  1,000 mL Intravenous ED 1 Time     Current Intravenous Infusions   0.9% NaCl  1,000 mL Intravenous Continuous        D5 and 0.45% NaCl   Intravenous Continuous PRN        insulin regular 1 units/mL infusion orderable (DKA)  0-0.2 Units/kg/hr (Dosing Weight) Intravenous Continuous        sodium bicarbonate 150 mEq in D5W 1,000 mL infusion   Intravenous Continuous         ROS: neg unless stated above        Objective:   No intake or output data in the 24 hours ending 01/06/25 1806  Vital Signs (Most Recent):  Temp: 97.9 °F (36.6 °C) (01/06/25 1507)  Pulse: 97 (01/06/25 1707)  Resp: 15 (01/06/25 1707)  BP: (!) 121/52 (01/06/25 1707)  SpO2: 100 % " (25 1707)  Body mass index is 34.14 kg/m².  Weight: 98.9 kg (218 lb) Vital Signs (24h Range):  Temp:  [97.9 °F (36.6 °C)] 97.9 °F (36.6 °C)  Pulse:  [] 97  Resp:  [15-22] 15  SpO2:  [98 %-100 %] 100 %  BP: (121-175)/() 121/52     Physical Exam:  General: Well nourished w/o distress  HEENT: NC/AT  Pulm: CTA bilaterally, normal work of breathing  CV: S1, S2 w/o murmurs or gallops; no edema noted  GI: Bowel sound +   MSK: Full ROM of all extremities and spine w/o limitation or discomfort  Derm: No rashes, abnormal bruising, or skin lesions  Neuro: AAOx4; motor/sensory function intact  Psych: Cooperative; appropriate mood and affect    Lines/Drains/Airways       Peripheral Intravenous Line  Duration                  Peripheral IV - Single Lumen 25 1743 20 G Right Antecubital <1 day                  Significant Labs:  Lab Results   Component Value Date    WBC 14.59 (H) 2025    HGB 12.4 2025    HCT 40.3 2025    MCV 98.5 (H) 2025     2025       BMP  Lab Results   Component Value Date     (L) 2025    K 7.3 (HH) 2025    CO2 6 (LL) 2025    BUN 56.5 (H) 2025    CREATININE 3.09 (H) 2025    CALCIUM 8.8 2025    AGAP 30.0 2025    ESTGFRAFRICA >60.0 2022    EGFRNONAA 54.8 (A) 2022     ABG  Recent Labs   Lab 25  1737   PH 7.060*   PO2 94.0   PCO2 22.0*   HCO3 6.2*     Mechanical Ventilation Support:       Significant Imaging:  I have reviewed the pertinent imaging within the past 24 hours.    Assessment/Plan:     Assessment  DKA   RSV infection     Plan  -Admitted to ICU with cardiac monitoring, DKA Protochol initiated  -On arrival: CB  Bicarb: 6  Gap: 30  BHB: 7.2  pH: 7.060  -HbA1c on 10/2024:  10.8.   -Likely 2/2 Non-compliance   -Home DM regimen:  Lantus 10U BID, Novolog 1-5U before meals and qHS PRN  -Start insulin gtt per protocol with q1h accuchecks while on the drip.    -Transition to SQ  insulin and continue the drip for additional 2 hours if able to tolerate PO w/o N/V  Bicarb > 18  Anion gap < 12  Glucose < 250 on 2 consecutive readings.    SQ insulin dosing: SSI or 0.5-0.8 U/kg divided between Long and short acting  -Once blood sugar reaches 200, transition to D5 1/2 NS @ 150 cc/h  -Monitor electrolytes with BMP q4h, replete per DKA protocol. Keep K > 4, Mg > 2, Phos > 3  -Bariatric clear liquid diet while on insulin gtt then change to Diabetic diet when initiating subq insulin with accuchecks 4x daily before meals and at bedtime (AC and HS)  -Continue close ICU monitoring. Once off drip and stable, downgrade to floor   -Airborne and Contact and Droplet Isolation     Code Status: Full  Abx: None   Fluids: DKA protocol   DVT Prophylaxis: SCD's   GI Prophylaxis: None      32 minutes of critical care was time spent personally by me on the following activities: development of treatment plan with patient or surrogate and bedside caregivers, discussions with consultants, evaluation of patient's response to treatment, examination of patient, ordering and performing treatments and interventions, ordering and review of laboratory studies, ordering and review of radiographic studies, pulse oximetry, re-evaluation of patient's condition.  This critical care time did not overlap with that of any other provider or involve time for any procedures.     Keith Magaña MD  U Family Medicine Resident, -I  Pulmonary Critical Care Medicine  [facility] - 7th Floor ICU

## 2025-01-07 NOTE — PLAN OF CARE
Problem: Adult Inpatient Plan of Care  Goal: Plan of Care Review  Outcome: Progressing  Goal: Patient-Specific Goal (Individualized)  Outcome: Progressing  Goal: Absence of Hospital-Acquired Illness or Injury  Outcome: Progressing  Goal: Optimal Comfort and Wellbeing  Outcome: Progressing  Goal: Readiness for Transition of Care  Outcome: Progressing     Problem: Diabetes Comorbidity  Goal: Blood Glucose Level Within Targeted Range  Outcome: Progressing     Problem: Acute Kidney Injury/Impairment  Goal: Fluid and Electrolyte Balance  Outcome: Progressing  Goal: Improved Oral Intake  Outcome: Progressing  Goal: Effective Renal Function  Outcome: Progressing     Problem: Pneumonia  Goal: Fluid Balance  Outcome: Progressing  Goal: Resolution of Infection Signs and Symptoms  Outcome: Progressing  Goal: Effective Oxygenation and Ventilation  Outcome: Progressing     Problem: Skin Injury Risk Increased  Goal: Skin Health and Integrity  Outcome: Progressing

## 2025-01-07 NOTE — H&P
[facility] - 7 North ICU HOSPITAL MEDICINE - H&P ADMISSION NOTE      Patient Name: Navin Beck  MRN: [id number]  Patient Class: IP- Inpatient   Admission Date: 1/6/2025   Admitting Physician:  Service   Attending Physician: Tang Erickson MD  Primary Care Provider: Seema Mirza MD  Face-to-Face encounter date: 01/07/2025        CHIEF COMPLAINT     Chief Complaint   Patient presents with    Shortness of Breath     C/O weakness and SOB x3 days. CBG >500. Has not taken insulin or bp meds x2-3 days due to not feeling well       HISTORY OF PRESENTING ILLNESS   64-year-old female with a past medical history of insulin-dependent diabetes mellitus, chronic kidney disease, HTN, hypothyroidism.  She came into the emergency department with complaints of shortness a breath, generalized weakness, muscle aches, chills, diarrhea of 3 days'.  She reports no fevers or chest pain.  She said she had not taken her insulin and 3 days due to feeling sick.  In the emergency department she tested positive for RSV and found to be in DKA and admitted to ICU for DKA protocol.  Gap has closed at this time on January 7th and downgraded to hospitalist service           PAST MEDICAL HISTORY     Past Medical History:   Diagnosis Date    Allergy     Anemia due to stage 3 chronic kidney disease 12/6/2020    Arrhythmia     Arthritis     Bronchial asthma     Diabetes mellitus     Diabetes mellitus     Glaucoma     Hormone disorder     hysterectomy    Hypercholesteremia     Hypertension     Hyperthyroidism     Insomnia     Kidney stones     Neuropathy     Thyroid disease     Urine, incontinence, stress female     UTI (urinary tract infection) 11/27/2020       PAST SURGICAL HISTORY     Past Surgical History:   Procedure Laterality Date    APPENDECTOMY      BACK SURGERY      disc removal     CARPAL TUNNEL RELEASE Bilateral     CHOLECYSTECTOMY      CYSTOSCOPY N/A 10/26/2020    Procedure: CYSTOSCOPY;  Surgeon: Wilner Goel  MD;  Location: [facility];  Service: OB/GYN;  Laterality: N/A;    CYSTOSCOPY W/ RETROGRADES Bilateral 12/7/2020    Procedure: CYSTOSCOPY, WITH RETROGRADE PYELOGRAM;  Surgeon: Daniel Jalloh MD;  Location: [address];  Service: Urology;  Laterality: Bilateral;    FRACTURE SURGERY Right     wrist    HYSTERECTOMY  2010    [facility]    JOINT REPLACEMENT      KNEE ARTHROSCOPY W/ DEBRIDEMENT      KNEE SURGERY Right     Knee replacement    LITHOTRIPSY      MO REMOVAL OF OVARY/TUBE(S)  9/9/13    benign    removal of round ligament mass      ROBOT-ASSISTED LAPAROSCOPIC LYSIS OF ADHESIONS USING DA JAMES XI N/A 10/26/2020    Procedure: XI ROBOTIC LYSIS, ADHESIONS;  Surgeon: Wilner Goel MD;  Location: [facility];  Service: OB/GYN;  Laterality: N/A;  ROBOTIC MOBILIZATION OF BLADDER- DR BURNHAM    SPINE SURGERY      WRIST FRACTURE SURGERY Left        FAMILY HISTORY   Reviewed and noncontributory to this case    SOCIAL HISTORY     Social History     Socioeconomic History    Marital status:    Tobacco Use    Smoking status: Every Day     Current packs/day: 0.50     Average packs/day: 0.5 packs/day for 30.0 years (15.0 ttl pk-yrs)     Types: Cigarettes    Smokeless tobacco: Never   Substance and Sexual Activity    Alcohol use: No    Drug use: No    Sexual activity: Not Currently     Partners: Male     Birth control/protection: Surgical   Social History Narrative    ** Merged History Encounter **          Social Drivers of Health     Financial Resource Strain: Low Risk  (1/6/2025)    Overall Financial Resource Strain (CARDIA)     Difficulty of Paying Living Expenses: Not hard at all   Food Insecurity: No Food Insecurity (1/6/2025)    Hunger Vital Sign     Worried About Running Out of Food in the Last Year: Never true     Ran Out of Food in the Last Year: Never true   Transportation Needs: No Transportation Needs (1/6/2025)    TRANSPORTATION NEEDS     Transportation : No   Stress: No Stress Concern  Present (1/6/2025)    [employer] [place] [employer] - Occupational Stress Questionnaire     Feeling of Stress : Not at all   Housing Stability: Low Risk  (1/6/2025)    Housing Stability Vital Sign     Unable to Pay for Housing in the Last Year: No     Homeless in the Last Year: No     Screening for Social Drivers for health:  Patient screened for food insecurity, housing instability, transportation needs, utility difficulties, and interpersonal safety (select all that apply as identified as concern)  []Housing or Food  []Transportation Needs  []Utility Difficulties  []Interpersonal safety  [x]None      HOME MEDICATIONS     Prior to Admission medications    Medication Sig Start Date End Date Taking? Authorizing Provider   albuterol (PROVENTIL/VENTOLIN HFA) 90 mcg/actuation inhaler Inhale 1-2 puffs into the lungs every 6 (six) hours as needed for Wheezing or Shortness of Breath.  4/17/20   Provider, Historical   ALPRAZolam (XANAX) 0.25 MG tablet Take one pill 60 minutes before procedure and then repeat 15 to 20 minutes before. 11/4/24   Seema Mirza MD   atorvastatin (LIPITOR) 20 MG tablet Take 1 tablet (20 mg total) by mouth once daily. 12/17/24   Seema Mirza MD   benazepriL (LOTENSIN) 5 MG tablet Take 1 tablet (5 mg total) by mouth once daily. 12/17/24 12/17/25  Seema Mirza MD   blood-glucose meter (TRUE METRIX GLUCOSE METER MISC) True Metrix Glucose Meter    Provider, Historical   calcium carb, citrate-vit D3 600 mg-12.5 mcg (500 unit) TbSR Take 1 each by mouth 2 (two) times a day. 12/17/24   Seema Mirza MD   diclofenac (VOLTAREN) 75 MG EC tablet Take 1 tablet (75 mg total) by mouth 2 (two) times daily. 9/5/22   Van Quevedo, DO   fluconazole (DIFLUCAN) 150 MG Tab Take 1 pill for yeast infection repeat in 3 days 10/1/24   Seema Mirza MD   fluticasone propionate (FLOVENT HFA) 44 mcg/actuation inhaler Inhale 1 puff into the lungs once daily.     Provider,  "Historical   HYDROcodone-acetaminophen (NORCO)  mg per tablet Take 1 tablet by mouth every 12 (twelve) hours as needed for Pain. 11/4/24   Seema Mirza MD   insulin glargine U-100, Lantus, (LANTUS U-100 INSULIN) 100 unit/mL injection Inject 40 Units into the skin 2 (two) times a day. 12/17/24 12/17/25  Seema Mirza MD   insulin syringe-needle U-100 (INSULIN SYRINGE MICROFINE) 1 mL 27 gauge x 5/8" Syrg 1 each by Misc.(Non-Drug; Combo Route) route 2 (two) times a day. 12/17/24   Seema Mirza MD   metFORMIN (GLUCOPHAGE) 500 MG tablet Take 1 tablet (500 mg total) by mouth daily with breakfast. 12/17/24   Seema Mirza MD   methIMAzole (TAPAZOLE) 10 MG Tab Take 1 tablet (10 mg total) by mouth once daily. 12/17/24   Seema Mirza MD   NOVOLOG FLEXPEN U-100 INSULIN 100 unit/mL (3 mL) InPn pen Inject 1 to 5 unites subq before meals and at bedtime as needed 12/17/24   Seema Mirza MD   pregabalin (LYRICA) 75 MG capsule Take 1 capsule (75 mg total) by mouth 3 (three) times daily. 12/17/24 7/15/25  Seema Mirza MD   tiZANidine (ZANAFLEX) 4 MG tablet Take 2-3 tablets (8-12 mg total) by mouth nightly as needed (muscle spasms). 12/17/24   Seema Mirza MD   traZODone (DESYREL) 50 MG tablet Take 1 tablet (50 mg total) by mouth every evening. 12/17/24 12/17/25  Seema Mirza MD   TRUE METRIX GLUCOSE TEST STRIP Strp Apply 1 each topically 3 (three) times daily. 10/1/24   Seema Mirza MD   zolpidem (AMBIEN) 5 MG Tab Take 1 tablet (5 mg total) by mouth nightly as needed (insomnia). May take with 5-10 mg of Melatonin 12/17/24 6/17/25  Seema Mirza MD       ALLERGIES   Penicillins and Penicillins          REVIEW OF SYSTEMS   Except as documented above, all other systems reviewed and negative    PHYSICAL EXAM     Vitals:    01/07/25 1615   BP:    Pulse: 80   Resp:    Temp:       General:  In no acute distress, resting comfortably  Head and neck:  Atraumatic, " normocephalic, moist mucous membranes, supple neck  Chest:  Clear to auscultation bilaterally  Heart:  S1, S2, no appreciable murmur  Abdomen:  Soft, nontender, BS +  MSK:  Warm, no lower extremity edema, no clubbing or cyanosis  Neuro:  Alert and oriented x4, moving all extremities with good strength  Integumentary:  No obvious skin rash  Psychiatry:  Appropriate mood and affect          ASSESSMENT AND PLAN   Diabetic ketoacidosis-resolved   RSV infection   Diarrhea   Acute kidney injury on CKD 3  Leukocytosis    History of: As listed above      After current bag of fluids is finished, discontinue IV fluids and monitor off of fluids.  Obtain ultrasound kidneys and check bladder volume as well  Supportive care for RSV, resting comfortably and satting well.    No laxatives recent use, check stool culture, C diff, GI panel  Antibiotics on hold currently, will adjust depending on stool cultures  Check TSH and free T4, continue current home dose of methimazole  Home dose of insulin Lantus 40 units b.i.d. along with 10 units NovoLog with meals.    DVT prophylaxis:  Lovenox 30  __________________________________________________________________  LABS/MICRO/MEDS/DIAGNOSTICS       LABS  Recent Labs     01/07/25 0222 01/07/25  0607 01/07/25  1040   *   < > 136   K 4.2   < > 4.1   CO2 17*   < > 24   BUN 56.2*   < > 46.2*   CREATININE 2.68*   < > 2.16*   GLUCOSE 332*   < > 102   CALCIUM 8.0*   < > 8.3*   ALKPHOS 92  --   --    AST 17  --   --    ALT 10  --   --    ALBUMIN 3.3*  --   --     < > = values in this interval not displayed.     Recent Labs     01/07/25 0222   WBC 14.92*   RBC 3.53*   HCT 31.4*   MCV 89.0          MICROBIOLOGY  Microbiology Results (last 7 days)       Procedure Component Value Units Date/Time    Stool Culture [[id number]]     Order Status: Sent Specimen: Stool     Clostridium Diff Toxin, A & B, EIA [[id number]]     Order Status: Sent Specimen: Stool             MEDICATIONS   atorvastatin   20 mg Oral Daily    fluticasone furoate  1 puff Inhalation Daily    methIMAzole  10 mg Oral Daily    mupirocin   Nasal BID    traZODone  50 mg Oral QHS      INFUSIONS   0.45% NaCl   Intravenous Continuous PRN        0.45% NaCl with KCl 20 mEq infusion  150 mL/hr Intravenous Continuous PRN        0.9% NaCl 1,000 mL with potassium chloride 20 mEq infusion   Intravenous Continuous  mL/hr at 01/07/25 0614 Rate Verify at 01/07/25 0614    0.9% NaCl   Intravenous Continuous PRN        D5 and 0.45% NaCl 990 mL with potassium chloride 20 mEq infusion   Intravenous Continuous PRN        D5 and 0.45% NaCl   Intravenous Continuous PRN        D5 and 0.45% NaCl   Intravenous Continuous PRN           DIAGNOSTIC TESTS  X-Ray Chest 1 View   Final Result      No acute abnormality of the chest.         Electronically signed by: Porsha Self   Date:    01/06/2025   Time:    18:18      US Retroperitoneal Complete    (Results Pending)          Patient information was obtained from patient, patient's family, past medical records and ER records.   All diagnosis and differential diagnosis have been reviewed; assessment and plan has been documented. I have personally reviewed the labs and test results that are presently available; I have reviewed the patients medication list. I have reviewed the consulting providers response and recommendations. I have reviewed or attempted to review medical records based upon their availability.  All of the patient's questions have been addressed and answered. Patient's is agreeable to the above stated plan. I will continue to monitor closely and make adjustments to medical management as needed.  This note was created using [employer] voice recognition software that occasionally misinterpreted phrases or words.  Please contact me if any questions may rise regarding documentation to clarify verbiage.        Tang Erickson MD   Internal Medicine  Department of [facility]  [facility]  [facility] - [address] [place] [address]

## 2025-01-08 LAB
ADV 40+41 DNA STL QL NAA+NON-PROBE: NOT DETECTED
ALBUMIN SERPL-MCNC: 2.9 G/DL (ref 3.4–4.8)
ALBUMIN/GLOB SERPL: 0.9 RATIO (ref 1.1–2)
ALP SERPL-CCNC: 83 UNIT/L (ref 40–150)
ALT SERPL-CCNC: 7 UNIT/L (ref 0–55)
ANION GAP SERPL CALC-SCNC: 11 MEQ/L
AST SERPL-CCNC: 16 UNIT/L (ref 5–34)
ASTRO TYP 1-8 RNA STL QL NAA+NON-PROBE: NOT DETECTED
BASOPHILS # BLD AUTO: 0.03 X10(3)/MCL
BASOPHILS NFR BLD AUTO: 0.2 %
BILIRUB SERPL-MCNC: 0.2 MG/DL
BUN SERPL-MCNC: 32.4 MG/DL (ref 9.8–20.1)
C CAYETANENSIS DNA STL QL NAA+NON-PROBE: NOT DETECTED
C COLI+JEJ+UPSA DNA STL QL NAA+NON-PROBE: NOT DETECTED
C DIFF TOX A+B STL QL IA: NEGATIVE
CALCIUM SERPL-MCNC: 8 MG/DL (ref 8.4–10.2)
CHLORIDE SERPL-SCNC: 103 MMOL/L (ref 98–107)
CLOSTRIDIUM DIFFICILE GDH ANTIGEN (OHS): NEGATIVE
CO2 SERPL-SCNC: 19 MMOL/L (ref 23–31)
CREAT SERPL-MCNC: 1.88 MG/DL (ref 0.55–1.02)
CREAT/UREA NIT SERPL: 17
CRYPTOSP DNA STL QL NAA+NON-PROBE: NOT DETECTED
E HISTOLYT DNA STL QL NAA+NON-PROBE: NOT DETECTED
EAEC PAA PLAS AGGR+AATA ST NAA+NON-PRB: DETECTED
EC STX1+STX2 GENES STL QL NAA+NON-PROBE: NOT DETECTED
EOSINOPHIL # BLD AUTO: 0.01 X10(3)/MCL (ref 0–0.9)
EOSINOPHIL NFR BLD AUTO: 0.1 %
EPEC EAE GENE STL QL NAA+NON-PROBE: NOT DETECTED
ERYTHROCYTE [DISTWIDTH] IN BLOOD BY AUTOMATED COUNT: 12.8 % (ref 11.5–17)
ETEC LTA+ST1A+ST1B TOX ST NAA+NON-PROBE: NOT DETECTED
G LAMBLIA DNA STL QL NAA+NON-PROBE: NOT DETECTED
GFR SERPLBLD CREATININE-BSD FMLA CKD-EPI: 29 ML/MIN/1.73/M2
GLOBULIN SER-MCNC: 3.2 GM/DL (ref 2.4–3.5)
GLUCOSE SERPL-MCNC: 326 MG/DL (ref 82–115)
HCT VFR BLD AUTO: 30.3 % (ref 37–47)
HGB BLD-MCNC: 10.2 G/DL (ref 12–16)
IMM GRANULOCYTES # BLD AUTO: 0.06 X10(3)/MCL (ref 0–0.04)
IMM GRANULOCYTES NFR BLD AUTO: 0.5 %
LYMPHOCYTES # BLD AUTO: 2.88 X10(3)/MCL (ref 0.6–4.6)
LYMPHOCYTES NFR BLD AUTO: 22.9 %
MCH RBC QN AUTO: 30.5 PG (ref 27–31)
MCHC RBC AUTO-ENTMCNC: 33.7 G/DL (ref 33–36)
MCV RBC AUTO: 90.7 FL (ref 80–94)
MONOCYTES # BLD AUTO: 0.73 X10(3)/MCL (ref 0.1–1.3)
MONOCYTES NFR BLD AUTO: 5.8 %
NEUTROPHILS # BLD AUTO: 8.87 X10(3)/MCL (ref 2.1–9.2)
NEUTROPHILS NFR BLD AUTO: 70.5 %
NOROVIRUS GI+II RNA STL QL NAA+NON-PROBE: NOT DETECTED
NRBC BLD AUTO-RTO: 0 %
P SHIGELLOIDES DNA STL QL NAA+NON-PROBE: NOT DETECTED
PLATELET # BLD AUTO: 196 X10(3)/MCL (ref 130–400)
PMV BLD AUTO: 10 FL (ref 7.4–10.4)
POCT GLUCOSE: 140 MG/DL (ref 70–110)
POCT GLUCOSE: 254 MG/DL (ref 70–110)
POCT GLUCOSE: 265 MG/DL (ref 70–110)
POCT GLUCOSE: 296 MG/DL (ref 70–110)
POTASSIUM SERPL-SCNC: 4.6 MMOL/L (ref 3.5–5.1)
PROT SERPL-MCNC: 6.1 GM/DL (ref 5.8–7.6)
RBC # BLD AUTO: 3.34 X10(6)/MCL (ref 4.2–5.4)
RVA RNA STL QL NAA+NON-PROBE: NOT DETECTED
S ENT+BONG DNA STL QL NAA+NON-PROBE: NOT DETECTED
SAPO I+II+IV+V RNA STL QL NAA+NON-PROBE: NOT DETECTED
SHIGELLA SP+EIEC IPAH ST NAA+NON-PROBE: NOT DETECTED
SODIUM SERPL-SCNC: 133 MMOL/L (ref 136–145)
V CHOL+PARA+VUL DNA STL QL NAA+NON-PROBE: NOT DETECTED
V CHOLERAE DNA STL QL NAA+NON-PROBE: NOT DETECTED
WBC # BLD AUTO: 12.58 X10(3)/MCL (ref 4.5–11.5)
Y ENTEROCOL DNA STL QL NAA+NON-PROBE: NOT DETECTED

## 2025-01-08 PROCEDURE — 80053 COMPREHEN METABOLIC PANEL: CPT

## 2025-01-08 PROCEDURE — 87507 IADNA-DNA/RNA PROBE TQ 12-25: CPT | Performed by: INTERNAL MEDICINE

## 2025-01-08 PROCEDURE — 25000003 PHARM REV CODE 250

## 2025-01-08 PROCEDURE — 87427 SHIGA-LIKE TOXIN AG IA: CPT | Performed by: INTERNAL MEDICINE

## 2025-01-08 PROCEDURE — 63700000 PHARM REV CODE 250 ALT 637 W/O HCPCS: Performed by: INTERNAL MEDICINE

## 2025-01-08 PROCEDURE — 63600175 PHARM REV CODE 636 W HCPCS: Performed by: INTERNAL MEDICINE

## 2025-01-08 PROCEDURE — 86318 IA INFECTIOUS AGENT ANTIBODY: CPT | Performed by: INTERNAL MEDICINE

## 2025-01-08 PROCEDURE — 97162 PT EVAL MOD COMPLEX 30 MIN: CPT

## 2025-01-08 PROCEDURE — 97166 OT EVAL MOD COMPLEX 45 MIN: CPT

## 2025-01-08 PROCEDURE — 85025 COMPLETE CBC W/AUTO DIFF WBC: CPT

## 2025-01-08 PROCEDURE — 27000207 HC ISOLATION

## 2025-01-08 PROCEDURE — 36415 COLL VENOUS BLD VENIPUNCTURE: CPT

## 2025-01-08 PROCEDURE — 25000003 PHARM REV CODE 250: Performed by: INTERNAL MEDICINE

## 2025-01-08 PROCEDURE — 21400001 HC TELEMETRY ROOM

## 2025-01-08 RX ORDER — DOCUSATE SODIUM 100 MG
400 CAPSULE ORAL 2 TIMES DAILY
Status: DISCONTINUED | OUTPATIENT
Start: 2025-01-08 | End: 2025-01-10 | Stop reason: HOSPADM

## 2025-01-08 RX ORDER — AZITHROMYCIN 250 MG/1
500 TABLET, FILM COATED ORAL DAILY
Status: COMPLETED | OUTPATIENT
Start: 2025-01-08 | End: 2025-01-10

## 2025-01-08 RX ORDER — INSULIN GLARGINE 100 [IU]/ML
20 INJECTION, SOLUTION SUBCUTANEOUS DAILY
Status: DISCONTINUED | OUTPATIENT
Start: 2025-01-08 | End: 2025-01-09

## 2025-01-08 RX ADMIN — INSULIN GLARGINE 20 UNITS: 100 INJECTION, SOLUTION SUBCUTANEOUS at 11:01

## 2025-01-08 RX ADMIN — Medication 400 ML: at 11:01

## 2025-01-08 RX ADMIN — TRAZODONE HYDROCHLORIDE 50 MG: 50 TABLET ORAL at 09:01

## 2025-01-08 RX ADMIN — INSULIN ASPART 8 UNITS: 100 INJECTION, SOLUTION INTRAVENOUS; SUBCUTANEOUS at 09:01

## 2025-01-08 RX ADMIN — INSULIN ASPART 9 UNITS: 100 INJECTION, SOLUTION INTRAVENOUS; SUBCUTANEOUS at 05:01

## 2025-01-08 RX ADMIN — Medication 400 ML: at 09:01

## 2025-01-08 RX ADMIN — METHIMAZOLE 10 MG: 10 TABLET ORAL at 10:01

## 2025-01-08 RX ADMIN — HYDROCODONE BITARTRATE AND ACETAMINOPHEN 1 TABLET: 7.5; 325 TABLET ORAL at 09:01

## 2025-01-08 RX ADMIN — ENOXAPARIN SODIUM 30 MG: 40 INJECTION SUBCUTANEOUS at 03:01

## 2025-01-08 RX ADMIN — MUPIROCIN: 20 OINTMENT TOPICAL at 09:01

## 2025-01-08 RX ADMIN — HYDROCODONE BITARTRATE AND ACETAMINOPHEN 1 TABLET: 7.5; 325 TABLET ORAL at 11:01

## 2025-01-08 RX ADMIN — INSULIN ASPART 9 UNITS: 100 INJECTION, SOLUTION INTRAVENOUS; SUBCUTANEOUS at 06:01

## 2025-01-08 RX ADMIN — ATORVASTATIN CALCIUM 20 MG: 10 TABLET, FILM COATED ORAL at 10:01

## 2025-01-08 RX ADMIN — AZITHROMYCIN DIHYDRATE 500 MG: 250 TABLET ORAL at 07:01

## 2025-01-08 RX ADMIN — MUPIROCIN: 20 OINTMENT TOPICAL at 10:01

## 2025-01-08 NOTE — PLAN OF CARE
Problem: Occupational Therapy  Goal: Occupational Therapy Goal  Description: LTG: Pt will perform basic ADLs and ADL transfers with Modified independence using LRAD by discharge.    STG: to be met by 2/8/25    Pt will complete grooming standing at sink with LRAD with SBA.  Pt will complete UB dressing with SBA.  Pt will complete LB dressing with SBA using LRAD.  Pt will complete toileting with SBA using LRAD.  Pt will complete functional mobility to/from toilet and toilet transfer with SBA using LRAD.   Outcome: Progressing

## 2025-01-08 NOTE — PT/OT/SLP EVAL
Occupational Therapy  Evaluation    Name: Navin Beck  MRN: [id number]    Recommendations:     Discharge therapy intensity: Low Intensity Therapy   Discharge Equipment Recommendations:  walker, rolling  Barriers to discharge:   (ongoing medical needs)    Assessment:     Navin Beck is a 64 y.o. female with a medical diagnosis of RSV, DKA. She presents with the following performance deficits affecting function: weakness, impaired endurance, impaired self care skills, impaired functional mobility, gait instability, impaired balance. Patient performing well during evaluation. Patient required overall SBA-CGA for all ADL tasks and functional mobility. Patient reports having plenty of family assistance. Will recommend low intensity therapy upon d/c.    Rehab Prognosis: Good; patient would benefit from acute skilled OT services to address these deficits and reach maximum level of function.       Plan:     Patient to be seen 3 x/week to address the above listed problems via self-care/home management, therapeutic activities, therapeutic exercises  Plan of Care Expires: 02/08/25  Plan of Care Reviewed with: patient    Subjective     Chief Complaint: none stated  Patient/Family Comments/goals: to go home    Occupational Profile:  Living Environment: lives with children in a [facility], no [facility]  Previous level of function: independent  Roles and Routines: mother  Equipment Used at Home: none  Assistance upon Discharge: family    Pain/Comfort:  Pain Rating 1: 0/10    Patients cultural, spiritual, Worship conflicts given the current situation: no    Objective:     OT communicated with NSG prior to session.      Patient was found HOB elevated with peripheral IV upon OT entry to room.    General Precautions: Standard, fall, droplet, contact  Orthopedic Precautions: N/A  Braces: N/A    Vital Signs: Respiratory Status: on room air; 93-99%    Bed Mobility:    Patient completed Supine to Sit with stand by assistance  Patient  completed Sit to Supine with stand by assistance    Functional Mobility/Transfers:  Patient completed Sit <> Stand Transfer with contact guard assistance  with  rolling walker   Patient completed Toilet Transfer Step Transfer technique with contact guard assistance with  rolling walker  Functional Mobility: ambulating to/from bathroom with CGA using RW    Activities of Daily Living:  Toileting: contact guard assistance to perform anterior perihygiene and clothing mgmt    AMPAC 6 Click ADL:  [facility] Total Score: 21    Functional Cognition:  Orientation: oriented to Person, Place, Time, and Situation    Visual Perceptual Skills:  Intact    Upper Extremity Function:  Right Upper Extremity:   WFL    Left Upper Extremity:  WFL    Therapeutic Positioning  Risk for acquired pressure injuries is decreased due to ability to get to BSC/toilet with assist, intact sensation, and continence.    OT interventions performed during the course of today's session:   Education was provided on benefits of and recommendations for therapeutic positioning  Therapeutic positioning was provided at the conclusion of session to offload all bony prominences for the prevention and/or reduction of pressure injuries    Skin assessment: all bony prominences were assessed    Findings: no redness or breakdown noted    OT recommendations for therapeutic positioning throughout hospitalization:   Follow [facility] Pressure Injury Prevention Protocol  Geomat recommended for sacral protection while [facility]    Patient Education:  Patient provided with verbal education education regarding OT role/goals/POC, fall prevention, safety awareness, Discharge/DME recommendations, and pressure ulcer prevention.  Understanding was verbalized.     Patient left HOB elevated with all lines intact, call button in reach, and NSG notified.    GOALS:   Multidisciplinary Problems       Occupational Therapy Goals          Problem: Occupational Therapy    Goal Priority Disciplines  Outcome Interventions   Occupational Therapy Goal     OT, PT/OT Progressing    Description: LTG: Pt will perform basic ADLs and ADL transfers with Modified independence using LRAD by discharge.    STG: to be met by 2/8/25    Pt will complete grooming standing at sink with LRAD with SBA.  Pt will complete UB dressing with SBA.  Pt will complete LB dressing with SBA using LRAD.  Pt will complete toileting with SBA using LRAD.  Pt will complete functional mobility to/from toilet and toilet transfer with SBA using LRAD.                        History:     Past Medical History:   Diagnosis Date    Allergy     Anemia due to stage 3 chronic kidney disease 12/6/2020    Arrhythmia     Arthritis     Bronchial asthma     Diabetes mellitus     Diabetes mellitus     Glaucoma     Hormone disorder     hysterectomy    Hypercholesteremia     Hypertension     Hyperthyroidism     Insomnia     Kidney stones     Neuropathy     Thyroid disease     Urine, incontinence, stress female     UTI (urinary tract infection) 11/27/2020         Past Surgical History:   Procedure Laterality Date    APPENDECTOMY      BACK SURGERY      disc removal     CARPAL TUNNEL RELEASE Bilateral     CHOLECYSTECTOMY      CYSTOSCOPY N/A 10/26/2020    Procedure: CYSTOSCOPY;  Surgeon: Wilner Goel MD;  Location: [facility];  Service: OB/GYN;  Laterality: N/A;    CYSTOSCOPY W/ RETROGRADES Bilateral 12/7/2020    Procedure: CYSTOSCOPY, WITH RETROGRADE PYELOGRAM;  Surgeon: Daniel Jalloh MD;  Location: [address];  Service: Urology;  Laterality: Bilateral;    FRACTURE SURGERY Right     wrist    [facility]CT[facility]Y  2010    [facility]    JOINT REPLACEMENT      KNEE ARTHROSCOPY W/ DEBRIDEMENT      KNEE SURGERY Right     Knee replacement    LITHOTRIPSY      MO REMOVAL OF OVARY/TUBE(S)  9/9/13    benign    removal of round ligament mass      ROBOT-ASSISTED LAPAROSCOPIC LYSIS OF ADHESIONS USING DA JAMES XI N/A 10/26/2020    Procedure: XI ROBOTIC LYSIS,  ADHESIONS;  Surgeon: Wilner Goel MD;  [facility];  Service: OB/GYN;  Laterality: N/A;  ROBOTIC MOBILIZATION OF BLADDER- DR BURNHAM    SPINE SURGERY      WRIST FRACTURE SURGERY Left        Time Tracking:     OT Date of Treatment:    OT Start Time: 1356  OT Stop Time: 1410  OT Total Time (min): 14 min    Billable Minutes:Evaluation mod    1/8/2025

## 2025-01-08 NOTE — PLAN OF CARE
Problem: Adult Inpatient Plan of Care  Goal: Plan of Care Review  Outcome: Progressing  Goal: Patient-Specific Goal (Individualized)  Outcome: Progressing  Goal: Absence of Hospital-Acquired Illness or Injury  Outcome: Progressing  Goal: Optimal Comfort and Wellbeing  Outcome: Progressing  Goal: Readiness for Transition of Care  Outcome: Progressing     Problem: Diabetes Comorbidity  Goal: Blood Glucose Level Within Targeted Range  Outcome: Progressing     Problem: Acute Kidney Injury/Impairment  Goal: Fluid and Electrolyte Balance  Outcome: Progressing  Goal: Improved Oral Intake  Outcome: Progressing  Goal: Effective Renal Function  Outcome: Progressing     Problem: Pneumonia  Goal: Fluid Balance  Outcome: Progressing  Goal: Resolution of Infection Signs and Symptoms  Outcome: Progressing  Goal: Effective Oxygenation and Ventilation  Outcome: Progressing     Problem: Skin Injury Risk Increased  Goal: Skin Health and Integrity  Outcome: Progressing     Problem: Infection  Goal: Absence of Infection Signs and Symptoms  Outcome: Progressing

## 2025-01-08 NOTE — PT/OT/SLP EVAL
Physical Therapy Evaluation    Patient Name:  Navin Beck   MRN:  [id number]    Recommendations:     Discharge therapy intensity: Low Intensity Therapy   Discharge Equipment Recommendations: walker, rolling   Barriers to discharge: Ongoing medical needs    Assessment:     Navin Beck is a 64 y.o. female admitted with a medical diagnosis of RSV, DKA.  She presents with the following impairments/functional limitations: weakness, impaired endurance, impaired functional mobility, decreased lower extremity function, gait instability. Pt tolerated session well, states she has been ambulating to/from bathroom I throughout hospital stay. Decreased endurance present throughout, but no major LOB.     Rehab Prognosis: Good; patient would benefit from acute skilled PT services to address these deficits and reach maximum level of function.    Recent Surgery: * No surgery found *      Plan:     During this hospitalization, patient would benefit from acute PT services 5 x/week to address the identified rehab impairments via gait training, therapeutic activities, therapeutic exercises and progress toward the following goals:    Plan of Care Expires:  02/08/25    Subjective     Chief Complaint: ready to go home  Patient/Family Comments/goals: to go home  Pain/Comfort:  Pain Rating 1: 0/10    Patients cultural, spiritual, Rastafari conflicts given the current situation:      Living Environment:  Pt lives with son and her family in a [facility] with flat entrance.   Prior to admission, patients level of function was I.  Equipment used at home: none.  DME owned (not currently used): none.  Upon discharge, patient will have assistance from family.    Objective:     Communicated with RN prior to session.  Patient found HOB elevated with peripheral IV  upon PT entry to room.    General Precautions: Standard,    Orthopedic Precautions:N/A   Braces: N/A  Respiratory Status: Room air >92% throughout    Exams:  RLE Strength: 4  global  LLE Strength: 4 global  Skin integrity: Visible skin intact    Functional Mobility:  Bed Mobility:     Supine to Sit: stand by assistance  Sit to Supine: stand by assistance  Transfers:     Sit to Stand:  contact guard assistance with rolling walker  Toilet Transfer: contact guard assistance with  rolling walker  using  Step Transfer  Gait: Pt ambulated to and from commode with seated rest on commode with RW CGA. Increased SOB but 02 stable.       AM-PAC 6 CLICK MOBILITY  Total Score:22       Treatment & Education:    Patient provided with verbal education education regarding PT role/goals/POC, fall prevention, and safety awareness.  Understanding was verbalized.     Patient left HOB elevated with all lines intact, call button in reach, and RN notified.    GOALS:   Multidisciplinary Problems       Physical Therapy Goals          Problem: Physical Therapy    Goal Priority Disciplines Outcome Interventions   Physical Therapy Goal     PT, PT/OT Progressing    Description: Goals to be met by: d/c     Patient will increase functional independence with mobility by performin. Supine to sit with Modified [place]  2. Sit to supine with Modified [place]  3. Sit to stand transfer with Modified [place]  4. Bed to chair transfer with Modified [place] using Rolling Walker  5. Gait  x 100 feet with Modified [place] using Rolling Walker.                          History:     Past Medical History:   Diagnosis Date    Allergy     Anemia due to stage 3 chronic kidney disease 2020    Arrhythmia     Arthritis     Bronchial asthma     Diabetes mellitus     Diabetes mellitus     Glaucoma     Hormone disorder     hysterectomy    Hypercholesteremia     Hypertension     Hyperthyroidism     Insomnia     Kidney stones     Neuropathy     Thyroid disease     Urine, incontinence, stress female     UTI (urinary tract infection) 2020       Past Surgical History:   Procedure Laterality Date     APPENDECTOMY      BACK SURGERY      disc removal     CARPAL TUNNEL RELEASE Bilateral     CHOLECYSTECTOMY      CYSTOSCOPY N/A 10/26/2020    Procedure: CYSTOSCOPY;  Surgeon: Wilner Goel MD;  Location: [facility];  Service: OB/GYN;  Laterality: N/A;    CYSTOSCOPY W/ RETROGRADES Bilateral 12/7/2020    Procedure: CYSTOSCOPY, WITH RETROGRADE PYELOGRAM;  Surgeon: Daniel Jalloh MD;  Location: [address];  Service: Urology;  Laterality: Bilateral;    FRACTURE SURGERY Right     wrist    [facility]CT[facility]Y  2010    [facility]    JOINT REPLACEMENT      KNEE ARTHROSCOPY W/ DEBRIDEMENT      KNEE SURGERY Right     Knee replacement    LITHOTRIPSY      AZ REMOVAL OF OVARY/TUBE(S)  9/9/13    benign    removal of round ligament mass      ROBOT-ASSISTED LAPAROSCOPIC LYSIS OF ADHESIONS USING DA JAMES XI N/A 10/26/2020    Procedure: XI ROBOTIC LYSIS, ADHESIONS;  Surgeon: Wilner Goel MD;  Location: [facility];  Service: OB/GYN;  Laterality: N/A;  ROBOTIC MOBILIZATION OF BLADDER- DR BURNHAM    SPINE SURGERY      WRIST FRACTURE SURGERY Left        Time Tracking:     PT Received On: 01/08/25  PT Start Time: 1357     PT Stop Time: 1409  PT Total Time (min): 12 min     Billable Minutes: Evaluation 12 01/08/2025

## 2025-01-08 NOTE — PLAN OF CARE
Problem: Physical Therapy  Goal: Physical Therapy Goal  Description: Goals to be met by: d/c     Patient will increase functional independence with mobility by performin. Supine to sit with Modified [place]  2. Sit to supine with Modified [place]  3. Sit to stand transfer with Modified [place]  4. Bed to chair transfer with Modified [place] using Rolling Walker  5. Gait  x 100 feet with Modified [place] using Rolling Walker.     Outcome: Progressing

## 2025-01-08 NOTE — PROGRESS NOTES
[facility] [address] MEDICINE ~ PROGRESS NOTE        CHIEF COMPLAINT   Hospital follow up    HOSPITAL COURSE   64-year-old female with a past medical history of insulin-dependent diabetes mellitus, chronic kidney disease, HTN, hypothyroidism.  She came into the emergency department with complaints of shortness a breath, generalized weakness, muscle aches, chills, diarrhea of 3 days'.  She reports no fevers or chest pain.  She said she had not taken her insulin and 3 days due to feeling sick.  In the emergency department she tested positive for RSV and found to be in DKA and admitted to ICU for DKA protocol.  Gap has closed at this time on January 7th and downgraded to hospitalist service  She tested positive for entero aggregate E coli as the cause of her diarrhea.  She was started on azithromycin.    Today  Seen examined this morning.  Had ongoing diarrhea yesterday.  None so far today.  Tested positive for E coli, starting on azithromycin.  Not feeling well today, muscle aching.  Encouraged her to eat and drink well.        OBJECTIVE/PHYSICAL EXAM     VITAL SIGNS (MOST RECENT):  Temp: 98.3 °F (36.8 °C) (01/08/25 0750)  Pulse: 68 (01/08/25 0750)  Resp: 13 (01/08/25 0357)  BP: (!) 140/96 (01/08/25 0750)  SpO2: 99 % (01/08/25 0750) VITAL SIGNS (24 HOUR RANGE):  Temp:  [98.1 °F (36.7 °C)-98.3 °F (36.8 °C)] 98.3 °F (36.8 °C)  Pulse:  [68-99] 68  Resp:  [13-32] 13  SpO2:  [95 %-100 %] 99 %  BP: (104-140)/(40-96) 140/96   GENERAL: In no acute distress, afebrile, obesity  HEENT:  CHEST: Clear to auscultation bilaterally  HEART: S1, S2, no appreciable murmur  ABDOMEN: Soft, nontender, BS +  MSK: Warm, no lower extremity edema, no clubbing or cyanosis  NEUROLOGIC: Alert and oriented x4, moving all extremities with good strength   INTEGUMENTARY:  PSYCHIATRY:        ASSESSMENT/PLAN   Diabetic ketoacidosis-resolved   RSV infection   Acute kidney injury on CKD 3  Leukocytosis  Enteroaggregative  E.coli colitis     History of: As listed above        Start LR 90 per hour due to ongoing losses from diarrhea and poor oral intake.  Pedialyte.  Start azithromycin 500 daily x3 days.  Monitor leukocytosis.  Supportive care for RSV, resting comfortably and satting well.    Home dose of insulin Lantus 40 units b.i.d. along with 10 units NovoLog with meals.  Start Lantus 20 units daily and up titrate as tolerated     DVT prophylaxis:  Lovenox 30    Anticipated discharge and disposition:   __________________________________________________________________________    NUTRITIONAL STATUS     Patient meets ASPEN criteria for   malnutrition of   per RD assessment as evidenced by:                       A minimum of two characteristics is recommended for diagnosis of either severe or non-severe malnutrition.     LABS/MICRO/MEDS/DIAGNOSTICS       LABS  Recent Labs     01/08/25 0405   *   K 4.6   CO2 19*   BUN 32.4*   CREATININE 1.88*   GLUCOSE 326*   CALCIUM 8.0*   ALKPHOS 83   AST 16   ALT 7   ALBUMIN 2.9*     Recent Labs     01/08/25 0405   WBC 12.58*   RBC 3.34*   HCT 30.3*   MCV 90.7          MICROBIOLOGY  Microbiology Results (last 7 days)       Procedure Component Value Units Date/Time    Clostridium Diff Toxin, A & B, EIA [[id number]] Collected: 01/08/25 0239    Order Status: Sent Specimen: Stool Updated: 01/08/25 0246    Stool Culture [[id number]] Collected: 01/08/25 0239    Order Status: Sent Specimen: Stool Updated: 01/08/25 0245               MEDICATIONS   atorvastatin  20 mg Oral Daily    azithromycin  500 mg Oral Daily    enoxparin  30 mg Subcutaneous Q24H (prophylaxis, 1700)    fluticasone furoate  1 puff Inhalation Daily    methIMAzole  10 mg Oral Daily    mupirocin   Nasal BID    traZODone  50 mg Oral QHS         INFUSIONS   0.45% NaCl   Intravenous Continuous PRN        0.45% NaCl with KCl 20 mEq infusion  150 mL/hr Intravenous Continuous PRN        0.9% NaCl 1,000 mL with potassium chloride 20  mEq infusion   Intravenous Continuous  mL/hr at 01/07/25 2023 Rate Verify at 01/07/25 2023    0.9% NaCl   Intravenous Continuous PRN        D5 and 0.45% NaCl 990 mL with potassium chloride 20 mEq infusion   Intravenous Continuous PRN        D5 and 0.45% NaCl   Intravenous Continuous PRN        D5 and 0.45% NaCl   Intravenous Continuous PRN              DIAGNOSTIC TESTS  US Retroperitoneal Complete   Final Result      No significant sonographic abnormality of the kidneys.         Electronically signed by: Gonzalez Chawla   Date:    01/08/2025   Time:    07:44      X-Ray Chest 1 View   Final Result      No acute abnormality of the chest.         Electronically signed by: Porsha Self   Date:    01/06/2025   Time:    18:18           No echocardiogram results found for the past 14 days.         Case related differential diagnoses have been reviewed; assessment and plan has been documented. I have personally reviewed the labs and test results that are currently available; I have reviewed the patients medication list. I have reviewed the consulting providers recommendations. I have reviewed or attempted to review medical records based upon their availability.  All of the patient's and/or family's questions have been addressed and answered to the best of my ability.  I will continue to monitor closely and make adjustments to medical management as needed.  This document was created using M*Modal Fluency Direct.  Transcription errors may have been made.  Please contact me if any questions may rise regarding documentation to clarify transcription.        Tang Erickson MD   Internal Medicine  Department of Hospital Medicine [facility] [address] [facility]

## 2025-01-09 LAB
ANION GAP SERPL CALC-SCNC: 9 MEQ/L
BASOPHILS # BLD AUTO: 0.03 X10(3)/MCL
BASOPHILS NFR BLD AUTO: 0.3 %
BUN SERPL-MCNC: 17.3 MG/DL (ref 9.8–20.1)
CALCIUM SERPL-MCNC: 8.4 MG/DL (ref 8.4–10.2)
CHLORIDE SERPL-SCNC: 107 MMOL/L (ref 98–107)
CO2 SERPL-SCNC: 23 MMOL/L (ref 23–31)
CREAT SERPL-MCNC: 1.11 MG/DL (ref 0.55–1.02)
CREAT/UREA NIT SERPL: 16
EOSINOPHIL # BLD AUTO: 0.02 X10(3)/MCL (ref 0–0.9)
EOSINOPHIL NFR BLD AUTO: 0.2 %
ERYTHROCYTE [DISTWIDTH] IN BLOOD BY AUTOMATED COUNT: 12.5 % (ref 11.5–17)
GFR SERPLBLD CREATININE-BSD FMLA CKD-EPI: 55 ML/MIN/1.73/M2
GLUCOSE SERPL-MCNC: 47 MG/DL (ref 82–115)
HCT VFR BLD AUTO: 31.4 % (ref 37–47)
HGB BLD-MCNC: 10.3 G/DL (ref 12–16)
IMM GRANULOCYTES # BLD AUTO: 0.03 X10(3)/MCL (ref 0–0.04)
IMM GRANULOCYTES NFR BLD AUTO: 0.3 %
LYMPHOCYTES # BLD AUTO: 3.17 X10(3)/MCL (ref 0.6–4.6)
LYMPHOCYTES NFR BLD AUTO: 36.9 %
MAGNESIUM SERPL-MCNC: 2.2 MG/DL (ref 1.6–2.6)
MCH RBC QN AUTO: 30.1 PG (ref 27–31)
MCHC RBC AUTO-ENTMCNC: 32.8 G/DL (ref 33–36)
MCV RBC AUTO: 91.8 FL (ref 80–94)
MONOCYTES # BLD AUTO: 0.58 X10(3)/MCL (ref 0.1–1.3)
MONOCYTES NFR BLD AUTO: 6.8 %
NEUTROPHILS # BLD AUTO: 4.75 X10(3)/MCL (ref 2.1–9.2)
NEUTROPHILS NFR BLD AUTO: 55.5 %
NRBC BLD AUTO-RTO: 0 %
PHOSPHATE SERPL-MCNC: 3.1 MG/DL (ref 2.3–4.7)
PLATELET # BLD AUTO: 192 X10(3)/MCL (ref 130–400)
PMV BLD AUTO: 10.1 FL (ref 7.4–10.4)
POCT GLUCOSE: 216 MG/DL (ref 70–110)
POCT GLUCOSE: 233 MG/DL (ref 70–110)
POCT GLUCOSE: 259 MG/DL (ref 70–110)
POCT GLUCOSE: 266 MG/DL (ref 70–110)
POCT GLUCOSE: 279 MG/DL (ref 70–110)
POCT GLUCOSE: 346 MG/DL (ref 70–110)
POCT GLUCOSE: 51 MG/DL (ref 70–110)
POTASSIUM SERPL-SCNC: 3.8 MMOL/L (ref 3.5–5.1)
RBC # BLD AUTO: 3.42 X10(6)/MCL (ref 4.2–5.4)
SODIUM SERPL-SCNC: 139 MMOL/L (ref 136–145)
WBC # BLD AUTO: 8.58 X10(3)/MCL (ref 4.5–11.5)

## 2025-01-09 PROCEDURE — 80048 BASIC METABOLIC PNL TOTAL CA: CPT | Performed by: INTERNAL MEDICINE

## 2025-01-09 PROCEDURE — 84100 ASSAY OF PHOSPHORUS: CPT | Performed by: INTERNAL MEDICINE

## 2025-01-09 PROCEDURE — 83735 ASSAY OF MAGNESIUM: CPT | Performed by: INTERNAL MEDICINE

## 2025-01-09 PROCEDURE — 63700000 PHARM REV CODE 250 ALT 637 W/O HCPCS: Performed by: INTERNAL MEDICINE

## 2025-01-09 PROCEDURE — 63600175 PHARM REV CODE 636 W HCPCS: Performed by: INTERNAL MEDICINE

## 2025-01-09 PROCEDURE — 97116 GAIT TRAINING THERAPY: CPT | Mod: CQ

## 2025-01-09 PROCEDURE — 27000207 HC ISOLATION

## 2025-01-09 PROCEDURE — 25000003 PHARM REV CODE 250

## 2025-01-09 PROCEDURE — 25000003 PHARM REV CODE 250: Performed by: INTERNAL MEDICINE

## 2025-01-09 PROCEDURE — 21400001 HC TELEMETRY ROOM

## 2025-01-09 PROCEDURE — 97530 THERAPEUTIC ACTIVITIES: CPT | Mod: CQ

## 2025-01-09 PROCEDURE — 97535 SELF CARE MNGMENT TRAINING: CPT | Mod: CO

## 2025-01-09 PROCEDURE — 85025 COMPLETE CBC W/AUTO DIFF WBC: CPT | Performed by: INTERNAL MEDICINE

## 2025-01-09 PROCEDURE — 36415 COLL VENOUS BLD VENIPUNCTURE: CPT | Performed by: INTERNAL MEDICINE

## 2025-01-09 RX ORDER — AMLODIPINE BESYLATE 5 MG/1
10 TABLET ORAL DAILY
Status: DISCONTINUED | OUTPATIENT
Start: 2025-01-09 | End: 2025-01-09

## 2025-01-09 RX ORDER — AMLODIPINE BESYLATE 5 MG/1
5 TABLET ORAL DAILY
Status: DISCONTINUED | OUTPATIENT
Start: 2025-01-09 | End: 2025-01-10 | Stop reason: HOSPADM

## 2025-01-09 RX ORDER — INSULIN GLARGINE 100 [IU]/ML
14 INJECTION, SOLUTION SUBCUTANEOUS NIGHTLY
Status: DISCONTINUED | OUTPATIENT
Start: 2025-01-09 | End: 2025-01-10 | Stop reason: HOSPADM

## 2025-01-09 RX ADMIN — HYDROCODONE BITARTRATE AND ACETAMINOPHEN 1 TABLET: 7.5; 325 TABLET ORAL at 03:01

## 2025-01-09 RX ADMIN — METHIMAZOLE 10 MG: 10 TABLET ORAL at 09:01

## 2025-01-09 RX ADMIN — AZITHROMYCIN DIHYDRATE 500 MG: 250 TABLET ORAL at 09:01

## 2025-01-09 RX ADMIN — DEXTROSE MONOHYDRATE 250 ML: 100 INJECTION, SOLUTION INTRAVENOUS at 09:01

## 2025-01-09 RX ADMIN — ATORVASTATIN CALCIUM 20 MG: 10 TABLET, FILM COATED ORAL at 09:01

## 2025-01-09 RX ADMIN — AMLODIPINE BESYLATE 5 MG: 5 TABLET ORAL at 05:01

## 2025-01-09 RX ADMIN — TRAZODONE HYDROCHLORIDE 50 MG: 50 TABLET ORAL at 08:01

## 2025-01-09 RX ADMIN — ENOXAPARIN SODIUM 30 MG: 40 INJECTION SUBCUTANEOUS at 02:01

## 2025-01-09 RX ADMIN — HYDROCODONE BITARTRATE AND ACETAMINOPHEN 1 TABLET: 7.5; 325 TABLET ORAL at 08:01

## 2025-01-09 RX ADMIN — MUPIROCIN: 20 OINTMENT TOPICAL at 08:01

## 2025-01-09 RX ADMIN — Medication 400 ML: at 08:01

## 2025-01-09 RX ADMIN — INSULIN GLARGINE 14 UNITS: 100 INJECTION, SOLUTION SUBCUTANEOUS at 08:01

## 2025-01-09 RX ADMIN — INSULIN ASPART 9 UNITS: 100 INJECTION, SOLUTION INTRAVENOUS; SUBCUTANEOUS at 05:01

## 2025-01-09 RX ADMIN — Medication 16 G: at 03:01

## 2025-01-09 RX ADMIN — MUPIROCIN: 20 OINTMENT TOPICAL at 09:01

## 2025-01-09 NOTE — PT/OT/SLP PROGRESS
Physical Therapy Treatment    Patient Name:  Navin Beck   MRN:  [id number]    Recommendations:     Discharge therapy intensity: Low Intensity Therapy   Discharge Equipment Recommendations: walker, rolling  Barriers to discharge: Ongoing medical needs    Assessment:     Navin Beck is a 64 y.o. female admitted with a medical diagnosis of  RSV, DKA. .  She presents with the following impairments/functional limitations: weakness, impaired endurance, impaired functional mobility, decreased lower extremity function, gait instability .    Rehab Prognosis: Good; patient would benefit from acute skilled PT services to address these deficits and reach maximum level of function.    Recent Surgery: * No surgery found *      Plan:     During this hospitalization, patient would benefit from acute PT services 5 x/week to address the identified rehab impairments via gait training, therapeutic activities, therapeutic exercises and progress toward the following goals:    Plan of Care Expires:  02/08/25    Subjective     Chief Complaint: Back pain  Patient/Family Comments/goals:   Pain/Comfort:         Objective:     Communicated with RN prior to session.  Patient found HOB elevated with peripheral IV upon PT entry to room.     General Precautions: Standard,    Orthopedic Precautions: N/A  Braces: N/A  Respiratory Status: Room air  Skin Integrity: Visible skin intact      Functional Mobility:  Bed Mobility:     Supine to Sit: independence  Sit to Supine: independence  Transfers:     Sit to Stand:  modified independence with rolling walker  Toilet Transfer: stand by assistance with  rolling walker  using  Step Transfer  Gait: Pt amb from EOB <>bathroom with RW SBA. No LOB       Education:  Patient provided with verbal education education regarding PT role/goals/POC, fall prevention, and safety awareness.  Understanding was verbalized, however additional teaching warranted.     Patient left HOB elevated with all lines  intact, call button in reach, and RN notified    GOALS:   Multidisciplinary Problems       Physical Therapy Goals          Problem: Physical Therapy    Goal Priority Disciplines Outcome Interventions   Physical Therapy Goal     PT, PT/OT Progressing    Description: Goals to be met by: d/c     Patient will increase functional independence with mobility by performin. Supine to sit with Modified [place]  2. Sit to supine with Modified [place]  3. Sit to stand transfer with Modified [place]  4. Bed to chair transfer with Modified [place] using Rolling Walker  5. Gait  x 100 feet with Modified [place] using Rolling Walker.                          Time Tracking:     PT Received On: 25  PT Start Time: 1430     PT Stop Time: 1446  PT Total Time (min): 16 min     Billable Minutes: Therapeutic Activity 16    Treatment Type: Treatment  PT/PTA: PTA     Number of PTA visits since last PT visit: 2025

## 2025-01-09 NOTE — PLAN OF CARE
01/09/25 1159   Discharge Assessment   Assessment Type Discharge Planning Assessment   Confirmed/corrected address, phone number and insurance Yes   Confirmed Demographics Correct on Facesheet   Source of Information patient   Communicated RAFA with patient/caregiver Date not available/Unable to determine   Reason For Admission SOB   People in Home child(jovanny), adult   Do you expect to return to your current living situation? Yes   Do you have help at home or someone to help you manage your care at home? Yes   Who are your caregiver(s) and their phone number(s)? marilee Beck [phone]   Prior to hospitilization cognitive status: Alert/Oriented   Current cognitive status: Alert/Oriented   Walking or Climbing Stairs Difficulty yes   Walking or Climbing Stairs ambulation difficulty, requires equipment   Mobility Management needs walker   Dressing/Bathing Difficulty no   Equipment Currently Used at Home none   Do you currently have service(s) that help you manage your care at home? No   Do you take prescription medications? Yes   Do you have prescription coverage? Yes   Coverage [employer] medicare, medicaid   Do you have any problems affording any of your prescribed medications? No   Who is going to help you get home at discharge? son   How do you get to doctors appointments? health plan transportation   Are you on dialysis? No   Do you take coumadin? No   Discharge Plan A Home Health   Discharge Plan B Home Health   DME Needed Upon Discharge  walker, rolling   Discharge Plan discussed with: Patient   Transition of Care Barriers None   OTHER   Name(s) of People in Home marilee Moctezuma II, daughter-in-law, and grandchildren     The patient lives with her son Jose Elias MAC, daughter-in-law, and grandchildren. The patient uses [employer] for medical transportation. Therapy recommending home health with PT/OT and rolling walker.  Freedom of choice given to the patient and [facility] selected.  Referral sent via  epic.  Referral sent via [facility] to [facility] for rolling walker.     [address] [facility] out of network.  Referral sent to [facility] via [facility].

## 2025-01-09 NOTE — PROGRESS NOTES
Inpatient Nutrition Evaluation    Admit Date: 1/6/2025   Total duration of encounter: 3 days   Patient Age: 64 y.o.    Nutrition Recommendation/Prescription     -Continue 2000 Calorie Diabetic Diet as tolerated.   -Monitor wt, labs, and intake.     Nutrition Assessment     Chart Review    Reason Seen: continuous nutrition monitoring    Malnutrition Screening Tool Results   Have you recently lost weight without trying?: No  Have you been eating poorly because of a decreased appetite?: Yes   MST Score: 1   Diagnosis:  Diabetic ketoacidosis-resolved   RSV infection   Acute kidney injury on CKD 3  Leukocytosis  Enteroaggregative E.coli colitis    Relevant Medical History: insulin-dependent diabetes mellitus, chronic kidney disease, HTN, hypothyroidism     Scheduled Medications:  atorvastatin, 20 mg, Daily  azithromycin, 500 mg, Daily  electrolytes-dextrose, 400 mL, BID  enoxparin, 30 mg, Q24H (prophylaxis, 1700)  fluticasone furoate, 1 puff, Daily  methIMAzole, 10 mg, Daily  mupirocin, , BID  traZODone, 50 mg, QHS    Continuous Infusions:  0.45% NaCl  0.45% NaCl with KCl 20 mEq infusion  0.9% NaCl 1,000 mL with potassium chloride 20 mEq infusion, Last Rate: 150 mL/hr at 01/07/25 2023  0.9% NaCl  D5 and 0.45% NaCl 990 mL with potassium chloride 20 mEq infusion  D5 and 0.45% NaCl  D5 and 0.45% NaCl    PRN Medications:   Current Facility-Administered Medications:     0.45% NaCl, , Intravenous, Continuous PRN    0.45% NaCl with KCl 20 mEq infusion, 150 mL/hr, Intravenous, Continuous PRN    0.9% NaCl 1,000 mL with potassium chloride 20 mEq infusion, , Intravenous, Continuous PRN    0.9% NaCl, , Intravenous, Continuous PRN    ALPRAZolam, 0.25 mg, Oral, Nightly PRN    D5 and 0.45% NaCl 990 mL with potassium chloride 20 mEq infusion, , Intravenous, Continuous PRN    D5 and 0.45% NaCl, , Intravenous, Continuous PRN    D5 and 0.45% NaCl, , Intravenous, Continuous PRN    dextrose 50%, 12.5 g, Intravenous, PRN    dextrose 50%, 12.5  g, Intravenous, PRN    dextrose 50%, 25 g, Intravenous, PRN    dextrose 50%, 25 g, Intravenous, PRN    glucagon (human recombinant), 1 mg, Intramuscular, PRN    glucose, 16 g, Oral, PRN    glucose, 24 g, Oral, PRN    HYDROcodone-acetaminophen, 1 tablet, Oral, Q6H PRN    insulin aspart U-100, 0-15 Units, Subcutaneous, QID (AC + HS) PRN    magnesium sulfate IVPB, 2 g, Intravenous, PRN    potassium chloride in water, 20 mEq, Intravenous, PRN    sodium chloride 0.9%, 10 mL, Intravenous, PRN    sodium phosphate 20.01 mmol in D5W 250 mL IVPB, 20.01 mmol, Intravenous, PRN    sodium phosphate 30 mmol in D5W 250 mL IVPB, 30 mmol, Intravenous, PRN    Recent Labs   Lab 01/06/25  1549 01/06/25  2226 01/07/25  0222 01/07/25  0607 01/07/25  1040 01/08/25  0405 01/09/25  0328   * 126* 130* 135* 136 133* 139   K 7.3* 4.8 4.2 4.0 4.1 4.6 3.8   CALCIUM 8.8 7.9* 8.0* 8.0* 8.3* 8.0* 8.4   PHOS  --  7.2* 4.8* 4.5 5.0*  --  3.1   MG 2.80* 2.60 2.30 2.30 2.40  --  2.20   CL 88* 94* 98 101 101 103 107   CO2 6* 9* 17* 23 24 19* 23   BUN 56.5* 60.3* 56.2* 52.4* 46.2* 32.4* 17.3   CREATININE 3.09* 3.16* 2.68* 2.44* 2.16* 1.88* 1.11*   EGFRNORACEVR 16 16 19 21 25 29 55   GLUCOSE 699* 593* 332* 158* 102 326* 47*   BILITOT 0.4  --  0.2  --   --  0.2  --    ALKPHOS 118  --  92  --   --  83  --    ALT 7  --  10  --   --  7  --    AST 17  --  17  --   --  16  --    ALBUMIN 3.9  --  3.3*  --   --  2.9*  --    HGBA1C  --   --   --   --  9.2*  --   --    WBC 14.59*  --  14.92*  --   --  12.58* 8.58   HGB 12.4  --  10.5*  --   --  10.2* 10.3*   HCT 40.3  --  31.4*  --   --  30.3* 31.4*     Nutrition Orders:  Diet diabetic 2000 Calories (up to 75 gm per meal)      Appetite/Oral Intake: good/% of meals  Factors Affecting Nutritional Intake: none identified  Food/Baptism/Cultural Preferences: none reported  Food Allergies: no known food allergies  Last Bowel Movement: 01/08/25  Wound(s):  surgical incision     Comments    1/6/25: Pt eating  "well and doing well with diet per nurse; not really appropriate for education at this time. Noted that pt is insulin dependent diabetic.     Anthropometrics    Height: 5' 7" (170.2 cm), Height Method: Stated  Last Weight: 95.6 kg (210 lb 12.2 oz) (01/06/25 2115), Weight Method: Bed Scale  BMI (Calculated): 33  BMI Classification: obese grade I (BMI 30-34.9)     Ideal Body Weight (IBW), Female: 135 lb     % Ideal Body Weight, Female (lb): 156.12 %                             Usual Weight Provided By: EMR weight history    Wt Readings from Last 5 Encounters:   01/06/25 95.6 kg (210 lb 12.2 oz)   10/01/24 98.4 kg (217 lb)   06/19/24 96.6 kg (213 lb)   05/07/24 94.3 kg (208 lb)   07/20/23 96 kg (211 lb 10.3 oz)     Weight Change(s) Since Admission:   Wt Readings from Last 1 Encounters:   01/06/25 2115 95.6 kg (210 lb 12.2 oz)   01/06/25 1806 98.9 kg (218 lb)   Admit Weight: 98.9 kg (218 lb) (01/06/25 1806), Weight Method: Stated    Patient Education     Not applicable.    Nutrition Goals & Monitoring     Dietitian will monitor: energy intake, weight, and glucose/endocrine profile    Nutrition Risk/Follow-Up: low (follow-up in 5-7 days)  Patients assigned 'low nutrition risk' status do not qualify for a full nutritional assessment but will be monitored and re-evaluated in a 5-7 day time period. Please consult if re-evaluation needed sooner.   "

## 2025-01-09 NOTE — PROGRESS NOTES
[facility]  Hospital Medicine Progress Note        Chief Complaint: Inpatient Follow-up    HPI:     64-year-old female with significant history of type 2 diabetes mellitus, chronic kidney disease, HLD, HTN, hyperthyroidism, GERD, glaucoma presented to the ED with complaints of dyspnea, generalized weakness, decreased appetite, nausea, chills, abdominal pain.  Patient was hypotensive and tachycardic and tachypneic in the ED and workup consistent with diabetic ketoacidosis.  RSV positive.  Patient was admitted to ICU on DKA protocol.  Labs did show hyperkalemia for which potassium shifting agents was added.  Once DKA resolved patient was downgraded to hospital medicine services.  Stool studies ordered for diarrhea, supportive care for RSV.  Home insulin doses adjusted based on CBG.  GI PCR came back positive for entero aggregating E coli.  Patient was initiated on Zithromax 500 daily with planned course x3 days and was initiated on IV fluids to compensate for loss.    Interval Hx:   Patient was seen at bedside, diarrhea better, BP slightly accelerated, otherwise hemodynamics stable, respiratory status stable with saturation in mid 90s, significantly hypoglycemic today with CBG less than 50.  Patient awake, alert and fully oriented at the time of my rounds, received D10    Objective/physical exam:  General: In no acute distress, afebrile  Chest: Clear to auscultation bilaterally  Heart: S1, S2, no appreciable murmur  Abdomen: Soft, nontender, BS +  MSK: Warm, no lower extremity edema, no clubbing or cyanosis  Neurologic: Alert and oriented x4, moving all extremities with good strength     VITAL SIGNS: 24 HRS MIN & MAX LAST   Temp  Min: 98 °F (36.7 °C)  Max: 98.6 °F (37 °C) 98 °F (36.7 °C)   BP  Min: 102/46  Max: 138/77 (!) 121/55   Pulse  Min: 65  Max: 75  71   Resp  Min: 16  Max: 18 18   SpO2  Min: 93 %  Max: 98 % (!) 94 %       Recent Labs   Lab 01/09/25  0328   WBC 8.58   RBC 3.42*    HGB 10.3*   HCT 31.4*   MCV 91.8   MCH 30.1   MCHC 32.8*   RDW 12.5      MPV 10.1         Recent Labs   Lab 01/06/25  1737 01/06/25  2226 01/08/25  0405 01/09/25  0328   NA  --    < > 133* 139   K  --    < > 4.6 3.8   CL  --    < > 103 107   CO2  --    < > 19* 23   BUN  --    < > 32.4* 17.3   CREATININE  --    < > 1.88* 1.11*   CALCIUM  --    < > 8.0* 8.4   PH 7.060*  --   --   --    MG  --    < >  --  2.20   ALBUMIN  --    < > 2.9*  --    ALKPHOS  --    < > 83  --    ALT  --    < > 7  --    AST  --    < > 16  --    BILITOT  --    < > 0.2  --     < > = values in this interval not displayed.          Microbiology Results (last 7 days)       Procedure Component Value Units Date/Time    Clostridium Diff Toxin, A & B, EIA [[id number]]  (Normal) Collected: 01/08/25 0239    Order Status: Completed Specimen: Stool Updated: 01/08/25 1127     Clostridium Difficile GDH Antigen Negative     Clostridium Difficile Toxin A/B Negative    Stool Culture [[id number]] Collected: 01/08/25 0239    Order Status: Resulted Specimen: Stool Updated: 01/08/25 0245             Scheduled Med:   atorvastatin  20 mg Oral Daily    azithromycin  500 mg Oral Daily    dextrose 10%  250 mL Intravenous Once    electrolytes-dextrose  400 mL Oral BID    enoxparin  30 mg Subcutaneous Q24H (prophylaxis, 1700)    fluticasone furoate  1 puff Inhalation Daily    methIMAzole  10 mg Oral Daily    mupirocin   Nasal BID    traZODone  50 mg Oral QHS          Assessment/Plan:    Poorly-controlled type 2 diabetes mellitus admitted with DKA-resolved   Severe hypoglycemia-improved with D10 infusion 1/9  RSV infection  Acute onset infectious diarrhea secondary to entero aggregative Ecoli, improved  Acute kidney injury-improving  Sepsis secondary to both above-improved  Melchor on CKD-improved  Essential HTN-slightly accelerated   HLD   History of hypothyroidism   History of GERD   History of glaucoma   Multilevel lumbar spondylosis  Prophylaxis    Patient was  significantly hypoglycemic today morning, treated with D10 bolus 250 mL x1 dose   A.m. Lantus held, restart Lantus 14 units q.h.s.   Continue to make adjustments to Lantus doses based on subsequent CBG   Respiratory status stable  Diarrhea improving on Zithromax-day 2/3  Leukocytosis improved   Renal function improving   P.o. intake is better and therefore will monitor off IV fluids  BP slightly accelerated, home ACE inhibitor is on hold given Melchor on CKD on admit   I will initiate low-dose amlodipine and make adjustments as warranted   Continue other home meds-statin, fluticasone, methimazole, trazodone   DVT prophylaxis-subQ Lovenox      Possible DC tomorrow if CBG stable, tolerating by mouth and diarrhea continues to improve      Sharla Mei MD   01/09/2025                 1

## 2025-01-09 NOTE — PT/OT/SLP PROGRESS
Occupational Therapy   Treatment    Name: Navin Beck  MRN: [id number]  Admitting Diagnosis:  DKA (diabetic ketoacidosis)       Recommendations:     Recommended therapy intensity at discharge: Low Intensity Therapy   Discharge Equipment Recommendations:  walker, rolling  Barriers to discharge:       Assessment:     Navin Beck is a 64 y.o. female with a medical diagnosis of DKA (diabetic ketoacidosis).  She presents with improved balance and increased endurance overall CGA for mobility with RW. Recommending Low intensity therapy. Performance deficits affecting function are weakness, impaired endurance, impaired self care skills, impaired functional mobility, gait instability, impaired balance.     Rehab Prognosis:  Good; patient would benefit from acute skilled OT services to address these deficits and reach maximum level of function.       Plan:     Patient to be seen 3 x/week to address the above listed problems via self-care/home management, therapeutic activities, therapeutic exercises  Plan of Care Expires: 02/08/25  Plan of Care Reviewed with: patient    Subjective     Pain/Comfort:       Objective:     Communicated with: RN prior to session.  Patient found HOB elevated with   upon OT entry to room.    General Precautions: Standard, fall, droplet, contact    Orthopedic Precautions:N/A  Braces: N/A  Respiratory Status: Room air       Occupational Performance:   (Bed Mobility-Mod I)  (Sitting balance- Mod I)  (Sit to stand- CGA)  Pt. Ambulating from EOB to bathroom using RW for UE support with balance SBA) no LOB noted.   Toilet t/f- SBA for safe descend.   Mod I- toilet hygiene.   Therapeutic Positioning    OT interventions performed during the course of today's session in an effort to prevent and/or reduce acquired pressure injuries:   Therapeutic positioning was provided at the conclusion of session to offload all bony prominences for the prevention and/or reduction of pressure  injuries        [facility] 6 Click ADL:      Patient Education:  Patient provided with verbal education education regarding fall prevention, safety awareness, and pressure ulcer prevention.  Additional teaching is warranted.      Patient left HOB elevated with all lines intact and call button in reach.    GOALS:   Multidisciplinary Problems       Occupational Therapy Goals          Problem: Occupational Therapy    Goal Priority Disciplines Outcome Interventions   Occupational Therapy Goal     OT, PT/OT Progressing    Description: LTG: Pt will perform basic ADLs and ADL transfers with Modified independence using LRAD by discharge.    STG: to be met by 2/8/25    Pt will complete grooming standing at sink with LRAD with SBA.  Pt will complete UB dressing with SBA.  Pt will complete LB dressing with SBA using LRAD.  Pt will complete toileting with SBA using LRAD.  Pt will complete functional mobility to/from toilet and toilet transfer with SBA using LRAD.                        Time Tracking:     OT Date of Treatment: 01/09/25  OT Start Time: 1315  OT Stop Time: 1330  OT Total Time (min): 15 min    Billable Minutes:Self Care/Home Management 1    OT/AHSAN: AHSAN     Number of AHSAN visits since last OT visit: 1 1/9/2025

## 2025-01-10 ENCOUNTER — TELEPHONE (OUTPATIENT)
Dept: FAMILY MEDICINE | Facility: CLINIC | Age: 65
End: 2025-01-10
Payer: MEDICARE

## 2025-01-10 VITALS
SYSTOLIC BLOOD PRESSURE: 149 MMHG | BODY MASS INDEX: 33.08 KG/M2 | HEIGHT: 67 IN | WEIGHT: 210.75 LBS | DIASTOLIC BLOOD PRESSURE: 74 MMHG | TEMPERATURE: 98 F | OXYGEN SATURATION: 95 % | RESPIRATION RATE: 18 BRPM | HEART RATE: 72 BPM

## 2025-01-10 LAB
ALBUMIN SERPL-MCNC: 2.9 G/DL (ref 3.4–4.8)
ALBUMIN/GLOB SERPL: 0.8 RATIO (ref 1.1–2)
ALP SERPL-CCNC: 80 UNIT/L (ref 40–150)
ALT SERPL-CCNC: 8 UNIT/L (ref 0–55)
ANION GAP SERPL CALC-SCNC: 8 MEQ/L
AST SERPL-CCNC: 14 UNIT/L (ref 5–34)
BACTERIA STL CULT: NORMAL
BASOPHILS # BLD AUTO: 0.03 X10(3)/MCL
BASOPHILS NFR BLD AUTO: 0.4 %
BILIRUB SERPL-MCNC: 0.4 MG/DL
BUN SERPL-MCNC: 11 MG/DL (ref 9.8–20.1)
CALCIUM SERPL-MCNC: 8.3 MG/DL (ref 8.4–10.2)
CHLORIDE SERPL-SCNC: 102 MMOL/L (ref 98–107)
CO2 SERPL-SCNC: 23 MMOL/L (ref 23–31)
CREAT SERPL-MCNC: 0.9 MG/DL (ref 0.55–1.02)
CREAT/UREA NIT SERPL: 12
EOSINOPHIL # BLD AUTO: 0.02 X10(3)/MCL (ref 0–0.9)
EOSINOPHIL NFR BLD AUTO: 0.3 %
ERYTHROCYTE [DISTWIDTH] IN BLOOD BY AUTOMATED COUNT: 12.2 % (ref 11.5–17)
GFR SERPLBLD CREATININE-BSD FMLA CKD-EPI: >60 ML/MIN/1.73/M2
GLOBULIN SER-MCNC: 3.7 GM/DL (ref 2.4–3.5)
GLUCOSE SERPL-MCNC: 117 MG/DL (ref 82–115)
HCT VFR BLD AUTO: 31.6 % (ref 37–47)
HGB BLD-MCNC: 10.2 G/DL (ref 12–16)
IMM GRANULOCYTES # BLD AUTO: 0.03 X10(3)/MCL (ref 0–0.04)
IMM GRANULOCYTES NFR BLD AUTO: 0.4 %
LYMPHOCYTES # BLD AUTO: 2.39 X10(3)/MCL (ref 0.6–4.6)
LYMPHOCYTES NFR BLD AUTO: 33.4 %
MCH RBC QN AUTO: 30.3 PG (ref 27–31)
MCHC RBC AUTO-ENTMCNC: 32.3 G/DL (ref 33–36)
MCV RBC AUTO: 93.8 FL (ref 80–94)
MONOCYTES # BLD AUTO: 0.53 X10(3)/MCL (ref 0.1–1.3)
MONOCYTES NFR BLD AUTO: 7.4 %
NEUTROPHILS # BLD AUTO: 4.16 X10(3)/MCL (ref 2.1–9.2)
NEUTROPHILS NFR BLD AUTO: 58.1 %
NRBC BLD AUTO-RTO: 0 %
PLATELET # BLD AUTO: 182 X10(3)/MCL (ref 130–400)
PMV BLD AUTO: 10 FL (ref 7.4–10.4)
POCT GLUCOSE: 121 MG/DL (ref 70–110)
POTASSIUM SERPL-SCNC: 4.1 MMOL/L (ref 3.5–5.1)
PROT SERPL-MCNC: 6.6 GM/DL (ref 5.8–7.6)
RBC # BLD AUTO: 3.37 X10(6)/MCL (ref 4.2–5.4)
SODIUM SERPL-SCNC: 133 MMOL/L (ref 136–145)
WBC # BLD AUTO: 7.16 X10(3)/MCL (ref 4.5–11.5)

## 2025-01-10 PROCEDURE — 25000003 PHARM REV CODE 250

## 2025-01-10 PROCEDURE — 25000003 PHARM REV CODE 250: Performed by: INTERNAL MEDICINE

## 2025-01-10 PROCEDURE — 36415 COLL VENOUS BLD VENIPUNCTURE: CPT | Performed by: INTERNAL MEDICINE

## 2025-01-10 PROCEDURE — 63700000 PHARM REV CODE 250 ALT 637 W/O HCPCS: Performed by: INTERNAL MEDICINE

## 2025-01-10 PROCEDURE — 80053 COMPREHEN METABOLIC PANEL: CPT | Performed by: INTERNAL MEDICINE

## 2025-01-10 PROCEDURE — 85025 COMPLETE CBC W/AUTO DIFF WBC: CPT | Performed by: INTERNAL MEDICINE

## 2025-01-10 RX ORDER — HYDROCODONE BITARTRATE AND ACETAMINOPHEN 7.5; 325 MG/1; MG/1
1 TABLET ORAL EVERY 8 HOURS PRN
Qty: 15 TABLET | Refills: 0 | Status: SHIPPED | OUTPATIENT
Start: 2025-01-10 | End: 2025-01-15

## 2025-01-10 RX ORDER — INSULIN GLARGINE 100 [IU]/ML
18 INJECTION, SOLUTION SUBCUTANEOUS NIGHTLY
Start: 2025-01-10 | End: 2025-01-16 | Stop reason: SDUPTHER

## 2025-01-10 RX ADMIN — AMLODIPINE BESYLATE 5 MG: 5 TABLET ORAL at 09:01

## 2025-01-10 RX ADMIN — MUPIROCIN: 20 OINTMENT TOPICAL at 09:01

## 2025-01-10 RX ADMIN — METHIMAZOLE 10 MG: 10 TABLET ORAL at 09:01

## 2025-01-10 RX ADMIN — ATORVASTATIN CALCIUM 20 MG: 10 TABLET, FILM COATED ORAL at 09:01

## 2025-01-10 RX ADMIN — AZITHROMYCIN DIHYDRATE 500 MG: 250 TABLET ORAL at 09:01

## 2025-01-10 NOTE — TELEPHONE ENCOUNTER
Called patient.  confirmed. I informed the patient that she will be getting home health services once she is discharged. She then asked about some pain medication. I let her know that it looks like a provider on the medical surgery floor prescribed this medication and it is located at [facility] pharmacy

## 2025-01-10 NOTE — TELEPHONE ENCOUNTER
----- Message from [employer] sent at 1/10/2025 11:48 AM CST -----  .Who Called: Jose Elias Beck    Caller is requesting assistance/information from provider's office.    Symptoms (please be specific):  n/a   How long has patient had these symptoms:  n/a  List of preferred pharmacies on file (remove unneeded): [unfilled]  If different, enter pharmacy into here including location and phone number: n/a      Preferred Method of Contact: Phone Call  Patient's Preferred Phone Number on File:   Best Call Back Number, if different:[phone]  Additional Information: Caller stated that his wife text him because she was contacted by home health to be added to the pts chart(per the caller) caller is confused as to what is needed. Please call t advices

## 2025-01-10 NOTE — PLAN OF CARE
01/10/25 0937   Final Note   Assessment Type Final Discharge Note   Anticipated Discharge Disposition Home-Health   Hospital Resources/Appts/Education Provided Post-Acute resouces added to AVS   Post-Acute Status   Post-Acute Authorization Home Health   Discharge Delays None known at this time     Discharge home today with home health.  AVS sent to [facility] via [facility].  Called and left a message at [facility] to inform them that the patient will discharge home today and needs walker delivered to hospital.

## 2025-01-10 NOTE — DISCHARGE SUMMARY
[facility] Hospital Medicine Discharge Summary    Admit Date: 1/6/2025  Discharge Date and Time: 1/10/081966:22 AM  Admitting Physician: DEJAH Team  Discharging Physician: Sharla Mei MD.  Primary Care Physician: Seema Mirza MD    Discharge Diagnoses:    Poorly-controlled type 2 diabetes mellitus admitted with DKA-resolved   Severe hypoglycemia-improved with D10 infusion 1/9  RSV infection  Acute onset infectious diarrhea secondary to entero aggregative Ecoli, improved  Acute kidney injury-improving  Sepsis secondary to both above-improved  Melchor on CKD-improved  Essential HTN-slightly accelerated   HLD   History of hypothyroidism   History of GERD   History of glaucoma   Multilevel lumbar spondylosis    Hospital Course:     64-year-old female with significant history of type 2 diabetes mellitus, chronic kidney disease, HLD, HTN, hyperthyroidism, GERD, glaucoma presented to the ED with complaints of dyspnea, generalized weakness, decreased appetite, nausea, chills, abdominal pain.  Patient was hypotensive and tachycardic and tachypneic in the ED and workup consistent with diabetic ketoacidosis.  RSV positive.  Patient was admitted to ICU on DKA protocol.  Labs did show hyperkalemia for which potassium shifting agents was added.  Once DKA resolved patient was downgraded to hospital medicine services.  Stool studies ordered for diarrhea, supportive care for RSV.  Home insulin doses adjusted based on CBG.  GI PCR came back positive for entero aggregating E coli.  Patient was initiated on Zithromax 500 daily with planned course x3 days and was initiated on IV fluids to compensate for loss.  Patient is significantly hypoglycemic on 01/09, treated with D10 bolus, a.m. Lantus held, respiratory status stable, diarrhea improving on Zithromax for E coli, leukocytosis improved, renal function improving, p.o. intake also better, BP accelerated, medications adjusted, ACE inhibitor which he  takes at home on hold given Melchor on CKD.  Patient re-evaluated 1/10, diarrhea continued to get better, CBG stable with no more episodes of hypoglycemia, symptomatically and hemodynamically stable with stable labs, decided to discharge her home on 01/10, follow up with PCP, treatment plans discussed with the patient and she voiced understanding to everything explained.  Patient was discharged on 18 units Lantus q.h.s. with dose adjustments to be made at home based on CBG  Vitals:  VITAL SIGNS: 24 HRS MIN & MAX LAST   Temp  Min: 98.2 °F (36.8 °C)  Max: 98.5 °F (36.9 °C) 98.5 °F (36.9 °C)   BP  Min: 111/51  Max: 156/68 129/62   Pulse  Min: 68  Max: 77  77   Resp  Min: 18  Max: 20 18   SpO2  Min: 87 %  Max: 98 % 98 %       Physical Exam:  General appearance:  No acute distress  HENT: Atraumatic   Lungs: Clear to auscultation bilaterally.   Heart: RRR,No edema  Abdomen: Soft, non tender   Extremities: warm  Neuro:  Awake, alert, oriented x4  Psych/mental status: Appropriate mood and affect.      Procedures Performed: No admission procedures for hospital encounter.     Significant Diagnostic Studies: See Full reports for all details    Recent Labs   Lab 01/08/25  0405 01/09/25 0328 01/10/25  0413   WBC 12.58* 8.58 7.16   RBC 3.34* 3.42* 3.37*   HGB 10.2* 10.3* 10.2*   HCT 30.3* 31.4* 31.6*   MCV 90.7 91.8 93.8   MCH 30.5 30.1 30.3   MCHC 33.7 32.8* 32.3*   RDW 12.8 12.5 12.2    192 182   MPV 10.0 10.1 10.0       Recent Labs   Lab 01/06/25  1737 01/06/25  2226 01/07/25  0222 01/07/25  0607 01/07/25  1040 01/08/25  0405 01/09/25  0328 01/10/25  0413   NA  --    < > 130* 135* 136 133* 139 133*   K  --    < > 4.2 4.0 4.1 4.6 3.8 4.1   CL  --    < > 98 101 101 103 107 102   CO2  --    < > 17* 23 24 19* 23 23   BUN  --    < > 56.2* 52.4* 46.2* 32.4* 17.3 11.0   CREATININE  --    < > 2.68* 2.44* 2.16* 1.88* 1.11* 0.90   CALCIUM  --    < > 8.0* 8.0* 8.3* 8.0* 8.4 8.3*   PH 7.060*  --   --   --   --   --   --   --    MG  --     < > 2.30 2.30 2.40  --  2.20  --    ALBUMIN  --   --  3.3*  --   --  2.9*  --  2.9*   ALKPHOS  --   --  92  --   --  83  --  80   ALT  --   --  10  --   --  7  --  8   AST  --   --  17  --   --  16  --  14   BILITOT  --   --  0.2  --   --  0.2  --  0.4    < > = values in this interval not displayed.        Microbiology Results (last 7 days)       Procedure Component Value Units Date/Time    Stool Culture [[id number]]  (Normal) Collected: 01/08/25 0239    Order Status: Completed Specimen: Stool Updated: 01/09/25 1201     Stool Culture Negative for Salmonella, Shigella, Campylobacter, Vibrio, Aeromonas, Pleisiomonas,Yersinia, or Shiga Toxin 1 and 2.    Clostridium Diff Toxin, A & B, EIA [[id number]]  (Normal) Collected: 01/08/25 0239    Order Status: Completed Specimen: Stool Updated: 01/08/25 1127     Clostridium Difficile GDH Antigen Negative     Clostridium Difficile Toxin A/B Negative             US Retroperitoneal Complete  Narrative: EXAMINATION:  US RETROPERITONEAL COMPLETE    CLINICAL HISTORY:  claire, check bladder volume as well;    COMPARISON:  CT 8 December 2020    FINDINGS:  Grayscale and color Doppler sonographic evaluation of the kidneys and urinary bladder.    The right kidney measures 10 cm. The left kidney measures 11 cm.   No hydronephrosis.  Grossly normal renal parenchymal echogenicity.    No significant abnormality of the urinary bladder.  Impression: No significant sonographic abnormality of the kidneys.    Electronically signed by: Gonzalez Chawla  Date:    01/08/2025  Time:    07:44         Medication List        START taking these medications      HYDROcodone-acetaminophen 7.5-325 mg per tablet  Commonly known as: NORCO  Take 1 tablet by mouth every 8 (eight) hours as needed for Pain.  Replaces: HYDROcodone-acetaminophen  mg per tablet            CHANGE how you take these medications      LANTUS U-100 INSULIN 100 unit/mL injection  Generic drug: insulin glargine U-100 (Lantus)  Inject 18  "Units into the skin every evening. INCREASE BY 2 UNITS EVERY DAY UNTIL CBG PERSISTENTLY BELOW 150  What changed:   how much to take  when to take this  additional instructions            CONTINUE taking these medications      albuterol 90 mcg/actuation inhaler  Commonly known as: PROVENTIL/VENTOLIN HFA     atorvastatin 20 MG tablet  Commonly known as: LIPITOR  Take 1 tablet (20 mg total) by mouth once daily.     benazepriL 5 MG tablet  Commonly known as: LOTENSIN  Take 1 tablet (5 mg total) by mouth once daily.     calcium carb, citrate-vit D3 600 mg-12.5 mcg (500 unit) Tbsr  Take 1 each by mouth 2 (two) times a day.     diclofenac 75 MG EC tablet  Commonly known as: VOLTAREN  Take 1 tablet (75 mg total) by mouth 2 (two) times daily.     FLOVENT HFA 44 mcg/actuation inhaler  Generic drug: fluticasone propionate     INSULIN SYRINGE MICROFINE 1 mL 27 gauge x 5/8" Syrg  Generic drug: insulin syringe-needle U-100  1 each by Misc.(Non-Drug; Combo Route) route 2 (two) times a day.     methIMAzole 10 MG Tab  Commonly known as: TAPAZOLE  Take 1 tablet (10 mg total) by mouth once daily.     NovoLOG Flexpen U-100 Insulin 100 unit/mL (3 mL) Inpn pen  Generic drug: insulin aspart U-100  Inject 1 to 5 unites subq before meals and at bedtime as needed     pregabalin 75 MG capsule  Commonly known as: LYRICA  Take 1 capsule (75 mg total) by mouth 3 (three) times daily.     tiZANidine 4 MG tablet  Commonly known as: ZANAFLEX  Take 2-3 tablets (8-12 mg total) by mouth nightly as needed (muscle spasms).     traZODone 50 MG tablet  Commonly known as: DESYREL  Take 1 tablet (50 mg total) by mouth every evening.     TRUE METRIX GLUCOSE METER MISC     TRUE METRIX GLUCOSE TEST STRIP Strp  Generic drug: blood sugar diagnostic  Apply 1 each topically 3 (three) times daily.     zolpidem 5 MG Tab  Commonly known as: AMBIEN  Take 1 tablet (5 mg total) by mouth nightly as needed (insomnia). May take with 5-10 mg of Melatonin            STOP " taking these medications      ALPRAZolam 0.25 MG tablet  Commonly known as: XANAX     fluconazole 150 MG Tab  Commonly known as: DIFLUCAN     HYDROcodone-acetaminophen  mg per tablet  Commonly known as: NORCO  Replaced by: HYDROcodone-acetaminophen 7.5-325 mg per tablet     metFORMIN 500 MG tablet  Commonly known as: GLUCOPHAGE               Where to Get Your Medications        These medications were sent to [address] [address]      Phone: [phone]   HYDROcodone-acetaminophen 7.5-325 mg per tablet       Information about where to get these medications is not yet available    Ask your nurse or doctor about these medications  LANTUS U-100 INSULIN 100 unit/mL injection          Explained in detail to the patient about the discharge plan, medications, and follow-up visits. Pt understands and agrees with the treatment plan  Discharge Disposition: Home or Self Care   Discharged Condition: stable  Diet-   Dietary Orders (From admission, onward)       Start     Ordered    01/07/25 0925  Diet diabetic 2000 Calories (up to 75 gm per meal)  Diet effective now        Question:  Total calories / carbs:  Answer:  2000 Calories (up to 75 gm per meal)    01/07/25 0925                   Medications Per DC med rec  Activities as tolerated   Follow-up Information       [facility] Follow up.    Specialties: Home Health Services, Home Therapy Services, Home Living Aide Services  Why: This will be your home health care provider.  They will contact you after discharge.  You may call them for any questions regarding home health.  Contact information:  [address] [place] [id number] [phone]             [facility] Follow up.    Why: This will be your home medical equipment supplier for your walker.  Contact information:  [address]  [facility] [address] [phone]             Seema Mirza MD  Follow up in 1 week(s).    Specialty: Family Medicine  Contact information:  [address] [place] [address] [phone]                           For further questions contact hospitalist office    Discharge time 33 minutes    For worsening symptoms, chest pain, shortness of breath, increased abdominal pain, high grade fever, stroke or stroke like symptoms, immediately go to the nearest Emergency Room or call 911 as soon as possible.      Sharla Strickland M.D, on 1/10/2025. at 10:22 AM.

## 2025-01-10 NOTE — TELEPHONE ENCOUNTER
----- Message from Alee sent at 1/10/2025  8:27 AM CST -----  Regarding: home health  Nolvia [facility]/ [facility] saystirmay're getting a referral for home health from [facility] once pt discharges. Would like to know if pt can be admitted for home health under . pls return all to [phone] for further questions

## 2025-01-10 NOTE — PLAN OF CARE
Spoke with Patricia at [facility] and she instructed me to get a walker from [facility] to give to the patient.  Walker given to the patient.

## 2025-01-13 ENCOUNTER — PATIENT MESSAGE (OUTPATIENT)
Dept: ADMINISTRATIVE | Facility: CLINIC | Age: 65
End: 2025-01-13
Payer: MEDICARE

## 2025-01-13 ENCOUNTER — PATIENT MESSAGE (OUTPATIENT)
Dept: ADMINISTRATIVE | Facility: OTHER | Age: 65
End: 2025-01-13
Payer: MEDICARE

## 2025-01-13 ENCOUNTER — PATIENT OUTREACH (OUTPATIENT)
Dept: ADMINISTRATIVE | Facility: CLINIC | Age: 65
End: 2025-01-13
Payer: MEDICARE

## 2025-01-13 NOTE — PROGRESS NOTES
C3 nurse attempted to contact Navin Beck for a TCC post hospital discharge follow up call. Patient requested a call back. The patient has a scheduled HOSFU appointment with Seema Mirza MD  on 1/16/25 @ 1921.

## 2025-01-14 NOTE — PROGRESS NOTES
2nd attempt C3 nurse attempted to contact Navin Beck for a TCC post hospital discharge follow up call. Patient requested a call back. The patient has a scheduled HOSFU appointment with Seema Mirza MD  on 1/16/25 @ [phone].

## 2025-01-14 NOTE — PROGRESS NOTES
3rd attempt C3 nurse attempted to contact Navin Beck for a TCC post hospital discharge follow up call. No answer. Voicemail left with instructions to return call. The patient has a scheduled HOSFU appointment with Seema Mirza MD  on 1/16/25 @ [id number].

## 2025-01-16 ENCOUNTER — OFFICE VISIT (OUTPATIENT)
Dept: FAMILY MEDICINE | Facility: CLINIC | Age: 65
End: 2025-01-16
Payer: MEDICARE

## 2025-01-16 VITALS
SYSTOLIC BLOOD PRESSURE: 136 MMHG | DIASTOLIC BLOOD PRESSURE: 80 MMHG | BODY MASS INDEX: 32.49 KG/M2 | TEMPERATURE: 98 F | HEART RATE: 77 BPM | WEIGHT: 207 LBS | OXYGEN SATURATION: 99 % | HEIGHT: 67 IN

## 2025-01-16 DIAGNOSIS — Z79.4 TYPE 2 DIABETES MELLITUS WITH HYPERGLYCEMIA, WITH LONG-TERM CURRENT USE OF INSULIN: ICD-10-CM

## 2025-01-16 DIAGNOSIS — N18.31 CHRONIC KIDNEY DISEASE, STAGE 3A: ICD-10-CM

## 2025-01-16 DIAGNOSIS — F32.2 MAJOR DEPRESSIVE DISORDER, SINGLE EPISODE, SEVERE: ICD-10-CM

## 2025-01-16 DIAGNOSIS — J30.1 SEASONAL ALLERGIC RHINITIS DUE TO POLLEN: ICD-10-CM

## 2025-01-16 DIAGNOSIS — N30.00 ACUTE CYSTITIS WITHOUT HEMATURIA: ICD-10-CM

## 2025-01-16 DIAGNOSIS — R30.0 DYSURIA: Primary | ICD-10-CM

## 2025-01-16 DIAGNOSIS — E11.649 UNCONTROLLED TYPE 2 DIABETES MELLITUS WITH HYPOGLYCEMIA, UNSPECIFIED HYPOGLYCEMIA COMA STATUS: ICD-10-CM

## 2025-01-16 DIAGNOSIS — E11.65 TYPE 2 DIABETES MELLITUS WITH HYPERGLYCEMIA, WITH LONG-TERM CURRENT USE OF INSULIN: ICD-10-CM

## 2025-01-16 PROBLEM — F11.20 OPIOID DEPENDENCE, UNCOMPLICATED: Status: RESOLVED | Noted: 2024-06-19 | Resolved: 2025-01-16

## 2025-01-16 LAB
BACTERIA #/AREA URNS AUTO: ABNORMAL /HPF
BILIRUB UR QL STRIP.AUTO: NEGATIVE
CLARITY UR: ABNORMAL
COLOR UR AUTO: YELLOW
GLUCOSE UR QL STRIP: ABNORMAL
HGB UR QL STRIP: ABNORMAL
KETONES UR QL STRIP: ABNORMAL
LEUKOCYTE ESTERASE UR QL STRIP: 500
NITRITE UR QL STRIP: ABNORMAL
PH UR STRIP: 5.5 [PH]
PROT UR QL STRIP: ABNORMAL
RBC #/AREA URNS AUTO: ABNORMAL /HPF
SP GR UR STRIP.AUTO: 1.02 (ref 1–1.03)
SQUAMOUS #/AREA URNS LPF: ABNORMAL /HPF
UROBILINOGEN UR STRIP-ACNC: NORMAL
WBC #/AREA URNS AUTO: ABNORMAL /HPF

## 2025-01-16 PROCEDURE — 1125F AMNT PAIN NOTED PAIN PRSNT: CPT | Mod: CPTII,,, | Performed by: STUDENT IN AN ORGANIZED HEALTH CARE EDUCATION/TRAINING PROGRAM

## 2025-01-16 PROCEDURE — 3079F DIAST BP 80-89 MM HG: CPT | Mod: CPTII,,, | Performed by: STUDENT IN AN ORGANIZED HEALTH CARE EDUCATION/TRAINING PROGRAM

## 2025-01-16 PROCEDURE — 1159F MED LIST DOCD IN RCRD: CPT | Mod: CPTII,,, | Performed by: STUDENT IN AN ORGANIZED HEALTH CARE EDUCATION/TRAINING PROGRAM

## 2025-01-16 PROCEDURE — 3008F BODY MASS INDEX DOCD: CPT | Mod: CPTII,,, | Performed by: STUDENT IN AN ORGANIZED HEALTH CARE EDUCATION/TRAINING PROGRAM

## 2025-01-16 PROCEDURE — 1101F PT FALLS ASSESS-DOCD LE1/YR: CPT | Mod: CPTII,,, | Performed by: STUDENT IN AN ORGANIZED HEALTH CARE EDUCATION/TRAINING PROGRAM

## 2025-01-16 PROCEDURE — 3046F HEMOGLOBIN A1C LEVEL >9.0%: CPT | Mod: CPTII,,, | Performed by: STUDENT IN AN ORGANIZED HEALTH CARE EDUCATION/TRAINING PROGRAM

## 2025-01-16 PROCEDURE — 3075F SYST BP GE 130 - 139MM HG: CPT | Mod: CPTII,,, | Performed by: STUDENT IN AN ORGANIZED HEALTH CARE EDUCATION/TRAINING PROGRAM

## 2025-01-16 PROCEDURE — 99214 OFFICE O/P EST MOD 30 MIN: CPT | Mod: S$PBB,,, | Performed by: STUDENT IN AN ORGANIZED HEALTH CARE EDUCATION/TRAINING PROGRAM

## 2025-01-16 PROCEDURE — 1111F DSCHRG MED/CURRENT MED MERGE: CPT | Mod: CPTII,,, | Performed by: STUDENT IN AN ORGANIZED HEALTH CARE EDUCATION/TRAINING PROGRAM

## 2025-01-16 PROCEDURE — 81015 MICROSCOPIC EXAM OF URINE: CPT | Performed by: STUDENT IN AN ORGANIZED HEALTH CARE EDUCATION/TRAINING PROGRAM

## 2025-01-16 PROCEDURE — 87086 URINE CULTURE/COLONY COUNT: CPT | Performed by: STUDENT IN AN ORGANIZED HEALTH CARE EDUCATION/TRAINING PROGRAM

## 2025-01-16 PROCEDURE — 3288F FALL RISK ASSESSMENT DOCD: CPT | Mod: CPTII,,, | Performed by: STUDENT IN AN ORGANIZED HEALTH CARE EDUCATION/TRAINING PROGRAM

## 2025-01-16 PROCEDURE — 99214 OFFICE O/P EST MOD 30 MIN: CPT | Mod: PBBFAC,PN | Performed by: STUDENT IN AN ORGANIZED HEALTH CARE EDUCATION/TRAINING PROGRAM

## 2025-01-16 RX ORDER — HYDROCODONE BITARTRATE AND ACETAMINOPHEN 7.5; 325 MG/1; MG/1
TABLET ORAL
COMMUNITY

## 2025-01-16 RX ORDER — INSULIN GLARGINE 100 [IU]/ML
20 INJECTION, SOLUTION SUBCUTANEOUS NIGHTLY
Start: 2025-01-16 | End: 2025-02-15

## 2025-01-16 RX ORDER — FLUTICASONE PROPIONATE 50 MCG
1 SPRAY, SUSPENSION (ML) NASAL DAILY
Qty: 18.2 ML | Refills: 11 | Status: SHIPPED | OUTPATIENT
Start: 2025-01-16

## 2025-01-16 RX ORDER — BLOOD-GLUCOSE,RECEIVER,CONT
1 EACH MISCELLANEOUS CONTINUOUS
Qty: 1 EACH | Refills: 0 | Status: SHIPPED | OUTPATIENT
Start: 2025-01-16 | End: 2026-01-16

## 2025-01-16 RX ORDER — DULOXETIN HYDROCHLORIDE 30 MG/1
30 CAPSULE, DELAYED RELEASE ORAL DAILY
Qty: 30 CAPSULE | Refills: 11 | Status: SHIPPED | OUTPATIENT
Start: 2025-01-16 | End: 2026-01-16

## 2025-01-16 RX ORDER — BLOOD-GLUCOSE SENSOR
1 EACH MISCELLANEOUS
Qty: 3 EACH | Refills: 11 | Status: SHIPPED | OUTPATIENT
Start: 2025-01-16 | End: 2026-01-16

## 2025-01-16 RX ORDER — SEMAGLUTIDE 0.68 MG/ML
0.5 INJECTION, SOLUTION SUBCUTANEOUS
Qty: 3 ML | Refills: 0 | Status: SHIPPED | OUTPATIENT
Start: 2025-02-15

## 2025-01-16 RX ORDER — SEMAGLUTIDE 0.68 MG/ML
0.25 INJECTION, SOLUTION SUBCUTANEOUS
Qty: 3 ML | Refills: 0 | Status: SHIPPED | OUTPATIENT
Start: 2025-01-16

## 2025-01-16 NOTE — PROGRESS NOTES
Patient Name: Navin Beck     : 1960    MRN: [id number]     Subjective:     Patient ID: Navin Beck is a 64 y.o. female.    Chief Complaint:   Chief Complaint   Patient presents with    Follow-up     Patient possibly here for hospital follow up. Complains of high blood sugars and blood pressure when she was put in the hospital. Tested positive for ecoli. Needs something for depression. Bp 136/80        HPI: HPI  64-year-old female presents to clinic for hospital follow-up.  Patient was hospitalized for DKA.  And diarrhea secondary to E coli  A1c 9.2    Patient is reporting an increase in depressive symptoms since being out of the hospital  Amenable to starting medication but is not yet amenable to counseling  No SI or HI but states difficulty sleeping, of appetite, loss of interest she used to find pleasure in     Hyperthyroidism  Patient reports that she takes methimazole 10 mg daily  TSH and T4 WNL    Diabetes with neuropathy   Prefers vials instead of pens  Lantus 18 unit  Novolog 20 units BID  No longer on Jardiance or metformin  Reports that blood sugars during the day rise into the 200s   Reports that morning fasting blood sugars are 160 or less  Asking for Dexcom  Has not been recently to Diabetes Education    Degenerative disc disease with sciatic nerve pain  Reports having been through epidural injections and ablation which did not relieve pain  Reports that she has been unable to get to her appointments in [place]   Asking for referral to pain management ; unable to be contacted by [facility]. Was given phone number. They are awaiting and MRI. Patient states she has not been able to attend one in the past few years. Has also been referred to Anesthesiology and pain consultants.  Patient has been informed numerous times that this clinic does not provide chronic pain management    Hypertension   is on benazepril 5 mg daily     Patient also reports that she has had insomnia    No  history of seizure states she has tried over-the-counter medications which have not helped her sleep  Was weaned from Ambien 10 as started by previous provider and is now on Ambien 5 mg.       ROS:      ROS     12 point review of systems conducted, negative except as stated in the history of present illness. See HPI for details.    History:     Past Medical History:   Diagnosis Date    Allergy     Anemia due to stage 3 chronic kidney disease 12/6/2020    Arrhythmia     Arthritis     Bronchial asthma     Diabetes mellitus     Diabetes mellitus     Glaucoma     Hormone disorder     hysterectomy    Hypercholesteremia     Hypertension     Hyperthyroidism     Insomnia     Kidney stones     Neuropathy     Thyroid disease     Urine, incontinence, stress female     UTI (urinary tract infection) 11/27/2020        Past Surgical History:   Procedure Laterality Date    APPENDECTOMY      BACK SURGERY      disc removal     CARPAL TUNNEL RELEASE Bilateral     CHOLECYSTECTOMY      CYSTOSCOPY N/A 10/26/2020    Procedure: CYSTOSCOPY;  Surgeon: Wilner Goel MD;  Location: [facility];  Service: OB/GYN;  Laterality: N/A;    CYSTOSCOPY W/ RETROGRADES Bilateral 12/7/2020    Procedure: CYSTOSCOPY, WITH RETROGRADE PYELOGRAM;  Surgeon: Daniel Jalloh MD;  Location: [address];  Service: Urology;  Laterality: Bilateral;    FRACTURE SURGERY Right     wrist    HYSTERECTOMY  2010    [facility]    JOINT REPLACEMENT      KNEE ARTHROSCOPY W/ DEBRIDEMENT      KNEE SURGERY Right     Knee replacement    LITHOTRIPSY      NH REMOVAL OF OVARY/TUBE(S)  9/9/13    benign    removal of round ligament mass      ROBOT-ASSISTED LAPAROSCOPIC LYSIS OF ADHESIONS USING DA JAMES XI N/A 10/26/2020    Procedure: XI ROBOTIC LYSIS, ADHESIONS;  Surgeon: Wilner Goel MD;  Location: [facility];  Service: OB/GYN;  Laterality: N/A;  ROBOTIC MOBILIZATION OF BLADDER- DR BURNHAM    SPINE SURGERY      WRIST FRACTURE SURGERY Left        Family History  "  Problem Relation Name Age of Onset    Cancer Mother      Cancer Brother      Colon cancer Brother      Cancer Brother      Cancer Brother      Cancer Brother      Ovarian cancer Neg Hx      Uterine cancer Neg Hx      Breast cancer Neg Hx          Social History     Tobacco Use    Smoking status: Every Day     Current packs/day: 0.50     Average packs/day: 0.5 packs/day for 30.0 years (15.0 ttl pk-yrs)     Types: Cigarettes    Smokeless tobacco: Never   Substance and Sexual Activity    Alcohol use: No    Drug use: No    Sexual activity: Not Currently     Partners: Male     Birth control/protection: Surgical       Current Outpatient Medications   Medication Instructions    albuterol (PROVENTIL/VENTOLIN HFA) 90 mcg/actuation inhaler 1-2 puffs, Every 6 hours PRN    atorvastatin (LIPITOR) 20 mg, Oral, Daily    benazepriL (LOTENSIN) 5 mg, Oral, Daily    blood-glucose meter (TRUE METRIX GLUCOSE METER MISC) True Metrix Glucose Meter    blood-glucose meter,continuous (DEXCOM ) Misc 1 each, Misc.(Non-Drug; Combo Route), Continuous    blood-glucose sensor (DEXCOM G7 SENSOR) Korina 1 each, Misc.(Non-Drug; Combo Route), Every 10 days    calcium carb, citrate-vit D3 600 mg-12.5 mcg (500 unit) TbSR 1 each, Oral, 2 times daily    diclofenac (VOLTAREN) 75 mg, Oral, 2 times daily    DULoxetine (CYMBALTA) 30 mg, Oral, Daily    fluticasone propionate (FLONASE) 50 mcg, Each Nostril, Daily    fluticasone propionate (FLOVENT HFA) 44 mcg/actuation inhaler 1 puff, Daily    HYDROcodone-acetaminophen (NORCO) 7.5-325 mg per tablet     insulin syringe-needle U-100 (INSULIN SYRINGE MICROFINE) 1 mL 27 gauge x 5/8" Syrg 1 each, Misc.(Non-Drug; Combo Route), 2 times daily    LANTUS U-100 INSULIN 20 Units, Subcutaneous, Nightly, INCREASE BY 2 UNITS EVERY DAY UNTIL CBG PERSISTENTLY BELOW 150    methIMAzole (TAPAZOLE) 10 mg, Oral, Daily    NOVOLOG FLEXPEN U-100 INSULIN 100 unit/mL (3 mL) InPn pen Inject 1 to 5 unites subq before meals and at " "bedtime as needed    OZEMPIC 0.25 mg, Subcutaneous, Every 7 days    [START ON 2/15/2025] OZEMPIC 0.5 mg, Subcutaneous, Every 7 days    pregabalin (LYRICA) 75 mg, Oral, 3 times daily    tiZANidine (ZANAFLEX) 8-12 mg, Oral, Nightly PRN    traZODone (DESYREL) 50 mg, Oral, Nightly    TRUE METRIX GLUCOSE TEST STRIP Strp 1 each, Topical (Top), 3 times daily    zolpidem (AMBIEN) 5 mg, Oral, Nightly PRN, May take with 5-10 mg of Melatonin        Review of patient's allergies indicates:   Allergen Reactions    Penicillins Shortness Of Breath, Itching and Swelling     Tolerates cefepime 11/30/2020    Penicillins Anaphylaxis       Objective:     Visit Vitals  /80 (BP Location: Right arm, Patient Position: Sitting)   Pulse 77   Temp 97.7 °F (36.5 °C) (Oral)   Ht 5' 7" (1.702 m)   Wt 93.9 kg (207 lb)   SpO2 99%   BMI 32.42 kg/m²       Physical Examination:     Physical Exam  Constitutional:       General: She is not in acute distress.     Appearance: Normal appearance. She is not ill-appearing or diaphoretic.   HENT:      Nose: No congestion.   Eyes:      Conjunctiva/sclera: Conjunctivae normal.      Pupils: Pupils are equal, round, and reactive to light.   Cardiovascular:      Rate and Rhythm: Normal rate and regular rhythm.      Heart sounds: No murmur heard.  Pulmonary:      Effort: Pulmonary effort is normal. No respiratory distress.      Breath sounds: Normal breath sounds. No wheezing.   Musculoskeletal:         General: No swelling, tenderness, deformity or signs of injury. Normal range of motion.      Cervical back: Normal range of motion.   Skin:     General: Skin is warm and dry.      Coloration: Skin is not jaundiced.   Neurological:      General: No focal deficit present.      Mental Status: She is alert and oriented to person, place, and time. Mental status is at baseline.      Gait: Gait normal.   Psychiatric:      Comments: Depressed mood         Lab Results:     Chemistry:  Lab Results   Component Value " Date     (L) 01/10/2025    K 4.1 01/10/2025    BUN 11.0 01/10/2025    CREATININE 0.90 01/10/2025    EGFRNORACEVR >60 01/10/2025    GLUCOSE 117 (H) 01/10/2025    CALCIUM 8.3 (L) 01/10/2025    ALKPHOS 80 01/10/2025    LABPROT 6.6 01/10/2025    ALBUMIN 2.9 (L) 01/10/2025    AST 14 01/10/2025    ALT 8 01/10/2025    MG 2.20 01/09/2025    PHOS 3.1 01/09/2025    UIHKJSCH84YF 7 (L) 06/19/2024    TSH 3.891 01/07/2025    FVQHZT3DEOM 0.83 01/07/2025        Lab Results   Component Value Date    HGBA1C 9.2 (H) 01/07/2025        Hematology:  Lab Results   Component Value Date    WBC 7.16 01/10/2025    HGB 10.2 (L) 01/10/2025    HCT 31.6 (L) 01/10/2025     01/10/2025       Lipid Panel:  Lab Results   Component Value Date    CHOL 201 (H) 06/19/2024    HDL 50 06/19/2024    .00 06/19/2024    TRIG 171 (H) 06/19/2024    TOTALCHOLEST 4 06/19/2024        Urine:  Lab Results   Component Value Date    APPEARANCEUA Clear 01/06/2025    SGUA 1.018 01/06/2025    PROTEINUA Negative 01/06/2025    KETONESUA 1+ (A) 01/06/2025    LEUKOCYTESUR Negative 01/06/2025    RBCUA 0-5 01/06/2025    WBCUA 0-5 01/06/2025    BACTERIA Trace 01/06/2025    SQEPUA Trace 01/06/2025    HYALINECASTS None Seen 06/19/2024    CREATRANDUR 123.2 (H) 06/19/2024        Assessment:          ICD-10-CM ICD-9-CM   1. Dysuria  R30.0 788.1   2. Uncontrolled type 2 diabetes mellitus with hypoglycemia, unspecified hypoglycemia coma status  E11.649 250.82   3. Major depressive disorder, single episode, severe  F32.2 296.23   4. Chronic kidney disease, stage 3a  N18.31 585.3   5. Type 2 diabetes mellitus with hyperglycemia, with long-term current use of insulin  E11.65 250.00    Z79.4 790.29     V58.67   6. Seasonal allergic rhinitis due to pollen  J30.1 477.0        Plan:     1. Dysuria  Assessment & Plan:  Urine culture    Orders:  -     Urinalysis, Reflex to Urine Culture    2. Uncontrolled type 2 diabetes mellitus with hypoglycemia, unspecified hypoglycemia  coma status  -     semaglutide (OZEMPIC) 0.25 mg or 0.5 mg (2 mg/3 mL) pen injector; Inject 0.25 mg into the skin every 7 days.  Dispense: 3 mL; Refill: 0  -     semaglutide (OZEMPIC) 0.25 mg or 0.5 mg (2 mg/3 mL) pen injector; Inject 0.5 mg into the skin every 7 days.  Dispense: 3 mL; Refill: 0  -     Ambulatory referral/consult to Diabetes Education; Future; Expected date: 01/23/2025  -     blood-glucose sensor (DEXCOM G7 SENSOR) Korina; 1 each by Misc.(Non-Drug; Combo Route) route every 10 days.  Dispense: 3 each; Refill: 11  -     blood-glucose meter,continuous (DEXCOM ) Misc; 1 each by Misc.(Non-Drug; Combo Route) route continuous.  Dispense: 1 each; Refill: 0  -     insulin glargine U-100, Lantus, (LANTUS U-100 INSULIN) 100 unit/mL injection; Inject 20 Units into the skin every evening. INCREASE BY 2 UNITS EVERY DAY UNTIL CBG PERSISTENTLY BELOW 150    3. Major depressive disorder, single episode, severe  Assessment & Plan:  Cymbalta 30 mg started. Not amenable to counseling.    Orders:  -     DULoxetine (CYMBALTA) 30 MG capsule; Take 1 capsule (30 mg total) by mouth once daily.  Dispense: 30 capsule; Refill: 11    4. Chronic kidney disease, stage 3a    5. Type 2 diabetes mellitus with hyperglycemia, with long-term current use of insulin  Assessment & Plan:  A1c not controlled at 9.2  Starting Ozempic at 0.25 mg with titration to 0.5 mg  Increasing Lantus to 20 units nightly  Patient was given instructions upon leaving hospital to increase Lantus by 2 units every day until CBG persistently below 150 however patient has not completed this may need additional help with Diabetes Education therefore I am referring patient  Ordering Dexcom for patient      6. Seasonal allergic rhinitis due to pollen  -     fluticasone propionate (FLONASE) 50 mcg/actuation nasal spray; 1 spray (50 mcg total) by Each Nostril route once daily.  Dispense: 18.2 mL; Refill: 11         Follow up in about 3 months (around 4/16/2025)  for Diabetes.    No future appointments.     Seema Mirza MD

## 2025-01-16 NOTE — ASSESSMENT & PLAN NOTE
A1c not controlled at 9.2  Starting Ozempic at 0.25 mg with titration to 0.5 mg  Increasing Lantus to 20 units nightly  Patient was given instructions upon leaving hospital to increase Lantus by 2 units every day until CBG persistently below 150 however patient has not completed this may need additional help with Diabetes Education therefore I am referring patient  Ordering Dexcom for patient

## 2025-01-17 ENCOUNTER — PATIENT MESSAGE (OUTPATIENT)
Dept: FAMILY MEDICINE | Facility: CLINIC | Age: 65
End: 2025-01-17
Payer: MEDICARE

## 2025-01-17 RX ORDER — NITROFURANTOIN 25; 75 MG/1; MG/1
100 CAPSULE ORAL 2 TIMES DAILY
Qty: 14 CAPSULE | Refills: 0 | Status: SHIPPED | OUTPATIENT
Start: 2025-01-17 | End: 2025-01-24

## 2025-01-18 LAB — BACTERIA UR CULT: ABNORMAL

## 2025-01-24 ENCOUNTER — PATIENT MESSAGE (OUTPATIENT)
Dept: FAMILY MEDICINE | Facility: CLINIC | Age: 65
End: 2025-01-24
Payer: MEDICARE

## 2025-01-24 DIAGNOSIS — N30.00 ACUTE CYSTITIS WITHOUT HEMATURIA: Primary | ICD-10-CM

## 2025-01-24 RX ORDER — CIPROFLOXACIN 250 MG/1
250 TABLET, FILM COATED ORAL EVERY 12 HOURS
Qty: 10 TABLET | Refills: 0 | Status: SHIPPED | OUTPATIENT
Start: 2025-01-24 | End: 2025-01-29

## 2025-01-27 ENCOUNTER — PATIENT OUTREACH (OUTPATIENT)
Facility: CLINIC | Age: 65
End: 2025-01-27
Payer: MEDICARE

## 2025-01-27 ENCOUNTER — CLINICAL SUPPORT (OUTPATIENT)
Dept: DIABETES | Facility: CLINIC | Age: 65
End: 2025-01-27
Payer: MEDICARE

## 2025-01-27 DIAGNOSIS — E11.65 TYPE 2 DIABETES MELLITUS WITH HYPERGLYCEMIA, WITH LONG-TERM CURRENT USE OF INSULIN: ICD-10-CM

## 2025-01-27 DIAGNOSIS — E11.649 UNCONTROLLED TYPE 2 DIABETES MELLITUS WITH HYPOGLYCEMIA, UNSPECIFIED HYPOGLYCEMIA COMA STATUS: ICD-10-CM

## 2025-01-27 DIAGNOSIS — Z09 NEED FOR CASE MANAGEMENT FOLLOW-UP: Primary | ICD-10-CM

## 2025-01-27 DIAGNOSIS — Z79.4 TYPE 2 DIABETES MELLITUS WITH HYPERGLYCEMIA, WITH LONG-TERM CURRENT USE OF INSULIN: ICD-10-CM

## 2025-01-27 PROCEDURE — G0108 DIAB MANAGE TRN  PER INDIV: HCPCS | Mod: 95,,, | Performed by: DIETITIAN, REGISTERED

## 2025-01-27 NOTE — PROGRESS NOTES
Pt returned call. Amenable to OPCM/CHW referral.   Educated on [facility] eligibility and how to sign up via the portal. Phone number given to callback if need assistance.

## 2025-01-27 NOTE — PROGRESS NOTES
Health Maintenance Topic(s) Outreach Outcomes & Actions Taken:     Additional Notes:  Attempt made to contact patient. No answer. Mailbox full. Call attempt re [facility] referral for SDOH and Dig Med eligibility for HTN & DM. OPCM referral sent for uncontrolled DM & SDOH food insecurity.    TCC closed 1/14/2025.   DM education today 1/27/25- New Pt         Care Management, Digital Medicine, and/or Education Referrals      Next Steps - Referral Actions: Referral placed for OPCM and Referral place for OPCM to [facility] for SDOH Food Insecurity & Transportation Needs if applicable

## 2025-01-27 NOTE — PROGRESS NOTES
Diabetes Care Specialist Virtual Visit Note       The patient location is: [place]  The chief complaint leading to consultation is: Diabetes  Visit type: audiovisual  Total time spent with patient: 20 min   Each patient to whom he or she provides medical services by telemedicine is:  (1) informed of the relationship between the physician and patient and the respective role of any other health care provider with respect to management of the patient; and (2) notified that he or she may decline to receive medical services by telemedicine and may withdraw from such care at any time.  Diabetes Care Specialist Progress Note  Author: Niyah Bunch RD  Date: 1/27/2025    Intake         Current Diabetes Treatment: Insulin, DM Injectables  DM Injectables Type/Dose: Ozempic .25 mg x 1 mth, increase to .5 mg  Method of insulin delivery?: Injections  Injection Type: Pens  Pen Type/Dose: Novolog 10 units with dinner and Lantus 40 units AM and 20 units PM    Continuous Glucose Monitoring  Personal CGM type:: Dexcom G7 with  and juan    Lab Results   Component Value Date    HGBA1C 9.2 (H) 01/07/2025               There is no height or weight on file to calculate BMI.    Lifestyle Coping Support & Clinical    Lifestyle/Coping/Support  Does anyone in your family have diabetes or does anyone in your family support you in your diabetes care?: son and daughter-in-law  Learning Barriers:: None  Psychosocial/Coping Skills Assessment Completed: : Yes  Assessment indicates:: Adequate understanding  Area of need?: No    Problem Review  Active Comorbidities: Hypertension    Diabetes Self-Management Skills Assessment    Medication Skills Assessment  Patient is able to identify current diabetes medications, dosages, and appropriate timing of medications.: yes  Patient reports problems or concerns with current medication regimen.: no  Patient is  aware that some diabetes medications can cause low blood sugar?: Yes  Medication Skills  Assessment Completed:: Yes  Assessment indicates:: Instruction Needed  Area of need?: Yes    Diabetes Disease Process/Treatment Options  Diabetes Disease Process/Treatment Options: Skills Assessment Completed: No  Deferred due to:: Time  Area of need?: Deferred    Nutrition/Healthy Eating  Meal Plan 24 Hour Recall - Dinner: [employer]  Who shops/cooks?: daughter-in-law  Patient can identify foods that impact blood sugar.: yes  Nutrition/Healthy Eating Skills Assessment Completed:: Yes  Assessment indicates:: Instruction Needed  Area of need?: Yes    Physical Activity/Exercise  Physical Activity/Exercise Skills Assessment Completed: : No  Deffered due to:: Time  Area of need?: Deferred    Home Blood Glucose Monitoring  Patient states that blood sugar is checked at home daily.: yes  Monitoring Method:: personal continuous glucose monitor  Personal CGM type:: Dexcom G7 with  and juan   What is your current Time in Range?: n/a  Home Blood Glucose Monitoring Skills Assessment Completed: : Yes  Assessment indicates:: Instruction Needed  Area of need?: Yes    Acute Complications  Have you ever had hypoglycemia (low BG 70 or less)?: yes  Acute Complications Skills Assessment Completed: : Yes  Assessment indicates:: Instruction Needed  Area of need?: Yes    Chronic Complications  Reviewed health maintenance: no (see comments)  Chronic Complications Skills Assessment Completed: : No  Deferred due to:: Time  Area of need?: Deferred      Assessment Summary and Plan    Based on today's diabetes care assessment, the following areas of need were identified:      Identified Areas of Need      Medication/Current Diabetes Treatment: Yes - Discussed MOA, onset, side effects, dosage of meds. Discussed increasing Ozempic to .5 mg after 4 doses of .25 mg/1 mth   Lifestyle Coping/Support: No   Diabetes Disease Process/Treatment Options: Deferred   Nutrition/Healthy Eating: Yes - see care plan. Eating 1 meal per day due to poor  appetite. Reviewed optimizing nutrition quality and to include lean protein and non-starchy vegetables.   Physical Activity/Exercise: Deferred    Home Blood Glucose Monitoring: Yes - provided clinic sharing code and instructed to connect via phone juan. Uses both phone juan and .   Acute Complications: Yes - Reviewed blood glucose goals, prevention, detection, signs and symptoms, and treatment of hypoglycemia and hyperglycemia, and when to contact the clinic.   Chronic Complications: Deferred       Today's interventions were provided through individual discussion, instruction, and written materials were provided.      Patient verbalized understanding of instruction and written materials.  Pt was able to return back demonstration of instructions today. Patient understood key points, needs reinforcement and further instruction.     Diabetes Self-Management Care Plan:    Today's Diabetes Self-Management Care Plan was developed with Navin's input. Navin has agreed to work toward the following goal(s) to improve his/her overall diabetes control.      Care Plan: Diabetes Management   Updates made since 1/28/2024 12:00 AM        Problem: Healthy Eating         Goal: Exchange lower carb soup for high carb ramen noodles    Start Date: 1/27/2025   Expected End Date: 2/17/2025   Priority: Medium   Barriers: No Barriers Identified   Note:    1/27/25 - reviewed lower carb options such as Healthy Choice       Task: Reviewed the sources and role of Carbohydrate, Protein, and Fat and how each nutrient impacts blood sugar. Completed 1/27/2025          Follow Up Plan     Follow up in about 3 weeks (around 2/17/2025) for dexcom report.    Today's care plan and follow up schedule was discussed with patient.  Navin verbalized understanding of the care plan, goals, and agrees to follow up plan.        The patient was encouraged to communicate with his/her health care provider/physician and care team regarding his/her condition(s)  and treatment.  I provided the patient with my contact information today and encouraged to contact me via phone or [facility] Patient Portal as needed.     Length of Visit   Total Time: 40 Minutes

## 2025-02-04 ENCOUNTER — HOSPITAL ENCOUNTER (INPATIENT)
Facility: HOSPITAL | Age: 65
LOS: 4 days | Discharge: HOME HEALTH | DRG: 638 | End: 2025-02-08
Attending: EMERGENCY MEDICINE | Admitting: INTERNAL MEDICINE
Payer: MEDICARE

## 2025-02-04 DIAGNOSIS — R19.7 DIARRHEA, UNSPECIFIED TYPE: ICD-10-CM

## 2025-02-04 DIAGNOSIS — R33.8 ACUTE URINARY RETENTION: ICD-10-CM

## 2025-02-04 DIAGNOSIS — R11.10 VOMITING, UNSPECIFIED VOMITING TYPE, UNSPECIFIED WHETHER NAUSEA PRESENT: ICD-10-CM

## 2025-02-04 DIAGNOSIS — E11.65 TYPE 2 DIABETES MELLITUS WITH HYPERGLYCEMIA, WITH LONG-TERM CURRENT USE OF INSULIN: ICD-10-CM

## 2025-02-04 DIAGNOSIS — Z79.4 TYPE 2 DIABETES MELLITUS WITH HYPERGLYCEMIA, WITH LONG-TERM CURRENT USE OF INSULIN: ICD-10-CM

## 2025-02-04 DIAGNOSIS — E11.10 DIABETIC KETOACIDOSIS WITHOUT COMA ASSOCIATED WITH TYPE 2 DIABETES MELLITUS: Primary | ICD-10-CM

## 2025-02-04 LAB
ALBUMIN SERPL-MCNC: 4 G/DL (ref 3.4–4.8)
ALBUMIN/GLOB SERPL: 0.9 RATIO (ref 1.1–2)
ALLENS TEST BLOOD GAS (OHS): YES
ALP SERPL-CCNC: 162 UNIT/L (ref 40–150)
ALT SERPL-CCNC: 14 UNIT/L (ref 0–55)
ANION GAP SERPL CALC-SCNC: 16 MEQ/L
ANION GAP SERPL CALC-SCNC: 24 MEQ/L
ANION GAP SERPL CALC-SCNC: 31 MEQ/L
AST SERPL-CCNC: 12 UNIT/L (ref 5–34)
B-OH-BUTYR SERPL-MCNC: 13.8 MMOL/L
BACTERIA #/AREA URNS AUTO: ABNORMAL /HPF
BASE EXCESS BLD CALC-SCNC: -24.6 MMOL/L (ref -2–2)
BASOPHILS # BLD AUTO: 0.01 X10(3)/MCL
BASOPHILS NFR BLD AUTO: 0.1 %
BILIRUB SERPL-MCNC: 0.2 MG/DL
BILIRUB UR QL STRIP.AUTO: NEGATIVE
BLOOD GAS SAMPLE TYPE (OHS): ABNORMAL
BUN SERPL-MCNC: 44 MG/DL (ref 9.8–20.1)
BUN SERPL-MCNC: 49.6 MG/DL (ref 9.8–20.1)
BUN SERPL-MCNC: 56.7 MG/DL (ref 9.8–20.1)
CA-I BLD-SCNC: 1.17 MMOL/L (ref 1.12–1.23)
CALCIUM SERPL-MCNC: 7.8 MG/DL (ref 8.4–10.2)
CALCIUM SERPL-MCNC: 8 MG/DL (ref 8.4–10.2)
CALCIUM SERPL-MCNC: 8.3 MG/DL (ref 8.4–10.2)
CHLORIDE SERPL-SCNC: 104 MMOL/L (ref 98–107)
CHLORIDE SERPL-SCNC: 112 MMOL/L (ref 98–107)
CHLORIDE SERPL-SCNC: 92 MMOL/L (ref 98–107)
CLARITY UR: CLEAR
CO2 BLDA-SCNC: 5 MMOL/L
CO2 SERPL-SCNC: 11 MMOL/L (ref 23–31)
CO2 SERPL-SCNC: 6 MMOL/L (ref 23–31)
CO2 SERPL-SCNC: 6 MMOL/L (ref 23–31)
COHGB MFR BLDA: 1.4 % (ref 0.5–1.5)
COLOR UR AUTO: ABNORMAL
CREAT SERPL-MCNC: 3.14 MG/DL (ref 0.55–1.02)
CREAT SERPL-MCNC: 3.74 MG/DL (ref 0.55–1.02)
CREAT SERPL-MCNC: 4.38 MG/DL (ref 0.55–1.02)
CREAT/UREA NIT SERPL: 13
CREAT/UREA NIT SERPL: 13
CREAT/UREA NIT SERPL: 14
DRAWN BY BLOOD GAS (OHS): ABNORMAL
EOSINOPHIL # BLD AUTO: 0 X10(3)/MCL (ref 0–0.9)
EOSINOPHIL NFR BLD AUTO: 0 %
ERYTHROCYTE [DISTWIDTH] IN BLOOD BY AUTOMATED COUNT: 14 % (ref 11.5–17)
FLUAV AG UPPER RESP QL IA.RAPID: NOT DETECTED
FLUBV AG UPPER RESP QL IA.RAPID: NOT DETECTED
GFR SERPLBLD CREATININE-BSD FMLA CKD-EPI: 11 ML/MIN/1.73/M2
GFR SERPLBLD CREATININE-BSD FMLA CKD-EPI: 13 ML/MIN/1.73/M2
GFR SERPLBLD CREATININE-BSD FMLA CKD-EPI: 16 ML/MIN/1.73/M2
GLOBULIN SER-MCNC: 4.4 GM/DL (ref 2.4–3.5)
GLUCOSE SERPL-MCNC: 142 MG/DL (ref 82–115)
GLUCOSE SERPL-MCNC: 533 MG/DL (ref 82–115)
GLUCOSE SERPL-MCNC: 733 MG/DL (ref 82–115)
GLUCOSE UR QL STRIP: ABNORMAL
HCO3 BLDA-SCNC: 4.5 MMOL/L (ref 22–26)
HCT VFR BLD AUTO: 37.9 % (ref 37–47)
HGB BLD-MCNC: 12.1 G/DL (ref 12–16)
HGB UR QL STRIP: ABNORMAL
IMM GRANULOCYTES # BLD AUTO: 0.08 X10(3)/MCL (ref 0–0.04)
IMM GRANULOCYTES NFR BLD AUTO: 0.7 %
KETONES UR QL STRIP: ABNORMAL
LACTATE SERPL-SCNC: 1.5 MMOL/L (ref 0.5–2.2)
LACTATE SERPL-SCNC: 2.2 MMOL/L (ref 0.5–2.2)
LEUKOCYTE ESTERASE UR QL STRIP: NEGATIVE
LYMPHOCYTES # BLD AUTO: 0.57 X10(3)/MCL (ref 0.6–4.6)
LYMPHOCYTES NFR BLD AUTO: 5.3 %
MAGNESIUM SERPL-MCNC: 2.6 MG/DL (ref 1.6–2.6)
MAGNESIUM SERPL-MCNC: 2.8 MG/DL (ref 1.6–2.6)
MAGNESIUM SERPL-MCNC: 3 MG/DL (ref 1.6–2.6)
MCH RBC QN AUTO: 30.6 PG (ref 27–31)
MCHC RBC AUTO-ENTMCNC: 31.9 G/DL (ref 33–36)
MCV RBC AUTO: 95.7 FL (ref 80–94)
METHGB MFR BLDA: 0.6 % (ref 0.4–1.5)
MONOCYTES # BLD AUTO: 0.48 X10(3)/MCL (ref 0.1–1.3)
MONOCYTES NFR BLD AUTO: 4.5 %
MUCOUS THREADS URNS QL MICRO: ABNORMAL /LPF
NEUTROPHILS # BLD AUTO: 9.54 X10(3)/MCL (ref 2.1–9.2)
NEUTROPHILS NFR BLD AUTO: 89.4 %
NITRITE UR QL STRIP: NEGATIVE
NRBC BLD AUTO-RTO: 0 %
O2 HB BLOOD GAS (OHS): 94.4 % (ref 94–97)
OXYHGB MFR BLDA: 12.5 G/DL (ref 12–16)
PCO2 BLDA: <19 MMHG (ref 35–45)
PH BLDA: 7.03 [PH] (ref 7.35–7.45)
PH UR STRIP: 5.5 [PH]
PHOSPHATE SERPL-MCNC: 2.8 MG/DL (ref 2.3–4.7)
PHOSPHATE SERPL-MCNC: 5.6 MG/DL (ref 2.3–4.7)
PLATELET # BLD AUTO: 303 X10(3)/MCL (ref 130–400)
PMV BLD AUTO: 11 FL (ref 7.4–10.4)
PO2 BLDA: 81 MMHG (ref 80–100)
POCT GLUCOSE: 101 MG/DL (ref 70–110)
POCT GLUCOSE: 137 MG/DL (ref 70–110)
POCT GLUCOSE: 170 MG/DL (ref 70–110)
POCT GLUCOSE: 229 MG/DL (ref 70–110)
POCT GLUCOSE: 305 MG/DL (ref 70–110)
POCT GLUCOSE: 366 MG/DL (ref 70–110)
POCT GLUCOSE: 366 MG/DL (ref 70–110)
POCT GLUCOSE: 430 MG/DL (ref 70–110)
POCT GLUCOSE: >500 MG/DL (ref 70–110)
POCT GLUCOSE: >500 MG/DL (ref 70–110)
POTASSIUM BLOOD GAS (OHS): 6.3 MMOL/L (ref 3.5–5)
POTASSIUM SERPL-SCNC: 4.6 MMOL/L (ref 3.5–5.1)
POTASSIUM SERPL-SCNC: 4.9 MMOL/L (ref 3.5–5.1)
POTASSIUM SERPL-SCNC: 6.1 MMOL/L (ref 3.5–5.1)
PROT SERPL-MCNC: 8.4 GM/DL (ref 5.8–7.6)
PROT UR QL STRIP: NEGATIVE
RBC # BLD AUTO: 3.96 X10(6)/MCL (ref 4.2–5.4)
RBC #/AREA URNS AUTO: ABNORMAL /HPF
SAMPLE SITE BLOOD GAS (OHS): ABNORMAL
SAO2 % BLDA: 88.2 %
SARS-COV-2 RNA RESP QL NAA+PROBE: NOT DETECTED
SODIUM BLOOD GAS (OHS): 129 MMOL/L (ref 137–145)
SODIUM SERPL-SCNC: 129 MMOL/L (ref 136–145)
SODIUM SERPL-SCNC: 134 MMOL/L (ref 136–145)
SODIUM SERPL-SCNC: 139 MMOL/L (ref 136–145)
SP GR UR STRIP.AUTO: 1.01 (ref 1–1.03)
SQUAMOUS #/AREA URNS LPF: ABNORMAL /HPF
TSH SERPL-ACNC: 1.23 UIU/ML (ref 0.35–4.94)
UROBILINOGEN UR STRIP-ACNC: NORMAL
WBC # BLD AUTO: 10.68 X10(3)/MCL (ref 4.5–11.5)
WBC #/AREA URNS AUTO: ABNORMAL /HPF

## 2025-02-04 PROCEDURE — 25000003 PHARM REV CODE 250

## 2025-02-04 PROCEDURE — 20000000 HC ICU ROOM

## 2025-02-04 PROCEDURE — 36600 WITHDRAWAL OF ARTERIAL BLOOD: CPT

## 2025-02-04 PROCEDURE — 85025 COMPLETE CBC W/AUTO DIFF WBC: CPT | Performed by: EMERGENCY MEDICINE

## 2025-02-04 PROCEDURE — 0240U COVID/FLU A&B PCR: CPT | Performed by: EMERGENCY MEDICINE

## 2025-02-04 PROCEDURE — 81001 URINALYSIS AUTO W/SCOPE: CPT | Performed by: EMERGENCY MEDICINE

## 2025-02-04 PROCEDURE — 96361 HYDRATE IV INFUSION ADD-ON: CPT

## 2025-02-04 PROCEDURE — 84443 ASSAY THYROID STIM HORMONE: CPT

## 2025-02-04 PROCEDURE — 84100 ASSAY OF PHOSPHORUS: CPT

## 2025-02-04 PROCEDURE — 82803 BLOOD GASES ANY COMBINATION: CPT

## 2025-02-04 PROCEDURE — 83735 ASSAY OF MAGNESIUM: CPT

## 2025-02-04 PROCEDURE — 36415 COLL VENOUS BLD VENIPUNCTURE: CPT

## 2025-02-04 PROCEDURE — 25000003 PHARM REV CODE 250: Performed by: EMERGENCY MEDICINE

## 2025-02-04 PROCEDURE — 99291 CRITICAL CARE FIRST HOUR: CPT

## 2025-02-04 PROCEDURE — 99900035 HC TECH TIME PER 15 MIN (STAT)

## 2025-02-04 PROCEDURE — 96360 HYDRATION IV INFUSION INIT: CPT

## 2025-02-04 PROCEDURE — 80053 COMPREHEN METABOLIC PANEL: CPT | Performed by: EMERGENCY MEDICINE

## 2025-02-04 PROCEDURE — 83605 ASSAY OF LACTIC ACID: CPT | Performed by: EMERGENCY MEDICINE

## 2025-02-04 PROCEDURE — 63600175 PHARM REV CODE 636 W HCPCS

## 2025-02-04 PROCEDURE — 25000003 PHARM REV CODE 250: Performed by: INTERNAL MEDICINE

## 2025-02-04 PROCEDURE — 82010 KETONE BODYS QUAN: CPT | Performed by: EMERGENCY MEDICINE

## 2025-02-04 PROCEDURE — 83735 ASSAY OF MAGNESIUM: CPT | Performed by: EMERGENCY MEDICINE

## 2025-02-04 RX ORDER — POTASSIUM CHLORIDE 7.45 MG/ML
60 INJECTION INTRAVENOUS
Status: DISCONTINUED | OUTPATIENT
Start: 2025-02-04 | End: 2025-02-08 | Stop reason: HOSPADM

## 2025-02-04 RX ORDER — MAGNESIUM SULFATE HEPTAHYDRATE 40 MG/ML
2 INJECTION, SOLUTION INTRAVENOUS ONCE
Status: DISCONTINUED | OUTPATIENT
Start: 2025-02-04 | End: 2025-02-04

## 2025-02-04 RX ORDER — SODIUM CHLORIDE 0.9 % (FLUSH) 0.9 %
10 SYRINGE (ML) INJECTION
Status: DISCONTINUED | OUTPATIENT
Start: 2025-02-04 | End: 2025-02-04

## 2025-02-04 RX ORDER — POTASSIUM CHLORIDE 7.45 MG/ML
40 INJECTION INTRAVENOUS
Status: DISCONTINUED | OUTPATIENT
Start: 2025-02-04 | End: 2025-02-08 | Stop reason: HOSPADM

## 2025-02-04 RX ORDER — SODIUM CHLORIDE 9 MG/ML
1000 INJECTION, SOLUTION INTRAVENOUS CONTINUOUS
Status: DISCONTINUED | OUTPATIENT
Start: 2025-02-04 | End: 2025-02-04

## 2025-02-04 RX ORDER — HYDROCODONE BITARTRATE AND ACETAMINOPHEN 5; 325 MG/1; MG/1
1 TABLET ORAL EVERY 8 HOURS PRN
Status: DISCONTINUED | OUTPATIENT
Start: 2025-02-04 | End: 2025-02-05

## 2025-02-04 RX ORDER — POTASSIUM CHLORIDE 7.45 MG/ML
80 INJECTION INTRAVENOUS
Status: DISCONTINUED | OUTPATIENT
Start: 2025-02-04 | End: 2025-02-08 | Stop reason: HOSPADM

## 2025-02-04 RX ORDER — SODIUM CHLORIDE, SODIUM LACTATE, POTASSIUM CHLORIDE, CALCIUM CHLORIDE 600; 310; 30; 20 MG/100ML; MG/100ML; MG/100ML; MG/100ML
INJECTION, SOLUTION INTRAVENOUS CONTINUOUS
Status: DISCONTINUED | OUTPATIENT
Start: 2025-02-04 | End: 2025-02-07

## 2025-02-04 RX ORDER — HEPARIN SODIUM 5000 [USP'U]/ML
5000 INJECTION, SOLUTION INTRAVENOUS; SUBCUTANEOUS EVERY 8 HOURS
Status: DISCONTINUED | OUTPATIENT
Start: 2025-02-04 | End: 2025-02-08 | Stop reason: HOSPADM

## 2025-02-04 RX ORDER — DEXTROSE, SODIUM CHLORIDE, SODIUM LACTATE, POTASSIUM CHLORIDE, AND CALCIUM CHLORIDE 5; .6; .31; .03; .02 G/100ML; G/100ML; G/100ML; G/100ML; G/100ML
INJECTION, SOLUTION INTRAVENOUS DAILY PRN
Status: DISCONTINUED | OUTPATIENT
Start: 2025-02-04 | End: 2025-02-08 | Stop reason: HOSPADM

## 2025-02-04 RX ORDER — LABETALOL HYDROCHLORIDE 5 MG/ML
10 INJECTION, SOLUTION INTRAVENOUS EVERY 4 HOURS PRN
Status: DISCONTINUED | OUTPATIENT
Start: 2025-02-04 | End: 2025-02-08 | Stop reason: HOSPADM

## 2025-02-04 RX ORDER — HYDRALAZINE HYDROCHLORIDE 20 MG/ML
10 INJECTION INTRAMUSCULAR; INTRAVENOUS EVERY 6 HOURS PRN
Status: DISCONTINUED | OUTPATIENT
Start: 2025-02-04 | End: 2025-02-08 | Stop reason: HOSPADM

## 2025-02-04 RX ORDER — MUPIROCIN 20 MG/G
OINTMENT TOPICAL 2 TIMES DAILY
Status: DISCONTINUED | OUTPATIENT
Start: 2025-02-04 | End: 2025-02-08 | Stop reason: HOSPADM

## 2025-02-04 RX ORDER — SODIUM CHLORIDE 0.9 % (FLUSH) 0.9 %
10 SYRINGE (ML) INJECTION
Status: DISCONTINUED | OUTPATIENT
Start: 2025-02-04 | End: 2025-02-08 | Stop reason: HOSPADM

## 2025-02-04 RX ORDER — HYDROCODONE BITARTRATE AND ACETAMINOPHEN 10; 325 MG/1; MG/1
1 TABLET ORAL DAILY PRN
COMMUNITY
Start: 2025-01-29

## 2025-02-04 RX ORDER — DEXTROSE MONOHYDRATE AND SODIUM CHLORIDE 5; .45 G/100ML; G/100ML
INJECTION, SOLUTION INTRAVENOUS CONTINUOUS PRN
Status: DISCONTINUED | OUTPATIENT
Start: 2025-02-04 | End: 2025-02-04

## 2025-02-04 RX ADMIN — HEPARIN SODIUM 5000 UNITS: 5000 INJECTION, SOLUTION INTRAVENOUS; SUBCUTANEOUS at 08:02

## 2025-02-04 RX ADMIN — SODIUM CHLORIDE 1000 ML: 9 INJECTION, SOLUTION INTRAVENOUS at 03:02

## 2025-02-04 RX ADMIN — HYDROCODONE BITARTRATE AND ACETAMINOPHEN 1 TABLET: 5; 325 TABLET ORAL at 08:02

## 2025-02-04 RX ADMIN — SODIUM CHLORIDE, SODIUM LACTATE, POTASSIUM CHLORIDE, CALCIUM CHLORIDE AND DEXTROSE MONOHYDRATE: 5; 600; 310; 30; 20 INJECTION, SOLUTION INTRAVENOUS at 09:02

## 2025-02-04 RX ADMIN — SODIUM CHLORIDE, POTASSIUM CHLORIDE, SODIUM LACTATE AND CALCIUM CHLORIDE: 600; 310; 30; 20 INJECTION, SOLUTION INTRAVENOUS at 05:02

## 2025-02-04 RX ADMIN — INSULIN HUMAN 0.2 UNITS/KG/HR: 1 INJECTION, SOLUTION INTRAVENOUS at 08:02

## 2025-02-04 RX ADMIN — MUPIROCIN: 20 OINTMENT TOPICAL at 08:02

## 2025-02-04 RX ADMIN — SODIUM CHLORIDE 2000 ML: 9 INJECTION, SOLUTION INTRAVENOUS at 01:02

## 2025-02-04 RX ADMIN — SODIUM CHLORIDE 1000 ML: 9 INJECTION, SOLUTION INTRAVENOUS at 12:02

## 2025-02-04 RX ADMIN — INSULIN HUMAN 0.1 UNITS/KG/HR: 1 INJECTION, SOLUTION INTRAVENOUS at 02:02

## 2025-02-04 NOTE — ED PROVIDER NOTES
"Encounter Date: 2/4/2025    SCRIBE #1 NOTE: I, Alvaro Steen, am scribing for, and in the presence of,  Ang Gilliam MD. I have scribed the following portions of the note - Other sections scribed: HPI, ROS, PE.       History     Chief Complaint   Patient presents with    Hyperglycemia     Pt with vomiting x3-4 days and CBG reading HI     Pt is a 64 y.o. female with a pMHx of DM, HTN, HLD, thyroid disease, and CKD presenting to the ED complaining of vomiting over the past 3-4 days. Pt is not responding correctly to verbal stimuli or cooperating with exam, history is limited. The pt spoke with her nurse when she first arrived in the ED but would not converse during exam. She told her nurse she was experiencing abdominal pain, nausea, vomiting and lumbar pain for the past three days. She is withdrawing to pain when her left upper quadrant is palpated. Her glucometer reading with EMS and in triage came out as "HIGH".     Repeat exam with the patient's reports he has had nausea vomiting and diarrhea for last few days about 4 days.  She reports that she is compliant with her insulin and her diabetic diet.  Denies any chest pain or shortness of breath    The history is provided by the patient. The history is limited by the condition of the patient. No  was used.     Review of patient's allergies indicates:   Allergen Reactions    Penicillins Shortness Of Breath, Itching and Swelling     Tolerates cefepime 11/30/2020    Penicillins Anaphylaxis     Past Medical History:   Diagnosis Date    Allergy     Anemia due to stage 3 chronic kidney disease 12/6/2020    Arrhythmia     Arthritis     Bronchial asthma     Diabetes mellitus     Diabetes mellitus     Glaucoma     Hormone disorder     hysterectomy    Hypercholesteremia     Hypertension     Hyperthyroidism     Insomnia     Kidney stones     Neuropathy     Thyroid disease     Urine, incontinence, stress female     UTI (urinary tract infection) 11/27/2020 "     Past Surgical History:   Procedure Laterality Date    APPENDECTOMY      BACK SURGERY      disc removal     CARPAL TUNNEL RELEASE Bilateral     CHOLECYSTECTOMY      CYSTOSCOPY N/A 10/26/2020    Procedure: CYSTOSCOPY;  Surgeon: Wilner Goel MD;  Location: [facility];  Service: OB/GYN;  Laterality: N/A;    CYSTOSCOPY W/ RETROGRADES Bilateral 12/7/2020    Procedure: CYSTOSCOPY, WITH RETROGRADE PYELOGRAM;  Surgeon: Daniel Jalloh MD;  Location: [address];  Service: Urology;  Laterality: Bilateral;    FRACTURE SURGERY Right     wrist    HYSTERECTOMY  2010    [facility]    JOINT REPLACEMENT      KNEE ARTHROSCOPY W/ DEBRIDEMENT      KNEE SURGERY Right     Knee replacement    LITHOTRIPSY      PA REMOVAL OF OVARY/TUBE(S)  9/9/13    benign    removal of round ligament mass      ROBOT-ASSISTED LAPAROSCOPIC LYSIS OF ADHESIONS USING DA JAMES XI N/A 10/26/2020    Procedure: XI ROBOTIC LYSIS, ADHESIONS;  Surgeon: Wilner Goel MD;  Location: [facility];  Service: OB/GYN;  Laterality: N/A;  ROBOTIC MOBILIZATION OF BLADDER- DR BURNHAM    SPINE SURGERY      WRIST FRACTURE SURGERY Left      Family History   Problem Relation Name Age of Onset    Cancer Mother      Cancer Brother      Colon cancer Brother      Cancer Brother      Cancer Brother      Cancer Brother      Ovarian cancer Neg Hx      Uterine cancer Neg Hx      Breast cancer Neg Hx       Social History     Tobacco Use    Smoking status: Every Day     Current packs/day: 0.50     Average packs/day: 0.5 packs/day for 30.0 years (15.0 ttl pk-yrs)     Types: Cigarettes    Smokeless tobacco: Never   Substance Use Topics    Alcohol use: No    Drug use: No     Review of Systems   Unable to perform ROS: Mental status change       Physical Exam     Initial Vitals [02/04/25 1128]   BP Pulse Resp Temp SpO2   (!) 113/48 102 (!) 22 97.9 °F (36.6 °C) 100 %      MAP       --         Physical Exam    Nursing note and vitals reviewed.  Constitutional: She appears  well-developed and well-nourished. No distress.   Not speaking answering questions, only grunting in response.   HENT:   Head: Normocephalic and atraumatic.   Eyes: Conjunctivae are normal. Pupils are equal, round, and reactive to light.   Cardiovascular:  Normal rate and intact distal pulses.           Pulmonary/Chest: No respiratory distress. She has no rhonchi.   Abdominal: Abdomen is soft. Bowel sounds are normal. There is no abdominal tenderness.   Left upper quadrant tenderness. There is no rebound and no guarding.   Musculoskeletal:         General: No edema.     Neurological: She is alert. She has normal strength.   No facial asymmetry. Moving all extremities.    Skin: Skin is warm and dry.         ED Course   Critical Care    Date/Time: 2/4/2025 2:25 PM    Performed by: Ang Gilliam MD  Authorized by: Ang Gilliam MD  Direct patient critical care time: 16 minutes  Additional history critical care time: 3 minutes  Ordering / reviewing critical care time: 5 minutes  Consulting other physicians critical care time: 8 minutes  Consult with family critical care time: 4 minutes  Total critical care time (exclusive of procedural time) : 36 minutes  Critical care was necessary to treat or prevent imminent or life-threatening deterioration of the following conditions: dehydration, renal failure and metabolic crisis.        Labs Reviewed   COMPREHENSIVE METABOLIC PANEL - Abnormal       Result Value    Sodium 129 (*)     Potassium 6.1 (*)     Chloride 92 (*)     CO2 6 (*)     Glucose 733 (*)     Blood Urea Nitrogen 56.7 (*)     Creatinine 4.38 (*)     Calcium 8.0 (*)     Protein Total 8.4 (*)     Albumin 4.0      Globulin 4.4 (*)     Albumin/Globulin Ratio 0.9 (*)     Bilirubin Total 0.2       (*)     ALT 14      AST 12      eGFR 11      Anion Gap 31.0      BUN/Creatinine Ratio 13     MAGNESIUM - Abnormal    Magnesium Level 3.00 (*)    URINALYSIS, REFLEX TO URINE CULTURE - Abnormal    Color, UA  Light-Yellow      Appearance, UA Clear      Specific Gravity, UA 1.015      pH, UA 5.5      Protein, UA Negative      Glucose, UA 4+ (*)     Ketones, UA 2+ (*)     Blood, UA 1+ (*)     Bilirubin, UA Negative      Urobilinogen, UA Normal      Nitrites, UA Negative      Leukocyte Esterase, UA Negative      RBC, UA 0-5      WBC, UA 0-5      Bacteria, UA None Seen      Squamous Epithelial Cells, UA None Seen      Mucous, UA Trace (*)    CBC WITH DIFFERENTIAL - Abnormal    WBC 10.68      RBC 3.96 (*)     Hgb 12.1      Hct 37.9      MCV 95.7 (*)     MCH 30.6      MCHC 31.9 (*)     RDW 14.0      Platelet 303      MPV 11.0 (*)     Neut % 89.4      Lymph % 5.3      Mono % 4.5      Eos % 0.0      Basophil % 0.1      Imm Grans % 0.7      Neut # 9.54 (*)     Lymph # 0.57 (*)     Mono # 0.48      Eos # 0.00      Baso # 0.01      Imm Gran # 0.08 (*)     NRBC% 0.0     COVID/FLU A&B PCR - Normal    Influenza A PCR Not Detected      Influenza B PCR Not Detected      SARS-CoV-2 PCR Not Detected      Narrative:     The Xpert Xpress SARS-CoV-2/FLU/RSV plus is a rapid, multiplexed real-time PCR test intended for the simultaneous qualitative detection and differentiation of SARS-CoV-2, Influenza A, Influenza B, and respiratory syncytial virus (RSV) viral RNA in either nasopharyngeal swab or nasal swab specimens.         LACTIC ACID, PLASMA - Normal    Lactic Acid Level 2.2     LACTIC ACID, PLASMA - Normal    Lactic Acid Level 1.5     CLOSTRIDIUM DIFFICILE TOXIN A AND B, EIA   CBC W/ AUTO DIFFERENTIAL    Narrative:     The following orders were created for panel order CBC auto differential.  Procedure                               Abnormality         Status                     ---------                               -----------         ------                     CBC with Differential[[id number]]       Abnormal            Final result                 Please view results for these tests on the individual orders.   BLOOD GAS   BETA -  HYDROXYBUTYRATE, SERUM   GI PANEL   POCT GLUCOSE MONITORING CONTINUOUS          Imaging Results              CT Head Without Contrast (Final result)  Result time 02/04/25 13:23:34      Final result by Yunier Kelley MD (02/04/25 13:23:34)                   Impression:      No acute intracranial findings identified.      Electronically signed by: Yunier Kelley  Date:    02/04/2025  Time:    13:23               Narrative:    EXAMINATION:  CT HEAD WITHOUT CONTRAST    CLINICAL HISTORY:  Mental status change, unknown cause;    TECHNIQUE:  Sequential axial images were performed of the brain without contrast.    Dose product length of 950 mGycm. Automated exposure control was utilized to minimize radiation dose.    COMPARISON:  July 8, 2012.    FINDINGS:  Gray-white matter differentiation is unremarkable.  There is no intracranial mass effect, midline shift, hydrocephalus or hemorrhage. There is no sulcal effacement or low attenuation changes to suggest recent large vessel territory infarction. There is no acute extra axial fluid collection.  Mild mucoperiosteal thickening of the right maxillary sinus.  Otherwise, visualized paranasal sinuses are clear without mucosal thickening, polypoidal abnormality or air-fluid levels. Mastoid air cells aeration is optimal.                                       X-Ray Chest AP Portable (Final result)  Result time 02/04/25 12:36:15      Final result by Carlos Goldman MD (02/04/25 12:36:15)                   Impression:      No acute chest disease is identified.      Electronically signed by: Carlos Goldman  Date:    02/04/2025  Time:    12:36               Narrative:    EXAMINATION:  XR CHEST AP PORTABLE    CLINICAL HISTORY:  confusion;, .    COMPARISON:  January 6, 2025    FINDINGS:  No alveolar consolidation, effusion, or pneumothorax is seen.   The thoracic aorta is normal  cardiac silhouette, central pulmonary vessels and mediastinum are normal in size and are grossly  unremarkable.   visualized osseous structures are grossly unremarkable.                                       Medications   sodium chloride 0.9% flush 10 mL (has no administration in time range)   0.9% NaCl infusion (has no administration in time range)   dextrose 5 % and 0.45 % NaCl infusion (has no administration in time range)   dextrose 50% injection 25 g (has no administration in time range)   dextrose 50% injection 12.5 g (has no administration in time range)   insulin regular bolus from bag/infusion 8.62 Units 8.62 mL (has no administration in time range)   insulin regular in 0.9 % NaCl 100 unit/100 mL (1 unit/mL) infusion (has no administration in time range)   potassium chloride 10 mEq in 100 mL IVPB (has no administration in time range)     And   potassium chloride 10 mEq in 100 mL IVPB (has no administration in time range)     And   potassium chloride 10 mEq in 100 mL IVPB (has no administration in time range)   sodium chloride 0.9% bolus 1,000 mL 1,000 mL (0 mLs Intravenous Stopped 2/4/25 1335)   sodium chloride 0.9% bolus 2,000 mL 2,000 mL (2,000 mLs Intravenous New Bag 2/4/25 1321)     Medical Decision Making  The differential diagnosis includes, but is not limited to, gastroenteritis, diabetes mellitus, diabetic ketoacidosis, dehydration.    Amount and/or Complexity of Data Reviewed  Labs: ordered.  Radiology: ordered.    Risk  Prescription drug management.  Decision regarding hospitalization.            Scribe Attestation:   Scribe #1: I performed the above scribed service and the documentation accurately describes the services I performed. I attest to the accuracy of the note.    Attending Attestation:           Physician Attestation for Scribe:  Physician Attestation Statement for Scribe #1: I, Ang Gilliam MD, reviewed documentation, as scribed by Alvaro Steen in my presence, and it is both accurate and complete.             ED Course as of 02/04/25 1426   Tue Feb 04, 2025   1242 Patient has  been experiencing abdominal pain lower back pain nausea and vomiting diarrhea.  Bladder scan showed over a 1000 cc Cifuentes catheter has been placed for urinary retention [LF]      ED Course User Index  [LF] Ang Gilliam MD          Cifuentes catheter placed 2500 cc returned glucose over 700 anion gap 31.  Potassium at 6 will start insulin drip which will help with that.  Suspect the acute renal failure is combination of dehydration from vomiting diarrhea hyperglycemia and urinary retention.  She is getting IV fluids catheter over do placed.  I discussed case with the ICU they will admit                 Clinical Impression:  Final diagnoses:  [E11.10] Diabetic ketoacidosis without coma associated with type 2 diabetes mellitus (Primary)  [R33.8] Acute urinary retention  [R19.7] Diarrhea, unspecified type  [R11.10] Vomiting, unspecified vomiting type, unspecified whether nausea present          ED Disposition Condition    Admit Critical                Ang Gilliam MD  02/04/25 0484

## 2025-02-04 NOTE — H&P
[facility] - Emergency Dept  Pulmonary Critical Care Note    Patient Name: Navni Beck  MRN: [id number]  Admission Date: 2/4/2025  Hospital Length of Stay: 0 days  Code Status: Prior  Attending Provider: Marcos Maurer MD  Primary Care Provider: Seema Mirza MD     Subjective:     HPI:   Patient is a 64-year-old female with past medical history of type 2 diabetes mellitus, CKD, hypertension, hyperlipidemia, hyperthyroidism, GERD, glaucoma, who presented to [facility] ER on 02/04/2025 with chief complaint of nausea/vomiting, diarrhea ongoing for the last 3-4 days, along with urinary retention.  History limited to patient's mental status/selective communication.  At times patient awakens and answers questions other times does not respond appears to be groaning.  Complaining of low back pain, stated she takes NovoLog for her back pain.  Patient reported her son called EMS.  Unsure about compliance regarding diabetes, apparently endorsed taking medications as prescribed to ED provider.    Initial vitals upon arrival to /48, tachypneic rate 22, tachycardic 102, afebrile 97.9° F. lab work revealing hyponatremia 129, hyperkalemia 6.1, hypochloremia 92, bicarb 6, anion gap 31, BUN/creatinine 56.7/4.38, glucose 733, magnesium 3, lactic 2.2.  Beta hydroxybutyrate 13.8, CBC without leukocytosis 10.68, H&H 12.1/37.9.  UA with 4+ glucose, 2+ ketones, 1+ blood.  COVID/influenza negative.    CT head without acute intracranial abnormalities.  Chest x-ray with no acute cardiopulmonary process.  Bladder scan done in ER showing 1000 cc of urine in bladder, Cifuentes place with 2500 cc of urine returned.    Hospital Course/Significant events:  02/04/2025-admitted for DKA    24 Hour Interval History:  pending    Past Medical History:   Diagnosis Date    Allergy     Anemia due to stage 3 chronic kidney disease 12/6/2020    Arrhythmia     Arthritis     Bronchial asthma     Diabetes mellitus     Diabetes  mellitus     Glaucoma     Hormone disorder     hysterectomy    Hypercholesteremia     Hypertension     Hyperthyroidism     Insomnia     Kidney stones     Neuropathy     Thyroid disease     Urine, incontinence, stress female     UTI (urinary tract infection) 11/27/2020       Past Surgical History:   Procedure Laterality Date    APPENDECTOMY      BACK SURGERY      disc removal     CARPAL TUNNEL RELEASE Bilateral     CHOLECYSTECTOMY      CYSTOSCOPY N/A 10/26/2020    Procedure: CYSTOSCOPY;  Surgeon: Wilner Goel MD;  Location: [facility];  Service: OB/GYN;  Laterality: N/A;    CYSTOSCOPY W/ RETROGRADES Bilateral 12/7/2020    Procedure: CYSTOSCOPY, WITH RETROGRADE PYELOGRAM;  Surgeon: Daniel Jalloh MD;  Location: [address];  Service: Urology;  Laterality: Bilateral;    FRACTURE SURGERY Right     wrist    HYSTERECTOMY  2010    [facility]    JOINT REPLACEMENT      KNEE ARTHROSCOPY W/ DEBRIDEMENT      KNEE SURGERY Right     Knee replacement    LITHOTRIPSY      CT REMOVAL OF OVARY/TUBE(S)  9/9/13    benign    removal of round ligament mass      ROBOT-ASSISTED LAPAROSCOPIC LYSIS OF ADHESIONS USING DA JAMES XI N/A 10/26/2020    Procedure: XI ROBOTIC LYSIS, ADHESIONS;  Surgeon: Wilner Goel MD;  Location: [facility];  Service: OB/GYN;  Laterality: N/A;  ROBOTIC MOBILIZATION OF BLADDER- DR BURNHAM    SPINE SURGERY      WRIST FRACTURE SURGERY Left        Social History     Socioeconomic History    Marital status:    Tobacco Use    Smoking status: Every Day     Current packs/day: 0.50     Average packs/day: 0.5 packs/day for 30.0 years (15.0 ttl pk-yrs)     Types: Cigarettes    Smokeless tobacco: Never   Substance and Sexual Activity    Alcohol use: No    Drug use: No    Sexual activity: Not Currently     Partners: Male     Birth control/protection: Surgical   Social History Narrative    ** Merged History Encounter **          Social Drivers of Health     Financial Resource Strain: Medium Risk  (1/26/2025)    Overall Financial Resource Strain (CARDIA)     Difficulty of Paying Living Expenses: Somewhat hard   Food Insecurity: Food Insecurity Present (1/26/2025)    Hunger Vital Sign     Worried About Running Out of Food in the Last Year: Sometimes true     Ran Out of Food in the Last Year: Sometimes true   Transportation Needs: No Transportation Needs (1/27/2025)    TRANSPORTATION NEEDS     Transportation : No   Physical Activity: Unknown (1/26/2025)    Exercise Vital Sign     Days of Exercise per Week: 0 days   Stress: Stress Concern Present (1/26/2025)    [employer] - Occupational Stress Questionnaire     Feeling of Stress : To some extent   Housing Stability: Low Risk  (1/27/2025)    Housing Stability Vital Sign     Unable to Pay for Housing in the Last Year: No     Homeless in the Last Year: No           Current Outpatient Medications   Medication Instructions    albuterol (PROVENTIL/VENTOLIN HFA) 90 mcg/actuation inhaler 1-2 puffs, Every 6 hours PRN    atorvastatin (LIPITOR) 20 mg, Oral, Daily    benazepriL (LOTENSIN) 5 mg, Oral, Daily    blood-glucose meter (TRUE METRIX GLUCOSE METER MISC) True Metrix Glucose Meter    blood-glucose meter,continuous (DEXCOM ) Misc 1 each, Misc.(Non-Drug; Combo Route), Continuous    blood-glucose sensor (DEXCOM G7 SENSOR) Korina 1 each, Misc.(Non-Drug; Combo Route), Every 10 days    calcium carb, citrate-vit D3 600 mg-12.5 mcg (500 unit) TbSR 1 each, Oral, 2 times daily    diclofenac (VOLTAREN) 75 mg, Oral, 2 times daily    DULoxetine (CYMBALTA) 30 mg, Oral, Daily    empagliflozin (JARDIANCE) 25 mg tablet 1 tablet, Daily    fluticasone propionate (FLONASE) 50 mcg, Each Nostril, Daily    fluticasone propionate (FLOVENT HFA) 44 mcg/actuation inhaler 1 puff, Daily    HYDROcodone-acetaminophen (NORCO)  mg per tablet 1 tablet, Daily PRN    HYDROcodone-acetaminophen (NORCO) 7.5-325 mg per tablet     insulin syringe-needle U-100  "(INSULIN SYRINGE MICROFINE) 1 mL 27 gauge x 5/8" Syrg 1 each, Misc.(Non-Drug; Combo Route), 2 times daily    LANTUS U-100 INSULIN 20 Units, Subcutaneous, Nightly, INCREASE BY 2 UNITS EVERY DAY UNTIL CBG PERSISTENTLY BELOW 150    methIMAzole (TAPAZOLE) 10 mg, Oral, Daily    NOVOLOG FLEXPEN U-100 INSULIN 100 unit/mL (3 mL) InPn pen Inject 1 to 5 unites subq before meals and at bedtime as needed    OZEMPIC 0.25 mg, Subcutaneous, Every 7 days    [START ON 2/15/2025] OZEMPIC 0.5 mg, Subcutaneous, Every 7 days    pregabalin (LYRICA) 75 mg, Oral, 3 times daily    tiZANidine (ZANAFLEX) 8-12 mg, Oral, Nightly PRN    traZODone (DESYREL) 50 mg, Oral, Nightly    TRUE METRIX GLUCOSE TEST STRIP Strp 1 each, Topical (Top), 3 times daily    zolpidem (AMBIEN) 5 mg, Oral, Nightly PRN, May take with 5-10 mg of Melatonin       Review of patient's allergies indicates:   Allergen Reactions    Penicillins Shortness Of Breath, Itching and Swelling     Tolerates cefepime 11/30/2020    Penicillins Anaphylaxis        Current Inpatient Medications      Current Intravenous Infusions   0.9% NaCl  1,000 mL Intravenous Continuous        D5 and 0.45% NaCl   Intravenous Continuous PRN        insulin regular 1 units/mL infusion orderable (DKA)  0-0.2 Units/kg/hr Intravenous Continuous 8.6 mL/hr at 02/04/25 1445 0.1 Units/kg/hr at 02/04/25 1445         Review of Systems   Unable to perform ROS: Mental status change          Objective:       Intake/Output Summary (Last 24 hours) at 2/4/2025 1518  Last data filed at 2/4/2025 1451  Gross per 24 hour   Intake --   Output 2700 ml   Net -2700 ml         Vital Signs (Most Recent):  Temp: 97.9 °F (36.6 °C) (02/04/25 1128)  Pulse: 97 (02/04/25 1443)  Resp: 11 (02/04/25 1443)  BP: (!) 163/89 (02/04/25 1443)  SpO2: 100 % (02/04/25 1443)  Body mass index is 29.76 kg/m².  Weight: 86.2 kg (190 lb) Vital Signs (24h Range):  Temp:  [97.9 °F (36.6 °C)] 97.9 °F (36.6 °C)  Pulse:  [] 97  Resp:  [11-22] 11  SpO2:  " [100 %] 100 %  BP: (113-184)/(48-89) 163/89     Physical Exam  Vitals reviewed.   Constitutional:       Appearance: She is ill-appearing.      Comments: Exam limited secondary to patient participation.  At times able to answer questions other times patient groaning.   HENT:      Head: Normocephalic and atraumatic.   Eyes:      Conjunctiva/sclera: Conjunctivae normal.      Pupils: Pupils are equal, round, and reactive to light.   Cardiovascular:      Rate and Rhythm: Normal rate and regular rhythm.      Pulses: Normal pulses.      Heart sounds: Normal heart sounds.   Pulmonary:      Effort: Pulmonary effort is normal.      Breath sounds: Normal breath sounds.   Abdominal:      General: Bowel sounds are normal. There is no distension.      Palpations: Abdomen is soft.      Tenderness: There is no abdominal tenderness.   Musculoskeletal:      Right lower leg: No edema.      Left lower leg: No edema.   Skin:     General: Skin is dry.   Neurological:      Cranial Nerves: No cranial nerve deficit.      Sensory: No sensory deficit.      Comments: Patient tracking with eyes upon name call.  Withdrawing to pain in 4 for extremity           Lines/Drains/Airways       Drain  Duration                  Urethral Catheter 02/04/25 1233 16 Fr. <1 day              Peripheral Intravenous Line  Duration                  Peripheral IV - Single Lumen 02/04/25 1233 18 G Anterior;Distal;Right Upper Arm <1 day         Peripheral IV - Single Lumen 02/04/25 1501 18 G Left Antecubital <1 day                    Significant Labs:    Lab Results   Component Value Date    WBC 10.68 02/04/2025    HGB 12.1 02/04/2025    HCT 37.9 02/04/2025    MCV 95.7 (H) 02/04/2025     02/04/2025           BMP  Lab Results   Component Value Date     (L) 02/04/2025    K 6.1 (H) 02/04/2025    CO2 6 (LL) 02/04/2025    BUN 56.7 (H) 02/04/2025    CREATININE 4.38 (H) 02/04/2025    CALCIUM 8.0 (L) 02/04/2025    AGAP 31.0 02/04/2025    ESTGFRAFRICA >60.0  04/04/2022    EGFRNONAA 54.8 (A) 04/04/2022         ABG  Recent Labs   Lab 02/04/25  1430   PH 7.030*   PO2 81.0   PCO2 <19.0*   HCO3 4.5*   POCBASEDEF -24.60*       Mechanical Ventilation Support:         Significant Imaging:  I have reviewed the pertinent imaging within the past 24 hours.        Assessment/Plan:     Assessment  DKA  LAURIE  Acute urinary retention  Acute diarrhea  Chronic back pain  Past medical history of type 2 diabetes mellitus, CKD, hypertension, hyperlipidemia, hyperthyroidism, GERD, glaucoma, anxiety       Plan  -Admitted to ICU with cardiac monitoring, DKA Protochol initiated  -On arrival: CBG: >500  Urine [place]: Positive  Bicarb: 5  Gap: 31  BHB: 13.8  pH: 7.030  -HbA1c on 1/7/25:  9.2. Defer repeat given value obtained in last 3 months  -Likely 2/2 non-compliance vs infection  -Home DM regimen:    -Start insulin gtt per protocol with q1h accuchecks while on the drip.    -Transition to SQ insulin and continue the drip for additional 2 hours if able to tolerate PO w/o N/V  Bicarb > 18  Anion gap < 12  Glucose < 250 on 2 consecutive readings.    SQ insulin dosing: SSI or 0.5-0.8 U/kg divided between Long and short acting  -Continue LR at 125 cc/hr. Once blood sugar reaches 200, transition to D5 +LR @ 125 cc/h  -Monitor electrolytes with BMP q4h, replete per DKA protocol. Keep K > 4, Mg > 2, Phos > 3  -Bariatric clear liquid diet while on insulin gtt then change to Diabetic diet when initiating subq insulin with accuchecks 4x daily before meals and at bedtime (AC and HS)  -Continue close ICU monitoring. Once off drip and stable, downgrade to floor    - LAURIE likely secondary to acute urinary retention and dehydration  - Cifuentes catheter in place, Cifuentes trail in AM   - obtain U/S retroperitoneal  - GI panel, C-diff studies ordered     DVT Prophylaxis:  Heparin 5 K  GI Prophylaxis: famotidine       Doug Ashley DO  Pulmonary Critical Care Medicine  [facility] - Emergency  Dept  DOS: 02/04/2025

## 2025-02-05 LAB
ANION GAP SERPL CALC-SCNC: 10 MEQ/L
ANION GAP SERPL CALC-SCNC: 11 MEQ/L
ANION GAP SERPL CALC-SCNC: 14 MEQ/L
ANION GAP SERPL CALC-SCNC: 7 MEQ/L
BASOPHILS # BLD AUTO: 0.02 X10(3)/MCL
BASOPHILS NFR BLD AUTO: 0.2 %
BUN SERPL-MCNC: 26.5 MG/DL (ref 9.8–20.1)
BUN SERPL-MCNC: 29.6 MG/DL (ref 9.8–20.1)
BUN SERPL-MCNC: 30.4 MG/DL (ref 9.8–20.1)
BUN SERPL-MCNC: 33.9 MG/DL (ref 9.8–20.1)
BUN SERPL-MCNC: 37.1 MG/DL (ref 9.8–20.1)
BUN SERPL-MCNC: 41.1 MG/DL (ref 9.8–20.1)
CALCIUM SERPL-MCNC: 8.3 MG/DL (ref 8.4–10.2)
CALCIUM SERPL-MCNC: 8.4 MG/DL (ref 8.4–10.2)
CALCIUM SERPL-MCNC: 8.5 MG/DL (ref 8.4–10.2)
CALCIUM SERPL-MCNC: 8.5 MG/DL (ref 8.4–10.2)
CALCIUM SERPL-MCNC: 8.7 MG/DL (ref 8.4–10.2)
CALCIUM SERPL-MCNC: 8.7 MG/DL (ref 8.4–10.2)
CHLORIDE SERPL-SCNC: 110 MMOL/L (ref 98–107)
CHLORIDE SERPL-SCNC: 112 MMOL/L (ref 98–107)
CHLORIDE SERPL-SCNC: 113 MMOL/L (ref 98–107)
CHLORIDE SERPL-SCNC: 114 MMOL/L (ref 98–107)
CHLORIDE SERPL-SCNC: 115 MMOL/L (ref 98–107)
CHLORIDE SERPL-SCNC: 115 MMOL/L (ref 98–107)
CO2 SERPL-SCNC: 11 MMOL/L (ref 23–31)
CO2 SERPL-SCNC: 16 MMOL/L (ref 23–31)
CO2 SERPL-SCNC: 17 MMOL/L (ref 23–31)
CO2 SERPL-SCNC: 18 MMOL/L (ref 23–31)
CREAT SERPL-MCNC: 1.85 MG/DL (ref 0.55–1.02)
CREAT SERPL-MCNC: 1.88 MG/DL (ref 0.55–1.02)
CREAT SERPL-MCNC: 2.12 MG/DL (ref 0.55–1.02)
CREAT SERPL-MCNC: 2.28 MG/DL (ref 0.55–1.02)
CREAT SERPL-MCNC: 2.5 MG/DL (ref 0.55–1.02)
CREAT SERPL-MCNC: 2.58 MG/DL (ref 0.55–1.02)
CREAT/UREA NIT SERPL: 14
CREAT/UREA NIT SERPL: 14
CREAT/UREA NIT SERPL: 15
CREAT/UREA NIT SERPL: 15
CREAT/UREA NIT SERPL: 16
CREAT/UREA NIT SERPL: 16
EOSINOPHIL # BLD AUTO: 0 X10(3)/MCL (ref 0–0.9)
EOSINOPHIL NFR BLD AUTO: 0 %
ERYTHROCYTE [DISTWIDTH] IN BLOOD BY AUTOMATED COUNT: 14.3 % (ref 11.5–17)
GFR SERPLBLD CREATININE-BSD FMLA CKD-EPI: 20 ML/MIN/1.73/M2
GFR SERPLBLD CREATININE-BSD FMLA CKD-EPI: 21 ML/MIN/1.73/M2
GFR SERPLBLD CREATININE-BSD FMLA CKD-EPI: 23 ML/MIN/1.73/M2
GFR SERPLBLD CREATININE-BSD FMLA CKD-EPI: 25 ML/MIN/1.73/M2
GFR SERPLBLD CREATININE-BSD FMLA CKD-EPI: 29 ML/MIN/1.73/M2
GFR SERPLBLD CREATININE-BSD FMLA CKD-EPI: 30 ML/MIN/1.73/M2
GLUCOSE SERPL-MCNC: 108 MG/DL (ref 82–115)
GLUCOSE SERPL-MCNC: 120 MG/DL (ref 82–115)
GLUCOSE SERPL-MCNC: 139 MG/DL (ref 82–115)
GLUCOSE SERPL-MCNC: 195 MG/DL (ref 82–115)
GLUCOSE SERPL-MCNC: 97 MG/DL (ref 82–115)
GLUCOSE SERPL-MCNC: 97 MG/DL (ref 82–115)
HCT VFR BLD AUTO: 35.4 % (ref 37–47)
HGB BLD-MCNC: 11.8 G/DL (ref 12–16)
IMM GRANULOCYTES # BLD AUTO: 0.06 X10(3)/MCL (ref 0–0.04)
IMM GRANULOCYTES NFR BLD AUTO: 0.5 %
LYMPHOCYTES # BLD AUTO: 2.07 X10(3)/MCL (ref 0.6–4.6)
LYMPHOCYTES NFR BLD AUTO: 18 %
MAGNESIUM SERPL-MCNC: 2.3 MG/DL (ref 1.6–2.6)
MAGNESIUM SERPL-MCNC: 2.4 MG/DL (ref 1.6–2.6)
MAGNESIUM SERPL-MCNC: 2.5 MG/DL (ref 1.6–2.6)
MCH RBC QN AUTO: 30.4 PG (ref 27–31)
MCHC RBC AUTO-ENTMCNC: 33.3 G/DL (ref 33–36)
MCV RBC AUTO: 91.2 FL (ref 80–94)
MONOCYTES # BLD AUTO: 1.05 X10(3)/MCL (ref 0.1–1.3)
MONOCYTES NFR BLD AUTO: 9.1 %
NEUTROPHILS # BLD AUTO: 8.28 X10(3)/MCL (ref 2.1–9.2)
NEUTROPHILS NFR BLD AUTO: 72.2 %
NRBC BLD AUTO-RTO: 0 %
PHOSPHATE SERPL-MCNC: 1.8 MG/DL (ref 2.3–4.7)
PHOSPHATE SERPL-MCNC: 2.2 MG/DL (ref 2.3–4.7)
PHOSPHATE SERPL-MCNC: 2.3 MG/DL (ref 2.3–4.7)
PHOSPHATE SERPL-MCNC: 2.4 MG/DL (ref 2.3–4.7)
PHOSPHATE SERPL-MCNC: 2.6 MG/DL (ref 2.3–4.7)
PHOSPHATE SERPL-MCNC: 2.9 MG/DL (ref 2.3–4.7)
PLATELET # BLD AUTO: 252 X10(3)/MCL (ref 130–400)
PMV BLD AUTO: 10.3 FL (ref 7.4–10.4)
POCT GLUCOSE: 101 MG/DL (ref 70–110)
POCT GLUCOSE: 101 MG/DL (ref 70–110)
POCT GLUCOSE: 106 MG/DL (ref 70–110)
POCT GLUCOSE: 110 MG/DL (ref 70–110)
POCT GLUCOSE: 112 MG/DL (ref 70–110)
POCT GLUCOSE: 113 MG/DL (ref 70–110)
POCT GLUCOSE: 113 MG/DL (ref 70–110)
POCT GLUCOSE: 116 MG/DL (ref 70–110)
POCT GLUCOSE: 124 MG/DL (ref 70–110)
POCT GLUCOSE: 126 MG/DL (ref 70–110)
POCT GLUCOSE: 127 MG/DL (ref 70–110)
POCT GLUCOSE: 127 MG/DL (ref 70–110)
POCT GLUCOSE: 156 MG/DL (ref 70–110)
POCT GLUCOSE: 164 MG/DL (ref 70–110)
POCT GLUCOSE: 166 MG/DL (ref 70–110)
POCT GLUCOSE: 167 MG/DL (ref 70–110)
POCT GLUCOSE: 98 MG/DL (ref 70–110)
POTASSIUM SERPL-SCNC: 3.8 MMOL/L (ref 3.5–5.1)
POTASSIUM SERPL-SCNC: 4.2 MMOL/L (ref 3.5–5.1)
POTASSIUM SERPL-SCNC: 4.3 MMOL/L (ref 3.5–5.1)
POTASSIUM SERPL-SCNC: 4.5 MMOL/L (ref 3.5–5.1)
POTASSIUM SERPL-SCNC: 4.5 MMOL/L (ref 3.5–5.1)
POTASSIUM SERPL-SCNC: 4.6 MMOL/L (ref 3.5–5.1)
RBC # BLD AUTO: 3.88 X10(6)/MCL (ref 4.2–5.4)
SODIUM SERPL-SCNC: 137 MMOL/L (ref 136–145)
SODIUM SERPL-SCNC: 138 MMOL/L (ref 136–145)
SODIUM SERPL-SCNC: 138 MMOL/L (ref 136–145)
SODIUM SERPL-SCNC: 140 MMOL/L (ref 136–145)
SODIUM SERPL-SCNC: 141 MMOL/L (ref 136–145)
SODIUM SERPL-SCNC: 141 MMOL/L (ref 136–145)
WBC # BLD AUTO: 11.48 X10(3)/MCL (ref 4.5–11.5)

## 2025-02-05 PROCEDURE — 36415 COLL VENOUS BLD VENIPUNCTURE: CPT

## 2025-02-05 PROCEDURE — 92610 EVALUATE SWALLOWING FUNCTION: CPT

## 2025-02-05 PROCEDURE — 20000000 HC ICU ROOM

## 2025-02-05 PROCEDURE — 85025 COMPLETE CBC W/AUTO DIFF WBC: CPT

## 2025-02-05 PROCEDURE — 84100 ASSAY OF PHOSPHORUS: CPT | Performed by: INTERNAL MEDICINE

## 2025-02-05 PROCEDURE — 83735 ASSAY OF MAGNESIUM: CPT | Performed by: INTERNAL MEDICINE

## 2025-02-05 PROCEDURE — 25000003 PHARM REV CODE 250: Performed by: INTERNAL MEDICINE

## 2025-02-05 PROCEDURE — 36415 COLL VENOUS BLD VENIPUNCTURE: CPT | Performed by: INTERNAL MEDICINE

## 2025-02-05 PROCEDURE — 63600175 PHARM REV CODE 636 W HCPCS

## 2025-02-05 PROCEDURE — 80048 BASIC METABOLIC PNL TOTAL CA: CPT | Performed by: INTERNAL MEDICINE

## 2025-02-05 RX ORDER — HYDROCODONE BITARTRATE AND ACETAMINOPHEN 5; 325 MG/1; MG/1
1 TABLET ORAL EVERY 6 HOURS PRN
Status: DISCONTINUED | OUTPATIENT
Start: 2025-02-05 | End: 2025-02-06

## 2025-02-05 RX ADMIN — HEPARIN SODIUM 5000 UNITS: 5000 INJECTION, SOLUTION INTRAVENOUS; SUBCUTANEOUS at 05:02

## 2025-02-05 RX ADMIN — HYDROCODONE BITARTRATE AND ACETAMINOPHEN 1 TABLET: 5; 325 TABLET ORAL at 03:02

## 2025-02-05 RX ADMIN — HEPARIN SODIUM 5000 UNITS: 5000 INJECTION, SOLUTION INTRAVENOUS; SUBCUTANEOUS at 09:02

## 2025-02-05 RX ADMIN — SODIUM CHLORIDE, SODIUM LACTATE, POTASSIUM CHLORIDE, CALCIUM CHLORIDE AND DEXTROSE MONOHYDRATE: 5; 600; 310; 30; 20 INJECTION, SOLUTION INTRAVENOUS at 09:02

## 2025-02-05 RX ADMIN — MUPIROCIN: 20 OINTMENT TOPICAL at 08:02

## 2025-02-05 RX ADMIN — MUPIROCIN: 20 OINTMENT TOPICAL at 09:02

## 2025-02-05 RX ADMIN — HEPARIN SODIUM 5000 UNITS: 5000 INJECTION, SOLUTION INTRAVENOUS; SUBCUTANEOUS at 02:02

## 2025-02-05 RX ADMIN — HYDROCODONE BITARTRATE AND ACETAMINOPHEN 1 TABLET: 5; 325 TABLET ORAL at 11:02

## 2025-02-05 RX ADMIN — HYDROCODONE BITARTRATE AND ACETAMINOPHEN 1 TABLET: 5; 325 TABLET ORAL at 09:02

## 2025-02-05 NOTE — PT/OT/SLP EVAL
[facility]  Speech Language Pathology Department  Clinical Swallow Evaluation    Patient Name:  Navin Beck   MRN:  [id number]    Recommendations     General recommendations:  dysphagia therapy  Solid texture recommendation:  Puree Diet - IDDSI Level 4  Liquid consistency recommendation: Thin liquids - IDDSI Level 0   Medications: crushed in puree  Swallow strategies/precautions: small bites/sips, upright for PO intake, and assist with feeding as needed  Precautions: aspiration    History     Navin Beck is a/n 64 y.o. female admitted with DKA.    Past Medical History:   Diagnosis Date    Allergy     Anemia due to stage 3 chronic kidney disease 12/6/2020    Arrhythmia     Arthritis     Bronchial asthma     Diabetes mellitus     Diabetes mellitus     Glaucoma     Hormone disorder     hysterectomy    Hypercholesteremia     Hypertension     Hyperthyroidism     Insomnia     Kidney stones     Neuropathy     Thyroid disease     Urine, incontinence, stress female     UTI (urinary tract infection) 11/27/2020     Past Surgical History:   Procedure Laterality Date    APPENDECTOMY      BACK SURGERY      disc removal     CARPAL TUNNEL RELEASE Bilateral     CHOLECYSTECTOMY      CYSTOSCOPY N/A 10/26/2020    Procedure: CYSTOSCOPY;  Surgeon: Wilner Goel MD;  Location: [facility];  Service: OB/GYN;  Laterality: N/A;    CYSTOSCOPY W/ RETROGRADES Bilateral 12/7/2020    Procedure: CYSTOSCOPY, WITH RETROGRADE PYELOGRAM;  Surgeon: Daniel Jalloh MD;  Location: [address];  Service: Urology;  Laterality: Bilateral;    FRACTURE SURGERY Right     wrist    HYSTERECTOMY  2010    [facility]    JOINT REPLACEMENT      KNEE ARTHROSCOPY W/ DEBRIDEMENT      KNEE SURGERY Right     Knee replacement    LITHOTRIPSY      MS REMOVAL OF OVARY/TUBE(S)  9/9/13    benign    removal of round ligament mass      ROBOT-ASSISTED LAPAROSCOPIC LYSIS OF ADHESIONS USING DA JAMES XI N/A 10/26/2020  "   Procedure: XI ROBOTIC LYSIS, ADHESIONS;  Surgeon: Wilner Goel MD;  Location: [facility];  Service: OB/GYN;  Laterality: N/A;  ROBOTIC MOBILIZATION OF BLADDER- DR BURNHAM    SPINE SURGERY      WRIST FRACTURE SURGERY Left      Current method of nutrition: NPO    Patient complaint: "I want some water."    Imaging   Results for orders placed during the hospital encounter of 01/06/25    X-Ray Chest 1 View    Narrative  EXAMINATION:  XR CHEST 1 VIEW    CLINICAL HISTORY:  Shortness of breath    TECHNIQUE:  AP chest    COMPARISON:  Chest x-ray dated 05/07/2024    FINDINGS:  The heart is normal in size.  The lungs are clear.  There is no pleural effusion or visible pneumothorax.    Impression  No acute abnormality of the chest.      Electronically signed by: Porsha Self  Date:    01/06/2025  Time:    18:18    Results for orders placed during the hospital encounter of 05/07/24    CT Chest With Contrast    Narrative  EXAMINATION:  CT CHEST WITH CONTRAST    CLINICAL HISTORY:  Respiratory illness, nondiagnostic xray;    TECHNIQUE:  Multidetector axial images were performed from thoracic inlet to below hemidiaphragms following intravenous contrast administration. Sagittal and coronal reformations performed.    Dose length product of 866 mGycm. Automated exposure control was utilized to minimize radiation dose.    COMPARISON:  Chest radiograph same date.    FINDINGS:  Large dense consolidation involves the superior segment of the right lower lung lobe and disc space seen on image 250 series 10.  Follow-up exams are highly recommended to ensure this improves and resolves and there is no underlying mass lesion.  Bilateral lung lobes scattered small ground-glass opacities which could represent associated pneumonitis.  There is no pulmonary edema.  No significant fluid within the pleural or the pericardial spaces.    There is anterior mediastinal density which could represent an enlarged lymph node though has atypical " "appearance and measures 2.6 x 1.6 cm.  There is precarinal enlarged lymph node measuring 2.2 cm.  Thoracic aorta is without aneurysmal dilatation or dissection.  Cardiac chamber size is within normal limits.  No pericardial effusion.    Left thyroid gland small hypodense nodules and calcifications.  Please further assess with ultrasound exam.    Thoracic kyphosis and degenerative changes.    Impression  1.  Right lower lung lobe superior segment large consolidation.  Follow-up exams are recommended to ensure this improves and resolves.    2. Mediastinal lymphadenopathy.      Electronically signed by: Yunier Kelley  Date:    05/07/2024  Time:    22:24    No results found for this or any previous visit.    Subjective     Patient awake and intermittently crying .    Patient goals: "I want some water."   Spiritual/Cultural/Quaker Beliefs/Practices that affect care: no    Pain/Comfort: Pain Rating 1: 0/10    Respiratory Status:  room air    Restraints/positioning devices: none    Objective     ORAL MUSCULATURE  Dentition: lower dentures  Secretion Management: adequate  Mucosal Quality: good  Facial Movement: WFL  Buccal Strength & Mobility: WFL  Mandibular Strength & Mobility: WFL  Oral Labial Strength & Mobility: WFL  Lingual Strength & Mobility: WFL  Vocal Quality: adequate  Volitional Cough: Able to clear secretions      PO TRIALS  Consistency Fed By Oral Symptoms Pharyngeal Symptoms   Ice chips SLP Bolus holding None   Puree SLP None None   Thin liquid by cup Self None None   Thin liquid by straw Self None None     Refusing chewable solids.    Assessment     Pt presents with mild bolus holding, refused chewable solid trials.  Rec: initiate pureed solid diet with thin liquids, assist feed as needed.  Skilled SLP services warranted to advance solids as appropriate.    Outcome Measures     Functional Oral Intake Scale: 5 - Total oral diet with multiple consistencies, by requiring special preparation or " compensations    Goals     Multidisciplinary Problems       SLP Goals          Problem: SLP    Goal Priority Disciplines Outcome   SLP Goal     SLP Progressing   Description: LTG: Pt will tolerate least restrictive PO diet with no clinical signs/sx aspiration    STGs:  1.  Pt will tolerate soft and bite sized solids with adequate bolus formation/transport and no clinical signs/sx aspiration  2.  Pt will tolerate regular solids with adequate bolus formation/transport and no clinical signs/sx aspiration                       Education     Patient provided with verbal education regarding recommendations.  Additional teaching is warranted.    Plan     SLP Follow-Up:  Yes   Patient to be seen:  5 x/week   Plan of Care expires:  02/19/25  Plan of Care reviewed with:  patient     Time Tracking     SLP Treatment Date:   02/05/25  Speech Start Time:  1135  Speech Stop Time:  1150     Speech Total Time (min):  15 min    Billable minutes:  Swallow and Oral Function Evaluation, 15 minutes     02/05/2025

## 2025-02-05 NOTE — PLAN OF CARE
Problem: Infection  Goal: Absence of Infection Signs and Symptoms  Outcome: Progressing     Problem: Adult Inpatient Plan of Care  Goal: Plan of Care Review  Outcome: Progressing  Goal: Patient-Specific Goal (Individualized)  Outcome: Progressing  Goal: Absence of Hospital-Acquired Illness or Injury  Outcome: Progressing  Goal: Optimal Comfort and Wellbeing  Outcome: Progressing  Goal: Readiness for Transition of Care  Outcome: Progressing     Problem: Acute Kidney Injury/Impairment  Goal: Fluid and Electrolyte Balance  Outcome: Progressing  Goal: Improved Oral Intake  Outcome: Progressing  Goal: Effective Renal Function  Outcome: Progressing     Problem: Pneumonia  Goal: Fluid Balance  Outcome: Progressing  Goal: Resolution of Infection Signs and Symptoms  Outcome: Progressing  Goal: Effective Oxygenation and Ventilation  Outcome: Progressing     Problem: Diabetes Comorbidity  Goal: Blood Glucose Level Within Targeted Range  Outcome: Progressing     Problem: Skin Injury Risk Increased  Goal: Skin Health and Integrity  Outcome: Progressing

## 2025-02-05 NOTE — PLAN OF CARE
Problem: SLP  Goal: SLP Goal  Description: LTG: Pt will tolerate least restrictive PO diet with no clinical signs/sx aspiration    STGs:  1.  Pt will tolerate soft and bite sized solids with adequate bolus formation/transport and no clinical signs/sx aspiration  2.  Pt will tolerate regular solids with adequate bolus formation/transport and no clinical signs/sx aspiration    Outcome: Progressing     POC initiated and goals created.  Christine

## 2025-02-05 NOTE — PLAN OF CARE
02/05/25 1417   Discharge Assessment   Assessment Type Discharge Planning Assessment   Confirmed/corrected address, phone number and insurance Yes   Confirmed Demographics Correct on Facesheet   Source of Information family   When was your last doctors appointment? 01/29/25   Communicated RAFA with patient/caregiver Date not available/Unable to determine   Reason For Admission Urinary Retention, DKA   People in Home child(jovanny), adult   Facility Arrived From: none   Do you expect to return to your current living situation? Yes   Do you have help at home or someone to help you manage your care at home? Yes   Who are your caregiver(s) and their phone number(s)? son, Jose Elias Beck   Prior to hospitilization cognitive status: Alert/Oriented   Current cognitive status: Unable to Assess   Walking or Climbing Stairs Difficulty yes   Walking or Climbing Stairs ambulation difficulty, requires equipment   Mobility Management walker   Dressing/Bathing Difficulty no   Home Accessibility wheelchair accessible   Equipment Currently Used at Home walker, rolling;glucometer;other (see comments)  (Dexcom)   Patient currently being followed by outpatient case management? No   Do you currently have service(s) that help you manage your care at home? Yes   How Many hours does patient receive services 2   Name and Contact number of agency [facility]   Is the pt/caregiver preference to resume services with current agency Yes   Do you take prescription medications? Yes   Do you have prescription coverage? Yes   Coverage [employer]   Do you have any problems affording any of your prescribed medications? No   Who is going to help you get home at discharge? family   How do you get to doctors appointments? family or friend will provide   Are you on dialysis? No   Do you take coumadin? No   Discharge Plan A Home with family;Home Health   Discharge Plan B Other  (TBD)   DME Needed Upon Discharge  none   Discharge Plan discussed with:  Adult children   Transition of Care Barriers None   OTHER   Name(s) of People in Home son, Jose Elias and his wife     Spoke to marilee Moctezuma over the phone.  He states patient lives with him and his wife and has supervision most of the time.  She was able to ambulate independently until a few days before hospitalization where she started using her walker.  Has HH with [facility]. Sent updates via EPIC

## 2025-02-06 LAB
ANION GAP SERPL CALC-SCNC: 11 MEQ/L
ANION GAP SERPL CALC-SCNC: 6 MEQ/L
ANION GAP SERPL CALC-SCNC: 6 MEQ/L
ANION GAP SERPL CALC-SCNC: 8 MEQ/L
ANION GAP SERPL CALC-SCNC: 8 MEQ/L
ANION GAP SERPL CALC-SCNC: 9 MEQ/L
BASOPHILS # BLD AUTO: 0.02 X10(3)/MCL
BASOPHILS NFR BLD AUTO: 0.2 %
BUN SERPL-MCNC: 18 MG/DL (ref 9.8–20.1)
BUN SERPL-MCNC: 18 MG/DL (ref 9.8–20.1)
BUN SERPL-MCNC: 18.7 MG/DL (ref 9.8–20.1)
BUN SERPL-MCNC: 19.6 MG/DL (ref 9.8–20.1)
BUN SERPL-MCNC: 20.3 MG/DL (ref 9.8–20.1)
BUN SERPL-MCNC: 22.9 MG/DL (ref 9.8–20.1)
CALCIUM SERPL-MCNC: 7.9 MG/DL (ref 8.4–10.2)
CALCIUM SERPL-MCNC: 7.9 MG/DL (ref 8.4–10.2)
CALCIUM SERPL-MCNC: 8.1 MG/DL (ref 8.4–10.2)
CALCIUM SERPL-MCNC: 8.2 MG/DL (ref 8.4–10.2)
CHLORIDE SERPL-SCNC: 107 MMOL/L (ref 98–107)
CHLORIDE SERPL-SCNC: 109 MMOL/L (ref 98–107)
CHLORIDE SERPL-SCNC: 110 MMOL/L (ref 98–107)
CHLORIDE SERPL-SCNC: 112 MMOL/L (ref 98–107)
CO2 SERPL-SCNC: 16 MMOL/L (ref 23–31)
CO2 SERPL-SCNC: 16 MMOL/L (ref 23–31)
CO2 SERPL-SCNC: 19 MMOL/L (ref 23–31)
CO2 SERPL-SCNC: 19 MMOL/L (ref 23–31)
CO2 SERPL-SCNC: 21 MMOL/L (ref 23–31)
CO2 SERPL-SCNC: 21 MMOL/L (ref 23–31)
CREAT SERPL-MCNC: 1.18 MG/DL (ref 0.55–1.02)
CREAT SERPL-MCNC: 1.44 MG/DL (ref 0.55–1.02)
CREAT SERPL-MCNC: 1.48 MG/DL (ref 0.55–1.02)
CREAT SERPL-MCNC: 1.52 MG/DL (ref 0.55–1.02)
CREAT SERPL-MCNC: 1.52 MG/DL (ref 0.55–1.02)
CREAT SERPL-MCNC: 1.6 MG/DL (ref 0.55–1.02)
CREAT/UREA NIT SERPL: 12
CREAT/UREA NIT SERPL: 13
CREAT/UREA NIT SERPL: 14
CREAT/UREA NIT SERPL: 15
EOSINOPHIL # BLD AUTO: 0 X10(3)/MCL (ref 0–0.9)
EOSINOPHIL NFR BLD AUTO: 0 %
ERYTHROCYTE [DISTWIDTH] IN BLOOD BY AUTOMATED COUNT: 14.6 % (ref 11.5–17)
GFR SERPLBLD CREATININE-BSD FMLA CKD-EPI: 35 ML/MIN/1.73/M2
GFR SERPLBLD CREATININE-BSD FMLA CKD-EPI: 38 ML/MIN/1.73/M2
GFR SERPLBLD CREATININE-BSD FMLA CKD-EPI: 38 ML/MIN/1.73/M2
GFR SERPLBLD CREATININE-BSD FMLA CKD-EPI: 39 ML/MIN/1.73/M2
GFR SERPLBLD CREATININE-BSD FMLA CKD-EPI: 40 ML/MIN/1.73/M2
GFR SERPLBLD CREATININE-BSD FMLA CKD-EPI: 51 ML/MIN/1.73/M2
GLUCOSE SERPL-MCNC: 124 MG/DL (ref 82–115)
GLUCOSE SERPL-MCNC: 178 MG/DL (ref 82–115)
GLUCOSE SERPL-MCNC: 179 MG/DL (ref 82–115)
GLUCOSE SERPL-MCNC: 248 MG/DL (ref 82–115)
GLUCOSE SERPL-MCNC: 250 MG/DL (ref 82–115)
GLUCOSE SERPL-MCNC: 315 MG/DL (ref 82–115)
HCT VFR BLD AUTO: 32.6 % (ref 37–47)
HGB BLD-MCNC: 10.9 G/DL (ref 12–16)
IMM GRANULOCYTES # BLD AUTO: 0.02 X10(3)/MCL (ref 0–0.04)
IMM GRANULOCYTES NFR BLD AUTO: 0.2 %
LYMPHOCYTES # BLD AUTO: 2.57 X10(3)/MCL (ref 0.6–4.6)
LYMPHOCYTES NFR BLD AUTO: 31.5 %
MAGNESIUM SERPL-MCNC: 1.8 MG/DL (ref 1.6–2.6)
MAGNESIUM SERPL-MCNC: 2 MG/DL (ref 1.6–2.6)
MAGNESIUM SERPL-MCNC: 2.1 MG/DL (ref 1.6–2.6)
MCH RBC QN AUTO: 31 PG (ref 27–31)
MCHC RBC AUTO-ENTMCNC: 33.4 G/DL (ref 33–36)
MCV RBC AUTO: 92.6 FL (ref 80–94)
MONOCYTES # BLD AUTO: 0.66 X10(3)/MCL (ref 0.1–1.3)
MONOCYTES NFR BLD AUTO: 8.1 %
NEUTROPHILS # BLD AUTO: 4.9 X10(3)/MCL (ref 2.1–9.2)
NEUTROPHILS NFR BLD AUTO: 60 %
NRBC BLD AUTO-RTO: 0 %
PHOSPHATE SERPL-MCNC: 1.7 MG/DL (ref 2.3–4.7)
PHOSPHATE SERPL-MCNC: 2 MG/DL (ref 2.3–4.7)
PHOSPHATE SERPL-MCNC: 2.1 MG/DL (ref 2.3–4.7)
PHOSPHATE SERPL-MCNC: 2.1 MG/DL (ref 2.3–4.7)
PHOSPHATE SERPL-MCNC: 2.3 MG/DL (ref 2.3–4.7)
PHOSPHATE SERPL-MCNC: 2.3 MG/DL (ref 2.3–4.7)
PLATELET # BLD AUTO: 185 X10(3)/MCL (ref 130–400)
PMV BLD AUTO: 10.4 FL (ref 7.4–10.4)
POCT GLUCOSE: 112 MG/DL (ref 70–110)
POCT GLUCOSE: 116 MG/DL (ref 70–110)
POCT GLUCOSE: 144 MG/DL (ref 70–110)
POCT GLUCOSE: 147 MG/DL (ref 70–110)
POCT GLUCOSE: 149 MG/DL (ref 70–110)
POCT GLUCOSE: 190 MG/DL (ref 70–110)
POCT GLUCOSE: 227 MG/DL (ref 70–110)
POCT GLUCOSE: 237 MG/DL (ref 70–110)
POCT GLUCOSE: 269 MG/DL (ref 70–110)
POCT GLUCOSE: 319 MG/DL (ref 70–110)
POCT GLUCOSE: 77 MG/DL (ref 70–110)
POCT GLUCOSE: 87 MG/DL (ref 70–110)
POTASSIUM SERPL-SCNC: 3.4 MMOL/L (ref 3.5–5.1)
POTASSIUM SERPL-SCNC: 3.5 MMOL/L (ref 3.5–5.1)
POTASSIUM SERPL-SCNC: 3.6 MMOL/L (ref 3.5–5.1)
POTASSIUM SERPL-SCNC: 3.8 MMOL/L (ref 3.5–5.1)
POTASSIUM SERPL-SCNC: 4 MMOL/L (ref 3.5–5.1)
POTASSIUM SERPL-SCNC: 4.3 MMOL/L (ref 3.5–5.1)
RBC # BLD AUTO: 3.52 X10(6)/MCL (ref 4.2–5.4)
SODIUM SERPL-SCNC: 134 MMOL/L (ref 136–145)
SODIUM SERPL-SCNC: 134 MMOL/L (ref 136–145)
SODIUM SERPL-SCNC: 135 MMOL/L (ref 136–145)
SODIUM SERPL-SCNC: 136 MMOL/L (ref 136–145)
SODIUM SERPL-SCNC: 138 MMOL/L (ref 136–145)
SODIUM SERPL-SCNC: 139 MMOL/L (ref 136–145)
WBC # BLD AUTO: 8.17 X10(3)/MCL (ref 4.5–11.5)

## 2025-02-06 PROCEDURE — 84100 ASSAY OF PHOSPHORUS: CPT | Performed by: INTERNAL MEDICINE

## 2025-02-06 PROCEDURE — 80048 BASIC METABOLIC PNL TOTAL CA: CPT | Performed by: INTERNAL MEDICINE

## 2025-02-06 PROCEDURE — 63600175 PHARM REV CODE 636 W HCPCS

## 2025-02-06 PROCEDURE — 36415 COLL VENOUS BLD VENIPUNCTURE: CPT | Performed by: INTERNAL MEDICINE

## 2025-02-06 PROCEDURE — 83735 ASSAY OF MAGNESIUM: CPT | Performed by: INTERNAL MEDICINE

## 2025-02-06 PROCEDURE — 25000003 PHARM REV CODE 250

## 2025-02-06 PROCEDURE — 25000242 PHARM REV CODE 250 ALT 637 W/ HCPCS

## 2025-02-06 PROCEDURE — 63600175 PHARM REV CODE 636 W HCPCS: Performed by: STUDENT IN AN ORGANIZED HEALTH CARE EDUCATION/TRAINING PROGRAM

## 2025-02-06 PROCEDURE — 25000003 PHARM REV CODE 250: Performed by: STUDENT IN AN ORGANIZED HEALTH CARE EDUCATION/TRAINING PROGRAM

## 2025-02-06 PROCEDURE — 11000001 HC ACUTE MED/SURG PRIVATE ROOM

## 2025-02-06 PROCEDURE — 25000003 PHARM REV CODE 250: Performed by: EMERGENCY MEDICINE

## 2025-02-06 PROCEDURE — 85025 COMPLETE CBC W/AUTO DIFF WBC: CPT

## 2025-02-06 RX ORDER — INSULIN ASPART 100 [IU]/ML
0-5 INJECTION, SOLUTION INTRAVENOUS; SUBCUTANEOUS
Status: DISCONTINUED | OUTPATIENT
Start: 2025-02-06 | End: 2025-02-06

## 2025-02-06 RX ORDER — GLUCAGON 1 MG
1 KIT INJECTION
Status: DISCONTINUED | OUTPATIENT
Start: 2025-02-06 | End: 2025-02-08

## 2025-02-06 RX ORDER — INSULIN ASPART 100 [IU]/ML
0-10 INJECTION, SOLUTION INTRAVENOUS; SUBCUTANEOUS
Status: DISCONTINUED | OUTPATIENT
Start: 2025-02-06 | End: 2025-02-08

## 2025-02-06 RX ORDER — ONDANSETRON 4 MG/1
4 TABLET, ORALLY DISINTEGRATING ORAL EVERY 6 HOURS PRN
Status: DISCONTINUED | OUTPATIENT
Start: 2025-02-06 | End: 2025-02-08 | Stop reason: HOSPADM

## 2025-02-06 RX ORDER — DULOXETIN HYDROCHLORIDE 30 MG/1
30 CAPSULE, DELAYED RELEASE ORAL DAILY
Status: DISCONTINUED | OUTPATIENT
Start: 2025-02-06 | End: 2025-02-08 | Stop reason: HOSPADM

## 2025-02-06 RX ORDER — IBUPROFEN 200 MG
24 TABLET ORAL
Status: DISCONTINUED | OUTPATIENT
Start: 2025-02-06 | End: 2025-02-08

## 2025-02-06 RX ORDER — INSULIN GLARGINE 100 [IU]/ML
15 INJECTION, SOLUTION SUBCUTANEOUS ONCE
Status: COMPLETED | OUTPATIENT
Start: 2025-02-06 | End: 2025-02-06

## 2025-02-06 RX ORDER — METHIMAZOLE 10 MG/1
10 TABLET ORAL DAILY
Status: DISCONTINUED | OUTPATIENT
Start: 2025-02-06 | End: 2025-02-08 | Stop reason: HOSPADM

## 2025-02-06 RX ORDER — IBUPROFEN 200 MG
16 TABLET ORAL
Status: DISCONTINUED | OUTPATIENT
Start: 2025-02-06 | End: 2025-02-06

## 2025-02-06 RX ORDER — ATORVASTATIN CALCIUM 10 MG/1
20 TABLET, FILM COATED ORAL DAILY
Status: DISCONTINUED | OUTPATIENT
Start: 2025-02-06 | End: 2025-02-08 | Stop reason: HOSPADM

## 2025-02-06 RX ORDER — IBUPROFEN 200 MG
16 TABLET ORAL
Status: DISCONTINUED | OUTPATIENT
Start: 2025-02-06 | End: 2025-02-08

## 2025-02-06 RX ORDER — IBUPROFEN 200 MG
24 TABLET ORAL
Status: DISCONTINUED | OUTPATIENT
Start: 2025-02-06 | End: 2025-02-06

## 2025-02-06 RX ORDER — HYDROCODONE BITARTRATE AND ACETAMINOPHEN 7.5; 325 MG/1; MG/1
1 TABLET ORAL EVERY 6 HOURS PRN
Status: DISCONTINUED | OUTPATIENT
Start: 2025-02-06 | End: 2025-02-08 | Stop reason: HOSPADM

## 2025-02-06 RX ORDER — PREGABALIN 75 MG/1
75 CAPSULE ORAL 3 TIMES DAILY
Status: DISCONTINUED | OUTPATIENT
Start: 2025-02-06 | End: 2025-02-08 | Stop reason: HOSPADM

## 2025-02-06 RX ORDER — INSULIN GLARGINE 100 [IU]/ML
20 INJECTION, SOLUTION SUBCUTANEOUS DAILY
Status: DISCONTINUED | OUTPATIENT
Start: 2025-02-07 | End: 2025-02-08

## 2025-02-06 RX ORDER — GLUCAGON 1 MG
1 KIT INJECTION
Status: DISCONTINUED | OUTPATIENT
Start: 2025-02-06 | End: 2025-02-06

## 2025-02-06 RX ADMIN — HYDROCODONE BITARTRATE AND ACETAMINOPHEN 1 TABLET: 7.5; 325 TABLET ORAL at 02:02

## 2025-02-06 RX ADMIN — SODIUM CHLORIDE, POTASSIUM CHLORIDE, SODIUM LACTATE AND CALCIUM CHLORIDE: 600; 310; 30; 20 INJECTION, SOLUTION INTRAVENOUS at 04:02

## 2025-02-06 RX ADMIN — ONDANSETRON 4 MG: 4 TABLET, ORALLY DISINTEGRATING ORAL at 07:02

## 2025-02-06 RX ADMIN — MUPIROCIN: 20 OINTMENT TOPICAL at 08:02

## 2025-02-06 RX ADMIN — HEPARIN SODIUM 5000 UNITS: 5000 INJECTION, SOLUTION INTRAVENOUS; SUBCUTANEOUS at 08:02

## 2025-02-06 RX ADMIN — DEXTROSE MONOHYDRATE 12.5 G: 25 INJECTION, SOLUTION INTRAVENOUS at 12:02

## 2025-02-06 RX ADMIN — INSULIN ASPART 3 UNITS: 100 INJECTION, SOLUTION INTRAVENOUS; SUBCUTANEOUS at 12:02

## 2025-02-06 RX ADMIN — FLUTICASONE FUROATE 1 PUFF: 100 POWDER RESPIRATORY (INHALATION) at 09:02

## 2025-02-06 RX ADMIN — INSULIN ASPART 2 UNITS: 100 INJECTION, SOLUTION INTRAVENOUS; SUBCUTANEOUS at 09:02

## 2025-02-06 RX ADMIN — INSULIN GLARGINE 15 UNITS: 100 INJECTION, SOLUTION SUBCUTANEOUS at 10:02

## 2025-02-06 RX ADMIN — INSULIN ASPART 6 UNITS: 100 INJECTION, SOLUTION INTRAVENOUS; SUBCUTANEOUS at 04:02

## 2025-02-06 RX ADMIN — HEPARIN SODIUM 5000 UNITS: 5000 INJECTION, SOLUTION INTRAVENOUS; SUBCUTANEOUS at 02:02

## 2025-02-06 RX ADMIN — INSULIN HUMAN 0.02 UNITS/KG/HR: 1 INJECTION, SOLUTION INTRAVENOUS at 01:02

## 2025-02-06 RX ADMIN — PREGABALIN 75 MG: 75 CAPSULE ORAL at 09:02

## 2025-02-06 RX ADMIN — HEPARIN SODIUM 5000 UNITS: 5000 INJECTION, SOLUTION INTRAVENOUS; SUBCUTANEOUS at 06:02

## 2025-02-06 RX ADMIN — ATORVASTATIN CALCIUM 20 MG: 10 TABLET, FILM COATED ORAL at 08:02

## 2025-02-06 RX ADMIN — DULOXETINE HYDROCHLORIDE 30 MG: 30 CAPSULE, DELAYED RELEASE ORAL at 08:02

## 2025-02-06 RX ADMIN — POTASSIUM BICARBONATE 50 MEQ: 978 TABLET, EFFERVESCENT ORAL at 02:02

## 2025-02-06 RX ADMIN — METHIMAZOLE 10 MG: 10 TABLET ORAL at 09:02

## 2025-02-06 RX ADMIN — PREGABALIN 75 MG: 75 CAPSULE ORAL at 02:02

## 2025-02-06 NOTE — PT/OT/SLP PROGRESS
[facility]  Speech Language Pathology Department  Diet Tolerance Follow-up    Patient Name:  Navin Beck   MRN:  [id number]  Admitting Diagnosis: DKA    Recommendations     General recommendations:  dysphagia therapy  Solid texture recommendation:  Puree Diet - IDDSI Level 4  Liquid consistency recommendation: Thin liquids - IDDSI Level 0   Medications: crushed in puree  Swallow strategies/precautions: small bites/sips, upright for PO intake   Precautions: aspiration    Diet Tolerance     Nursing reports no difficulty regarding diet tolerance.    Outcome Measures     Functional Oral Intake Scale: 5 - Total oral diet with multiple consistencies, by requiring special preparation or compensations    Plan     SLP attempting to see pt for solid texture advancement however pt shaking head no despite encouragement.  SLP to continue to follow.    02/06/2025

## 2025-02-06 NOTE — PLAN OF CARE
Problem: Infection  Goal: Absence of Infection Signs and Symptoms  2/6/2025 0400 by Bharat Hubbard RN  Outcome: Progressing  2/5/2025 2303 by Bharat Hubbard RN  Outcome: Progressing     Problem: Adult Inpatient Plan of Care  Goal: Plan of Care Review  2/6/2025 0400 by Bharat Hubbard RN  Outcome: Progressing  2/5/2025 2303 by Bharat Hubbard RN  Outcome: Progressing  Goal: Patient-Specific Goal (Individualized)  2/6/2025 0400 by Bharat Hubbard RN  Outcome: Progressing  2/5/2025 2303 by Bharat Hubbard RN  Outcome: Progressing  Goal: Absence of Hospital-Acquired Illness or Injury  2/6/2025 0400 by Bharat Hubbard RN  Outcome: Progressing  2/5/2025 2303 by Bharat Hubbard RN  Outcome: Progressing  Goal: Optimal Comfort and Wellbeing  2/6/2025 0400 by Bharat Hubbard RN  Outcome: Progressing  2/5/2025 2303 by Bharat Hubbard RN  Outcome: Progressing  Goal: Readiness for Transition of Care  2/6/2025 0400 by Bharat Hubbard RN  Outcome: Progressing  2/5/2025 2303 by Bharat Hubbard RN  Outcome: Progressing     Problem: Acute Kidney Injury/Impairment  Goal: Fluid and Electrolyte Balance  2/6/2025 0400 by Bharat Hubbard RN  Outcome: Progressing  2/5/2025 2303 by Bharat Hubbard RN  Outcome: Progressing  Goal: Improved Oral Intake  2/6/2025 0400 by Bharat Hubbard RN  Outcome: Progressing  2/5/2025 2303 by Bharat Hubbard RN  Outcome: Progressing  Goal: Effective Renal Function  2/6/2025 0400 by Bharat Hubbard RN  Outcome: Progressing  2/5/2025 2303 by Bharat Hubbard RN  Outcome: Progressing     Problem: Pneumonia  Goal: Fluid Balance  2/6/2025 0400 by Bharat Hubbard RN  Outcome: Progressing  2/5/2025 2303 by Bharat Hubbard RN  Outcome: Progressing  Goal: Resolution of Infection Signs and Symptoms  2/6/2025 0400 by Hubbard, Twynette, RN  Outcome: Progressing  2/5/2025 2303 by Bharat Hubbard, RN  Outcome: Progressing  Goal: Effective Oxygenation and Ventilation  2/6/2025 0400 by Bharat Hubbard,  RN  Outcome: Progressing  2/5/2025 2303 by Bharat Hubbard RN  Outcome: Progressing     Problem: Diabetes Comorbidity  Goal: Blood Glucose Level Within Targeted Range  2/6/2025 0400 by Bharat Hubbard RN  Outcome: Progressing  2/5/2025 2303 by Bharat Hubbard RN  Outcome: Progressing     Problem: Skin Injury Risk Increased  Goal: Skin Health and Integrity  2/6/2025 0400 by Bharat Hubbard RN  Outcome: Progressing  2/5/2025 2303 by Bharat Hubbard RN  Outcome: Progressing

## 2025-02-06 NOTE — PROGRESS NOTES
[facility] - Emergency Dept  Pulmonary Critical Care Note    Patient Name: Navin Beck  MRN: [id number]  Admission Date: 2/4/2025  Hospital Length of Stay: 2 days  Code Status: Prior  Attending Provider: Marcos Maurer MD  Primary Care Provider: Seema Mirza MD     Subjective:     HPI:   Patient is a 64-year-old female with past medical history of type 2 diabetes mellitus, CKD, hypertension, hyperlipidemia, hyperthyroidism, GERD, glaucoma, who presented to [facility] ER on 02/04/2025 with chief complaint of nausea/vomiting, diarrhea ongoing for the last 3-4 days, along with urinary retention.  History limited to patient's mental status/selective communication.  At times patient awakens and answers questions other times does not respond appears to be groaning.  Complaining of low back pain, stated she takes NovoLog for her back pain.  Patient reported her son called EMS.  Unsure about compliance regarding diabetes, apparently endorsed taking medications as prescribed to ED provider.    Initial vitals upon arrival to /48, tachypneic rate 22, tachycardic 102, afebrile 97.9° F. lab work revealing hyponatremia 129, hyperkalemia 6.1, hypochloremia 92, bicarb 6, anion gap 31, BUN/creatinine 56.7/4.38, glucose 733, magnesium 3, lactic 2.2.  Beta hydroxybutyrate 13.8, CBC without leukocytosis 10.68, H&H 12.1/37.9.  UA with 4+ glucose, 2+ ketones, 1+ blood.  COVID/influenza negative.    CT head without acute intracranial abnormalities.  Chest x-ray with no acute cardiopulmonary process.  Bladder scan done in ER showing 1000 cc of urine in bladder, Cifuentes place with 2500 cc of urine returned.    Hospital Course/Significant events:  02/04/2025-admitted for DKA    24 Hour Interval History:  NAEO. Continues to be on insulin gtt due to bicarb continuing to be persistently below 18. Was seen by speech and recommended pureed diet. Had some nausea last night and this morning. But was able to eat dinner  last night.     Past Medical History:   Diagnosis Date    Allergy     Anemia due to stage 3 chronic kidney disease 12/6/2020    Arrhythmia     Arthritis     Bronchial asthma     Diabetes mellitus     Diabetes mellitus     Glaucoma     Hormone disorder     hysterectomy    Hypercholesteremia     Hypertension     Hyperthyroidism     Insomnia     Kidney stones     Neuropathy     Thyroid disease     Urine, incontinence, stress female     UTI (urinary tract infection) 11/27/2020       Past Surgical History:   Procedure Laterality Date    APPENDECTOMY      BACK SURGERY      disc removal     CARPAL TUNNEL RELEASE Bilateral     CHOLECYSTECTOMY      CYSTOSCOPY N/A 10/26/2020    Procedure: CYSTOSCOPY;  Surgeon: Wilner Goel MD;  Location: [facility];  Service: OB/GYN;  Laterality: N/A;    CYSTOSCOPY W/ RETROGRADES Bilateral 12/7/2020    Procedure: CYSTOSCOPY, WITH RETROGRADE PYELOGRAM;  Surgeon: Daniel Jalloh MD;  Location: [address];  Service: Urology;  Laterality: Bilateral;    FRACTURE SURGERY Right     wrist    HYSTERECTOMY  2010    [facility]    JOINT REPLACEMENT      KNEE ARTHROSCOPY W/ DEBRIDEMENT      KNEE SURGERY Right     Knee replacement    LITHOTRIPSY      NE REMOVAL OF OVARY/TUBE(S)  9/9/13    benign    removal of round ligament mass      ROBOT-ASSISTED LAPAROSCOPIC LYSIS OF ADHESIONS USING DA JAMES XI N/A 10/26/2020    Procedure: XI ROBOTIC LYSIS, ADHESIONS;  Surgeon: Wilner Goel MD;  Location: [facility];  Service: OB/GYN;  Laterality: N/A;  ROBOTIC MOBILIZATION OF BLADDER- DR BURNHAM    SPINE SURGERY      WRIST FRACTURE SURGERY Left        Social History     Socioeconomic History    Marital status:    Tobacco Use    Smoking status: Every Day     Current packs/day: 0.50     Average packs/day: 0.5 packs/day for 30.0 years (15.0 ttl pk-yrs)     Types: Cigarettes    Smokeless tobacco: Never   Substance and Sexual Activity    Alcohol use: No    Drug use: No    Sexual activity:  Not Currently     Partners: Male     Birth control/protection: Surgical   Social History Narrative    ** Merged History Encounter **          Social Drivers of Health     Financial Resource Strain: Patient Unable To Answer (2/6/2025)    Overall Financial Resource Strain (CARDIA)     Difficulty of Paying Living Expenses: Patient unable to answer   Recent Concern: Financial Resource Strain - Medium Risk (1/26/2025)    Overall Financial Resource Strain (CARDIA)     Difficulty of Paying Living Expenses: Somewhat hard   Food Insecurity: Patient Unable To Answer (2/6/2025)    Hunger Vital Sign     Worried About Running Out of Food in the Last Year: Patient unable to answer     Ran Out of Food in the Last Year: Patient unable to answer   Recent Concern: Food Insecurity - Food Insecurity Present (1/26/2025)    Hunger Vital Sign     Worried About Running Out of Food in the Last Year: Sometimes true     Ran Out of Food in the Last Year: Sometimes true   Transportation Needs: Patient Unable To Answer (2/6/2025)    TRANSPORTATION NEEDS     Transportation : Patient unable to answer   Physical Activity: Unknown (1/26/2025)    Exercise Vital Sign     Days of Exercise per Week: 0 days   Stress: Patient Unable To Answer (2/6/2025)    [employer] - Occupational Stress Questionnaire     Feeling of Stress : Patient unable to answer   Recent Concern: Stress - Stress Concern Present (1/26/2025)    [employer] - Occupational Stress Questionnaire     Feeling of Stress : To some extent   Housing Stability: Patient Unable To Answer (2/6/2025)    Housing Stability Vital Sign     Unable to Pay for Housing in the Last Year: Patient unable to answer     Homeless in the Last Year: Patient unable to answer           Current Outpatient Medications   Medication Instructions    albuterol (PROVENTIL/VENTOLIN HFA) 90 mcg/actuation inhaler 1-2 puffs, Every 6 hours PRN    atorvastatin (LIPITOR) 20  "mg, Oral, Daily    benazepriL (LOTENSIN) 5 mg, Oral, Daily    blood-glucose meter (TRUE METRIX GLUCOSE METER MISC) True Metrix Glucose Meter    blood-glucose meter,continuous (DEXCOM ) Misc 1 each, Misc.(Non-Drug; Combo Route), Continuous    blood-glucose sensor (DEXCOM G7 SENSOR) Korina 1 each, Misc.(Non-Drug; Combo Route), Every 10 days    calcium carb, citrate-vit D3 600 mg-12.5 mcg (500 unit) TbSR 1 each, Oral, 2 times daily    diclofenac (VOLTAREN) 75 mg, Oral, 2 times daily    DULoxetine (CYMBALTA) 30 mg, Oral, Daily    empagliflozin (JARDIANCE) 25 mg tablet 1 tablet, Daily    fluticasone propionate (FLONASE) 50 mcg, Each Nostril, Daily    fluticasone propionate (FLOVENT HFA) 44 mcg/actuation inhaler 1 puff, Daily    HYDROcodone-acetaminophen (NORCO)  mg per tablet 1 tablet, Daily PRN    HYDROcodone-acetaminophen (NORCO) 7.5-325 mg per tablet     insulin syringe-needle U-100 (INSULIN SYRINGE MICROFINE) 1 mL 27 gauge x 5/8" Syrg 1 each, Misc.(Non-Drug; Combo Route), 2 times daily    LANTUS U-100 INSULIN 20 Units, Subcutaneous, Nightly, INCREASE BY 2 UNITS EVERY DAY UNTIL CBG PERSISTENTLY BELOW 150    methIMAzole (TAPAZOLE) 10 mg, Oral, Daily    NOVOLOG FLEXPEN U-100 INSULIN 100 unit/mL (3 mL) InPn pen Inject 1 to 5 unites subq before meals and at bedtime as needed    OZEMPIC 0.25 mg, Subcutaneous, Every 7 days    [START ON 2/15/2025] OZEMPIC 0.5 mg, Subcutaneous, Every 7 days    pregabalin (LYRICA) 75 mg, Oral, 3 times daily    tiZANidine (ZANAFLEX) 8-12 mg, Oral, Nightly PRN    traZODone (DESYREL) 50 mg, Oral, Nightly    TRUE METRIX GLUCOSE TEST STRIP Strp 1 each, Topical (Top), 3 times daily    zolpidem (AMBIEN) 5 mg, Oral, Nightly PRN, May take with 5-10 mg of Melatonin       Review of patient's allergies indicates:   Allergen Reactions    Penicillins Shortness Of Breath, Itching and Swelling     Tolerates cefepime 11/30/2020    Penicillins Anaphylaxis        Current Inpatient Medications   " heparin (porcine)  5,000 Units Subcutaneous Q8H    mupirocin   Nasal BID       Current Intravenous Infusions   insulin regular 1 units/mL infusion orderable (DKA)  0-0.2 Units/kg/hr Intravenous Continuous 1.7 mL/hr at 02/06/25 0531 0.02 Units/kg/hr at 02/06/25 0531    lactated ringers   Intravenous Continuous 100 mL/hr at 02/06/25 0531 Rate Verify at 02/06/25 0531         Review of Systems   Unable to perform ROS: Mental status change          Objective:       Intake/Output Summary (Last 24 hours) at 2/6/2025 0552  Last data filed at 2/6/2025 0531  Gross per 24 hour   Intake 3363.38 ml   Output 2225 ml   Net 1138.38 ml         Vital Signs (Most Recent):  Temp: 98.3 °F (36.8 °C) (02/06/25 0400)  Pulse: 63 (02/06/25 0500)  Resp: 14 (02/06/25 0500)  BP: 136/74 (02/06/25 0500)  SpO2: 100 % (02/06/25 0500)  Body mass index is 29.76 kg/m².  Weight: 86.2 kg (190 lb) Vital Signs (24h Range):  Temp:  [98.3 °F (36.8 °C)-99.2 °F (37.3 °C)] 98.3 °F (36.8 °C)  Pulse:  [63-81] 63  Resp:  [2-23] 14  SpO2:  [96 %-100 %] 100 %  BP: (117-160)/(44-74) 136/74     Physical Exam  Vitals reviewed.   Constitutional:       Appearance: She is ill-appearing.      Comments: Exam limited secondary to patient participation.  At times able to answer questions other times patient groaning.   HENT:      Head: Normocephalic and atraumatic.   Eyes:      Conjunctiva/sclera: Conjunctivae normal.      Pupils: Pupils are equal, round, and reactive to light.   Cardiovascular:      Rate and Rhythm: Normal rate and regular rhythm.      Pulses: Normal pulses.      Heart sounds: Normal heart sounds.   Pulmonary:      Effort: Pulmonary effort is normal.      Breath sounds: Normal breath sounds.   Abdominal:      General: Bowel sounds are normal. There is no distension.      Palpations: Abdomen is soft.      Tenderness: There is no abdominal tenderness.   Musculoskeletal:      Right lower leg: No edema.      Left lower leg: No edema.   Skin:     General: Skin  is dry.   Neurological:      Cranial Nerves: No cranial nerve deficit.      Sensory: No sensory deficit.      Comments: Patient tracking with eyes upon name call.  Withdrawing to pain in 4 for extremity           Lines/Drains/Airways       Drain  Duration                  Urethral Catheter 02/04/25 1233 16 Fr. 1 day              Peripheral Intravenous Line  Duration                  Peripheral IV - Single Lumen 02/04/25 1233 18 G Anterior;Distal;Right Upper Arm 1 day         Peripheral IV - Single Lumen 02/04/25 1501 18 G Left Antecubital 1 day                    Significant Labs:    Lab Results   Component Value Date    WBC 8.17 02/06/2025    HGB 10.9 (L) 02/06/2025    HCT 32.6 (L) 02/06/2025    MCV 92.6 02/06/2025     02/06/2025           BMP  Lab Results   Component Value Date     02/06/2025    K 4.0 02/06/2025    CO2 16 (L) 02/06/2025    BUN 22.9 (H) 02/06/2025    CREATININE 1.60 (H) 02/06/2025    CALCIUM 8.1 (L) 02/06/2025    AGAP 11.0 02/06/2025    ESTGFRAFRICA >60.0 04/04/2022    EGFRNONAA 54.8 (A) 04/04/2022         ABG  Recent Labs   Lab 02/04/25  1430   PH 7.030*   PO2 81.0   PCO2 <19.0*   HCO3 4.5*   POCBASEDEF -24.60*       Mechanical Ventilation Support:         Significant Imaging:  I have reviewed the pertinent imaging within the past 24 hours.        Assessment/Plan:     Assessment  DKA  LAURIE  Acute urinary retention  Acute diarrhea  Chronic back pain  Past medical history of type 2 diabetes mellitus, CKD, hypertension, hyperlipidemia, hyperthyroidism, GERD, glaucoma, anxiety       Plan  -Admitted to ICU with cardiac monitoring, DKA Protochol initiated  -On arrival: CBG: >500  Urine [place]: Positive  Bicarb: 5  Gap: 31  BHB: 13.8  pH: 7.030  -HbA1c on 1/7/25:  9.2. Defer repeat given value obtained in last 3 months  -Likely 2/2 non-compliance vs infection  -Home DM regimen:    -Start insulin gtt per protocol with q1h accuchecks while on the drip.    -Transition to SQ insulin and  continue the drip for additional 2 hours if able to tolerate PO w/o N/V  Bicarb > 18  Anion gap < 12  Glucose < 250 on 2 consecutive readings.    SQ insulin dosing: SSI or 0.5-0.8 U/kg divided between Long and short acting  -Continue LR at 125 cc/hr. Once blood sugar reaches 200, transition to D5 +LR @ 125 cc/h  -Monitor electrolytes with BMP q4h, replete per DKA protocol. Keep K > 4, Mg > 2, Phos > 3  -Bariatric clear liquid diet while on insulin gtt then change to Diabetic diet when initiating subq insulin with accuchecks 4x daily before meals and at bedtime (AC and HS)  -Continue close ICU monitoring. Once off drip and stable, downgrade to floor    - LAURIE likely secondary to acute urinary retention and dehydration, improving   - Cifuentes catheter in place, Cifuentes trail in AM   - us retroperitoneal with mild right hydronephrosis  - GI panel, C-diff studies ordered not collected    DVT Prophylaxis:  Heparin 5 K  GI Prophylaxis: famotidine       Virgie Mays MD  Pulmonary Critical Care Medicine  [facility] - Emergency Dept  DOS: 02/06/2025

## 2025-02-06 NOTE — PROGRESS NOTES
Inpatient Nutrition Assessment    Admit Date: 2/4/2025   Total duration of encounter: 2 days   Patient Age: 64 y.o.    Nutrition Recommendation/Prescription     Continue Diet diabetic Pureed (IDDSI Level 4); 2000 Calories (up to 75 gm per meal) per SLP recs for consistency.  Add Boost Glucose Control (provides 190 kcal, 16 g protein per serving) TID.    Communication of Recommendations: reviewed with nurse and reviewed with patient    Nutrition Assessment     Malnutrition Assessment/Nutrition-Focused Physical Exam    Malnutrition Context: acute illness or injury (02/06/25 1238)  Malnutrition Level: severe (02/06/25 1238)  Energy Intake (Malnutrition): less than or equal to 75% for greater than or equal to 1 month (02/06/25 1238)  Weight Loss (Malnutrition): greater than 5% in 1 month (02/06/25 1238)                                         Fluid Accumulation (Malnutrition): other (see comments) (Not present) (02/06/25 1238)        A minimum of two characteristics is recommended for diagnosis of either severe or non-severe malnutrition.    Chart Review    Reason Seen: continuous nutrition monitoring and malnutrition screening tool (MST)    Malnutrition Screening Tool Results   Have you recently lost weight without trying?: Unsure  Have you been eating poorly because of a decreased appetite?: No   MST Score: 2   Diagnosis:  DKA  LAURIE   Acute urinary retention  Acute diarrhea  Chronic back pain    Relevant Medical History: DM, CKD, HTN, HLD, GERD, glaucoma, anxiety, hyperthyroidism    Scheduled Medications:  atorvastatin, 20 mg, Daily  DULoxetine, 30 mg, Daily  fluticasone furoate, 1 puff, Daily  heparin (porcine), 5,000 Units, Q8H  methIMAzole, 10 mg, Daily  mupirocin, , BID  pregabalin, 75 mg, TID    Continuous Infusions:  insulin regular 1 units/mL infusion orderable (DKA), Last Rate: 0.02 Units/kg/hr (02/06/25 0531)  lactated ringers, Last Rate: 100 mL/hr at 02/06/25 0642    PRN Medications:  dextrose 5% lactated  ringers, , Daily PRN  dextrose 50%, 12.5 g, PRN  dextrose 50%, 25 g, PRN  glucagon (human recombinant), 1 mg, PRN  glucose, 16 g, PRN  glucose, 24 g, PRN  hydrALAZINE, 10 mg, Q6H PRN  HYDROcodone-acetaminophen, 1 tablet, Q6H PRN  insulin aspart U-100, 0-5 Units, QID (AC + HS) PRN  labetalol, 10 mg, Q4H PRN  ondansetron, 4 mg, Q6H PRN  potassium chloride, 40 mEq, PRN   And  potassium chloride, 60 mEq, PRN   And  potassium chloride, 80 mEq, PRN  sodium chloride 0.9%, 10 mL, PRN    Calorie Containing IV Medications: no significant kcals from medications at this time    Recent Labs   Lab 02/04/25  1232 02/04/25  1555 02/05/25  0243 02/05/25  0712 02/05/25  0942 02/05/25  1432 02/05/25  1651 02/05/25  2011 02/06/25  0146 02/06/25  0648 02/06/25  1040   *   < > 138   < > 141 138 140 137 139 135* 138   K 6.1*   < > 4.6   < > 4.5 4.2 3.8 4.3 4.0 3.5 3.4*   CALCIUM 8.0*   < > 8.3*   < > 8.4 8.7 8.5 8.7 8.1* 8.2* 8.1*   PHOS  --    < > 2.9   < > 2.4 2.2* 1.8* 2.3 2.3 2.0* 2.3   MG 3.00*   < > 2.50   < > 2.40 2.40 2.30 2.40 2.10 2.00 2.00   CL 92*   < > 113*   < > 115* 112* 115* 110* 112* 110* 109*   CO2 6*   < > 11*   < > 16* 16* 18* 16* 16* 16* 21*   BUN 56.7*   < > 41.1*   < > 33.9* 30.4* 29.6* 26.5* 22.9* 20.3* 19.6   CREATININE 4.38*   < > 2.58*   < > 2.28* 2.12* 1.88* 1.85* 1.60* 1.52* 1.48*   EGFRNORACEVR 11   < > 20   < > 23 25 29 30 35 38 39   GLUCOSE 733*   < > 108   < > 97 139* 97 195* 178* 124* 179*   BILITOT 0.2  --   --   --   --   --   --   --   --   --   --    ALKPHOS 162*  --   --   --   --   --   --   --   --   --   --    ALT 14  --   --   --   --   --   --   --   --   --   --    AST 12  --   --   --   --   --   --   --   --   --   --    ALBUMIN 4.0  --   --   --   --   --   --   --   --   --   --    WBC 10.68  --  11.48  --   --   --   --   --  8.17  --   --    HGB 12.1  --  11.8*  --   --   --   --   --  10.9*  --   --    HCT 37.9  --  35.4*  --   --   --   --   --  32.6*  --   --     < > = values in  "this interval not displayed.     Nutrition Orders:  Diet diabetic Pureed (IDDSI Level 4); 2000 Calories (up to 75 gm per meal)  Dietary nutrition supplements TID; Boost Glucose Control - Vanilla    Appetite/Oral Intake: poor/0-25% of meals  Factors Affecting Nutritional Intake: decreased appetite  Social Needs Impacting Access to Food: none identified  Food/Judaism/Cultural Preferences: none reported  Food Allergies: no known food allergies  Last Bowel Movement: 25  Wound(s):  Incision documentation noted     Comments    25: Noted unsure wt loss per MST. Pt stated # but unsure of wt changes, time frames. Noted previous EMR wt indicating wt loss over past month. Offered ONS, pt agreeable. Poor po intake of meals. May need to consider appetite stimulant if po intake remains poor.     Anthropometrics    Height: 5' 7.01" (170.2 cm),    Last Weight: 86.2 kg (190 lb 0.6 oz) (25 1236), Weight Method: Estimated  BMI (Calculated): 29.8  BMI Classification: overweight (BMI 25-29.9)     Ideal Body Weight (IBW), Female: 135.05 lb     % Ideal Body Weight, Female (lb): 140.74 %                    Usual Body Weight (UBW), k.73 kg (per pt and EMR from 2025)  % Usual Body Weight: 88.39  % Weight Change From Usual Weight: -11.8 %  Usual Weight Provided By: patient and EMR weight history    Wt Readings from Last 5 Encounters:   25 86.2 kg (190 lb 0.6 oz)   25 93.9 kg (207 lb)   25 95.6 kg (210 lb 12.2 oz)   10/01/24 98.4 kg (217 lb)   24 96.6 kg (213 lb)     Weight Change(s) Since Admission:   Wt Readings from Last 1 Encounters:   25 1236 86.2 kg (190 lb 0.6 oz)   25 1128 86.2 kg (190 lb)   Admit Weight: 86.2 kg (190 lb) (25 1128), Weight Method: Estimated    Estimated Needs    Weight Used For Calorie Calculations: 86.2 kg (190 lb 0.6 oz)  Energy Calorie Requirements (kcal): 1865kcal (1.3 stress factor)  Energy Need Method: McLeod-St Bobor  Weight Used For " Protein Calculations: 86.2 kg (190 lb 0.6 oz)  Protein Requirements: 112-130gm (1.3-1.5g/kg)  Fluid Requirements (mL): 1865ml (1ml/kcal)  CHO Requirement: 210gm (45% est kcal needs)     Enteral Nutrition     Patient not receiving enteral nutrition at this time.    Parenteral Nutrition     Patient not receiving parenteral nutrition support at this time.    Evaluation of Received Nutrient Intake    Calories: not meeting estimated needs  Protein: not meeting estimated needs    Patient Education     Not applicable.    Nutrition Diagnosis     PES: Severe acute disease or injury related malnutrition Related to current condition As Evidenced by:  - weight loss: > 5% in 1 month - energy intake: <= 75% for 1 months (meets criteria for <= 75% >= 1 month (severe - chronic)) active    Nutrition Interventions     Intervention(s): general/healthful diet, commercial beverage, and collaboration with other providers  Intervention(s):      Goal: Meet greater than 80% of nutritional needs by follow-up. (new)  Goal: Consume % of oral supplements by follow-up. (new)    Nutrition Goals & Monitoring     Dietitian will monitor: food and beverage intake and energy intake  Discharge planning: resume home regimen  Nutrition Risk/Follow-Up: high (follow-up in 1-4 days)   Please consult if re-assessment needed sooner.

## 2025-02-07 LAB
ALBUMIN SERPL-MCNC: 2.7 G/DL (ref 3.4–4.8)
ALBUMIN/GLOB SERPL: 0.9 RATIO (ref 1.1–2)
ALP SERPL-CCNC: 94 UNIT/L (ref 40–150)
ALT SERPL-CCNC: 9 UNIT/L (ref 0–55)
ANION GAP SERPL CALC-SCNC: 8 MEQ/L
AST SERPL-CCNC: 14 UNIT/L (ref 5–34)
BASOPHILS # BLD AUTO: 0.02 X10(3)/MCL
BASOPHILS NFR BLD AUTO: 0.3 %
BILIRUB SERPL-MCNC: 0.4 MG/DL
BUN SERPL-MCNC: 16.3 MG/DL (ref 9.8–20.1)
CALCIUM SERPL-MCNC: 7.9 MG/DL (ref 8.4–10.2)
CHLORIDE SERPL-SCNC: 105 MMOL/L (ref 98–107)
CO2 SERPL-SCNC: 20 MMOL/L (ref 23–31)
CREAT SERPL-MCNC: 1.1 MG/DL (ref 0.55–1.02)
CREAT/UREA NIT SERPL: 15
EOSINOPHIL # BLD AUTO: 0.01 X10(3)/MCL (ref 0–0.9)
EOSINOPHIL NFR BLD AUTO: 0.2 %
ERYTHROCYTE [DISTWIDTH] IN BLOOD BY AUTOMATED COUNT: 14.2 % (ref 11.5–17)
GFR SERPLBLD CREATININE-BSD FMLA CKD-EPI: 56 ML/MIN/1.73/M2
GLOBULIN SER-MCNC: 2.9 GM/DL (ref 2.4–3.5)
GLUCOSE SERPL-MCNC: 263 MG/DL (ref 82–115)
GLUCOSE SERPL-MCNC: 399 MG/DL (ref 82–115)
HCT VFR BLD AUTO: 30.9 % (ref 37–47)
HGB BLD-MCNC: 10.3 G/DL (ref 12–16)
IMM GRANULOCYTES # BLD AUTO: 0.02 X10(3)/MCL (ref 0–0.04)
IMM GRANULOCYTES NFR BLD AUTO: 0.3 %
LYMPHOCYTES # BLD AUTO: 2.47 X10(3)/MCL (ref 0.6–4.6)
LYMPHOCYTES NFR BLD AUTO: 42.3 %
MCH RBC QN AUTO: 30.4 PG (ref 27–31)
MCHC RBC AUTO-ENTMCNC: 33.3 G/DL (ref 33–36)
MCV RBC AUTO: 91.2 FL (ref 80–94)
MONOCYTES # BLD AUTO: 0.46 X10(3)/MCL (ref 0.1–1.3)
MONOCYTES NFR BLD AUTO: 7.9 %
NEUTROPHILS # BLD AUTO: 2.86 X10(3)/MCL (ref 2.1–9.2)
NEUTROPHILS NFR BLD AUTO: 49 %
NRBC BLD AUTO-RTO: 0 %
PLATELET # BLD AUTO: 186 X10(3)/MCL (ref 130–400)
PMV BLD AUTO: 10.7 FL (ref 7.4–10.4)
POCT GLUCOSE: 194 MG/DL (ref 70–110)
POCT GLUCOSE: 196 MG/DL (ref 70–110)
POCT GLUCOSE: 435 MG/DL (ref 70–110)
POCT GLUCOSE: 459 MG/DL (ref 70–110)
POTASSIUM SERPL-SCNC: 4.2 MMOL/L (ref 3.5–5.1)
PROT SERPL-MCNC: 5.6 GM/DL (ref 5.8–7.6)
RBC # BLD AUTO: 3.39 X10(6)/MCL (ref 4.2–5.4)
SODIUM SERPL-SCNC: 133 MMOL/L (ref 136–145)
WBC # BLD AUTO: 5.84 X10(3)/MCL (ref 4.5–11.5)

## 2025-02-07 PROCEDURE — 85025 COMPLETE CBC W/AUTO DIFF WBC: CPT

## 2025-02-07 PROCEDURE — 63600175 PHARM REV CODE 636 W HCPCS

## 2025-02-07 PROCEDURE — 25000003 PHARM REV CODE 250: Performed by: INTERNAL MEDICINE

## 2025-02-07 PROCEDURE — 25000003 PHARM REV CODE 250: Performed by: STUDENT IN AN ORGANIZED HEALTH CARE EDUCATION/TRAINING PROGRAM

## 2025-02-07 PROCEDURE — 63600175 PHARM REV CODE 636 W HCPCS: Performed by: STUDENT IN AN ORGANIZED HEALTH CARE EDUCATION/TRAINING PROGRAM

## 2025-02-07 PROCEDURE — 80053 COMPREHEN METABOLIC PANEL: CPT | Performed by: STUDENT IN AN ORGANIZED HEALTH CARE EDUCATION/TRAINING PROGRAM

## 2025-02-07 PROCEDURE — 82947 ASSAY GLUCOSE BLOOD QUANT: CPT | Performed by: INTERNAL MEDICINE

## 2025-02-07 PROCEDURE — 25000003 PHARM REV CODE 250

## 2025-02-07 PROCEDURE — 36415 COLL VENOUS BLD VENIPUNCTURE: CPT | Performed by: INTERNAL MEDICINE

## 2025-02-07 PROCEDURE — 97162 PT EVAL MOD COMPLEX 30 MIN: CPT

## 2025-02-07 PROCEDURE — 25000242 PHARM REV CODE 250 ALT 637 W/ HCPCS

## 2025-02-07 PROCEDURE — 21400001 HC TELEMETRY ROOM

## 2025-02-07 PROCEDURE — 36415 COLL VENOUS BLD VENIPUNCTURE: CPT

## 2025-02-07 PROCEDURE — 92526 ORAL FUNCTION THERAPY: CPT

## 2025-02-07 RX ADMIN — METHIMAZOLE 10 MG: 10 TABLET ORAL at 11:02

## 2025-02-07 RX ADMIN — INSULIN ASPART 6 UNITS: 100 INJECTION, SOLUTION INTRAVENOUS; SUBCUTANEOUS at 06:02

## 2025-02-07 RX ADMIN — HEPARIN SODIUM 5000 UNITS: 5000 INJECTION, SOLUTION INTRAVENOUS; SUBCUTANEOUS at 06:02

## 2025-02-07 RX ADMIN — FLUTICASONE FUROATE 1 PUFF: 100 POWDER RESPIRATORY (INHALATION) at 10:02

## 2025-02-07 RX ADMIN — MUPIROCIN: 20 OINTMENT TOPICAL at 10:02

## 2025-02-07 RX ADMIN — PREGABALIN 75 MG: 75 CAPSULE ORAL at 08:02

## 2025-02-07 RX ADMIN — PREGABALIN 75 MG: 75 CAPSULE ORAL at 10:02

## 2025-02-07 RX ADMIN — ATORVASTATIN CALCIUM 20 MG: 10 TABLET, FILM COATED ORAL at 10:02

## 2025-02-07 RX ADMIN — INSULIN ASPART 10 UNITS: 100 INJECTION, SOLUTION INTRAVENOUS; SUBCUTANEOUS at 11:02

## 2025-02-07 RX ADMIN — DULOXETINE HYDROCHLORIDE 30 MG: 30 CAPSULE, DELAYED RELEASE ORAL at 10:02

## 2025-02-07 RX ADMIN — HEPARIN SODIUM 5000 UNITS: 5000 INJECTION, SOLUTION INTRAVENOUS; SUBCUTANEOUS at 10:02

## 2025-02-07 RX ADMIN — HYDROCODONE BITARTRATE AND ACETAMINOPHEN 1 TABLET: 7.5; 325 TABLET ORAL at 05:02

## 2025-02-07 RX ADMIN — INSULIN ASPART 5 UNITS: 100 INJECTION, SOLUTION INTRAVENOUS; SUBCUTANEOUS at 11:02

## 2025-02-07 RX ADMIN — INSULIN GLARGINE 20 UNITS: 100 INJECTION, SOLUTION SUBCUTANEOUS at 10:02

## 2025-02-07 NOTE — PROGRESS NOTES
[facility] Medicine Progress Note        Chief Complaint: Inpatient Follow-up for DKA    HPI:   64-year-old lady with DM type 1, HTN, HLD, CKD stage 3, GERD, hypothyroidism, glaucoma, chronic back pain who was admitted to [facility] ICU with complaints of nausea, vomiting and diarrhea for the last 3-4 days with urine retention. She was found to have hyponatremia, hyperkalemia, LAURIE, a GME with lactic acidemia, ketonemia due to DKA and intravascular depletion. She was started on IV insulin protocol and IV fluids with notable improvement. She also required March placement with 2500 mL urine returning. She was able to be weaned off of IV insulin infusion and started on long-acting insulin after she was able to tolerate p.o. intake without nausea or vomiting once her serum CO2 was more than 18 and anion gap had closed. She was downgraded to Hospital Medicine. At bedside, patient reported having back pain which is chronic for which he takes Norco at home. Denied any new complaints. Able to tolerate p.o. intake. Speech therapy on board, patient on modified diet. PT/OT consulted. LAURIE improving with IVF.     Interval Hx:   Patient today awake and comfortable. Eating well. Denies any fever or chills. Has a march and will be removed today.     No family at bedside.     Case was discussed with patient's nurse and  on the floor.    Objective/physical exam:  General: In no acute distress  Chest: Clear to auscultation bilaterally  Heart: RRR, +S1, S2, no appreciable murmur  Abdomen: Soft, nontender, BS +  Neurologic: Alert and oriented x4, Cranial nerve II-XII intact, Strength 5/5 in all 4 extremities    VITAL SIGNS: 24 HRS MIN & MAX LAST   Temp  Min: 97.9 °F (36.6 °C)  Max: 98.8 °F (37.1 °C) 98 °F (36.7 °C)   BP  Min: 121/50  Max: 138/62 130/73   Pulse  Min: 63  Max: 71  66   Resp  Min: 10  Max: 18 18   SpO2  Min: 97 %  Max: 100 % 98 %     I have reviewed the following labs:  Recent  Labs   Lab 02/05/25  0243 02/06/25  0146 02/07/25  0454   WBC 11.48 8.17 5.84   RBC 3.88* 3.52* 3.39*   HGB 11.8* 10.9* 10.3*   HCT 35.4* 32.6* 30.9*   MCV 91.2 92.6 91.2   MCH 30.4 31.0 30.4   MCHC 33.3 33.4 33.3   RDW 14.3 14.6 14.2    185 186   MPV 10.3 10.4 10.7*     Recent Labs   Lab 02/04/25  1232 02/04/25  1430 02/04/25  1555 02/06/25  1350 02/06/25  1556 02/06/25 2042 02/07/25  0454   *  --    < > 136 134* 134* 133*   K 6.1*  --    < > 3.6 4.3 3.8 4.2   CL 92*  --    < > 109* 109* 107 105   CO2 6*  --    < > 21* 19* 19* 20*   BUN 56.7*  --    < > 18.7 18.0 18.0 16.3   CREATININE 4.38*  --    < > 1.52* 1.44* 1.18* 1.10*   CALCIUM 8.0*  --    < > 8.1* 7.9* 7.9* 7.9*   PH  --  7.030*  --   --   --   --   --    MG 3.00*  --    < > 2.00 2.00 1.80  --    ALBUMIN 4.0  --   --   --   --   --  2.7*   ALKPHOS 162*  --   --   --   --   --  94   ALT 14  --   --   --   --   --  9   AST 12  --   --   --   --   --  14   BILITOT 0.2  --   --   --   --   --  0.4    < > = values in this interval not displayed.     Microbiology Results (last 7 days)       Procedure Component Value Units Date/Time    Clostridium Diff Toxin, A & B, EIA [[id number]]     Order Status: Canceled Specimen: Stool              See below for Radiology    Assessment/Plan:  DKA resolved   DM2 stable   LAURIE on CKD : stable   Urine Retention req March  HTN  HLD  GERD  Hypothyroidism  Glaucoma  Chronic back pain    Plan:  Patient looks comfortable  Has been afebrile  Blood sugars stable. Continue lantus and accu checks     Reviewed today's labs and  , K 4.2, BUN 16, Crt 1.10    Dc march today    Continue strict aspiration, fall and decubitus precautions        VTE prophylaxis: Sq heparin     Patient condition:  Fair    Anticipated discharge and Disposition:   Home with        All diagnosis and differential diagnosis have been reviewed; assessment and plan has been documented; I have personally reviewed the labs and test results that are  presently available; I have reviewed the patients medication list; I have reviewed the consulting providers response and recommendations. I have reviewed or attempted to review medical records based upon their availability    All of the patient's questions have been  addressed and answered. Patient's is agreeable to the above stated plan. I will continue to monitor closely and make adjustments to medical management as needed.    Portions of this note dictated using EMR integrated voice recognition software, and may be subject to voice recognition errors not corrected at proofreading. Please contact writer for clarification if needed.   _____________________________________________________________________    Malnutrition Status:  Nutrition consulted. Most recent weight and BMI monitored-     Measurements:  Wt Readings from Last 1 Encounters:   02/06/25 86.2 kg (190 lb 0.6 oz)   Body mass index is 29.76 kg/m².    Patient has been screened and assessed by RD.    Malnutrition Type:  Context: acute illness or injury  Level: severe    Malnutrition Characteristic Summary:  Weight Loss (Malnutrition): greater than 5% in 1 month  Energy Intake (Malnutrition): less than or equal to 75% for greater than or equal to 1 month  Fluid Accumulation (Malnutrition): other (see comments) (Not present)    Interventions/Recommendations (treatment strategy):        Scheduled Med:   atorvastatin  20 mg Oral Daily    DULoxetine  30 mg Oral Daily    fluticasone furoate  1 puff Inhalation Daily    heparin (porcine)  5,000 Units Subcutaneous Q8H    insulin glargine U-100  20 Units Subcutaneous Daily    methIMAzole  10 mg Oral Daily    mupirocin   Nasal BID    pregabalin  75 mg Oral TID      Continuous Infusions:     PRN Meds:    Current Facility-Administered Medications:     dextrose 5% lactated ringers, , Intravenous, Daily PRN    dextrose 50%, 12.5 g, Intravenous, PRN    dextrose 50%, 12.5 g, Intravenous, PRN    dextrose 50%, 25 g,  Intravenous, PRN    dextrose 50%, 25 g, Intravenous, PRN    glucagon (human recombinant), 1 mg, Intramuscular, PRN    glucose, 16 g, Oral, PRN    glucose, 24 g, Oral, PRN    hydrALAZINE, 10 mg, Intravenous, Q6H PRN    HYDROcodone-acetaminophen, 1 tablet, Oral, Q6H PRN    insulin aspart U-100, 0-10 Units, Subcutaneous, QID (AC + HS) PRN    labetalol, 10 mg, Intravenous, Q4H PRN    ondansetron, 4 mg, Oral, Q6H PRN    potassium chloride, 40 mEq, Intravenous, PRN **AND** potassium chloride, 60 mEq, Intravenous, PRN **AND** potassium chloride, 80 mEq, Intravenous, PRN    sodium chloride 0.9%, 10 mL, Intravenous, PRN     Radiology:  I have personally reviewed the following imaging and agree with the radiologist.     X-Ray Lumbar Spine 2 Or 3 Views  Narrative: EXAMINATION:  XR LUMBAR SPINE 2 OR 3 VIEWS    CLINICAL HISTORY:  Back pain;    COMPARISON:  None.    FINDINGS:  The lowest fully formed disc space is labeled as L5-S1.  Alignment is normal.  There is mild disc height loss at L4-5 with small multilevel marginal osteophytes.  There is moderate facet arthropathy.  The soft tissues are unremarkable.  Impression: 1. No acute abnormality identified.  2. Multilevel degenerative changes of the lumbar spine.    Electronically signed by: Porsha Self  Date:    02/04/2025  Time:    17:29  US Retroperitoneal Limited  Narrative: EXAMINATION:  US RETROPERITONEAL LIMITED    CLINICAL HISTORY:  LAURIE; urinary retention;    COMPARISON:  7 January 2025, 8 December 2020    FINDINGS:  Grayscale and color Doppler sonographic evaluation of the kidneys and urinary bladder.    The right kidney measures 8 cm. The left kidney measures 12 cm.   Mild right hydronephrosis.  Prominence of the left renal collecting system without gross hydronephrosis.    Cifuentes in the urinary bladder.  Impression: Mild right hydronephrosis.    Electronically signed by: Gonzalez Chawla  Date:    02/04/2025  Time:    16:39  CT Head Without Contrast  Narrative:  EXAMINATION:  CT HEAD WITHOUT CONTRAST    CLINICAL HISTORY:  Mental status change, unknown cause;    TECHNIQUE:  Sequential axial images were performed of the brain without contrast.    Dose product length of 950 mGycm. Automated exposure control was utilized to minimize radiation dose.    COMPARISON:  July 8, 2012.    FINDINGS:  Gray-white matter differentiation is unremarkable.  There is no intracranial mass effect, midline shift, hydrocephalus or hemorrhage. There is no sulcal effacement or low attenuation changes to suggest recent large vessel territory infarction. There is no acute extra axial fluid collection.  Mild mucoperiosteal thickening of the right maxillary sinus.  Otherwise, visualized paranasal sinuses are clear without mucosal thickening, polypoidal abnormality or air-fluid levels. Mastoid air cells aeration is optimal.  Impression: No acute intracranial findings identified.    Electronically signed by: Yunier Kelley  Date:    02/04/2025  Time:    13:23  X-Ray Chest AP Portable  Narrative: EXAMINATION:  XR CHEST AP PORTABLE    CLINICAL HISTORY:  confusion;, .    COMPARISON:  January 6, 2025    FINDINGS:  No alveolar consolidation, effusion, or pneumothorax is seen.   The thoracic aorta is normal  cardiac silhouette, central pulmonary vessels and mediastinum are normal in size and are grossly unremarkable.   visualized osseous structures are grossly unremarkable.  Impression: No acute chest disease is identified.    Electronically signed by: Carlos Goldman  Date:    02/04/2025  Time:    12:36      Jose Kan MD  Department of Hospital Medicine   [facility]   02/07/2025

## 2025-02-07 NOTE — H&P
[facility] Medicine History & Physical Examination       Patient Name: Navin Beck  MRN: [id number]  Patient Class: IP- Inpatient   Admission Date: 2/4/2025   Admitting Physician: DEJAH Service   Length of Stay: 3  Attending Physician: Abelardo Serrano MD  Primary Care Provider: Seema Mirza MD  Face-to-Face encounter date: 02/07/2025  Code Status:Code Status Discussion Note   Chief Complaint: Hyperglycemia (Pt with vomiting x3-4 days and CBG reading HI)        Patient information was obtained from patient, patient's family, past medical records and ER records.     HISTORY OF PRESENT ILLNESS:   64-year-old lady with DM type 1, HTN, HLD, CKD stage 3, GERD, hypothyroidism, glaucoma, chronic back pain who was admitted to [facility] ICU with complaints of nausea, vomiting and diarrhea for the last 3-4 days with urine retention.  She was found to have hyponatremia, hyperkalemia, LAURIE, a GME with lactic acidemia, ketonemia due to DKA and intravascular depletion.  She was started on IV insulin protocol and IV fluids with notable improvement.  She also required Cifuentes placement with 2500 mL urine returning.  She was able to be weaned off of IV insulin infusion and started on long-acting insulin after she was able to tolerate p.o. intake without nausea or vomiting once her serum CO2 was more than 18 and anion gap had closed.  She was downgraded to Hospital Medicine.  At bedside, patient reported having back pain which is chronic for which he takes Norco at home.  Denied any new complaints.  Able to tolerate p.o. intake.  Speech therapy on board, patient on modified diet.  PT/OT consulted.  LAURIE improving with IVF..    PAST MEDICAL HISTORY:     Past Medical History:   Diagnosis Date    Allergy     Anemia due to stage 3 chronic kidney disease 12/6/2020    Arrhythmia     Arthritis     Bronchial asthma     Diabetes mellitus     Diabetes mellitus     Glaucoma     Hormone disorder      hysterectomy    Hypercholesteremia     Hypertension     Hyperthyroidism     Insomnia     Kidney stones     Neuropathy     Thyroid disease     Urine, incontinence, stress female     UTI (urinary tract infection) 11/27/2020       PAST SURGICAL HISTORY:     Past Surgical History:   Procedure Laterality Date    APPENDECTOMY      BACK SURGERY      disc removal     CARPAL TUNNEL RELEASE Bilateral     CHOLECYSTECTOMY      CYSTOSCOPY N/A 10/26/2020    Procedure: CYSTOSCOPY;  Surgeon: Wilner Goel MD;  Location: [facility];  Service: OB/GYN;  Laterality: N/A;    CYSTOSCOPY W/ RETROGRADES Bilateral 12/7/2020    Procedure: CYSTOSCOPY, WITH RETROGRADE PYELOGRAM;  Surgeon: Daniel Jalloh MD;  Location: [address];  Service: Urology;  Laterality: Bilateral;    FRACTURE SURGERY Right     wrist    HYSTERECTOMY  2010    [facility]    JOINT REPLACEMENT      KNEE ARTHROSCOPY W/ DEBRIDEMENT      KNEE SURGERY Right     Knee replacement    LITHOTRIPSY      [place] REMOVAL OF OVARY/TUBE(S)  9/9/13    benign    removal of round ligament mass      ROBOT-ASSISTED LAPAROSCOPIC LYSIS OF ADHESIONS USING DA JAMES XI N/A 10/26/2020    Procedure: XI ROBOTIC LYSIS, ADHESIONS;  Surgeon: Wilner Goel MD;  Location: [facility];  Service: OB/GYN;  Laterality: N/A;  ROBOTIC MOBILIZATION OF BLADDER- DR BURNHAM    SPINE SURGERY      WRIST FRACTURE SURGERY Left        ALLERGIES:   Penicillins and Penicillins    FAMILY HISTORY:   Reviewed and negative    SOCIAL HISTORY:     Social History     Tobacco Use    Smoking status: Every Day     Current packs/day: 0.50     Average packs/day: 0.5 packs/day for 30.0 years (15.0 ttl pk-yrs)     Types: Cigarettes    Smokeless tobacco: Never   Substance Use Topics    Alcohol use: No        HOME MEDICATIONS:     Prior to Admission medications    Medication Sig Start Date End Date Taking? Authorizing Provider   DULoxetine (CYMBALTA) 30 MG capsule Take 1 capsule (30 mg total) by mouth once daily.  1/16/25 1/16/26 Yes Seema Mirza MD   empagliflozin (JARDIANCE) 25 mg tablet Take 1 tablet by mouth once daily.   Yes Provider, Historical   HYDROcodone-acetaminophen (NORCO)  mg per tablet Take 1 tablet by mouth daily as needed. 1/29/25  Yes Provider, Historical   methIMAzole (TAPAZOLE) 10 MG Tab Take 1 tablet (10 mg total) by mouth once daily. 12/17/24  Yes Seema Mirza MD   tiZANidine (ZANAFLEX) 4 MG tablet Take 2-3 tablets (8-12 mg total) by mouth nightly as needed (muscle spasms). 12/17/24  Yes Seema Mirza MD   zolpidem (AMBIEN) 5 MG Tab Take 1 tablet (5 mg total) by mouth nightly as needed (insomnia). May take with 5-10 mg of Melatonin 12/17/24 6/17/25 Yes Seema Mirza MD   albuterol (PROVENTIL/VENTOLIN HFA) 90 mcg/actuation inhaler Inhale 1-2 puffs into the lungs every 6 (six) hours as needed for Wheezing or Shortness of Breath.  4/17/20   Provider, Historical   atorvastatin (LIPITOR) 20 MG tablet Take 1 tablet (20 mg total) by mouth once daily. 12/17/24   Seema Mirza MD   benazepriL (LOTENSIN) 5 MG tablet Take 1 tablet (5 mg total) by mouth once daily. 12/17/24 12/17/25  Seema Mirza MD   blood-glucose meter (TRUE METRIX GLUCOSE METER MISC) True Metrix Glucose Meter    Provider, Historical   blood-glucose meter,continuous (DEXCOM ) Misc 1 each by Misc.(Non-Drug; Combo Route) route continuous. 1/16/25 1/16/26  Seema Mirza MD   blood-glucose sensor (DEXCOM G7 SENSOR) Korina 1 each by Misc.(Non-Drug; Combo Route) route every 10 days. 1/16/25 1/16/26  Seema Mirza MD   calcium carb, citrate-vit D3 600 mg-12.5 mcg (500 unit) TbSR Take 1 each by mouth 2 (two) times a day. 12/17/24   Seema Mirza MD   diclofenac (VOLTAREN) 75 MG EC tablet Take 1 tablet (75 mg total) by mouth 2 (two) times daily. 9/5/22   Van Quevedo,    fluticasone propionate (FLONASE) 50 mcg/actuation nasal spray 1 spray (50 mcg total) by Each Nostril route  "once daily. 1/16/25   Seema Mirza MD   fluticasone propionate (FLOVENT HFA) 44 mcg/actuation inhaler Inhale 1 puff into the lungs once daily.     Provider, Historical   HYDROcodone-acetaminophen (NORCO) 7.5-325 mg per tablet     Provider, Historical   insulin glargine U-100, Lantus, (LANTUS U-100 INSULIN) 100 unit/mL injection Inject 20 Units into the skin every evening. INCREASE BY 2 UNITS EVERY DAY UNTIL CBG PERSISTENTLY BELOW 150 1/16/25 2/15/25  Seema Mirza MD   insulin syringe-needle U-100 (INSULIN SYRINGE MICROFINE) 1 mL 27 gauge x 5/8" Syrg 1 each by Misc.(Non-Drug; Combo Route) route 2 (two) times a day. 12/17/24   Seema Mirza MD   NOVOLOG FLEXPEN U-100 INSULIN 100 unit/mL (3 mL) InPn pen Inject 1 to 5 unites subq before meals and at bedtime as needed 12/17/24   Seema Mirza MD   pregabalin (LYRICA) 75 MG capsule Take 1 capsule (75 mg total) by mouth 3 (three) times daily. 12/17/24 7/15/25  Seema Mirza MD   semaglutide (OZEMPIC) 0.25 mg or 0.5 mg (2 mg/3 mL) pen injector Inject 0.25 mg into the skin every 7 days. 1/16/25   Seema Mirza MD   semaglutide (OZEMPIC) 0.25 mg or 0.5 mg (2 mg/3 mL) pen injector Inject 0.5 mg into the skin every 7 days. 2/15/25   Seema Mirza MD   traZODone (DESYREL) 50 MG tablet Take 1 tablet (50 mg total) by mouth every evening. 12/17/24 12/17/25  Seema Mirza MD   TRUE METRIX GLUCOSE TEST STRIP Strp Apply 1 each topically 3 (three) times daily. 10/1/24   Seema Mirza MD       REVIEW OF SYSTEMS:   Except as documented, all other systems reviewed and negative     PHYSICAL EXAM:     VITAL SIGNS: 24 HRS MIN & MAX LAST   Temp  Min: 98.1 °F (36.7 °C)  Max: 98.8 °F (37.1 °C) 98.2 °F (36.8 °C)   BP  Min: 121/54  Max: 154/79 (!) 128/58   Pulse  Min: 63  Max: 84  65   Resp  Min: 0  Max: 22 10   SpO2  Min: 96 %  Max: 100 % 100 %       General: alert lady lying comfortably in bed, in no acute distress  HENT: oral and " oropharyngeal mucosa moist, pink, with no erythema or exudates, no ear pain or discharge  Neck: normal neck movement, no lymph nodes or swellings, no JVD or Carotid bruit  Respiratory: clear breathing sounds bilaterally, no crackles, rales, ronchi or wheezes  Cardiovascular: clear S1 and S2, no murmurs, rubs or gallops  Peripheral Vascular: no lesions, ulcers or erosions, normal peripheral pulses and no pedal edema  Gastrointestinal: soft, non-tender, non-distended abdomen, no guarding, rigidity or rebound tenderness, normal bowel sounds  Integumentary: normal skin color, no rashes or lesions  Neuro: AAO x 3; motor strength 5/5 in B/L UEs & LEs; sensation intact to gross and fine touch B/L; CN II-XII grossly intact    LABS AND IMAGING:     Recent Labs   Lab 02/04/25  1232 02/05/25  0243 02/06/25  0146   WBC 10.68 11.48 8.17   RBC 3.96* 3.88* 3.52*   HGB 12.1 11.8* 10.9*   HCT 37.9 35.4* 32.6*   MCV 95.7* 91.2 92.6   MCH 30.6 30.4 31.0   MCHC 31.9* 33.3 33.4   RDW 14.0 14.3 14.6    252 185   MPV 11.0* 10.3 10.4       Recent Labs   Lab 02/04/25  1232 02/04/25  1430 02/04/25  1555 02/06/25  1350 02/06/25  1556 02/06/25  2042   *  --    < > 136 134* 134*   K 6.1*  --    < > 3.6 4.3 3.8   CL 92*  --    < > 109* 109* 107   CO2 6*  --    < > 21* 19* 19*   BUN 56.7*  --    < > 18.7 18.0 18.0   CREATININE 4.38*  --    < > 1.52* 1.44* 1.18*   CALCIUM 8.0*  --    < > 8.1* 7.9* 7.9*   PH  --  7.030*  --   --   --   --    MG 3.00*  --    < > 2.00 2.00 1.80   ALBUMIN 4.0  --   --   --   --   --    ALKPHOS 162*  --   --   --   --   --    ALT 14  --   --   --   --   --    AST 12  --   --   --   --   --    BILITOT 0.2  --   --   --   --   --     < > = values in this interval not displayed.     X-Ray Lumbar Spine 2 Or 3 Views  Narrative: EXAMINATION:  XR LUMBAR SPINE 2 OR 3 VIEWS    CLINICAL HISTORY:  Back pain;    COMPARISON:  None.    FINDINGS:  The lowest fully formed disc space is labeled as L5-S1.  Alignment is  normal.  There is mild disc height loss at L4-5 with small multilevel marginal osteophytes.  There is moderate facet arthropathy.  The soft tissues are unremarkable.  Impression: 1. No acute abnormality identified.  2. Multilevel degenerative changes of the lumbar spine.    Electronically signed by: Porsha Self  Date:    02/04/2025  Time:    17:29  US Retroperitoneal Limited  Narrative: EXAMINATION:  US RETROPERITONEAL LIMITED    CLINICAL HISTORY:  LAURIE; urinary retention;    COMPARISON:  7 January 2025, 8 December 2020    FINDINGS:  Grayscale and color Doppler sonographic evaluation of the kidneys and urinary bladder.    The right kidney measures 8 cm. The left kidney measures 12 cm.   Mild right hydronephrosis.  Prominence of the left renal collecting system without gross hydronephrosis.    Cifuentes in the urinary bladder.  Impression: Mild right hydronephrosis.    Electronically signed by: Gonzalez Chawla  Date:    02/04/2025  Time:    16:39  CT Head Without Contrast  Narrative: EXAMINATION:  CT HEAD WITHOUT CONTRAST    CLINICAL HISTORY:  Mental status change, unknown cause;    TECHNIQUE:  Sequential axial images were performed of the brain without contrast.    Dose product length of 950 mGycm. Automated exposure control was utilized to minimize radiation dose.    COMPARISON:  July 8, 2012.    FINDINGS:  Gray-white matter differentiation is unremarkable.  There is no intracranial mass effect, midline shift, hydrocephalus or hemorrhage. There is no sulcal effacement or low attenuation changes to suggest recent large vessel territory infarction. There is no acute extra axial fluid collection.  Mild mucoperiosteal thickening of the right maxillary sinus.  Otherwise, visualized paranasal sinuses are clear without mucosal thickening, polypoidal abnormality or air-fluid levels. Mastoid air cells aeration is optimal.  Impression: No acute intracranial findings identified.    Electronically signed by: Yunier  Kelley  Date:    02/04/2025  Time:    13:23  X-Ray Chest AP Portable  Narrative: EXAMINATION:  XR CHEST AP PORTABLE    CLINICAL HISTORY:  confusion;, .    COMPARISON:  January 6, 2025    FINDINGS:  No alveolar consolidation, effusion, or pneumothorax is seen.   The thoracic aorta is normal  cardiac silhouette, central pulmonary vessels and mediastinum are normal in size and are grossly unremarkable.   visualized osseous structures are grossly unremarkable.  Impression: No acute chest disease is identified.    Electronically signed by: Carlos Goldman  Date:    02/04/2025  Time:    12:36        ASSESSMENT & PLAN:   N/V, Abdominal Pain 2/2 DKA  AGMA 2/2 Above  LAURIE on CKD III 2/2 Prerenal vs Postrenal Azotemia  Urine Retention req Cifuentes  HTN  HLD  GERD  Hypothyroidism  Glaucoma  Chronic back pain    Downgraded to    Reporting chronic back pain will add p.r.n. Norco   Tolerating p.o. intake   Renal indices improving with IVF   Remains on IV LR 75 mL/hour   On Lantus 20 units with ISS LD  Continued on atorvastatin, duloxetine, fluticasone, methimazole, pregabalin t.i.d.  PT/OT consulted   Speech therapy on board; patient modified diet   Continue monitoring patient's symptoms    VTE Prophylaxis:  Heparin    Patient condition:  Stable    Discharge Planning and Disposition: No mobility needs. Ambulating well. Good social support system.   Anticipated discharge    All diagnosis and differential diagnosis have been reviewed; assessment and plan has been documented; I have personally reviewed the labs and test results that are presently available; I have reviewed the patients medication list; I have reviewed the consulting providers response and recommendations. I have reviewed or attempted to review medical records based upon their availability.    All of the patient and family questions have been addressed and answered. Patient's is agreeable to the above stated plan. I will continue to monitor closely and make  adjustments to medical management as needed.      Abelardo Serrano MD   02/07/2025

## 2025-02-07 NOTE — PLAN OF CARE
Problem: Physical Therapy  Goal: Physical Therapy Goal  Description: Goals to be met by: 3/7/25     Patient will increase functional independence with mobility by performin. Supine to sit with Modified [facility]  2. Sit to supine with Modified [facility]  3. Sit to stand transfer with Modified [facility]  4. Bed to chair transfer with Modified [facility] using Rolling Walker  5. Gait  x 200 feet with Modified [facility] using Rolling Walker.     Outcome: Progressing

## 2025-02-07 NOTE — PT/OT/SLP PROGRESS
[facility]  Speech Language Pathology Department  Dysphagia Therapy Progress Note    Patient Name:  Navin Beck   MRN:  [id number]  Admitting Diagnosis: DKA     Recommendations     General recommendations:  SLP follow up x1 regarding diet tolerance  Solid texture recommendation:  Soft & Bite Sized Diet - IDDSI Level 6  Liquid consistency recommendation: Thin liquids - IDDSI Level 0   Medications: per patient preference  Discharge therapy intensity: No Therapy Indicated   Barriers to safe discharge:  acuity of illness    Subjective     Patient awake, alert, calm, and cooperative.  Spiritual/Cultural/Zoroastrian Beliefs/Practices that affect care: no    Pain/Comfort: Pain Rating 1: 0/10    Respiratory Status:  room air    Objective     Dentition:  lower dentition present, upper dentures left at home    Therapeutic PO Trials:  Consistency Fed By Oral Symptoms Pharyngeal Symptoms   Chewable solid Self Prolonged bolus formation/mastication None   Thin liquid by cup Self None None     Assessment     Pt presents with prolonged mastication secondary to lack of dentition.  Rec: advance diet to soft and bite sized solids, thin liquids, meds per patient preference.     Outcome Measures     Functional Oral Intake Scale: 5 - Total oral diet with multiple consistencies, by requiring special preparation or compensations    Goals     Multidisciplinary Problems       SLP Goals          Problem: SLP    Goal Priority Disciplines Outcome   SLP Goal     SLP Progressing   Description: LTG: Pt will tolerate least restrictive PO diet with no clinical signs/sx aspiration    STGs:  1.  Pt will tolerate soft and bite sized solids with adequate bolus formation/transport and no clinical signs/sx aspiration  2.  Pt will tolerate regular solids with adequate bolus formation/transport and no clinical signs/sx aspiration                       Patient Education     Patient provided with verbal education regarding  recommendations.  Understanding was verbalized.    Plan     Will continue to follow and tx as appropriate.    SLP Follow-Up:  Yes   Patient to be seen:  5 x/week   Plan of Care expires:  02/19/25  Plan of Care reviewed with:  patient       Time Tracking     SLP Treatment Date:   02/07/25  Speech Start Time:  1315  Speech Stop Time:  1330     Speech Total Time (min):  15 min    Billable minutes:  Swallow and Oral Function Evaluation, 15 minutes       02/07/2025

## 2025-02-07 NOTE — PT/OT/SLP EVAL
Physical Therapy Evaluation    Patient Name:  Navin Beck   MRN:  [id number]    Recommendations:     Discharge therapy intensity: Low Intensity Therapy   Discharge Equipment Recommendations: none   Barriers to discharge: Impaired mobility    Assessment:     Navin Beck is a 64 y.o. female admitted with a medical diagnosis of nausea and vomiting, DKA, LAURIE.  She presents with the following impairments/functional limitations: weakness, gait instability, impaired functional mobility, impaired endurance, impaired balance. The pt tolerated session well. She is able to ambulate x 15 feet, 15 feet with min A. Will progress as able. She would benefit from assist from her family at home and  PT.    Rehab Prognosis: Good; patient would benefit from acute skilled PT services to address these deficits and reach maximum level of function.    Recent Surgery: * No surgery found *      Plan:     During this hospitalization, patient would benefit from acute PT services 5 x/week to address the identified rehab impairments via gait training, therapeutic activities, therapeutic exercises and progress toward the following goals:    Plan of Care Expires:  03/07/25    Subjective     Chief Complaint: none  Patient/Family Comments/goals: return to PLOF  Pain/Comfort:       Patients cultural, spiritual, Rastafari conflicts given the current situation:      Living Environment:  Home with son and daughter in law, SL home, no steps to enter  Prior to admission, patients level of function was independent.  Equipment used at home: walker, rolling.  DME owned (not currently used): rolling walker.  Upon discharge, patient will have assistance from family.    Objective:     Communicated with NSG prior to session.  Patient found supine with peripheral IV  upon PT entry to room.    General Precautions: Standard, fall  Orthopedic Precautions:N/A   Braces: N/A  Respiratory Status: Room air  Blood Pressure: NA      Exams:  RLE ROM:  WFL  RLE Strength: WFL  LLE ROM: WFL  LLE Strength: WFL  Skin integrity: Visible skin intact      Functional Mobility:  Bed Mobility:     Supine to Sit: minimum assistance  Transfers:     Sit to Stand:  minimum assistance with rolling walker x 3 trials for changing pads underneath pt  Toilet tf: min A and RW  Gait: the pt is able to ambulate x 15 feet, 15 feet with CGA and RW    Patient provided with verbal education education regarding PT role/goals/POC, safety awareness, and discharge/DME recommendations.  Understanding was verbalized.     Patient left supine with all lines intact, call button in reach, and NSG notified.    GOALS:   Multidisciplinary Problems       Physical Therapy Goals          Problem: Physical Therapy    Goal Priority Disciplines Outcome Interventions   Physical Therapy Goal     PT, PT/OT Progressing    Description: Goals to be met by: 3/7/25     Patient will increase functional independence with mobility by performin. Supine to sit with Modified [place]  2. Sit to supine with Modified [place]  3. Sit to stand transfer with Modified [place]  4. Bed to chair transfer with Modified [place] using Rolling Walker  5. Gait  x 200 feet with Modified [place] using Rolling Walker.                          History:     Past Medical History:   Diagnosis Date    Allergy     Anemia due to stage 3 chronic kidney disease 2020    Arrhythmia     Arthritis     Bronchial asthma     Diabetes mellitus     Diabetes mellitus     Glaucoma     Hormone disorder     hysterectomy    Hypercholesteremia     Hypertension     Hyperthyroidism     Insomnia     Kidney stones     Neuropathy     Thyroid disease     Urine, incontinence, stress female     UTI (urinary tract infection) 2020       Past Surgical History:   Procedure Laterality Date    APPENDECTOMY      BACK SURGERY      disc removal     CARPAL TUNNEL RELEASE Bilateral     CHOLECYSTECTOMY      CYSTOSCOPY N/A 10/26/2020     Procedure: CYSTOSCOPY;  Surgeon: Wilner Goel MD;  Location: [facility];  Service: OB/GYN;  Laterality: N/A;    CYSTOSCOPY W/ RETROGRADES Bilateral 12/7/2020    Procedure: CYSTOSCOPY, WITH RETROGRADE PYELOGRAM;  Surgeon: Daniel Jalloh MD;  Location: [address];  Service: Urology;  Laterality: Bilateral;    FRACTURE SURGERY Right     wrist    HYSTERECTOMY  2010    [facility]    JOINT REPLACEMENT      KNEE ARTHROSCOPY W/ DEBRIDEMENT      KNEE SURGERY Right     Knee replacement    LITHOTRIPSY      KS REMOVAL OF OVARY/TUBE(S)  9/9/13    benign    removal of round ligament mass      ROBOT-ASSISTED LAPAROSCOPIC LYSIS OF ADHESIONS USING DA JAMES XI N/A 10/26/2020    Procedure: XI ROBOTIC LYSIS, ADHESIONS;  Surgeon: Wilner Goel MD;  Location: [facility];  Service: OB/GYN;  Laterality: N/A;  ROBOTIC MOBILIZATION OF BLADDER- DR BURNHAM    SPINE SURGERY      WRIST FRACTURE SURGERY Left        Time Tracking:     PT Received On: 02/07/25  PT Start Time: 1348     PT Stop Time: 1400  PT Total Time (min): 12 min     Billable Minutes: Evaluation 12      02/07/2025

## 2025-02-08 VITALS
OXYGEN SATURATION: 96 % | RESPIRATION RATE: 18 BRPM | BODY MASS INDEX: 29.83 KG/M2 | TEMPERATURE: 98 F | HEIGHT: 67 IN | DIASTOLIC BLOOD PRESSURE: 80 MMHG | SYSTOLIC BLOOD PRESSURE: 128 MMHG | WEIGHT: 190.06 LBS | HEART RATE: 77 BPM

## 2025-02-08 LAB
ANION GAP SERPL CALC-SCNC: 7 MEQ/L
BASOPHILS # BLD AUTO: 0.03 X10(3)/MCL
BASOPHILS NFR BLD AUTO: 0.4 %
BUN SERPL-MCNC: 20.7 MG/DL (ref 9.8–20.1)
CALCIUM SERPL-MCNC: 8.3 MG/DL (ref 8.4–10.2)
CHLORIDE SERPL-SCNC: 104 MMOL/L (ref 98–107)
CO2 SERPL-SCNC: 23 MMOL/L (ref 23–31)
CREAT SERPL-MCNC: 0.98 MG/DL (ref 0.55–1.02)
CREAT/UREA NIT SERPL: 21
EOSINOPHIL # BLD AUTO: 0.05 X10(3)/MCL (ref 0–0.9)
EOSINOPHIL NFR BLD AUTO: 0.7 %
ERYTHROCYTE [DISTWIDTH] IN BLOOD BY AUTOMATED COUNT: 13.6 % (ref 11.5–17)
GFR SERPLBLD CREATININE-BSD FMLA CKD-EPI: >60 ML/MIN/1.73/M2
GLUCOSE SERPL-MCNC: 222 MG/DL (ref 82–115)
HCT VFR BLD AUTO: 30.7 % (ref 37–47)
HGB BLD-MCNC: 10.3 G/DL (ref 12–16)
IMM GRANULOCYTES # BLD AUTO: 0.02 X10(3)/MCL (ref 0–0.04)
IMM GRANULOCYTES NFR BLD AUTO: 0.3 %
LYMPHOCYTES # BLD AUTO: 2.95 X10(3)/MCL (ref 0.6–4.6)
LYMPHOCYTES NFR BLD AUTO: 39.3 %
MCH RBC QN AUTO: 30.4 PG (ref 27–31)
MCHC RBC AUTO-ENTMCNC: 33.6 G/DL (ref 33–36)
MCV RBC AUTO: 90.6 FL (ref 80–94)
MONOCYTES # BLD AUTO: 0.48 X10(3)/MCL (ref 0.1–1.3)
MONOCYTES NFR BLD AUTO: 6.4 %
NEUTROPHILS # BLD AUTO: 3.98 X10(3)/MCL (ref 2.1–9.2)
NEUTROPHILS NFR BLD AUTO: 52.9 %
NRBC BLD AUTO-RTO: 0 %
PLATELET # BLD AUTO: 162 X10(3)/MCL (ref 130–400)
PMV BLD AUTO: 10.7 FL (ref 7.4–10.4)
POCT GLUCOSE: 220 MG/DL (ref 70–110)
POCT GLUCOSE: 361 MG/DL (ref 70–110)
POCT GLUCOSE: 370 MG/DL (ref 70–110)
POTASSIUM SERPL-SCNC: 3.7 MMOL/L (ref 3.5–5.1)
RBC # BLD AUTO: 3.39 X10(6)/MCL (ref 4.2–5.4)
SODIUM SERPL-SCNC: 134 MMOL/L (ref 136–145)
WBC # BLD AUTO: 7.51 X10(3)/MCL (ref 4.5–11.5)

## 2025-02-08 PROCEDURE — 63600175 PHARM REV CODE 636 W HCPCS: Performed by: INTERNAL MEDICINE

## 2025-02-08 PROCEDURE — 25000003 PHARM REV CODE 250: Performed by: STUDENT IN AN ORGANIZED HEALTH CARE EDUCATION/TRAINING PROGRAM

## 2025-02-08 PROCEDURE — 25000003 PHARM REV CODE 250: Performed by: INTERNAL MEDICINE

## 2025-02-08 PROCEDURE — 36415 COLL VENOUS BLD VENIPUNCTURE: CPT

## 2025-02-08 PROCEDURE — 63600175 PHARM REV CODE 636 W HCPCS

## 2025-02-08 PROCEDURE — 80048 BASIC METABOLIC PNL TOTAL CA: CPT | Performed by: INTERNAL MEDICINE

## 2025-02-08 PROCEDURE — 63600175 PHARM REV CODE 636 W HCPCS: Performed by: STUDENT IN AN ORGANIZED HEALTH CARE EDUCATION/TRAINING PROGRAM

## 2025-02-08 PROCEDURE — 25000003 PHARM REV CODE 250

## 2025-02-08 PROCEDURE — 85025 COMPLETE CBC W/AUTO DIFF WBC: CPT

## 2025-02-08 RX ORDER — INSULIN GLARGINE 100 [IU]/ML
25 INJECTION, SOLUTION SUBCUTANEOUS NIGHTLY
Status: DISCONTINUED | OUTPATIENT
Start: 2025-02-08 | End: 2025-02-08 | Stop reason: HOSPADM

## 2025-02-08 RX ORDER — INSULIN GLARGINE 100 [IU]/ML
25 INJECTION, SOLUTION SUBCUTANEOUS NIGHTLY
Qty: 7.5 ML | Refills: 11 | Status: SHIPPED | OUTPATIENT
Start: 2025-02-08 | End: 2026-02-08

## 2025-02-08 RX ORDER — INSULIN GLARGINE 100 [IU]/ML
25 INJECTION, SOLUTION SUBCUTANEOUS DAILY
Status: DISCONTINUED | OUTPATIENT
Start: 2025-02-08 | End: 2025-02-08

## 2025-02-08 RX ORDER — INSULIN ASPART 100 [IU]/ML
10 INJECTION, SOLUTION INTRAVENOUS; SUBCUTANEOUS
Status: DISCONTINUED | OUTPATIENT
Start: 2025-02-08 | End: 2025-02-08 | Stop reason: HOSPADM

## 2025-02-08 RX ORDER — INSULIN ASPART 100 [IU]/ML
10 INJECTION, SOLUTION INTRAVENOUS; SUBCUTANEOUS 3 TIMES DAILY
Qty: 9 ML | Refills: 11 | Status: SHIPPED | OUTPATIENT
Start: 2025-02-08 | End: 2026-02-08

## 2025-02-08 RX ADMIN — HEPARIN SODIUM 5000 UNITS: 5000 INJECTION, SOLUTION INTRAVENOUS; SUBCUTANEOUS at 05:02

## 2025-02-08 RX ADMIN — PREGABALIN 75 MG: 75 CAPSULE ORAL at 09:02

## 2025-02-08 RX ADMIN — HYDROCODONE BITARTRATE AND ACETAMINOPHEN 1 TABLET: 7.5; 325 TABLET ORAL at 09:02

## 2025-02-08 RX ADMIN — INSULIN ASPART 10 UNITS: 100 INJECTION, SOLUTION INTRAVENOUS; SUBCUTANEOUS at 11:02

## 2025-02-08 RX ADMIN — ATORVASTATIN CALCIUM 20 MG: 10 TABLET, FILM COATED ORAL at 09:02

## 2025-02-08 RX ADMIN — MUPIROCIN: 20 OINTMENT TOPICAL at 09:02

## 2025-02-08 RX ADMIN — DULOXETINE HYDROCHLORIDE 30 MG: 30 CAPSULE, DELAYED RELEASE ORAL at 09:02

## 2025-02-08 RX ADMIN — INSULIN ASPART 4 UNITS: 100 INJECTION, SOLUTION INTRAVENOUS; SUBCUTANEOUS at 05:02

## 2025-02-08 NOTE — DISCHARGE SUMMARY
[facility] Hospital Medicine Discharge Summary    Admit Date: 2/4/2025  Discharge Date and Time: 2/8/202510:11 AM  Admitting Physician: DEJAH Team  Discharging Physician: Jose Kan MD.  Primary Care Physician: Seema Mirza MD      Discharge Diagnoses:  DKA resolved   DM2 stable   LAURIE on CKD : stable   Urine Retention req Cifuentes  HTN  HLD  GERD  Hypothyroidism  Glaucoma  Chronic back pain    Hospital Course:   64-year-old lady with DM type 1, HTN, HLD, CKD stage 3, GERD, hypothyroidism, glaucoma, chronic back pain who was admitted to [facility] ICU with complaints of nausea, vomiting and diarrhea for the last 3-4 days with urine retention. She was found to have hyponatremia, hyperkalemia, LAURIE, a GME with lactic acidemia, ketonemia due to DKA and intravascular depletion. She was started on IV insulin protocol and IV fluids with notable improvement. She also required Cifuentes placement with 2500 mL urine returning. She was able to be weaned off of IV insulin infusion and started on long-acting insulin after she was able to tolerate p.o. intake without nausea or vomiting once her serum CO2 was more than 18 and anion gap had closed. She was downgraded to Hospital Medicine. At bedside, patient reported having back pain which is chronic for which he takes Norco at home. Denied any new complaints. Able to tolerate p.o. intake. Speech therapy on board, patient on modified diet. PT/OT consulted. LAURIE improving with IVF and later renal functions was at normal range.       Home meds reviewed. She was ozempic, benazepril and sliding scale. This was stopped. She was continued on lantus at bedtime and aspart before each meal. She was stable and at baseline. Diabetes education was given. She was set up with HH. She was advised close f/u with PCP and home accu checks. Cifuentes was removed and she had no new issues.     She was discharged home in a stable condition.     Pt was seen and examined on the  day of discharge  Vitals:  VITAL SIGNS: 24 HRS MIN & MAX LAST   Temp  Min: 98 °F (36.7 °C)  Max: 98.4 °F (36.9 °C) 98.3 °F (36.8 °C)   BP  Min: 101/53  Max: 137/70 115/66   Pulse  Min: 63  Max: 92  63   Resp  Min: 16  Max: 18 18   SpO2  Min: 95 %  Max: 99 % 99 %       Physical Exam:  Heart RRR  Lungs clear   Abdomen soft and non tender   Neuro: No FND      Procedures Performed: No admission procedures for hospital encounter.     Significant Diagnostic Studies: See Full reports for all details    Recent Labs   Lab 02/06/25  0146 02/07/25  0454 02/08/25  0455   WBC 8.17 5.84 7.51   RBC 3.52* 3.39* 3.39*   HGB 10.9* 10.3* 10.3*   HCT 32.6* 30.9* 30.7*   MCV 92.6 91.2 90.6   MCH 31.0 30.4 30.4   MCHC 33.4 33.3 33.6   RDW 14.6 14.2 13.6    186 162   MPV 10.4 10.7* 10.7*       Recent Labs   Lab 02/04/25  1232 02/04/25  1430 02/04/25  1555 02/06/25  1350 02/06/25  1556 02/06/25  2042 02/07/25  0454 02/08/25  0455   *  --    < > 136 134* 134* 133* 134*   K 6.1*  --    < > 3.6 4.3 3.8 4.2 3.7   CL 92*  --    < > 109* 109* 107 105 104   CO2 6*  --    < > 21* 19* 19* 20* 23   BUN 56.7*  --    < > 18.7 18.0 18.0 16.3 20.7*   CREATININE 4.38*  --    < > 1.52* 1.44* 1.18* 1.10* 0.98   CALCIUM 8.0*  --    < > 8.1* 7.9* 7.9* 7.9* 8.3*   PH  --  7.030*  --   --   --   --   --   --    MG 3.00*  --    < > 2.00 2.00 1.80  --   --    ALBUMIN 4.0  --   --   --   --   --  2.7*  --    ALKPHOS 162*  --   --   --   --   --  94  --    ALT 14  --   --   --   --   --  9  --    AST 12  --   --   --   --   --  14  --    BILITOT 0.2  --   --   --   --   --  0.4  --     < > = values in this interval not displayed.        Microbiology Results (last 7 days)       Procedure Component Value Units Date/Time    Clostridium Diff Toxin, A & B, EIA [[id number]]     Order Status: Canceled Specimen: Stool              X-Ray Lumbar Spine 2 Or 3 Views  Narrative: EXAMINATION:  XR LUMBAR SPINE 2 OR 3 VIEWS    CLINICAL HISTORY:  Back  pain;    COMPARISON:  None.    FINDINGS:  The lowest fully formed disc space is labeled as L5-S1.  Alignment is normal.  There is mild disc height loss at L4-5 with small multilevel marginal osteophytes.  There is moderate facet arthropathy.  The soft tissues are unremarkable.  Impression: 1. No acute abnormality identified.  2. Multilevel degenerative changes of the lumbar spine.    Electronically signed by: Porsha Self  Date:    02/04/2025  Time:    17:29  US Retroperitoneal Limited  Narrative: EXAMINATION:  US RETROPERITONEAL LIMITED    CLINICAL HISTORY:  LAURIE; urinary retention;    COMPARISON:  7 January 2025, 8 December 2020    FINDINGS:  Grayscale and color Doppler sonographic evaluation of the kidneys and urinary bladder.    The right kidney measures 8 cm. The left kidney measures 12 cm.   Mild right hydronephrosis.  Prominence of the left renal collecting system without gross hydronephrosis.    Cifuentes in the urinary bladder.  Impression: Mild right hydronephrosis.    Electronically signed by: Gonzalez Chawla  Date:    02/04/2025  Time:    16:39  CT Head Without Contrast  Narrative: EXAMINATION:  CT HEAD WITHOUT CONTRAST    CLINICAL HISTORY:  Mental status change, unknown cause;    TECHNIQUE:  Sequential axial images were performed of the brain without contrast.    Dose product length of 950 mGycm. Automated exposure control was utilized to minimize radiation dose.    COMPARISON:  July 8, 2012.    FINDINGS:  Gray-white matter differentiation is unremarkable.  There is no intracranial mass effect, midline shift, hydrocephalus or hemorrhage. There is no sulcal effacement or low attenuation changes to suggest recent large vessel territory infarction. There is no acute extra axial fluid collection.  Mild mucoperiosteal thickening of the right maxillary sinus.  Otherwise, visualized paranasal sinuses are clear without mucosal thickening, polypoidal abnormality or air-fluid levels. Mastoid air cells aeration is  optimal.  Impression: No acute intracranial findings identified.    Electronically signed by: Yunier Kelley  Date:    02/04/2025  Time:    13:23  X-Ray Chest AP Portable  Narrative: EXAMINATION:  XR CHEST AP PORTABLE    CLINICAL HISTORY:  confusion;, .    COMPARISON:  January 6, 2025    FINDINGS:  No alveolar consolidation, effusion, or pneumothorax is seen.   The thoracic aorta is normal  cardiac silhouette, central pulmonary vessels and mediastinum are normal in size and are grossly unremarkable.   visualized osseous structures are grossly unremarkable.  Impression: No acute chest disease is identified.    Electronically signed by: Carlos Goldman  Date:    02/04/2025  Time:    12:36         Medication List        START taking these medications      insulin aspart U-100 100 unit/mL injection  Commonly known as: NovoLOG  Inject 10 Units into the skin 3 (three) times daily.  Replaces: NovoLOG Flexpen U-100 Insulin 100 unit/mL (3 mL) Inpn pen            CHANGE how you take these medications      insulin glargine U-100 (Lantus) 100 unit/mL injection  Inject 25 Units into the skin every evening.  What changed:   how much to take  additional instructions            CONTINUE taking these medications      albuterol 90 mcg/actuation inhaler  Commonly known as: PROVENTIL/VENTOLIN HFA     atorvastatin 20 MG tablet  Commonly known as: LIPITOR  Take 1 tablet (20 mg total) by mouth once daily.     benazepriL 5 MG tablet  Commonly known as: LOTENSIN  Take 1 tablet (5 mg total) by mouth once daily.     calcium carb, citrate-vit D3 600 mg-12.5 mcg (500 unit) Tbsr  Take 1 each by mouth 2 (two) times a day.     DEXCOM G7 SENSOR Korina  Generic drug: blood-glucose sensor  1 each by Misc.(Non-Drug; Combo Route) route every 10 days.     DEXCOM  Misc  Generic drug: blood-glucose meter,continuous  1 each by Misc.(Non-Drug; Combo Route) route continuous.     DULoxetine 30 MG capsule  Commonly known as: CYMBALTA  Take 1 capsule (30  "mg total) by mouth once daily.     * FLOVENT HFA 44 mcg/actuation inhaler  Generic drug: fluticasone propionate     * fluticasone propionate 50 mcg/actuation nasal spray  Commonly known as: FLONASE  1 spray (50 mcg total) by Each Nostril route once daily.     * HYDROcodone-acetaminophen 7.5-325 mg per tablet  Commonly known as: NORCO     * HYDROcodone-acetaminophen  mg per tablet  Commonly known as: NORCO     INSULIN SYRINGE MICROFINE 1 mL 27 gauge x 5/8" Syrg  Generic drug: insulin syringe-needle U-100  1 each by Misc.(Non-Drug; Combo Route) route 2 (two) times a day.     JARDIANCE 25 mg tablet  Generic drug: empagliflozin     methIMAzole 10 MG Tab  Commonly known as: TAPAZOLE  Take 1 tablet (10 mg total) by mouth once daily.     pregabalin 75 MG capsule  Commonly known as: LYRICA  Take 1 capsule (75 mg total) by mouth 3 (three) times daily.     tiZANidine 4 MG tablet  Commonly known as: ZANAFLEX  Take 2-3 tablets (8-12 mg total) by mouth nightly as needed (muscle spasms).     traZODone 50 MG tablet  Commonly known as: DESYREL  Take 1 tablet (50 mg total) by mouth every evening.     TRUE METRIX GLUCOSE METER MISC     TRUE METRIX GLUCOSE TEST STRIP Strp  Generic drug: blood sugar diagnostic  Apply 1 each topically 3 (three) times daily.     zolpidem 5 MG Tab  Commonly known as: AMBIEN  Take 1 tablet (5 mg total) by mouth nightly as needed (insomnia). May take with 5-10 mg of Melatonin           * This list has 4 medication(s) that are the same as other medications prescribed for you. Read the directions carefully, and ask your doctor or other care provider to review them with you.                STOP taking these medications      diclofenac 75 MG EC tablet  Commonly known as: VOLTAREN     NovoLOG Flexpen U-100 Insulin 100 unit/mL (3 mL) Inpn pen  Generic drug: insulin aspart U-100  Replaced by: insulin aspart U-100 100 unit/mL injection     OZEMPIC 0.25 mg or 0.5 mg (2 mg/3 mL) pen injector  Generic drug: " semaglutide               Where to Get Your Medications        These medications were sent to [facility] [address]  [address] [facility] [address]      Phone: [phone]   insulin aspart U-100 100 unit/mL injection  insulin glargine U-100 (Lantus) 100 unit/mL injection          Explained in detail to the patient about the discharge plan, medications, and follow-up visits. Pt understands and agrees with the treatment plan  Discharge Disposition: Home-Health Care [facility]   Discharged Condition: stable  Diet-   Dietary Orders (From admission, onward)       Start     Ordered    02/07/25 1408  Diet Soft & Bite Sized (IDDSI Level 6) Diabetic; 2000 Calories (up to 75 gm per meal); Standard Tray  Diet effective now        Question Answer Comment   Diet Modifier: Diabetic    Total calories / carbs: 2000 Calories (up to 75 gm per meal)    Tray type: Standard Tray        02/07/25 1407    02/06/25 1044  Dietary nutrition supplements TID; Boost Glucose Control - Vanilla  Continuous        Question Answer Comment   Frequency: TID    Select PO Supplement: Boost Glucose Control - Vanilla        02/06/25 1043                   Medications Per DC med rec  Activities as tolerated   Follow-up Information       [facility] Follow up.    Specialties: Home Health Services, Home Therapy Services, Home Living Aide Services  [facility] information[facility]  [facility] [address] [phone]             Seema Mirza MD Follow up in 1 week(s).    Specialty: Family Medicine  Contact information:  [address] [place] [address] [phone]                           For further questions contact hospitalist office    Discharge time 33 minutes    For worsening symptoms, chest pain, shortness of breath, increased abdominal pain, high grade fever, stroke or stroke like symptoms, immediately go to the nearest Emergency Room or call 911 as soon as  abundio Alba M.D on 2/8/2025. at 10:11 AM.

## 2025-02-08 NOTE — PLAN OF CARE
Patient discharged today to home health. [facility] notified of d/c via EPIC with d/c information sent.

## 2025-02-08 NOTE — PT/OT/SLP PROGRESS
[facility]  Speech Language Pathology Department  Diet Tolerance Follow-up    Patient Name:  Navin Beck   MRN:  [id number]  Admitting Diagnosis: DKA    Recommendations     General recommendations:  SLP intervention not indicated  Solid texture recommendation:  Soft & Bite Sized Diet - IDDSI Level 6  Liquid consistency recommendation: Thin liquids - IDDSI Level 0   Medications: per patient preference  Swallow strategies/precautions: upright for PO intake and feed only when fully alert  Precautions: aspiration    Diet Tolerance     Nursing reports no difficulty regarding diet tolerance.    Outcome Measures     Functional Oral Intake Scale: 5 - Total oral diet with multiple consistencies, by requiring special preparation or compensations    Patient Education     No learner present/available.    Plan     02/08/2025

## 2025-02-10 ENCOUNTER — PATIENT OUTREACH (OUTPATIENT)
Dept: ADMINISTRATIVE | Facility: CLINIC | Age: 65
End: 2025-02-10
Payer: MEDICARE

## 2025-02-10 NOTE — PROGRESS NOTES
C3 nurse spoke with Navin Beck for a TCC post hospital discharge follow up call. Patient stated she canceled her scheduled HOSFU appointment with  Seema Mirza MD (Family Medicine) on 2/17/25 @ 1:00 for telehealth and needs to reschedule.  C3 nurse unable to schedule HOSPFU appt.  Message routed to PCP for assistance with rescheduling appt.

## 2025-02-10 NOTE — PROGRESS NOTES
C3 nurse attempted to contact Navin Beck for a TCC post hospital discharge follow up call. Patient was just waking up and requested a call back later today. The patient has a scheduled HOSFU appointment with  Seema Mirza MD (Family Medicine);2/17/25 @ 1:00 for telehealth.

## 2025-02-11 NOTE — TELEPHONE ENCOUNTER
Called patient.  confirmed. Informed the patient that we do not have a provider her at this time so we would not be able to get her in before 2025. Patient already informed that she has an appt with Diann vazquez on 2025.

## 2025-02-12 ENCOUNTER — TELEPHONE (OUTPATIENT)
Dept: FAMILY MEDICINE | Facility: CLINIC | Age: 65
End: 2025-02-12
Payer: MEDICARE

## 2025-02-12 NOTE — TELEPHONE ENCOUNTER
Patient is scheduled for bilateral injection L3-L5 medial branch block needs to hold 3 days prior to procedure. Needs to hold jardiance prior to procedure because they use anesthesia for this. States the only thing they need is a verification of holding jardiance.     Can you verify if they can hold this or not?

## 2025-02-12 NOTE — TELEPHONE ENCOUNTER
----- Message from Mely sent at 2/12/2025  1:12 PM CST -----  .Type:  Patient Returning Call    Who Called:Tiffany with Dr Maxwell Whittaker   Who Left Message for Patient:Tiffany  Does the patient know what this is regarding?:holding some medication for patient  Would the patient rather a call back or a response via [facility]?   Best Call Back Number:[phone] fax # [phone]  Additional Information: Please call back about holding some medication for patient. The request was sent on February 3rd

## 2025-02-12 NOTE — TELEPHONE ENCOUNTER
I have a form that needs to be filled out by Narcisa I will keep this in my box to remind me that it needs to be done when she comes on Friday.

## 2025-02-17 ENCOUNTER — PATIENT MESSAGE (OUTPATIENT)
Dept: DIABETES | Facility: CLINIC | Age: 65
End: 2025-02-17

## 2025-02-17 ENCOUNTER — CLINICAL SUPPORT (OUTPATIENT)
Dept: DIABETES | Facility: CLINIC | Age: 65
End: 2025-02-17
Payer: MEDICARE

## 2025-02-17 DIAGNOSIS — E11.65 TYPE 2 DIABETES MELLITUS WITH HYPERGLYCEMIA, WITH LONG-TERM CURRENT USE OF INSULIN: Primary | ICD-10-CM

## 2025-02-17 DIAGNOSIS — Z79.4 TYPE 2 DIABETES MELLITUS WITH HYPERGLYCEMIA, WITH LONG-TERM CURRENT USE OF INSULIN: Primary | ICD-10-CM

## 2025-02-17 NOTE — PROGRESS NOTES
Diabetes Care Specialist Virtual Visit Note       The patient location is: [place]  The chief complaint leading to consultation is: Diabetes  Visit type: audiovisual  Total time spent with patient: 30 min   Each patient to whom he or she provides medical services by telemedicine is:  (1) informed of the relationship between the physician and patient and the respective role of any other health care provider with respect to management of the patient; and (2) notified that he or she may decline to receive medical services by telemedicine and may withdraw from such care at any time.    Diabetes Care Specialist Progress Note  Author: Niyah Bunch RD  Date: 2/17/2025    Intake    Program Intake  Reason for Diabetes Program Visit:: Intervention  Type of Intervention:: Individual  Individual: Education  Education: Self-Management Skill Review    Current Diabetes Treatment: Insulin  Method of insulin delivery?: Injections  Injection Type: Vial/Sryringe, Pens  Pen Type/Dose: Novolog 10 units with meals and Lantus 25 units PM    Continuous Glucose Monitoring  Patient has CGM: Yes  Personal CGM type:: dexcom G7 with  and juan (not connected). Connection code given.    Lab Results   Component Value Date    HGBA1C 9.2 (H) 01/07/2025               There is no height or weight on file to calculate BMI.    Lifestyle Coping Support & Clinical              Diabetes Self-Management Skills Assessment    Medication Skills Assessment  Patient is able to identify current diabetes medications, dosages, and appropriate timing of medications.: yes  Patient reports problems or concerns with current medication regimen.: no  Patient is  aware that some diabetes medications can cause low blood sugar?: Yes  Medication Skills Assessment Completed:: Yes  Assessment indicates:: Adequate understanding  Area of need?: No    Diabetes Disease Process/Treatment Options  Diabetes Disease Process/Treatment Options: Skills Assessment Completed:  Yes  Assessment indicates:: Instruction Needed  Area of need?: Yes    Nutrition/Healthy Eating  Nutrition/Healthy Eating Skills Assessment Completed:: Yes  Assessment indicates:: Instruction Needed  Area of need?: Yes    Physical Activity/Exercise  Physical Activity/Exercise Skills Assessment Completed: : No  Deffered due to:: Time  Area of need?: Deferred    Home Blood Glucose Monitoring  Patient states that blood sugar is checked at home daily.: yes  Monitoring Method:: personal continuous glucose monitor  Personal CGM type:: dexcom G7 with  and juan (not connected). Connection code given.  Home Blood Glucose Monitoring Skills Assessment Completed: : Yes  Assessment indicates:: Instruction Needed  Area of need?: Yes    Acute Complications  Have you ever had hypoglycemia (low BG 70 or less)?: yes  Have you ever had hyperglycemia (high  or more)?: yes  Have you ever had DKA?: yes  When:: this month  Do you ever test for ketones?: no  Acute Complications Skills Assessment Completed: : Yes  Assessment indicates:: Instruction Needed  Area of need?: Yes    Chronic Complications  Chronic Complications Skills Assessment Completed: : No  Deferred due to:: Time  Area of need?: Deferred      Assessment Summary and Plan    Based on today's diabetes care assessment, the following areas of need were identified:      Identified Areas of Need      Medication/Current Diabetes Treatment: No   Lifestyle Coping/Support:     Diabetes Disease Process/Treatment Options: Yes - reviewed DKA   Nutrition/Healthy Eating: Yes - see care plan   Physical Activity/Exercise: Deferred    Home Blood Glucose Monitoring: Yes - provided Clarity code for remote monitoring.   Acute Complications: Yes - see care plan   Chronic Complications: Deferred       Today's interventions were provided through individual discussion, instruction, and written materials were provided.      Patient verbalized understanding of instruction and written  materials.  Pt was able to return back demonstration of instructions today. Patient understood key points, needs reinforcement and further instruction.     Diabetes Self-Management Care Plan:    Today's Diabetes Self-Management Care Plan was developed with Navin's input. Navin has agreed to work toward the following goal(s) to improve his/her overall diabetes control.      Care Plan: Diabetes Management   Updates made since 2/18/2024 12:00 AM        Problem: Healthy Eating         Goal: Exchange lower carb soup for high carb ramen noodles    Start Date: 1/27/2025   Expected End Date: 4/14/2025   This Visit's Progress: Met   Priority: Medium   Barriers: No Barriers Identified   Note:    1/27/25 - reviewed lower carb options such as Healthy Choice    2/17/25 - pt stopped eating Ramen Noodles. States family is cooking baked lean protein and non-starchy vegetables with small portions of carbs (such as mashed potatoes). Drinks water and occasional Sprite Zero. She is also getting diabetic meals from her insurance company.       Task: Reviewed the sources and role of Carbohydrate, Protein, and Fat and how each nutrient impacts blood sugar. Completed 1/27/2025        Problem: Acute Complications         Goal: Patient agrees to identify and manage signs and symptoms of high/low blood sugar (hyper/hypoglycemia) by keeping a log of events and using proper treatment.    Start Date: 2/17/2025   Expected End Date: 4/14/2025   Barriers: No Barriers Identified   Note:    Reviewed blood glucose goals, prevention, detection, signs and symptoms, and treatment of hypoglycemia and hyperglycemia, and when to contact the clinic. Discussed getting glucose tablets for bedside. Provided Clarity clinic code for Dexcom connection prior to next visit for remote monitoring.       Task: Reviewed proper treatment of hypoglycemia with the rule of 15--patient to eat 15g simple carbohydrate (4 glucose tablets, 1 glucose gel, 5 pieces hard candy, ½  cup fruit juice, ½ can regular soda, etc) and wait 15 minutes and recheck home glucose. Completed 2/17/2025        Task: Reviewed common causes and precautions to help prevent hyper/hypoglycemic events. Completed 2/17/2025        Task: Reviewed signs and symptoms of hyper/hypoglycemia, what range is considered to be hyper/hypoglycemia, and when to seek further medical attention. Completed 2/17/2025        Task: Discussed, sick day planning, natural disaster planning, and/or travel planning to prevent hyper/hypoglycemia. Completed 2/17/2025        Task: Discussed risk factors for developing diabetic ketoacidosis (DKA), strategies for reducing risk, testing with ketone test strips if BG is >240mg/dl, basic protocol for managing DKA, and when to seek further medical attention. Completed 2/17/2025          Follow Up Plan     Follow up in about 4 weeks (around 3/17/2025) for health maintenance, chronic complications, glucose log review.    Today's care plan and follow up schedule was discussed with patient.  Navin verbalized understanding of the care plan, goals, and agrees to follow up plan.        The patient was encouraged to communicate with his/her health care provider/physician and care team regarding his/her condition(s) and treatment.  I provided the patient with my contact information today and encouraged to contact me via phone or [facility] Patient Portal as needed.     Length of Visit   Total Time: 30 Minutes

## 2025-02-20 ENCOUNTER — PATIENT MESSAGE (OUTPATIENT)
Dept: DIABETES | Facility: CLINIC | Age: 65
End: 2025-02-20
Payer: MEDICARE

## 2025-02-24 ENCOUNTER — EXTERNAL HOME HEALTH (OUTPATIENT)
Dept: HOME HEALTH SERVICES | Facility: HOSPITAL | Age: 65
End: 2025-02-24
Payer: MEDICARE

## 2025-03-05 ENCOUNTER — PATIENT OUTREACH (OUTPATIENT)
Dept: ADMINISTRATIVE | Facility: OTHER | Age: 65
End: 2025-03-05
Payer: MEDICARE

## 2025-03-05 ENCOUNTER — OFFICE VISIT (OUTPATIENT)
Dept: FAMILY MEDICINE | Facility: CLINIC | Age: 65
End: 2025-03-05
Payer: MEDICARE

## 2025-03-05 VITALS
HEIGHT: 67 IN | WEIGHT: 197 LBS | OXYGEN SATURATION: 99 % | TEMPERATURE: 98 F | DIASTOLIC BLOOD PRESSURE: 63 MMHG | SYSTOLIC BLOOD PRESSURE: 155 MMHG | HEART RATE: 86 BPM | BODY MASS INDEX: 30.92 KG/M2

## 2025-03-05 DIAGNOSIS — F51.01 PRIMARY INSOMNIA: ICD-10-CM

## 2025-03-05 DIAGNOSIS — Z79.4 TYPE 2 DIABETES MELLITUS WITH HYPERGLYCEMIA, WITH LONG-TERM CURRENT USE OF INSULIN: Primary | ICD-10-CM

## 2025-03-05 DIAGNOSIS — E11.65 TYPE 2 DIABETES MELLITUS WITH HYPERGLYCEMIA, WITH LONG-TERM CURRENT USE OF INSULIN: Primary | ICD-10-CM

## 2025-03-05 DIAGNOSIS — M54.16 LUMBAR RADICULOPATHY: ICD-10-CM

## 2025-03-05 PROCEDURE — 3008F BODY MASS INDEX DOCD: CPT | Mod: CPTII,,,

## 2025-03-05 PROCEDURE — 3078F DIAST BP <80 MM HG: CPT | Mod: CPTII,,,

## 2025-03-05 PROCEDURE — 3046F HEMOGLOBIN A1C LEVEL >9.0%: CPT | Mod: CPTII,,,

## 2025-03-05 PROCEDURE — 3077F SYST BP >= 140 MM HG: CPT | Mod: CPTII,,,

## 2025-03-05 PROCEDURE — 99215 OFFICE O/P EST HI 40 MIN: CPT | Mod: PBBFAC,PN

## 2025-03-05 PROCEDURE — 1159F MED LIST DOCD IN RCRD: CPT | Mod: CPTII,,,

## 2025-03-05 PROCEDURE — 1160F RVW MEDS BY RX/DR IN RCRD: CPT | Mod: CPTII,,,

## 2025-03-05 PROCEDURE — 1111F DSCHRG MED/CURRENT MED MERGE: CPT | Mod: CPTII,,,

## 2025-03-05 PROCEDURE — 99214 OFFICE O/P EST MOD 30 MIN: CPT | Mod: S$PBB,,,

## 2025-03-05 PROCEDURE — 1101F PT FALLS ASSESS-DOCD LE1/YR: CPT | Mod: CPTII,,,

## 2025-03-05 PROCEDURE — 3288F FALL RISK ASSESSMENT DOCD: CPT | Mod: CPTII,,,

## 2025-03-05 PROCEDURE — 1125F AMNT PAIN NOTED PAIN PRSNT: CPT | Mod: CPTII,,,

## 2025-03-05 RX ORDER — ZOLPIDEM TARTRATE 10 MG/1
10 TABLET ORAL NIGHTLY PRN
Qty: 30 TABLET | Refills: 0 | Status: SHIPPED | OUTPATIENT
Start: 2025-03-05 | End: 2025-09-03

## 2025-03-05 RX ORDER — ACETAMINOPHEN 500 MG
1 TABLET ORAL DAILY
Qty: 1 EACH | Refills: 0 | Status: SHIPPED | OUTPATIENT
Start: 2025-03-05

## 2025-03-05 RX ORDER — PREGABALIN 75 MG/1
75 CAPSULE ORAL 3 TIMES DAILY
Qty: 90 CAPSULE | Refills: 6 | Status: SHIPPED | OUTPATIENT
Start: 2025-03-05 | End: 2025-10-01

## 2025-03-05 RX ORDER — INSULIN ASPART 100 [IU]/ML
10 INJECTION, SOLUTION INTRAVENOUS; SUBCUTANEOUS 3 TIMES DAILY
Qty: 9 ML | Refills: 11 | Status: CANCELLED | OUTPATIENT
Start: 2025-03-05 | End: 2026-03-05

## 2025-03-05 RX ORDER — TIZANIDINE 4 MG/1
8-12 TABLET ORAL NIGHTLY PRN
Qty: 90 TABLET | Refills: 3 | Status: SHIPPED | OUTPATIENT
Start: 2025-03-05

## 2025-03-05 RX ORDER — HYDROCODONE BITARTRATE AND ACETAMINOPHEN 10; 325 MG/1; MG/1
1 TABLET ORAL EVERY 8 HOURS PRN
Qty: 14 TABLET | Refills: 0 | Status: SHIPPED | OUTPATIENT
Start: 2025-03-05

## 2025-03-05 RX ORDER — INSULIN ASPART 100 [IU]/ML
10 INJECTION, SOLUTION INTRAVENOUS; SUBCUTANEOUS
Qty: 9 ML | Refills: 11 | Status: SHIPPED | OUTPATIENT
Start: 2025-03-05 | End: 2026-03-05

## 2025-03-05 RX ORDER — INSULIN GLARGINE 100 [IU]/ML
25 INJECTION, SOLUTION SUBCUTANEOUS NIGHTLY
Qty: 7.5 ML | Refills: 11 | Status: SHIPPED | OUTPATIENT
Start: 2025-03-05 | End: 2026-03-05

## 2025-03-05 NOTE — ASSESSMENT & PLAN NOTE
A1c 9.2 two months ago.  Regimen adjusted during patient's hospitalization for DKA. Patient is only taking NovoLog with lunch and dinner.  We will add 5 units to take with her morning coffee and protein shake.  Continue current doses of Jardiance and Lantus.  Keep scheduled follow up with dietitian.  Recheck A1c next month    Lab Results   Component Value Date    HGBA1C 9.2 (H) 01/07/2025    HGBA1C 8.9 (H) 06/19/2024    HGBA1C 8.1 (H) 04/20/2023    HGBA1C 10.0 (H) 04/04/2022    .00 06/19/2024    CREATININE 0.98 02/08/2025    CREATININE 1.13 04/20/2023      Diabetes Medications              empagliflozin (JARDIANCE) 25 mg tablet Take 1 tablet (25 mg total) by mouth once daily.    insulin aspart U-100 (NOVOLOG) 100 unit/mL injection Inject 10 Units into the skin 3 (three) times daily before meals.    insulin glargine U-100, Lantus, 100 unit/mL injection Inject 25 Units into the skin every evening.          On ACE and Statin according to guidelines.  Discussed caution with SGLT2s + diuretics as concomitant use can cause volume depletion. Discussed caution with DPP-Ivs and HF risk.  Follow ADA Diet. Avoid soda, sweets, and limit carbs (no more than 45-50 grams per meal).  Maintain healthy weight with goal BMI <30.  Exercise 5 times per week for 30 minutes per day.  Stressed importance of daily foot exams.  Up to date on DM retinal and foot exams.

## 2025-03-05 NOTE — ASSESSMENT & PLAN NOTE
Patient is followed by pain management.  She had a lumbar steroid injection last month, which she reports worsened her pain.  She is requesting a refill of her Norco.  Discussed with the patient that we would not be able to prescribe long-term opioids for pain.  She has a follow up with pain management for this month.  We will give her 14 tablets of Norco 10 mg due to worsening of her pain following lumbar steroid injection.  Continue Lyrica and tizanidine.

## 2025-03-05 NOTE — PROGRESS NOTES
[facility]  Diann Ray MD    Patient ID: [id number]     Chief Complaint: Diabetes (Patient was in the hospital a month ago due to her sugar spiking. Blood pressure elevated today. Requesting refills on medications. )      HPI:     Navin Beck is a 64 y.o. female type 2 diabetes, hypertension, hyperlipidemia, CKD stage 3, hypothyroidism, GERD, glaucoma here today for hospital follow up.    Patient was admitted to [facility] from 02/04/2025 to 02/08/2025 or diabetic ketoacidosis and LAURIE.  Her Ozempic was discontinued.  She was discharged on Lantus 25 units HS and aspart 10 units with meals.  Patient does not eat breakfast but has sweetened coffee and a protein shake in the morning.  A1c 9.2 one month ago. She has episodes of low blood sugars 1-2 times per week, early in the morning. She had a sugar of 300 yesterday. Mostly ranging 100-150s. She has appointment with dietiicna next week.    Patient reports she has been to take 2 Ambien asleep at night.  With the she gets about 3 hours of sleep.  She denied any history of sleep activity her sleepwalking.    Reports severe back pain since a steroid injection into the lumbar spine this month.  She reports she has been on Norco for over 10 years.  She is also taking tizanidine and Lyrica.    Past Medical History:   Diagnosis Date    Allergy     Anemia due to stage 3 chronic kidney disease 12/6/2020    Arrhythmia     Arthritis     Bronchial asthma     Diabetes mellitus     Diabetes mellitus     Glaucoma     Hormone disorder     hysterectomy    Hypercholesteremia     Hypertension     Hyperthyroidism     Insomnia     Kidney stones     Neuropathy     Thyroid disease     Urine, incontinence, stress female     UTI (urinary tract infection) 11/27/2020        Past Surgical History:   Procedure Laterality Date    APPENDECTOMY      BACK SURGERY      disc removal     CARPAL TUNNEL RELEASE Bilateral     CHOLECYSTECTOMY      CYSTOSCOPY N/A  10/26/2020    Procedure: CYSTOSCOPY;  Surgeon: Wilner Goel MD;  Location: [facility] OR;  Service: OB/GYN;  Laterality: N/A;    CYSTOSCOPY W/ RETROGRADES Bilateral 12/7/2020    Procedure: CYSTOSCOPY, WITH RETROGRADE PYELOGRAM;  Surgeon: Daniel Jalloh MD;  [facility];  Service: Urology;  Laterality: Bilateral;    FRACTURE SURGERY Right     wrist    HYSTERECTOMY  2010    [facility]    JOINT REPLACEMENT      KNEE ARTHROSCOPY W/ DEBRIDEMENT      KNEE SURGERY Right     Knee replacement    LITHOTRIPSY      LA REMOVAL OF OVARY/TUBE(S)  9/9/13    benign    removal of round ligament mass      ROBOT-ASSISTED LAPAROSCOPIC LYSIS OF ADHESIONS USING DA JAMES XI N/A 10/26/2020    Procedure: XI ROBOTIC LYSIS, ADHESIONS;  Surgeon: Wilner Goel MD;  Location: [facility] OR;  Service: OB/GYN;  Laterality: N/A;  ROBOTIC MOBILIZATION OF BLADDER- DR BURNHAM    SPINE SURGERY      WRIST FRACTURE SURGERY Left         Social History     Tobacco Use    Smoking status: Every Day     Current packs/day: 0.50     Average packs/day: 0.5 packs/day for 30.0 years (15.0 ttl pk-yrs)     Types: Cigarettes    Smokeless tobacco: Never   Substance and Sexual Activity    Alcohol use: No    Drug use: No    Sexual activity: Not Currently     Partners: Male     Birth control/protection: Surgical        Current Outpatient Medications   Medication Instructions    albuterol (PROVENTIL/VENTOLIN HFA) 90 mcg/actuation inhaler 1-2 puffs, Every 6 hours PRN    atorvastatin (LIPITOR) 20 mg, Oral, Daily    blood pressure monitor Kit 1 each, Misc.(Non-Drug; Combo Route), Daily    blood-glucose meter (TRUE METRIX GLUCOSE METER MISC) True Metrix Glucose Meter    blood-glucose meter,continuous (DEXCOM ) Misc 1 each, Misc.(Non-Drug; Combo Route), Continuous    blood-glucose sensor (DEXCOM G7 SENSOR) Korina 1 each, Misc.(Non-Drug; Combo Route), Every 10 days    calcium carb, citrate-vit D3 600 mg-12.5 mcg (500 unit) TbSR 1 each, Oral, 2  "times daily    DULoxetine (CYMBALTA) 30 mg, Oral, Daily    empagliflozin (JARDIANCE) 25 mg, Oral, Daily    fluticasone propionate (FLONASE) 50 mcg, Each Nostril, Daily    fluticasone propionate (FLOVENT HFA) 44 mcg/actuation inhaler 1 puff, Daily    HYDROcodone-acetaminophen (NORCO)  mg per tablet 1 tablet, Oral, Every 8 hours PRN    insulin aspart U-100 (NOVOLOG) 10 Units, Subcutaneous, 3 times daily before meals    insulin glargine U-100 (Lantus) 25 Units, Subcutaneous, Nightly    insulin syringe-needle U-100 (INSULIN SYRINGE MICROFINE) 1 mL 27 gauge x 5/8" Syrg 1 each, Misc.(Non-Drug; Combo Route), 2 times daily    methIMAzole (TAPAZOLE) 10 mg, Oral, Daily    pregabalin (LYRICA) 75 mg, Oral, 3 times daily    tiZANidine (ZANAFLEX) 8-12 mg, Oral, Nightly PRN    TRUE METRIX GLUCOSE TEST STRIP Strp 1 each, Topical (Top), 3 times daily    zolpidem (AMBIEN) 10 mg, Oral, Nightly PRN       Review of patient's allergies indicates:   Allergen Reactions    Penicillins Shortness Of Breath, Itching and Swelling     Tolerates cefepime 11/30/2020    Penicillins Anaphylaxis        Patient Care Team:  Seema Mirza MD as PCP - General (Family Medicine)  Giovanni Lorenz MD (Inactive) as Consulting Physician (Ophthalmology)  Angel Quinones MD (Cardiology)  Gretel Martini LPN as Care Coordinator  Miguelina Ortiz LPN as Licensed Practical Nurse     Subjective:     Review of Systems    12 point review of systems conducted, negative except as stated in the history of present illness. See HPI for details.    Objective:     Visit Vitals  BP (!) 155/63 (BP Location: Right arm, Patient Position: Sitting)   Pulse 86   Temp 97.6 °F (36.4 °C) (Oral)   Ht 5' 7" (1.702 m)   Wt 89.4 kg (197 lb)   SpO2 99%   BMI 30.85 kg/m²       Physical Exam  Vitals and nursing note reviewed.   Constitutional:       General: She is not in acute distress.     Appearance: She is not ill-appearing.   HENT:      Head: " Normocephalic and atraumatic.   Eyes:      General: No scleral icterus.     Extraocular Movements: Extraocular movements intact.      Conjunctiva/sclera: Conjunctivae normal.   Neck:      Vascular: No carotid bruit.   Cardiovascular:      Rate and Rhythm: Normal rate and regular rhythm.      Pulses:           Dorsalis pedis pulses are 2+ on the right side and 2+ on the left side.        Posterior tibial pulses are 2+ on the right side and 2+ on the left side.      Heart sounds: No murmur heard.     No friction rub. No gallop.   Pulmonary:      Effort: Pulmonary effort is normal. No respiratory distress.      Breath sounds: Normal breath sounds. No wheezing, rhonchi or rales.   Musculoskeletal:         General: Normal range of motion.      Right lower leg: No edema.      Left lower leg: No edema.      Right foot: No deformity.      Left foot: No deformity.   Feet:      Right foot:      Protective Sensation: 5 sites tested.  5 sites sensed.      Skin integrity: Skin integrity normal. No ulcer, blister, skin breakdown, erythema, warmth, callus, dry skin or fissure.      Toenail Condition: Right toenails are normal.      Left foot:      Protective Sensation: 5 sites tested.  5 sites sensed.      Skin integrity: Skin integrity normal. No ulcer, blister, skin breakdown, erythema, warmth, callus, dry skin or fissure.      Toenail Condition: Left toenails are normal.   Skin:     General: Skin is warm and dry.   Neurological:      General: No focal deficit present.      Mental Status: She is alert.   Psychiatric:         Mood and Affect: Mood normal.         Labs Reviewed:     Chemistry:  Lab Results   Component Value Date     (L) 02/08/2025    K 3.7 02/08/2025    BUN 20.7 (H) 02/08/2025    CREATININE 0.98 02/08/2025    EGFRNORACEVR >60 02/08/2025    GLUCOSE 222 (H) 02/08/2025    CALCIUM 8.3 (L) 02/08/2025    ALKPHOS 94 02/07/2025    LABPROT 5.6 (L) 02/07/2025    ALBUMIN 2.7 (L) 02/07/2025    AST 14 02/07/2025    ALT 9  02/07/2025    MG 1.80 02/06/2025    PHOS 1.7 (L) 02/06/2025    DCHXBSCT78KK 7 (L) 06/19/2024    TSH 1.225 02/04/2025    IQPABY7BXIT 0.83 01/07/2025        Lab Results   Component Value Date    HGBA1C 9.2 (H) 01/07/2025        Hematology:  Lab Results   Component Value Date    WBC 7.51 02/08/2025    HGB 10.3 (L) 02/08/2025    HCT 30.7 (L) 02/08/2025     02/08/2025       Lipid Panel:  Lab Results   Component Value Date    CHOL 201 (H) 06/19/2024    HDL 50 06/19/2024    .00 06/19/2024    TRIG 171 (H) 06/19/2024    TOTALCHOLEST 4 06/19/2024        Urine:  Lab Results   Component Value Date    APPEARANCEUA Clear 02/04/2025    SGUA 1.015 02/04/2025    PROTEINUA Negative 02/04/2025    KETONESUA 2+ (A) 02/04/2025    LEUKOCYTESUR Negative 02/04/2025    RBCUA 0-5 02/04/2025    WBCUA 0-5 02/04/2025    BACTERIA None Seen 02/04/2025    SQEPUA None Seen 02/04/2025    HYALINECASTS None Seen 06/19/2024    CREATRANDUR 123.2 (H) 06/19/2024        Assessment:       ICD-10-CM ICD-9-CM   1. Type 2 diabetes mellitus with hyperglycemia, with long-term current use of insulin  E11.65 250.00    Z79.4 790.29     V58.67   2. Primary insomnia  F51.01 307.42   3. Lumbar radiculopathy  M54.16 724.4        Plan:     1. Type 2 diabetes mellitus with hyperglycemia, with long-term current use of insulin  Assessment & Plan:  A1c 9.2 two months ago.  Regimen adjusted during patient's hospitalization for DKA. Patient is only taking NovoLog with lunch and dinner.  We will add 5 units to take with her morning coffee and protein shake.  Continue current doses of Jardiance and Lantus.  Keep scheduled follow up with dietitian.  Recheck A1c next month    Lab Results   Component Value Date    HGBA1C 9.2 (H) 01/07/2025    HGBA1C 8.9 (H) 06/19/2024    HGBA1C 8.1 (H) 04/20/2023    HGBA1C 10.0 (H) 04/04/2022    .00 06/19/2024    CREATININE 0.98 02/08/2025    CREATININE 1.13 04/20/2023      Diabetes Medications              empagliflozin  (JARDIANCE) 25 mg tablet Take 1 tablet (25 mg total) by mouth once daily.    insulin aspart U-100 (NOVOLOG) 100 unit/mL injection Inject 10 Units into the skin 3 (three) times daily before meals.    insulin glargine U-100, Lantus, 100 unit/mL injection Inject 25 Units into the skin every evening.          On ACE and Statin according to guidelines.  Discussed caution with SGLT2s + diuretics as concomitant use can cause volume depletion. Discussed caution with DPP-Ivs and HF risk.  Follow ADA Diet. Avoid soda, sweets, and limit carbs (no more than 45-50 grams per meal).  Maintain healthy weight with goal BMI <30.  Exercise 5 times per week for 30 minutes per day.  Stressed importance of daily foot exams.  Up to date on DM retinal and foot exams.           Orders:  -     insulin glargine U-100, Lantus, 100 unit/mL injection; Inject 25 Units into the skin every evening.  Dispense: 7.5 mL; Refill: 11  -     empagliflozin (JARDIANCE) 25 mg tablet; Take 1 tablet (25 mg total) by mouth once daily.  Dispense: 90 tablet; Refill: 3  -     insulin aspart U-100 (NOVOLOG) 100 unit/mL injection; Inject 10 Units into the skin 3 (three) times daily before meals.  Dispense: 9 mL; Refill: 11    2. Primary insomnia  Assessment & Plan:  Patient is taking 2 of her Ambien 5 mg to get any sleep at night.  She denied any episodes of sleepwalking or sleep activity.  No falls.  She gets into bed immediately after taking her Ambien.  We will increase to 10 daily.    Orders:  -     zolpidem (AMBIEN) 10 mg Tab; Take 1 tablet (10 mg total) by mouth nightly as needed.  Dispense: 30 tablet; Refill: 0    3. Lumbar radiculopathy  Assessment & Plan:  Patient is followed by pain management.  She had a lumbar steroid injection last month, which she reports worsened her pain.  She is requesting a refill of her Norco.  Discussed with the patient that we would not be able to prescribe long-term opioids for pain.  She has a follow up with pain management  for this month.  We will give her 14 tablets of Norco 10 mg due to worsening of her pain following lumbar steroid injection.  Continue Lyrica and tizanidine.    Orders:  -     tiZANidine (ZANAFLEX) 4 MG tablet; Take 2-3 tablets (8-12 mg total) by mouth nightly as needed (muscle spasms).  Dispense: 90 tablet; Refill: 3  -     pregabalin (LYRICA) 75 MG capsule; Take 1 capsule (75 mg total) by mouth 3 (three) times daily.  Dispense: 90 capsule; Refill: 6  -     HYDROcodone-acetaminophen (NORCO)  mg per tablet; Take 1 tablet by mouth every 8 (eight) hours as needed for Pain.  Dispense: 14 tablet; Refill: 0    Other orders  -     blood pressure monitor Kit; 1 each by Misc.(Non-Drug; Combo Route) route once daily.  Dispense: 1 each; Refill: 0         Follow up in about 3 months (around 6/5/2025). In addition to their scheduled follow up, the patient has also been instructed to follow up on as needed basis.     Future Appointments   Date Time Provider Department Center   4/14/2025  1:00 PM Niyah Bunch RD [facility]   10/1/2025 10:40 AM [facility]        Diann Ray MD

## 2025-03-05 NOTE — ASSESSMENT & PLAN NOTE
Patient is taking 2 of her Ambien 5 mg to get any sleep at night.  She denied any episodes of sleepwalking or sleep activity.  No falls.  She gets into bed immediately after taking her Ambien.  We will increase to 10 daily.

## 2025-03-05 NOTE — PATIENT INSTRUCTIONS
Take 5 units of Novolog with your breakfast (coffee and protein shake). Take 10 units with lunch and dinner. Keep everything the same.

## 2025-03-06 ENCOUNTER — PATIENT MESSAGE (OUTPATIENT)
Dept: ADMINISTRATIVE | Facility: OTHER | Age: 65
End: 2025-03-06
Payer: MEDICARE

## 2025-03-06 NOTE — PROGRESS NOTES
LPN spoke to patient/caregiver as per OPCM referral for: food    Does the patient consent to completing the assessment/enrollment: Yes  Does the patient consent for LPN to speak to a caregiver? No    Health Insurance Coverage:     Does the patient have adequate health insurance coverage? Yes  Education provided: Yes    PCP Follow-Up Appointments:    Does the patient have a primary care provider? yes - Seema Mirza MD  Date of last PCP appointment? 3/6/25  Next PCP appointment:  10/1/25  Was patient provided with education surrounding PCP services/creating a medical home? yes -       Specialist Appointments:     Does the patient have a pending specialist referral? no  Does the patient have an upcoming specialist appointment? yes - DM  Is the patient pregnant? No  Does the patient have a mental health provider? no       Home Medications:     Reviewed medication list with patient? No  Is the patient able to afford their medications? Yes        Recent lab results:  Blood Sugar:    Provided education: Yes  Blood Pressure:   Provided education: Yes        Social Determinants of Health (SDOH)    Patient's identified areas of need:      Education/Resources provided:          Home Health/DME:    Current Home Health  Patient has all healthcare equipment/supplies indicated: yes      Additional Documentation:   Spoke to patient based on OPCM referral for food resources. Will put some info in portal on meals on wheels and food gunderson. Pt does get SNAP. Denies issues with housing/transp/utilities. Pt recently saw pcp and will f/u in Oct 2025. Dicussed importance of monitoring BS and taking meds as prescribed. Will f/u.       Follow up:   Patient agrees to scheduled follow up call.

## 2025-03-19 ENCOUNTER — PATIENT OUTREACH (OUTPATIENT)
Dept: ADMINISTRATIVE | Facility: OTHER | Age: 65
End: 2025-03-19
Payer: MEDICARE

## 2025-03-19 NOTE — PROGRESS NOTES
CHW - Case Closure    This LPN spoke to patient via telephone today.   Pt/Caregiver reported: Pt states she did receive resources and has gotten some food from a local pantry. Asked to call in future if needs.   Pt/Caregiver denied any additional needs at this time and agrees with episode closure at this time.  Provided patient with Community Health Worker's contact information and encouraged him/her to contact this Community Health Worker if additional needs arise.

## 2025-04-10 DIAGNOSIS — F51.01 PRIMARY INSOMNIA: ICD-10-CM

## 2025-04-10 NOTE — TELEPHONE ENCOUNTER
Care Due:                  Date            Visit Type   Department     Provider  --------------------------------------------------------------------------------                                EP -                              PRIMARY      [facility]  Last Visit: 01-      CARE ([facility])   ADELA Mirza                               -                              [facility]  Next Visit: 10-      CARE ([facility])   ADELA Mirza                                                            Last  Test          Frequency    Reason                     Performed    Due Date  --------------------------------------------------------------------------------    Lipid Panel.  12 months..  atorvastatin.............  06- 06-    Health [facility] Embedded Care Due Messages. Reference number: [id number].   4/10/2025 10:15:25 AM CDT

## 2025-04-11 RX ORDER — ZOLPIDEM TARTRATE 10 MG/1
10 TABLET ORAL NIGHTLY PRN
Qty: 30 TABLET | Refills: 1 | Status: SHIPPED | OUTPATIENT
Start: 2025-04-11 | End: 2025-06-10

## 2025-05-01 ENCOUNTER — PATIENT OUTREACH (OUTPATIENT)
Facility: CLINIC | Age: 65
End: 2025-05-01
Payer: MEDICARE

## 2025-05-01 NOTE — PROGRESS NOTES
Health Maintenance Topic(s) Outreach Outcomes & Actions Taken:    Blood Pressure - Outreach Outcomes & Actions Taken  : portal msg sent for remote BP       Additional Notes:  Attempt made to contact patient. No answer. Mailbox full. Portal msg sent.  Annual Wellness Visit: Due     Next PCP F/U: 10/1/2025  SDOH Incomplete- financial, transportation & food   Health Maintenance Topics Overdue:  VBHM Score: 2   Hemoglobin A1c  Uncontrolled BP       HTN: uncontrolled- portal msg sent for remote BP  DM: Last A1c 9.2.   Statin Therapy for prevention of CVD: Atorvastatin       Care Management, Digital Medicine, and/or Education Referrals      Next Steps - Referral Actions: Currently enrolled in DM Education. OPCM referral at last outreach. Closed 3/19/2025.

## 2025-09-05 RX ORDER — SEMAGLUTIDE 0.68 MG/ML
0.5 INJECTION, SOLUTION SUBCUTANEOUS
OUTPATIENT
Start: 2025-09-05

## (undated) DEVICE — SOL NS 1000CC

## (undated) DEVICE — LUBRICANT SURGILUBE 2 OZ

## (undated) DEVICE — PULSAVAC ZIMMER

## (undated) DEVICE — BLANKET UPPER BODY 78.7X29.9IN

## (undated) DEVICE — CHLORAPREP W TINT 26ML APPL

## (undated) DEVICE — SUT ABS CLIP LAPRA-TY CTD

## (undated) DEVICE — CLIPPER BLADE MOD 4406 (CAREF)

## (undated) DEVICE — CATH POLLACK OPEN-END FLEXI-TI

## (undated) DEVICE — CUFF TOURNIQUET DL PRT

## (undated) DEVICE — SUT 0 V-LOC GR GS-21 TAPER

## (undated) DEVICE — TROCAR ENDOPATH XCEL 5X100MM

## (undated) DEVICE — HOOD FLYTE PEELWY STERISHIELD

## (undated) DEVICE — SUT ETHIBOND EXCEL 0 CT2 30

## (undated) DEVICE — SET DECANTER MEDICHOICE

## (undated) DEVICE — Device

## (undated) DEVICE — SUT MCRYL PLUS 4-0 PS2 27IN

## (undated) DEVICE — SUT CTD VICRYL 0 UND BR CT

## (undated) DEVICE — SUT 2/0 18IN COATED VICRYL

## (undated) DEVICE — SOL IRR WATER STRL 3000 ML

## (undated) DEVICE — SEE MEDLINE ITEM 146373

## (undated) DEVICE — SUT VICRYL CTD 2-0 GI 27 SH

## (undated) DEVICE — SEE MEDLINE ITEM 156930

## (undated) DEVICE — CARTRIDGE BABCOCK GRASPER 5X38

## (undated) DEVICE — SUT 1 18IN COATED VICRYL U

## (undated) DEVICE — SOL SOD CHLORIDE 0.9% 10ML

## (undated) DEVICE — SUT GORETEX CV-4 TH-26 36IN

## (undated) DEVICE — CATH 5FR OPEN END URETERAL

## (undated) DEVICE — ELECTRODE REM PLYHSV RETURN 9

## (undated) DEVICE — PACK PERI/GYN OPTIMA

## (undated) DEVICE — SYR 50CC LL

## (undated) DEVICE — COVER OVERHEAD SURG LT BLUE

## (undated) DEVICE — UNDERGLOVES BIOGEL PI SIZE 8

## (undated) DEVICE — SOL 9P NACL IRR PIC IL

## (undated) DEVICE — BLADE SAW OSC 19.5 X 1.2 X 90

## (undated) DEVICE — PIN PINABALL HEADLESS
Type: IMPLANTABLE DEVICE | Site: KNEE | Status: NON-FUNCTIONAL
Removed: 2017-06-26

## (undated) DEVICE — GLOVE BIOGEL SKINSENSE PI 6.5

## (undated) DEVICE — SYR 10CC LUER LOCK

## (undated) DEVICE — NDL HYPO REG 25G X 1 1/2

## (undated) DEVICE — SHEET DRAPE FAN-FOLDED 3/4

## (undated) DEVICE — NDL 18GA X1 1/2 REG BEVEL

## (undated) DEVICE — BLADE SURG STAINLESS STEEL #15

## (undated) DEVICE — KIT WING PAD POSITIONING

## (undated) DEVICE — UNDERGLOVES BIOGEL PI SIZE 7.5

## (undated) DEVICE — DRAPE ARM DAVINCI XI

## (undated) DEVICE — GLOVE SURGICAL LATEX SZ 8

## (undated) DEVICE — DRAPE STERI U-SHAPED 47X51IN

## (undated) DEVICE — SEE MEDLINE ITEM 152487

## (undated) DEVICE — IRRIGATOR ENDOSCOPY DISP.

## (undated) DEVICE — TIP SACROCOLPOPEXY SM 1.3X3.5

## (undated) DEVICE — TRAY FOLEY 16FR INFECTION CONT

## (undated) DEVICE — SEE MEDLINE ITEM 156923

## (undated) DEVICE — SOL IRR NACL .9% 3000ML

## (undated) DEVICE — SET TRI-LUMEN FILTERED TUBE

## (undated) DEVICE — SEE MEDLINE ITEM 157181

## (undated) DEVICE — PAD PREP 50/CA

## (undated) DEVICE — SET CYSTO IRRIGATION UNIV SPIK

## (undated) DEVICE — GLOVE BIOGEL SKINSENSE PI 7.5

## (undated) DEVICE — DRAPE COLUMN DAVINCI XI

## (undated) DEVICE — SET IRR URLGY 2LINE UNIV SPIKE

## (undated) DEVICE — OBTURATOR 8MM BLUNT XI

## (undated) DEVICE — SEE MEDLINE ITEM 154981

## (undated) DEVICE — SUT VICRYL+ 27 UR-6 VIOL

## (undated) DEVICE — SUPPORT ULNA NERVE PROTECTOR

## (undated) DEVICE — SUT VICRYL PLUS ANTIBACT

## (undated) DEVICE — SEE MEDLINE ITEM 152622

## (undated) DEVICE — GUIDE WIRE MOTION .035 X 150CM

## (undated) DEVICE — PORT AIRSEAL 12/120MM LPI

## (undated) DEVICE — BANDAGE GAUZE 6PLY FLUFF 2X3

## (undated) DEVICE — DRAPE ADPT RUMI II ADVINCULA

## (undated) DEVICE — UNDERGLOVE BIOGEL PI SZ 6.5 LF

## (undated) DEVICE — WIRE GD LUB STD 3CM .038 150CM

## (undated) DEVICE — KIT PROCEDURE STER INLET CLOSU

## (undated) DEVICE — GOWN SURGICAL X-LARGE

## (undated) DEVICE — CANISTER SUCTION 2 LTR

## (undated) DEVICE — SUT MONOCRYL 0 CT-1 UND MON

## (undated) DEVICE — SOL STRL WATER INJ 1000ML BG

## (undated) DEVICE — GLOVE BIOGEL SKINSENSE PI 7.0

## (undated) DEVICE — CATH IV CATHLON W/HUB14GAX

## (undated) DEVICE — SEAL UNIVERSAL 5MM-8MM XI

## (undated) DEVICE — PAD ABD 8X10 STERILE

## (undated) DEVICE — DEVICE ANC SW STAT FOLEY 6-24

## (undated) DEVICE — SOL ELECTROLUBE ANTI-STIC

## (undated) DEVICE — NDL SPINAL 18GX3.5 SPINOCAN

## (undated) DEVICE — COVER TIP CURVED SCISSORS XI